# Patient Record
Sex: FEMALE | Race: WHITE | NOT HISPANIC OR LATINO | Employment: OTHER | URBAN - METROPOLITAN AREA
[De-identification: names, ages, dates, MRNs, and addresses within clinical notes are randomized per-mention and may not be internally consistent; named-entity substitution may affect disease eponyms.]

---

## 2019-05-07 ENCOUNTER — HOSPITAL ENCOUNTER (OUTPATIENT)
Dept: OTHER | Facility: HOSPITAL | Age: 79
Discharge: HOME | End: 2019-05-07
Attending: PSYCHIATRY & NEUROLOGY

## 2019-05-07 LAB
AMPHETAMINES UR QL SCN: NEGATIVE
BARBITURATES UR QL SCN: NEGATIVE
BENZODIAZ UR QL SCN: NEGATIVE
CONV COCAINE, UR: NEGATIVE
METHADONE UR QL SCN: NEGATIVE
OPIATES TESTED UR SCN: NEGATIVE
OXYCODONE UR QL SCN: NEGATIVE
PCP UR QL: NEGATIVE
THC SERPLBLD CFM-MCNC: NEGATIVE NG/ML

## 2019-05-09 LAB — ETHANOL UR-MCNC: NEGATIVE MG/DL

## 2019-06-04 ENCOUNTER — HOSPITAL ENCOUNTER (OUTPATIENT)
Dept: OTHER | Facility: HOSPITAL | Age: 79
Discharge: HOME | End: 2019-06-04
Attending: PSYCHIATRY & NEUROLOGY

## 2019-06-04 LAB
CHOLEST SERPL-MCNC: 113 MG/DL (ref 107–200)
EST. AVERAGE GLUCOSE BLD GHB EST-MCNC: 197 MG/DL
HBA1C MFR BLD: 8.5 % (ref 3.5–5.7)
VALPROATE SERPL-MCNC: 57.6 UG/ML (ref 50–100)

## 2019-10-29 ENCOUNTER — HOSPITAL ENCOUNTER (OUTPATIENT)
Dept: OTHER | Facility: HOSPITAL | Age: 79
Discharge: HOME | End: 2019-10-29
Attending: PSYCHIATRY & NEUROLOGY

## 2019-10-29 LAB — VALPROATE SERPL-MCNC: 40.1 UG/ML (ref 50–100)

## 2020-05-26 ENCOUNTER — HOSPITAL ENCOUNTER (OUTPATIENT)
Dept: LAB | Facility: HOSPITAL | Age: 80
Discharge: HOME | End: 2020-05-26
Attending: PSYCHIATRY & NEUROLOGY

## 2020-05-26 LAB
AMPHETAMINES UR QL SCN: NEGATIVE
BARBITURATES UR QL SCN: NEGATIVE
BENZODIAZ UR QL SCN: NEGATIVE
CONV COCAINE, UR: NEGATIVE
ETHANOL BLD-MCNC: <10 MG/DL
METHADONE UR QL SCN: NEGATIVE
OPIATES TESTED UR SCN: NEGATIVE
OXYCODONE UR QL SCN: NEGATIVE
PCP UR QL: NEGATIVE
THC SERPLBLD CFM-MCNC: NEGATIVE NG/ML

## 2020-10-26 ENCOUNTER — APPOINTMENT (OUTPATIENT)
Dept: GENERAL RADIOLOGY | Facility: HOSPITAL | Age: 80
End: 2020-10-26

## 2020-10-26 ENCOUNTER — HOSPITAL ENCOUNTER (INPATIENT)
Facility: HOSPITAL | Age: 80
LOS: 6 days | Discharge: HOME HEALTH | End: 2020-11-03
Attending: INTERNAL MEDICINE | Admitting: INTERNAL MEDICINE

## 2020-10-26 ENCOUNTER — APPOINTMENT (OUTPATIENT)
Dept: CT IMAGING | Facility: HOSPITAL | Age: 80
End: 2020-10-26

## 2020-10-26 DIAGNOSIS — N30.01 ACUTE CYSTITIS WITH HEMATURIA: ICD-10-CM

## 2020-10-26 DIAGNOSIS — U07.1 PNEUMONIA DUE TO COVID-19 VIRUS: Primary | ICD-10-CM

## 2020-10-26 DIAGNOSIS — R06.00 DYSPNEA, UNSPECIFIED TYPE: ICD-10-CM

## 2020-10-26 DIAGNOSIS — J12.82 PNEUMONIA DUE TO COVID-19 VIRUS: Primary | ICD-10-CM

## 2020-10-26 LAB
ALBUMIN SERPL-MCNC: 3 G/DL (ref 3.5–5.2)
ALBUMIN/GLOB SERPL: 0.8 G/DL
ALP SERPL-CCNC: 82 U/L (ref 39–117)
ALT SERPL W P-5'-P-CCNC: 14 U/L (ref 1–33)
ANION GAP SERPL CALCULATED.3IONS-SCNC: 13 MMOL/L (ref 5–15)
ANISOCYTOSIS BLD QL: ABNORMAL
AST SERPL-CCNC: 27 U/L (ref 1–32)
B PARAPERT DNA SPEC QL NAA+PROBE: NOT DETECTED
B PERT DNA SPEC QL NAA+PROBE: NOT DETECTED
BACTERIA UR QL AUTO: ABNORMAL /HPF
BILIRUB SERPL-MCNC: 0.2 MG/DL (ref 0–1.2)
BILIRUB UR QL STRIP: NEGATIVE
BUN SERPL-MCNC: 16 MG/DL (ref 8–23)
BUN/CREAT SERPL: 11 (ref 7–25)
C PNEUM DNA NPH QL NAA+NON-PROBE: NOT DETECTED
CALCIUM SPEC-SCNC: 8.9 MG/DL (ref 8.6–10.5)
CHLORIDE SERPL-SCNC: 103 MMOL/L (ref 98–107)
CLARITY UR: ABNORMAL
CO2 SERPL-SCNC: 23 MMOL/L (ref 22–29)
COLOR UR: YELLOW
CREAT SERPL-MCNC: 1.45 MG/DL (ref 0.57–1)
CRP SERPL-MCNC: 13.34 MG/DL (ref 0–0.5)
D DIMER PPP FEU-MCNC: 2.45 MG/L (FEU) (ref 0–0.59)
DEPRECATED RDW RBC AUTO: 43.8 FL (ref 37–54)
ERYTHROCYTE [DISTWIDTH] IN BLOOD BY AUTOMATED COUNT: 18.2 % (ref 12.3–15.4)
FERRITIN SERPL-MCNC: 96.58 NG/ML (ref 13–150)
FLUAV H1 2009 PAND RNA NPH QL NAA+PROBE: NOT DETECTED
FLUAV H1 HA GENE NPH QL NAA+PROBE: NOT DETECTED
FLUAV H3 RNA NPH QL NAA+PROBE: NOT DETECTED
FLUAV SUBTYP SPEC NAA+PROBE: NOT DETECTED
FLUBV RNA ISLT QL NAA+PROBE: NOT DETECTED
GFR SERPL CREATININE-BSD FRML MDRD: 35 ML/MIN/1.73
GLOBULIN UR ELPH-MCNC: 3.6 GM/DL
GLUCOSE SERPL-MCNC: 203 MG/DL (ref 65–99)
GLUCOSE UR STRIP-MCNC: ABNORMAL MG/DL
HADV DNA SPEC NAA+PROBE: NOT DETECTED
HCOV 229E RNA SPEC QL NAA+PROBE: NOT DETECTED
HCOV HKU1 RNA SPEC QL NAA+PROBE: NOT DETECTED
HCOV NL63 RNA SPEC QL NAA+PROBE: NOT DETECTED
HCOV OC43 RNA SPEC QL NAA+PROBE: NOT DETECTED
HCT VFR BLD AUTO: 26.2 % (ref 34–46.6)
HGB BLD-MCNC: 8.1 G/DL (ref 12–15.9)
HGB UR QL STRIP.AUTO: ABNORMAL
HMPV RNA NPH QL NAA+NON-PROBE: NOT DETECTED
HOLD SPECIMEN: NORMAL
HOLD SPECIMEN: NORMAL
HPIV1 RNA SPEC QL NAA+PROBE: NOT DETECTED
HPIV2 RNA SPEC QL NAA+PROBE: NOT DETECTED
HPIV3 RNA NPH QL NAA+PROBE: NOT DETECTED
HPIV4 P GENE NPH QL NAA+PROBE: NOT DETECTED
HYALINE CASTS UR QL AUTO: ABNORMAL /LPF
KETONES UR QL STRIP: NEGATIVE
LDH SERPL-CCNC: 334 U/L (ref 135–214)
LEUKOCYTE ESTERASE UR QL STRIP.AUTO: ABNORMAL
LYMPHOCYTES # BLD MANUAL: 0.67 10*3/MM3 (ref 0.7–3.1)
LYMPHOCYTES NFR BLD MANUAL: 9 % (ref 19.6–45.3)
LYMPHOCYTES NFR BLD MANUAL: 9 % (ref 5–12)
M PNEUMO IGG SER IA-ACNC: NOT DETECTED
MCH RBC QN AUTO: 20.7 PG (ref 26.6–33)
MCHC RBC AUTO-ENTMCNC: 30.7 G/DL (ref 31.5–35.7)
MCV RBC AUTO: 67.3 FL (ref 79–97)
MICROCYTES BLD QL: ABNORMAL
MONOCYTES # BLD AUTO: 0.67 10*3/MM3 (ref 0.1–0.9)
NEUTROPHILS # BLD AUTO: 6.07 10*3/MM3 (ref 1.7–7)
NEUTROPHILS NFR BLD MANUAL: 82 % (ref 42.7–76)
NITRITE UR QL STRIP: POSITIVE
PH UR STRIP.AUTO: 6.5 [PH] (ref 5–8)
PLAT MORPH BLD: NORMAL
PLATELET # BLD AUTO: 310 10*3/MM3 (ref 140–450)
PMV BLD AUTO: 7.6 FL (ref 6–12)
POTASSIUM SERPL-SCNC: 3.1 MMOL/L (ref 3.5–5.2)
PROCALCITONIN SERPL-MCNC: 0.2 NG/ML (ref 0–0.25)
PROT SERPL-MCNC: 6.6 G/DL (ref 6–8.5)
PROT UR QL STRIP: ABNORMAL
RBC # BLD AUTO: 3.9 10*6/MM3 (ref 3.77–5.28)
RBC # UR: ABNORMAL /HPF
REF LAB TEST METHOD: ABNORMAL
RHINOVIRUS RNA SPEC NAA+PROBE: NOT DETECTED
RSV RNA NPH QL NAA+NON-PROBE: NOT DETECTED
SARS-COV-2 RNA NPH QL NAA+NON-PROBE: DETECTED
SCAN SLIDE: NORMAL
SODIUM SERPL-SCNC: 139 MMOL/L (ref 136–145)
SP GR UR STRIP: 1.02 (ref 1–1.03)
SQUAMOUS #/AREA URNS HPF: ABNORMAL /HPF
UROBILINOGEN UR QL STRIP: ABNORMAL
WBC # BLD AUTO: 7.4 10*3/MM3 (ref 3.4–10.8)
WBC MORPH BLD: NORMAL
WBC UR QL AUTO: ABNORMAL /HPF
WHOLE BLOOD HOLD SPECIMEN: NORMAL
WHOLE BLOOD HOLD SPECIMEN: NORMAL

## 2020-10-26 PROCEDURE — 85379 FIBRIN DEGRADATION QUANT: CPT | Performed by: NURSE PRACTITIONER

## 2020-10-26 PROCEDURE — 71045 X-RAY EXAM CHEST 1 VIEW: CPT

## 2020-10-26 PROCEDURE — 71275 CT ANGIOGRAPHY CHEST: CPT

## 2020-10-26 PROCEDURE — 83615 LACTATE (LD) (LDH) ENZYME: CPT | Performed by: NURSE PRACTITIONER

## 2020-10-26 PROCEDURE — 87186 SC STD MICRODIL/AGAR DIL: CPT | Performed by: NURSE PRACTITIONER

## 2020-10-26 PROCEDURE — 84145 PROCALCITONIN (PCT): CPT | Performed by: NURSE PRACTITIONER

## 2020-10-26 PROCEDURE — 80053 COMPREHEN METABOLIC PANEL: CPT | Performed by: NURSE PRACTITIONER

## 2020-10-26 PROCEDURE — 93005 ELECTROCARDIOGRAM TRACING: CPT | Performed by: NURSE PRACTITIONER

## 2020-10-26 PROCEDURE — 87899 AGENT NOS ASSAY W/OPTIC: CPT | Performed by: NURSE PRACTITIONER

## 2020-10-26 PROCEDURE — 85007 BL SMEAR W/DIFF WBC COUNT: CPT | Performed by: NURSE PRACTITIONER

## 2020-10-26 PROCEDURE — 99284 EMERGENCY DEPT VISIT MOD MDM: CPT

## 2020-10-26 PROCEDURE — 87077 CULTURE AEROBIC IDENTIFY: CPT | Performed by: NURSE PRACTITIONER

## 2020-10-26 PROCEDURE — 87147 CULTURE TYPE IMMUNOLOGIC: CPT | Performed by: NURSE PRACTITIONER

## 2020-10-26 PROCEDURE — 87086 URINE CULTURE/COLONY COUNT: CPT | Performed by: NURSE PRACTITIONER

## 2020-10-26 PROCEDURE — G0378 HOSPITAL OBSERVATION PER HR: HCPCS

## 2020-10-26 PROCEDURE — 36415 COLL VENOUS BLD VENIPUNCTURE: CPT

## 2020-10-26 PROCEDURE — 87150 DNA/RNA AMPLIFIED PROBE: CPT | Performed by: NURSE PRACTITIONER

## 2020-10-26 PROCEDURE — 82728 ASSAY OF FERRITIN: CPT | Performed by: NURSE PRACTITIONER

## 2020-10-26 PROCEDURE — 87040 BLOOD CULTURE FOR BACTERIA: CPT | Performed by: NURSE PRACTITIONER

## 2020-10-26 PROCEDURE — 99223 1ST HOSP IP/OBS HIGH 75: CPT | Performed by: NURSE PRACTITIONER

## 2020-10-26 PROCEDURE — 85025 COMPLETE CBC W/AUTO DIFF WBC: CPT | Performed by: NURSE PRACTITIONER

## 2020-10-26 PROCEDURE — 25010000002 CEFTRIAXONE PER 250 MG: Performed by: NURSE PRACTITIONER

## 2020-10-26 PROCEDURE — 0202U NFCT DS 22 TRGT SARS-COV-2: CPT | Performed by: NURSE PRACTITIONER

## 2020-10-26 PROCEDURE — 86140 C-REACTIVE PROTEIN: CPT | Performed by: NURSE PRACTITIONER

## 2020-10-26 PROCEDURE — 0 IOPAMIDOL PER 1 ML: Performed by: NURSE PRACTITIONER

## 2020-10-26 PROCEDURE — 81001 URINALYSIS AUTO W/SCOPE: CPT | Performed by: NURSE PRACTITIONER

## 2020-10-26 RX ORDER — ONDANSETRON 4 MG/1
4 TABLET, FILM COATED ORAL EVERY 6 HOURS PRN
Status: DISCONTINUED | OUTPATIENT
Start: 2020-10-26 | End: 2020-11-03 | Stop reason: HOSPADM

## 2020-10-26 RX ORDER — ACETAMINOPHEN 325 MG/1
650 TABLET ORAL EVERY 4 HOURS PRN
Status: DISCONTINUED | OUTPATIENT
Start: 2020-10-26 | End: 2020-11-03 | Stop reason: HOSPADM

## 2020-10-26 RX ORDER — POTASSIUM CHLORIDE 20 MEQ/1
40 TABLET, EXTENDED RELEASE ORAL DAILY
Status: DISCONTINUED | OUTPATIENT
Start: 2020-10-26 | End: 2020-11-03 | Stop reason: HOSPADM

## 2020-10-26 RX ORDER — ONDANSETRON 2 MG/ML
4 INJECTION INTRAMUSCULAR; INTRAVENOUS EVERY 6 HOURS PRN
Status: DISCONTINUED | OUTPATIENT
Start: 2020-10-26 | End: 2020-11-03 | Stop reason: HOSPADM

## 2020-10-26 RX ORDER — SODIUM CHLORIDE 0.9 % (FLUSH) 0.9 %
10 SYRINGE (ML) INJECTION EVERY 12 HOURS SCHEDULED
Status: DISCONTINUED | OUTPATIENT
Start: 2020-10-26 | End: 2020-11-03 | Stop reason: HOSPADM

## 2020-10-26 RX ORDER — ACETAMINOPHEN 650 MG/1
650 SUPPOSITORY RECTAL EVERY 4 HOURS PRN
Status: DISCONTINUED | OUTPATIENT
Start: 2020-10-26 | End: 2020-11-03 | Stop reason: HOSPADM

## 2020-10-26 RX ORDER — SODIUM CHLORIDE 0.9 % (FLUSH) 0.9 %
10 SYRINGE (ML) INJECTION AS NEEDED
Status: DISCONTINUED | OUTPATIENT
Start: 2020-10-26 | End: 2020-11-03 | Stop reason: HOSPADM

## 2020-10-26 RX ORDER — HEPARIN SODIUM 5000 [USP'U]/ML
5000 INJECTION, SOLUTION INTRAVENOUS; SUBCUTANEOUS EVERY 12 HOURS SCHEDULED
Status: DISCONTINUED | OUTPATIENT
Start: 2020-10-27 | End: 2020-10-27

## 2020-10-26 RX ORDER — ACETAMINOPHEN 160 MG/5ML
650 SOLUTION ORAL EVERY 4 HOURS PRN
Status: DISCONTINUED | OUTPATIENT
Start: 2020-10-26 | End: 2020-11-03 | Stop reason: HOSPADM

## 2020-10-26 RX ADMIN — IOPAMIDOL 100 ML: 755 INJECTION, SOLUTION INTRAVENOUS at 18:55

## 2020-10-26 RX ADMIN — POTASSIUM CHLORIDE 40 MEQ: 1500 TABLET, EXTENDED RELEASE ORAL at 18:28

## 2020-10-26 RX ADMIN — CEFTRIAXONE SODIUM 1 G: 10 INJECTION, POWDER, FOR SOLUTION INTRAVENOUS at 18:28

## 2020-10-27 PROBLEM — N17.9 ACUTE RENAL INJURY (HCC): Status: ACTIVE | Noted: 2020-10-27

## 2020-10-27 PROBLEM — R53.1 GENERALIZED WEAKNESS: Status: ACTIVE | Noted: 2020-10-27

## 2020-10-27 PROBLEM — G93.41 METABOLIC ENCEPHALOPATHY: Status: ACTIVE | Noted: 2020-10-27

## 2020-10-27 LAB
BACTERIA BLD CULT: ABNORMAL
BOTTLE TYPE: ABNORMAL
D DIMER PPP FEU-MCNC: 2.24 MG/L (FEU) (ref 0–0.59)
D-LACTATE SERPL-SCNC: 1.2 MMOL/L (ref 0.5–2)
GLUCOSE BLDC GLUCOMTR-MCNC: 162 MG/DL (ref 70–105)
GLUCOSE BLDC GLUCOMTR-MCNC: 242 MG/DL (ref 70–105)
GLUCOSE BLDC GLUCOMTR-MCNC: 325 MG/DL (ref 70–105)
HBA1C MFR BLD: 10.3 % (ref 3.5–5.6)
HCT VFR BLD AUTO: 23.4 % (ref 34–46.6)
HGB BLD-MCNC: 7.1 G/DL (ref 12–15.9)
S PNEUM AG SPEC QL LA: NEGATIVE
VALPROATE SERPL-MCNC: 30.1 MCG/ML (ref 50–125)

## 2020-10-27 PROCEDURE — 25010000002 ENOXAPARIN PER 10 MG: Performed by: INTERNAL MEDICINE

## 2020-10-27 PROCEDURE — 25010000002 HEPARIN (PORCINE) PER 1000 UNITS: Performed by: NURSE PRACTITIONER

## 2020-10-27 PROCEDURE — 63710000001 INSULIN LISPRO (HUMAN) PER 5 UNITS: Performed by: NURSE PRACTITIONER

## 2020-10-27 PROCEDURE — 83036 HEMOGLOBIN GLYCOSYLATED A1C: CPT | Performed by: NURSE PRACTITIONER

## 2020-10-27 PROCEDURE — 25010000002 AZITHROMYCIN PER 500 MG: Performed by: NURSE PRACTITIONER

## 2020-10-27 PROCEDURE — 82962 GLUCOSE BLOOD TEST: CPT

## 2020-10-27 PROCEDURE — 63710000001 INSULIN GLARGINE PER 5 UNITS: Performed by: INTERNAL MEDICINE

## 2020-10-27 PROCEDURE — 85018 HEMOGLOBIN: CPT | Performed by: NURSE PRACTITIONER

## 2020-10-27 PROCEDURE — 25010000002 CEFTRIAXONE PER 250 MG: Performed by: NURSE PRACTITIONER

## 2020-10-27 PROCEDURE — 83605 ASSAY OF LACTIC ACID: CPT | Performed by: NURSE PRACTITIONER

## 2020-10-27 PROCEDURE — G0378 HOSPITAL OBSERVATION PER HR: HCPCS

## 2020-10-27 PROCEDURE — 99233 SBSQ HOSP IP/OBS HIGH 50: CPT | Performed by: INTERNAL MEDICINE

## 2020-10-27 PROCEDURE — 87040 BLOOD CULTURE FOR BACTERIA: CPT | Performed by: INTERNAL MEDICINE

## 2020-10-27 PROCEDURE — 85014 HEMATOCRIT: CPT | Performed by: NURSE PRACTITIONER

## 2020-10-27 PROCEDURE — 85379 FIBRIN DEGRADATION QUANT: CPT

## 2020-10-27 PROCEDURE — 80164 ASSAY DIPROPYLACETIC ACD TOT: CPT | Performed by: NURSE PRACTITIONER

## 2020-10-27 RX ORDER — DEXTROSE MONOHYDRATE 25 G/50ML
25 INJECTION, SOLUTION INTRAVENOUS
Status: DISCONTINUED | OUTPATIENT
Start: 2020-10-27 | End: 2020-11-03 | Stop reason: HOSPADM

## 2020-10-27 RX ORDER — PREDNISOLONE ACETATE 10 MG/ML
1 SUSPENSION/ DROPS OPHTHALMIC 4 TIMES DAILY
Status: DISCONTINUED | OUTPATIENT
Start: 2020-10-27 | End: 2020-11-03 | Stop reason: HOSPADM

## 2020-10-27 RX ORDER — SODIUM CHLORIDE 9 MG/ML
75 INJECTION, SOLUTION INTRAVENOUS CONTINUOUS
Status: DISCONTINUED | OUTPATIENT
Start: 2020-10-27 | End: 2020-11-01

## 2020-10-27 RX ORDER — ROSUVASTATIN CALCIUM 20 MG/1
20 TABLET, COATED ORAL
COMMUNITY

## 2020-10-27 RX ORDER — POTASSIUM CHLORIDE 20 MEQ/1
40 TABLET, EXTENDED RELEASE ORAL AS NEEDED
Status: DISCONTINUED | OUTPATIENT
Start: 2020-10-27 | End: 2020-11-03 | Stop reason: HOSPADM

## 2020-10-27 RX ORDER — LEVOTHYROXINE SODIUM 0.07 MG/1
37.5 TABLET ORAL DAILY
Status: DISCONTINUED | OUTPATIENT
Start: 2020-10-27 | End: 2020-11-03 | Stop reason: HOSPADM

## 2020-10-27 RX ORDER — ROSUVASTATIN CALCIUM 10 MG/1
20 TABLET, COATED ORAL NIGHTLY
Status: DISCONTINUED | OUTPATIENT
Start: 2020-10-27 | End: 2020-11-03 | Stop reason: HOSPADM

## 2020-10-27 RX ORDER — RISPERIDONE 0.25 MG/1
0.5 TABLET ORAL EVERY MORNING
Status: DISCONTINUED | OUTPATIENT
Start: 2020-10-28 | End: 2020-11-03 | Stop reason: HOSPADM

## 2020-10-27 RX ORDER — OXYBUTYNIN CHLORIDE 5 MG/1
5 TABLET ORAL EVERY MORNING
COMMUNITY

## 2020-10-27 RX ORDER — OFLOXACIN 3 MG/ML
1 SOLUTION/ DROPS OPHTHALMIC 4 TIMES DAILY
COMMUNITY
End: 2022-09-26

## 2020-10-27 RX ORDER — NICOTINE POLACRILEX 4 MG
15 LOZENGE BUCCAL
Status: DISCONTINUED | OUTPATIENT
Start: 2020-10-27 | End: 2020-11-03 | Stop reason: HOSPADM

## 2020-10-27 RX ORDER — LORAZEPAM 0.5 MG/1
0.5 TABLET ORAL 3 TIMES DAILY
COMMUNITY

## 2020-10-27 RX ORDER — OFLOXACIN 3 MG/ML
1 SOLUTION/ DROPS OPHTHALMIC 4 TIMES DAILY
Status: DISCONTINUED | OUTPATIENT
Start: 2020-10-27 | End: 2020-11-03 | Stop reason: HOSPADM

## 2020-10-27 RX ORDER — LEVOTHYROXINE SODIUM 0.03 MG/1
37.5 TABLET ORAL EVERY MORNING
COMMUNITY
End: 2022-10-11

## 2020-10-27 RX ORDER — RISPERIDONE 1 MG/1
1 TABLET ORAL DAILY
Status: DISCONTINUED | OUTPATIENT
Start: 2020-10-27 | End: 2020-11-03 | Stop reason: HOSPADM

## 2020-10-27 RX ORDER — PREDNISOLONE ACETATE 10 MG/ML
1 SUSPENSION/ DROPS OPHTHALMIC 4 TIMES DAILY
COMMUNITY
End: 2022-09-26

## 2020-10-27 RX ORDER — GLIPIZIDE 10 MG/1
20 TABLET ORAL
COMMUNITY
End: 2022-11-15 | Stop reason: HOSPADM

## 2020-10-27 RX ORDER — RISPERIDONE 0.25 MG/1
0.5 TABLET ORAL NIGHTLY
Status: DISCONTINUED | OUTPATIENT
Start: 2020-10-27 | End: 2020-11-03 | Stop reason: HOSPADM

## 2020-10-27 RX ORDER — LORAZEPAM 0.5 MG/1
0.5 TABLET ORAL 3 TIMES DAILY
Status: DISCONTINUED | OUTPATIENT
Start: 2020-10-27 | End: 2020-11-03 | Stop reason: HOSPADM

## 2020-10-27 RX ORDER — INSULIN GLARGINE 100 [IU]/ML
10 INJECTION, SOLUTION SUBCUTANEOUS NIGHTLY
Status: DISCONTINUED | OUTPATIENT
Start: 2020-10-27 | End: 2020-10-31

## 2020-10-27 RX ORDER — RISPERIDONE 0.5 MG/1
0.5 TABLET ORAL DAILY
Status: ON HOLD | COMMUNITY
End: 2022-11-08

## 2020-10-27 RX ORDER — INSULIN LISPRO 100 [IU]/ML
0-7 INJECTION, SOLUTION INTRAVENOUS; SUBCUTANEOUS AS NEEDED
Status: DISCONTINUED | OUTPATIENT
Start: 2020-10-27 | End: 2020-10-31

## 2020-10-27 RX ORDER — CLOPIDOGREL BISULFATE 75 MG/1
75 TABLET ORAL DAILY
Status: DISCONTINUED | OUTPATIENT
Start: 2020-10-27 | End: 2020-11-03 | Stop reason: HOSPADM

## 2020-10-27 RX ORDER — METFORMIN HYDROCHLORIDE 500 MG/1
500 TABLET, EXTENDED RELEASE ORAL 2 TIMES DAILY
COMMUNITY
End: 2022-11-15 | Stop reason: HOSPADM

## 2020-10-27 RX ORDER — POTASSIUM CHLORIDE 1.5 G/1.77G
40 POWDER, FOR SOLUTION ORAL AS NEEDED
Status: DISCONTINUED | OUTPATIENT
Start: 2020-10-27 | End: 2020-11-03 | Stop reason: HOSPADM

## 2020-10-27 RX ORDER — CLOPIDOGREL BISULFATE 75 MG/1
75 TABLET ORAL DAILY
COMMUNITY
End: 2022-09-26 | Stop reason: ALTCHOICE

## 2020-10-27 RX ORDER — PANTOPRAZOLE SODIUM 40 MG/1
40 TABLET, DELAYED RELEASE ORAL EVERY MORNING
COMMUNITY

## 2020-10-27 RX ORDER — RISPERIDONE 0.25 MG/1
0.5 TABLET ORAL 4 TIMES DAILY
Status: DISCONTINUED | OUTPATIENT
Start: 2020-10-27 | End: 2020-10-27

## 2020-10-27 RX ORDER — INSULIN LISPRO 100 [IU]/ML
0-7 INJECTION, SOLUTION INTRAVENOUS; SUBCUTANEOUS
Status: DISCONTINUED | OUTPATIENT
Start: 2020-10-27 | End: 2020-10-31

## 2020-10-27 RX ORDER — RISPERIDONE 0.25 MG/1
0.5 TABLET ORAL ONCE
Status: COMPLETED | OUTPATIENT
Start: 2020-10-27 | End: 2020-10-27

## 2020-10-27 RX ORDER — DIVALPROEX SODIUM 250 MG/1
250 TABLET, DELAYED RELEASE ORAL 3 TIMES DAILY
COMMUNITY
End: 2022-11-15 | Stop reason: HOSPADM

## 2020-10-27 RX ORDER — DIVALPROEX SODIUM 250 MG/1
250 TABLET, DELAYED RELEASE ORAL 3 TIMES DAILY
Status: DISCONTINUED | OUTPATIENT
Start: 2020-10-27 | End: 2020-11-03 | Stop reason: HOSPADM

## 2020-10-27 RX ORDER — PANTOPRAZOLE SODIUM 40 MG/1
40 TABLET, DELAYED RELEASE ORAL
Status: DISCONTINUED | OUTPATIENT
Start: 2020-10-27 | End: 2020-11-03 | Stop reason: HOSPADM

## 2020-10-27 RX ADMIN — DIVALPROEX SODIUM 250 MG: 250 TABLET, DELAYED RELEASE ORAL at 21:37

## 2020-10-27 RX ADMIN — PREDNISOLONE ACETATE 1 DROP: 10 SUSPENSION/ DROPS OPHTHALMIC at 21:38

## 2020-10-27 RX ADMIN — POTASSIUM CHLORIDE 40 MEQ: 1500 TABLET, EXTENDED RELEASE ORAL at 09:30

## 2020-10-27 RX ADMIN — OFLOXACIN 1 DROP: 3 SOLUTION/ DROPS OPHTHALMIC at 11:27

## 2020-10-27 RX ADMIN — SODIUM CHLORIDE, PRESERVATIVE FREE 10 ML: 5 INJECTION INTRAVENOUS at 21:37

## 2020-10-27 RX ADMIN — DIVALPROEX SODIUM 250 MG: 250 TABLET, DELAYED RELEASE ORAL at 09:33

## 2020-10-27 RX ADMIN — HEPARIN SODIUM 5000 UNITS: 5000 INJECTION INTRAVENOUS; SUBCUTANEOUS at 01:30

## 2020-10-27 RX ADMIN — LORAZEPAM 0.5 MG: 0.5 TABLET ORAL at 21:37

## 2020-10-27 RX ADMIN — ROSUVASTATIN CALCIUM 20 MG: 10 TABLET, FILM COATED ORAL at 21:37

## 2020-10-27 RX ADMIN — ENOXAPARIN SODIUM 40 MG: 40 INJECTION SUBCUTANEOUS at 21:36

## 2020-10-27 RX ADMIN — LORAZEPAM 0.5 MG: 0.5 TABLET ORAL at 09:32

## 2020-10-27 RX ADMIN — INSULIN LISPRO 2 UNITS: 100 INJECTION, SOLUTION INTRAVENOUS; SUBCUTANEOUS at 09:29

## 2020-10-27 RX ADMIN — PREDNISOLONE ACETATE 1 DROP: 10 SUSPENSION/ DROPS OPHTHALMIC at 11:27

## 2020-10-27 RX ADMIN — RISPERIDONE 1 MG: 1 TABLET ORAL at 15:02

## 2020-10-27 RX ADMIN — PREDNISOLONE ACETATE 1 DROP: 10 SUSPENSION/ DROPS OPHTHALMIC at 17:02

## 2020-10-27 RX ADMIN — CLOPIDOGREL BISULFATE 75 MG: 75 TABLET ORAL at 09:37

## 2020-10-27 RX ADMIN — OFLOXACIN 1 DROP: 3 SOLUTION/ DROPS OPHTHALMIC at 17:02

## 2020-10-27 RX ADMIN — INSULIN GLARGINE 10 UNITS: 100 INJECTION, SOLUTION SUBCUTANEOUS at 21:38

## 2020-10-27 RX ADMIN — SODIUM CHLORIDE 500 MG: 900 INJECTION, SOLUTION INTRAVENOUS at 01:29

## 2020-10-27 RX ADMIN — DIVALPROEX SODIUM 250 MG: 250 TABLET, DELAYED RELEASE ORAL at 15:02

## 2020-10-27 RX ADMIN — Medication 10 ML: at 01:30

## 2020-10-27 RX ADMIN — OFLOXACIN 1 DROP: 3 SOLUTION/ DROPS OPHTHALMIC at 21:38

## 2020-10-27 RX ADMIN — ENOXAPARIN SODIUM 40 MG: 40 INJECTION SUBCUTANEOUS at 11:26

## 2020-10-27 RX ADMIN — LEVOTHYROXINE SODIUM 37.5 MCG: 75 TABLET ORAL at 09:30

## 2020-10-27 RX ADMIN — LORAZEPAM 0.5 MG: 0.5 TABLET ORAL at 15:02

## 2020-10-27 RX ADMIN — SODIUM CHLORIDE 75 ML/HR: 900 INJECTION, SOLUTION INTRAVENOUS at 15:08

## 2020-10-27 RX ADMIN — PANTOPRAZOLE SODIUM 40 MG: 40 TABLET, DELAYED RELEASE ORAL at 09:30

## 2020-10-27 RX ADMIN — SODIUM CHLORIDE, PRESERVATIVE FREE 10 ML: 5 INJECTION INTRAVENOUS at 09:35

## 2020-10-27 RX ADMIN — RISPERIDONE 0.5 MG: 0.25 TABLET ORAL at 12:17

## 2020-10-27 RX ADMIN — INSULIN LISPRO 2 UNITS: 100 INJECTION, SOLUTION INTRAVENOUS; SUBCUTANEOUS at 12:17

## 2020-10-27 RX ADMIN — CEFTRIAXONE SODIUM 1 G: 10 INJECTION, POWDER, FOR SOLUTION INTRAVENOUS at 17:02

## 2020-10-27 RX ADMIN — INSULIN LISPRO 5 UNITS: 100 INJECTION, SOLUTION INTRAVENOUS; SUBCUTANEOUS at 17:02

## 2020-10-27 RX ADMIN — RISPERIDONE 0.5 MG: 0.25 TABLET ORAL at 21:37

## 2020-10-28 LAB
ABO GROUP BLD: NORMAL
ALBUMIN SERPL-MCNC: 2.4 G/DL (ref 3.5–5.2)
ALBUMIN/GLOB SERPL: 0.8 G/DL
ALP SERPL-CCNC: 99 U/L (ref 39–117)
ALT SERPL W P-5'-P-CCNC: 23 U/L (ref 1–33)
ANION GAP SERPL CALCULATED.3IONS-SCNC: 8 MMOL/L (ref 5–15)
ANISOCYTOSIS BLD QL: ABNORMAL
AST SERPL-CCNC: 47 U/L (ref 1–32)
BACTERIA SPEC AEROBE CULT: ABNORMAL
BACTERIA SPEC AEROBE CULT: ABNORMAL
BILIRUB SERPL-MCNC: <0.2 MG/DL (ref 0–1.2)
BLD GP AB SCN SERPL QL: NEGATIVE
BUN SERPL-MCNC: 15 MG/DL (ref 8–23)
BUN/CREAT SERPL: 10.9 (ref 7–25)
CALCIUM SPEC-SCNC: 8.2 MG/DL (ref 8.6–10.5)
CHLORIDE SERPL-SCNC: 109 MMOL/L (ref 98–107)
CO2 SERPL-SCNC: 23 MMOL/L (ref 22–29)
CREAT SERPL-MCNC: 1.37 MG/DL (ref 0.57–1)
DEPRECATED RDW RBC AUTO: 44.2 FL (ref 37–54)
EOSINOPHIL # BLD MANUAL: 0.12 10*3/MM3 (ref 0–0.4)
EOSINOPHIL NFR BLD MANUAL: 2 % (ref 0.3–6.2)
ERYTHROCYTE [DISTWIDTH] IN BLOOD BY AUTOMATED COUNT: 18.4 % (ref 12.3–15.4)
GFR SERPL CREATININE-BSD FRML MDRD: 37 ML/MIN/1.73
GLOBULIN UR ELPH-MCNC: 3 GM/DL
GLUCOSE BLDC GLUCOMTR-MCNC: 145 MG/DL (ref 70–105)
GLUCOSE BLDC GLUCOMTR-MCNC: 193 MG/DL (ref 70–105)
GLUCOSE BLDC GLUCOMTR-MCNC: 228 MG/DL (ref 70–105)
GLUCOSE BLDC GLUCOMTR-MCNC: 260 MG/DL (ref 70–105)
GLUCOSE BLDC GLUCOMTR-MCNC: 313 MG/DL (ref 70–105)
GLUCOSE SERPL-MCNC: 179 MG/DL (ref 65–99)
HCT VFR BLD AUTO: 19.7 % (ref 34–46.6)
HCT VFR BLD AUTO: 25.8 % (ref 34–46.6)
HGB BLD-MCNC: 6.2 G/DL (ref 12–15.9)
HGB BLD-MCNC: 8 G/DL (ref 12–15.9)
LYMPHOCYTES # BLD MANUAL: 0.62 10*3/MM3 (ref 0.7–3.1)
LYMPHOCYTES NFR BLD MANUAL: 10 % (ref 19.6–45.3)
LYMPHOCYTES NFR BLD MANUAL: 6 % (ref 5–12)
MCH RBC QN AUTO: 21.4 PG (ref 26.6–33)
MCHC RBC AUTO-ENTMCNC: 31.7 G/DL (ref 31.5–35.7)
MCV RBC AUTO: 67.7 FL (ref 79–97)
MICROCYTES BLD QL: ABNORMAL
MONOCYTES # BLD AUTO: 0.37 10*3/MM3 (ref 0.1–0.9)
NEUTROPHILS # BLD AUTO: 5.08 10*3/MM3 (ref 1.7–7)
NEUTROPHILS NFR BLD MANUAL: 77 % (ref 42.7–76)
NEUTS BAND NFR BLD MANUAL: 5 % (ref 0–5)
PLATELET # BLD AUTO: 253 10*3/MM3 (ref 140–450)
PMV BLD AUTO: 7.5 FL (ref 6–12)
POTASSIUM SERPL-SCNC: 4.2 MMOL/L (ref 3.5–5.2)
PROCALCITONIN SERPL-MCNC: 0.18 NG/ML (ref 0–0.25)
PROT SERPL-MCNC: 5.4 G/DL (ref 6–8.5)
QT INTERVAL: 326 MS
RBC # BLD AUTO: 2.91 10*6/MM3 (ref 3.77–5.28)
RH BLD: POSITIVE
SCAN SLIDE: NORMAL
SMALL PLATELETS BLD QL SMEAR: ADEQUATE
SODIUM SERPL-SCNC: 140 MMOL/L (ref 136–145)
T&S EXPIRATION DATE: NORMAL
WBC # BLD AUTO: 6.2 10*3/MM3 (ref 3.4–10.8)
WBC MORPH BLD: NORMAL

## 2020-10-28 PROCEDURE — 86900 BLOOD TYPING SEROLOGIC ABO: CPT

## 2020-10-28 PROCEDURE — 82962 GLUCOSE BLOOD TEST: CPT

## 2020-10-28 PROCEDURE — 25010000002 CEFTRIAXONE PER 250 MG: Performed by: INTERNAL MEDICINE

## 2020-10-28 PROCEDURE — 85018 HEMOGLOBIN: CPT | Performed by: INTERNAL MEDICINE

## 2020-10-28 PROCEDURE — 63710000001 INSULIN GLARGINE PER 5 UNITS: Performed by: INTERNAL MEDICINE

## 2020-10-28 PROCEDURE — 86923 COMPATIBILITY TEST ELECTRIC: CPT

## 2020-10-28 PROCEDURE — P9016 RBC LEUKOCYTES REDUCED: HCPCS

## 2020-10-28 PROCEDURE — 85025 COMPLETE CBC W/AUTO DIFF WBC: CPT | Performed by: INTERNAL MEDICINE

## 2020-10-28 PROCEDURE — 84145 PROCALCITONIN (PCT): CPT | Performed by: NURSE PRACTITIONER

## 2020-10-28 PROCEDURE — 86850 RBC ANTIBODY SCREEN: CPT | Performed by: NURSE PRACTITIONER

## 2020-10-28 PROCEDURE — 63710000001 INSULIN LISPRO (HUMAN) PER 5 UNITS: Performed by: NURSE PRACTITIONER

## 2020-10-28 PROCEDURE — 25010000002 AZITHROMYCIN PER 500 MG: Performed by: NURSE PRACTITIONER

## 2020-10-28 PROCEDURE — 86900 BLOOD TYPING SEROLOGIC ABO: CPT | Performed by: NURSE PRACTITIONER

## 2020-10-28 PROCEDURE — 86901 BLOOD TYPING SEROLOGIC RH(D): CPT | Performed by: NURSE PRACTITIONER

## 2020-10-28 PROCEDURE — 63710000001 DIPHENHYDRAMINE PER 50 MG: Performed by: NURSE PRACTITIONER

## 2020-10-28 PROCEDURE — 86901 BLOOD TYPING SEROLOGIC RH(D): CPT

## 2020-10-28 PROCEDURE — 99233 SBSQ HOSP IP/OBS HIGH 50: CPT | Performed by: INTERNAL MEDICINE

## 2020-10-28 PROCEDURE — 85007 BL SMEAR W/DIFF WBC COUNT: CPT | Performed by: INTERNAL MEDICINE

## 2020-10-28 PROCEDURE — 85014 HEMATOCRIT: CPT | Performed by: INTERNAL MEDICINE

## 2020-10-28 PROCEDURE — 36430 TRANSFUSION BLD/BLD COMPNT: CPT

## 2020-10-28 PROCEDURE — 25010000002 ENOXAPARIN PER 10 MG: Performed by: INTERNAL MEDICINE

## 2020-10-28 PROCEDURE — 80053 COMPREHEN METABOLIC PANEL: CPT | Performed by: INTERNAL MEDICINE

## 2020-10-28 RX ORDER — ACETAMINOPHEN 160 MG/5ML
650 SOLUTION ORAL ONCE
Status: COMPLETED | OUTPATIENT
Start: 2020-10-28 | End: 2020-10-28

## 2020-10-28 RX ORDER — AZITHROMYCIN 250 MG/1
500 TABLET, FILM COATED ORAL ONCE
Status: COMPLETED | OUTPATIENT
Start: 2020-10-29 | End: 2020-10-29

## 2020-10-28 RX ORDER — ACETAMINOPHEN 650 MG/1
650 SUPPOSITORY RECTAL ONCE
Status: COMPLETED | OUTPATIENT
Start: 2020-10-28 | End: 2020-10-28

## 2020-10-28 RX ORDER — ACETAMINOPHEN 325 MG/1
650 TABLET ORAL ONCE
Status: COMPLETED | OUTPATIENT
Start: 2020-10-28 | End: 2020-10-28

## 2020-10-28 RX ORDER — DIPHENHYDRAMINE HYDROCHLORIDE 50 MG/ML
25 INJECTION INTRAMUSCULAR; INTRAVENOUS ONCE
Status: COMPLETED | OUTPATIENT
Start: 2020-10-28 | End: 2020-10-28

## 2020-10-28 RX ORDER — DEXAMETHASONE 6 MG/1
6 TABLET ORAL
Status: DISCONTINUED | OUTPATIENT
Start: 2020-10-29 | End: 2020-11-03 | Stop reason: HOSPADM

## 2020-10-28 RX ORDER — DIPHENHYDRAMINE HCL 25 MG
25 CAPSULE ORAL ONCE
Status: COMPLETED | OUTPATIENT
Start: 2020-10-28 | End: 2020-10-28

## 2020-10-28 RX ADMIN — RISPERIDONE 0.5 MG: 0.25 TABLET ORAL at 21:00

## 2020-10-28 RX ADMIN — INSULIN GLARGINE 10 UNITS: 100 INJECTION, SOLUTION SUBCUTANEOUS at 21:01

## 2020-10-28 RX ADMIN — INSULIN LISPRO 2 UNITS: 100 INJECTION, SOLUTION INTRAVENOUS; SUBCUTANEOUS at 12:14

## 2020-10-28 RX ADMIN — SODIUM CHLORIDE, PRESERVATIVE FREE 10 ML: 5 INJECTION INTRAVENOUS at 10:03

## 2020-10-28 RX ADMIN — ROSUVASTATIN CALCIUM 20 MG: 10 TABLET, FILM COATED ORAL at 21:00

## 2020-10-28 RX ADMIN — SODIUM CHLORIDE 500 MG: 900 INJECTION, SOLUTION INTRAVENOUS at 00:23

## 2020-10-28 RX ADMIN — DIVALPROEX SODIUM 250 MG: 250 TABLET, DELAYED RELEASE ORAL at 18:04

## 2020-10-28 RX ADMIN — POTASSIUM CHLORIDE 40 MEQ: 1500 TABLET, EXTENDED RELEASE ORAL at 10:02

## 2020-10-28 RX ADMIN — SODIUM CHLORIDE 75 ML/HR: 900 INJECTION, SOLUTION INTRAVENOUS at 21:00

## 2020-10-28 RX ADMIN — DIPHENHYDRAMINE HYDROCHLORIDE 25 MG: 25 CAPSULE ORAL at 05:27

## 2020-10-28 RX ADMIN — PANTOPRAZOLE SODIUM 40 MG: 40 TABLET, DELAYED RELEASE ORAL at 10:02

## 2020-10-28 RX ADMIN — ACETAMINOPHEN 650 MG: 325 TABLET, FILM COATED ORAL at 05:27

## 2020-10-28 RX ADMIN — OFLOXACIN 1 DROP: 3 SOLUTION/ DROPS OPHTHALMIC at 10:03

## 2020-10-28 RX ADMIN — OFLOXACIN 1 DROP: 3 SOLUTION/ DROPS OPHTHALMIC at 18:05

## 2020-10-28 RX ADMIN — PREDNISOLONE ACETATE 1 DROP: 10 SUSPENSION/ DROPS OPHTHALMIC at 10:03

## 2020-10-28 RX ADMIN — OFLOXACIN 1 DROP: 3 SOLUTION/ DROPS OPHTHALMIC at 12:13

## 2020-10-28 RX ADMIN — LORAZEPAM 0.5 MG: 0.5 TABLET ORAL at 18:04

## 2020-10-28 RX ADMIN — CLOPIDOGREL BISULFATE 75 MG: 75 TABLET ORAL at 10:02

## 2020-10-28 RX ADMIN — SODIUM CHLORIDE, PRESERVATIVE FREE 10 ML: 5 INJECTION INTRAVENOUS at 21:01

## 2020-10-28 RX ADMIN — DIVALPROEX SODIUM 250 MG: 250 TABLET, DELAYED RELEASE ORAL at 10:02

## 2020-10-28 RX ADMIN — DIVALPROEX SODIUM 250 MG: 250 TABLET, DELAYED RELEASE ORAL at 21:00

## 2020-10-28 RX ADMIN — PREDNISOLONE ACETATE 1 DROP: 10 SUSPENSION/ DROPS OPHTHALMIC at 21:00

## 2020-10-28 RX ADMIN — CEFTRIAXONE SODIUM 1 G: 10 INJECTION, POWDER, FOR SOLUTION INTRAVENOUS at 18:04

## 2020-10-28 RX ADMIN — LEVOTHYROXINE SODIUM 37.5 MCG: 75 TABLET ORAL at 10:02

## 2020-10-28 RX ADMIN — LORAZEPAM 0.5 MG: 0.5 TABLET ORAL at 10:02

## 2020-10-28 RX ADMIN — RISPERIDONE 1 MG: 1 TABLET ORAL at 18:04

## 2020-10-28 RX ADMIN — PREDNISOLONE ACETATE 1 DROP: 10 SUSPENSION/ DROPS OPHTHALMIC at 18:05

## 2020-10-28 RX ADMIN — RISPERIDONE 0.5 MG: 0.25 TABLET ORAL at 05:26

## 2020-10-28 RX ADMIN — SODIUM CHLORIDE 75 ML/HR: 900 INJECTION, SOLUTION INTRAVENOUS at 12:14

## 2020-10-28 RX ADMIN — PREDNISOLONE ACETATE 1 DROP: 10 SUSPENSION/ DROPS OPHTHALMIC at 12:13

## 2020-10-28 RX ADMIN — ENOXAPARIN SODIUM 40 MG: 40 INJECTION SUBCUTANEOUS at 10:02

## 2020-10-28 RX ADMIN — ENOXAPARIN SODIUM 40 MG: 40 INJECTION SUBCUTANEOUS at 21:01

## 2020-10-28 RX ADMIN — LORAZEPAM 0.5 MG: 0.5 TABLET ORAL at 21:00

## 2020-10-28 RX ADMIN — INSULIN LISPRO 3 UNITS: 100 INJECTION, SOLUTION INTRAVENOUS; SUBCUTANEOUS at 18:04

## 2020-10-28 RX ADMIN — OFLOXACIN 1 DROP: 3 SOLUTION/ DROPS OPHTHALMIC at 21:00

## 2020-10-29 LAB
ANION GAP SERPL CALCULATED.3IONS-SCNC: 10 MMOL/L (ref 5–15)
ANISOCYTOSIS BLD QL: ABNORMAL
BACTERIA SPEC AEROBE CULT: ABNORMAL
BH BB BLOOD EXPIRATION DATE: NORMAL
BH BB BLOOD TYPE BARCODE: 5100
BH BB DISPENSE STATUS: NORMAL
BH BB PRODUCT CODE: NORMAL
BH BB UNIT NUMBER: NORMAL
BUN SERPL-MCNC: 11 MG/DL (ref 8–23)
BUN/CREAT SERPL: 10 (ref 7–25)
BURR CELLS BLD QL SMEAR: ABNORMAL
CALCIUM SPEC-SCNC: 8.4 MG/DL (ref 8.6–10.5)
CHLORIDE SERPL-SCNC: 106 MMOL/L (ref 98–107)
CO2 SERPL-SCNC: 20 MMOL/L (ref 22–29)
CREAT SERPL-MCNC: 1.1 MG/DL (ref 0.57–1)
CROSSMATCH INTERPRETATION: NORMAL
DACRYOCYTES BLD QL SMEAR: ABNORMAL
DEPRECATED RDW RBC AUTO: 52.9 FL (ref 37–54)
ELLIPTOCYTES BLD QL SMEAR: ABNORMAL
ERYTHROCYTE [DISTWIDTH] IN BLOOD BY AUTOMATED COUNT: 21.1 % (ref 12.3–15.4)
GFR SERPL CREATININE-BSD FRML MDRD: 48 ML/MIN/1.73
GLUCOSE BLDC GLUCOMTR-MCNC: 148 MG/DL (ref 70–105)
GLUCOSE BLDC GLUCOMTR-MCNC: 312 MG/DL (ref 70–105)
GLUCOSE BLDC GLUCOMTR-MCNC: 353 MG/DL (ref 70–105)
GLUCOSE BLDC GLUCOMTR-MCNC: 447 MG/DL (ref 70–105)
GLUCOSE BLDC GLUCOMTR-MCNC: 483 MG/DL (ref 70–105)
GLUCOSE SERPL-MCNC: 154 MG/DL (ref 65–99)
GRAM STN SPEC: ABNORMAL
HCT VFR BLD AUTO: 26.2 % (ref 34–46.6)
HGB BLD-MCNC: 8.4 G/DL (ref 12–15.9)
LYMPHOCYTES # BLD MANUAL: 0.54 10*3/MM3 (ref 0.7–3.1)
LYMPHOCYTES NFR BLD MANUAL: 17 % (ref 5–12)
LYMPHOCYTES NFR BLD MANUAL: 7 % (ref 19.6–45.3)
MCH RBC QN AUTO: 22.7 PG (ref 26.6–33)
MCHC RBC AUTO-ENTMCNC: 32.1 G/DL (ref 31.5–35.7)
MCV RBC AUTO: 70.6 FL (ref 79–97)
METAMYELOCYTES NFR BLD MANUAL: 1 % (ref 0–0)
MICROCYTES BLD QL: ABNORMAL
MONOCYTES # BLD AUTO: 1.31 10*3/MM3 (ref 0.1–0.9)
MYELOCYTES NFR BLD MANUAL: 1 % (ref 0–0)
NEUTROPHILS # BLD AUTO: 5.7 10*3/MM3 (ref 1.7–7)
NEUTROPHILS NFR BLD MANUAL: 70 % (ref 42.7–76)
NEUTS BAND NFR BLD MANUAL: 4 % (ref 0–5)
PLAT MORPH BLD: NORMAL
PLATELET # BLD AUTO: 350 10*3/MM3 (ref 140–450)
PMV BLD AUTO: 7.4 FL (ref 6–12)
POIKILOCYTOSIS BLD QL SMEAR: ABNORMAL
POLYCHROMASIA BLD QL SMEAR: ABNORMAL
POTASSIUM SERPL-SCNC: 4.3 MMOL/L (ref 3.5–5.2)
RBC # BLD AUTO: 3.72 10*6/MM3 (ref 3.77–5.28)
SCAN SLIDE: NORMAL
SODIUM SERPL-SCNC: 136 MMOL/L (ref 136–145)
UNIT  ABO: NORMAL
UNIT  RH: NORMAL
WBC # BLD AUTO: 7.7 10*3/MM3 (ref 3.4–10.8)
WBC MORPH BLD: NORMAL

## 2020-10-29 PROCEDURE — 25010000002 ENOXAPARIN PER 10 MG: Performed by: INTERNAL MEDICINE

## 2020-10-29 PROCEDURE — 25010000002 CEFTRIAXONE PER 250 MG: Performed by: INTERNAL MEDICINE

## 2020-10-29 PROCEDURE — 63710000001 INSULIN GLARGINE PER 5 UNITS: Performed by: INTERNAL MEDICINE

## 2020-10-29 PROCEDURE — 85007 BL SMEAR W/DIFF WBC COUNT: CPT | Performed by: INTERNAL MEDICINE

## 2020-10-29 PROCEDURE — 97162 PT EVAL MOD COMPLEX 30 MIN: CPT

## 2020-10-29 PROCEDURE — 82962 GLUCOSE BLOOD TEST: CPT

## 2020-10-29 PROCEDURE — 99232 SBSQ HOSP IP/OBS MODERATE 35: CPT | Performed by: INTERNAL MEDICINE

## 2020-10-29 PROCEDURE — 80048 BASIC METABOLIC PNL TOTAL CA: CPT | Performed by: INTERNAL MEDICINE

## 2020-10-29 PROCEDURE — 63710000001 INSULIN LISPRO (HUMAN) PER 5 UNITS: Performed by: NURSE PRACTITIONER

## 2020-10-29 PROCEDURE — 85025 COMPLETE CBC W/AUTO DIFF WBC: CPT | Performed by: INTERNAL MEDICINE

## 2020-10-29 RX ADMIN — DIVALPROEX SODIUM 250 MG: 250 TABLET, DELAYED RELEASE ORAL at 17:26

## 2020-10-29 RX ADMIN — OFLOXACIN 1 DROP: 3 SOLUTION/ DROPS OPHTHALMIC at 17:26

## 2020-10-29 RX ADMIN — SODIUM CHLORIDE 75 ML/HR: 900 INJECTION, SOLUTION INTRAVENOUS at 09:36

## 2020-10-29 RX ADMIN — RISPERIDONE 0.5 MG: 0.25 TABLET ORAL at 20:50

## 2020-10-29 RX ADMIN — SODIUM CHLORIDE, PRESERVATIVE FREE 10 ML: 5 INJECTION INTRAVENOUS at 17:26

## 2020-10-29 RX ADMIN — ENOXAPARIN SODIUM 40 MG: 40 INJECTION SUBCUTANEOUS at 09:34

## 2020-10-29 RX ADMIN — DIVALPROEX SODIUM 250 MG: 250 TABLET, DELAYED RELEASE ORAL at 20:50

## 2020-10-29 RX ADMIN — PREDNISOLONE ACETATE 1 DROP: 10 SUSPENSION/ DROPS OPHTHALMIC at 20:51

## 2020-10-29 RX ADMIN — PREDNISOLONE ACETATE 1 DROP: 10 SUSPENSION/ DROPS OPHTHALMIC at 17:26

## 2020-10-29 RX ADMIN — DIVALPROEX SODIUM 250 MG: 250 TABLET, DELAYED RELEASE ORAL at 09:36

## 2020-10-29 RX ADMIN — OFLOXACIN 1 DROP: 3 SOLUTION/ DROPS OPHTHALMIC at 09:33

## 2020-10-29 RX ADMIN — SODIUM CHLORIDE, PRESERVATIVE FREE 10 ML: 5 INJECTION INTRAVENOUS at 09:35

## 2020-10-29 RX ADMIN — LEVOTHYROXINE SODIUM 37.5 MCG: 75 TABLET ORAL at 05:19

## 2020-10-29 RX ADMIN — CLOPIDOGREL BISULFATE 75 MG: 75 TABLET ORAL at 09:36

## 2020-10-29 RX ADMIN — LORAZEPAM 0.5 MG: 0.5 TABLET ORAL at 09:36

## 2020-10-29 RX ADMIN — ENOXAPARIN SODIUM 40 MG: 40 INJECTION SUBCUTANEOUS at 20:53

## 2020-10-29 RX ADMIN — CEFTRIAXONE SODIUM 1 G: 10 INJECTION, POWDER, FOR SOLUTION INTRAVENOUS at 17:26

## 2020-10-29 RX ADMIN — LORAZEPAM 0.5 MG: 0.5 TABLET ORAL at 17:26

## 2020-10-29 RX ADMIN — INSULIN GLARGINE 10 UNITS: 100 INJECTION, SOLUTION SUBCUTANEOUS at 20:51

## 2020-10-29 RX ADMIN — OFLOXACIN 1 DROP: 3 SOLUTION/ DROPS OPHTHALMIC at 12:37

## 2020-10-29 RX ADMIN — INSULIN LISPRO 6 UNITS: 100 INJECTION, SOLUTION INTRAVENOUS; SUBCUTANEOUS at 17:26

## 2020-10-29 RX ADMIN — LORAZEPAM 0.5 MG: 0.5 TABLET ORAL at 20:51

## 2020-10-29 RX ADMIN — PANTOPRAZOLE SODIUM 40 MG: 40 TABLET, DELAYED RELEASE ORAL at 05:18

## 2020-10-29 RX ADMIN — AZITHROMYCIN MONOHYDRATE 500 MG: 250 TABLET ORAL at 00:09

## 2020-10-29 RX ADMIN — POTASSIUM CHLORIDE 40 MEQ: 1500 TABLET, EXTENDED RELEASE ORAL at 09:36

## 2020-10-29 RX ADMIN — RISPERIDONE 0.5 MG: 0.25 TABLET ORAL at 05:19

## 2020-10-29 RX ADMIN — INSULIN LISPRO 5 UNITS: 100 INJECTION, SOLUTION INTRAVENOUS; SUBCUTANEOUS at 12:36

## 2020-10-29 RX ADMIN — OFLOXACIN 1 DROP: 3 SOLUTION/ DROPS OPHTHALMIC at 20:51

## 2020-10-29 RX ADMIN — SODIUM CHLORIDE, PRESERVATIVE FREE 10 ML: 5 INJECTION INTRAVENOUS at 20:51

## 2020-10-29 RX ADMIN — PREDNISOLONE ACETATE 1 DROP: 10 SUSPENSION/ DROPS OPHTHALMIC at 09:33

## 2020-10-29 RX ADMIN — ROSUVASTATIN CALCIUM 20 MG: 10 TABLET, FILM COATED ORAL at 20:50

## 2020-10-29 RX ADMIN — INSULIN LISPRO 7 UNITS: 100 INJECTION, SOLUTION INTRAVENOUS; SUBCUTANEOUS at 20:52

## 2020-10-29 RX ADMIN — RISPERIDONE 1 MG: 1 TABLET ORAL at 17:26

## 2020-10-29 RX ADMIN — PREDNISOLONE ACETATE 1 DROP: 10 SUSPENSION/ DROPS OPHTHALMIC at 12:37

## 2020-10-29 RX ADMIN — DEXAMETHASONE 6 MG: 6 TABLET ORAL at 09:35

## 2020-10-30 LAB
ANION GAP SERPL CALCULATED.3IONS-SCNC: 11 MMOL/L (ref 5–15)
BUN SERPL-MCNC: 20 MG/DL (ref 8–23)
BUN/CREAT SERPL: 19.4 (ref 7–25)
BURR CELLS BLD QL SMEAR: ABNORMAL
C3 FRG RBC-MCNC: ABNORMAL
CALCIUM SPEC-SCNC: 8.9 MG/DL (ref 8.6–10.5)
CHLORIDE SERPL-SCNC: 108 MMOL/L (ref 98–107)
CO2 SERPL-SCNC: 20 MMOL/L (ref 22–29)
CREAT SERPL-MCNC: 1.03 MG/DL (ref 0.57–1)
DEPRECATED RDW RBC AUTO: 53.4 FL (ref 37–54)
ERYTHROCYTE [DISTWIDTH] IN BLOOD BY AUTOMATED COUNT: 21.5 % (ref 12.3–15.4)
GFR SERPL CREATININE-BSD FRML MDRD: 51 ML/MIN/1.73
GLUCOSE BLDC GLUCOMTR-MCNC: 185 MG/DL (ref 70–105)
GLUCOSE BLDC GLUCOMTR-MCNC: 281 MG/DL (ref 70–105)
GLUCOSE BLDC GLUCOMTR-MCNC: 310 MG/DL (ref 70–105)
GLUCOSE BLDC GLUCOMTR-MCNC: 351 MG/DL (ref 70–105)
GLUCOSE BLDC GLUCOMTR-MCNC: 387 MG/DL (ref 70–105)
GLUCOSE SERPL-MCNC: 253 MG/DL (ref 65–99)
HCT VFR BLD AUTO: 28.7 % (ref 34–46.6)
HGB BLD-MCNC: 9.1 G/DL (ref 12–15.9)
LYMPHOCYTES # BLD MANUAL: 1.16 10*3/MM3 (ref 0.7–3.1)
LYMPHOCYTES NFR BLD MANUAL: 10 % (ref 5–12)
LYMPHOCYTES NFR BLD MANUAL: 15 % (ref 19.6–45.3)
MCH RBC QN AUTO: 22.2 PG (ref 26.6–33)
MCHC RBC AUTO-ENTMCNC: 31.7 G/DL (ref 31.5–35.7)
MCV RBC AUTO: 69.9 FL (ref 79–97)
METAMYELOCYTES NFR BLD MANUAL: 1 % (ref 0–0)
MICROCYTES BLD QL: ABNORMAL
MONOCYTES # BLD AUTO: 0.77 10*3/MM3 (ref 0.1–0.9)
NEUTROPHILS # BLD AUTO: 5.7 10*3/MM3 (ref 1.7–7)
NEUTROPHILS NFR BLD MANUAL: 63 % (ref 42.7–76)
NEUTS BAND NFR BLD MANUAL: 11 % (ref 0–5)
PLAT MORPH BLD: NORMAL
PLATELET # BLD AUTO: 434 10*3/MM3 (ref 140–450)
PMV BLD AUTO: 7.3 FL (ref 6–12)
POIKILOCYTOSIS BLD QL SMEAR: ABNORMAL
POTASSIUM SERPL-SCNC: 4.1 MMOL/L (ref 3.5–5.2)
RBC # BLD AUTO: 4.11 10*6/MM3 (ref 3.77–5.28)
SCAN SLIDE: NORMAL
SODIUM SERPL-SCNC: 139 MMOL/L (ref 136–145)
WBC # BLD AUTO: 7.7 10*3/MM3 (ref 3.4–10.8)
WBC MORPH BLD: NORMAL

## 2020-10-30 PROCEDURE — 82962 GLUCOSE BLOOD TEST: CPT

## 2020-10-30 PROCEDURE — 85025 COMPLETE CBC W/AUTO DIFF WBC: CPT | Performed by: INTERNAL MEDICINE

## 2020-10-30 PROCEDURE — 80048 BASIC METABOLIC PNL TOTAL CA: CPT | Performed by: INTERNAL MEDICINE

## 2020-10-30 PROCEDURE — 25010000002 ENOXAPARIN PER 10 MG: Performed by: INTERNAL MEDICINE

## 2020-10-30 PROCEDURE — 63710000001 INSULIN GLARGINE PER 5 UNITS: Performed by: INTERNAL MEDICINE

## 2020-10-30 PROCEDURE — 99232 SBSQ HOSP IP/OBS MODERATE 35: CPT | Performed by: INTERNAL MEDICINE

## 2020-10-30 PROCEDURE — 25010000002 CEFTRIAXONE PER 250 MG: Performed by: INTERNAL MEDICINE

## 2020-10-30 PROCEDURE — 85007 BL SMEAR W/DIFF WBC COUNT: CPT | Performed by: INTERNAL MEDICINE

## 2020-10-30 PROCEDURE — 63710000001 INSULIN LISPRO (HUMAN) PER 5 UNITS: Performed by: NURSE PRACTITIONER

## 2020-10-30 RX ADMIN — OFLOXACIN 1 DROP: 3 SOLUTION/ DROPS OPHTHALMIC at 21:35

## 2020-10-30 RX ADMIN — RISPERIDONE 0.5 MG: 0.25 TABLET ORAL at 06:11

## 2020-10-30 RX ADMIN — ENOXAPARIN SODIUM 40 MG: 40 INJECTION SUBCUTANEOUS at 10:15

## 2020-10-30 RX ADMIN — LORAZEPAM 0.5 MG: 0.5 TABLET ORAL at 17:04

## 2020-10-30 RX ADMIN — SODIUM CHLORIDE 75 ML/HR: 900 INJECTION, SOLUTION INTRAVENOUS at 21:35

## 2020-10-30 RX ADMIN — RISPERIDONE 1 MG: 1 TABLET ORAL at 17:03

## 2020-10-30 RX ADMIN — OFLOXACIN 1 DROP: 3 SOLUTION/ DROPS OPHTHALMIC at 17:05

## 2020-10-30 RX ADMIN — INSULIN LISPRO 4 UNITS: 100 INJECTION, SOLUTION INTRAVENOUS; SUBCUTANEOUS at 12:48

## 2020-10-30 RX ADMIN — CEFTRIAXONE SODIUM 1 G: 10 INJECTION, POWDER, FOR SOLUTION INTRAVENOUS at 17:04

## 2020-10-30 RX ADMIN — DIVALPROEX SODIUM 250 MG: 250 TABLET, DELAYED RELEASE ORAL at 07:38

## 2020-10-30 RX ADMIN — PREDNISOLONE ACETATE 1 DROP: 10 SUSPENSION/ DROPS OPHTHALMIC at 17:05

## 2020-10-30 RX ADMIN — LORAZEPAM 0.5 MG: 0.5 TABLET ORAL at 07:39

## 2020-10-30 RX ADMIN — PANTOPRAZOLE SODIUM 40 MG: 40 TABLET, DELAYED RELEASE ORAL at 06:12

## 2020-10-30 RX ADMIN — CLOPIDOGREL BISULFATE 75 MG: 75 TABLET ORAL at 07:39

## 2020-10-30 RX ADMIN — DIVALPROEX SODIUM 250 MG: 250 TABLET, DELAYED RELEASE ORAL at 21:35

## 2020-10-30 RX ADMIN — DEXAMETHASONE 6 MG: 6 TABLET ORAL at 07:38

## 2020-10-30 RX ADMIN — ENOXAPARIN SODIUM 40 MG: 40 INJECTION SUBCUTANEOUS at 21:34

## 2020-10-30 RX ADMIN — PREDNISOLONE ACETATE 1 DROP: 10 SUSPENSION/ DROPS OPHTHALMIC at 12:48

## 2020-10-30 RX ADMIN — INSULIN LISPRO 4 UNITS: 100 INJECTION, SOLUTION INTRAVENOUS; SUBCUTANEOUS at 07:40

## 2020-10-30 RX ADMIN — SODIUM CHLORIDE, PRESERVATIVE FREE 10 ML: 5 INJECTION INTRAVENOUS at 07:41

## 2020-10-30 RX ADMIN — LORAZEPAM 0.5 MG: 0.5 TABLET ORAL at 21:34

## 2020-10-30 RX ADMIN — PREDNISOLONE ACETATE 1 DROP: 10 SUSPENSION/ DROPS OPHTHALMIC at 21:35

## 2020-10-30 RX ADMIN — OFLOXACIN 1 DROP: 3 SOLUTION/ DROPS OPHTHALMIC at 12:48

## 2020-10-30 RX ADMIN — POTASSIUM CHLORIDE 40 MEQ: 1500 TABLET, EXTENDED RELEASE ORAL at 07:38

## 2020-10-30 RX ADMIN — INSULIN LISPRO 6 UNITS: 100 INJECTION, SOLUTION INTRAVENOUS; SUBCUTANEOUS at 17:03

## 2020-10-30 RX ADMIN — LEVOTHYROXINE SODIUM 37.5 MCG: 75 TABLET ORAL at 06:12

## 2020-10-30 RX ADMIN — DIVALPROEX SODIUM 250 MG: 250 TABLET, DELAYED RELEASE ORAL at 17:04

## 2020-10-30 RX ADMIN — SODIUM CHLORIDE, PRESERVATIVE FREE 10 ML: 5 INJECTION INTRAVENOUS at 21:34

## 2020-10-30 RX ADMIN — ROSUVASTATIN CALCIUM 20 MG: 10 TABLET, FILM COATED ORAL at 21:34

## 2020-10-30 RX ADMIN — PREDNISOLONE ACETATE 1 DROP: 10 SUSPENSION/ DROPS OPHTHALMIC at 07:43

## 2020-10-30 RX ADMIN — RISPERIDONE 0.5 MG: 0.25 TABLET ORAL at 21:34

## 2020-10-30 RX ADMIN — OFLOXACIN 1 DROP: 3 SOLUTION/ DROPS OPHTHALMIC at 07:43

## 2020-10-30 RX ADMIN — INSULIN GLARGINE 10 UNITS: 100 INJECTION, SOLUTION SUBCUTANEOUS at 21:35

## 2020-10-31 LAB
ALBUMIN SERPL-MCNC: 2.4 G/DL (ref 3.5–5.2)
ALP SERPL-CCNC: 109 U/L (ref 39–117)
ALT SERPL W P-5'-P-CCNC: 34 U/L (ref 1–33)
ANION GAP SERPL CALCULATED.3IONS-SCNC: 11 MMOL/L (ref 5–15)
AST SERPL-CCNC: 44 U/L (ref 1–32)
BACTERIA SPEC AEROBE CULT: NORMAL
BILIRUB CONJ SERPL-MCNC: <0.2 MG/DL (ref 0–0.3)
BILIRUB INDIRECT SERPL-MCNC: ABNORMAL MG/DL
BILIRUB SERPL-MCNC: 0.2 MG/DL (ref 0–1.2)
BUN SERPL-MCNC: 20 MG/DL (ref 8–23)
BUN/CREAT SERPL: 18.7 (ref 7–25)
C3 FRG RBC-MCNC: ABNORMAL
CALCIUM SPEC-SCNC: 8.9 MG/DL (ref 8.6–10.5)
CHLORIDE SERPL-SCNC: 105 MMOL/L (ref 98–107)
CO2 SERPL-SCNC: 20 MMOL/L (ref 22–29)
CREAT SERPL-MCNC: 1.07 MG/DL (ref 0.57–1)
D DIMER PPP FEU-MCNC: 1.75 MG/L (FEU) (ref 0–0.59)
DEPRECATED RDW RBC AUTO: 53.4 FL (ref 37–54)
ERYTHROCYTE [DISTWIDTH] IN BLOOD BY AUTOMATED COUNT: 21.7 % (ref 12.3–15.4)
GFR SERPL CREATININE-BSD FRML MDRD: 49 ML/MIN/1.73
GLUCOSE BLDC GLUCOMTR-MCNC: 291 MG/DL (ref 70–105)
GLUCOSE BLDC GLUCOMTR-MCNC: 335 MG/DL (ref 70–105)
GLUCOSE BLDC GLUCOMTR-MCNC: 339 MG/DL (ref 70–105)
GLUCOSE BLDC GLUCOMTR-MCNC: 346 MG/DL (ref 70–105)
GLUCOSE SERPL-MCNC: 365 MG/DL (ref 65–99)
HCT VFR BLD AUTO: 28.1 % (ref 34–46.6)
HGB BLD-MCNC: 8.8 G/DL (ref 12–15.9)
LYMPHOCYTES # BLD MANUAL: 1.01 10*3/MM3 (ref 0.7–3.1)
LYMPHOCYTES NFR BLD MANUAL: 13 % (ref 19.6–45.3)
LYMPHOCYTES NFR BLD MANUAL: 5 % (ref 5–12)
MCH RBC QN AUTO: 22.2 PG (ref 26.6–33)
MCHC RBC AUTO-ENTMCNC: 31.4 G/DL (ref 31.5–35.7)
MCV RBC AUTO: 70.5 FL (ref 79–97)
METAMYELOCYTES NFR BLD MANUAL: 1 % (ref 0–0)
MICROCYTES BLD QL: ABNORMAL
MONOCYTES # BLD AUTO: 0.39 10*3/MM3 (ref 0.1–0.9)
NEUTROPHILS # BLD AUTO: 6.32 10*3/MM3 (ref 1.7–7)
NEUTROPHILS NFR BLD MANUAL: 70 % (ref 42.7–76)
NEUTS BAND NFR BLD MANUAL: 11 % (ref 0–5)
NRBC SPEC MANUAL: 1 /100 WBC (ref 0–0.2)
PLAT MORPH BLD: NORMAL
PLATELET # BLD AUTO: 495 10*3/MM3 (ref 140–450)
PMV BLD AUTO: 7.5 FL (ref 6–12)
POIKILOCYTOSIS BLD QL SMEAR: ABNORMAL
POTASSIUM SERPL-SCNC: 4.6 MMOL/L (ref 3.5–5.2)
PROT SERPL-MCNC: 6.1 G/DL (ref 6–8.5)
RBC # BLD AUTO: 3.99 10*6/MM3 (ref 3.77–5.28)
SCAN SLIDE: NORMAL
SODIUM SERPL-SCNC: 136 MMOL/L (ref 136–145)
WBC # BLD AUTO: 7.8 10*3/MM3 (ref 3.4–10.8)
WBC MORPH BLD: NORMAL

## 2020-10-31 PROCEDURE — 85379 FIBRIN DEGRADATION QUANT: CPT | Performed by: INTERNAL MEDICINE

## 2020-10-31 PROCEDURE — 25010000002 CEFTRIAXONE PER 250 MG: Performed by: INTERNAL MEDICINE

## 2020-10-31 PROCEDURE — 25010000002 ENOXAPARIN PER 10 MG: Performed by: INTERNAL MEDICINE

## 2020-10-31 PROCEDURE — 63710000001 INSULIN LISPRO (HUMAN) PER 5 UNITS: Performed by: NURSE PRACTITIONER

## 2020-10-31 PROCEDURE — 80076 HEPATIC FUNCTION PANEL: CPT | Performed by: INTERNAL MEDICINE

## 2020-10-31 PROCEDURE — 99233 SBSQ HOSP IP/OBS HIGH 50: CPT | Performed by: INTERNAL MEDICINE

## 2020-10-31 PROCEDURE — 63710000001 INSULIN LISPRO (HUMAN) PER 5 UNITS: Performed by: INTERNAL MEDICINE

## 2020-10-31 PROCEDURE — 85025 COMPLETE CBC W/AUTO DIFF WBC: CPT | Performed by: INTERNAL MEDICINE

## 2020-10-31 PROCEDURE — 63710000001 INSULIN GLARGINE PER 5 UNITS: Performed by: INTERNAL MEDICINE

## 2020-10-31 PROCEDURE — 85007 BL SMEAR W/DIFF WBC COUNT: CPT | Performed by: INTERNAL MEDICINE

## 2020-10-31 PROCEDURE — 82962 GLUCOSE BLOOD TEST: CPT

## 2020-10-31 PROCEDURE — XW033E5 INTRODUCTION OF REMDESIVIR ANTI-INFECTIVE INTO PERIPHERAL VEIN, PERCUTANEOUS APPROACH, NEW TECHNOLOGY GROUP 5: ICD-10-PCS | Performed by: INTERNAL MEDICINE

## 2020-10-31 PROCEDURE — 80048 BASIC METABOLIC PNL TOTAL CA: CPT | Performed by: INTERNAL MEDICINE

## 2020-10-31 RX ORDER — INSULIN GLARGINE 100 [IU]/ML
15 INJECTION, SOLUTION SUBCUTANEOUS NIGHTLY
Status: DISCONTINUED | OUTPATIENT
Start: 2020-10-31 | End: 2020-11-03 | Stop reason: HOSPADM

## 2020-10-31 RX ORDER — INSULIN LISPRO 100 [IU]/ML
0-24 INJECTION, SOLUTION INTRAVENOUS; SUBCUTANEOUS AS NEEDED
Status: DISCONTINUED | OUTPATIENT
Start: 2020-10-31 | End: 2020-11-03 | Stop reason: HOSPADM

## 2020-10-31 RX ORDER — INSULIN LISPRO 100 [IU]/ML
0-24 INJECTION, SOLUTION INTRAVENOUS; SUBCUTANEOUS
Status: DISCONTINUED | OUTPATIENT
Start: 2020-10-31 | End: 2020-11-03 | Stop reason: HOSPADM

## 2020-10-31 RX ADMIN — OFLOXACIN 1 DROP: 3 SOLUTION/ DROPS OPHTHALMIC at 17:39

## 2020-10-31 RX ADMIN — PANTOPRAZOLE SODIUM 40 MG: 40 TABLET, DELAYED RELEASE ORAL at 09:17

## 2020-10-31 RX ADMIN — LORAZEPAM 0.5 MG: 0.5 TABLET ORAL at 16:15

## 2020-10-31 RX ADMIN — ENOXAPARIN SODIUM 40 MG: 40 INJECTION SUBCUTANEOUS at 20:26

## 2020-10-31 RX ADMIN — INSULIN LISPRO 5 UNITS: 100 INJECTION, SOLUTION INTRAVENOUS; SUBCUTANEOUS at 12:54

## 2020-10-31 RX ADMIN — CLOPIDOGREL BISULFATE 75 MG: 75 TABLET ORAL at 09:17

## 2020-10-31 RX ADMIN — OFLOXACIN 1 DROP: 3 SOLUTION/ DROPS OPHTHALMIC at 11:41

## 2020-10-31 RX ADMIN — CEFTRIAXONE SODIUM 1 G: 10 INJECTION, POWDER, FOR SOLUTION INTRAVENOUS at 17:39

## 2020-10-31 RX ADMIN — LORAZEPAM 0.5 MG: 0.5 TABLET ORAL at 09:18

## 2020-10-31 RX ADMIN — SODIUM CHLORIDE, PRESERVATIVE FREE 10 ML: 5 INJECTION INTRAVENOUS at 09:18

## 2020-10-31 RX ADMIN — PREDNISOLONE ACETATE 1 DROP: 10 SUSPENSION/ DROPS OPHTHALMIC at 11:41

## 2020-10-31 RX ADMIN — RISPERIDONE 1 MG: 1 TABLET ORAL at 16:15

## 2020-10-31 RX ADMIN — LORAZEPAM 0.5 MG: 0.5 TABLET ORAL at 20:25

## 2020-10-31 RX ADMIN — INSULIN LISPRO 4 UNITS: 100 INJECTION, SOLUTION INTRAVENOUS; SUBCUTANEOUS at 09:18

## 2020-10-31 RX ADMIN — DIVALPROEX SODIUM 250 MG: 250 TABLET, DELAYED RELEASE ORAL at 09:18

## 2020-10-31 RX ADMIN — DIVALPROEX SODIUM 250 MG: 250 TABLET, DELAYED RELEASE ORAL at 16:15

## 2020-10-31 RX ADMIN — RISPERIDONE 0.5 MG: 0.25 TABLET ORAL at 09:17

## 2020-10-31 RX ADMIN — ENOXAPARIN SODIUM 40 MG: 40 INJECTION SUBCUTANEOUS at 09:19

## 2020-10-31 RX ADMIN — INSULIN LISPRO 16 UNITS: 100 INJECTION, SOLUTION INTRAVENOUS; SUBCUTANEOUS at 17:38

## 2020-10-31 RX ADMIN — OFLOXACIN 1 DROP: 3 SOLUTION/ DROPS OPHTHALMIC at 09:20

## 2020-10-31 RX ADMIN — OFLOXACIN 1 DROP: 3 SOLUTION/ DROPS OPHTHALMIC at 20:26

## 2020-10-31 RX ADMIN — SODIUM CHLORIDE 75 ML/HR: 900 INJECTION, SOLUTION INTRAVENOUS at 20:24

## 2020-10-31 RX ADMIN — DEXAMETHASONE 6 MG: 6 TABLET ORAL at 09:17

## 2020-10-31 RX ADMIN — SODIUM CHLORIDE 200 MG: 9 INJECTION, SOLUTION INTRAVENOUS at 11:41

## 2020-10-31 RX ADMIN — SODIUM CHLORIDE, PRESERVATIVE FREE 10 ML: 5 INJECTION INTRAVENOUS at 20:24

## 2020-10-31 RX ADMIN — PREDNISOLONE ACETATE 1 DROP: 10 SUSPENSION/ DROPS OPHTHALMIC at 20:26

## 2020-10-31 RX ADMIN — POTASSIUM CHLORIDE 40 MEQ: 1500 TABLET, EXTENDED RELEASE ORAL at 09:17

## 2020-10-31 RX ADMIN — ROSUVASTATIN CALCIUM 20 MG: 10 TABLET, FILM COATED ORAL at 20:25

## 2020-10-31 RX ADMIN — INSULIN GLARGINE 15 UNITS: 100 INJECTION, SOLUTION SUBCUTANEOUS at 20:27

## 2020-10-31 RX ADMIN — RISPERIDONE 0.5 MG: 0.25 TABLET ORAL at 20:25

## 2020-10-31 RX ADMIN — PREDNISOLONE ACETATE 1 DROP: 10 SUSPENSION/ DROPS OPHTHALMIC at 17:39

## 2020-10-31 RX ADMIN — DIVALPROEX SODIUM 250 MG: 250 TABLET, DELAYED RELEASE ORAL at 20:25

## 2020-10-31 RX ADMIN — LEVOTHYROXINE SODIUM 37.5 MCG: 75 TABLET ORAL at 09:18

## 2020-10-31 RX ADMIN — PREDNISOLONE ACETATE 1 DROP: 10 SUSPENSION/ DROPS OPHTHALMIC at 09:20

## 2020-11-01 LAB
ALBUMIN SERPL-MCNC: 2.6 G/DL (ref 3.5–5.2)
ALBUMIN/GLOB SERPL: 0.8 G/DL
ALP SERPL-CCNC: 111 U/L (ref 39–117)
ALT SERPL W P-5'-P-CCNC: 35 U/L (ref 1–33)
ANION GAP SERPL CALCULATED.3IONS-SCNC: 12 MMOL/L (ref 5–15)
ANISOCYTOSIS BLD QL: ABNORMAL
AST SERPL-CCNC: 39 U/L (ref 1–32)
BACTERIA SPEC AEROBE CULT: NORMAL
BACTERIA SPEC AEROBE CULT: NORMAL
BILIRUB CONJ SERPL-MCNC: <0.2 MG/DL (ref 0–0.3)
BILIRUB SERPL-MCNC: 0.2 MG/DL (ref 0–1.2)
BUN SERPL-MCNC: 21 MG/DL (ref 8–23)
BUN/CREAT SERPL: 22.6 (ref 7–25)
C3 FRG RBC-MCNC: ABNORMAL
CALCIUM SPEC-SCNC: 8.7 MG/DL (ref 8.6–10.5)
CHLORIDE SERPL-SCNC: 105 MMOL/L (ref 98–107)
CO2 SERPL-SCNC: 22 MMOL/L (ref 22–29)
CREAT SERPL-MCNC: 0.93 MG/DL (ref 0.57–1)
DEPRECATED RDW RBC AUTO: 52.5 FL (ref 37–54)
EOSINOPHIL # BLD MANUAL: 0.09 10*3/MM3 (ref 0–0.4)
EOSINOPHIL NFR BLD MANUAL: 1 % (ref 0.3–6.2)
ERYTHROCYTE [DISTWIDTH] IN BLOOD BY AUTOMATED COUNT: 21.2 % (ref 12.3–15.4)
GFR SERPL CREATININE-BSD FRML MDRD: 58 ML/MIN/1.73
GLOBULIN UR ELPH-MCNC: 3.4 GM/DL
GLUCOSE BLDC GLUCOMTR-MCNC: 170 MG/DL (ref 70–105)
GLUCOSE BLDC GLUCOMTR-MCNC: 208 MG/DL (ref 70–105)
GLUCOSE BLDC GLUCOMTR-MCNC: 217 MG/DL (ref 70–105)
GLUCOSE BLDC GLUCOMTR-MCNC: 270 MG/DL (ref 70–105)
GLUCOSE SERPL-MCNC: 183 MG/DL (ref 65–99)
HCT VFR BLD AUTO: 28.4 % (ref 34–46.6)
HGB BLD-MCNC: 9 G/DL (ref 12–15.9)
LYMPHOCYTES # BLD MANUAL: 1.38 10*3/MM3 (ref 0.7–3.1)
LYMPHOCYTES NFR BLD MANUAL: 11 % (ref 5–12)
LYMPHOCYTES NFR BLD MANUAL: 15 % (ref 19.6–45.3)
MCH RBC QN AUTO: 22.3 PG (ref 26.6–33)
MCHC RBC AUTO-ENTMCNC: 31.9 G/DL (ref 31.5–35.7)
MCV RBC AUTO: 69.9 FL (ref 79–97)
MICROCYTES BLD QL: ABNORMAL
MONOCYTES # BLD AUTO: 1.01 10*3/MM3 (ref 0.1–0.9)
NEUTROPHILS # BLD AUTO: 6.72 10*3/MM3 (ref 1.7–7)
NEUTROPHILS NFR BLD MANUAL: 71 % (ref 42.7–76)
NEUTS BAND NFR BLD MANUAL: 2 % (ref 0–5)
PLATELET # BLD AUTO: 534 10*3/MM3 (ref 140–450)
PMV BLD AUTO: 7.2 FL (ref 6–12)
POIKILOCYTOSIS BLD QL SMEAR: ABNORMAL
POTASSIUM SERPL-SCNC: 4 MMOL/L (ref 3.5–5.2)
PROT SERPL-MCNC: 6 G/DL (ref 6–8.5)
RBC # BLD AUTO: 4.06 10*6/MM3 (ref 3.77–5.28)
SCAN SLIDE: NORMAL
SMALL PLATELETS BLD QL SMEAR: ABNORMAL
SODIUM SERPL-SCNC: 139 MMOL/L (ref 136–145)
WBC # BLD AUTO: 9.2 10*3/MM3 (ref 3.4–10.8)
WBC MORPH BLD: NORMAL

## 2020-11-01 PROCEDURE — 85025 COMPLETE CBC W/AUTO DIFF WBC: CPT | Performed by: INTERNAL MEDICINE

## 2020-11-01 PROCEDURE — 82248 BILIRUBIN DIRECT: CPT | Performed by: INTERNAL MEDICINE

## 2020-11-01 PROCEDURE — 99232 SBSQ HOSP IP/OBS MODERATE 35: CPT | Performed by: INTERNAL MEDICINE

## 2020-11-01 PROCEDURE — 80053 COMPREHEN METABOLIC PANEL: CPT | Performed by: INTERNAL MEDICINE

## 2020-11-01 PROCEDURE — 82962 GLUCOSE BLOOD TEST: CPT

## 2020-11-01 PROCEDURE — 25010000002 ENOXAPARIN PER 10 MG: Performed by: INTERNAL MEDICINE

## 2020-11-01 PROCEDURE — 63710000001 INSULIN GLARGINE PER 5 UNITS: Performed by: INTERNAL MEDICINE

## 2020-11-01 PROCEDURE — 63710000001 INSULIN LISPRO (HUMAN) PER 5 UNITS: Performed by: INTERNAL MEDICINE

## 2020-11-01 PROCEDURE — 25010000002 CEFTRIAXONE PER 250 MG: Performed by: INTERNAL MEDICINE

## 2020-11-01 PROCEDURE — 63710000001 INSULIN REGULAR HUMAN PER 5 UNITS: Performed by: INTERNAL MEDICINE

## 2020-11-01 PROCEDURE — 85007 BL SMEAR W/DIFF WBC COUNT: CPT | Performed by: INTERNAL MEDICINE

## 2020-11-01 RX ADMIN — LORAZEPAM 0.5 MG: 0.5 TABLET ORAL at 22:23

## 2020-11-01 RX ADMIN — RISPERIDONE 0.5 MG: 0.25 TABLET ORAL at 05:45

## 2020-11-01 RX ADMIN — OFLOXACIN 1 DROP: 3 SOLUTION/ DROPS OPHTHALMIC at 22:24

## 2020-11-01 RX ADMIN — LORAZEPAM 0.5 MG: 0.5 TABLET ORAL at 09:08

## 2020-11-01 RX ADMIN — DIVALPROEX SODIUM 250 MG: 250 TABLET, DELAYED RELEASE ORAL at 22:28

## 2020-11-01 RX ADMIN — PREDNISOLONE ACETATE 1 DROP: 10 SUSPENSION/ DROPS OPHTHALMIC at 09:08

## 2020-11-01 RX ADMIN — INSULIN LISPRO 8 UNITS: 100 INJECTION, SOLUTION INTRAVENOUS; SUBCUTANEOUS at 12:37

## 2020-11-01 RX ADMIN — LORAZEPAM 0.5 MG: 0.5 TABLET ORAL at 17:15

## 2020-11-01 RX ADMIN — PREDNISOLONE ACETATE 1 DROP: 10 SUSPENSION/ DROPS OPHTHALMIC at 17:42

## 2020-11-01 RX ADMIN — OFLOXACIN 1 DROP: 3 SOLUTION/ DROPS OPHTHALMIC at 09:08

## 2020-11-01 RX ADMIN — DEXAMETHASONE 6 MG: 6 TABLET ORAL at 09:08

## 2020-11-01 RX ADMIN — INSULIN LISPRO 8 UNITS: 100 INJECTION, SOLUTION INTRAVENOUS; SUBCUTANEOUS at 17:41

## 2020-11-01 RX ADMIN — PREDNISOLONE ACETATE 1 DROP: 10 SUSPENSION/ DROPS OPHTHALMIC at 12:37

## 2020-11-01 RX ADMIN — CEFTRIAXONE SODIUM 1 G: 10 INJECTION, POWDER, FOR SOLUTION INTRAVENOUS at 17:16

## 2020-11-01 RX ADMIN — INSULIN HUMAN 6 UNITS: 100 INJECTION, SOLUTION PARENTERAL at 12:37

## 2020-11-01 RX ADMIN — DIVALPROEX SODIUM 250 MG: 250 TABLET, DELAYED RELEASE ORAL at 09:08

## 2020-11-01 RX ADMIN — RISPERIDONE 0.5 MG: 0.25 TABLET ORAL at 22:23

## 2020-11-01 RX ADMIN — ENOXAPARIN SODIUM 40 MG: 40 INJECTION SUBCUTANEOUS at 22:24

## 2020-11-01 RX ADMIN — INSULIN LISPRO 4 UNITS: 100 INJECTION, SOLUTION INTRAVENOUS; SUBCUTANEOUS at 09:07

## 2020-11-01 RX ADMIN — LEVOTHYROXINE SODIUM 37.5 MCG: 75 TABLET ORAL at 05:46

## 2020-11-01 RX ADMIN — PANTOPRAZOLE SODIUM 40 MG: 40 TABLET, DELAYED RELEASE ORAL at 05:45

## 2020-11-01 RX ADMIN — OFLOXACIN 1 DROP: 3 SOLUTION/ DROPS OPHTHALMIC at 12:37

## 2020-11-01 RX ADMIN — RISPERIDONE 1 MG: 1 TABLET ORAL at 17:15

## 2020-11-01 RX ADMIN — PREDNISOLONE ACETATE 1 DROP: 10 SUSPENSION/ DROPS OPHTHALMIC at 22:24

## 2020-11-01 RX ADMIN — SODIUM CHLORIDE, PRESERVATIVE FREE 10 ML: 5 INJECTION INTRAVENOUS at 22:24

## 2020-11-01 RX ADMIN — OFLOXACIN 1 DROP: 3 SOLUTION/ DROPS OPHTHALMIC at 17:42

## 2020-11-01 RX ADMIN — CLOPIDOGREL BISULFATE 75 MG: 75 TABLET ORAL at 09:08

## 2020-11-01 RX ADMIN — SODIUM CHLORIDE 75 ML/HR: 900 INJECTION, SOLUTION INTRAVENOUS at 05:44

## 2020-11-01 RX ADMIN — POTASSIUM CHLORIDE 40 MEQ: 1500 TABLET, EXTENDED RELEASE ORAL at 09:07

## 2020-11-01 RX ADMIN — ENOXAPARIN SODIUM 40 MG: 40 INJECTION SUBCUTANEOUS at 09:08

## 2020-11-01 RX ADMIN — INSULIN GLARGINE 15 UNITS: 100 INJECTION, SOLUTION SUBCUTANEOUS at 22:28

## 2020-11-01 RX ADMIN — DIVALPROEX SODIUM 250 MG: 250 TABLET, DELAYED RELEASE ORAL at 17:15

## 2020-11-01 RX ADMIN — SODIUM CHLORIDE, PRESERVATIVE FREE 10 ML: 5 INJECTION INTRAVENOUS at 09:08

## 2020-11-01 RX ADMIN — SODIUM CHLORIDE 100 MG: 9 INJECTION, SOLUTION INTRAVENOUS at 09:09

## 2020-11-01 RX ADMIN — ROSUVASTATIN CALCIUM 20 MG: 10 TABLET, FILM COATED ORAL at 22:23

## 2020-11-01 NOTE — PLAN OF CARE
Goal Outcome Evaluation:  Plan of Care Reviewed With: patient  Progress: no change  Outcome Summary: patient is resting with no new complaints, patient is currently on 6L O2, patient was started on remdesivir yesterday, will continue to monitor patient

## 2020-11-01 NOTE — PLAN OF CARE
Goal Outcome Evaluation:  Plan of Care Reviewed With: patient  Progress: no change   Patient seems to be better feeling today since getting Remdesivir, I can see a change in patients actions and responses.  No other issues noted at this time.  Continue to monitor.

## 2020-11-01 NOTE — PROGRESS NOTES
[facility] Medicine Services Daily Progress Note      Hospitalist Team  LOS 4 days      Patient Care Team:  Beka Weir MD as PCP - General (Family Medicine)    Patient Location: 302/1      Subjective   Subjective     Chief Complaint / Subjective  Chief Complaint   Patient presents with   • Weakness - Generalized     Related to COVID-19         Brief Synopsis of Hospital Course/HPI    80-year-old female with multiple comorbidities including diabetes mellitus type 2, hypothyroidism and bipolar disorder.  Presented to the emergency room on 10/26/2020.  Patient and her  were feeling generally weak and had Covid test 2 days prior to this presentation and was positive.  Patient continued having worsening generalized body weakness and confusion thus presented to the ED.  Denies any fever or chills and had no shortness of breath.    Patient and her  live at [facility] and are normally independent in getting their groceries and activities of daily living.  They have a  who comes in once a week to help with housework.        Date::      10/27/2020  Patient seen and examined  Remains confused  Voiced no new complaints    10/28/2020  Patient remains on 2 L of oxygen via nasal cannula and saturating between 92 to 95%  Hemoglobin noted to be 6.2 today-no gross bleeding noted.  Patient continues with intermittent confusion.    10/29/2020  Has no new complaint  Intermittently confused  Evaluated by physical therapist and inpatient rehab recommended    10/30/2020  Formerly declined inpatient rehab  Patient is pleasantly confused  No new complaints  Initially on room air and subsequently started desaturating  Continue on supplementary oxygen  Will need walking oximetry prior to discharge    10/31/2020  Patient's oxygen requirements increasing  Currently on 6 L of oxygen via nasal cannula  Started on remdesivir  Renal function improving-discontinue IV  "fluid    11/1/2020  Patient seen and examined  Continues on 6 L of oxygen  Patient's mental status is back to baseline-she is able to answer questions correctly.  Denies any new complaints      Review of Systems   Constitution: Negative for chills and fever.   HENT: Negative for congestion.    Eyes: Negative for blurred vision.   Cardiovascular: Negative for chest pain.   Respiratory: Positive for shortness of breath.    Endocrine: Negative for cold intolerance and heat intolerance.   Gastrointestinal: Negative for abdominal pain, nausea and vomiting.   Genitourinary: Negative for dysuria.   Neurological: Positive for weakness.   Psychiatric/Behavioral: Negative for altered mental status.         Objective   Objective      Vital Signs  Temp:  [97.3 °F (36.3 °C)-99 °F (37.2 °C)] 99 °F (37.2 °C)  Heart Rate:  [] 97  Resp:  [15-20] 15  BP: (113-182)/(58-74) 140/65  Oxygen Therapy  SpO2: 96 %  Pulse Oximetry Type: Continuous  Device (Oxygen Therapy): nasal cannula  Flow (L/min): 6  Flowsheet Rows      First Filed Value   Admission Height  162.6 cm (64\") Documented at 10/26/2020 1524   Admission Weight  68 kg (150 lb) Documented at 10/26/2020 1524        Intake & Output (last 3 days)       10/29 0701 - 10/30 0700 10/30 0701 - 10/31 0700 10/31 0701 - 11/01 0700 11/01 0701 - 11/02 0700    P.O. 540 240 950     Blood        Total Intake(mL/kg) 540 (7.8) 240 (3.5) 950 (13.7)     Urine (mL/kg/hr) 1800 (1.1)       Stool 0       Total Output 1800       Net -1260 +240 +950             Urine Unmeasured Occurrence 3 x 3 x 6 x     Stool Unmeasured Occurrence 1 x  3 x         Lines, Drains & Airways    Active LDAs     Name:   Placement date:   Placement time:   Site:   Days:    Peripheral IV 10/26/20 1534 Right Antecubital   10/26/20    1534    Antecubital   less than 1                  Physical Exam:    Physical Exam  Vitals signs reviewed.   Constitutional:       General: She is not in acute distress.  HENT:      Head: " Normocephalic and atraumatic.      Nose: Nose normal.      Mouth/Throat:      Mouth: Mucous membranes are moist.   Eyes:      Pupils: Pupils are equal, round, and reactive to light.   Neck:      Musculoskeletal: Neck supple.   Cardiovascular:      Rate and Rhythm: Normal rate and regular rhythm.   Pulmonary:      Effort: No respiratory distress.   Abdominal:      General: Bowel sounds are normal.      Palpations: Abdomen is soft.      Tenderness: There is no abdominal tenderness.   Musculoskeletal:      Comments: Degenerative changes   Skin:     General: Skin is warm and dry.   Neurological:      Mental Status: She is alert and oriented to person, place, and time.   Psychiatric:         Mood and Affect: Mood normal.              Wounds (last 24 hours)      LDA Wound     Row Name 11/01/20 0300 10/31/20 2322 10/31/20 1910       Wound 10/26/20 2350 Bilateral medial perineum MASD (Moisture associated skin damage)    Wound - Properties Group Placement Date: 10/26/20  - Placement Time: 2350 -SH Present on Hospital Admission: Y  -SH Side: Bilateral  -SH Orientation: medial  -SH Location: perineum  -SH Primary Wound Type: MASD  -SH    Closure  Open to air  -SS  Open to air  -SS  Open to air  -SS    Base  blanchable  -SS  blanchable  -SS  blanchable  -SS    Periwound  blanchable;excoriated  -SS  blanchable;excoriated  -SS  blanchable;excoriated  -SS    Drainage Amount  none  -SS  none  -SS  none  -SS    Care, Wound  --  --  barrier applied  -SS    Dressing Care  --  --  open to air  -SS    Periwound Care  --  --  barrier ointment applied  -    Retired Wound - Properties Group Date first assessed: 10/26/20  - Time first assessed: 2350 -SH Present on Hospital Admission: Y  -SH Side: Bilateral  -SH Location: perineum  -SH Primary Wound Type: MASD  -SH       Wound 10/26/20 2350 urethral meatus MASD (Moisture associated skin damage)    Wound - Properties Group Placement Date: 10/26/20  - Placement Time: 2350 -SH  Present on Hospital Admission: Y  -SH Location: urethral meatus  -SH Primary Wound Type: MASD  -SH    Closure  Open to air  -SS  Open to air  -SS  Open to air  -SS    Base  blanchable;moist  -SS  blanchable;moist  -SS  blanchable;moist  -SS    Periwound  blanchable;redness  -SS  blanchable;redness  -SS  blanchable;redness  -SS    Drainage Amount  none  -SS  none  -SS  none  -SS    Care, Wound  --  --  barrier applied  -SS    Dressing Care  --  --  open to air  -SS    Periwound Care  --  --  barrier ointment applied  -SS    Retired Wound - Properties Group Date first assessed: 10/26/20  - Time first assessed: 2350 -SH Present on Hospital Admission: Y  -SH Location: urethral meatus  -SH Primary Wound Type: MASD  -SH      User Key  (r) = Recorded By, (t) = Taken By, (c) = Cosigned By    Initials Name Provider Type    Renuka Zayas RN Registered Nurse    Sydnie Ruby LPN Licensed Nurse          Procedures:              Results Review:     I reviewed the patient's new clinical results.      Lab Results (last 24 hours)     Procedure Component Value Units Date/Time    POC Glucose Once [[id number]]  (Abnormal) Collected: 11/01/20 0728    Specimen: Blood Updated: 11/01/20 0730     Glucose 170 mg/dL      Comment: Serial Number: [id number]:  [id number]       Scan Slide [[id number]] Collected: 11/01/20 0414    Specimen: Blood Updated: 11/01/20 0523     Scan Slide --     Comment: See Manual Differential Results       Manual Differential [[id number]]  (Abnormal) Collected: 11/01/20 0414    Specimen: Blood Updated: 11/01/20 0523     Neutrophil % 71.0 %      Lymphocyte % 15.0 %      Monocyte % 11.0 %      Eosinophil % 1.0 %      Bands %  2.0 %      Neutrophils Absolute 6.72 10*3/mm3      Lymphocytes Absolute 1.38 10*3/mm3      Monocytes Absolute 1.01 10*3/mm3      Eosinophils Absolute 0.09 10*3/mm3      Anisocytosis Slight/1+     Microcytes Slight/1+     Poikilocytes Slight/1+     RBC Fragments Slight/1+     WBC  Morphology Normal     Platelet Estimate Increased    CBC & Differential [[id number]]  (Abnormal) Collected: 11/01/20 0414    Specimen: Blood Updated: 11/01/20 0523    Narrative:      The following orders were created for panel order CBC & Differential.  Procedure                               Abnormality         Status                     ---------                               -----------         ------                     CBC Auto Differential[[id number]]        Abnormal            Final result                 Please view results for these tests on the individual orders.    CBC Auto Differential [[id number]]  (Abnormal) Collected: 11/01/20 0414    Specimen: Blood Updated: 11/01/20 0523     WBC 9.20 10*3/mm3      RBC 4.06 10*6/mm3      Hemoglobin 9.0 g/dL      Hematocrit 28.4 %      MCV 69.9 fL      MCH 22.3 pg      MCHC 31.9 g/dL      RDW 21.2 %      RDW-SD 52.5 fl      MPV 7.2 fL      Platelets 534 10*3/mm3     Comprehensive Metabolic Panel [[id number]]  (Abnormal) Collected: 11/01/20 0414    Specimen: Blood Updated: 11/01/20 0513     Glucose 183 mg/dL      BUN 21 mg/dL      Creatinine 0.93 mg/dL      Sodium 139 mmol/L      Potassium 4.0 mmol/L      Chloride 105 mmol/L      CO2 22.0 mmol/L      Calcium 8.7 mg/dL      Total Protein 6.0 g/dL      Albumin 2.60 g/dL      ALT (SGPT) 35 U/L      AST (SGOT) 39 U/L      Alkaline Phosphatase 111 U/L      Total Bilirubin 0.2 mg/dL      eGFR Non African Amer 58 mL/min/1.73      Globulin 3.4 gm/dL      A/G Ratio 0.8 g/dL      BUN/Creatinine Ratio 22.6     Anion Gap 12.0 mmol/L     Narrative:      GFR Normal >60  Chronic Kidney Disease <60  Kidney Failure <15      Bilirubin, Direct [[id number]]  (Normal) Collected: 11/01/20 0414    Specimen: Blood Updated: 11/01/20 0513     Bilirubin, Direct <0.2 mg/dL     POC Glucose Once [[id number]]  (Abnormal) Collected: 10/31/20 1904    Specimen: Blood Updated: 10/31/20 1905     Glucose 339 mg/dL      Comment: Serial Number:  [id number]:  [id number]       Blood Culture - Blood, Arm, Left [[id number]] Collected: 10/26/20 1712    Specimen: Blood from Arm, Left Updated: 10/31/20 1730     Blood Culture No growth at 5 days    POC Glucose Once [[id number]]  (Abnormal) Collected: 10/31/20 1643    Specimen: Blood Updated: 10/31/20 1646     Glucose 335 mg/dL      Comment: Serial Number: [id number]:  [id number]       Blood Culture - Blood, Arm, Left [[id number]] Collected: 10/27/20 1541    Specimen: Blood from Arm, Left Updated: 10/31/20 1615     Blood Culture No growth at 4 days    Blood Culture - Blood, Hand, Right [[id number]] Collected: 10/27/20 1541    Specimen: Blood from Hand, Right Updated: 10/31/20 1615     Blood Culture No growth at 4 days    POC Glucose Once [[id number]]  (Abnormal) Collected: 10/31/20 1144    Specimen: Blood Updated: 10/31/20 1151     Glucose 346 mg/dL      Comment: Serial Number: [id number]:  [id number]       D-dimer, Quantitative [[id number]]  (Abnormal) Collected: 10/31/20 1046    Specimen: Blood Updated: 10/31/20 1145     D-Dimer, Quantitative 1.75 mg/L (FEU)     Narrative:      Reference Range  --------------------------------------------------------------------     < 0.50   Negative Predictive Value  0.50-0.59   Indeterminate    >= 0.60   Probable VTE             A very low percentage of patients with DVT may yield D-Dimer results   below the cut-off of 0.50 mg/L FEU.  This is known to be more   prevalent in patients with distal DVT.             Results of this test should always be interpreted in conjunction with   the patient's medical history, clinical presentation and other   findings.  Clinical diagnosis should not be based on the result of   INNOVANCE D-Dimer alone.    Hepatic Function Panel [[id number]]  (Abnormal) Collected: 10/31/20 0345    Specimen: Blood Updated: 10/31/20 1134     Total Protein 6.1 g/dL      Albumin 2.40 g/dL      ALT (SGPT) 34 U/L      AST (SGOT) 44 U/L      Comment:  Specimen hemolyzed.  Results may be affected.        Alkaline Phosphatase 109 U/L      Total Bilirubin 0.2 mg/dL      Bilirubin, Direct <0.2 mg/dL      Comment: Specimen hemolyzed. Results may be affected.        Bilirubin, Indirect --     Comment: Unable to calculate           No results found for: HGBA1C            No results found for: LIPASE  No results found for: CHOL, CHLPL, TRIG, HDL, LDL, LDLDIRECT    No results found for: INTRAOP, PREDX, FINALDX, COMDX    Microbiology Results (last 10 days)     Procedure Component Value - Date/Time    Blood Culture - Blood, Arm, Left [[id number]] Collected: 10/27/20 1541    Lab Status: Preliminary result Specimen: Blood from Arm, Left Updated: 10/31/20 1615     Blood Culture No growth at 4 days    Blood Culture - Blood, Hand, Right [[id number]] Collected: 10/27/20 1541    Lab Status: Preliminary result Specimen: Blood from Hand, Right Updated: 10/31/20 1615     Blood Culture No growth at 4 days    Blood Culture - Blood, Arm, Left [[id number]] Collected: 10/26/20 1712    Lab Status: Final result Specimen: Blood from Arm, Left Updated: 10/31/20 1730     Blood Culture No growth at 5 days    Respiratory Panel PCR w/COVID-19(SARS-CoV-2) GEORGE/EFRAIN/SHAINA/PAD/COR/MAD/VANDANA In-House, NP Swab in UTM/VTM, 3-4 HR TAT - Swab, Nasopharynx [[id number]]  (Abnormal) Collected: 10/26/20 1711    Lab Status: Final result Specimen: Swab from Nasopharynx Updated: 10/26/20 1822     ADENOVIRUS, PCR Not Detected     Coronavirus 229E Not Detected     Coronavirus HKU1 Not Detected     Coronavirus NL63 Not Detected     Coronavirus OC43 Not Detected     COVID19 Detected     Human Metapneumovirus Not Detected     Human Rhinovirus/Enterovirus Not Detected     Influenza A PCR Not Detected     Influenza A H1 Not Detected     Influenza A H1 2009 PCR Not Detected     Influenza A H3 Not Detected     Influenza B PCR Not Detected     Parainfluenza Virus 1 Not Detected     Parainfluenza Virus 2 Not Detected      Parainfluenza Virus 3 Not Detected     Parainfluenza Virus 4 Not Detected     RSV, PCR Not Detected     Bordetella pertussis pcr Not Detected     Bordetella parapertussis PCR Not Detected     Chlamydophila pneumoniae PCR Not Detected     Mycoplasma pneumo by PCR Not Detected    Narrative:      Fact sheet for providers: https://[employer]/wp-content/uploads/VJL8094-6482-RX9.1-EUA-Provider-Fact-Sheet-3.pdf    Fact sheet for patients: https://docs.[employer]ist/wp-content/uploads/TSN7308-9666-MR4.1-EUA-Patient-Fact-Sheet-1.pdf    Urine Culture - Urine, Urine, Clean Catch [[id number]]  (Abnormal)  (Susceptibility) Collected: 10/26/20 1711    Lab Status: Final result Specimen: Urine, Clean Catch Updated: 10/28/20 0437     Urine Culture >100,000 CFU/mL Escherichia coli      50,000 CFU/mL Streptococcus agalactiae (Group B)     Comment: This organism is considered to be universally susceptible to penicillin.  No further antibiotic testing will be performed.       Susceptibility      Escherichia coli     MCKENNA     Ampicillin Resistant     Ampicillin + Sulbactam Resistant     Cefazolin Susceptible     Cefepime Susceptible     Ceftazidime Susceptible     Ceftriaxone Susceptible     Gentamicin Resistant     Levofloxacin Susceptible     Nitrofurantoin Susceptible     Piperacillin + Tazobactam Intermediate     Tetracycline Resistant     Tobramycin Susceptible     Trimethoprim + Sulfamethoxazole Resistant                    S. Pneumo Ag Urine or CSF - Urine, Urine, Clean Catch [[id number]]  (Normal) Collected: 10/26/20 1711    Lab Status: Final result Specimen: Urine, Clean Catch Updated: 10/27/20 0751     Strep Pneumo Ag Negative    Blood Culture - Blood, Arm, Right [[id number]]  (Abnormal) Collected: 10/26/20 1704    Lab Status: Final result Specimen: Blood from Arm, Right Updated: 10/29/20 0609     Blood Culture Staphylococcus hominis ssp hominis     Comment: Probable contaminant requires clinical correlation, susceptibility  not performed unless requested by physician.          Gram Stain Anaerobic Bottle Gram positive cocci in clusters    Blood Culture ID, PCR - Blood, Arm, Right [[id number]]  (Abnormal) Collected: 10/26/20 1704    Lab Status: Final result Specimen: Blood from Arm, Right Updated: 10/27/20 1357     BCID, PCR Staphylococcus spp, not aureus. Identification by BCID PCR.     BOTTLE TYPE Anaerobic Bottle          ECG/EMG Results (most recent)     Procedure Component Value Units Date/Time    ECG 12 Lead [[id number]] Collected: 10/26/20 1648     Updated: 10/28/20 1700     QT Interval 326 ms     Narrative:      HEART RATE= 100  bpm  RR Interval= 612  ms  AK Interval= 174  ms  P Horizontal Axis= 22  deg  P Front Axis= 34  deg  QRSD Interval= 86  ms  QT Interval= 326  ms  QRS Axis= 43  deg  T Wave Axis= -47  deg  - ABNORMAL ECG -  Sinus tachycardia  Atrial premature complexes  No previous ECG available for comparison  Electronically Signed By: Tyrone Coon) 28-Oct-2020 16:59:23  Date and Time of Study: 2020-10-26 16:48:03                    Xr Chest 1 View    Result Date: 10/26/2020   1. Patchy peripheral opacities concerning for multifocal pneumonia. Patient is Covid positive. 2. Mild enlargement of the cardiac silhouette.   Electronically Signed By-Shahram Kenny DO. On:10/26/2020 4:56 PM This report was finalized on [id number] by  Shahram Kenny DO..    Ct Chest Pulmonary Embolism    Result Date: 10/26/2020  1. Negative for pulmonary embolus. 2. There are extensive bilateral groundglass opacities in the lungs. The appearance is compatible with Covid pneumonia. Other diagnoses such as influenza or organizing pneumonia, drug toxicity, or connective tissue disease is also possible. 3. Atherosclerosis.  Electronically Signed By-Gladys Ambrose On:10/26/2020 7:25 PM This report was finalized on [id number] by  Gladys Ambrose .          Xrays, labs reviewed personally by physician.    Medication Review:   I have reviewed the  patient's current medication list      Scheduled Meds  cefTRIAXone, 1 g, Intravenous, Q24H  clopidogrel, 75 mg, Oral, Daily  dexamethasone, 6 mg, Oral, Daily With Breakfast  divalproex, 250 mg, Oral, TID  enoxaparin, 40 mg, Subcutaneous, Q12H  insulin glargine, 15 Units, Subcutaneous, Nightly  insulin lispro, 0-24 Units, Subcutaneous, TID AC  levothyroxine, 37.5 mcg, Oral, Daily  LORazepam, 0.5 mg, Oral, TID  ofloxacin, 1 drop, Right Eye, 4x Daily  pantoprazole, 40 mg, Oral, QAM AC  potassium chloride, 40 mEq, Oral, Daily  prednisoLONE acetate, 1 drop, Right Eye, 4x Daily  remdesivir, 100 mg, Intravenous, Q24H  risperiDONE, 0.5 mg, Oral, QAM  risperiDONE, 0.5 mg, Oral, Nightly  risperiDONE, 1 mg, Oral, Daily  rosuvastatin, 20 mg, Oral, Nightly  sodium chloride, 10 mL, Intravenous, Q12H        Meds Infusions  Pharmacy Consult - Remdesivir,   sodium chloride, 75 mL/hr, Last Rate: 75 mL/hr (11/01/20 0544)        Meds PRN  •  acetaminophen **OR** acetaminophen **OR** acetaminophen  •  dextrose  •  dextrose  •  glucagon (human recombinant)  •  insulin lispro **AND** insulin lispro  •  ondansetron **OR** ondansetron  •  Pharmacy Consult - Remdesivir  •  potassium chloride  •  potassium chloride  •  [COMPLETED] Insert peripheral IV **AND** sodium chloride  •  sodium chloride        Assessment/Plan   Assessment/Plan     Active Hospital Problems    Diagnosis  POA   • **Pneumonia due to COVID-19 virus [U07.1, J12.89]  Yes   • Generalized weakness [R53.1]  Unknown   • Metabolic encephalopathy [G93.41]  Yes   • Acute renal injury (CMS/HCC) [N17.9]  Unknown      Resolved Hospital Problems   No resolved problems to display.       MEDICAL DECISION MAKING COMPLEXITY BY PROBLEM:     Generalized body weakness  Most likely due to Covid infection  Fall precaution  PT recommending inpatient rehab but family declined.  Will be discharged home with home health for physical therapy    COVID-19 with  pneumonia:   Chest x-ray and CT chest  reviewed-findings suggestive of atypical pneumonia.   On 6 L via nasal cannula  on Decadron and remdesivir  Anti-inflammatory agents  Monitor    Acute on chronic anemia  ?  Etiology  No gross bleeding noted  Hemoglobin  6.2-10/20/2020-status post transfusion 1 unit packed red blood cell   Patient will need further work-up probably as outpatient when infection resolves    CAD  No active angina  Continue medical management    Hypothyroidism-on Synthroid    Hypokalemia-on replacement protocol    Altered mental status  ?  Underlying dementia  Probably due to UTI  Mental status back to baseline    Acute kidney injury  Probably due to dehydration  Resolved    E. coli UTI  on Rocephin         Bipolar disorder with anxiety:   On Depakote, Ativan and risperidone     Diabetes type 2, chronic:  A1c 10.3  Holding home medication glipizide and Metformin  On premeal insulin, sliding scale insulin and Lantus   Patient will probably need insulin at discharge    Hyperlipidemia-on statin      HLD-on PPI     Overactive bladder: Hold Ditropan       Bacteremia-blood culture growing gram-positive cocci in clusters not aureus  ?  Contaminant  Repeat blood culture no growth      VTE Prophylaxis -Lovenox    Mechanical Order History:     None      Pharmalogical Order History:      Ordered     Dose Route Frequency Stop    10/26/20 2337  heparin (porcine) 5000 UNIT/ML injection 5,000 Units      5,000 Units SC Every 12 Hours Scheduled --    10/26/20 2028  enoxaparin (LOVENOX) syringe 30 mg  Status:  Discontinued      30 mg SC Every 24 Hours 10/26/20 2338                  Code Status -   Code Status and Medical Interventions:   Ordered at: 10/26/20 2028     Code Status:    CPR     Medical Interventions (Level of Support Prior to Arrest):    Full       This patient has been examined with appropriate PPE11/01/20        Discharge Planning            Electronically signed by Brannon Sorenson MD, 11/01/20, 09:29 [facility] Hospitalist  Team

## 2020-11-02 LAB
ALBUMIN SERPL-MCNC: 2.3 G/DL (ref 3.5–5.2)
ALP SERPL-CCNC: 105 U/L (ref 39–117)
ALT SERPL W P-5'-P-CCNC: 36 U/L (ref 1–33)
ANION GAP SERPL CALCULATED.3IONS-SCNC: 10 MMOL/L (ref 5–15)
ANISOCYTOSIS BLD QL: ABNORMAL
AST SERPL-CCNC: 35 U/L (ref 1–32)
BILIRUB CONJ SERPL-MCNC: <0.2 MG/DL (ref 0–0.3)
BILIRUB INDIRECT SERPL-MCNC: ABNORMAL MG/DL
BILIRUB SERPL-MCNC: 0.2 MG/DL (ref 0–1.2)
BUN SERPL-MCNC: 29 MG/DL (ref 8–23)
BUN/CREAT SERPL: 23 (ref 7–25)
CALCIUM SPEC-SCNC: 8.4 MG/DL (ref 8.6–10.5)
CHLORIDE SERPL-SCNC: 103 MMOL/L (ref 98–107)
CO2 SERPL-SCNC: 21 MMOL/L (ref 22–29)
CREAT SERPL-MCNC: 1.26 MG/DL (ref 0.57–1)
DEPRECATED RDW RBC AUTO: 55.1 FL (ref 37–54)
ERYTHROCYTE [DISTWIDTH] IN BLOOD BY AUTOMATED COUNT: 22.2 % (ref 12.3–15.4)
GFR SERPL CREATININE-BSD FRML MDRD: 41 ML/MIN/1.73
GLUCOSE BLDC GLUCOMTR-MCNC: 159 MG/DL (ref 70–105)
GLUCOSE BLDC GLUCOMTR-MCNC: 265 MG/DL (ref 70–105)
GLUCOSE BLDC GLUCOMTR-MCNC: 315 MG/DL (ref 70–105)
GLUCOSE BLDC GLUCOMTR-MCNC: 327 MG/DL (ref 70–105)
GLUCOSE SERPL-MCNC: 342 MG/DL (ref 65–99)
HCT VFR BLD AUTO: 27.6 % (ref 34–46.6)
HGB BLD-MCNC: 8.6 G/DL (ref 12–15.9)
LYMPHOCYTES # BLD MANUAL: 1.48 10*3/MM3 (ref 0.7–3.1)
LYMPHOCYTES NFR BLD MANUAL: 17 % (ref 19.6–45.3)
LYMPHOCYTES NFR BLD MANUAL: 6 % (ref 5–12)
MCH RBC QN AUTO: 22.2 PG (ref 26.6–33)
MCHC RBC AUTO-ENTMCNC: 31.2 G/DL (ref 31.5–35.7)
MCV RBC AUTO: 71.2 FL (ref 79–97)
MICROCYTES BLD QL: ABNORMAL
MONOCYTES # BLD AUTO: 0.52 10*3/MM3 (ref 0.1–0.9)
NEUTROPHILS # BLD AUTO: 6.7 10*3/MM3 (ref 1.7–7)
NEUTROPHILS NFR BLD MANUAL: 74 % (ref 42.7–76)
NEUTS BAND NFR BLD MANUAL: 3 % (ref 0–5)
PLATELET # BLD AUTO: 506 10*3/MM3 (ref 140–450)
PMV BLD AUTO: 7.3 FL (ref 6–12)
POTASSIUM SERPL-SCNC: 4.9 MMOL/L (ref 3.5–5.2)
PROT SERPL-MCNC: 5.6 G/DL (ref 6–8.5)
RBC # BLD AUTO: 3.87 10*6/MM3 (ref 3.77–5.28)
SCAN SLIDE: NORMAL
SMALL PLATELETS BLD QL SMEAR: ABNORMAL
SODIUM SERPL-SCNC: 134 MMOL/L (ref 136–145)
WBC # BLD AUTO: 8.7 10*3/MM3 (ref 3.4–10.8)
WBC MORPH BLD: NORMAL

## 2020-11-02 PROCEDURE — 63710000001 INSULIN LISPRO (HUMAN) PER 5 UNITS: Performed by: INTERNAL MEDICINE

## 2020-11-02 PROCEDURE — 25010000002 ENOXAPARIN PER 10 MG: Performed by: INTERNAL MEDICINE

## 2020-11-02 PROCEDURE — 85007 BL SMEAR W/DIFF WBC COUNT: CPT | Performed by: INTERNAL MEDICINE

## 2020-11-02 PROCEDURE — 80048 BASIC METABOLIC PNL TOTAL CA: CPT | Performed by: INTERNAL MEDICINE

## 2020-11-02 PROCEDURE — 25010000002 CEFTRIAXONE PER 250 MG: Performed by: INTERNAL MEDICINE

## 2020-11-02 PROCEDURE — 63710000001 INSULIN GLARGINE PER 5 UNITS: Performed by: INTERNAL MEDICINE

## 2020-11-02 PROCEDURE — 82962 GLUCOSE BLOOD TEST: CPT

## 2020-11-02 PROCEDURE — 63710000001 INSULIN REGULAR HUMAN PER 5 UNITS: Performed by: INTERNAL MEDICINE

## 2020-11-02 PROCEDURE — 80076 HEPATIC FUNCTION PANEL: CPT | Performed by: INTERNAL MEDICINE

## 2020-11-02 PROCEDURE — 99232 SBSQ HOSP IP/OBS MODERATE 35: CPT | Performed by: INTERNAL MEDICINE

## 2020-11-02 PROCEDURE — 85025 COMPLETE CBC W/AUTO DIFF WBC: CPT | Performed by: INTERNAL MEDICINE

## 2020-11-02 RX ADMIN — INSULIN LISPRO 16 UNITS: 100 INJECTION, SOLUTION INTRAVENOUS; SUBCUTANEOUS at 08:21

## 2020-11-02 RX ADMIN — RISPERIDONE 1 MG: 1 TABLET ORAL at 15:56

## 2020-11-02 RX ADMIN — ENOXAPARIN SODIUM 40 MG: 40 INJECTION SUBCUTANEOUS at 11:00

## 2020-11-02 RX ADMIN — SODIUM CHLORIDE 100 MG: 9 INJECTION, SOLUTION INTRAVENOUS at 11:00

## 2020-11-02 RX ADMIN — CEFTRIAXONE SODIUM 1 G: 10 INJECTION, POWDER, FOR SOLUTION INTRAVENOUS at 17:55

## 2020-11-02 RX ADMIN — INSULIN HUMAN 6 UNITS: 100 INJECTION, SOLUTION PARENTERAL at 08:20

## 2020-11-02 RX ADMIN — CLOPIDOGREL BISULFATE 75 MG: 75 TABLET ORAL at 08:21

## 2020-11-02 RX ADMIN — PANTOPRAZOLE SODIUM 40 MG: 40 TABLET, DELAYED RELEASE ORAL at 05:31

## 2020-11-02 RX ADMIN — POTASSIUM CHLORIDE 40 MEQ: 1500 TABLET, EXTENDED RELEASE ORAL at 08:21

## 2020-11-02 RX ADMIN — DIVALPROEX SODIUM 250 MG: 250 TABLET, DELAYED RELEASE ORAL at 08:21

## 2020-11-02 RX ADMIN — DIVALPROEX SODIUM 250 MG: 250 TABLET, DELAYED RELEASE ORAL at 20:23

## 2020-11-02 RX ADMIN — RISPERIDONE 0.5 MG: 0.25 TABLET ORAL at 20:23

## 2020-11-02 RX ADMIN — OFLOXACIN 1 DROP: 3 SOLUTION/ DROPS OPHTHALMIC at 11:07

## 2020-11-02 RX ADMIN — LORAZEPAM 0.5 MG: 0.5 TABLET ORAL at 08:21

## 2020-11-02 RX ADMIN — OFLOXACIN 1 DROP: 3 SOLUTION/ DROPS OPHTHALMIC at 17:55

## 2020-11-02 RX ADMIN — ROSUVASTATIN CALCIUM 20 MG: 10 TABLET, FILM COATED ORAL at 20:23

## 2020-11-02 RX ADMIN — SODIUM CHLORIDE, PRESERVATIVE FREE 10 ML: 5 INJECTION INTRAVENOUS at 20:27

## 2020-11-02 RX ADMIN — PREDNISOLONE ACETATE 1 DROP: 10 SUSPENSION/ DROPS OPHTHALMIC at 21:00

## 2020-11-02 RX ADMIN — INSULIN LISPRO 16 UNITS: 100 INJECTION, SOLUTION INTRAVENOUS; SUBCUTANEOUS at 11:49

## 2020-11-02 RX ADMIN — LEVOTHYROXINE SODIUM 37.5 MCG: 75 TABLET ORAL at 05:31

## 2020-11-02 RX ADMIN — LORAZEPAM 0.5 MG: 0.5 TABLET ORAL at 15:56

## 2020-11-02 RX ADMIN — ACETAMINOPHEN 650 MG: 325 TABLET, FILM COATED ORAL at 20:23

## 2020-11-02 RX ADMIN — PREDNISOLONE ACETATE 1 DROP: 10 SUSPENSION/ DROPS OPHTHALMIC at 08:22

## 2020-11-02 RX ADMIN — PREDNISOLONE ACETATE 1 DROP: 10 SUSPENSION/ DROPS OPHTHALMIC at 17:55

## 2020-11-02 RX ADMIN — OFLOXACIN 1 DROP: 3 SOLUTION/ DROPS OPHTHALMIC at 21:00

## 2020-11-02 RX ADMIN — INSULIN GLARGINE 15 UNITS: 100 INJECTION, SOLUTION SUBCUTANEOUS at 20:36

## 2020-11-02 RX ADMIN — LORAZEPAM 0.5 MG: 0.5 TABLET ORAL at 20:23

## 2020-11-02 RX ADMIN — SODIUM CHLORIDE, PRESERVATIVE FREE 10 ML: 5 INJECTION INTRAVENOUS at 08:21

## 2020-11-02 RX ADMIN — DEXAMETHASONE 6 MG: 6 TABLET ORAL at 08:21

## 2020-11-02 RX ADMIN — RISPERIDONE 0.5 MG: 0.25 TABLET ORAL at 05:31

## 2020-11-02 RX ADMIN — OFLOXACIN 1 DROP: 3 SOLUTION/ DROPS OPHTHALMIC at 08:22

## 2020-11-02 RX ADMIN — INSULIN LISPRO 4 UNITS: 100 INJECTION, SOLUTION INTRAVENOUS; SUBCUTANEOUS at 17:55

## 2020-11-02 RX ADMIN — PREDNISOLONE ACETATE 1 DROP: 10 SUSPENSION/ DROPS OPHTHALMIC at 11:07

## 2020-11-02 RX ADMIN — DIVALPROEX SODIUM 250 MG: 250 TABLET, DELAYED RELEASE ORAL at 15:56

## 2020-11-02 RX ADMIN — ENOXAPARIN SODIUM 40 MG: 40 INJECTION SUBCUTANEOUS at 21:00

## 2020-11-02 NOTE — PLAN OF CARE
Goal Outcome Evaluation:  Plan of Care Reviewed With: patient  Progress: no change  Outcome Summary: pt voiced no concerns this shift; O2 weaned to 4L per n/c; Remdesivir continued; plan for discharge to home tomororw with H/H

## 2020-11-02 NOTE — CONSULTS
"Diabetes Education  Assessment/Teaching    Patient Name:  Laura Perkins  YOB: 1940  MRN: [id number]  Admit Date:  10/26/2020      Assessment Date:  11/2/2020    Most Recent Value   General Information    Referral From:  A1c, Blood glucose [Pt seen due to adm bs of 203 and A1c this adm of 10.3%]   Height  164.6 cm (64.8\")   Height Method  Stated   Weight  67.6 kg (149 lb 0.5 oz)   Weight Method  Bed scale   Pregnancy Assessment   Diabetes History   What type of diabetes do you have?  Type 2   Length of Diabetes Diagnosis  -- [since 1996]   Do you test your blood sugar at home?  yes   Frequency of checks  am and presupper   Meter type  Prodigy   Who performs the test?  self   Have you had low blood sugar? (<70mg/dl)  no   Have you had high blood sugar? (>140mg/dl)  yes   How often do you have high blood sugar?  frequently   When was your last high blood sugar?  Adm bs 203   Education Preferences   What areas of diabetes would you like to learn about?  avoiding high blood sugar, avoiding low blood sugar   Nutrition Information   Assessment Topics   Problem Solving - Assessment  Needs education   Reducing Risk - Assessment  Needs education   DM Goals   Problem Solving - Goal  Today   Reducing Risk - Goal  Today            Most Recent Value   DM Education Needs   Meter  Has own   Frequency of Testing  2 times a day   Blood Glucose Target Range  Discussed A1c result of 10.3%. Reviewed healthy bs range and healthy A1c target. Discussed importance of bs control   Medication  Oral [Pt takes glipizide 10 mg bid and Metformin  mg bid]   Problem Solving  Hypoglycemia, Hyperglycemia, Signs, Symptoms, Treatment   Reducing Risks  A1C testing   Motivation  Engaged   Teaching Method  Explanation, Discussion   Patient Response  Verbalized understanding            Other Comments: Contacted pt by phone due to Covid confirmed. Pt lives with . She states she sees PCP routinely. Pt recording bs and notifies MD " of readings. Pt states  fills pill container weekly. Discussed pt receiving Lantus 15 units in hospital and oral DM meds are being held. Discussed if MD to d/c pt on insulin, educator would follow up.     Educator to follow up if MD consults for insulin teaching. Pt states she has the necessary DM meds and bs checking supplies at home. Pt states no additional info needed at this time.        Electronically signed by:  Lainey Altamirano RN  11/02/20 17:22 EST

## 2020-11-02 NOTE — PROGRESS NOTES
Continued Stay Note  THANH Wharton     Patient Name: Laura Perkins  MRN: [id number]  Today's Date: 11/2/2020    Admit Date: 10/26/2020    Discharge Plan     Row Name 11/02/20 1357       Plan    Plan  Declined IP rehab. Anticipate routine home with spouse and [facility], order placed and accepted. Will require walking oximetry prior to d/c.    Plan Comments  Called and spoke to patients spouse Marino, he is still declining IP rehab placement for patient on d/c. DC barriers: 6 L NC, IV abx, IV Remdesivir- last dose scheduled for 11/4            Elisabeth Aguilar RN

## 2020-11-02 NOTE — PLAN OF CARE
Goal Outcome Evaluation:  Plan of Care Reviewed With: patient  Progress: no change  Outcome Summary: patient currenlty resting, patient moved to room 303 due to 302's call light not working, patient still on 6L O2,  will continue to monitor

## 2020-11-02 NOTE — PROGRESS NOTES
[facility] Medicine Services Daily Progress Note      Hospitalist Team  LOS 5 days      Patient Care Team:  Beka Weir MD as PCP - General (Family Medicine)    Patient Location: 303/1      Subjective   Subjective     Chief Complaint / Subjective  Chief Complaint   Patient presents with    Weakness - Generalized     Related to COVID-19         Brief Synopsis of Hospital Course/HPI    80-year-old female with multiple comorbidities including diabetes mellitus type 2, hypothyroidism and bipolar disorder.  Presented to the emergency room on 10/26/2020.  Patient and her  were feeling generally weak and had Covid test 2 days prior to this presentation and was positive.  Patient continued having worsening generalized body weakness and confusion thus presented to the ED.  Denies any fever or chills and had no shortness of breath.    Patient and her  live at [facility] and are normally independent in getting their groceries and activities of daily living.  They have a  who comes in once a week to help with housework.        Date::      10/27/2020  Patient seen and examined  Remains confused  Voiced no new complaints    10/28/2020  Patient remains on 2 L of oxygen via nasal cannula and saturating between 92 to 95%  Hemoglobin noted to be 6.2 today-no gross bleeding noted.  Patient continues with intermittent confusion.    10/29/2020  Has no new complaint  Intermittently confused  Evaluated by physical therapist and inpatient rehab recommended    10/30/2020  Formerly declined inpatient rehab  Patient is pleasantly confused  No new complaints  Initially on room air and subsequently started desaturating  Continue on supplementary oxygen  Will need walking oximetry prior to discharge    10/31/2020  Patient's oxygen requirements increasing  Currently on 6 L of oxygen via nasal cannula  Started on remdesivir  Renal function improving-discontinue IV  "fluid    11/1/2020  Patient seen and examined  Continues on 6 L of oxygen  Patient's mental status is back to baseline-she is able to answer questions correctly.  Denies any new complaints    11/2/20  Reports feeling improved and less short of breath, at time of exam titrated O2 down to 4L    Review of Systems   Constitution: Negative for chills and fever.   HENT: Negative for congestion.    Eyes: Negative for blurred vision.   Cardiovascular: Negative for chest pain.   Respiratory: Positive for shortness of breath.    Endocrine: Negative for cold intolerance and heat intolerance.   Gastrointestinal: Negative for abdominal pain, nausea and vomiting.   Genitourinary: Negative for dysuria.   Neurological: Positive for weakness.   Psychiatric/Behavioral: Negative for altered mental status.         Objective   Objective      Vital Signs  Temp:  [97.3 °F (36.3 °C)-98.7 °F (37.1 °C)] 98.1 °F (36.7 °C)  Heart Rate:  [] 95  Resp:  [14-16] 16  BP: (137-167)/(47-77) 157/71  Oxygen Therapy  SpO2: 95 %  Pulse Oximetry Type: Continuous  Device (Oxygen Therapy): nasal cannula  Flow (L/min): 3  Flowsheet Rows        First Filed Value   Admission Height  162.6 cm (64\") Documented at 10/26/2020 1524   Admission Weight  68 kg (150 lb) Documented at 10/26/2020 1524          Intake & Output (last 3 days)         10/30 0701 - 10/31 0700 10/31 0701 - 11/01 0700 11/01 0701 - 11/02 0700 11/02 0701 - 11/03 0700    P.O. 240 950      Total Intake(mL/kg) 240 (3.5) 950 (13.7)      Urine (mL/kg/hr)   500 (0.3)     Stool        Total Output   500     Net +240 +950 -500             Urine Unmeasured Occurrence 3 x 6 x 4 x 2 x    Stool Unmeasured Occurrence  3 x 1 x 2 x          Lines, Drains & Airways      Active LDAs       Name:   Placement date:   Placement time:   Site:   Days:    Peripheral IV 10/26/20 1534 Right Antecubital   10/26/20    1534    Antecubital   less than 1                      Physical Exam:    Physical Exam  Vitals signs " reviewed.   Constitutional:       General: She is not in acute distress.  Eyes:      Pupils: Pupils are equal, round, and reactive to light.   Cardiovascular:      Rate and Rhythm: Normal rate and regular rhythm.   Pulmonary:      Effort: No respiratory distress.   Abdominal:      Palpations: Abdomen is soft.      Tenderness: There is no abdominal tenderness.   Musculoskeletal:      Comments: Degenerative changes   Skin:     General: Skin is warm.   Neurological:      Mental Status: She is alert.   Psychiatric:         Mood and Affect: Mood normal.              Wounds (last 24 hours)        LDA Wound       Row Name 11/02/20 0745 11/02/20 0300 11/01/20 2300       Wound 10/26/20 2350 Bilateral medial perineum MASD (Moisture associated skin damage)    Wound - Properties Group Placement Date: 10/26/20  -SH Placement Time: 2350 -SH Present on Hospital Admission: Y  -SH Side: Bilateral  -SH Orientation: medial  -SH Location: perineum  -SH Primary Wound Type: MASD  -SH    Dressing Appearance  open to air  -VS  --  --    Closure  Open to air  -VS  Open to air  -SS  Open to air  -SS    Base  blanchable;moist  -VS  blanchable  -SS  blanchable  -SS    Periwound  blanchable;excoriated  -VS  blanchable;excoriated  -SS  blanchable;excoriated  -SS    Periwound Temperature  warm  -VS  --  --    Periwound Skin Turgor  soft  -VS  --  --    Drainage Amount  none  -VS  none  -SS  none  -SS    Care, Wound  barrier applied  -VS  --  --    Dressing Care  open to air  -VS  --  --    Periwound Care  barrier ointment applied  -VS  --  --    Retired Wound - Properties Group Date first assessed: 10/26/20  -SH Time first assessed: 2350 -SH Present on Hospital Admission: Y  -SH Side: Bilateral  -SH Location: perineum  -SH Primary Wound Type: MASD  -SH       Wound 10/26/20 2350 urethral meatus MASD (Moisture associated skin damage)    Wound - Properties Group Placement Date: 10/26/20  -SH Placement Time: 2350 -SH Present on Hospital Admission:  Y  -SH Location: urethral meatus  -SH Primary Wound Type: MASD  -SH    Dressing Appearance  open to air  -VS  --  --    Closure  Open to air  -VS  Open to air  -SS  Open to air  -SS    Base  blanchable;moist  -VS  blanchable;moist  -SS  blanchable;moist  -SS    Periwound  blanchable;excoriated;redness  -VS  blanchable;redness  -SS  blanchable;redness  -SS    Periwound Temperature  warm  -VS  --  --    Periwound Skin Turgor  soft  -VS  --  --    Drainage Amount  none  -VS  none  -SS  none  -SS    Care, Wound  barrier applied  -VS  --  --    Dressing Care  open to air  -VS  --  --    Periwound Care  barrier ointment applied  -VS  --  --    Retired Wound - Properties Group Date first assessed: 10/26/20  -SH Time first assessed: 2350 -SH Present on Hospital Admission: Y  -SH Location: urethral meatus  -SH Primary Wound Type: MASD  -SH      Row Name 11/01/20 1901             Wound 10/26/20 2350 Bilateral medial perineum MASD (Moisture associated skin damage)    Wound - Properties Group Placement Date: 10/26/20  -SH Placement Time: 2350 -SH Present on Hospital Admission: Y  -SH Side: Bilateral  -SH Orientation: medial  -SH Location: perineum  -SH Primary Wound Type: MASD  -SH    Closure  Open to air  -SS      Base  blanchable  -SS      Periwound  blanchable;excoriated  -SS      Drainage Amount  none  -SS      Care, Wound  barrier applied  -SS      Dressing Care  open to air  -SS      Periwound Care  barrier ointment applied  -SS      Retired Wound - Properties Group Date first assessed: 10/26/20  -SH Time first assessed: 2350  -SH Present on Hospital Admission: Y  -SH Side: Bilateral  -SH Location: perineum  -SH Primary Wound Type: MASD  -SH       Wound 10/26/20 2350 urethral meatus MASD (Moisture associated skin damage)    Wound - Properties Group Placement Date: 10/26/20  -SH Placement Time: 2350  -SH Present on Hospital Admission: Y  -SH Location: urethral meatus  -SH Primary Wound Type: MASD  -SH    Closure  Open to  air  -      Base  blanchable;moist  -      Periwound  blanchable;redness  -SS      Drainage Amount  none  -      Care, Wound  barrier applied  -      Dressing Care  open to air  -      Periwound Care  barrier ointment applied  -      Retired Wound - Properties Group Date first assessed: 10/26/20  - Time first assessed: 2350  -SH Present on Hospital Admission: Y  - Location: urethral meatus  - Primary Wound Type: MASD  -          User Key  (r) = Recorded By, (t) = Taken By, (c) = Cosigned By      Initials Name Provider Type     Renuka Sullivan RN Registered Nurse    VS Sturgeon, Valerie, LPN Licensed Nurse     Sydnie Witt LPN Licensed Nurse            Procedures:              Results Review:     I reviewed the patient's new clinical results.      Lab Results (last 24 hours)       Procedure Component Value Units Date/Time    POC Glucose Once [[id number]]  (Abnormal) Collected: 11/02/20 1632    Specimen: Blood Updated: 11/02/20 1634     Glucose 159 mg/dL      Comment: Serial Number: [id number]:  [id number]       POC Glucose Once [[id number]]  (Abnormal) Collected: 11/02/20 1130    Specimen: Blood Updated: 11/02/20 1136     Glucose 315 mg/dL      Comment: Serial Number: [id number]:  [id number]       Hepatic Function Panel [[id number]]  (Abnormal) Collected: 11/02/20 0624    Specimen: Blood Updated: 11/02/20 1135     Total Protein 5.6 g/dL      Albumin 2.30 g/dL      ALT (SGPT) 36 U/L      AST (SGOT) 35 U/L      Alkaline Phosphatase 105 U/L      Total Bilirubin 0.2 mg/dL      Bilirubin, Direct <0.2 mg/dL      Bilirubin, Indirect --     Comment: Unable to calculate       Scan Slide [[id number]] Collected: 11/02/20 0624    Specimen: Blood Updated: 11/02/20 0930     Scan Slide --     Comment: See Manual Differential Results       Manual Differential [[id number]]  (Abnormal) Collected: 11/02/20 0624    Specimen: Blood Updated: 11/02/20 0930     Neutrophil % 74.0 %      Lymphocyte % 17.0  %      Monocyte % 6.0 %      Bands %  3.0 %      Neutrophils Absolute 6.70 10*3/mm3      Lymphocytes Absolute 1.48 10*3/mm3      Monocytes Absolute 0.52 10*3/mm3      Anisocytosis Slight/1+     Microcytes Slight/1+     WBC Morphology Normal     Platelet Estimate Increased    CBC & Differential [[id number]]  (Abnormal) Collected: 11/02/20 0624    Specimen: Blood Updated: 11/02/20 0930    Narrative:      The following orders were created for panel order CBC & Differential.  Procedure                               Abnormality         Status                     ---------                               -----------         ------                     CBC Auto Differential[[id number]]        Abnormal            Final result                 Please view results for these tests on the individual orders.    CBC Auto Differential [[id number]]  (Abnormal) Collected: 11/02/20 0624    Specimen: Blood Updated: 11/02/20 0930     WBC 8.70 10*3/mm3      RBC 3.87 10*6/mm3      Hemoglobin 8.6 g/dL      Hematocrit 27.6 %      MCV 71.2 fL      MCH 22.2 pg      MCHC 31.2 g/dL      RDW 22.2 %      RDW-SD 55.1 fl      MPV 7.3 fL      Platelets 506 10*3/mm3     POC Glucose Once [[id number]]  (Abnormal) Collected: 11/02/20 0745    Specimen: Blood Updated: 11/02/20 0746     Glucose 327 mg/dL      Comment: Serial Number: [id number]:  [id number]       Basic Metabolic Panel [[id number]]  (Abnormal) Collected: 11/02/20 0624    Specimen: Blood Updated: 11/02/20 0712     Glucose 342 mg/dL      BUN 29 mg/dL      Creatinine 1.26 mg/dL      Sodium 134 mmol/L      Potassium 4.9 mmol/L      Chloride 103 mmol/L      CO2 21.0 mmol/L      Calcium 8.4 mg/dL      eGFR Non African Amer 41 mL/min/1.73      BUN/Creatinine Ratio 23.0     Anion Gap 10.0 mmol/L     Narrative:      GFR Normal >60  Chronic Kidney Disease <60  Kidney Failure <15      POC Glucose Once [[id number]]  (Abnormal) Collected: 11/01/20 1941    Specimen: Blood Updated: 11/01/20 1942     Glucose  270 mg/dL      Comment: Serial Number: [id number]:  [id number]             No results found for: HGBA1C            No results found for: LIPASE  No results found for: CHOL, CHLPL, TRIG, HDL, LDL, LDLDIRECT    No results found for: INTRAOP, PREDX, FINALDX, COMDX    Microbiology Results (last 10 days)       Procedure Component Value - Date/Time    Blood Culture - Blood, Arm, Left [[id number]] Collected: 10/27/20 1541    Lab Status: Final result Specimen: Blood from Arm, Left Updated: 11/01/20 1515     Blood Culture No growth at 5 days    Blood Culture - Blood, Hand, Right [[id number]] Collected: 10/27/20 1541    Lab Status: Final result Specimen: Blood from Hand, Right Updated: 11/01/20 1515     Blood Culture No growth at 5 days    Blood Culture - Blood, Arm, Left [[id number]] Collected: 10/26/20 1712    Lab Status: Final result Specimen: Blood from Arm, Left Updated: 10/31/20 1730     Blood Culture No growth at 5 days    Respiratory Panel PCR w/COVID-19(SARS-CoV-2) GEORGE/EFRAIN/SHAINA/PAD/COR/MAD/VANDANA In-House, NP Swab in UTM/VTM, 3-4 HR TAT - Swab, Nasopharynx [[id number]]  (Abnormal) Collected: 10/26/20 1711    Lab Status: Final result Specimen: Swab from Nasopharynx Updated: 10/26/20 1822     ADENOVIRUS, PCR Not Detected     Coronavirus 229E Not Detected     Coronavirus HKU1 Not Detected     Coronavirus NL63 Not Detected     Coronavirus OC43 Not Detected     COVID19 Detected     Human Metapneumovirus Not Detected     Human Rhinovirus/Enterovirus Not Detected     Influenza A PCR Not Detected     Influenza A H1 Not Detected     Influenza A H1 2009 PCR Not Detected     Influenza A H3 Not Detected     Influenza B PCR Not Detected     Parainfluenza Virus 1 Not Detected     Parainfluenza Virus 2 Not Detected     Parainfluenza Virus 3 Not Detected     Parainfluenza Virus 4 Not Detected     RSV, PCR Not Detected     Bordetella pertussis pcr Not Detected     Bordetella parapertussis PCR Not Detected     Chlamydophila pneumoniae  PCR Not Detected     Mycoplasma pneumo by PCR Not Detected    Narrative:      Fact sheet for providers: https://docs.[employer]/wp-content/uploads/DIO1928-6841-RF1.1-EUA-Provider-Fact-Sheet-3.pdf    Fact sheet for patients: https://docs.[employer]/wp-content/uploads/IYH1323-6532-SL1.1-EUA-Patient-Fact-Sheet-1.pdf    Urine Culture - Urine, Urine, Clean Catch [[id number]]  (Abnormal)  (Susceptibility) Collected: 10/26/20 1711    Lab Status: Final result Specimen: Urine, Clean Catch Updated: 10/28/20 0437     Urine Culture >100,000 CFU/mL Escherichia coli      50,000 CFU/mL Streptococcus agalactiae (Group B)     Comment: This organism is considered to be universally susceptible to penicillin.  No further antibiotic testing will be performed.       Susceptibility        Escherichia coli     MCKENNA     Ampicillin Resistant     Ampicillin + Sulbactam Resistant     Cefazolin Susceptible     Cefepime Susceptible     Ceftazidime Susceptible     Ceftriaxone Susceptible     Gentamicin Resistant     Levofloxacin Susceptible     Nitrofurantoin Susceptible     Piperacillin + Tazobactam Intermediate     Tetracycline Resistant     Tobramycin Susceptible     Trimethoprim + Sulfamethoxazole Resistant                      S. Pneumo Ag Urine or CSF - Urine, Urine, Clean Catch [[id number]]  (Normal) Collected: 10/26/20 1711    Lab Status: Final result Specimen: Urine, Clean Catch Updated: 10/27/20 0751     Strep Pneumo Ag Negative    Blood Culture - Blood, Arm, Right [[id number]]  (Abnormal) Collected: 10/26/20 1704    Lab Status: Final result Specimen: Blood from Arm, Right Updated: 10/29/20 0609     Blood Culture Staphylococcus hominis ssp hominis     Comment: Probable contaminant requires clinical correlation, susceptibility not performed unless requested by physician.          Gram Stain Anaerobic Bottle Gram positive cocci in clusters    Blood Culture ID, PCR - Blood, Arm, Right [[id number]]  (Abnormal) Collected: 10/26/20 1704     Lab Status: Final result Specimen: Blood from Arm, Right Updated: 10/27/20 1357     BCID, PCR Staphylococcus spp, not aureus. Identification by BCID PCR.     BOTTLE TYPE Anaerobic Bottle            ECG/EMG Results (most recent)       Procedure Component Value Units Date/Time    ECG 12 Lead [[id number]] Collected: 10/26/20 1648     Updated: 10/28/20 1700     QT Interval 326 ms     Narrative:      HEART RATE= 100  bpm  RR Interval= 612  ms  MA Interval= 174  ms  P Horizontal Axis= 22  deg  P Front Axis= 34  deg  QRSD Interval= 86  ms  QT Interval= 326  ms  QRS Axis= 43  deg  T Wave Axis= -47  deg  - ABNORMAL ECG -  Sinus tachycardia  Atrial premature complexes  No previous ECG available for comparison  Electronically Signed By: Tyrone Coon (SHAINA) 28-Oct-2020 16:59:23  Date and Time of Study: 2020-10-26 16:48:03                      Xr Chest 1 View    Result Date: 10/26/2020   1. Patchy peripheral opacities concerning for multifocal pneumonia. Patient is Covid positive. 2. Mild enlargement of the cardiac silhouette.   Electronically Signed By-Shahram Kenny DO. On:10/26/2020 4:56 PM This report was finalized on [id number] by  Shahram Kenny DO..    Ct Chest Pulmonary Embolism    Result Date: 10/26/2020  1. Negative for pulmonary embolus. 2. There are extensive bilateral groundglass opacities in the lungs. The appearance is compatible with Covid pneumonia. Other diagnoses such as influenza or organizing pneumonia, drug toxicity, or connective tissue disease is also possible. 3. Atherosclerosis.  Electronically Signed By-Gladys Ambrose On:10/26/2020 7:25 PM This report was finalized on [id number] by  Gladys Ambrose .          Xrays, labs reviewed personally by physician.    Medication Review:   I have reviewed the patient's current medication list      Scheduled Meds  cefTRIAXone, 1 g, Intravenous, Q24H  clopidogrel, 75 mg, Oral, Daily  dexamethasone, 6 mg, Oral, Daily With Breakfast  divalproex, 250 mg, Oral,  TID  enoxaparin, 40 mg, Subcutaneous, Q12H  insulin glargine, 15 Units, Subcutaneous, Nightly  insulin lispro, 0-24 Units, Subcutaneous, TID AC  [START ON 11/3/2020] insulin regular, 12 Units, Subcutaneous, Daily With Breakfast  levothyroxine, 37.5 mcg, Oral, Daily  LORazepam, 0.5 mg, Oral, TID  ofloxacin, 1 drop, Right Eye, 4x Daily  pantoprazole, 40 mg, Oral, QAM AC  potassium chloride, 40 mEq, Oral, Daily  prednisoLONE acetate, 1 drop, Right Eye, 4x Daily  remdesivir, 100 mg, Intravenous, Q24H  risperiDONE, 0.5 mg, Oral, QAM  risperiDONE, 0.5 mg, Oral, Nightly  risperiDONE, 1 mg, Oral, Daily  rosuvastatin, 20 mg, Oral, Nightly  sodium chloride, 10 mL, Intravenous, Q12H        Meds Infusions  Pharmacy Consult - Remdesivir,         Meds PRN    acetaminophen **OR** acetaminophen **OR** acetaminophen    dextrose    dextrose    glucagon (human recombinant)    insulin lispro **AND** insulin lispro    ondansetron **OR** ondansetron    Pharmacy Consult - Remdesivir    potassium chloride    potassium chloride    [COMPLETED] Insert peripheral IV **AND** sodium chloride    sodium chloride        Assessment/Plan   Assessment/Plan     Active Hospital Problems    Diagnosis  POA    **Pneumonia due to COVID-19 virus [U07.1, J12.89]  Yes    Generalized weakness [R53.1]  Unknown    Metabolic encephalopathy [G93.41]  Yes    Acute renal injury (CMS/HCC) [N17.9]  Unknown      Resolved Hospital Problems   No resolved problems to display.       MEDICAL DECISION MAKING COMPLEXITY BY PROBLEM:     Generalized body weakness  Most likely due to Covid infection  Fall precaution  PT recommending inpatient rehab but family declined.  Will be discharged home with home health for physical therapy    Acute respiratory failure with hypoxia due to COVID-19 pneumonia:   Chest x-ray and CT chest reviewed-findings suggestive of atypical pneumonia.   Wean O2 down, on 4L  on Decadron and remdesivir  Anti-inflammatory agents    Acute on chronic anemia  ?   Etiology  No gross bleeding noted  Hemoglobin  6.2-10/20/2020-status post transfusion 1 unit packed red blood cell   Patient will need further work-up  as outpatient when infection resolves    CAD  No active angina  Continue medical management    Hypothyroidism-on Synthroid    Hypokalemia-on replacement protocol    Acute Encephalopathy  ?  Underlying dementia  Probably due to UTI  Mental status back to baseline    Acute kidney injury  Probably due to dehydration  Resolved    E. coli UTI  Finishes Rocephin tomorrow       Bipolar disorder with anxiety:   On Depakote, Ativan and risperidone     Diabetes type 2, chronic:  A1c 10.3  Holding home medication glipizide and Metformin  On premeal insulin, sliding scale insulin and Lantus   Follow and adjust prn    Hyperlipidemia-on statin      HLD-on PPI     Overactive bladder: Hold Ditropan       Bacteremia-blood culture growing gram-positive cocci in clusters not aureus  ?  Contaminant  Repeat blood culture no growth      VTE Prophylaxis -Lovenox    Mechanical Order History:       None          Pharmalogical Order History:        Ordered     Dose Route Frequency Stop    10/26/20 2337  heparin (porcine) 5000 UNIT/ML injection 5,000 Units      5,000 Units SC Every 12 Hours Scheduled --    10/26/20 2028  enoxaparin (LOVENOX) syringe 30 mg  Status:  Discontinued      30 mg SC Every 24 Hours 10/26/20 2338                      Code Status -   Code Status and Medical Interventions:   Ordered at: 10/26/20 2028     Code Status:    CPR     Medical Interventions (Level of Support Prior to Arrest):    Full       This patient has been examined with appropriate PPE11/02/20        Discharge Planning  Tomorrow if O2 around 3-4L, will need oximetry prior to d/c          Electronically signed by Nicolás Cook DO, 11/02/20, 17:43 EST.  [facility] Hospitalist Team

## 2020-11-03 ENCOUNTER — READMISSION MANAGEMENT (OUTPATIENT)
Dept: CALL CENTER | Facility: HOSPITAL | Age: 80
End: 2020-11-03

## 2020-11-03 VITALS
SYSTOLIC BLOOD PRESSURE: 121 MMHG | OXYGEN SATURATION: 92 % | DIASTOLIC BLOOD PRESSURE: 50 MMHG | WEIGHT: 149.03 LBS | TEMPERATURE: 97.2 F | HEIGHT: 65 IN | RESPIRATION RATE: 17 BRPM | BODY MASS INDEX: 24.83 KG/M2 | HEART RATE: 92 BPM

## 2020-11-03 PROBLEM — R53.1 GENERALIZED WEAKNESS: Status: RESOLVED | Noted: 2020-10-27 | Resolved: 2020-11-03

## 2020-11-03 PROBLEM — G93.41 METABOLIC ENCEPHALOPATHY: Status: RESOLVED | Noted: 2020-10-27 | Resolved: 2020-11-03

## 2020-11-03 LAB
ALBUMIN SERPL-MCNC: 2.4 G/DL (ref 3.5–5.2)
ALP SERPL-CCNC: 125 U/L (ref 39–117)
ALT SERPL W P-5'-P-CCNC: 35 U/L (ref 1–33)
AST SERPL-CCNC: 32 U/L (ref 1–32)
BILIRUB CONJ SERPL-MCNC: <0.2 MG/DL (ref 0–0.3)
BILIRUB INDIRECT SERPL-MCNC: ABNORMAL MG/DL
BILIRUB SERPL-MCNC: 0.2 MG/DL (ref 0–1.2)
CREAT SERPL-MCNC: 1.38 MG/DL (ref 0.57–1)
D DIMER PPP FEU-MCNC: 1.02 MG/L (FEU) (ref 0–0.59)
GFR SERPL CREATININE-BSD FRML MDRD: 37 ML/MIN/1.73
GLUCOSE BLDC GLUCOMTR-MCNC: 251 MG/DL (ref 70–105)
GLUCOSE BLDC GLUCOMTR-MCNC: 323 MG/DL (ref 70–105)
PROT SERPL-MCNC: 6.5 G/DL (ref 6–8.5)

## 2020-11-03 PROCEDURE — 85379 FIBRIN DEGRADATION QUANT: CPT | Performed by: INTERNAL MEDICINE

## 2020-11-03 PROCEDURE — 63710000001 INSULIN REGULAR HUMAN PER 5 UNITS: Performed by: INTERNAL MEDICINE

## 2020-11-03 PROCEDURE — 82565 ASSAY OF CREATININE: CPT | Performed by: INTERNAL MEDICINE

## 2020-11-03 PROCEDURE — 80076 HEPATIC FUNCTION PANEL: CPT | Performed by: INTERNAL MEDICINE

## 2020-11-03 PROCEDURE — 82962 GLUCOSE BLOOD TEST: CPT

## 2020-11-03 PROCEDURE — 99239 HOSP IP/OBS DSCHRG MGMT >30: CPT | Performed by: INTERNAL MEDICINE

## 2020-11-03 PROCEDURE — 25010000002 ENOXAPARIN PER 10 MG: Performed by: INTERNAL MEDICINE

## 2020-11-03 PROCEDURE — 63710000001 INSULIN LISPRO (HUMAN) PER 5 UNITS: Performed by: INTERNAL MEDICINE

## 2020-11-03 PROCEDURE — 94618 PULMONARY STRESS TESTING: CPT

## 2020-11-03 RX ORDER — NYSTATIN 100000 [USP'U]/G
POWDER TOPICAL EVERY 12 HOURS SCHEDULED
Status: DISCONTINUED | OUTPATIENT
Start: 2020-11-03 | End: 2020-11-03 | Stop reason: HOSPADM

## 2020-11-03 RX ORDER — ALBUTEROL SULFATE 90 UG/1
2 AEROSOL, METERED RESPIRATORY (INHALATION) EVERY 4 HOURS PRN
Qty: 18 G | Refills: 0 | Status: SHIPPED | OUTPATIENT
Start: 2020-11-03 | End: 2022-09-26

## 2020-11-03 RX ORDER — DEXAMETHASONE 6 MG/1
6 TABLET ORAL
Qty: 4 TABLET | Refills: 0 | Status: SHIPPED | OUTPATIENT
Start: 2020-11-04 | End: 2022-09-26

## 2020-11-03 RX ORDER — ACYCLOVIR 200 MG/1
800 CAPSULE ORAL
Status: DISCONTINUED | OUTPATIENT
Start: 2020-11-03 | End: 2020-11-03

## 2020-11-03 RX ORDER — NYSTATIN 100000 [USP'U]/G
POWDER TOPICAL EVERY 12 HOURS SCHEDULED
Qty: 15 G | Refills: 0 | Status: SHIPPED | OUTPATIENT
Start: 2020-11-03 | End: 2022-09-26

## 2020-11-03 RX ADMIN — SODIUM CHLORIDE, PRESERVATIVE FREE 10 ML: 5 INJECTION INTRAVENOUS at 08:50

## 2020-11-03 RX ADMIN — OFLOXACIN 1 DROP: 3 SOLUTION/ DROPS OPHTHALMIC at 08:52

## 2020-11-03 RX ADMIN — DEXAMETHASONE 6 MG: 6 TABLET ORAL at 08:51

## 2020-11-03 RX ADMIN — POTASSIUM CHLORIDE 40 MEQ: 1500 TABLET, EXTENDED RELEASE ORAL at 08:51

## 2020-11-03 RX ADMIN — LEVOTHYROXINE SODIUM 37.5 MCG: 75 TABLET ORAL at 08:51

## 2020-11-03 RX ADMIN — CLOPIDOGREL BISULFATE 75 MG: 75 TABLET ORAL at 08:51

## 2020-11-03 RX ADMIN — INSULIN HUMAN 12 UNITS: 100 INJECTION, SOLUTION PARENTERAL at 08:50

## 2020-11-03 RX ADMIN — SODIUM CHLORIDE 100 MG: 9 INJECTION, SOLUTION INTRAVENOUS at 09:55

## 2020-11-03 RX ADMIN — PANTOPRAZOLE SODIUM 40 MG: 40 TABLET, DELAYED RELEASE ORAL at 08:51

## 2020-11-03 RX ADMIN — LORAZEPAM 0.5 MG: 0.5 TABLET ORAL at 08:51

## 2020-11-03 RX ADMIN — PREDNISOLONE ACETATE 1 DROP: 10 SUSPENSION/ DROPS OPHTHALMIC at 12:23

## 2020-11-03 RX ADMIN — DIVALPROEX SODIUM 250 MG: 250 TABLET, DELAYED RELEASE ORAL at 08:51

## 2020-11-03 RX ADMIN — INSULIN LISPRO 12 UNITS: 100 INJECTION, SOLUTION INTRAVENOUS; SUBCUTANEOUS at 12:23

## 2020-11-03 RX ADMIN — RISPERIDONE 0.5 MG: 0.25 TABLET ORAL at 06:17

## 2020-11-03 RX ADMIN — INSULIN LISPRO 16 UNITS: 100 INJECTION, SOLUTION INTRAVENOUS; SUBCUTANEOUS at 08:50

## 2020-11-03 RX ADMIN — NYSTATIN: 100000 POWDER TOPICAL at 05:34

## 2020-11-03 RX ADMIN — OFLOXACIN 1 DROP: 3 SOLUTION/ DROPS OPHTHALMIC at 12:23

## 2020-11-03 RX ADMIN — ENOXAPARIN SODIUM 40 MG: 40 INJECTION SUBCUTANEOUS at 09:56

## 2020-11-03 RX ADMIN — ACYCLOVIR 800 MG: 200 CAPSULE ORAL at 06:18

## 2020-11-03 RX ADMIN — PREDNISOLONE ACETATE 1 DROP: 10 SUSPENSION/ DROPS OPHTHALMIC at 08:52

## 2020-11-03 NOTE — PLAN OF CARE
Goal Outcome Evaluation:  Plan of Care Reviewed With: patient  Progress: improving  Outcome Summary: pt plan for discharge thgius evening; home with home health [place]

## 2020-11-03 NOTE — PLAN OF CARE
Goal Outcome Evaluation:  Plan of Care Reviewed With: patient  Progress: improving  Pt discharge to home

## 2020-11-03 NOTE — PROGRESS NOTES
Exercise Oximetry    Patient Name:Laura Perkins   MRN: [id number]   Date: 11/03/20             ROOM AIR BASELINE   SpO2% 91   Heart Rate    Blood Pressure      EXERCISE ON ROOM AIR SpO2% EXERCISE ON O2 @  LPM SpO2%   1 MINUTE 90 1 MINUTE    2 MINUTES 91 2 MINUTES    3 MINUTES 91 3 MINUTES    4 MINUTES 89 4 MINUTES    5 MINUTES 91 5 MINUTES    6 MINUTES 90 6 MINUTES               Distance Walked   Distance Walked   Dyspnea (Dhiraj Scale)   Dyspnea (Dhiraj Scale)   Fatigue (Dhiraj Scale)   Fatigue (Dhiraj Scale)   SpO2% Post Exercise   SpO2% Post Exercise   HR Post Exercise   HR Post Exercise   Time to Recovery   Time to Recovery     Comments: Room air sats 89-91% for duration of walk

## 2020-11-03 NOTE — DISCHARGE SUMMARY
Date of Admission: 10/26/2020    Date of Discharge:  11/3/2020    Length of stay:  LOS: 6 days     Presenting Problem:   Acute cystitis with hematuria [N30.01]  Dyspnea, unspecified type [R06.00]  Pneumonia due to COVID-19 virus [U07.1, J12.89]  Pneumonia due to COVID-19 virus [U07.1, J12.89]      Principal and Active Diagnosis During Hospital Stay:     Active Hospital Problems    Diagnosis  POA   • **Pneumonia due to COVID-19 virus [U07.1, J12.89]  Yes   • Acute renal injury (CMS/HCC) [N17.9]  Unknown      Resolved Hospital Problems    Diagnosis Date Resolved POA   • Generalized weakness [R53.1] 11/03/2020 Unknown   • Metabolic encephalopathy [G93.41] 11/03/2020 Yes       Generalized body weakness  Most likely due to Covid infection  Fall precaution  PT recommending inpatient rehab but family declined.  Will be discharged home with home health for physical therapy     Acute respiratory failure with hypoxia due to COVID-19 pneumonia:   Chest x-ray and CT chest reviewed-findings suggestive of atypical pneumonia.   Wean O2 down, on 3L and will get exercise O2 before d/c as will need at home  on Decadron for 4 more days and finished remdesivir  Anti-inflammatory agents     Acute on chronic anemia  ?  Etiology  No gross bleeding noted  Hemoglobin  6.2-10/20/2020-status post transfusion 1 unit packed red blood cell and Hgb stable   Patient will need further work-up  as outpatient when infection resolves     CAD  No active angina  Continue medical management     Hypothyroidism-on Synthroid     Hypokalemia-on replacement protocol     Acute Encephalopathy  ?  Underlying dementia  Probably due to UTI and COVID-19 pneumonia   Mental status back to baseline     Acute kidney injury  Probably due to dehydration  Resolved     E. coli UTI  Finished Rocephin tomorrow        Bipolar disorder with anxiety:   On Depakote, Ativan and risperidone     Diabetes type 2, chronic:  Uncontrolled  Continue home meds at  d/c     Hyperlipidemia-on statin        HLD-on PPI     Overactive bladder: Hold Ditropan     Rash to inner thigh  Likely fungal   Continue Nystain powder at home      Bacteremia-blood culture growing gram-positive cocci in clusters not aureus but only one blood Cx done at the time  likely Contaminant  Repeat blood culture no growth    Hospital Course  Patient is a 80 y.o. female presented with above. She progressed and improved. PT rec rehab but family decline. Oxygenation had improved, and she was felt stable to d/c home with home health         Procedures Performed:as noted          Consults:   Consults     Date and Time Order Name Status Description    10/26/2020 1953 Hospitalist (on-call MD unless specified) Completed           Pertinent Test Results:     Lab Results (last 72 hours)     Procedure Component Value Units Date/Time    POC Glucose Once [[id number]]  (Abnormal) Collected: 11/03/20 0732    Specimen: Blood Updated: 11/03/20 0733     Glucose 323 mg/dL      Comment: Serial Number: [id number]:  [id number]       D-dimer, Quantitative [[id number]]  (Abnormal) Collected: 11/03/20 0437    Specimen: Blood Updated: 11/03/20 0544     D-Dimer, Quantitative 1.02 mg/L (FEU)     Narrative:      Reference Range  --------------------------------------------------------------------     < 0.50   Negative Predictive Value  0.50-0.59   Indeterminate    >= 0.60   Probable VTE             A very low percentage of patients with DVT may yield D-Dimer results   below the cut-off of 0.50 mg/L FEU.  This is known to be more   prevalent in patients with distal DVT.             Results of this test should always be interpreted in conjunction with   the patient's medical history, clinical presentation and other   findings.  Clinical diagnosis should not be based on the result of   INNOVANCE D-Dimer alone.    Hepatic Function Panel [[id number]]  (Abnormal) Collected: 11/03/20 0437    Specimen: Blood Updated: 11/03/20 0542      Total Protein 6.5 g/dL      Albumin 2.40 g/dL      ALT (SGPT) 35 U/L      AST (SGOT) 32 U/L      Comment: Specimen hemolyzed.  Results may be affected.        Alkaline Phosphatase 125 U/L      Total Bilirubin 0.2 mg/dL      Bilirubin, Direct <0.2 mg/dL      Comment: Specimen hemolyzed. Results may be affected.        Bilirubin, Indirect --     Comment: Unable to calculate       Creatinine, Serum [[id number]]  (Abnormal) Collected: 11/03/20 0437    Specimen: Blood Updated: 11/03/20 0541     Creatinine 1.38 mg/dL      eGFR Non African Amer 37 mL/min/1.73     Narrative:      GFR Normal >60  Chronic Kidney Disease <60  Kidney Failure <15      POC Glucose Once [[id number]]  (Abnormal) Collected: 11/02/20 1914    Specimen: Blood Updated: 11/02/20 1919     Glucose 265 mg/dL      Comment: Serial Number: [id number]:  [id number]       POC Glucose Once [[id number]]  (Abnormal) Collected: 11/02/20 1632    Specimen: Blood Updated: 11/02/20 1634     Glucose 159 mg/dL      Comment: Serial Number: [id number]:  [id number]       POC Glucose Once [[id number]]  (Abnormal) Collected: 11/02/20 1130    Specimen: Blood Updated: 11/02/20 1136     Glucose 315 mg/dL      Comment: Serial Number: [id number]:  [id number]       Hepatic Function Panel [[id number]]  (Abnormal) Collected: 11/02/20 0624    Specimen: Blood Updated: 11/02/20 1135     Total Protein 5.6 g/dL      Albumin 2.30 g/dL      ALT (SGPT) 36 U/L      AST (SGOT) 35 U/L      Alkaline Phosphatase 105 U/L      Total Bilirubin 0.2 mg/dL      Bilirubin, Direct <0.2 mg/dL      Bilirubin, Indirect --     Comment: Unable to calculate       Scan Slide [[id number]] Collected: 11/02/20 0624    Specimen: Blood Updated: 11/02/20 0930     Scan Slide --     Comment: See Manual Differential Results       Manual Differential [[id number]]  (Abnormal) Collected: 11/02/20 0624    Specimen: Blood Updated: 11/02/20 0930     Neutrophil % 74.0 %      Lymphocyte % 17.0 %      Monocyte % 6.0 %       Bands %  3.0 %      Neutrophils Absolute 6.70 10*3/mm3      Lymphocytes Absolute 1.48 10*3/mm3      Monocytes Absolute 0.52 10*3/mm3      Anisocytosis Slight/1+     Microcytes Slight/1+     WBC Morphology Normal     Platelet Estimate Increased    CBC & Differential [[id number]]  (Abnormal) Collected: 11/02/20 0624    Specimen: Blood Updated: 11/02/20 0930    Narrative:      The following orders were created for panel order CBC & Differential.  Procedure                               Abnormality         Status                     ---------                               -----------         ------                     CBC Auto Differential[[id number]]        Abnormal            Final result                 Please view results for these tests on the individual orders.    CBC Auto Differential [[id number]]  (Abnormal) Collected: 11/02/20 0624    Specimen: Blood Updated: 11/02/20 0930     WBC 8.70 10*3/mm3      RBC 3.87 10*6/mm3      Hemoglobin 8.6 g/dL      Hematocrit 27.6 %      MCV 71.2 fL      MCH 22.2 pg      MCHC 31.2 g/dL      RDW 22.2 %      RDW-SD 55.1 fl      MPV 7.3 fL      Platelets 506 10*3/mm3     POC Glucose Once [[id number]]  (Abnormal) Collected: 11/02/20 0745    Specimen: Blood Updated: 11/02/20 0746     Glucose 327 mg/dL      Comment: Serial Number: [id number]:  [id number]       Basic Metabolic Panel [[id number]]  (Abnormal) Collected: 11/02/20 0624    Specimen: Blood Updated: 11/02/20 0712     Glucose 342 mg/dL      BUN 29 mg/dL      Creatinine 1.26 mg/dL      Sodium 134 mmol/L      Potassium 4.9 mmol/L      Chloride 103 mmol/L      CO2 21.0 mmol/L      Calcium 8.4 mg/dL      eGFR Non African Amer 41 mL/min/1.73      BUN/Creatinine Ratio 23.0     Anion Gap 10.0 mmol/L     Narrative:      GFR Normal >60  Chronic Kidney Disease <60  Kidney Failure <15      POC Glucose Once [[id number]]  (Abnormal) Collected: 11/01/20 1941    Specimen: Blood Updated: 11/01/20 1942     Glucose 270 mg/dL      Comment:  Serial Number: [id number]:  [id number]       POC Glucose Once [[id number]]  (Abnormal) Collected: 11/01/20 1724    Specimen: Blood Updated: 11/01/20 1726     Glucose 208 mg/dL      Comment: Serial Number: [id number]:  [id number]       Blood Culture - Blood, Arm, Left [[id number]] Collected: 10/27/20 1541    Specimen: Blood from Arm, Left Updated: 11/01/20 1515     Blood Culture No growth at 5 days    Blood Culture - Blood, Hand, Right [[id number]] Collected: 10/27/20 1541    Specimen: Blood from Hand, Right Updated: 11/01/20 1515     Blood Culture No growth at 5 days    POC Glucose Once [[id number]]  (Abnormal) Collected: 11/01/20 1111    Specimen: Blood Updated: 11/01/20 1112     Glucose 217 mg/dL      Comment: Serial Number: [id number]:  [id number]       POC Glucose Once [[id number]]  (Abnormal) Collected: 11/01/20 0728    Specimen: Blood Updated: 11/01/20 0730     Glucose 170 mg/dL      Comment: Serial Number: [id number]:  [id number]       Scan Slide [[id number]] Collected: 11/01/20 0414    Specimen: Blood Updated: 11/01/20 0523     Scan Slide --     Comment: See Manual Differential Results       Manual Differential [[id number]]  (Abnormal) Collected: 11/01/20 0414    Specimen: Blood Updated: 11/01/20 0523     Neutrophil % 71.0 %      Lymphocyte % 15.0 %      Monocyte % 11.0 %      Eosinophil % 1.0 %      Bands %  2.0 %      Neutrophils Absolute 6.72 10*3/mm3      Lymphocytes Absolute 1.38 10*3/mm3      Monocytes Absolute 1.01 10*3/mm3      Eosinophils Absolute 0.09 10*3/mm3      Anisocytosis Slight/1+     Microcytes Slight/1+     Poikilocytes Slight/1+     RBC Fragments Slight/1+     WBC Morphology Normal     Platelet Estimate Increased    CBC & Differential [[id number]]  (Abnormal) Collected: 11/01/20 0414    Specimen: Blood Updated: 11/01/20 0523    Narrative:      The following orders were created for panel order CBC & Differential.  Procedure                               Abnormality          Status                     ---------                               -----------         ------                     CBC Auto Differential[[id number]]        Abnormal            Final result                 Please view results for these tests on the individual orders.    CBC Auto Differential [[id number]]  (Abnormal) Collected: 11/01/20 0414    Specimen: Blood Updated: 11/01/20 0523     WBC 9.20 10*3/mm3      RBC 4.06 10*6/mm3      Hemoglobin 9.0 g/dL      Hematocrit 28.4 %      MCV 69.9 fL      MCH 22.3 pg      MCHC 31.9 g/dL      RDW 21.2 %      RDW-SD 52.5 fl      MPV 7.2 fL      Platelets 534 10*3/mm3     Comprehensive Metabolic Panel [[id number]]  (Abnormal) Collected: 11/01/20 0414    Specimen: Blood Updated: 11/01/20 0513     Glucose 183 mg/dL      BUN 21 mg/dL      Creatinine 0.93 mg/dL      Sodium 139 mmol/L      Potassium 4.0 mmol/L      Chloride 105 mmol/L      CO2 22.0 mmol/L      Calcium 8.7 mg/dL      Total Protein 6.0 g/dL      Albumin 2.60 g/dL      ALT (SGPT) 35 U/L      AST (SGOT) 39 U/L      Alkaline Phosphatase 111 U/L      Total Bilirubin 0.2 mg/dL      eGFR Non African Amer 58 mL/min/1.73      Globulin 3.4 gm/dL      A/G Ratio 0.8 g/dL      BUN/Creatinine Ratio 22.6     Anion Gap 12.0 mmol/L     Narrative:      GFR Normal >60  Chronic Kidney Disease <60  Kidney Failure <15      Bilirubin, Direct [[id number]]  (Normal) Collected: 11/01/20 0414    Specimen: Blood Updated: 11/01/20 0513     Bilirubin, Direct <0.2 mg/dL     POC Glucose Once [[id number]]  (Abnormal) Collected: 10/31/20 1904    Specimen: Blood Updated: 10/31/20 1905     Glucose 339 mg/dL      Comment: Serial Number: [id number]:  [id number]       Blood Culture - Blood, Arm, Left [[id number]] Collected: 10/26/20 1712    Specimen: Blood from Arm, Left Updated: 10/31/20 1730     Blood Culture No growth at 5 days    POC Glucose Once [[id number]]  (Abnormal) Collected: 10/31/20 1643    Specimen: Blood Updated: 10/31/20 1646     Glucose 335  mg/dL      Comment: Serial Number: [id number]:  [id number]       POC Glucose Once [[id number]]  (Abnormal) Collected: 10/31/20 1144    Specimen: Blood Updated: 10/31/20 1151     Glucose 346 mg/dL      Comment: Serial Number: [id number]:  [id number]       D-dimer, Quantitative [[id number]]  (Abnormal) Collected: 10/31/20 1046    Specimen: Blood Updated: 10/31/20 1145     D-Dimer, Quantitative 1.75 mg/L (FEU)     Narrative:      Reference Range  --------------------------------------------------------------------     < 0.50   Negative Predictive Value  0.50-0.59   Indeterminate    >= 0.60   Probable VTE             A very low percentage of patients with DVT may yield D-Dimer results   below the cut-off of 0.50 mg/L FEU.  This is known to be more   prevalent in patients with distal DVT.             Results of this test should always be interpreted in conjunction with   the patient's medical history, clinical presentation and other   findings.  Clinical diagnosis should not be based on the result of   INNOVANCE D-Dimer alone.               Microbiology Results (last 10 days)     Procedure Component Value - Date/Time    Blood Culture - Blood, Arm, Left [[id number]] Collected: 10/27/20 1541    Lab Status: Final result Specimen: Blood from Arm, Left Updated: 11/01/20 1515     Blood Culture No growth at 5 days    Blood Culture - Blood, Hand, Right [[id number]] Collected: 10/27/20 1541    Lab Status: Final result Specimen: Blood from Hand, Right Updated: 11/01/20 1515     Blood Culture No growth at 5 days    Blood Culture - Blood, Arm, Left [[id number]] Collected: 10/26/20 1712    Lab Status: Final result Specimen: Blood from Arm, Left Updated: 10/31/20 1730     Blood Culture No growth at 5 days    Respiratory Panel PCR w/COVID-19(SARS-CoV-2) GEORGE/EFRAIN/SHAINA/PAD/COR/MAD/VANDANA In-House, NP Swab in [facility], 3-4 HR TAT - Swab, Nasopharynx [[id number]]  (Abnormal) Collected: 10/26/20 4257    Lab Status: Final result Specimen:  Swab from Nasopharynx Updated: 10/26/20 1822     ADENOVIRUS, PCR Not Detected     Coronavirus 229E Not Detected     Coronavirus HKU1 Not Detected     Coronavirus NL63 Not Detected     Coronavirus OC43 Not Detected     COVID19 Detected     Human Metapneumovirus Not Detected     Human Rhinovirus/Enterovirus Not Detected     Influenza A PCR Not Detected     Influenza A H1 Not Detected     Influenza A H1 2009 PCR Not Detected     Influenza A H3 Not Detected     Influenza B PCR Not Detected     Parainfluenza Virus 1 Not Detected     Parainfluenza Virus 2 Not Detected     Parainfluenza Virus 3 Not Detected     Parainfluenza Virus 4 Not Detected     RSV, PCR Not Detected     Bordetella pertussis pcr Not Detected     Bordetella parapertussis PCR Not Detected     Chlamydophila pneumoniae PCR Not Detected     Mycoplasma pneumo by PCR Not Detected    Narrative:      Fact sheet for providers: https://docs.[employer]/wp-content/uploads/NBT8447-0740-AX2.1-EUA-Provider-Fact-Sheet-3.pdf    Fact sheet for patients: https://docs.[employer]/wp-content/uploads/FMD7550-3260-QC3.1-EUA-Patient-Fact-Sheet-1.pdf    Urine Culture - Urine, Urine, Clean Catch [[id number]]  (Abnormal)  (Susceptibility) Collected: 10/26/20 1711    Lab Status: Final result Specimen: Urine, Clean Catch Updated: 10/28/20 0437     Urine Culture >100,000 CFU/mL Escherichia coli      50,000 CFU/mL Streptococcus agalactiae (Group B)     Comment: This organism is considered to be universally susceptible to penicillin.  No further antibiotic testing will be performed.       Susceptibility      Escherichia coli     MCKENNA     Ampicillin Resistant     Ampicillin + Sulbactam Resistant     Cefazolin Susceptible     Cefepime Susceptible     Ceftazidime Susceptible     Ceftriaxone Susceptible     Gentamicin Resistant     Levofloxacin Susceptible     Nitrofurantoin Susceptible     Piperacillin + Tazobactam Intermediate     Tetracycline Resistant     Tobramycin Susceptible      Trimethoprim + Sulfamethoxazole Resistant                    S. Pneumo Ag Urine or CSF - Urine, Urine, Clean Catch [[id number]]  (Normal) Collected: 10/26/20 1711    Lab Status: Final result Specimen: Urine, Clean Catch Updated: 10/27/20 0751     Strep Pneumo Ag Negative    Blood Culture - Blood, Arm, Right [[id number]]  (Abnormal) Collected: 10/26/20 1704    Lab Status: Final result Specimen: Blood from Arm, Right Updated: 10/29/20 0609     Blood Culture Staphylococcus hominis ssp hominis     Comment: Probable contaminant requires clinical correlation, susceptibility not performed unless requested by physician.          Gram Stain Anaerobic Bottle Gram positive cocci in clusters    Blood Culture ID, PCR - Blood, Arm, Right [[id number]]  (Abnormal) Collected: 10/26/20 1704    Lab Status: Final result Specimen: Blood from Arm, Right Updated: 10/27/20 1357     BCID, PCR Staphylococcus spp, not aureus. Identification by BCID PCR.     BOTTLE TYPE Anaerobic Bottle                 Imaging Results (All)     Procedure Component Value Units Date/Time    CT Chest Pulmonary Embolism [[id number]] Collected: 10/26/20 1915     Updated: 10/26/20 1928    Narrative:         DATE OF EXAM:  10/26/2020 6:50 PM     PROCEDURE:  CT CHEST PULMONARY EMBOLISM-     INDICATIONS:   PE suspected, low/intermediate prob, positive D-dimer     COMPARISON:   Portable chest 10/26/1990     TECHNIQUE:  Routine transaxial slices were obtained through chest after  administration of intravenous 100 ml of Isovue 370. Reconstructed  coronal and sagittal images were also obtained. Automated exposure  control and iterative reconstruction methods were used.      FINDINGS:  Pulmonary artery opacification is good. No pulmonary embolus is  identified. There is no evidence of right heart strain.     There is heavy atherosclerotic vascular calcification, including in the  coronary arteries. The heart size is upper limits of normal. There is  dense material in the  esophagus consistent with contrast. Mediastinal  nodes are not significantly enlarged. There is borderline hilar  adenopathy.     There are multiple bilateral areas of groundglass opacity in the upper  and lower lobes, many of which are peripheral in location. Overall these  are moderately extensive. There is no endobronchial lesion identified.  There is no pleural effusion.     Limited abdominal imaging is negative. 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  There is compression of T12 which looks chronic.       Impression:      1. Negative for pulmonary embolus.  2. There are extensive bilateral groundglass opacities in the lungs. The  appearance is compatible with Covid pneumonia. Other diagnoses such as  influenza or organizing pneumonia, drug toxicity, or connective tissue  disease is also possible.  3. Atherosclerosis.     Electronically Signed By-Gladys Ambrose On:10/26/2020 7:25 PM  This report was finalized on [id number] by  Gladys Ambrose, .    XR Chest 1 View [[id number]] Collected: 10/26/20 1655     Updated: 10/26/20 1659    Narrative:      XR CHEST 1 VW-     Date of Exam: 10/26/2020 4:48 PM     Indication: shortness of breath  80-year-old female, Covid positive,  chest pain and shortness of breath     Comparison: None available.     Technique: 1 view(s) of the chest were obtained.     FINDINGS: Patchy peripheral opacities are present throughout each  lung concerning for multifocal pneumonia. Pulmonary vascularity is  unremarkable. There is calcification in the thoracic aorta and mild  enlargement of the cardiac silhouette. No pneumothorax or pleural  effusion. Trachea is midline. Visualized bony structures are intact.       Impression:         1. Patchy peripheral opacities concerning for multifocal pneumonia.  Patient is Covid positive.  2. Mild enlargement of the cardiac silhouette.        Electronically Signed By-Shahram Kenny DO. On:10/26/2020  4:56 PM  This report was finalized on [id number] by  Shahram Kenny DO..            Condition on Discharge:  Chronically ill but stable     Vital Signs  Temp:  [97.2 °F (36.2 °C)-98.5 °F (36.9 °C)] 97.5 °F (36.4 °C)  Heart Rate:  [88-98] 88  Resp:  [16-18] 17  BP: (110-164)/(47-71) 118/67    Physical Exam:  Physical Exam  HENT:      Mouth/Throat:      Comments: Poor dentition  Eyes:      Pupils: Pupils are equal, round, and reactive to light.   Pulmonary:      Effort: Pulmonary effort is normal.      Breath sounds: Normal breath sounds.   Abdominal:      Palpations: Abdomen is soft.   Skin:     Comments: Red rash noted to inner thigh   Neurological:      Mental Status: She is alert.      Comments: Oriented x 2         Discharge Disposition  Home-Health Care Svc    Discharge Medications     Discharge Medications      New Medications      Instructions Start Date   albuterol sulfate  (90 Base) MCG/ACT inhaler  Commonly known as: PROVENTIL HFA;VENTOLIN HFA;PROAIR HFA   2 puffs, Inhalation, Every 4 Hours PRN      dexamethasone 6 MG tablet  Commonly known as: DECADRON   6 mg, Oral, Daily With Breakfast   Start Date: November 4, 2020     nystatin [id number] UNIT/GM powder  Commonly known as: MYCOSTATIN   Topical, Every 12 Hours Scheduled         Continue These Medications      Instructions Start Date   clopidogrel 75 MG tablet  Commonly known as: PLAVIX   75 mg, Oral, Daily      divalproex 125 MG DR tablet  Commonly known as: DEPAKOTE   250 mg, Oral, 3 Times Daily      glipizide 10 MG tablet  Commonly known as: GLUCOTROL   10 mg, Oral, 2 Times Daily Before Meals      levothyroxine 25 MCG tablet  Commonly known as: SYNTHROID, LEVOTHROID   37.5 mcg, Oral, Every Morning      LORazepam 0.5 MG tablet  Commonly known as: ATIVAN   0.5 mg, Oral, 3 Times Daily      metFORMIN  MG 24 hr tablet  Commonly known as: GLUCOPHAGE-XR   500 mg, Oral, 2 times daily      ofloxacin 0.3 % ophthalmic solution  Commonly known as:  OCUFLOX   1 drop, Right Eye, 4 Times Daily      oxybutynin 5 MG tablet  Commonly known as: DITROPAN   5 mg, Oral, Every Morning      pantoprazole 40 MG EC tablet  Commonly known as: PROTONIX   40 mg, Oral, Every Morning      prednisoLONE acetate 1 % ophthalmic suspension  Commonly known as: PRED FORTE   1 drop, Right Eye, 4 Times Daily      risperiDONE 0.5 MG tablet  Commonly known as: risperDAL   0.5 mg, Oral, 4 Times Daily, Takes 1 tablet QAM, 2 tablets QPM, and 1 tablet QHS.      rosuvastatin 20 MG tablet  Commonly known as: CRESTOR   20 mg, Oral, Every Night at Bedtime             Discharge Diet:   Diet Instructions     Diet: Cardiac, Consistent Carbohydrate      Discharge Diet:  Cardiac  Consistent Carbohydrate             Activity at Discharge:     Follow-up Appointments  No future appointments.  Additional Instructions for the Follow-ups that You Need to Schedule     Ambulatory Referral to Home Health   As directed      Face to Face Visit Date: 10/27/2020    Follow-up provider for Plan of Care?: I treated the patient in an acute care facility and will not continue treatment after discharge.    Follow-up provider: VIRGINIA BOSTON [[id number]]    Reason/Clinical Findings: post hospital evaluation    Describe mobility limitations that make leaving home difficult: weakness    Nursing/Therapeutic Services Requested: Other ([facility] to evalute )    Frequency: 1 Week 1         Discharge Follow-up with PCP   As directed       Currently Documented PCP:    Virginia Boston MD    PCP Phone Number:    [phone]     Follow Up Details: one week               Test Results Pending at Discharge       Risk for Readmission (LACE) Score: 8 (11/3/2020  6:00 AM)      This patient has been examined wearing appropriate Personal Protective Equipment . 11/03/20      Time: Discharge 34 min with face-to-face history exam, writing of prescriptions, and documenting discharge data including care coordination with the nursing staff.      Nicolás LARA  DO Joyce  11/03/20  10:37 EST

## 2020-11-03 NOTE — PLAN OF CARE
Goal Outcome Evaluation:  Plan of Care Reviewed With: patient  Progress: no change  Outcome Summary: Patient states she is breathing well this shift; SpO2 remains at 96% on 3L O2 via NC.  Patient c/o itching and burning in crotch.  Vaginal/perianal/buttocks areas red with vesicular lesions.  Orders placed to treat for fungal infection and shingles.  Thomas catheter placed this shift to manage wounds as skin is noted to be oozing blood.  Patient otherwise pleasantly confused and cooperative in care.  Will continue to monitor.

## 2020-11-03 NOTE — NURSING NOTE
Patient incontinent with severe excoriation of the vaginal/perianal area.  Bleeding noted to labia majora.  Patient complaining of burning/itching.  16 [place] Thomas catheter placed and tolerated well.  Urine return noted.  While applying nystatin powder and antifungal cream patient noted to have a large cluster of vesicles lateral to the left labia majora and multiple disseminated vesicals on the perianal area and buttocks.  MASSIEL Devine notified and will place appropriate orders.

## 2020-11-04 ENCOUNTER — READMISSION MANAGEMENT (OUTPATIENT)
Dept: CALL CENTER | Facility: HOSPITAL | Age: 80
End: 2020-11-04

## 2020-11-04 NOTE — OUTREACH NOTE
Prep Survey      Responses   [facility]   Is LACE score < 7 ?  No   Eligibility  Readm Mgmt   Discharge diagnosis  Pneumonia due to COVID-19 virus   Does the patient have one of the following disease processes/diagnoses(primary or secondary)?  COVID-19   Does the patient have Home health ordered?  Yes   What is the Home health agency?   [facility]   Is there a DME ordered?  No   Prep survey completed?  Yes          Reina Boyd RN

## 2020-11-05 ENCOUNTER — READMISSION MANAGEMENT (OUTPATIENT)
Dept: CALL CENTER | Facility: HOSPITAL | Age: 80
End: 2020-11-05

## 2020-11-05 NOTE — OUTREACH NOTE
COVID-19 Week 1 Survey      Responses   [facility] patient discharged from?  [facility]   Does the patient have one of the following disease processes/diagnoses(primary or secondary)?  COVID-19   COVID-19 underlying condition?  None   Call Number  Call 2   Week 1 Call successful?  Yes   Call start time  1417   Call end time  1418   Discharge diagnosis  Pneumonia due to COVID-19 virus   Is patient permission given to speak with other caregiver?  Yes   List who call center can speak with  Marino-     Person spoke with today (if not patient) and relationship  Marino-    Meds reviewed with patient/caregiver?  Yes   Is the patient having any side effects they believe may be caused by any medication additions or changes?  No   Does the patient have all medications ordered at discharge?  Yes   Is the patient taking all medications as directed (includes completed medication regime)?  Yes   Does the patient have a primary care provider?   Yes   Does the patient have an appointment with their PCP or specialist within 7 days of discharge?  Yes   Has the patient kept scheduled appointments due by today?  Yes   Psychosocial issues?  No   Did the patient receive a copy of their discharge instructions?  Yes   Did the patient receive a copy of COVID-19 specific instructions?  Yes   Nursing interventions  Reviewed instructions with patient   What is the patient's perception of their health status since discharge?  Improving   Does the patient have any of the following symptoms?  None   Nursing Interventions  Nurse provided patient education   Pulse Ox monitoring  None   Is the patient/caregiver able to teach back steps to recovery at home?  Set small, achievable goals for return to baseline health, Rest and rebuild strength, gradually increase activity   If the patient is a current smoker, are they able to teach back resources for cessation?  Not a smoker   Is the patient/caregiver able to teach back the hierarchy of who  to call/visit for symptoms/problems? PCP, Specialist, Home health nurse, Urgent Care, ED, 911  Yes   COVID-19 call completed?  Yes          Mitzi Levy RN

## 2020-11-06 ENCOUNTER — READMISSION MANAGEMENT (OUTPATIENT)
Dept: CALL CENTER | Facility: HOSPITAL | Age: 80
End: 2020-11-06

## 2020-11-06 NOTE — OUTREACH NOTE
COVID-19 Week 1 Survey      Responses   [facility] patient discharged from?  [facility]   Does the patient have one of the following disease processes/diagnoses(primary or secondary)?  COVID-19   COVID-19 underlying condition?  None   Call Number  Call 3   Week 1 Call successful?  Yes   Call start time  1448   Call end time  1452   Is patient permission given to speak with other caregiver?  Yes   Person spoke with today (if not patient) and relationship  Marino-   Meds reviewed with patient/caregiver?  Yes   Is the patient having any side effects they believe may be caused by any medication additions or changes?  No   Does the patient have all medications ordered at discharge?  Yes   Is the patient taking all medications as directed (includes completed medication regime)?  Yes   Does the patient have a primary care provider?   Yes   Does the patient have an appointment with their PCP or specialist within 7 days of discharge?  Yes   Has the patient kept scheduled appointments due by today?  Yes   Has home health visited the patient within 72 hours of discharge?  Yes   Psychosocial issues?  No   Did the patient receive a copy of their discharge instructions?  Yes   Did the patient receive a copy of COVID-19 specific instructions?  Yes   Nursing interventions  Reviewed instructions with patient   What is the patient's perception of their health status since discharge?  Improving   Does the patient have any of the following symptoms?  None   Nursing Interventions  Nurse provided patient education   Pulse Ox monitoring  None   Is the patient/caregiver able to teach back steps to recovery at home?  Eat a well-balance diet   If the patient is a current smoker, are they able to teach back resources for cessation?  Not a smoker   Is the patient/caregiver able to teach back the hierarchy of who to call/visit for symptoms/problems? PCP, Specialist, Home health nurse, Urgent Care, ED, 911  Yes   COVID-19 call completed?   Yes   Wrap up additional comments   states is slightly better but still weak. States HH PT is working with her. Denies any fever,SOA or chest pain-states very little cough. States appetite good. Denies any needs today.          Alexsandra Mott RN

## 2020-11-09 ENCOUNTER — READMISSION MANAGEMENT (OUTPATIENT)
Dept: CALL CENTER | Facility: HOSPITAL | Age: 80
End: 2020-11-09

## 2020-11-09 NOTE — OUTREACH NOTE
COVID-19 Week 1 Survey      Responses   [facility] patient discharged from?  [facility]   Does the patient have one of the following disease processes/diagnoses(primary or secondary)?  COVID-19   COVID-19 underlying condition?  None   Call Number  Call 4   Week 1 Call successful?  No   Discharge diagnosis  Pneumonia due to COVID-19 virus          Lupe Finnegan RN

## 2020-11-12 ENCOUNTER — READMISSION MANAGEMENT (OUTPATIENT)
Dept: CALL CENTER | Facility: HOSPITAL | Age: 80
End: 2020-11-12

## 2020-11-12 NOTE — OUTREACH NOTE
COVID-19 Week 2 Survey      Responses   [facility] patient discharged from?  [facility]   Does the patient have one of the following disease processes/diagnoses(primary or secondary)?  COVID-19   COVID-19 underlying condition?  None   Call Number  Call 1   COVID-19 Week 2: Call 1 attempt successful?  Yes   Call start time  1127   Call end time  1129   Discharge diagnosis  Pneumonia due to COVID-19 virus   Is patient permission given to speak with other caregiver?  Yes   List who call center can speak with  Marino-     Person spoke with today (if not patient) and relationship  Marino-   Meds reviewed with patient/caregiver?  Yes   Is the patient having any side effects they believe may be caused by any medication additions or changes?  No   Does the patient have all medications ordered at discharge?  Yes   Is the patient taking all medications as directed (includes completed medication regime)?  Yes   Does the patient have a primary care provider?   Yes   Does the patient have an appointment with their PCP or specialist within 7 days of discharge?  Yes   Has the patient kept scheduled appointments due by today?  Yes   Psychosocial issues?  No   Did the patient receive a copy of their discharge instructions?  Yes   Did the patient receive a copy of COVID-19 specific instructions?  Yes   Nursing interventions  Reviewed instructions with patient   What is the patient's perception of their health status since discharge?  Improving   Does the patient have any of the following symptoms?  None   Nursing Interventions  Nurse provided patient education   Pulse Ox monitoring  None   Is the patient/caregiver able to teach back steps to recovery at home?  Set small, achievable goals for return to baseline health, Rest and rebuild strength, gradually increase activity   If the patient is a current smoker, are they able to teach back resources for cessation?  Not a smoker   Is the patient/caregiver able to teach back  the hierarchy of who to call/visit for symptoms/problems? PCP, Specialist, Home health nurse, Urgent Care, ED, 911  Yes   COVID-19 call completed?  Yes   Wrap up additional comments  Still weak. She has a good appetite. She just doesn't do as much as before.          Mitzi Levy RN

## 2020-11-17 ENCOUNTER — READMISSION MANAGEMENT (OUTPATIENT)
Dept: CALL CENTER | Facility: HOSPITAL | Age: 80
End: 2020-11-17

## 2020-11-17 NOTE — OUTREACH NOTE
COVID-19 Week 3 Survey      Responses   [facility] patient discharged from?  [facility]   Does the patient have one of the following disease processes/diagnoses(primary or secondary)?  COVID-19   COVID-19 underlying condition?  None   Call Number  Call 1   COVID-19 Week 3: Call 1 attempt successful?  No   Discharge diagnosis  Pneumonia due to COVID-19 virus          Reena Norwood RN

## 2021-04-06 RX ORDER — LEVOTHYROXINE SODIUM 0.03 MG/1
TABLET ORAL
Qty: 90 TABLET | OUTPATIENT
Start: 2021-04-06

## 2021-04-06 RX ORDER — DIVALPROEX SODIUM 250 MG/1
TABLET, DELAYED RELEASE ORAL
Qty: 270 TABLET | OUTPATIENT
Start: 2021-04-06

## 2021-04-06 RX ORDER — OXYBUTYNIN CHLORIDE 5 MG/1
TABLET ORAL
Qty: 180 TABLET | OUTPATIENT
Start: 2021-04-06

## 2021-04-06 RX ORDER — METFORMIN HYDROCHLORIDE 500 MG/1
TABLET, EXTENDED RELEASE ORAL
Qty: 180 TABLET | OUTPATIENT
Start: 2021-04-06

## 2021-04-06 RX ORDER — SIMVASTATIN 40 MG
TABLET ORAL
Qty: 90 TABLET | OUTPATIENT
Start: 2021-04-06

## 2022-02-23 ENCOUNTER — OFFICE (OUTPATIENT)
Dept: RURAL CLINIC 3 | Facility: CLINIC | Age: 82
End: 2022-02-23

## 2022-02-23 VITALS
HEIGHT: 64 IN | WEIGHT: 149 LBS | HEART RATE: 82 BPM | DIASTOLIC BLOOD PRESSURE: 74 MMHG | SYSTOLIC BLOOD PRESSURE: 132 MMHG

## 2022-02-23 DIAGNOSIS — D50.9 IRON DEFICIENCY ANEMIA, UNSPECIFIED: ICD-10-CM

## 2022-02-23 DIAGNOSIS — R11.10 VOMITING, UNSPECIFIED: ICD-10-CM

## 2022-02-23 PROCEDURE — 99204 OFFICE O/P NEW MOD 45 MIN: CPT | Performed by: INTERNAL MEDICINE

## 2022-02-23 RX ORDER — METOCLOPRAMIDE 5 MG/1
TABLET ORAL
Qty: 120 | Refills: 3 | Status: COMPLETED
Start: 2022-02-23 | End: 2022-08-24

## 2022-04-20 ENCOUNTER — ON CAMPUS - OUTPATIENT (OUTPATIENT)
Dept: RURAL HOSPITAL 3 | Facility: HOSPITAL | Age: 82
End: 2022-04-20

## 2022-04-20 DIAGNOSIS — K29.50 UNSPECIFIED CHRONIC GASTRITIS WITHOUT BLEEDING: ICD-10-CM

## 2022-04-20 DIAGNOSIS — D13.1 BENIGN NEOPLASM OF STOMACH: ICD-10-CM

## 2022-04-20 DIAGNOSIS — D50.0 IRON DEFICIENCY ANEMIA SECONDARY TO BLOOD LOSS (CHRONIC): ICD-10-CM

## 2022-04-20 DIAGNOSIS — K92.1 MELENA: ICD-10-CM

## 2022-04-20 PROCEDURE — 43251 EGD REMOVE LESION SNARE: CPT | Performed by: INTERNAL MEDICINE

## 2022-04-20 PROCEDURE — 43239 EGD BIOPSY SINGLE/MULTIPLE: CPT | Mod: 59 | Performed by: INTERNAL MEDICINE

## 2022-08-24 ENCOUNTER — OFFICE (OUTPATIENT)
Dept: RURAL CLINIC 3 | Facility: CLINIC | Age: 82
End: 2022-08-24

## 2022-08-24 VITALS
WEIGHT: 127 LBS | HEART RATE: 83 BPM | HEIGHT: 64 IN | DIASTOLIC BLOOD PRESSURE: 61 MMHG | SYSTOLIC BLOOD PRESSURE: 116 MMHG

## 2022-08-24 DIAGNOSIS — R19.5 OTHER FECAL ABNORMALITIES: ICD-10-CM

## 2022-08-24 PROCEDURE — 99214 OFFICE O/P EST MOD 30 MIN: CPT | Performed by: NURSE PRACTITIONER

## 2022-08-24 RX ORDER — PANTOPRAZOLE SODIUM 40 MG/1
40 TABLET, DELAYED RELEASE ORAL
Qty: 90 | Refills: 3 | Status: ACTIVE
Start: 2022-08-24

## 2022-09-21 ENCOUNTER — ON CAMPUS - OUTPATIENT (OUTPATIENT)
Dept: RURAL HOSPITAL 3 | Facility: HOSPITAL | Age: 82
End: 2022-09-21

## 2022-09-21 DIAGNOSIS — D12.0 BENIGN NEOPLASM OF CECUM: ICD-10-CM

## 2022-09-21 DIAGNOSIS — D12.2 BENIGN NEOPLASM OF ASCENDING COLON: ICD-10-CM

## 2022-09-21 DIAGNOSIS — R13.10 DYSPHAGIA, UNSPECIFIED: ICD-10-CM

## 2022-09-21 DIAGNOSIS — D13.1 BENIGN NEOPLASM OF STOMACH: ICD-10-CM

## 2022-09-21 DIAGNOSIS — D12.5 BENIGN NEOPLASM OF SIGMOID COLON: ICD-10-CM

## 2022-09-21 DIAGNOSIS — K31.89 OTHER DISEASES OF STOMACH AND DUODENUM: ICD-10-CM

## 2022-09-21 DIAGNOSIS — D12.8 BENIGN NEOPLASM OF RECTUM: ICD-10-CM

## 2022-09-21 DIAGNOSIS — K29.80 DUODENITIS WITHOUT BLEEDING: ICD-10-CM

## 2022-09-21 DIAGNOSIS — C18.4 MALIGNANT NEOPLASM OF TRANSVERSE COLON: ICD-10-CM

## 2022-09-21 DIAGNOSIS — K22.4 DYSKINESIA OF ESOPHAGUS: ICD-10-CM

## 2022-09-21 DIAGNOSIS — D50.0 IRON DEFICIENCY ANEMIA SECONDARY TO BLOOD LOSS (CHRONIC): ICD-10-CM

## 2022-09-21 DIAGNOSIS — D12.3 BENIGN NEOPLASM OF TRANSVERSE COLON: ICD-10-CM

## 2022-09-21 PROCEDURE — 45381 COLONOSCOPY SUBMUCOUS NJX: CPT | Performed by: INTERNAL MEDICINE

## 2022-09-21 PROCEDURE — 45385 COLONOSCOPY W/LESION REMOVAL: CPT | Performed by: INTERNAL MEDICINE

## 2022-09-21 PROCEDURE — 45380 COLONOSCOPY AND BIOPSY: CPT | Mod: 59 | Performed by: INTERNAL MEDICINE

## 2022-09-21 PROCEDURE — 43239 EGD BIOPSY SINGLE/MULTIPLE: CPT | Performed by: INTERNAL MEDICINE

## 2022-09-21 PROCEDURE — 43450 DILATE ESOPHAGUS 1/MULT PASS: CPT | Performed by: INTERNAL MEDICINE

## 2022-09-26 ENCOUNTER — OFFICE VISIT (OUTPATIENT)
Dept: SURGERY | Facility: CLINIC | Age: 82
End: 2022-09-26

## 2022-09-26 VITALS — BODY MASS INDEX: 23.9 KG/M2 | HEIGHT: 64 IN | WEIGHT: 140 LBS

## 2022-09-26 DIAGNOSIS — C18.4 CANCER OF TRANSVERSE COLON: Primary | ICD-10-CM

## 2022-09-26 PROCEDURE — 99204 OFFICE O/P NEW MOD 45 MIN: CPT | Performed by: SURGERY

## 2022-09-26 RX ORDER — FLUOXETINE HYDROCHLORIDE 20 MG/1
1 CAPSULE ORAL DAILY
COMMUNITY
Start: 2022-07-23

## 2022-09-26 RX ORDER — FERROUS SULFATE 325(65) MG
1 TABLET ORAL 2 TIMES DAILY
COMMUNITY
Start: 2022-09-17

## 2022-09-26 RX ORDER — CEFAZOLIN SODIUM 2 G/100ML
2 INJECTION, SOLUTION INTRAVENOUS ONCE
Status: CANCELLED | OUTPATIENT
Start: 2022-10-18 | End: 2022-09-26

## 2022-09-26 RX ORDER — METRONIDAZOLE 500 MG/100ML
500 INJECTION, SOLUTION INTRAVENOUS ONCE
Status: CANCELLED | OUTPATIENT
Start: 2022-10-18 | End: 2022-09-26

## 2022-09-26 RX ORDER — NEOMYCIN SULFATE 500 MG/1
1000 TABLET ORAL 3 TIMES DAILY
Qty: 6 TABLET | Refills: 0 | Status: SHIPPED | OUTPATIENT
Start: 2022-09-26 | End: 2022-09-27

## 2022-09-26 RX ORDER — CANAGLIFLOZIN 300 MG/1
150 TABLET, FILM COATED ORAL DAILY
COMMUNITY
Start: 2022-07-13 | End: 2022-11-15 | Stop reason: HOSPADM

## 2022-09-26 RX ORDER — METRONIDAZOLE 500 MG/1
500 TABLET ORAL 3 TIMES DAILY
Qty: 3 TABLET | Refills: 0 | Status: SHIPPED | OUTPATIENT
Start: 2022-09-26 | End: 2022-09-27

## 2022-09-30 NOTE — PROGRESS NOTES
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  I discussed with the patient and her family the nature of the diagnosis and the recommendation for laparoscopic transverse colectomy.  They understand the nature of the procedure and the risks including but not limited to bleeding, infection, conversion to open procedure, and anastomotic leak requiring reoperation and temporary colostomy.  Increased risk of surgery and anesthesia complications secondary to age and multiple medical comorbidities outlined below.  Also discussed likelihood of needing rehabilitation stay following hospitalization due to age and debility.    CC:     Colon cancer    HPI:    82-year-old lady who underwent EGD and colonoscopy secondary to iron deficiency anemia and was found to have a 5 cm transverse colon mass.    ENDOSCOPY:   • EGD 9/21/2022: Gastric polyps  • Colonoscopy 9/21/2022: 5 cm transverse colon mass, 19 colon polyps.  All polyps were excised.  Mass was biopsied and tattoo applied distal to the mass.    RADIOLOGY:   • CT abdomen pelvis 9/21/2022: Abdominal wall thickening measuring 9 to 10 cm in length in the mid transverse colon.  No evidence of distant metastatic disease.  Mild bladder wall thickening.    LABS:    • WBC 8.2  • Hemoglobin 10.1  • Platelets 316  • Albumin 2.8  • ALT 8  • AST 14  • CEA 8.84    SOCIAL HISTORY:   • No tobacco use  • No alcohol use    FAMILY HISTORY:    • Colorectal cancer: Negative    PREVIOUS ABDOMINAL SURGERY    • Hysterectomy    PAST MEDICAL HISTORY:    • Transverse colon cancer-current diagnosis  • Gastroesophageal reflux disease  • Bipolar 1 disorder  • Diabetes mellitus  • Hypothyroidism  • Hypertension  • Hyperlipidemia  • History of cerebrovascular accident 2012    MEDICATIONS:   • Invokana  • Depakote  • Iron  • Prozac  • Glipizide  • Synthroid  • Ativan  • Glucophage  • Ditropan  • Protonix  • Risperidone  • Crestor  • Januvia    ALLERGIES:   • None    PHYSICAL EXAM:    • Constitutional: Elderly appearing lady, no acute distress  • Vital signs:   o Weight: Weight 140 pounds  o Height: 64 inches  o BMI: 24  • Neck: Supple, no palpable mass, trachea midline  • Respiratory: Normal nonlabored inspiratory effort  • Cardiovascular: Regular rate, no jugular venous distention  • Gastrointestinal: Soft, nontender, no palpable mass  • Psychiatric: Responds appropriately to questions    JENNIFER LAUREN M.D.

## 2022-10-11 ENCOUNTER — PRE-ADMISSION TESTING (OUTPATIENT)
Dept: PREADMISSION TESTING | Facility: HOSPITAL | Age: 82
End: 2022-10-11

## 2022-10-11 ENCOUNTER — TELEPHONE (OUTPATIENT)
Dept: SURGERY | Facility: CLINIC | Age: 82
End: 2022-10-11

## 2022-10-11 VITALS
DIASTOLIC BLOOD PRESSURE: 61 MMHG | TEMPERATURE: 97.5 F | WEIGHT: 142 LBS | BODY MASS INDEX: 24.24 KG/M2 | RESPIRATION RATE: 16 BRPM | SYSTOLIC BLOOD PRESSURE: 128 MMHG | HEIGHT: 64 IN | OXYGEN SATURATION: 99 % | HEART RATE: 85 BPM

## 2022-10-11 LAB
ANION GAP SERPL CALCULATED.3IONS-SCNC: 12.7 MMOL/L (ref 5–15)
BUN SERPL-MCNC: 12 MG/DL (ref 8–23)
BUN/CREAT SERPL: 14.8 (ref 7–25)
CALCIUM SPEC-SCNC: 8.4 MG/DL (ref 8.6–10.5)
CHLORIDE SERPL-SCNC: 104 MMOL/L (ref 98–107)
CO2 SERPL-SCNC: 26.3 MMOL/L (ref 22–29)
CREAT SERPL-MCNC: 0.81 MG/DL (ref 0.57–1)
DEPRECATED RDW RBC AUTO: 46.6 FL (ref 37–54)
EGFRCR SERPLBLD CKD-EPI 2021: 72.1 ML/MIN/1.73
ERYTHROCYTE [DISTWIDTH] IN BLOOD BY AUTOMATED COUNT: 15.7 % (ref 12.3–15.4)
GLUCOSE SERPL-MCNC: 221 MG/DL (ref 65–99)
HCT VFR BLD AUTO: 30 % (ref 34–46.6)
HGB BLD-MCNC: 8.8 G/DL (ref 12–15.9)
MCH RBC QN AUTO: 23.8 PG (ref 26.6–33)
MCHC RBC AUTO-ENTMCNC: 29.3 G/DL (ref 31.5–35.7)
MCV RBC AUTO: 81.1 FL (ref 79–97)
PLATELET # BLD AUTO: 245 10*3/MM3 (ref 140–450)
PMV BLD AUTO: 9.2 FL (ref 6–12)
POTASSIUM SERPL-SCNC: 2.7 MMOL/L (ref 3.5–5.2)
QT INTERVAL: 449 MS
RBC # BLD AUTO: 3.7 10*6/MM3 (ref 3.77–5.28)
SODIUM SERPL-SCNC: 143 MMOL/L (ref 136–145)
WBC NRBC COR # BLD: 8.27 10*3/MM3 (ref 3.4–10.8)

## 2022-10-11 PROCEDURE — 93010 ELECTROCARDIOGRAM REPORT: CPT | Performed by: INTERNAL MEDICINE

## 2022-10-11 PROCEDURE — 93005 ELECTROCARDIOGRAM TRACING: CPT

## 2022-10-11 PROCEDURE — 85027 COMPLETE CBC AUTOMATED: CPT

## 2022-10-11 PROCEDURE — 80048 BASIC METABOLIC PNL TOTAL CA: CPT

## 2022-10-11 PROCEDURE — 36415 COLL VENOUS BLD VENIPUNCTURE: CPT

## 2022-10-11 RX ORDER — RISPERIDONE 0.5 MG/1
1 TABLET ORAL 2 TIMES DAILY
COMMUNITY
End: 2022-11-15 | Stop reason: HOSPADM

## 2022-10-11 RX ORDER — LEVOTHYROXINE SODIUM 0.05 MG/1
50 TABLET ORAL DAILY
COMMUNITY

## 2022-10-11 RX ORDER — POTASSIUM CHLORIDE 20 MEQ/1
20 TABLET, EXTENDED RELEASE ORAL 2 TIMES DAILY
Qty: 60 TABLET | Refills: 0 | Status: SHIPPED | OUTPATIENT
Start: 2022-10-11 | End: 2022-10-24

## 2022-10-11 NOTE — DISCHARGE INSTRUCTIONS
Take the following medications the morning of surgery:    DEPAKOTE,  FLUOXETINE, LEVOTHYROXINE,  PANTOPRAZOLE AND RISPERIDONE    ARRIVE AT 11:00    If you are on prescription narcotic pain medication to control your pain you may also take that medication the morning of surgery.    General Instructions:  Do not eat solid food after midnight the night before surgery.  You may drink clear liquids day of surgery but must stop at least one hour before your hospital arrival time.  It is beneficial for you to have a clear drink that contains carbohydrates the day of surgery.  We suggest a 12 to 20 ounce bottle of Gatorade or Powerade for non-diabetic patients or a 12 to 20 ounce bottle of G2 or Powerade Zero for diabetic patients. (Pediatric patients, are not advised to drink a 12 to 20 ounce carbohydrate drink)    Clear liquids are liquids you can see through.  Nothing red in color.     Plain water                               Sports drinks  Sodas                                   Gelatin (Jell-O)  Fruit juices without pulp such as white grape juice and apple juice  Popsicles that contain no fruit or yogurt  Tea or coffee (no cream or milk added)  Gatorade / Powerade  G2 / Powerade Zero       Patients who avoid smoking, chewing tobacco and alcohol for 4 weeks prior to surgery have a reduced risk of post-operative complications.  Quit smoking as many days before surgery as you can.  Do not smoke, use chewing tobacco or drink alcohol the day of surgery.   If applicable bring your C-PAP/ BI-PAP machine.  Bring any papers given to you in the doctor’s office.  Wear clean comfortable clothes.  Do not wear contact lenses, false eyelashes or make-up.  Bring a case for your glasses.   Bring crutches or walker if applicable.  Remove all piercings.  Leave jewelry and any other valuables at home.  Hair extensions with metal clips must be removed prior to surgery.  The Pre-Admission Testing nurse will instruct you to bring  medications if unable to obtain an accurate list in Pre-Admission Testing.            Preventing a Surgical Site Infection:  For 2 to 3 days before surgery, avoid shaving with a razor because the razor can irritate skin and make it easier to develop an infection.    Any areas of open skin can increase the risk of a post-operative wound infection by allowing bacteria to enter and travel throughout the body.  Notify your surgeon if you have any skin wounds / rashes even if it is not near the expected surgical site.  The area will need assessed to determine if surgery should be delayed until it is healed.  The night prior to surgery shower using a fresh bar of anti-bacterial soap (such as Dial) and clean washcloth.  Sleep in a clean bed with clean clothing.  Do not allow pets to sleep with you.  Shower on the morning of surgery using a fresh bar of anti-bacterial soap (such as Dial) and clean washcloth.  Dry with a clean towel and dress in clean clothing.  Ask your surgeon if you will be receiving antibiotics prior to surgery.  Make sure you, your family, and all healthcare providers clean their hands with soap and water or an alcohol based hand  before caring for you or your wound.    Day of surgery:  Your arrival time is approximately two hours before your scheduled surgery time.  Upon arrival, a Pre-op nurse and Anesthesiologist will review your health history, obtain vital signs, and answer questions you may have.  The only belongings needed at this time will be a list of your home medications and if applicable your C-PAP/BI-PAP machine.  A Pre-op nurse will start an IV and you may receive medication in preparation for surgery, including something to help you relax.     Please be aware that surgery does come with discomfort.  We want to make every effort to control your discomfort so please discuss any uncontrolled symptoms with your nurse.   Your doctor will most likely have prescribed pain medications.       If you are going home after surgery you will receive individualized written care instructions before being discharged.  A responsible adult must drive you to and from the hospital on the day of your surgery and stay with you for 24 hours.  Discharge prescriptions can be filled by the hospital pharmacy during regular pharmacy hours.  If you are having surgery late in the day/evening your prescription may be e-prescribed to your pharmacy.  Please verify your pharmacy hours or chose a 24 hour pharmacy to avoid not having access to your prescription because your pharmacy has closed for the day.    If you are staying overnight following surgery, you will be transported to your hospital room following the recovery period.  [facility] has all private rooms.    If you have any questions please call Pre-Admission Testing at [phone].  Deductibles and co-payments are collected on the day of service. Please be prepared to pay the required co-pay, deductible or deposit on the day of service as defined by your plan.    Call your surgeon immediately if you experience any of the following symptoms:  Sore Throat  Shortness of Breath or difficulty breathing  Cough  Chills  Body soreness or muscle pain  Headache  Fever  New loss of taste or smell  Do not arrive for your surgery ill.  Your procedure will need to be rescheduled to another time.  You will need to call your physician before the day of surgery to avoid any unnecessary exposure to hospital staff as well as other patients.     CHLORHEXIDINE CLOTH INSTRUCTIONS  The morning of surgery follow these instructions using the Chlorhexidine cloths you've been given.  These steps reduce bacteria on the body.  Do not use the cloths near your eyes, ears mouth, genitalia or on open wounds.  Throw the cloths away after use but do not try to flush them down a toilet.      Open and remove one cloth at a time from the package.    Leave the cloth unfolded and begin  the bathing.  Massage the skin with the cloths using gentle pressure to remove bacteria.  Do not scrub harshly.   Follow the steps below with one 2% CHG cloth per area (6 total cloths).  One cloth for neck, shoulders and chest.  One cloth for both arms, hands, fingers and underarms (do underarms last).  One cloth for the abdomen followed by groin.  One cloth for right leg and foot including between the toes.  One cloth for left leg and foot including between the toes.  The last cloth is to be used for the back of the neck, back and buttocks.    Allow the CHG to air dry 3 minutes on the skin which will give it time to work and decrease the chance of irritation.  The skin may feel sticky until it is dry.  Do not rinse with water or any other liquid or you will lose the beneficial effects of the CHG.  If mild skin irritation occurs, do rinse the skin to remove the CHG.  Report this to the nurse at time of admission.  Do not apply lotions, creams, ointments, deodorants or perfumes after using the clothes. Dress in clean clothes before coming to the hospital.

## 2022-10-11 NOTE — TELEPHONE ENCOUNTER
Pt neomycin (MYCIFRADIN) 500 MG tablet   Pharmacy did not receive wondering if it can be called in again to [facility]

## 2022-10-12 ENCOUNTER — PREP FOR SURGERY (OUTPATIENT)
Dept: OTHER | Facility: HOSPITAL | Age: 82
End: 2022-10-12

## 2022-10-12 ENCOUNTER — TELEPHONE (OUTPATIENT)
Dept: SURGERY | Facility: CLINIC | Age: 82
End: 2022-10-12

## 2022-10-12 DIAGNOSIS — C18.4 CANCER OF TRANSVERSE COLON: Primary | ICD-10-CM

## 2022-10-12 NOTE — TELEPHONE ENCOUNTER
Spoke with pharamcist at [facility], they have received the prescription, but it is currently out of stock.  It is expected to be back in stock tomorrow.  They will fill the prescription and notify the patient.

## 2022-10-17 ENCOUNTER — ANESTHESIA EVENT (OUTPATIENT)
Dept: PERIOP | Facility: HOSPITAL | Age: 82
End: 2022-10-17

## 2022-10-18 ENCOUNTER — ANESTHESIA (OUTPATIENT)
Dept: PERIOP | Facility: HOSPITAL | Age: 82
End: 2022-10-18

## 2022-10-18 ENCOUNTER — HOSPITAL ENCOUNTER (INPATIENT)
Facility: HOSPITAL | Age: 82
LOS: 3 days | Discharge: NURSING, REHAB OR LONG-TERM CARE | End: 2022-10-21
Attending: SURGERY | Admitting: SURGERY

## 2022-10-18 DIAGNOSIS — C18.4 CANCER OF TRANSVERSE COLON: ICD-10-CM

## 2022-10-18 LAB
ABO GROUP BLD: NORMAL
BLD GP AB SCN SERPL QL: NEGATIVE
GLUCOSE BLDC GLUCOMTR-MCNC: 225 MG/DL (ref 70–130)
GLUCOSE BLDC GLUCOMTR-MCNC: 61 MG/DL (ref 70–130)
GLUCOSE BLDC GLUCOMTR-MCNC: 95 MG/DL (ref 70–130)
RH BLD: POSITIVE
T&S EXPIRATION DATE: NORMAL

## 2022-10-18 PROCEDURE — 86920 COMPATIBILITY TEST SPIN: CPT

## 2022-10-18 PROCEDURE — 25010000002 DEXAMETHASONE SODIUM PHOSPHATE 20 MG/5ML SOLUTION: Performed by: NURSE ANESTHETIST, CERTIFIED REGISTERED

## 2022-10-18 PROCEDURE — 86901 BLOOD TYPING SEROLOGIC RH(D): CPT | Performed by: SURGERY

## 2022-10-18 PROCEDURE — C9290 INJ, BUPIVACAINE LIPOSOME: HCPCS | Performed by: SURGERY

## 2022-10-18 PROCEDURE — 82962 GLUCOSE BLOOD TEST: CPT

## 2022-10-18 PROCEDURE — 25010000002 PROPOFOL 10 MG/ML EMULSION: Performed by: NURSE ANESTHETIST, CERTIFIED REGISTERED

## 2022-10-18 PROCEDURE — 44204 LAPARO PARTIAL COLECTOMY: CPT | Performed by: SURGERY

## 2022-10-18 PROCEDURE — 25010000002 FENTANYL CITRATE (PF) 50 MCG/ML SOLUTION: Performed by: NURSE ANESTHETIST, CERTIFIED REGISTERED

## 2022-10-18 PROCEDURE — 0DBL4ZZ EXCISION OF TRANSVERSE COLON, PERCUTANEOUS ENDOSCOPIC APPROACH: ICD-10-PCS | Performed by: SURGERY

## 2022-10-18 PROCEDURE — 25010000002 CEFAZOLIN IN DEXTROSE 2-4 GM/100ML-% SOLUTION: Performed by: SURGERY

## 2022-10-18 PROCEDURE — 88342 IMHCHEM/IMCYTCHM 1ST ANTB: CPT

## 2022-10-18 PROCEDURE — 25010000002 FENTANYL CITRATE (PF) 100 MCG/2ML SOLUTION: Performed by: NURSE ANESTHETIST, CERTIFIED REGISTERED

## 2022-10-18 PROCEDURE — 86900 BLOOD TYPING SEROLOGIC ABO: CPT | Performed by: SURGERY

## 2022-10-18 PROCEDURE — 88307 TISSUE EXAM BY PATHOLOGIST: CPT | Performed by: SURGERY

## 2022-10-18 PROCEDURE — 88309 TISSUE EXAM BY PATHOLOGIST: CPT | Performed by: SURGERY

## 2022-10-18 PROCEDURE — 25010000002 ONDANSETRON PER 1 MG: Performed by: NURSE ANESTHETIST, CERTIFIED REGISTERED

## 2022-10-18 PROCEDURE — 86850 RBC ANTIBODY SCREEN: CPT | Performed by: SURGERY

## 2022-10-18 PROCEDURE — 0 POTASSIUM CHLORIDE 10 MEQ/100ML SOLUTION: Performed by: NURSE ANESTHETIST, CERTIFIED REGISTERED

## 2022-10-18 PROCEDURE — 88341 IMHCHEM/IMCYTCHM EA ADD ANTB: CPT

## 2022-10-18 PROCEDURE — 0 BUPIVACAINE LIPOSOME 1.3 % SUSPENSION 20 ML VIAL: Performed by: SURGERY

## 2022-10-18 PROCEDURE — 25010000002 HYDRALAZINE PER 20 MG: Performed by: NURSE ANESTHETIST, CERTIFIED REGISTERED

## 2022-10-18 DEVICE — PROXIMATE LINEAR CUTTER RELOAD, BLUE, 75MM
Type: IMPLANTABLE DEVICE | Site: ABDOMEN | Status: FUNCTIONAL
Brand: PROXIMATE

## 2022-10-18 DEVICE — PROXIMATE RELOADABLE LINEAR CUTTER WITH SAFETY LOCK-OUT, 75MM
Type: IMPLANTABLE DEVICE | Site: ABDOMEN | Status: FUNCTIONAL
Brand: PROXIMATE

## 2022-10-18 DEVICE — HORIZON TI LG 6 CLIPS/CART
Type: IMPLANTABLE DEVICE | Site: ABDOMEN | Status: FUNCTIONAL
Brand: WECK

## 2022-10-18 RX ORDER — FENTANYL CITRATE 50 UG/ML
50 INJECTION, SOLUTION INTRAMUSCULAR; INTRAVENOUS
Status: DISCONTINUED | OUTPATIENT
Start: 2022-10-18 | End: 2022-10-18 | Stop reason: HOSPADM

## 2022-10-18 RX ORDER — POTASSIUM CHLORIDE 750 MG/1
20 TABLET, FILM COATED, EXTENDED RELEASE ORAL 2 TIMES DAILY WITH MEALS
Status: DISCONTINUED | OUTPATIENT
Start: 2022-10-18 | End: 2022-10-21 | Stop reason: HOSPADM

## 2022-10-18 RX ORDER — GABAPENTIN 300 MG/1
300 CAPSULE ORAL ONCE
Status: COMPLETED | OUTPATIENT
Start: 2022-10-18 | End: 2022-10-18

## 2022-10-18 RX ORDER — PROPOFOL 10 MG/ML
VIAL (ML) INTRAVENOUS CONTINUOUS PRN
Status: DISCONTINUED | OUTPATIENT
Start: 2022-10-18 | End: 2022-10-18 | Stop reason: SURG

## 2022-10-18 RX ORDER — ONDANSETRON 2 MG/ML
INJECTION INTRAMUSCULAR; INTRAVENOUS AS NEEDED
Status: DISCONTINUED | OUTPATIENT
Start: 2022-10-18 | End: 2022-10-18 | Stop reason: SURG

## 2022-10-18 RX ORDER — HYDROMORPHONE HYDROCHLORIDE 1 MG/ML
0.5 INJECTION, SOLUTION INTRAMUSCULAR; INTRAVENOUS; SUBCUTANEOUS
Status: DISCONTINUED | OUTPATIENT
Start: 2022-10-18 | End: 2022-10-20

## 2022-10-18 RX ORDER — PROPOFOL 10 MG/ML
VIAL (ML) INTRAVENOUS AS NEEDED
Status: DISCONTINUED | OUTPATIENT
Start: 2022-10-18 | End: 2022-10-18 | Stop reason: SURG

## 2022-10-18 RX ORDER — FAMOTIDINE 20 MG/1
20 TABLET, FILM COATED ORAL
Status: COMPLETED | OUTPATIENT
Start: 2022-10-18 | End: 2022-10-18

## 2022-10-18 RX ORDER — EPHEDRINE SULFATE 50 MG/ML
5 INJECTION, SOLUTION INTRAVENOUS ONCE AS NEEDED
Status: DISCONTINUED | OUTPATIENT
Start: 2022-10-18 | End: 2022-10-18 | Stop reason: HOSPADM

## 2022-10-18 RX ORDER — FAMOTIDINE 10 MG/ML
20 INJECTION, SOLUTION INTRAVENOUS
Status: COMPLETED | OUTPATIENT
Start: 2022-10-18 | End: 2022-10-18

## 2022-10-18 RX ORDER — RISPERIDONE 1 MG/1
1 TABLET ORAL 2 TIMES DAILY
Status: DISCONTINUED | OUTPATIENT
Start: 2022-10-18 | End: 2022-10-21 | Stop reason: HOSPADM

## 2022-10-18 RX ORDER — SODIUM CHLORIDE AND POTASSIUM CHLORIDE 150; 900 MG/100ML; MG/100ML
75 INJECTION, SOLUTION INTRAVENOUS CONTINUOUS
Status: DISCONTINUED | OUTPATIENT
Start: 2022-10-18 | End: 2022-10-19

## 2022-10-18 RX ORDER — CEFAZOLIN SODIUM 2 G/100ML
2 INJECTION, SOLUTION INTRAVENOUS EVERY 8 HOURS
Status: COMPLETED | OUTPATIENT
Start: 2022-10-18 | End: 2022-10-19

## 2022-10-18 RX ORDER — OXYCODONE AND ACETAMINOPHEN 7.5; 325 MG/1; MG/1
1 TABLET ORAL EVERY 4 HOURS PRN
Status: DISCONTINUED | OUTPATIENT
Start: 2022-10-18 | End: 2022-10-18 | Stop reason: HOSPADM

## 2022-10-18 RX ORDER — DEXTROSE, SODIUM CHLORIDE, SODIUM LACTATE, POTASSIUM CHLORIDE, AND CALCIUM CHLORIDE 5; .6; .31; .03; .02 G/100ML; G/100ML; G/100ML; G/100ML; G/100ML
75 INJECTION, SOLUTION INTRAVENOUS CONTINUOUS
Status: DISCONTINUED | OUTPATIENT
Start: 2022-10-18 | End: 2022-10-18

## 2022-10-18 RX ORDER — LABETALOL HYDROCHLORIDE 5 MG/ML
5 INJECTION, SOLUTION INTRAVENOUS
Status: DISCONTINUED | OUTPATIENT
Start: 2022-10-18 | End: 2022-10-18 | Stop reason: HOSPADM

## 2022-10-18 RX ORDER — SODIUM CHLORIDE, SODIUM LACTATE, POTASSIUM CHLORIDE, CALCIUM CHLORIDE 600; 310; 30; 20 MG/100ML; MG/100ML; MG/100ML; MG/100ML
9 INJECTION, SOLUTION INTRAVENOUS CONTINUOUS
Status: DISCONTINUED | OUTPATIENT
Start: 2022-10-18 | End: 2022-10-18

## 2022-10-18 RX ORDER — METRONIDAZOLE 500 MG/100ML
500 INJECTION, SOLUTION INTRAVENOUS ONCE
Status: COMPLETED | OUTPATIENT
Start: 2022-10-18 | End: 2022-10-18

## 2022-10-18 RX ORDER — LEVOTHYROXINE SODIUM 0.05 MG/1
50 TABLET ORAL DAILY
Status: DISCONTINUED | OUTPATIENT
Start: 2022-10-19 | End: 2022-10-21 | Stop reason: HOSPADM

## 2022-10-18 RX ORDER — SODIUM CHLORIDE, SODIUM LACTATE, POTASSIUM CHLORIDE, CALCIUM CHLORIDE 600; 310; 30; 20 MG/100ML; MG/100ML; MG/100ML; MG/100ML
INJECTION, SOLUTION INTRAVENOUS CONTINUOUS PRN
Status: DISCONTINUED | OUTPATIENT
Start: 2022-10-18 | End: 2022-10-18 | Stop reason: SURG

## 2022-10-18 RX ORDER — IBUPROFEN 600 MG/1
600 TABLET ORAL ONCE AS NEEDED
Status: DISCONTINUED | OUTPATIENT
Start: 2022-10-18 | End: 2022-10-18 | Stop reason: HOSPADM

## 2022-10-18 RX ORDER — HYDROCODONE BITARTRATE AND ACETAMINOPHEN 5; 325 MG/1; MG/1
2 TABLET ORAL EVERY 4 HOURS PRN
Status: DISCONTINUED | OUTPATIENT
Start: 2022-10-18 | End: 2022-10-20

## 2022-10-18 RX ORDER — METRONIDAZOLE 500 MG/100ML
500 INJECTION, SOLUTION INTRAVENOUS EVERY 8 HOURS SCHEDULED
Status: COMPLETED | OUTPATIENT
Start: 2022-10-18 | End: 2022-10-19

## 2022-10-18 RX ORDER — BUPIVACAINE HYDROCHLORIDE AND EPINEPHRINE 5; 5 MG/ML; UG/ML
INJECTION, SOLUTION EPIDURAL; INTRACAUDAL; PERINEURAL AS NEEDED
Status: DISCONTINUED | OUTPATIENT
Start: 2022-10-18 | End: 2022-10-18 | Stop reason: HOSPADM

## 2022-10-18 RX ORDER — CEFAZOLIN SODIUM 2 G/100ML
2 INJECTION, SOLUTION INTRAVENOUS ONCE
Status: COMPLETED | OUTPATIENT
Start: 2022-10-18 | End: 2022-10-18

## 2022-10-18 RX ORDER — ROSUVASTATIN CALCIUM 20 MG/1
20 TABLET, COATED ORAL NIGHTLY
Status: DISCONTINUED | OUTPATIENT
Start: 2022-10-18 | End: 2022-10-21 | Stop reason: HOSPADM

## 2022-10-18 RX ORDER — POTASSIUM CHLORIDE 7.45 MG/ML
INJECTION INTRAVENOUS CONTINUOUS PRN
Status: DISCONTINUED | OUTPATIENT
Start: 2022-10-18 | End: 2022-10-18 | Stop reason: SURG

## 2022-10-18 RX ORDER — FENTANYL CITRATE 50 UG/ML
INJECTION, SOLUTION INTRAMUSCULAR; INTRAVENOUS AS NEEDED
Status: DISCONTINUED | OUTPATIENT
Start: 2022-10-18 | End: 2022-10-18 | Stop reason: SURG

## 2022-10-18 RX ORDER — NALOXONE HCL 0.4 MG/ML
0.2 VIAL (ML) INJECTION AS NEEDED
Status: DISCONTINUED | OUTPATIENT
Start: 2022-10-18 | End: 2022-10-18 | Stop reason: HOSPADM

## 2022-10-18 RX ORDER — MAGNESIUM HYDROXIDE 1200 MG/15ML
LIQUID ORAL AS NEEDED
Status: DISCONTINUED | OUTPATIENT
Start: 2022-10-18 | End: 2022-10-18 | Stop reason: HOSPADM

## 2022-10-18 RX ORDER — PROMETHAZINE HYDROCHLORIDE 25 MG/1
25 SUPPOSITORY RECTAL ONCE AS NEEDED
Status: DISCONTINUED | OUTPATIENT
Start: 2022-10-18 | End: 2022-10-18 | Stop reason: HOSPADM

## 2022-10-18 RX ORDER — LIDOCAINE HYDROCHLORIDE 20 MG/ML
INJECTION, SOLUTION INTRAVENOUS AS NEEDED
Status: DISCONTINUED | OUTPATIENT
Start: 2022-10-18 | End: 2022-10-18 | Stop reason: SURG

## 2022-10-18 RX ORDER — NALOXONE HCL 0.4 MG/ML
0.1 VIAL (ML) INJECTION
Status: DISCONTINUED | OUTPATIENT
Start: 2022-10-18 | End: 2022-10-20

## 2022-10-18 RX ORDER — CELECOXIB 200 MG/1
200 CAPSULE ORAL ONCE
Status: COMPLETED | OUTPATIENT
Start: 2022-10-18 | End: 2022-10-18

## 2022-10-18 RX ORDER — SODIUM CHLORIDE 0.9 % (FLUSH) 0.9 %
3-10 SYRINGE (ML) INJECTION AS NEEDED
Status: DISCONTINUED | OUTPATIENT
Start: 2022-10-18 | End: 2022-10-18 | Stop reason: HOSPADM

## 2022-10-18 RX ORDER — METFORMIN HYDROCHLORIDE 500 MG/1
500 TABLET, EXTENDED RELEASE ORAL 2 TIMES DAILY WITH MEALS
Status: DISCONTINUED | OUTPATIENT
Start: 2022-10-18 | End: 2022-10-21 | Stop reason: HOSPADM

## 2022-10-18 RX ORDER — PANTOPRAZOLE SODIUM 40 MG/1
40 TABLET, DELAYED RELEASE ORAL EVERY MORNING
Status: DISCONTINUED | OUTPATIENT
Start: 2022-10-19 | End: 2022-10-21 | Stop reason: HOSPADM

## 2022-10-18 RX ORDER — NICOTINE POLACRILEX 4 MG
15 LOZENGE BUCCAL
Status: DISCONTINUED | OUTPATIENT
Start: 2022-10-18 | End: 2022-10-21 | Stop reason: HOSPADM

## 2022-10-18 RX ORDER — MIDAZOLAM HYDROCHLORIDE 1 MG/ML
0.5 INJECTION INTRAMUSCULAR; INTRAVENOUS
Status: DISCONTINUED | OUTPATIENT
Start: 2022-10-18 | End: 2022-10-18 | Stop reason: HOSPADM

## 2022-10-18 RX ORDER — ONDANSETRON 2 MG/ML
4 INJECTION INTRAMUSCULAR; INTRAVENOUS EVERY 6 HOURS PRN
Status: DISCONTINUED | OUTPATIENT
Start: 2022-10-18 | End: 2022-10-21 | Stop reason: HOSPADM

## 2022-10-18 RX ORDER — HYDRALAZINE HYDROCHLORIDE 20 MG/ML
5 INJECTION INTRAMUSCULAR; INTRAVENOUS
Status: DISCONTINUED | OUTPATIENT
Start: 2022-10-18 | End: 2022-10-18 | Stop reason: HOSPADM

## 2022-10-18 RX ORDER — INSULIN LISPRO 100 [IU]/ML
0-9 INJECTION, SOLUTION INTRAVENOUS; SUBCUTANEOUS
Status: DISCONTINUED | OUTPATIENT
Start: 2022-10-19 | End: 2022-10-21 | Stop reason: HOSPADM

## 2022-10-18 RX ORDER — DEXTROSE MONOHYDRATE 25 G/50ML
25 INJECTION, SOLUTION INTRAVENOUS
Status: DISCONTINUED | OUTPATIENT
Start: 2022-10-18 | End: 2022-10-21 | Stop reason: HOSPADM

## 2022-10-18 RX ORDER — DEXAMETHASONE SODIUM PHOSPHATE 4 MG/ML
INJECTION, SOLUTION INTRA-ARTICULAR; INTRALESIONAL; INTRAMUSCULAR; INTRAVENOUS; SOFT TISSUE AS NEEDED
Status: DISCONTINUED | OUTPATIENT
Start: 2022-10-18 | End: 2022-10-18 | Stop reason: SURG

## 2022-10-18 RX ORDER — ACETAMINOPHEN 500 MG
1000 TABLET ORAL ONCE
Status: COMPLETED | OUTPATIENT
Start: 2022-10-18 | End: 2022-10-18

## 2022-10-18 RX ORDER — LABETALOL HYDROCHLORIDE 5 MG/ML
INJECTION, SOLUTION INTRAVENOUS AS NEEDED
Status: DISCONTINUED | OUTPATIENT
Start: 2022-10-18 | End: 2022-10-18 | Stop reason: SURG

## 2022-10-18 RX ORDER — PROMETHAZINE HYDROCHLORIDE 25 MG/1
25 TABLET ORAL ONCE AS NEEDED
Status: DISCONTINUED | OUTPATIENT
Start: 2022-10-18 | End: 2022-10-18 | Stop reason: HOSPADM

## 2022-10-18 RX ORDER — HYDROCODONE BITARTRATE AND ACETAMINOPHEN 5; 325 MG/1; MG/1
1 TABLET ORAL EVERY 4 HOURS PRN
Status: DISCONTINUED | OUTPATIENT
Start: 2022-10-18 | End: 2022-10-21 | Stop reason: HOSPADM

## 2022-10-18 RX ORDER — OXYBUTYNIN CHLORIDE 5 MG/1
5 TABLET ORAL EVERY MORNING
Status: DISCONTINUED | OUTPATIENT
Start: 2022-10-19 | End: 2022-10-21 | Stop reason: HOSPADM

## 2022-10-18 RX ORDER — HYDROCODONE BITARTRATE AND ACETAMINOPHEN 7.5; 325 MG/1; MG/1
1 TABLET ORAL ONCE AS NEEDED
Status: DISCONTINUED | OUTPATIENT
Start: 2022-10-18 | End: 2022-10-18 | Stop reason: HOSPADM

## 2022-10-18 RX ORDER — HYDROMORPHONE HYDROCHLORIDE 1 MG/ML
0.5 INJECTION, SOLUTION INTRAMUSCULAR; INTRAVENOUS; SUBCUTANEOUS
Status: DISCONTINUED | OUTPATIENT
Start: 2022-10-18 | End: 2022-10-18 | Stop reason: HOSPADM

## 2022-10-18 RX ORDER — DIPHENHYDRAMINE HCL 25 MG
25 CAPSULE ORAL
Status: DISCONTINUED | OUTPATIENT
Start: 2022-10-18 | End: 2022-10-18 | Stop reason: HOSPADM

## 2022-10-18 RX ORDER — FLUOXETINE HYDROCHLORIDE 20 MG/1
20 CAPSULE ORAL DAILY
Status: DISCONTINUED | OUTPATIENT
Start: 2022-10-19 | End: 2022-10-21 | Stop reason: HOSPADM

## 2022-10-18 RX ORDER — GLIPIZIDE 10 MG/1
20 TABLET ORAL
Status: DISCONTINUED | OUTPATIENT
Start: 2022-10-19 | End: 2022-10-21 | Stop reason: HOSPADM

## 2022-10-18 RX ORDER — LORAZEPAM 0.5 MG/1
0.5 TABLET ORAL 3 TIMES DAILY
Status: DISCONTINUED | OUTPATIENT
Start: 2022-10-18 | End: 2022-10-21 | Stop reason: HOSPADM

## 2022-10-18 RX ORDER — SODIUM CHLORIDE 0.9 % (FLUSH) 0.9 %
3 SYRINGE (ML) INJECTION EVERY 12 HOURS SCHEDULED
Status: DISCONTINUED | OUTPATIENT
Start: 2022-10-18 | End: 2022-10-18 | Stop reason: HOSPADM

## 2022-10-18 RX ORDER — FLUMAZENIL 0.1 MG/ML
0.2 INJECTION INTRAVENOUS AS NEEDED
Status: DISCONTINUED | OUTPATIENT
Start: 2022-10-18 | End: 2022-10-18 | Stop reason: HOSPADM

## 2022-10-18 RX ORDER — ROCURONIUM BROMIDE 10 MG/ML
INJECTION, SOLUTION INTRAVENOUS AS NEEDED
Status: DISCONTINUED | OUTPATIENT
Start: 2022-10-18 | End: 2022-10-18 | Stop reason: SURG

## 2022-10-18 RX ORDER — SODIUM CHLORIDE 9 MG/ML
INJECTION, SOLUTION INTRAVENOUS AS NEEDED
Status: DISCONTINUED | OUTPATIENT
Start: 2022-10-18 | End: 2022-10-18 | Stop reason: HOSPADM

## 2022-10-18 RX ORDER — LIDOCAINE HYDROCHLORIDE 10 MG/ML
0.5 INJECTION, SOLUTION EPIDURAL; INFILTRATION; INTRACAUDAL; PERINEURAL ONCE AS NEEDED
Status: DISCONTINUED | OUTPATIENT
Start: 2022-10-18 | End: 2022-10-18 | Stop reason: HOSPADM

## 2022-10-18 RX ORDER — DIPHENHYDRAMINE HYDROCHLORIDE 50 MG/ML
12.5 INJECTION INTRAMUSCULAR; INTRAVENOUS
Status: DISCONTINUED | OUTPATIENT
Start: 2022-10-18 | End: 2022-10-18 | Stop reason: HOSPADM

## 2022-10-18 RX ORDER — DIVALPROEX SODIUM 250 MG/1
250 TABLET, DELAYED RELEASE ORAL 3 TIMES DAILY
Status: DISCONTINUED | OUTPATIENT
Start: 2022-10-18 | End: 2022-10-21 | Stop reason: HOSPADM

## 2022-10-18 RX ORDER — ONDANSETRON 2 MG/ML
4 INJECTION INTRAMUSCULAR; INTRAVENOUS ONCE AS NEEDED
Status: DISCONTINUED | OUTPATIENT
Start: 2022-10-18 | End: 2022-10-18 | Stop reason: HOSPADM

## 2022-10-18 RX ORDER — ENOXAPARIN SODIUM 100 MG/ML
30 INJECTION SUBCUTANEOUS DAILY
Status: DISCONTINUED | OUTPATIENT
Start: 2022-10-19 | End: 2022-10-21 | Stop reason: HOSPADM

## 2022-10-18 RX ADMIN — HYDRALAZINE HYDROCHLORIDE 5 MG: 20 INJECTION INTRAMUSCULAR; INTRAVENOUS at 18:10

## 2022-10-18 RX ADMIN — FENTANYL CITRATE 50 MCG: 50 INJECTION INTRAMUSCULAR; INTRAVENOUS at 19:18

## 2022-10-18 RX ADMIN — SODIUM CHLORIDE, SODIUM LACTATE, POTASSIUM CHLORIDE, CALCIUM CHLORIDE AND DEXTROSE MONOHYDRATE 75 ML/HR: 5; 600; 310; 30; 20 INJECTION, SOLUTION INTRAVENOUS at 13:19

## 2022-10-18 RX ADMIN — FENTANYL CITRATE 50 MCG: 50 INJECTION, SOLUTION INTRAMUSCULAR; INTRAVENOUS at 16:10

## 2022-10-18 RX ADMIN — Medication 120 MCG/KG/MIN: at 16:10

## 2022-10-18 RX ADMIN — PROPOFOL 50 MG: 10 INJECTION, EMULSION INTRAVENOUS at 16:31

## 2022-10-18 RX ADMIN — POTASSIUM CHLORIDE 20 MEQ: 750 TABLET, EXTENDED RELEASE ORAL at 22:52

## 2022-10-18 RX ADMIN — CELECOXIB 200 MG: 200 CAPSULE ORAL at 12:40

## 2022-10-18 RX ADMIN — SODIUM CHLORIDE, POTASSIUM CHLORIDE, SODIUM LACTATE AND CALCIUM CHLORIDE 9 ML/HR: 600; 310; 30; 20 INJECTION, SOLUTION INTRAVENOUS at 12:40

## 2022-10-18 RX ADMIN — LORAZEPAM 0.5 MG: 0.5 TABLET ORAL at 22:52

## 2022-10-18 RX ADMIN — SODIUM CHLORIDE, POTASSIUM CHLORIDE, SODIUM LACTATE AND CALCIUM CHLORIDE: 600; 310; 30; 20 INJECTION, SOLUTION INTRAVENOUS at 14:51

## 2022-10-18 RX ADMIN — METRONIDAZOLE 500 MG: 500 INJECTION, SOLUTION INTRAVENOUS at 15:51

## 2022-10-18 RX ADMIN — ROCURONIUM BROMIDE 50 MG: 50 INJECTION, SOLUTION INTRAVENOUS at 16:10

## 2022-10-18 RX ADMIN — LABETALOL HYDROCHLORIDE 5 MG: 5 INJECTION, SOLUTION INTRAVENOUS at 17:16

## 2022-10-18 RX ADMIN — FENTANYL CITRATE 50 MCG: 50 INJECTION, SOLUTION INTRAMUSCULAR; INTRAVENOUS at 16:30

## 2022-10-18 RX ADMIN — PROPOFOL 120 MG: 10 INJECTION, EMULSION INTRAVENOUS at 16:10

## 2022-10-18 RX ADMIN — DEXAMETHASONE SODIUM PHOSPHATE 6 MG: 4 INJECTION, SOLUTION INTRAMUSCULAR; INTRAVENOUS at 16:24

## 2022-10-18 RX ADMIN — RISPERIDONE 1 MG: 1 TABLET ORAL at 22:52

## 2022-10-18 RX ADMIN — METFORMIN HYDROCHLORIDE 500 MG: 500 TABLET, EXTENDED RELEASE ORAL at 22:52

## 2022-10-18 RX ADMIN — CEFAZOLIN SODIUM 2 G: 2 INJECTION, SOLUTION INTRAVENOUS at 15:52

## 2022-10-18 RX ADMIN — ACETAMINOPHEN 1000 MG: 500 TABLET ORAL at 12:40

## 2022-10-18 RX ADMIN — ROSUVASTATIN CALCIUM 20 MG: 20 TABLET, FILM COATED ORAL at 22:52

## 2022-10-18 RX ADMIN — GABAPENTIN 300 MG: 300 CAPSULE ORAL at 12:40

## 2022-10-18 RX ADMIN — ONDANSETRON 4 MG: 2 INJECTION INTRAMUSCULAR; INTRAVENOUS at 17:20

## 2022-10-18 RX ADMIN — HYDROCODONE BITARTRATE AND ACETAMINOPHEN 1 TABLET: 5; 325 TABLET ORAL at 22:56

## 2022-10-18 RX ADMIN — SUGAMMADEX 200 MG: 100 INJECTION, SOLUTION INTRAVENOUS at 17:20

## 2022-10-18 RX ADMIN — LABETALOL HYDROCHLORIDE 5 MG: 5 INJECTION, SOLUTION INTRAVENOUS at 16:37

## 2022-10-18 RX ADMIN — METRONIDAZOLE 500 MG: 500 INJECTION, SOLUTION INTRAVENOUS at 22:52

## 2022-10-18 RX ADMIN — DIVALPROEX SODIUM 250 MG: 250 TABLET, DELAYED RELEASE ORAL at 22:52

## 2022-10-18 RX ADMIN — LIDOCAINE HYDROCHLORIDE 60 MG: 20 INJECTION, SOLUTION INTRAVENOUS at 16:10

## 2022-10-18 RX ADMIN — POTASSIUM CHLORIDE: 7.46 INJECTION, SOLUTION INTRAVENOUS at 16:55

## 2022-10-18 RX ADMIN — FAMOTIDINE 20 MG: 10 INJECTION INTRAVENOUS at 12:40

## 2022-10-18 NOTE — ANESTHESIA POSTPROCEDURE EVALUATION
"Patient: Laura Perkins    Procedure Summary     Date: 10/18/22 Room / Location: [facility] OR [address] [place] [address] MAIN OR    Anesthesia Start: 1600 Anesthesia Stop: 1749    Procedure: COLON RESECTIOn TRANSVERSE LAPAROSCOPIC (Abdomen) Diagnosis:       Cancer of transverse colon (HCC)      (Cancer of transverse colon (HCC) [C18.4])    Surgeons: Toño Michelle MD Provider: Elizabeth Cohen MD    Anesthesia Type: general ASA Status: 2          Anesthesia Type: general    Vitals  Vitals Value Taken Time   /72 10/18/22 1841   Temp 36.6 °C (97.8 °F) 10/18/22 1744   Pulse 57 10/18/22 1844   Resp 14 10/18/22 1835   SpO2 100 % 10/18/22 1844   Vitals shown include unvalidated device data.        Post Anesthesia Care and Evaluation    Pain management: adequate    Airway patency: patent  Anesthetic complications: No anesthetic complications    Cardiovascular status: acceptable  Respiratory status: acceptable  Hydration status: acceptable    Comments: /68   Pulse 64   Temp 36.6 °C (97.8 °F) (Oral)   Resp 14   Ht 162.6 cm (64\")   Wt 64.4 kg (141 lb 15.6 oz)   SpO2 100%   BMI 24.37 kg/m²         "

## 2022-10-18 NOTE — OP NOTE
PREOPERATIVE DIAGNOSIS:  Transverse colon cancer    POSTOPERATIVE DIAGNOSIS (FINDINGS):  Mid transverse colon cancer    PROCEDURE:  Laparoscopic partial transverse colectomy    SURGEON:  Toño Michelle MD    ASSISTANT:  Tila Connors, was responsible for performing the following activities: suction, irrigation, suturing, closing, retraction, camera holding, and placing dressing, and their skilled assistance was necessary for the success of this case.    ANESTHESIA:  General    EBL:  Minimal    SPECIMEN(S):  Transverse colon    DESCRIPTION:  In supine position under general anesthetic prepped and draped usual sterile manner.  Half percent Marcaine with epinephrine mixed with Exparel infiltrated in all incision sites.  Small vertical incision made above the umbilicus and Veress needle inserted with upper extraction on the abdominal wall.  Abdomen insufflated 15 mmHg and a 5 mm Optiview trocar inserted followed by 2 left midabdomen 5 mm trochars.  There were adhesions to the anterior abdominal wall which were taken down with the harmonic scalpel and the tattoo mariela just distal to the colon tumor was identified in the mid transverse colon directly beneath the midline trocar incision.  The transverse colon was extremely redundant and I therefore elected to extend the supraumbilical trocar incision superiorly just long enough to allow exteriorization of the transverse colon and tumor through a medium wound protector.  The transverse colon was divided proximal and distal to the tumor with the BALA stapler.  The colon mesentery was divided with the harmonic scalpel to the level of the middle colic vessel which was clamped, divided, and suture ligated with 2-0 silk.  The specimen was passed off, good hemostasis was noted.  And an end to end handsewn anastomosis of outer interrupted 2-0 silk and running interlocking 3-0 chromic was fashioned.  Good patent lumen was palpable and good hemostasis was noted and bowel was returned  to the peritoneal cavity.  Fascia was closed with running 0 PDS.  Skin closed with 5-0 Vicryl subcuticular and Exofin.  Tolerated well, stable to recovery room.    Toño Michelle M.D.

## 2022-10-18 NOTE — ANESTHESIA PREPROCEDURE EVALUATION
Anesthesia Evaluation     Patient summary reviewed and Nursing notes reviewed   NPO Solid Status: > 8 hours  NPO Liquid Status: > 2 hours           Airway   Mallampati: II  TM distance: >3 FB  Neck ROM: full  Dental - normal exam   (+) poor dentition    Pulmonary - normal exam    breath sounds clear to auscultation  (+) pneumonia resolved ,   Cardiovascular - normal exam    ECG reviewed  Rhythm: regular  Rate: normal    (+) hyperlipidemia,   (-) angina, orthopnea, PND, LABOY    ROS comment: Sinus rhythm  Borderline repolarization abnormality  Prolonged QT interval  Rate has improved and PACs have resolved    Neuro/Psych  (+) CVA, psychiatric history Bipolar and Anxiety,    GI/Hepatic/Renal/Endo    (+)  GERD,  diabetes mellitus type 2, thyroid problem hypothyroidism    Musculoskeletal (-) negative ROS    Abdominal    Substance History - negative use     OB/GYN negative ob/gyn ROS         Other   blood dyscrasia (Hbg = 8.8) anemia,   history of cancer (Colon cancer) active                  Anesthesia Plan    ASA 2     general     (K+ 2.7 Glu 61)  intravenous induction     Anesthetic plan, risks, benefits, and alternatives have been provided, discussed and informed consent has been obtained with: patient.        CODE STATUS:

## 2022-10-18 NOTE — ANESTHESIA PROCEDURE NOTES
Airway  Urgency: elective    Airway not difficult    General Information and Staff    Patient location during procedure: OR  Anesthesiologist: Elizabeth Cohen MD  CRNA/CAA: Jonah Bay CRNA    Indications and Patient Condition  Indications for airway management: airway protection    Preoxygenated: yes  MILS maintained throughout  Mask difficulty assessment: 1 - vent by mask    Final Airway Details  Final airway type: endotracheal airway      Successful airway: ETT  Cuffed: yes   Successful intubation technique: direct laryngoscopy  Endotracheal tube insertion site: oral  Blade: Ruben  Blade size: 3  ETT size (mm): 7.0  Cormack-Lehane Classification: grade I - full view of glottis  Placement verified by: chest auscultation and capnometry   Measured from: lips  ETT/EBT  to lips (cm): 20  Number of attempts at approach: 1  Assessment: lips, teeth, and gum same as pre-op and atraumatic intubation

## 2022-10-18 NOTE — ADDENDUM NOTE
Addendum  created 10/18/22 1950 by Renny Shore MD    Child order released for a procedure order, Order Canceled from Note

## 2022-10-19 LAB
ANION GAP SERPL CALCULATED.3IONS-SCNC: 16.2 MMOL/L (ref 5–15)
BUN SERPL-MCNC: 7 MG/DL (ref 8–23)
BUN/CREAT SERPL: 10.4 (ref 7–25)
CALCIUM SPEC-SCNC: 8.9 MG/DL (ref 8.6–10.5)
CHLORIDE SERPL-SCNC: 100 MMOL/L (ref 98–107)
CO2 SERPL-SCNC: 21.8 MMOL/L (ref 22–29)
CREAT SERPL-MCNC: 0.67 MG/DL (ref 0.57–1)
DEPRECATED RDW RBC AUTO: 43.9 FL (ref 37–54)
EGFRCR SERPLBLD CKD-EPI 2021: 86.8 ML/MIN/1.73
ERYTHROCYTE [DISTWIDTH] IN BLOOD BY AUTOMATED COUNT: 15.6 % (ref 12.3–15.4)
GLUCOSE BLDC GLUCOMTR-MCNC: 171 MG/DL (ref 70–130)
GLUCOSE BLDC GLUCOMTR-MCNC: 179 MG/DL (ref 70–130)
GLUCOSE BLDC GLUCOMTR-MCNC: 203 MG/DL (ref 70–130)
GLUCOSE BLDC GLUCOMTR-MCNC: 246 MG/DL (ref 70–130)
GLUCOSE BLDC GLUCOMTR-MCNC: 292 MG/DL (ref 70–130)
GLUCOSE SERPL-MCNC: 143 MG/DL (ref 65–99)
HCT VFR BLD AUTO: 28.9 % (ref 34–46.6)
HGB BLD-MCNC: 8.8 G/DL (ref 12–15.9)
MCH RBC QN AUTO: 23.9 PG (ref 26.6–33)
MCHC RBC AUTO-ENTMCNC: 30.4 G/DL (ref 31.5–35.7)
MCV RBC AUTO: 78.5 FL (ref 79–97)
PLATELET # BLD AUTO: 238 10*3/MM3 (ref 140–450)
PMV BLD AUTO: 9.2 FL (ref 6–12)
POTASSIUM SERPL-SCNC: 4 MMOL/L (ref 3.5–5.2)
RBC # BLD AUTO: 3.68 10*6/MM3 (ref 3.77–5.28)
SODIUM SERPL-SCNC: 138 MMOL/L (ref 136–145)
WBC NRBC COR # BLD: 8.19 10*3/MM3 (ref 3.4–10.8)

## 2022-10-19 PROCEDURE — 80048 BASIC METABOLIC PNL TOTAL CA: CPT | Performed by: SURGERY

## 2022-10-19 PROCEDURE — 86901 BLOOD TYPING SEROLOGIC RH(D): CPT

## 2022-10-19 PROCEDURE — 25010000002 SODIUM CHLORIDE 0.9 % WITH KCL 20 MEQ 20-0.9 MEQ/L-% SOLUTION: Performed by: SURGERY

## 2022-10-19 PROCEDURE — 99024 POSTOP FOLLOW-UP VISIT: CPT | Performed by: SURGERY

## 2022-10-19 PROCEDURE — 82962 GLUCOSE BLOOD TEST: CPT

## 2022-10-19 PROCEDURE — 85027 COMPLETE CBC AUTOMATED: CPT | Performed by: SURGERY

## 2022-10-19 PROCEDURE — 97161 PT EVAL LOW COMPLEX 20 MIN: CPT

## 2022-10-19 PROCEDURE — 97530 THERAPEUTIC ACTIVITIES: CPT

## 2022-10-19 PROCEDURE — 86900 BLOOD TYPING SEROLOGIC ABO: CPT

## 2022-10-19 PROCEDURE — 25010000002 ENOXAPARIN PER 10 MG: Performed by: SURGERY

## 2022-10-19 PROCEDURE — 63710000001 INSULIN LISPRO (HUMAN) PER 5 UNITS: Performed by: SURGERY

## 2022-10-19 PROCEDURE — 25010000002 CEFAZOLIN IN DEXTROSE 2-4 GM/100ML-% SOLUTION: Performed by: SURGERY

## 2022-10-19 RX ORDER — DOCUSATE SODIUM 100 MG/1
100 CAPSULE, LIQUID FILLED ORAL 2 TIMES DAILY
Status: DISCONTINUED | OUTPATIENT
Start: 2022-10-19 | End: 2022-10-21 | Stop reason: HOSPADM

## 2022-10-19 RX ORDER — IRON POLYSACCHARIDE COMPLEX 150 MG
150 CAPSULE ORAL DAILY
Status: DISCONTINUED | OUTPATIENT
Start: 2022-10-19 | End: 2022-10-21 | Stop reason: HOSPADM

## 2022-10-19 RX ADMIN — DOCUSATE SODIUM 100 MG: 100 CAPSULE, LIQUID FILLED ORAL at 10:27

## 2022-10-19 RX ADMIN — LINAGLIPTIN 5 MG: 5 TABLET, FILM COATED ORAL at 08:13

## 2022-10-19 RX ADMIN — CEFAZOLIN SODIUM 2 G: 2 INJECTION, SOLUTION INTRAVENOUS at 01:06

## 2022-10-19 RX ADMIN — DIVALPROEX SODIUM 250 MG: 250 TABLET, DELAYED RELEASE ORAL at 16:25

## 2022-10-19 RX ADMIN — POTASSIUM CHLORIDE 20 MEQ: 750 TABLET, EXTENDED RELEASE ORAL at 08:12

## 2022-10-19 RX ADMIN — POTASSIUM CHLORIDE 20 MEQ: 750 TABLET, EXTENDED RELEASE ORAL at 17:23

## 2022-10-19 RX ADMIN — HYDROCODONE BITARTRATE AND ACETAMINOPHEN 2 TABLET: 5; 325 TABLET ORAL at 16:25

## 2022-10-19 RX ADMIN — GLIPIZIDE 20 MG: 10 TABLET ORAL at 06:46

## 2022-10-19 RX ADMIN — RISPERIDONE 1 MG: 1 TABLET ORAL at 08:13

## 2022-10-19 RX ADMIN — METFORMIN HYDROCHLORIDE 500 MG: 500 TABLET, EXTENDED RELEASE ORAL at 08:12

## 2022-10-19 RX ADMIN — ROSUVASTATIN CALCIUM 20 MG: 20 TABLET, FILM COATED ORAL at 20:58

## 2022-10-19 RX ADMIN — INSULIN LISPRO 2 UNITS: 100 INJECTION, SOLUTION INTRAVENOUS; SUBCUTANEOUS at 11:56

## 2022-10-19 RX ADMIN — INSULIN LISPRO 6 UNITS: 100 INJECTION, SOLUTION INTRAVENOUS; SUBCUTANEOUS at 17:22

## 2022-10-19 RX ADMIN — LORAZEPAM 0.5 MG: 0.5 TABLET ORAL at 20:59

## 2022-10-19 RX ADMIN — POTASSIUM CHLORIDE AND SODIUM CHLORIDE 75 ML/HR: 900; 150 INJECTION, SOLUTION INTRAVENOUS at 01:09

## 2022-10-19 RX ADMIN — DOCUSATE SODIUM 100 MG: 100 CAPSULE, LIQUID FILLED ORAL at 20:59

## 2022-10-19 RX ADMIN — DIVALPROEX SODIUM 250 MG: 250 TABLET, DELAYED RELEASE ORAL at 08:13

## 2022-10-19 RX ADMIN — LEVOTHYROXINE SODIUM 50 MCG: 0.05 TABLET ORAL at 08:13

## 2022-10-19 RX ADMIN — ENOXAPARIN SODIUM 30 MG: 30 INJECTION SUBCUTANEOUS at 08:12

## 2022-10-19 RX ADMIN — PANTOPRAZOLE SODIUM 40 MG: 40 TABLET, DELAYED RELEASE ORAL at 06:46

## 2022-10-19 RX ADMIN — DIVALPROEX SODIUM 250 MG: 250 TABLET, DELAYED RELEASE ORAL at 20:59

## 2022-10-19 RX ADMIN — GLIPIZIDE 20 MG: 10 TABLET ORAL at 17:29

## 2022-10-19 RX ADMIN — INSULIN LISPRO 4 UNITS: 100 INJECTION, SOLUTION INTRAVENOUS; SUBCUTANEOUS at 07:47

## 2022-10-19 RX ADMIN — CEFAZOLIN SODIUM 2 G: 2 INJECTION, SOLUTION INTRAVENOUS at 07:47

## 2022-10-19 RX ADMIN — METFORMIN HYDROCHLORIDE 500 MG: 500 TABLET, EXTENDED RELEASE ORAL at 17:30

## 2022-10-19 RX ADMIN — LORAZEPAM 0.5 MG: 0.5 TABLET ORAL at 16:25

## 2022-10-19 RX ADMIN — HYDROCODONE BITARTRATE AND ACETAMINOPHEN 1 TABLET: 5; 325 TABLET ORAL at 10:27

## 2022-10-19 RX ADMIN — OXYBUTYNIN CHLORIDE 5 MG: 5 TABLET ORAL at 06:46

## 2022-10-19 RX ADMIN — FLUOXETINE HYDROCHLORIDE 20 MG: 20 CAPSULE ORAL at 08:13

## 2022-10-19 RX ADMIN — RISPERIDONE 1 MG: 1 TABLET ORAL at 20:59

## 2022-10-19 RX ADMIN — HYDROCODONE BITARTRATE AND ACETAMINOPHEN 1 TABLET: 5; 325 TABLET ORAL at 04:59

## 2022-10-19 RX ADMIN — METRONIDAZOLE 500 MG: 500 INJECTION, SOLUTION INTRAVENOUS at 06:46

## 2022-10-19 RX ADMIN — LORAZEPAM 0.5 MG: 0.5 TABLET ORAL at 08:12

## 2022-10-19 NOTE — PLAN OF CARE
Goal Outcome Evaluation:           Progress: improving  Outcome Evaluation: VSS. Pt worked with PT today, sat up in the chair for most of the day and interacted with staff and spouse. Pt tolerating regular diet and PO narcotics with adequate pain management. Waiting for rehab placement. Pt incontinent in brief throughout shift.

## 2022-10-19 NOTE — DISCHARGE PLACEMENT REQUEST
"Shereen Bonds (82 y.o. Female)     Date of Birth   1940    Social Security Number       Address   [address] [address]    Home Phone   [phone]    MRN   [id number]       Baptism   Protestant    Marital Status                               Admission Date   10/18/22    Admission Type   Urgent    Admitting Provider   Toño Michelle MD    Attending Provider   Toño Michelle MD    Department, Room/Bed   [address], P681/1       Discharge Date       Discharge Disposition       Discharge Destination                               Attending Provider: Toño Michelle MD    Allergies: No Known Allergies    Isolation: None   Infection: None   Code Status: CPR    Ht: 162.6 cm (64\")   Wt: 64.4 kg (141 lb 15.6 oz)    Admission Cmt: None   Principal Problem: Cancer of transverse colon (HCC) [C18.4]                 Active Insurance as of 10/18/2022     Primary Coverage     Payor Plan Insurance Group Employer/Plan Group    MEDICARE MEDICARE A & B      Payor Plan Address Payor Plan Phone Number Payor Plan Fax Number Effective Dates    [address] [phone]  5/1/2004 - None Entered    [facility] [id number]       Subscriber Name Subscriber Birth Date Member ID       SHEREEN BONDS 1940 [id number]           Secondary Coverage     Payor Plan Insurance Group Employer/Plan Group    AARP MC SUP AAR HEALTH CARE OPTIONS      Payor Plan Address Payor Plan Phone Number Payor Plan Fax Number Effective Dates    [facility] [phone]  1/1/2020 - None Entered    [address]       [facility] [address]       Subscriber Name Subscriber Birth Date Member ID       SHEREEN BONDS 1940 [id number]                 Emergency Contacts      (Rel.) Home Phone Work Phone Mobile Phone    KIMBERLY BONDS (Spouse) [phone] -- --    KIMBERLY BONDS (Son) -- -- [phone]    Mazin Bonds (Son) -- -- [phone]    Gianni Bonds (Son) -- -- [phone] "

## 2022-10-19 NOTE — PROGRESS NOTES
Discharge Planning Assessment  [facility]     Patient Name: Laura Perkins  MRN: [id number]  Today's Date: 10/19/2022    Admit Date: 10/18/2022    Plan: SNF referral pending to [facility]   Discharge Needs Assessment     Row Name 10/19/22 1312       Living Environment    People in Home spouse    Name(s) of People in Home Marino    Current Living Arrangements home    Primary Care Provided by spouse/significant other;self    Provides Primary Care For no one, unable/limited ability to care for self    Family Caregiver if Needed spouse    Family Caregiver Names Marino    Quality of Family Relationships supportive;involved;helpful       Resource/Environmental Concerns    Transportation Concerns none       Transition Planning    Patient/Family Anticipates Transition to inpatient rehabilitation facility;home with help/services;home with family       Discharge Needs Assessment    Equipment Currently Used at Home wheelchair;cane, quad    Provided Post Acute Provider List? Refused    Refused Provider List Comment declined, pt is current with [facility]  and pt's spouse requested referral to [facility]    Provided Post Acute Provider Quality & Resource List? Refused    Refused Quality and Resource List Comment declined, pt is current with [facility]  and pt's spouse requested referral to [facility]               Discharge Plan     Row Name 10/19/22 1314       Plan    Plan SNF referral pending to [facility]    Roadmap to Recovery Yes  left at pt's bedside for pt's spouse    Plan Comments Spoke with pt deyanira at bedside who was agreeable for CCP to call her sposue to discuss DCP/needs.  Spoke with pt's sposue Marino who did confirm that pt does reside with him in [place] [facility].  Pt's sposue stated that pt is current with [facility] .  referral placed in [facility] for [facility]   to follow.  Pt's spouse stated that pt has recently last couple months decreased with her  khadar and has been using her wheelchair more.  pt stated that PTA pt was able to stand up independently and take several steps to get herself into the wheelchair.  on and off the bed and on and off the commode.  Pt's sposue stated that he feel spt will need to go to rehab at [place] to get stronger.  Pt's sposue stated that pt has been to [facility] in the past and they would like pt to go there again at [place].  Did inform, pt's spouse that pt would need to meet criteria for sub acute rehab and pt's sposue voiced understanding.  Referral placed in [facility] for [facility] and spoke to Ally  with [facility] who will assess pt and see if [facility] could accept pt for skilled rehab at [place].   [facility].              Continued Care and Services - Admitted Since 10/18/2022     Destination     Service Provider Request Status Selected Services Address Phone Fax Patient Preferred    [facility] Pending - Request Sent N/A [address] [facility] [id number] [phone] --          Home Medical Care     Service Provider Request Status Selected Services Address Phone Fax Patient Preferred    [facility] Accepted N/A [address] [facility] [address] [phone] --    [facility] Accepted N/A [address] [place] [address] [phone] --                 Demographic Summary     Row Name 10/19/22 1311       General Information    Admission Type inpatient    Arrived From home    Referral Source admission list    Reason for Consult discharge planning    Preferred Language English               Functional Status     Row Name 10/19/22 1312       Functional Status    Usual Activity Tolerance fair    Current Activity Tolerance poor       Functional Status, IADL    Medications assistive person    Meal Preparation  assistive person    Housekeeping assistive person    Laundry assistive person    Shopping assistive person               Psychosocial    No documentation.                Abuse/Neglect    No documentation.                Legal    No documentation.                Substance Abuse    No documentation.                Patient Forms    No documentation.                   LIDIA Kelley

## 2022-10-19 NOTE — PROGRESS NOTES
Postoperative day 1 transverse colectomy    Awake and alert.    Afebrile, vital signs stable and basically normal.  Abdomen is soft and minimally tender.  Incisions look good.    WBC 8.2  Hemoglobin 8.8    · Start stool softener  · Ambulate with physical therapy  · Start iron supplement

## 2022-10-19 NOTE — PLAN OF CARE
Goal Outcome Evaluation:  Plan of Care Reviewed With: patient           Outcome Evaluation: VSS, midline incision and 2 lap sites all CDI, dermabond and open to air, some slight bruising noted at incisions, in brief, turns and repositioning done as needed, IVF infusing, IV abx given, norco given for pain control and c/o shoulder pain, voided, tolerating diet, a little slow with answering questions, SCDs on, bed alarm for falls precautions,

## 2022-10-19 NOTE — PLAN OF CARE
Goal Outcome Evaluation:  Plan of Care Reviewed With: patient           Outcome Evaluation: Patient is an 82 y.o female who present to [facility] POD1 colon resection. Patient AO to self and time. Patient demonstrate intermittent ability to answer personal history question but at times would stare blankly at therapist. Patient reports she lives at home with her husand who assists her with all ADLs. Patient reports she does not walk but stand pivots to w/c with a rwx. Unclear level of assist needed to w/c at baseline. Patient sat up to EOB with modA this date. Patient required ModA to scoot to EOB to put feet on the floor. Patient performed STS from EOB x3 this date. Patient attempted first 2 STS with rwx and modA. Patient demonstrated a posterior lean with difficulty shifting weight anteriorly. Patient unable to initiate movement of feet or rwx. Patient performed 3rd STS supported on therapist with modA. Once standing patient performed pivot to the chair with modA-MaxA. Patient UIC at end of session with chair alarm on. Patient would continue to benefit from skilled PT intervention to address deficits in strength, balance, and activity endurance limiting functional mobility below baseline. PT recommends SNF at baseline. Will continue to monitor.

## 2022-10-19 NOTE — THERAPY EVALUATION
Patient Name: Laura Perkins  : 1940    MRN: [id number]                              Today's Date: 10/19/2022       Admit Date: 10/18/2022    Visit Dx:     ICD-10-CM ICD-9-CM   1. Cancer of transverse colon (HCC)  C18.4 153.1     Patient Active Problem List   Diagnosis   • Pneumonia due to COVID-19 virus   • Acute renal injury (HCC)   • Cancer of transverse colon (HCC)     Past Medical History:   Diagnosis Date   • Acid reflux    • Anemia    • Anemia    • Bipolar 1 disorder (HCC)    • Colon cancer (HCC)    • Diabetes mellitus (HCC)     Type 2   • Disease of thyroid gland    • Hyperlipidemia    • Hypertension     TAKEN OFF HTN MEDS OVER 5 YEARS AGO   • Incontinence of urine    • Stroke (HCC)          Past Surgical History:   Procedure Laterality Date   • BLADDER SURGERY     • COLON RESECTION N/A 10/18/2022    Procedure: COLON RESECTIOn TRANSVERSE LAPAROSCOPIC;  Surgeon: Toño Michelle MD;  Location: [facility];  Service: General;  Laterality: N/A;   • COLONOSCOPY  2022    [facility]   • ENDOSCOPY  2022    [facility]   • EYE SURGERY     • HEMORRHOIDECTOMY     • HYSTERECTOMY        General Information     Row Name 10/19/22 1206          Physical Therapy Time and Intention    Document Type evaluation  -     Mode of Treatment individual therapy;physical therapy  -     Row Name 10/19/22 1206          General Information    Patient Profile Reviewed yes  -SM     Prior Level of Function mod assist:  Patient reports  assists with ADLs  -     Existing Precautions/Restrictions fall  -     Barriers to Rehab previous functional deficit;cognitive status  -     Row Name 10/19/22 1206          Living Environment    People in Home spouse  -     Row Name 10/19/22 1206          Cognition    Orientation Status (Cognition) oriented to;person;time;situation  -     Row Name 10/19/22 1206          Safety Issues, Functional Mobility    Safety Issues Affecting  Function (Mobility) ability to follow commands;positioning of assistive device;problem-solving;sequencing abilities  -     Impairments Affecting Function (Mobility) balance;strength;pain;endurance/activity tolerance  -           User Key  (r) = Recorded By, (t) = Taken By, (c) = Cosigned By    Initials Name Provider Type     Libia Love PT Physical Therapist               Mobility     Row Name 10/19/22 1206          Bed Mobility    Bed Mobility supine-sit  -     Supine-Sit [place] (Bed Mobility) moderate assist (50% patient effort);verbal cues;nonverbal cues (demo/gesture)  -     Assistive Device (Bed Mobility) draw sheet;bed rails;head of bed elevated  -     Comment, (Bed Mobility) Patient UIC at end of session  -     Row Name 10/19/22 1206          Bed-Chair Transfer    Bed-Chair [place] (Transfers) maximum assist (25% patient effort);verbal cues;nonverbal cues (demo/gesture)  -     Row Name 10/19/22 1206          Sit-Stand Transfer    Sit-Stand [place] (Transfers) moderate assist (50% patient effort);maximum assist (25% patient effort)  -     Assistive Device (Sit-Stand Transfers) walker, front-wheeled  -     Comment, (Sit-Stand Transfer) x3 from EOB  -     Row Name 10/19/22 1206          Gait/Stairs (Locomotion)    [place] Level (Gait) not tested  -           User Key  (r) = Recorded By, (t) = Taken By, (c) = Cosigned By    Initials Name Provider Type     Libia Love PT Physical Therapist               Obj/Interventions     Row Name 10/19/22 1207          Range of Motion Comprehensive    General Range of Motion bilateral lower extremity ROM WFL  -     Row Name 10/19/22 1207          Strength Comprehensive (MMT)    General Manual Muscle Testing (MMT) Assessment lower extremity strength deficits identified  -     Comment, General Manual Muscle Testing (MMT) Assessment Generalized LE weakness  -     Row Name 10/19/22 1207          Motor Skills     Motor Skills functional endurance  -     Functional Endurance poor  -     Row Name 10/19/22 1207          Balance    Balance Assessment sitting static balance;sitting dynamic balance;sit to stand dynamic balance;standing static balance  -SM     Static Sitting Balance standby assist  -SM     Dynamic Sitting Balance contact guard  -SM     Position, Sitting Balance sitting edge of bed  -SM     Sit to Stand Dynamic Balance maximum assist;moderate assist  -SM     Static Standing Balance maximum assist  -SM     Position/Device Used, Standing Balance walker, front-wheeled  -     Balance Interventions sitting;standing;sit to stand;supported;static  -SM           User Key  (r) = Recorded By, (t) = Taken By, (c) = Cosigned By    Initials Name Provider Type     Libia Love PT Physical Therapist               Goals/Plan     Row Name 10/19/22 1214          Bed Mobility Goal 1 (PT)    Activity/Assistive Device (Bed Mobility Goal 1, PT) bed mobility activities, all  -SM     [place] Level/Cues Needed (Bed Mobility Goal 1, PT) contact guard required  -SM     Time Frame (Bed Mobility Goal 1, PT) 1 week  -     Row Name 10/19/22 1214          Transfer Goal 1 (PT)    Activity/Assistive Device (Transfer Goal 1, PT) sit-to-stand/stand-to-sit;bed-to-chair/chair-to-bed  -SM     [place] Level/Cues Needed (Transfer Goal 1, PT) minimum assist (75% or more patient effort)  -SM     Time Frame (Transfer Goal 1, PT) 1 week  -           User Key  (r) = Recorded By, (t) = Taken By, (c) = Cosigned By    Initials Name Provider Type     Libia Love PT Physical Therapist               Clinical Impression     Row Name 10/19/22 1208          Pain    Pretreatment Pain Rating 0/10 - no pain  -SM     Posttreatment Pain Rating 0/10 - no pain  -SM     Row Name 10/19/22 1208          Plan of Care Review    Plan of Care Reviewed With patient  -     Outcome Evaluation Patient is an 82 y.o female who present to [facility] POD1  colon resection. Patient AO to self and time. Patient demonstrate intermittent ability to answer personal history question but at times would stare blankly at therapist. Patient reports she lives at home with her husand who assists her with all ADLs. Patient reports she does not walk but stand pivots to w/c with a rwx. Unclear level of assist needed to w/c at baseline. Patient sat up to EOB with modA this date. Patient required ModA to scoot to EOB to put feet on the floor. Patient performed STS from EOB x3 this date. Patient attempted first 2 STS with rwx and modA. Patient demonstrated a posterior lean with difficulty shifting weight anteriorly. Patient unable to initiate movement of feet or rwx. Patient performed 3rd STS supported on therapist with modA. Once standing patient performed pivot to the chair with modA-MaxA. Patient UIC at end of session with chair alarm on. Patient would continue to benefit from skilled PT intervention to address deficits in strength, balance, and activity endurance limiting functional mobility below baseline. PT recommends SNF at baseline. Will continue to monitor.  -     Row Name 10/19/22 1200          Therapy Assessment/Plan (PT)    Rehab Potential (PT) good, to achieve stated therapy goals  -     Criteria for Skilled Interventions Met (PT) yes  -     Therapy Frequency (PT) 5 times/wk  -     Row Name 10/19/22 1200          Vital Signs    O2 Delivery Pre Treatment room air  -SM     O2 Delivery Intra Treatment room air  -SM     O2 Delivery Post Treatment room air  -SM     Pre Patient Position Supine  -SM     Intra Patient Position Standing  -SM     Post Patient Position Sitting  -SM     Row Name 10/19/22 1207          Positioning and Restraints    Pre-Treatment Position in bed  -SM     Post Treatment Position chair  -SM     In Chair reclined;encouraged to call for assist;call light within reach;exit alarm on  -SM           User Key  (r) = Recorded By, (t) = Taken By, (c) =  Cosigned By    Initials Name Provider Type    Libia Chávez, YULIA Physical Therapist               Outcome Measures     Row Name 10/19/22 1215 10/19/22 0814       How much help from another person do you currently need...    Turning from your back to your side while in flat bed without using bedrails? 3  -SM 3  -MB    Moving from lying on back to sitting on the side of a flat bed without bedrails? 2  -SM 3  -MB    Moving to and from a bed to a chair (including a wheelchair)? 2  -SM 2  -MB    Standing up from a chair using your arms (e.g., wheelchair, bedside chair)? 2  -SM 1  -MB    Climbing 3-5 steps with a railing? 1  -SM 1  -MB    To walk in hospital room? 1  -SM 1  -MB    AM-PAC 6 Clicks Score (PT) 11  -SM 11  -MB    Highest level of mobility 4 --> Transferred to chair/commode  -SM 4 --> Transferred to chair/commode  -MB    Row Name 10/19/22 1215          Functional Assessment    Outcome Measure Options AM-PAC 6 Clicks Basic Mobility (PT)  -           User Key  (r) = Recorded By, (t) = Taken By, (c) = Cosigned By    Initials Name Provider Type    Emilie Gibson, RN Registered Nurse    Libia Chávez PT Physical Therapist                             Physical Therapy Education     Title: PT OT SLP Therapies (In Progress)     Topic: Physical Therapy (In Progress)     Point: Mobility training (In Progress)     Learning Progress Summary           Patient Acceptance, E, NR by  at 10/19/2022 1215                   Point: Home exercise program (In Progress)     Learning Progress Summary           Patient Acceptance, E, NR by  at 10/19/2022 1215                   Point: Body mechanics (In Progress)     Learning Progress Summary           Patient Acceptance, E, NR by  at 10/19/2022 1215                   Point: Precautions (In Progress)     Learning Progress Summary           Patient Acceptance, E, NR by  at 10/19/2022 1215                               User Key     Initials Effective Dates Name  Provider Type Discipline     05/02/22 -  Libia Love, YULIA Physical Therapist PT              PT Recommendation and Plan     Plan of Care Reviewed With: patient  Outcome Evaluation: Patient is an 82 y.o female who present to [facility] POD1 colon resection. Patient AO to self and time. Patient demonstrate intermittent ability to answer personal history question but at times would stare blankly at therapist. Patient reports she lives at home with her husand who assists her with all ADLs. Patient reports she does not walk but stand pivots to w/c with a rwx. Unclear level of assist needed to w/c at baseline. Patient sat up to EOB with modA this date. Patient required ModA to scoot to EOB to put feet on the floor. Patient performed STS from EOB x3 this date. Patient attempted first 2 STS with rwx and modA. Patient demonstrated a posterior lean with difficulty shifting weight anteriorly. Patient unable to initiate movement of feet or rwx. Patient performed 3rd STS supported on therapist with modA. Once standing patient performed pivot to the chair with modA-MaxA. Patient UIC at end of session with chair alarm on. Patient would continue to benefit from skilled PT intervention to address deficits in strength, balance, and activity endurance limiting functional mobility below baseline. PT recommends SNF at baseline. Will continue to monitor.     Time Calculation:    PT Charges     Row Name 10/19/22 1216             Time Calculation    Start Time 1053  -SM      Stop Time 1113  -SM      Time Calculation (min) 20 min  -SM      PT Received On 10/19/22  -      PT - Next Appointment 10/20/22  -      PT Goal Re-Cert Due Date 10/26/22  -         Time Calculation- PT    Total Timed Code Minutes- PT 10 minute(s)  -SM         Timed Charges    46532 - PT Therapeutic Activity Minutes 10  -SM         Total Minutes    Timed Charges Total Minutes 10  -SM       Total Minutes 10  -SM            User Key  (r) = Recorded By, (t) = Taken  By, (c) = Cosigned By    Initials Name Provider Type     Libia Love PT Physical Therapist              Therapy Charges for Today     Code Description Service Date Service Provider Modifiers Qty    [id number]  PT THERAPEUTIC ACT EA 15 MIN 10/19/2022 Libia Love, PT GP 1    [id number]  PT EVAL LOW COMPLEXITY 3 10/19/2022 Libia Love PT GP 1          PT G-Codes  Outcome Measure Options: AM-PAC 6 Clicks Basic Mobility (PT)  AM-PAC 6 Clicks Score (PT): 11  Patient was not wearing a face mask during this therapy encounter. Therapist used appropriate personal protective equipment including mask and gloves.  Mask used was standard procedure mask. Appropriate PPE was worn during the entire therapy session. Hand hygiene was completed before and after therapy session. Patient is not in enhanced droplet precautions.     Libia Love PT  10/19/2022

## 2022-10-20 LAB
GLUCOSE BLDC GLUCOMTR-MCNC: 134 MG/DL (ref 70–130)
GLUCOSE BLDC GLUCOMTR-MCNC: 169 MG/DL (ref 70–130)
GLUCOSE BLDC GLUCOMTR-MCNC: 178 MG/DL (ref 70–130)
GLUCOSE BLDC GLUCOMTR-MCNC: 185 MG/DL (ref 70–130)

## 2022-10-20 PROCEDURE — 63710000001 INSULIN LISPRO (HUMAN) PER 5 UNITS: Performed by: SURGERY

## 2022-10-20 PROCEDURE — 82962 GLUCOSE BLOOD TEST: CPT

## 2022-10-20 PROCEDURE — 99024 POSTOP FOLLOW-UP VISIT: CPT | Performed by: SURGERY

## 2022-10-20 PROCEDURE — 25010000002 ENOXAPARIN PER 10 MG: Performed by: SURGERY

## 2022-10-20 RX ADMIN — INSULIN LISPRO 2 UNITS: 100 INJECTION, SOLUTION INTRAVENOUS; SUBCUTANEOUS at 17:18

## 2022-10-20 RX ADMIN — DIVALPROEX SODIUM 250 MG: 250 TABLET, DELAYED RELEASE ORAL at 08:36

## 2022-10-20 RX ADMIN — LINAGLIPTIN 5 MG: 5 TABLET, FILM COATED ORAL at 08:36

## 2022-10-20 RX ADMIN — LORAZEPAM 0.5 MG: 0.5 TABLET ORAL at 08:36

## 2022-10-20 RX ADMIN — LEVOTHYROXINE SODIUM 50 MCG: 0.05 TABLET ORAL at 08:36

## 2022-10-20 RX ADMIN — POTASSIUM CHLORIDE 20 MEQ: 750 TABLET, EXTENDED RELEASE ORAL at 08:36

## 2022-10-20 RX ADMIN — Medication 150 MG: at 00:14

## 2022-10-20 RX ADMIN — HYDROCODONE BITARTRATE AND ACETAMINOPHEN 1 TABLET: 5; 325 TABLET ORAL at 22:33

## 2022-10-20 RX ADMIN — INSULIN LISPRO 2 UNITS: 100 INJECTION, SOLUTION INTRAVENOUS; SUBCUTANEOUS at 12:14

## 2022-10-20 RX ADMIN — OXYBUTYNIN CHLORIDE 5 MG: 5 TABLET ORAL at 05:51

## 2022-10-20 RX ADMIN — DOCUSATE SODIUM 100 MG: 100 CAPSULE, LIQUID FILLED ORAL at 08:36

## 2022-10-20 RX ADMIN — RISPERIDONE 1 MG: 1 TABLET ORAL at 08:36

## 2022-10-20 RX ADMIN — ENOXAPARIN SODIUM 30 MG: 30 INJECTION SUBCUTANEOUS at 08:36

## 2022-10-20 RX ADMIN — GLIPIZIDE 20 MG: 10 TABLET ORAL at 08:36

## 2022-10-20 RX ADMIN — LORAZEPAM 0.5 MG: 0.5 TABLET ORAL at 22:33

## 2022-10-20 RX ADMIN — METFORMIN HYDROCHLORIDE 500 MG: 500 TABLET, EXTENDED RELEASE ORAL at 17:18

## 2022-10-20 RX ADMIN — Medication 150 MG: at 08:36

## 2022-10-20 RX ADMIN — DIVALPROEX SODIUM 250 MG: 250 TABLET, DELAYED RELEASE ORAL at 17:18

## 2022-10-20 RX ADMIN — LORAZEPAM 0.5 MG: 0.5 TABLET ORAL at 17:18

## 2022-10-20 RX ADMIN — DIVALPROEX SODIUM 250 MG: 250 TABLET, DELAYED RELEASE ORAL at 22:33

## 2022-10-20 RX ADMIN — PANTOPRAZOLE SODIUM 40 MG: 40 TABLET, DELAYED RELEASE ORAL at 05:51

## 2022-10-20 RX ADMIN — FLUOXETINE HYDROCHLORIDE 20 MG: 20 CAPSULE ORAL at 08:36

## 2022-10-20 RX ADMIN — ROSUVASTATIN CALCIUM 20 MG: 20 TABLET, FILM COATED ORAL at 22:34

## 2022-10-20 RX ADMIN — DOCUSATE SODIUM 100 MG: 100 CAPSULE, LIQUID FILLED ORAL at 22:33

## 2022-10-20 RX ADMIN — METFORMIN HYDROCHLORIDE 500 MG: 500 TABLET, EXTENDED RELEASE ORAL at 08:36

## 2022-10-20 RX ADMIN — GLIPIZIDE 20 MG: 10 TABLET ORAL at 17:18

## 2022-10-20 RX ADMIN — POTASSIUM CHLORIDE 20 MEQ: 750 TABLET, EXTENDED RELEASE ORAL at 17:18

## 2022-10-20 RX ADMIN — RISPERIDONE 1 MG: 1 TABLET ORAL at 22:34

## 2022-10-20 NOTE — PLAN OF CARE
Goal Outcome Evaluation:  Plan of Care Reviewed With: patient        Progress: improving  Outcome Evaluation: VSS, midline and lap sites X 2 are ETELVINA and CDI, blood glucose monitoring, falls precautions maintained, spouse at bedside, awaiting rehab placement

## 2022-10-20 NOTE — PROGRESS NOTES
Continued Stay Note  [facility]     Patient Name: Laura Perkins  MRN: [id number]  Today's Date: 10/20/2022    Admit Date: 10/18/2022    Plan: [facility]   Discharge Plan     Row Name 10/20/22 1545       Plan    Plan [facility]    Plan Comments Per Ally with [facility], patient accepted to [facility] and bed will be available tomorrow afternoon. Stated a COVID testis not needed. Informed patient and spouse at bedside who stated he will provide transportation at [place]. Updated RN               Discharge Codes    No documentation.               Expected Discharge Date and Time     Expected Discharge Date Expected Discharge Time    Oct 21, 2022             Meka Martin RN

## 2022-10-20 NOTE — PROGRESS NOTES
Postoperative day 2 transverse colectomy    Awake and alert, eating well.    Afebrile vital signs stable.  Good urine output.  Has had bowel movements.  Abdomen soft, incision clean with no evidence of infection.    · Recheck labs in a.m. to follow-up on anemia  · Await rehabilitation placement

## 2022-10-21 VITALS
DIASTOLIC BLOOD PRESSURE: 67 MMHG | HEART RATE: 99 BPM | WEIGHT: 141.98 LBS | TEMPERATURE: 97.2 F | SYSTOLIC BLOOD PRESSURE: 134 MMHG | RESPIRATION RATE: 18 BRPM | BODY MASS INDEX: 24.24 KG/M2 | OXYGEN SATURATION: 99 % | HEIGHT: 64 IN

## 2022-10-21 LAB
GLUCOSE BLDC GLUCOMTR-MCNC: 155 MG/DL (ref 70–130)
GLUCOSE BLDC GLUCOMTR-MCNC: 248 MG/DL (ref 70–130)

## 2022-10-21 PROCEDURE — 63710000001 INSULIN LISPRO (HUMAN) PER 5 UNITS: Performed by: SURGERY

## 2022-10-21 PROCEDURE — 82962 GLUCOSE BLOOD TEST: CPT

## 2022-10-21 PROCEDURE — 25010000002 ENOXAPARIN PER 10 MG: Performed by: SURGERY

## 2022-10-21 PROCEDURE — 99024 POSTOP FOLLOW-UP VISIT: CPT | Performed by: SURGERY

## 2022-10-21 RX ADMIN — GLIPIZIDE 20 MG: 10 TABLET ORAL at 06:51

## 2022-10-21 RX ADMIN — METFORMIN HYDROCHLORIDE 500 MG: 500 TABLET, EXTENDED RELEASE ORAL at 08:48

## 2022-10-21 RX ADMIN — FLUOXETINE HYDROCHLORIDE 20 MG: 20 CAPSULE ORAL at 08:48

## 2022-10-21 RX ADMIN — INSULIN LISPRO 4 UNITS: 100 INJECTION, SOLUTION INTRAVENOUS; SUBCUTANEOUS at 11:44

## 2022-10-21 RX ADMIN — ENOXAPARIN SODIUM 30 MG: 30 INJECTION SUBCUTANEOUS at 08:49

## 2022-10-21 RX ADMIN — RISPERIDONE 1 MG: 1 TABLET ORAL at 08:48

## 2022-10-21 RX ADMIN — POTASSIUM CHLORIDE 20 MEQ: 750 TABLET, EXTENDED RELEASE ORAL at 08:47

## 2022-10-21 RX ADMIN — DOCUSATE SODIUM 100 MG: 100 CAPSULE, LIQUID FILLED ORAL at 08:47

## 2022-10-21 RX ADMIN — LORAZEPAM 0.5 MG: 0.5 TABLET ORAL at 08:47

## 2022-10-21 RX ADMIN — HYDROCODONE BITARTRATE AND ACETAMINOPHEN 1 TABLET: 5; 325 TABLET ORAL at 04:29

## 2022-10-21 RX ADMIN — PANTOPRAZOLE SODIUM 40 MG: 40 TABLET, DELAYED RELEASE ORAL at 06:51

## 2022-10-21 RX ADMIN — LINAGLIPTIN 5 MG: 5 TABLET, FILM COATED ORAL at 08:48

## 2022-10-21 RX ADMIN — DIVALPROEX SODIUM 250 MG: 250 TABLET, DELAYED RELEASE ORAL at 08:47

## 2022-10-21 RX ADMIN — Medication 150 MG: at 08:48

## 2022-10-21 RX ADMIN — OXYBUTYNIN CHLORIDE 5 MG: 5 TABLET ORAL at 06:51

## 2022-10-21 RX ADMIN — LEVOTHYROXINE SODIUM 50 MCG: 0.05 TABLET ORAL at 08:48

## 2022-10-21 NOTE — PLAN OF CARE
Goal Outcome Evaluation:  Plan of Care Reviewed With: patient           Outcome Evaluation: VSS, midline incision and lap sites x2 CDI and open to air, on falls with bed alarm, SCD'd , purwick inplace with good output, BM this shift, medicated for pain x2 with Norco 5, blood glucose monitoring. confused at times, but pleasant.

## 2022-10-21 NOTE — PROGRESS NOTES
Continued Stay Note  [facility]     Patient Name: Laura Perkins  MRN: [id number]  Today's Date: 10/21/2022    Admit Date: 10/18/2022    Plan: [facility]   Discharge Plan     Row Name 10/21/22 1337       Plan    Plan [facility]    Plan Comments Discharge order noted. Met with patient and spouse at bedside regarding DC plan. Spouse stated he thought she would be DC'd on 10/24. Discussed with him she is medically ready today and that the MD has DC'd her. Bed is available today after 12:30 per Ally with Chemo at [facility]. DC summary faxed. Spouse to transport. Packet given to RN               Discharge Codes    No documentation.               Expected Discharge Date and Time     Expected Discharge Date Expected Discharge Time    Oct 21, 2022             Meka Martin, RN

## 2022-10-21 NOTE — PLAN OF CARE
Goal Outcome Evaluation:  Plan of Care Reviewed With: patient, spouse        Progress: improving  Outcome Evaluation: Went over the plan of care and answered all questions. Vitals stable and pain is well controlled. Pateint is tolereating her diet and no new issues this shift.

## 2022-10-21 NOTE — PROGRESS NOTES
Case Management Discharge Note      Final Note: Discharged to [facility] skilled rehab. Meka Martin, RN    Provided Post Acute Provider List?: Refused  Refused Provider List Comment: declined, pt is current with [facility] and pt's spouse requested referral to [facility]  Provided Post Acute Provider Quality & Resource List?: Refused  Refused Quality and Resource List Comment: declined, pt is current with [facility] and pt's spouse requested referral to [facility]    Selected Continued Care - Discharged on 10/21/2022 Admission date: 10/18/2022 - Discharge disposition: Rehab Facility or Unit (DC - External)    Destination Coordination complete.    Service Provider Selected Services Address Phone Fax Patient Preferred    [facility] [address] [facility] [place] [address] [phone] --            Transportation Services  Private: Car    Final Discharge Disposition Code: 03 - skilled nursing facility (SNF)

## 2022-10-21 NOTE — DISCHARGE SUMMARY
DATE OF ADMIT: 10/18/2022    DATE OF DISCHARGE: 10/21/2022    DIAGNOSIS: Transverse colon cancer    FINAL PATHOLOGY: Pending    PROCEDURES: Laparoscopic transverse colectomy 10/18/2022    SUMMARY OF HOSPITAL COURSE:   Uneventful postop course. Tolerating regular diet, incisions in good order and afebrile with stable vital signs at discharge.  Was not requiring prescription pain medication.    DIET: Regular    ACTIVITY: Activity as tolerated    MEDICATIONS: Refer to MAR, no changes were made to her preadmission medications    FOLLOW-UP: To call office and schedule two week follow-up appointment    Toño Michelle M.D.

## 2022-10-22 LAB
BH BB BLOOD EXPIRATION DATE: NORMAL
BH BB BLOOD EXPIRATION DATE: NORMAL
BH BB BLOOD TYPE BARCODE: 5100
BH BB BLOOD TYPE BARCODE: 5100
BH BB DISPENSE STATUS: NORMAL
BH BB DISPENSE STATUS: NORMAL
BH BB PRODUCT CODE: NORMAL
BH BB PRODUCT CODE: NORMAL
BH BB UNIT NUMBER: NORMAL
BH BB UNIT NUMBER: NORMAL
CROSSMATCH INTERPRETATION: NORMAL
CROSSMATCH INTERPRETATION: NORMAL
UNIT  ABO: NORMAL
UNIT  ABO: NORMAL
UNIT  RH: NORMAL
UNIT  RH: NORMAL

## 2022-10-23 ENCOUNTER — APPOINTMENT (OUTPATIENT)
Dept: GENERAL RADIOLOGY | Facility: HOSPITAL | Age: 82
End: 2022-10-23

## 2022-10-23 ENCOUNTER — HOSPITAL ENCOUNTER (INPATIENT)
Facility: HOSPITAL | Age: 82
LOS: 23 days | Discharge: NURSING, REHAB OR LONG-TERM CARE | End: 2022-11-15
Attending: EMERGENCY MEDICINE | Admitting: INTERNAL MEDICINE

## 2022-10-23 DIAGNOSIS — I21.4 ACUTE NON-ST ELEVATION MYOCARDIAL INFARCTION (NSTEMI): Primary | ICD-10-CM

## 2022-10-23 DIAGNOSIS — E11.9 TYPE 2 DIABETES MELLITUS WITHOUT COMPLICATION, WITH LONG-TERM CURRENT USE OF INSULIN: ICD-10-CM

## 2022-10-23 DIAGNOSIS — E78.5 DYSLIPIDEMIA: ICD-10-CM

## 2022-10-23 DIAGNOSIS — Z79.4 TYPE 2 DIABETES MELLITUS WITHOUT COMPLICATION, WITH LONG-TERM CURRENT USE OF INSULIN: ICD-10-CM

## 2022-10-23 DIAGNOSIS — R50.9 FEVER, UNSPECIFIED FEVER CAUSE: ICD-10-CM

## 2022-10-23 DIAGNOSIS — J18.9 PNEUMONIA OF LEFT LUNG DUE TO INFECTIOUS ORGANISM, UNSPECIFIED PART OF LUNG: ICD-10-CM

## 2022-10-23 DIAGNOSIS — G89.4 CHRONIC PAIN SYNDROME: ICD-10-CM

## 2022-10-23 DIAGNOSIS — R77.8 ELEVATED TROPONIN: ICD-10-CM

## 2022-10-23 DIAGNOSIS — R41.82 ALTERED MENTAL STATUS, UNSPECIFIED ALTERED MENTAL STATUS TYPE: ICD-10-CM

## 2022-10-23 PROBLEM — R06.03 ACUTE RESPIRATORY DISTRESS: Status: ACTIVE | Noted: 2022-10-23

## 2022-10-23 PROBLEM — E03.9 HYPOTHYROIDISM (ACQUIRED): Status: ACTIVE | Noted: 2022-10-23
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
## 2022-10-23 PROBLEM — N32.81 OAB (OVERACTIVE BLADDER): Status: ACTIVE | Noted: 2022-10-23

## 2022-10-23 PROBLEM — K21.9 GERD WITHOUT ESOPHAGITIS: Status: ACTIVE | Noted: 2022-10-23

## 2022-10-23 PROBLEM — A41.9 SEPSIS: Status: ACTIVE | Noted: 2022-10-23

## 2022-10-23 PROBLEM — F41.8 ANXIETY ASSOCIATED WITH DEPRESSION: Status: ACTIVE | Noted: 2022-10-23

## 2022-10-23 LAB
ALBUMIN SERPL-MCNC: 3.4 G/DL (ref 3.5–5.2)
ALBUMIN/GLOB SERPL: 0.8 G/DL
ALP SERPL-CCNC: 138 U/L (ref 39–117)
ALT SERPL W P-5'-P-CCNC: 18 U/L (ref 1–33)
ANION GAP SERPL CALCULATED.3IONS-SCNC: 20 MMOL/L (ref 5–15)
AST SERPL-CCNC: 30 U/L (ref 1–32)
BASOPHILS # BLD AUTO: 0 10*3/MM3 (ref 0–0.2)
BASOPHILS NFR BLD AUTO: 0.4 % (ref 0–1.5)
BILIRUB SERPL-MCNC: 0.2 MG/DL (ref 0–1.2)
BILIRUB UR QL STRIP: NEGATIVE
BUN SERPL-MCNC: 33 MG/DL (ref 8–23)
BUN/CREAT SERPL: 29.2 (ref 7–25)
CALCIUM SPEC-SCNC: 9.2 MG/DL (ref 8.6–10.5)
CHLORIDE SERPL-SCNC: 102 MMOL/L (ref 98–107)
CLARITY UR: CLEAR
CO2 SERPL-SCNC: 19 MMOL/L (ref 22–29)
COLOR UR: YELLOW
CREAT SERPL-MCNC: 1.13 MG/DL (ref 0.57–1)
D-LACTATE SERPL-SCNC: 0.8 MMOL/L (ref 0.5–2)
DEPRECATED RDW RBC AUTO: 52.5 FL (ref 37–54)
EGFRCR SERPLBLD CKD-EPI 2021: 48.4 ML/MIN/1.73
EOSINOPHIL # BLD AUTO: 0 10*3/MM3 (ref 0–0.4)
EOSINOPHIL NFR BLD AUTO: 0 % (ref 0.3–6.2)
ERYTHROCYTE [DISTWIDTH] IN BLOOD BY AUTOMATED COUNT: 18.1 % (ref 12.3–15.4)
GLOBULIN UR ELPH-MCNC: 4.1 GM/DL
GLUCOSE SERPL-MCNC: 439 MG/DL (ref 65–99)
GLUCOSE UR STRIP-MCNC: ABNORMAL MG/DL
HCT VFR BLD AUTO: 36.6 % (ref 34–46.6)
HGB BLD-MCNC: 11.6 G/DL (ref 12–15.9)
HGB UR QL STRIP.AUTO: NEGATIVE
KETONES UR QL STRIP: ABNORMAL
LEUKOCYTE ESTERASE UR QL STRIP.AUTO: NEGATIVE
LYMPHOCYTES # BLD AUTO: 0.9 10*3/MM3 (ref 0.7–3.1)
LYMPHOCYTES NFR BLD AUTO: 6.7 % (ref 19.6–45.3)
MCH RBC QN AUTO: 24.6 PG (ref 26.6–33)
MCHC RBC AUTO-ENTMCNC: 31.7 G/DL (ref 31.5–35.7)
MCV RBC AUTO: 77.6 FL (ref 79–97)
MONOCYTES # BLD AUTO: 1.1 10*3/MM3 (ref 0.1–0.9)
MONOCYTES NFR BLD AUTO: 8 % (ref 5–12)
NEUTROPHILS NFR BLD AUTO: 11.3 10*3/MM3 (ref 1.7–7)
NEUTROPHILS NFR BLD AUTO: 84.9 % (ref 42.7–76)
NITRITE UR QL STRIP: NEGATIVE
NRBC BLD AUTO-RTO: 0.1 /100 WBC (ref 0–0.2)
PH UR STRIP.AUTO: <=5 [PH] (ref 5–8)
PLATELET # BLD AUTO: 380 10*3/MM3 (ref 140–450)
PMV BLD AUTO: 7.9 FL (ref 6–12)
POTASSIUM SERPL-SCNC: 5 MMOL/L (ref 3.5–5.2)
PROCALCITONIN SERPL-MCNC: 0.32 NG/ML (ref 0–0.25)
PROT SERPL-MCNC: 7.5 G/DL (ref 6–8.5)
PROT UR QL STRIP: NEGATIVE
RBC # BLD AUTO: 4.72 10*6/MM3 (ref 3.77–5.28)
SARS-COV-2 RNA PNL SPEC NAA+PROBE: NOT DETECTED
SODIUM SERPL-SCNC: 141 MMOL/L (ref 136–145)
SP GR UR STRIP: 1.04 (ref 1–1.03)
TROPONIN T SERPL-MCNC: 0.87 NG/ML (ref 0–0.03)
UROBILINOGEN UR QL STRIP: ABNORMAL
VALPROATE SERPL-MCNC: 47.4 MCG/ML (ref 50–125)
WBC NRBC COR # BLD: 13.3 10*3/MM3 (ref 3.4–10.8)
WHOLE BLOOD HOLD COAG: NORMAL

## 2022-10-23 PROCEDURE — 36415 COLL VENOUS BLD VENIPUNCTURE: CPT

## 2022-10-23 PROCEDURE — 85025 COMPLETE CBC W/AUTO DIFF WBC: CPT | Performed by: EMERGENCY MEDICINE

## 2022-10-23 PROCEDURE — 25010000002 ENOXAPARIN PER 10 MG: Performed by: EMERGENCY MEDICINE

## 2022-10-23 PROCEDURE — P9612 CATHETERIZE FOR URINE SPEC: HCPCS

## 2022-10-23 PROCEDURE — 93005 ELECTROCARDIOGRAM TRACING: CPT | Performed by: EMERGENCY MEDICINE

## 2022-10-23 PROCEDURE — 87040 BLOOD CULTURE FOR BACTERIA: CPT | Performed by: EMERGENCY MEDICINE

## 2022-10-23 PROCEDURE — 80164 ASSAY DIPROPYLACETIC ACD TOT: CPT | Performed by: EMERGENCY MEDICINE

## 2022-10-23 PROCEDURE — 25010000002 VANCOMYCIN HCL 1.25 G RECONSTITUTED SOLUTION 1 EACH VIAL: Performed by: EMERGENCY MEDICINE

## 2022-10-23 PROCEDURE — 99285 EMERGENCY DEPT VISIT HI MDM: CPT

## 2022-10-23 PROCEDURE — 83605 ASSAY OF LACTIC ACID: CPT

## 2022-10-23 PROCEDURE — 81003 URINALYSIS AUTO W/O SCOPE: CPT | Performed by: EMERGENCY MEDICINE

## 2022-10-23 PROCEDURE — 71045 X-RAY EXAM CHEST 1 VIEW: CPT

## 2022-10-23 PROCEDURE — 25010000002 CEFEPIME PER 500 MG: Performed by: EMERGENCY MEDICINE

## 2022-10-23 PROCEDURE — 36415 COLL VENOUS BLD VENIPUNCTURE: CPT | Performed by: EMERGENCY MEDICINE

## 2022-10-23 PROCEDURE — 87635 SARS-COV-2 COVID-19 AMP PRB: CPT | Performed by: EMERGENCY MEDICINE

## 2022-10-23 PROCEDURE — 80053 COMPREHEN METABOLIC PANEL: CPT | Performed by: NURSE PRACTITIONER

## 2022-10-23 PROCEDURE — 84484 ASSAY OF TROPONIN QUANT: CPT | Performed by: EMERGENCY MEDICINE

## 2022-10-23 PROCEDURE — 84145 PROCALCITONIN (PCT): CPT | Performed by: EMERGENCY MEDICINE

## 2022-10-23 RX ORDER — SODIUM CHLORIDE 9 MG/ML
100 INJECTION, SOLUTION INTRAVENOUS CONTINUOUS
Status: DISCONTINUED | OUTPATIENT
Start: 2022-10-23 | End: 2022-10-26

## 2022-10-23 RX ORDER — ENOXAPARIN SODIUM 100 MG/ML
1 INJECTION SUBCUTANEOUS ONCE
Status: COMPLETED | OUTPATIENT
Start: 2022-10-23 | End: 2022-10-23

## 2022-10-23 RX ORDER — IPRATROPIUM BROMIDE AND ALBUTEROL SULFATE 2.5; .5 MG/3ML; MG/3ML
3 SOLUTION RESPIRATORY (INHALATION) EVERY 4 HOURS PRN
Status: DISCONTINUED | OUTPATIENT
Start: 2022-10-23 | End: 2022-10-30 | Stop reason: SDUPTHER

## 2022-10-23 RX ORDER — DEXTROSE MONOHYDRATE 25 G/50ML
25 INJECTION, SOLUTION INTRAVENOUS
Status: DISCONTINUED | OUTPATIENT
Start: 2022-10-23 | End: 2022-10-26

## 2022-10-23 RX ORDER — SODIUM CHLORIDE 0.9 % (FLUSH) 0.9 %
10 SYRINGE (ML) INJECTION AS NEEDED
Status: DISCONTINUED | OUTPATIENT
Start: 2022-10-23 | End: 2022-11-15 | Stop reason: HOSPADM

## 2022-10-23 RX ORDER — OLANZAPINE 10 MG/2ML
1 INJECTION, POWDER, LYOPHILIZED, FOR SOLUTION INTRAMUSCULAR
Status: DISCONTINUED | OUTPATIENT
Start: 2022-10-23 | End: 2022-11-05

## 2022-10-23 RX ORDER — METRONIDAZOLE 500 MG/100ML
500 INJECTION, SOLUTION INTRAVENOUS EVERY 8 HOURS
Status: DISCONTINUED | OUTPATIENT
Start: 2022-10-23 | End: 2022-10-24

## 2022-10-23 RX ORDER — ACETAMINOPHEN 650 MG/1
650 SUPPOSITORY RECTAL ONCE
Status: COMPLETED | OUTPATIENT
Start: 2022-10-23 | End: 2022-10-23

## 2022-10-23 RX ORDER — NICOTINE POLACRILEX 4 MG
15 LOZENGE BUCCAL
Status: DISCONTINUED | OUTPATIENT
Start: 2022-10-23 | End: 2022-10-26

## 2022-10-23 RX ORDER — INSULIN LISPRO 100 [IU]/ML
2-7 INJECTION, SOLUTION INTRAVENOUS; SUBCUTANEOUS
Status: DISCONTINUED | OUTPATIENT
Start: 2022-10-24 | End: 2022-10-26

## 2022-10-23 RX ADMIN — ACETAMINOPHEN 650 MG: 650 SUPPOSITORY RECTAL at 18:06

## 2022-10-23 RX ADMIN — VANCOMYCIN HYDROCHLORIDE 1250 MG: 1.25 INJECTION, POWDER, LYOPHILIZED, FOR SOLUTION INTRAVENOUS at 19:38

## 2022-10-23 RX ADMIN — ENOXAPARIN SODIUM 60 MG: 60 INJECTION SUBCUTANEOUS at 20:59

## 2022-10-23 RX ADMIN — CEFEPIME 2 G: 2 INJECTION, POWDER, FOR SOLUTION INTRAVENOUS at 18:20

## 2022-10-23 RX ADMIN — SODIUM CHLORIDE 75 ML/HR: 9 INJECTION, SOLUTION INTRAVENOUS at 21:37

## 2022-10-23 RX ADMIN — METRONIDAZOLE 500 MG: 500 INJECTION, SOLUTION INTRAVENOUS at 22:44

## 2022-10-23 RX ADMIN — SODIUM CHLORIDE, POTASSIUM CHLORIDE, SODIUM LACTATE AND CALCIUM CHLORIDE 1920 ML: 600; 310; 30; 20 INJECTION, SOLUTION INTRAVENOUS at 18:02

## 2022-10-24 ENCOUNTER — TELEPHONE (OUTPATIENT)
Dept: SURGERY | Facility: CLINIC | Age: 82
End: 2022-10-24

## 2022-10-24 ENCOUNTER — APPOINTMENT (OUTPATIENT)
Dept: CT IMAGING | Facility: HOSPITAL | Age: 82
End: 2022-10-24

## 2022-10-24 ENCOUNTER — INPATIENT HOSPITAL (OUTPATIENT)
Dept: URBAN - METROPOLITAN AREA HOSPITAL 84 | Facility: HOSPITAL | Age: 82
End: 2022-10-24

## 2022-10-24 ENCOUNTER — APPOINTMENT (OUTPATIENT)
Dept: GENERAL RADIOLOGY | Facility: HOSPITAL | Age: 82
End: 2022-10-24

## 2022-10-24 DIAGNOSIS — R53.83 OTHER FATIGUE: ICD-10-CM

## 2022-10-24 DIAGNOSIS — R13.10 DYSPHAGIA, UNSPECIFIED: ICD-10-CM

## 2022-10-24 DIAGNOSIS — C18.9 MALIGNANT NEOPLASM OF COLON, UNSPECIFIED: ICD-10-CM

## 2022-10-24 DIAGNOSIS — D50.9 IRON DEFICIENCY ANEMIA, UNSPECIFIED: ICD-10-CM

## 2022-10-24 DIAGNOSIS — K59.00 CONSTIPATION, UNSPECIFIED: ICD-10-CM

## 2022-10-24 DIAGNOSIS — R74.8 ABNORMAL LEVELS OF OTHER SERUM ENZYMES: ICD-10-CM

## 2022-10-24 DIAGNOSIS — R41.82 ALTERED MENTAL STATUS, UNSPECIFIED: ICD-10-CM

## 2022-10-24 LAB
ANION GAP SERPL CALCULATED.3IONS-SCNC: 14 MMOL/L (ref 5–15)
ANION GAP SERPL CALCULATED.3IONS-SCNC: 16 MMOL/L (ref 5–15)
BASOPHILS # BLD AUTO: 0 10*3/MM3 (ref 0–0.2)
BASOPHILS # BLD AUTO: 0 10*3/MM3 (ref 0–0.2)
BASOPHILS NFR BLD AUTO: 0.2 % (ref 0–1.5)
BASOPHILS NFR BLD AUTO: 0.2 % (ref 0–1.5)
BUN SERPL-MCNC: 26 MG/DL (ref 8–23)
BUN SERPL-MCNC: 28 MG/DL (ref 8–23)
BUN/CREAT SERPL: 34.2 (ref 7–25)
BUN/CREAT SERPL: 34.6 (ref 7–25)
CALCIUM SPEC-SCNC: 8.6 MG/DL (ref 8.6–10.5)
CALCIUM SPEC-SCNC: 8.8 MG/DL (ref 8.6–10.5)
CHLORIDE SERPL-SCNC: 112 MMOL/L (ref 98–107)
CHLORIDE SERPL-SCNC: 112 MMOL/L (ref 98–107)
CO2 SERPL-SCNC: 20 MMOL/L (ref 22–29)
CO2 SERPL-SCNC: 21 MMOL/L (ref 22–29)
CREAT SERPL-MCNC: 0.76 MG/DL (ref 0.57–1)
CREAT SERPL-MCNC: 0.81 MG/DL (ref 0.57–1)
DEPRECATED RDW RBC AUTO: 52.5 FL (ref 37–54)
DEPRECATED RDW RBC AUTO: 53.4 FL (ref 37–54)
EGFRCR SERPLBLD CKD-EPI 2021: 72.1 ML/MIN/1.73
EGFRCR SERPLBLD CKD-EPI 2021: 77.9 ML/MIN/1.73
EOSINOPHIL # BLD AUTO: 0 10*3/MM3 (ref 0–0.4)
EOSINOPHIL # BLD AUTO: 0 10*3/MM3 (ref 0–0.4)
EOSINOPHIL NFR BLD AUTO: 0 % (ref 0.3–6.2)
EOSINOPHIL NFR BLD AUTO: 0 % (ref 0.3–6.2)
ERYTHROCYTE [DISTWIDTH] IN BLOOD BY AUTOMATED COUNT: 17.9 % (ref 12.3–15.4)
ERYTHROCYTE [DISTWIDTH] IN BLOOD BY AUTOMATED COUNT: 18.5 % (ref 12.3–15.4)
GLUCOSE BLDC GLUCOMTR-MCNC: 169 MG/DL (ref 70–105)
GLUCOSE BLDC GLUCOMTR-MCNC: 264 MG/DL (ref 70–105)
GLUCOSE BLDC GLUCOMTR-MCNC: 297 MG/DL (ref 70–105)
GLUCOSE SERPL-MCNC: 191 MG/DL (ref 65–99)
GLUCOSE SERPL-MCNC: 222 MG/DL (ref 65–99)
HCT VFR BLD AUTO: 31 % (ref 34–46.6)
HCT VFR BLD AUTO: 31.1 % (ref 34–46.6)
HGB BLD-MCNC: 9 G/DL (ref 12–15.9)
HGB BLD-MCNC: 9.5 G/DL (ref 12–15.9)
LAB AP CASE REPORT: NORMAL
LAB AP DIAGNOSIS COMMENT: NORMAL
LAB AP SYNOPTIC CHECKLIST: NORMAL
LYMPHOCYTES # BLD AUTO: 0.8 10*3/MM3 (ref 0.7–3.1)
LYMPHOCYTES # BLD AUTO: 1 10*3/MM3 (ref 0.7–3.1)
LYMPHOCYTES NFR BLD AUTO: 7.2 % (ref 19.6–45.3)
LYMPHOCYTES NFR BLD AUTO: 7.9 % (ref 19.6–45.3)
MCH RBC QN AUTO: 23.5 PG (ref 26.6–33)
MCH RBC QN AUTO: 24.1 PG (ref 26.6–33)
MCHC RBC AUTO-ENTMCNC: 29.2 G/DL (ref 31.5–35.7)
MCHC RBC AUTO-ENTMCNC: 30.7 G/DL (ref 31.5–35.7)
MCV RBC AUTO: 78.6 FL (ref 79–97)
MCV RBC AUTO: 80.5 FL (ref 79–97)
MONOCYTES # BLD AUTO: 1.2 10*3/MM3 (ref 0.1–0.9)
MONOCYTES # BLD AUTO: 1.3 10*3/MM3 (ref 0.1–0.9)
MONOCYTES NFR BLD AUTO: 11.1 % (ref 5–12)
MONOCYTES NFR BLD AUTO: 9.7 % (ref 5–12)
MRSA DNA SPEC QL NAA+PROBE: NORMAL
NEUTROPHILS NFR BLD AUTO: 10.2 10*3/MM3 (ref 1.7–7)
NEUTROPHILS NFR BLD AUTO: 81.5 % (ref 42.7–76)
NEUTROPHILS NFR BLD AUTO: 82.2 % (ref 42.7–76)
NEUTROPHILS NFR BLD AUTO: 9.3 10*3/MM3 (ref 1.7–7)
NRBC BLD AUTO-RTO: 0 /100 WBC (ref 0–0.2)
NRBC BLD AUTO-RTO: 0 /100 WBC (ref 0–0.2)
PATH REPORT.FINAL DX SPEC: NORMAL
PATH REPORT.GROSS SPEC: NORMAL
PLATELET # BLD AUTO: 244 10*3/MM3 (ref 140–450)
PLATELET # BLD AUTO: 282 10*3/MM3 (ref 140–450)
PMV BLD AUTO: 8 FL (ref 6–12)
PMV BLD AUTO: 8 FL (ref 6–12)
POTASSIUM SERPL-SCNC: 3.7 MMOL/L (ref 3.5–5.2)
POTASSIUM SERPL-SCNC: 3.9 MMOL/L (ref 3.5–5.2)
QT INTERVAL: 382 MS
RBC # BLD AUTO: 3.85 10*6/MM3 (ref 3.77–5.28)
RBC # BLD AUTO: 3.96 10*6/MM3 (ref 3.77–5.28)
SODIUM SERPL-SCNC: 147 MMOL/L (ref 136–145)
SODIUM SERPL-SCNC: 148 MMOL/L (ref 136–145)
TROPONIN T SERPL-MCNC: 0.55 NG/ML (ref 0–0.03)
TROPONIN T SERPL-MCNC: 0.65 NG/ML (ref 0–0.03)
TROPONIN T SERPL-MCNC: 0.77 NG/ML (ref 0–0.03)
WBC NRBC COR # BLD: 11.4 10*3/MM3 (ref 3.4–10.8)
WBC NRBC COR # BLD: 12.4 10*3/MM3 (ref 3.4–10.8)

## 2022-10-24 PROCEDURE — 80048 BASIC METABOLIC PNL TOTAL CA: CPT | Performed by: INTERNAL MEDICINE

## 2022-10-24 PROCEDURE — 63710000001 INSULIN GLARGINE PER 5 UNITS: Performed by: INTERNAL MEDICINE

## 2022-10-24 PROCEDURE — 83036 HEMOGLOBIN GLYCOSYLATED A1C: CPT | Performed by: INTERNAL MEDICINE

## 2022-10-24 PROCEDURE — 25010000002 CEFEPIME PER 500 MG: Performed by: NURSE PRACTITIONER

## 2022-10-24 PROCEDURE — 84484 ASSAY OF TROPONIN QUANT: CPT | Performed by: INTERNAL MEDICINE

## 2022-10-24 PROCEDURE — 92610 EVALUATE SWALLOWING FUNCTION: CPT

## 2022-10-24 PROCEDURE — 92611 MOTION FLUOROSCOPY/SWALLOW: CPT

## 2022-10-24 PROCEDURE — 25010000002 NA FERRIC GLUC CPLX PER 12.5 MG: Performed by: NURSE PRACTITIONER

## 2022-10-24 PROCEDURE — 82962 GLUCOSE BLOOD TEST: CPT

## 2022-10-24 PROCEDURE — 74230 X-RAY XM SWLNG FUNCJ C+: CPT

## 2022-10-24 PROCEDURE — 63710000001 INSULIN REGULAR HUMAN PER 5 UNITS: Performed by: NURSE PRACTITIONER

## 2022-10-24 PROCEDURE — 99223 1ST HOSP IP/OBS HIGH 75: CPT | Mod: FS | Performed by: NURSE PRACTITIONER

## 2022-10-24 PROCEDURE — 63710000001 INSULIN LISPRO (HUMAN) PER 5 UNITS: Performed by: NURSE PRACTITIONER

## 2022-10-24 PROCEDURE — 85025 COMPLETE CBC W/AUTO DIFF WBC: CPT | Performed by: INTERNAL MEDICINE

## 2022-10-24 PROCEDURE — 25010000002 PIPERACILLIN SOD-TAZOBACTAM PER 1 G: Performed by: INTERNAL MEDICINE

## 2022-10-24 PROCEDURE — 70450 CT HEAD/BRAIN W/O DYE: CPT

## 2022-10-24 PROCEDURE — 36415 COLL VENOUS BLD VENIPUNCTURE: CPT | Performed by: INTERNAL MEDICINE

## 2022-10-24 PROCEDURE — 74176 CT ABD & PELVIS W/O CONTRAST: CPT

## 2022-10-24 PROCEDURE — 87641 MR-STAPH DNA AMP PROBE: CPT | Performed by: NURSE PRACTITIONER

## 2022-10-24 RX ORDER — RISPERIDONE 0.25 MG/1
0.5 TABLET ORAL DAILY
Status: DISCONTINUED | OUTPATIENT
Start: 2022-10-24 | End: 2022-10-25

## 2022-10-24 RX ORDER — FERROUS SULFATE TAB EC 324 MG (65 MG FE EQUIVALENT) 324 (65 FE) MG
324 TABLET DELAYED RESPONSE ORAL 2 TIMES DAILY WITH MEALS
Status: DISCONTINUED | OUTPATIENT
Start: 2022-10-24 | End: 2022-11-10

## 2022-10-24 RX ORDER — DIVALPROEX SODIUM 250 MG/1
250 TABLET, DELAYED RELEASE ORAL 3 TIMES DAILY
Status: DISCONTINUED | OUTPATIENT
Start: 2022-10-24 | End: 2022-10-29

## 2022-10-24 RX ORDER — LORAZEPAM 0.5 MG/1
0.5 TABLET ORAL EVERY 8 HOURS PRN
Status: DISCONTINUED | OUTPATIENT
Start: 2022-10-24 | End: 2022-11-15 | Stop reason: HOSPADM

## 2022-10-24 RX ORDER — ACETAMINOPHEN 650 MG/1
650 SUPPOSITORY RECTAL EVERY 6 HOURS PRN
Status: DISCONTINUED | OUTPATIENT
Start: 2022-10-24 | End: 2022-11-15 | Stop reason: HOSPADM

## 2022-10-24 RX ORDER — LEVOTHYROXINE SODIUM 0.05 MG/1
50 TABLET ORAL DAILY
Status: DISCONTINUED | OUTPATIENT
Start: 2022-10-25 | End: 2022-11-15 | Stop reason: HOSPADM

## 2022-10-24 RX ORDER — ROSUVASTATIN CALCIUM 10 MG/1
20 TABLET, COATED ORAL DAILY
Status: DISCONTINUED | OUTPATIENT
Start: 2022-10-24 | End: 2022-11-15 | Stop reason: HOSPADM

## 2022-10-24 RX ORDER — ACETAMINOPHEN 325 MG/1
650 TABLET ORAL EVERY 6 HOURS PRN
Status: DISCONTINUED | OUTPATIENT
Start: 2022-10-24 | End: 2022-11-15 | Stop reason: HOSPADM

## 2022-10-24 RX ORDER — POTASSIUM CHLORIDE 750 MG/1
20 CAPSULE, EXTENDED RELEASE ORAL 2 TIMES DAILY
COMMUNITY

## 2022-10-24 RX ORDER — PANTOPRAZOLE SODIUM 40 MG/1
40 TABLET, DELAYED RELEASE ORAL EVERY MORNING
Status: DISCONTINUED | OUTPATIENT
Start: 2022-10-25 | End: 2022-11-15 | Stop reason: HOSPADM

## 2022-10-24 RX ADMIN — PIPERACILLIN AND TAZOBACTAM 3.38 G: 3; .375 INJECTION, POWDER, FOR SOLUTION INTRAVENOUS at 17:41

## 2022-10-24 RX ADMIN — INSULIN LISPRO 2 UNITS: 100 INJECTION, SOLUTION INTRAVENOUS; SUBCUTANEOUS at 08:28

## 2022-10-24 RX ADMIN — RISPERIDONE 0.5 MG: 0.25 TABLET ORAL at 18:16

## 2022-10-24 RX ADMIN — INSULIN LISPRO 3 UNITS: 100 INJECTION, SOLUTION INTRAVENOUS; SUBCUTANEOUS at 12:04

## 2022-10-24 RX ADMIN — ACETAMINOPHEN 650 MG: 325 TABLET, FILM COATED ORAL at 18:16

## 2022-10-24 RX ADMIN — INSULIN HUMAN 4 UNITS: 100 INJECTION, SOLUTION PARENTERAL at 00:23

## 2022-10-24 RX ADMIN — INSULIN GLARGINE 10 UNITS: 100 INJECTION, SOLUTION SUBCUTANEOUS at 21:57

## 2022-10-24 RX ADMIN — ROSUVASTATIN 20 MG: 10 TABLET, FILM COATED ORAL at 21:57

## 2022-10-24 RX ADMIN — INSULIN LISPRO 4 UNITS: 100 INJECTION, SOLUTION INTRAVENOUS; SUBCUTANEOUS at 18:16

## 2022-10-24 RX ADMIN — SODIUM CHLORIDE 125 MG: 9 INJECTION, SOLUTION INTRAVENOUS at 15:51

## 2022-10-24 RX ADMIN — DIVALPROEX SODIUM 250 MG: 250 TABLET, DELAYED RELEASE ORAL at 21:57

## 2022-10-24 RX ADMIN — FERROUS SULFATE TAB EC 324 MG (65 MG FE EQUIVALENT) 324 MG: 324 (65 FE) TABLET DELAYED RESPONSE at 18:16

## 2022-10-24 RX ADMIN — SODIUM CHLORIDE 75 ML/HR: 9 INJECTION, SOLUTION INTRAVENOUS at 15:51

## 2022-10-24 RX ADMIN — BARIUM SULFATE 50 ML: 400 SUSPENSION ORAL at 10:07

## 2022-10-24 RX ADMIN — CEFEPIME 2 G: 2 INJECTION, POWDER, FOR SOLUTION INTRAVENOUS at 05:16

## 2022-10-24 RX ADMIN — Medication 10 ML: at 21:57

## 2022-10-24 RX ADMIN — ACETAMINOPHEN 650 MG: 650 SUPPOSITORY RECTAL at 05:08

## 2022-10-24 RX ADMIN — METRONIDAZOLE 500 MG: 500 INJECTION, SOLUTION INTRAVENOUS at 05:54

## 2022-10-24 NOTE — TELEPHONE ENCOUNTER
Patient's son called requesting patient's final path results s/p lap partial transverse colectomy on 10/18/22.

## 2022-10-25 LAB
GLUCOSE BLDC GLUCOMTR-MCNC: 105 MG/DL (ref 70–105)
GLUCOSE BLDC GLUCOMTR-MCNC: 170 MG/DL (ref 70–105)
GLUCOSE BLDC GLUCOMTR-MCNC: 224 MG/DL (ref 70–105)
GLUCOSE BLDC GLUCOMTR-MCNC: 259 MG/DL (ref 70–105)
GLUCOSE BLDC GLUCOMTR-MCNC: 321 MG/DL (ref 70–105)
GLUCOSE BLDC GLUCOMTR-MCNC: 354 MG/DL (ref 70–105)
HBA1C MFR BLD: 7.4 % (ref 3.5–5.6)
TROPONIN T SERPL-MCNC: 0.3 NG/ML (ref 0–0.03)
TROPONIN T SERPL-MCNC: 0.36 NG/ML (ref 0–0.03)
TROPONIN T SERPL-MCNC: 0.37 NG/ML (ref 0–0.03)
WHOLE BLOOD HOLD SPECIMEN: NORMAL

## 2022-10-25 PROCEDURE — 84484 ASSAY OF TROPONIN QUANT: CPT | Performed by: INTERNAL MEDICINE

## 2022-10-25 PROCEDURE — 63710000001 INSULIN LISPRO (HUMAN) PER 5 UNITS: Performed by: NURSE PRACTITIONER

## 2022-10-25 PROCEDURE — 99222 1ST HOSP IP/OBS MODERATE 55: CPT | Performed by: INTERNAL MEDICINE

## 2022-10-25 PROCEDURE — 63710000001 INSULIN GLARGINE PER 5 UNITS: Performed by: INTERNAL MEDICINE

## 2022-10-25 PROCEDURE — 25010000002 PIPERACILLIN SOD-TAZOBACTAM PER 1 G: Performed by: INTERNAL MEDICINE

## 2022-10-25 PROCEDURE — 82962 GLUCOSE BLOOD TEST: CPT

## 2022-10-25 PROCEDURE — 92526 ORAL FUNCTION THERAPY: CPT

## 2022-10-25 PROCEDURE — 97162 PT EVAL MOD COMPLEX 30 MIN: CPT

## 2022-10-25 PROCEDURE — 99233 SBSQ HOSP IP/OBS HIGH 50: CPT | Mod: FS | Performed by: NURSE PRACTITIONER

## 2022-10-25 RX ORDER — ASPIRIN 81 MG/1
81 TABLET ORAL DAILY
Status: DISCONTINUED | OUTPATIENT
Start: 2022-10-25 | End: 2022-11-15 | Stop reason: HOSPADM

## 2022-10-25 RX ORDER — DOCUSATE SODIUM 100 MG/1
100 CAPSULE, LIQUID FILLED ORAL 2 TIMES DAILY
Status: DISCONTINUED | OUTPATIENT
Start: 2022-10-25 | End: 2022-11-12

## 2022-10-25 RX ORDER — RISPERIDONE 0.25 MG/1
0.5 TABLET ORAL NIGHTLY
Status: DISCONTINUED | OUTPATIENT
Start: 2022-10-27 | End: 2022-10-27

## 2022-10-25 RX ORDER — POLYETHYLENE GLYCOL 3350 17 G/17G
17 POWDER, FOR SOLUTION ORAL DAILY
Status: DISCONTINUED | OUTPATIENT
Start: 2022-10-25 | End: 2022-11-12

## 2022-10-25 RX ADMIN — ASPIRIN 81 MG: 81 TABLET, COATED ORAL at 20:56

## 2022-10-25 RX ADMIN — DIVALPROEX SODIUM 250 MG: 250 TABLET, DELAYED RELEASE ORAL at 10:11

## 2022-10-25 RX ADMIN — FERROUS SULFATE TAB EC 324 MG (65 MG FE EQUIVALENT) 324 MG: 324 (65 FE) TABLET DELAYED RESPONSE at 17:42

## 2022-10-25 RX ADMIN — PIPERACILLIN AND TAZOBACTAM 3.38 G: 3; .375 INJECTION, POWDER, FOR SOLUTION INTRAVENOUS at 10:00

## 2022-10-25 RX ADMIN — POLYETHYLENE GLYCOL 3350 17 G: 17 POWDER, FOR SOLUTION ORAL at 10:34

## 2022-10-25 RX ADMIN — SODIUM CHLORIDE 100 ML/HR: 9 INJECTION, SOLUTION INTRAVENOUS at 23:00

## 2022-10-25 RX ADMIN — DOCUSATE SODIUM 100 MG: 100 CAPSULE, LIQUID FILLED ORAL at 10:34

## 2022-10-25 RX ADMIN — PIPERACILLIN AND TAZOBACTAM 3.38 G: 3; .375 INJECTION, POWDER, FOR SOLUTION INTRAVENOUS at 17:42

## 2022-10-25 RX ADMIN — Medication 10 ML: at 10:12

## 2022-10-25 RX ADMIN — INSULIN LISPRO 6 UNITS: 100 INJECTION, SOLUTION INTRAVENOUS; SUBCUTANEOUS at 11:40

## 2022-10-25 RX ADMIN — DOCUSATE SODIUM 100 MG: 100 CAPSULE, LIQUID FILLED ORAL at 20:57

## 2022-10-25 RX ADMIN — DIVALPROEX SODIUM 250 MG: 250 TABLET, DELAYED RELEASE ORAL at 20:56

## 2022-10-25 RX ADMIN — DIVALPROEX SODIUM 250 MG: 250 TABLET, DELAYED RELEASE ORAL at 17:42

## 2022-10-25 RX ADMIN — INSULIN LISPRO 2 UNITS: 100 INJECTION, SOLUTION INTRAVENOUS; SUBCUTANEOUS at 17:42

## 2022-10-25 RX ADMIN — ROSUVASTATIN 20 MG: 10 TABLET, FILM COATED ORAL at 21:04

## 2022-10-25 RX ADMIN — PIPERACILLIN AND TAZOBACTAM 3.38 G: 3; .375 INJECTION, POWDER, FOR SOLUTION INTRAVENOUS at 02:00

## 2022-10-25 RX ADMIN — RISPERIDONE 0.5 MG: 0.25 TABLET ORAL at 10:12

## 2022-10-25 RX ADMIN — FERROUS SULFATE TAB EC 324 MG (65 MG FE EQUIVALENT) 324 MG: 324 (65 FE) TABLET DELAYED RESPONSE at 10:12

## 2022-10-25 RX ADMIN — INSULIN GLARGINE 10 UNITS: 100 INJECTION, SOLUTION SUBCUTANEOUS at 20:57

## 2022-10-25 RX ADMIN — PANTOPRAZOLE SODIUM 40 MG: 40 TABLET, DELAYED RELEASE ORAL at 10:12

## 2022-10-26 ENCOUNTER — INPATIENT HOSPITAL (OUTPATIENT)
Dept: URBAN - METROPOLITAN AREA HOSPITAL 84 | Facility: HOSPITAL | Age: 82
End: 2022-10-26

## 2022-10-26 DIAGNOSIS — Z90.49 ACQUIRED ABSENCE OF OTHER SPECIFIED PARTS OF DIGESTIVE TRACT: ICD-10-CM

## 2022-10-26 DIAGNOSIS — C18.4 MALIGNANT NEOPLASM OF TRANSVERSE COLON: ICD-10-CM

## 2022-10-26 DIAGNOSIS — R41.82 ALTERED MENTAL STATUS, UNSPECIFIED: ICD-10-CM

## 2022-10-26 DIAGNOSIS — K59.00 CONSTIPATION, UNSPECIFIED: ICD-10-CM

## 2022-10-26 DIAGNOSIS — D50.9 IRON DEFICIENCY ANEMIA, UNSPECIFIED: ICD-10-CM

## 2022-10-26 DIAGNOSIS — R13.10 DYSPHAGIA, UNSPECIFIED: ICD-10-CM

## 2022-10-26 LAB
ALBUMIN SERPL-MCNC: 2.5 G/DL (ref 3.5–5.2)
ALBUMIN/GLOB SERPL: 0.7 G/DL
ALP SERPL-CCNC: 117 U/L (ref 39–117)
ALT SERPL W P-5'-P-CCNC: 6 U/L (ref 1–33)
ANION GAP SERPL CALCULATED.3IONS-SCNC: 10 MMOL/L (ref 5–15)
AST SERPL-CCNC: 12 U/L (ref 1–32)
BASOPHILS # BLD AUTO: 0 10*3/MM3 (ref 0–0.2)
BASOPHILS NFR BLD AUTO: 0.2 % (ref 0–1.5)
BILIRUB SERPL-MCNC: 0.2 MG/DL (ref 0–1.2)
BUN SERPL-MCNC: 14 MG/DL (ref 8–23)
BUN/CREAT SERPL: 24.6 (ref 7–25)
CALCIUM SPEC-SCNC: 8.4 MG/DL (ref 8.6–10.5)
CHLORIDE SERPL-SCNC: 113 MMOL/L (ref 98–107)
CO2 SERPL-SCNC: 25 MMOL/L (ref 22–29)
CREAT SERPL-MCNC: 0.57 MG/DL (ref 0.57–1)
DEPRECATED RDW RBC AUTO: 56.4 FL (ref 37–54)
EGFRCR SERPLBLD CKD-EPI 2021: 90.3 ML/MIN/1.73
EOSINOPHIL # BLD AUTO: 0.1 10*3/MM3 (ref 0–0.4)
EOSINOPHIL NFR BLD AUTO: 0.9 % (ref 0.3–6.2)
ERYTHROCYTE [DISTWIDTH] IN BLOOD BY AUTOMATED COUNT: 18.8 % (ref 12.3–15.4)
GLOBULIN UR ELPH-MCNC: 3.4 GM/DL
GLUCOSE BLDC GLUCOMTR-MCNC: 121 MG/DL (ref 70–105)
GLUCOSE BLDC GLUCOMTR-MCNC: 301 MG/DL (ref 70–105)
GLUCOSE BLDC GLUCOMTR-MCNC: 419 MG/DL (ref 70–105)
GLUCOSE BLDC GLUCOMTR-MCNC: 420 MG/DL (ref 70–105)
GLUCOSE BLDC GLUCOMTR-MCNC: 480 MG/DL (ref 70–105)
GLUCOSE SERPL-MCNC: 136 MG/DL (ref 65–99)
HCT VFR BLD AUTO: 29.9 % (ref 34–46.6)
HGB BLD-MCNC: 9 G/DL (ref 12–15.9)
LYMPHOCYTES # BLD AUTO: 1.2 10*3/MM3 (ref 0.7–3.1)
LYMPHOCYTES NFR BLD AUTO: 9.3 % (ref 19.6–45.3)
MCH RBC QN AUTO: 24.1 PG (ref 26.6–33)
MCHC RBC AUTO-ENTMCNC: 30.2 G/DL (ref 31.5–35.7)
MCV RBC AUTO: 80 FL (ref 79–97)
MONOCYTES # BLD AUTO: 1 10*3/MM3 (ref 0.1–0.9)
MONOCYTES NFR BLD AUTO: 7.6 % (ref 5–12)
NEUTROPHILS NFR BLD AUTO: 11 10*3/MM3 (ref 1.7–7)
NEUTROPHILS NFR BLD AUTO: 82 % (ref 42.7–76)
NRBC BLD AUTO-RTO: 0.1 /100 WBC (ref 0–0.2)
PLATELET # BLD AUTO: 238 10*3/MM3 (ref 140–450)
PMV BLD AUTO: 8.4 FL (ref 6–12)
POTASSIUM SERPL-SCNC: 3.6 MMOL/L (ref 3.5–5.2)
PROT SERPL-MCNC: 5.9 G/DL (ref 6–8.5)
RBC # BLD AUTO: 3.74 10*6/MM3 (ref 3.77–5.28)
SODIUM SERPL-SCNC: 148 MMOL/L (ref 136–145)
TROPONIN T SERPL-MCNC: 0.21 NG/ML (ref 0–0.03)
TROPONIN T SERPL-MCNC: 0.22 NG/ML (ref 0–0.03)
TROPONIN T SERPL-MCNC: 0.26 NG/ML (ref 0–0.03)
WBC NRBC COR # BLD: 13.4 10*3/MM3 (ref 3.4–10.8)

## 2022-10-26 PROCEDURE — 63710000001 INSULIN GLARGINE PER 5 UNITS: Performed by: INTERNAL MEDICINE

## 2022-10-26 PROCEDURE — 63710000001 INSULIN LISPRO (HUMAN) PER 5 UNITS: Performed by: NURSE PRACTITIONER

## 2022-10-26 PROCEDURE — 92526 ORAL FUNCTION THERAPY: CPT

## 2022-10-26 PROCEDURE — 82962 GLUCOSE BLOOD TEST: CPT

## 2022-10-26 PROCEDURE — 99232 SBSQ HOSP IP/OBS MODERATE 35: CPT | Performed by: INTERNAL MEDICINE

## 2022-10-26 PROCEDURE — 80053 COMPREHEN METABOLIC PANEL: CPT | Performed by: NURSE PRACTITIONER

## 2022-10-26 PROCEDURE — 99232 SBSQ HOSP IP/OBS MODERATE 35: CPT | Mod: FS | Performed by: NURSE PRACTITIONER

## 2022-10-26 PROCEDURE — 84484 ASSAY OF TROPONIN QUANT: CPT | Performed by: INTERNAL MEDICINE

## 2022-10-26 PROCEDURE — 25010000002 PIPERACILLIN SOD-TAZOBACTAM PER 1 G: Performed by: INTERNAL MEDICINE

## 2022-10-26 PROCEDURE — 63710000001 INSULIN LISPRO (HUMAN) PER 5 UNITS: Performed by: INTERNAL MEDICINE

## 2022-10-26 PROCEDURE — 36415 COLL VENOUS BLD VENIPUNCTURE: CPT | Performed by: NURSE PRACTITIONER

## 2022-10-26 PROCEDURE — 85025 COMPLETE CBC W/AUTO DIFF WBC: CPT | Performed by: NURSE PRACTITIONER

## 2022-10-26 RX ORDER — DEXTROSE MONOHYDRATE 25 G/50ML
25 INJECTION, SOLUTION INTRAVENOUS
Status: DISCONTINUED | OUTPATIENT
Start: 2022-10-26 | End: 2022-11-15 | Stop reason: HOSPADM

## 2022-10-26 RX ORDER — NICOTINE POLACRILEX 4 MG
15 LOZENGE BUCCAL
Status: DISCONTINUED | OUTPATIENT
Start: 2022-10-26 | End: 2022-11-15 | Stop reason: HOSPADM

## 2022-10-26 RX ORDER — INSULIN LISPRO 100 [IU]/ML
2-9 INJECTION, SOLUTION INTRAVENOUS; SUBCUTANEOUS
Status: DISCONTINUED | OUTPATIENT
Start: 2022-10-26 | End: 2022-10-31

## 2022-10-26 RX ORDER — METOPROLOL SUCCINATE 25 MG/1
12.5 TABLET, EXTENDED RELEASE ORAL
Status: DISCONTINUED | OUTPATIENT
Start: 2022-10-26 | End: 2022-10-30

## 2022-10-26 RX ORDER — AMOXICILLIN AND CLAVULANATE POTASSIUM 875; 125 MG/1; MG/1
1 TABLET, FILM COATED ORAL EVERY 12 HOURS SCHEDULED
Status: DISCONTINUED | OUTPATIENT
Start: 2022-10-26 | End: 2022-10-30

## 2022-10-26 RX ORDER — OLANZAPINE 10 MG/2ML
1 INJECTION, POWDER, LYOPHILIZED, FOR SOLUTION INTRAMUSCULAR
Status: DISCONTINUED | OUTPATIENT
Start: 2022-10-26 | End: 2022-11-15 | Stop reason: HOSPADM

## 2022-10-26 RX ADMIN — INSULIN GLARGINE 10 UNITS: 100 INJECTION, SOLUTION SUBCUTANEOUS at 20:37

## 2022-10-26 RX ADMIN — Medication 10 ML: at 20:37

## 2022-10-26 RX ADMIN — LEVOTHYROXINE SODIUM 50 MCG: 50 TABLET ORAL at 06:14

## 2022-10-26 RX ADMIN — MAGNESIUM OXIDE TAB 400 MG (241.3 MG ELEMENTAL MG) 400 MG: 400 (241.3 MG) TAB at 09:15

## 2022-10-26 RX ADMIN — POLYETHYLENE GLYCOL 3350 17 G: 17 POWDER, FOR SOLUTION ORAL at 09:08

## 2022-10-26 RX ADMIN — DIVALPROEX SODIUM 250 MG: 250 TABLET, DELAYED RELEASE ORAL at 09:08

## 2022-10-26 RX ADMIN — INSULIN LISPRO 7 UNITS: 100 INJECTION, SOLUTION INTRAVENOUS; SUBCUTANEOUS at 21:03

## 2022-10-26 RX ADMIN — INSULIN LISPRO 7 UNITS: 100 INJECTION, SOLUTION INTRAVENOUS; SUBCUTANEOUS at 17:58

## 2022-10-26 RX ADMIN — DIVALPROEX SODIUM 250 MG: 250 TABLET, DELAYED RELEASE ORAL at 20:36

## 2022-10-26 RX ADMIN — FERROUS SULFATE TAB EC 324 MG (65 MG FE EQUIVALENT) 324 MG: 324 (65 FE) TABLET DELAYED RESPONSE at 09:08

## 2022-10-26 RX ADMIN — METOPROLOL SUCCINATE 12.5 MG: 25 TABLET, FILM COATED, EXTENDED RELEASE ORAL at 13:18

## 2022-10-26 RX ADMIN — AMOXICILLIN AND CLAVULANATE POTASSIUM 1 TABLET: 875; 125 TABLET, FILM COATED ORAL at 17:58

## 2022-10-26 RX ADMIN — PANTOPRAZOLE SODIUM 40 MG: 40 TABLET, DELAYED RELEASE ORAL at 06:14

## 2022-10-26 RX ADMIN — INSULIN LISPRO 7 UNITS: 100 INJECTION, SOLUTION INTRAVENOUS; SUBCUTANEOUS at 13:27

## 2022-10-26 RX ADMIN — ROSUVASTATIN 20 MG: 10 TABLET, FILM COATED ORAL at 20:36

## 2022-10-26 RX ADMIN — DIVALPROEX SODIUM 250 MG: 250 TABLET, DELAYED RELEASE ORAL at 17:58

## 2022-10-26 RX ADMIN — PIPERACILLIN AND TAZOBACTAM 3.38 G: 3; .375 INJECTION, POWDER, FOR SOLUTION INTRAVENOUS at 01:49

## 2022-10-26 RX ADMIN — PIPERACILLIN AND TAZOBACTAM 3.38 G: 3; .375 INJECTION, POWDER, FOR SOLUTION INTRAVENOUS at 09:08

## 2022-10-26 RX ADMIN — ASPIRIN 81 MG: 81 TABLET, COATED ORAL at 09:07

## 2022-10-26 RX ADMIN — DOCUSATE SODIUM 100 MG: 100 CAPSULE, LIQUID FILLED ORAL at 09:08

## 2022-10-26 RX ADMIN — FERROUS SULFATE TAB EC 324 MG (65 MG FE EQUIVALENT) 324 MG: 324 (65 FE) TABLET DELAYED RESPONSE at 17:58

## 2022-10-27 ENCOUNTER — INPATIENT HOSPITAL (OUTPATIENT)
Dept: URBAN - METROPOLITAN AREA HOSPITAL 113 | Facility: HOSPITAL | Age: 82
End: 2022-10-27

## 2022-10-27 ENCOUNTER — APPOINTMENT (OUTPATIENT)
Dept: CARDIOLOGY | Facility: HOSPITAL | Age: 82
End: 2022-10-27

## 2022-10-27 DIAGNOSIS — D50.9 IRON DEFICIENCY ANEMIA, UNSPECIFIED: ICD-10-CM

## 2022-10-27 DIAGNOSIS — K59.00 CONSTIPATION, UNSPECIFIED: ICD-10-CM

## 2022-10-27 DIAGNOSIS — R13.10 DYSPHAGIA, UNSPECIFIED: ICD-10-CM

## 2022-10-27 PROBLEM — E78.5 DYSLIPIDEMIA: Status: ACTIVE | Noted: 2022-10-23

## 2022-10-27 PROBLEM — I21.4 ACUTE NON-ST ELEVATION MYOCARDIAL INFARCTION (NSTEMI) (HCC): Status: ACTIVE | Noted: 2022-10-23

## 2022-10-27 PROBLEM — R77.8 ELEVATED TROPONIN: Chronic | Status: ACTIVE | Noted: 2022-10-23

## 2022-10-27 PROBLEM — E11.9 TYPE 2 DIABETES MELLITUS (HCC): Chronic | Status: ACTIVE | Noted: 2022-10-23

## 2022-10-27 PROBLEM — E78.5 DYSLIPIDEMIA: Chronic | Status: ACTIVE | Noted: 2022-10-23

## 2022-10-27 PROBLEM — I21.4 ACUTE NON-ST ELEVATION MYOCARDIAL INFARCTION (NSTEMI): Chronic | Status: ACTIVE | Noted: 2022-10-23

## 2022-10-27 LAB
ACT BLD: 155 SECONDS (ref 89–137)
ALBUMIN SERPL-MCNC: 2 G/DL (ref 3.5–5.2)
ALBUMIN/GLOB SERPL: 0.5 G/DL
ALP SERPL-CCNC: 158 U/L (ref 39–117)
ALT SERPL W P-5'-P-CCNC: 8 U/L (ref 1–33)
ANION GAP SERPL CALCULATED.3IONS-SCNC: 12 MMOL/L (ref 5–15)
AST SERPL-CCNC: 16 U/L (ref 1–32)
BASOPHILS # BLD AUTO: 0 10*3/MM3 (ref 0–0.2)
BASOPHILS NFR BLD AUTO: 0.3 % (ref 0–1.5)
BILIRUB SERPL-MCNC: 0.2 MG/DL (ref 0–1.2)
BUN SERPL-MCNC: 13 MG/DL (ref 8–23)
BUN/CREAT SERPL: 22 (ref 7–25)
CALCIUM SPEC-SCNC: 8.3 MG/DL (ref 8.6–10.5)
CHLORIDE SERPL-SCNC: 107 MMOL/L (ref 98–107)
CO2 SERPL-SCNC: 24 MMOL/L (ref 22–29)
CREAT SERPL-MCNC: 0.59 MG/DL (ref 0.57–1)
DEPRECATED RDW RBC AUTO: 54.7 FL (ref 37–54)
EGFRCR SERPLBLD CKD-EPI 2021: 89.6 ML/MIN/1.73
EOSINOPHIL # BLD AUTO: 0.1 10*3/MM3 (ref 0–0.4)
EOSINOPHIL NFR BLD AUTO: 0.9 % (ref 0.3–6.2)
ERYTHROCYTE [DISTWIDTH] IN BLOOD BY AUTOMATED COUNT: 18.2 % (ref 12.3–15.4)
GLOBULIN UR ELPH-MCNC: 3.7 GM/DL
GLUCOSE BLDC GLUCOMTR-MCNC: 128 MG/DL (ref 70–105)
GLUCOSE BLDC GLUCOMTR-MCNC: 316 MG/DL (ref 70–105)
GLUCOSE SERPL-MCNC: 98 MG/DL (ref 65–99)
HCT VFR BLD AUTO: 29.3 % (ref 34–46.6)
HGB BLD-MCNC: 8.8 G/DL (ref 12–15.9)
LYMPHOCYTES # BLD AUTO: 1.2 10*3/MM3 (ref 0.7–3.1)
LYMPHOCYTES NFR BLD AUTO: 9.4 % (ref 19.6–45.3)
MCH RBC QN AUTO: 24.3 PG (ref 26.6–33)
MCHC RBC AUTO-ENTMCNC: 30.1 G/DL (ref 31.5–35.7)
MCV RBC AUTO: 80.5 FL (ref 79–97)
MONOCYTES # BLD AUTO: 1 10*3/MM3 (ref 0.1–0.9)
MONOCYTES NFR BLD AUTO: 7.4 % (ref 5–12)
NEUTROPHILS NFR BLD AUTO: 10.6 10*3/MM3 (ref 1.7–7)
NEUTROPHILS NFR BLD AUTO: 82 % (ref 42.7–76)
NRBC BLD AUTO-RTO: 0.1 /100 WBC (ref 0–0.2)
PLATELET # BLD AUTO: 236 10*3/MM3 (ref 140–450)
PMV BLD AUTO: 8.9 FL (ref 6–12)
POTASSIUM SERPL-SCNC: 3.9 MMOL/L (ref 3.5–5.2)
PROT SERPL-MCNC: 5.7 G/DL (ref 6–8.5)
RBC # BLD AUTO: 3.64 10*6/MM3 (ref 3.77–5.28)
SODIUM SERPL-SCNC: 143 MMOL/L (ref 136–145)
TROPONIN T SERPL-MCNC: 0.17 NG/ML (ref 0–0.03)
TROPONIN T SERPL-MCNC: 0.18 NG/ML (ref 0–0.03)
WBC NRBC COR # BLD: 12.9 10*3/MM3 (ref 3.4–10.8)

## 2022-10-27 PROCEDURE — 99232 SBSQ HOSP IP/OBS MODERATE 35: CPT | Performed by: INTERNAL MEDICINE

## 2022-10-27 PROCEDURE — C1874 STENT, COATED/COV W/DEL SYS: HCPCS | Performed by: INTERNAL MEDICINE

## 2022-10-27 PROCEDURE — 36415 COLL VENOUS BLD VENIPUNCTURE: CPT | Performed by: INTERNAL MEDICINE

## 2022-10-27 PROCEDURE — 25010000002 HEPARIN (PORCINE) PER 1000 UNITS: Performed by: INTERNAL MEDICINE

## 2022-10-27 PROCEDURE — 99153 MOD SED SAME PHYS/QHP EA: CPT | Performed by: INTERNAL MEDICINE

## 2022-10-27 PROCEDURE — 93458 L HRT ARTERY/VENTRICLE ANGIO: CPT | Performed by: INTERNAL MEDICINE

## 2022-10-27 PROCEDURE — B2151ZZ FLUOROSCOPY OF LEFT HEART USING LOW OSMOLAR CONTRAST: ICD-10-PCS | Performed by: INTERNAL MEDICINE

## 2022-10-27 PROCEDURE — 93005 ELECTROCARDIOGRAM TRACING: CPT | Performed by: INTERNAL MEDICINE

## 2022-10-27 PROCEDURE — 99232 SBSQ HOSP IP/OBS MODERATE 35: CPT | Mod: FS | Performed by: NURSE PRACTITIONER

## 2022-10-27 PROCEDURE — 63710000001 INSULIN LISPRO (HUMAN) PER 5 UNITS: Performed by: INTERNAL MEDICINE

## 2022-10-27 PROCEDURE — C1894 INTRO/SHEATH, NON-LASER: HCPCS | Performed by: INTERNAL MEDICINE

## 2022-10-27 PROCEDURE — 99152 MOD SED SAME PHYS/QHP 5/>YRS: CPT | Performed by: INTERNAL MEDICINE

## 2022-10-27 PROCEDURE — C1887 CATHETER, GUIDING: HCPCS | Performed by: INTERNAL MEDICINE

## 2022-10-27 PROCEDURE — 93010 ELECTROCARDIOGRAM REPORT: CPT | Performed by: INTERNAL MEDICINE

## 2022-10-27 PROCEDURE — 027034Z DILATION OF CORONARY ARTERY, ONE ARTERY WITH DRUG-ELUTING INTRALUMINAL DEVICE, PERCUTANEOUS APPROACH: ICD-10-PCS | Performed by: INTERNAL MEDICINE

## 2022-10-27 PROCEDURE — B2111ZZ FLUOROSCOPY OF MULTIPLE CORONARY ARTERIES USING LOW OSMOLAR CONTRAST: ICD-10-PCS | Performed by: INTERNAL MEDICINE

## 2022-10-27 PROCEDURE — 93306 TTE W/DOPPLER COMPLETE: CPT

## 2022-10-27 PROCEDURE — 25010000002 SULFUR HEXAFLUORIDE MICROSPH 60.7-25 MG RECONSTITUTED SUSPENSION: Performed by: INTERNAL MEDICINE

## 2022-10-27 PROCEDURE — 0 IOPAMIDOL PER 1 ML: Performed by: INTERNAL MEDICINE

## 2022-10-27 PROCEDURE — 25010000002 MIDAZOLAM PER 1 MG: Performed by: INTERNAL MEDICINE

## 2022-10-27 PROCEDURE — C1769 GUIDE WIRE: HCPCS | Performed by: INTERNAL MEDICINE

## 2022-10-27 PROCEDURE — 25010000002 FENTANYL CITRATE (PF) 100 MCG/2ML SOLUTION: Performed by: INTERNAL MEDICINE

## 2022-10-27 PROCEDURE — 93306 TTE W/DOPPLER COMPLETE: CPT | Performed by: INTERNAL MEDICINE

## 2022-10-27 PROCEDURE — 82962 GLUCOSE BLOOD TEST: CPT

## 2022-10-27 PROCEDURE — 80053 COMPREHEN METABOLIC PANEL: CPT | Performed by: NURSE PRACTITIONER

## 2022-10-27 PROCEDURE — 4A023N7 MEASUREMENT OF CARDIAC SAMPLING AND PRESSURE, LEFT HEART, PERCUTANEOUS APPROACH: ICD-10-PCS | Performed by: INTERNAL MEDICINE

## 2022-10-27 PROCEDURE — 85347 COAGULATION TIME ACTIVATED: CPT

## 2022-10-27 PROCEDURE — 84484 ASSAY OF TROPONIN QUANT: CPT | Performed by: INTERNAL MEDICINE

## 2022-10-27 PROCEDURE — C1725 CATH, TRANSLUMIN NON-LASER: HCPCS | Performed by: INTERNAL MEDICINE

## 2022-10-27 PROCEDURE — 85025 COMPLETE CBC W/AUTO DIFF WBC: CPT | Performed by: NURSE PRACTITIONER

## 2022-10-27 PROCEDURE — C9600 PERC DRUG-EL COR STENT SING: HCPCS | Performed by: INTERNAL MEDICINE

## 2022-10-27 PROCEDURE — 25010000002 HYDRALAZINE PER 20 MG: Performed by: INTERNAL MEDICINE

## 2022-10-27 PROCEDURE — 63710000001 INSULIN GLARGINE PER 5 UNITS: Performed by: INTERNAL MEDICINE

## 2022-10-27 PROCEDURE — 92928 PRQ TCAT PLMT NTRAC ST 1 LES: CPT | Performed by: INTERNAL MEDICINE

## 2022-10-27 DEVICE — XIENCE SKYPOINT™ EVEROLIMUS ELUTING CORONARY STENT SYSTEM 3.50 MM X 18 MM / RAPID-EXCHANGE
Type: IMPLANTABLE DEVICE | Site: CORONARY | Status: FUNCTIONAL
Brand: XIENCE SKYPOINT™

## 2022-10-27 RX ORDER — HEPARIN SODIUM 1000 [USP'U]/ML
INJECTION, SOLUTION INTRAVENOUS; SUBCUTANEOUS
Status: DISCONTINUED | OUTPATIENT
Start: 2022-10-27 | End: 2022-10-27 | Stop reason: HOSPADM

## 2022-10-27 RX ORDER — FENTANYL CITRATE 50 UG/ML
INJECTION, SOLUTION INTRAMUSCULAR; INTRAVENOUS
Status: DISCONTINUED | OUTPATIENT
Start: 2022-10-27 | End: 2022-10-27 | Stop reason: HOSPADM

## 2022-10-27 RX ORDER — NITROGLYCERIN 5 MG/ML
INJECTION, SOLUTION INTRAVENOUS
Status: DISCONTINUED | OUTPATIENT
Start: 2022-10-27 | End: 2022-10-27 | Stop reason: HOSPADM

## 2022-10-27 RX ORDER — CLOPIDOGREL BISULFATE 75 MG/1
300 TABLET ORAL ONCE
Status: DISCONTINUED | OUTPATIENT
Start: 2022-10-27 | End: 2022-11-05

## 2022-10-27 RX ORDER — MIDAZOLAM HYDROCHLORIDE 1 MG/ML
INJECTION INTRAMUSCULAR; INTRAVENOUS
Status: DISCONTINUED | OUTPATIENT
Start: 2022-10-27 | End: 2022-10-27 | Stop reason: HOSPADM

## 2022-10-27 RX ORDER — LIDOCAINE HYDROCHLORIDE 20 MG/ML
INJECTION, SOLUTION INFILTRATION; PERINEURAL
Status: DISCONTINUED | OUTPATIENT
Start: 2022-10-27 | End: 2022-10-27 | Stop reason: HOSPADM

## 2022-10-27 RX ORDER — SODIUM CHLORIDE 9 MG/ML
100 INJECTION, SOLUTION INTRAVENOUS CONTINUOUS
Status: DISCONTINUED | OUTPATIENT
Start: 2022-10-27 | End: 2022-10-30

## 2022-10-27 RX ORDER — ACETAMINOPHEN 325 MG/1
650 TABLET ORAL EVERY 4 HOURS PRN
Status: DISCONTINUED | OUTPATIENT
Start: 2022-10-27 | End: 2022-11-15 | Stop reason: HOSPADM

## 2022-10-27 RX ORDER — CLOPIDOGREL BISULFATE 75 MG/1
75 TABLET ORAL DAILY
Status: DISCONTINUED | OUTPATIENT
Start: 2022-10-28 | End: 2022-11-15 | Stop reason: HOSPADM

## 2022-10-27 RX ORDER — CLOPIDOGREL BISULFATE 75 MG/1
TABLET ORAL
Status: DISCONTINUED | OUTPATIENT
Start: 2022-10-27 | End: 2022-10-27 | Stop reason: HOSPADM

## 2022-10-27 RX ORDER — SODIUM CHLORIDE 9 MG/ML
INJECTION, SOLUTION INTRAVENOUS
Status: COMPLETED | OUTPATIENT
Start: 2022-10-27 | End: 2022-10-27

## 2022-10-27 RX ORDER — HYDRALAZINE HYDROCHLORIDE 20 MG/ML
20 INJECTION INTRAMUSCULAR; INTRAVENOUS ONCE
Status: COMPLETED | OUTPATIENT
Start: 2022-10-27 | End: 2022-10-27

## 2022-10-27 RX ADMIN — FERROUS SULFATE TAB EC 324 MG (65 MG FE EQUIVALENT) 324 MG: 324 (65 FE) TABLET DELAYED RESPONSE at 20:45

## 2022-10-27 RX ADMIN — MAGNESIUM OXIDE TAB 400 MG (241.3 MG ELEMENTAL MG) 400 MG: 400 (241.3 MG) TAB at 08:57

## 2022-10-27 RX ADMIN — AMOXICILLIN AND CLAVULANATE POTASSIUM 1 TABLET: 875; 125 TABLET, FILM COATED ORAL at 08:57

## 2022-10-27 RX ADMIN — DOCUSATE SODIUM 100 MG: 100 CAPSULE, LIQUID FILLED ORAL at 20:45

## 2022-10-27 RX ADMIN — INSULIN GLARGINE 10 UNITS: 100 INJECTION, SOLUTION SUBCUTANEOUS at 20:57

## 2022-10-27 RX ADMIN — AMOXICILLIN AND CLAVULANATE POTASSIUM 1 TABLET: 875; 125 TABLET, FILM COATED ORAL at 20:45

## 2022-10-27 RX ADMIN — DIVALPROEX SODIUM 250 MG: 250 TABLET, DELAYED RELEASE ORAL at 20:45

## 2022-10-27 RX ADMIN — LEVOTHYROXINE SODIUM 50 MCG: 50 TABLET ORAL at 05:41

## 2022-10-27 RX ADMIN — ASPIRIN 81 MG: 81 TABLET, COATED ORAL at 08:57

## 2022-10-27 RX ADMIN — Medication 10 ML: at 08:57

## 2022-10-27 RX ADMIN — FERROUS SULFATE TAB EC 324 MG (65 MG FE EQUIVALENT) 324 MG: 324 (65 FE) TABLET DELAYED RESPONSE at 08:57

## 2022-10-27 RX ADMIN — SODIUM CHLORIDE 100 ML/HR: 9 INJECTION, SOLUTION INTRAVENOUS at 14:48

## 2022-10-27 RX ADMIN — SODIUM CHLORIDE 100 ML/HR: 9 INJECTION, SOLUTION INTRAVENOUS at 20:09

## 2022-10-27 RX ADMIN — PANTOPRAZOLE SODIUM 40 MG: 40 TABLET, DELAYED RELEASE ORAL at 05:41

## 2022-10-27 RX ADMIN — DOCUSATE SODIUM 100 MG: 100 CAPSULE, LIQUID FILLED ORAL at 08:56

## 2022-10-27 RX ADMIN — INSULIN LISPRO 7 UNITS: 100 INJECTION, SOLUTION INTRAVENOUS; SUBCUTANEOUS at 12:04

## 2022-10-27 RX ADMIN — METOPROLOL SUCCINATE 12.5 MG: 25 TABLET, FILM COATED, EXTENDED RELEASE ORAL at 08:57

## 2022-10-27 RX ADMIN — HYDRALAZINE HYDROCHLORIDE 20 MG: 20 INJECTION INTRAMUSCULAR; INTRAVENOUS at 14:51

## 2022-10-27 RX ADMIN — ROSUVASTATIN 20 MG: 10 TABLET, FILM COATED ORAL at 20:45

## 2022-10-27 RX ADMIN — DIVALPROEX SODIUM 250 MG: 250 TABLET, DELAYED RELEASE ORAL at 08:56

## 2022-10-27 RX ADMIN — SULFUR HEXAFLUORIDE 2 ML: KIT at 07:31

## 2022-10-28 LAB
ALBUMIN SERPL-MCNC: 2.3 G/DL (ref 3.5–5.2)
ALP SERPL-CCNC: 93 U/L (ref 39–117)
ALT SERPL W P-5'-P-CCNC: 9 U/L (ref 1–33)
ANION GAP SERPL CALCULATED.3IONS-SCNC: 12 MMOL/L (ref 5–15)
AST SERPL-CCNC: 12 U/L (ref 1–32)
BACTERIA SPEC AEROBE CULT: NORMAL
BACTERIA SPEC AEROBE CULT: NORMAL
BILIRUB CONJ SERPL-MCNC: <0.2 MG/DL (ref 0–0.3)
BILIRUB INDIRECT SERPL-MCNC: ABNORMAL MG/DL
BILIRUB SERPL-MCNC: 0.2 MG/DL (ref 0–1.2)
BUN SERPL-MCNC: 15 MG/DL (ref 8–23)
BUN/CREAT SERPL: 26.3 (ref 7–25)
CALCIUM SPEC-SCNC: 8.4 MG/DL (ref 8.6–10.5)
CHLORIDE SERPL-SCNC: 109 MMOL/L (ref 98–107)
CHOLEST SERPL-MCNC: 79 MG/DL (ref 0–200)
CO2 SERPL-SCNC: 23 MMOL/L (ref 22–29)
CREAT SERPL-MCNC: 0.57 MG/DL (ref 0.57–1)
DEPRECATED RDW RBC AUTO: 54.3 FL (ref 37–54)
EGFRCR SERPLBLD CKD-EPI 2021: 90.3 ML/MIN/1.73
ERYTHROCYTE [DISTWIDTH] IN BLOOD BY AUTOMATED COUNT: 18.5 % (ref 12.3–15.4)
GLUCOSE BLDC GLUCOMTR-MCNC: 189 MG/DL (ref 70–105)
GLUCOSE BLDC GLUCOMTR-MCNC: 358 MG/DL (ref 70–105)
GLUCOSE BLDC GLUCOMTR-MCNC: 367 MG/DL (ref 70–105)
GLUCOSE SERPL-MCNC: 215 MG/DL (ref 65–99)
HCT VFR BLD AUTO: 28.3 % (ref 34–46.6)
HDLC SERPL-MCNC: 33 MG/DL (ref 40–60)
HGB BLD-MCNC: 8.6 G/DL (ref 12–15.9)
LDLC SERPL CALC-MCNC: 26 MG/DL (ref 0–100)
LDLC/HDLC SERPL: 0.76 {RATIO}
MCH RBC QN AUTO: 23.9 PG (ref 26.6–33)
MCHC RBC AUTO-ENTMCNC: 30.5 G/DL (ref 31.5–35.7)
MCV RBC AUTO: 78.4 FL (ref 79–97)
PA ADP PRP-ACNC: 213 PRU (ref 194–418)
PLATELET # BLD AUTO: 254 10*3/MM3 (ref 140–450)
PMV BLD AUTO: 8.5 FL (ref 6–12)
POTASSIUM SERPL-SCNC: 3.5 MMOL/L (ref 3.5–5.2)
PROT SERPL-MCNC: 5.5 G/DL (ref 6–8.5)
QT INTERVAL: 474 MS
RBC # BLD AUTO: 3.61 10*6/MM3 (ref 3.77–5.28)
SODIUM SERPL-SCNC: 144 MMOL/L (ref 136–145)
TRIGL SERPL-MCNC: 105 MG/DL (ref 0–150)
TROPONIN T SERPL-MCNC: 0.12 NG/ML (ref 0–0.03)
TROPONIN T SERPL-MCNC: 0.13 NG/ML (ref 0–0.03)
TROPONIN T SERPL-MCNC: 0.14 NG/ML (ref 0–0.03)
TROPONIN T SERPL-MCNC: 0.15 NG/ML (ref 0–0.03)
VLDLC SERPL-MCNC: 20 MG/DL (ref 5–40)
WBC NRBC COR # BLD: 12.2 10*3/MM3 (ref 3.4–10.8)

## 2022-10-28 PROCEDURE — 97530 THERAPEUTIC ACTIVITIES: CPT

## 2022-10-28 PROCEDURE — 80048 BASIC METABOLIC PNL TOTAL CA: CPT | Performed by: INTERNAL MEDICINE

## 2022-10-28 PROCEDURE — 82962 GLUCOSE BLOOD TEST: CPT

## 2022-10-28 PROCEDURE — 99222 1ST HOSP IP/OBS MODERATE 55: CPT | Performed by: PSYCHIATRY & NEUROLOGY

## 2022-10-28 PROCEDURE — 84484 ASSAY OF TROPONIN QUANT: CPT | Performed by: INTERNAL MEDICINE

## 2022-10-28 PROCEDURE — 97116 GAIT TRAINING THERAPY: CPT

## 2022-10-28 PROCEDURE — 80061 LIPID PANEL: CPT | Performed by: INTERNAL MEDICINE

## 2022-10-28 PROCEDURE — 93005 ELECTROCARDIOGRAM TRACING: CPT | Performed by: INTERNAL MEDICINE

## 2022-10-28 PROCEDURE — 99232 SBSQ HOSP IP/OBS MODERATE 35: CPT | Performed by: INTERNAL MEDICINE

## 2022-10-28 PROCEDURE — 97535 SELF CARE MNGMENT TRAINING: CPT

## 2022-10-28 PROCEDURE — 80076 HEPATIC FUNCTION PANEL: CPT | Performed by: INTERNAL MEDICINE

## 2022-10-28 PROCEDURE — 85576 BLOOD PLATELET AGGREGATION: CPT | Performed by: INTERNAL MEDICINE

## 2022-10-28 PROCEDURE — 85027 COMPLETE CBC AUTOMATED: CPT | Performed by: INTERNAL MEDICINE

## 2022-10-28 PROCEDURE — 97166 OT EVAL MOD COMPLEX 45 MIN: CPT

## 2022-10-28 PROCEDURE — 63710000001 INSULIN LISPRO (HUMAN) PER 5 UNITS: Performed by: INTERNAL MEDICINE

## 2022-10-28 PROCEDURE — 63710000001 INSULIN GLARGINE PER 5 UNITS: Performed by: INTERNAL MEDICINE

## 2022-10-28 PROCEDURE — 93010 ELECTROCARDIOGRAM REPORT: CPT | Performed by: INTERNAL MEDICINE

## 2022-10-28 PROCEDURE — 36415 COLL VENOUS BLD VENIPUNCTURE: CPT | Performed by: INTERNAL MEDICINE

## 2022-10-28 RX ORDER — ARIPIPRAZOLE 2 MG/1
2 TABLET ORAL NIGHTLY
Status: DISCONTINUED | OUTPATIENT
Start: 2022-10-28 | End: 2022-11-08

## 2022-10-28 RX ADMIN — DOCUSATE SODIUM 100 MG: 100 CAPSULE, LIQUID FILLED ORAL at 10:26

## 2022-10-28 RX ADMIN — DIVALPROEX SODIUM 250 MG: 250 TABLET, DELAYED RELEASE ORAL at 10:25

## 2022-10-28 RX ADMIN — DIVALPROEX SODIUM 250 MG: 250 TABLET, DELAYED RELEASE ORAL at 20:17

## 2022-10-28 RX ADMIN — INSULIN LISPRO 8 UNITS: 100 INJECTION, SOLUTION INTRAVENOUS; SUBCUTANEOUS at 15:00

## 2022-10-28 RX ADMIN — SODIUM CHLORIDE 100 ML/HR: 9 INJECTION, SOLUTION INTRAVENOUS at 06:10

## 2022-10-28 RX ADMIN — ARIPIPRAZOLE 2 MG: 2 TABLET ORAL at 20:17

## 2022-10-28 RX ADMIN — ROSUVASTATIN 20 MG: 10 TABLET, FILM COATED ORAL at 20:17

## 2022-10-28 RX ADMIN — AMOXICILLIN AND CLAVULANATE POTASSIUM 1 TABLET: 875; 125 TABLET, FILM COATED ORAL at 10:25

## 2022-10-28 RX ADMIN — CLOPIDOGREL BISULFATE 75 MG: 75 TABLET ORAL at 10:26

## 2022-10-28 RX ADMIN — INSULIN LISPRO 8 UNITS: 100 INJECTION, SOLUTION INTRAVENOUS; SUBCUTANEOUS at 17:50

## 2022-10-28 RX ADMIN — LEVOTHYROXINE SODIUM 50 MCG: 50 TABLET ORAL at 06:05

## 2022-10-28 RX ADMIN — FERROUS SULFATE TAB EC 324 MG (65 MG FE EQUIVALENT) 324 MG: 324 (65 FE) TABLET DELAYED RESPONSE at 10:26

## 2022-10-28 RX ADMIN — DIVALPROEX SODIUM 250 MG: 250 TABLET, DELAYED RELEASE ORAL at 17:18

## 2022-10-28 RX ADMIN — POLYETHYLENE GLYCOL 3350 17 G: 17 POWDER, FOR SOLUTION ORAL at 09:00

## 2022-10-28 RX ADMIN — INSULIN GLARGINE 10 UNITS: 100 INJECTION, SOLUTION SUBCUTANEOUS at 20:14

## 2022-10-28 RX ADMIN — PANTOPRAZOLE SODIUM 40 MG: 40 TABLET, DELAYED RELEASE ORAL at 06:05

## 2022-10-28 RX ADMIN — AMOXICILLIN AND CLAVULANATE POTASSIUM 1 TABLET: 875; 125 TABLET, FILM COATED ORAL at 20:17

## 2022-10-28 RX ADMIN — INSULIN LISPRO 2 UNITS: 100 INJECTION, SOLUTION INTRAVENOUS; SUBCUTANEOUS at 10:23

## 2022-10-28 RX ADMIN — FERROUS SULFATE TAB EC 324 MG (65 MG FE EQUIVALENT) 324 MG: 324 (65 FE) TABLET DELAYED RESPONSE at 17:18

## 2022-10-28 RX ADMIN — DOCUSATE SODIUM 100 MG: 100 CAPSULE, LIQUID FILLED ORAL at 20:17

## 2022-10-28 RX ADMIN — MAGNESIUM OXIDE TAB 400 MG (241.3 MG ELEMENTAL MG) 400 MG: 400 (241.3 MG) TAB at 10:26

## 2022-10-28 RX ADMIN — METOPROLOL SUCCINATE 12.5 MG: 25 TABLET, FILM COATED, EXTENDED RELEASE ORAL at 10:24

## 2022-10-28 RX ADMIN — SODIUM CHLORIDE 100 ML/HR: 9 INJECTION, SOLUTION INTRAVENOUS at 16:51

## 2022-10-28 RX ADMIN — ASPIRIN 81 MG: 81 TABLET, COATED ORAL at 10:24

## 2022-10-29 LAB
ANION GAP SERPL CALCULATED.3IONS-SCNC: 11 MMOL/L (ref 5–15)
ANISOCYTOSIS BLD QL: ABNORMAL
BH CV ECHO MEAS - ACS: 1.78 CM
BH CV ECHO MEAS - AO MAX PG: 7 MMHG
BH CV ECHO MEAS - AO MEAN PG: 4 MMHG
BH CV ECHO MEAS - AO ROOT DIAM: 3.1 CM
BH CV ECHO MEAS - AO V2 MAX: 132.5 CM/SEC
BH CV ECHO MEAS - AO V2 VTI: 29.2 CM
BH CV ECHO MEAS - AVA(I,D): 1.8 CM2
BH CV ECHO MEAS - EDV(CUBED): 112.9 ML
BH CV ECHO MEAS - EDV(MOD-SP4): 82.6 ML
BH CV ECHO MEAS - EF(MOD-SP4): 32.7 %
BH CV ECHO MEAS - ESV(CUBED): 27 ML
BH CV ECHO MEAS - ESV(MOD-SP4): 55.6 ML
BH CV ECHO MEAS - FS: 37.9 %
BH CV ECHO MEAS - IVS/LVPW: 1.1 CM
BH CV ECHO MEAS - IVSD: 1.03 CM
BH CV ECHO MEAS - LV DIASTOLIC VOL/BSA (35-75): 52.3 CM2
BH CV ECHO MEAS - LV MASS(C)D: 167.1 GRAMS
BH CV ECHO MEAS - LV MAX PG: 3.2 MMHG
BH CV ECHO MEAS - LV MEAN PG: 1.74 MMHG
BH CV ECHO MEAS - LV SYSTOLIC VOL/BSA (12-30): 35.2 CM2
BH CV ECHO MEAS - LV V1 MAX: 89.3 CM/SEC
BH CV ECHO MEAS - LV V1 VTI: 19.9 CM
BH CV ECHO MEAS - LVIDD: 4.8 CM
BH CV ECHO MEAS - LVIDS: 3 CM
BH CV ECHO MEAS - LVOT AREA: 2.6 CM2
BH CV ECHO MEAS - LVOT DIAM: 1.83 CM
BH CV ECHO MEAS - LVPWD: 0.93 CM
BH CV ECHO MEAS - MR MAX PG: 64.9 MMHG
BH CV ECHO MEAS - MR MAX VEL: 402.7 CM/SEC
BH CV ECHO MEAS - MV A MAX VEL: 147.2 CM/SEC
BH CV ECHO MEAS - MV DEC SLOPE: 605.9 CM/SEC2
BH CV ECHO MEAS - MV DEC TIME: 0.19 MSEC
BH CV ECHO MEAS - MV E MAX VEL: 115.3 CM/SEC
BH CV ECHO MEAS - MV E/A: 0.78
BH CV ECHO MEAS - MV MAX PG: 7.5 MMHG
BH CV ECHO MEAS - MV MEAN PG: 3.4 MMHG
BH CV ECHO MEAS - MV V2 VTI: 34.1 CM
BH CV ECHO MEAS - MVA(VTI): 1.54 CM2
BH CV ECHO MEAS - PA V2 MAX: 96.9 CM/SEC
BH CV ECHO MEAS - RAP SYSTOLE: 3 MMHG
BH CV ECHO MEAS - RV MAX PG: 2 MMHG
BH CV ECHO MEAS - RV V1 MAX: 70.6 CM/SEC
BH CV ECHO MEAS - RV V1 VTI: 14.8 CM
BH CV ECHO MEAS - RVDD: 2.43 CM
BH CV ECHO MEAS - RVSP: 29.5 MMHG
BH CV ECHO MEAS - SI(MOD-SP4): 17.1 ML/M2
BH CV ECHO MEAS - SV(LVOT): 52.4 ML
BH CV ECHO MEAS - SV(MOD-SP4): 27 ML
BH CV ECHO MEAS - TR MAX PG: 26.5 MMHG
BH CV ECHO MEAS - TR MAX VEL: 254.7 CM/SEC
BUN SERPL-MCNC: 17 MG/DL (ref 8–23)
BUN/CREAT SERPL: 32.1 (ref 7–25)
CALCIUM SPEC-SCNC: 8.1 MG/DL (ref 8.6–10.5)
CHLORIDE SERPL-SCNC: 106 MMOL/L (ref 98–107)
CO2 SERPL-SCNC: 24 MMOL/L (ref 22–29)
CREAT SERPL-MCNC: 0.53 MG/DL (ref 0.57–1)
DEPRECATED RDW RBC AUTO: 51.6 FL (ref 37–54)
EGFRCR SERPLBLD CKD-EPI 2021: 91.9 ML/MIN/1.73
ELLIPTOCYTES BLD QL SMEAR: ABNORMAL
EOSINOPHIL # BLD MANUAL: 0.14 10*3/MM3 (ref 0–0.4)
EOSINOPHIL NFR BLD MANUAL: 1 % (ref 0.3–6.2)
ERYTHROCYTE [DISTWIDTH] IN BLOOD BY AUTOMATED COUNT: 18.1 % (ref 12.3–15.4)
GLUCOSE BLDC GLUCOMTR-MCNC: 172 MG/DL (ref 70–105)
GLUCOSE BLDC GLUCOMTR-MCNC: 291 MG/DL (ref 70–105)
GLUCOSE BLDC GLUCOMTR-MCNC: 320 MG/DL (ref 70–105)
GLUCOSE BLDC GLUCOMTR-MCNC: 372 MG/DL (ref 70–105)
GLUCOSE SERPL-MCNC: 170 MG/DL (ref 65–99)
HCT VFR BLD AUTO: 29.1 % (ref 34–46.6)
HGB BLD-MCNC: 8.7 G/DL (ref 12–15.9)
HYPOCHROMIA BLD QL: ABNORMAL
LV EF 2D ECHO EST: 40 %
LYMPHOCYTES # BLD MANUAL: 1.56 10*3/MM3 (ref 0.7–3.1)
LYMPHOCYTES NFR BLD MANUAL: 3 % (ref 5–12)
MAGNESIUM SERPL-MCNC: 1.8 MG/DL (ref 1.6–2.4)
MAXIMAL PREDICTED HEART RATE: 137 BPM
MCH RBC QN AUTO: 24.3 PG (ref 26.6–33)
MCHC RBC AUTO-ENTMCNC: 30 G/DL (ref 31.5–35.7)
MCV RBC AUTO: 81 FL (ref 79–97)
MONOCYTES # BLD: 0.43 10*3/MM3 (ref 0.1–0.9)
NEUTROPHILS # BLD AUTO: 12.07 10*3/MM3 (ref 1.7–7)
NEUTROPHILS NFR BLD MANUAL: 79 % (ref 42.7–76)
NEUTS BAND NFR BLD MANUAL: 6 % (ref 0–5)
PLAT MORPH BLD: NORMAL
PLATELET # BLD AUTO: 297 10*3/MM3 (ref 140–450)
PMV BLD AUTO: 8.2 FL (ref 6–12)
POIKILOCYTOSIS BLD QL SMEAR: ABNORMAL
POTASSIUM SERPL-SCNC: 3.9 MMOL/L (ref 3.5–5.2)
RBC # BLD AUTO: 3.59 10*6/MM3 (ref 3.77–5.28)
SCAN SLIDE: NORMAL
SODIUM SERPL-SCNC: 141 MMOL/L (ref 136–145)
STRESS TARGET HR: 116 BPM
TROPONIN T SERPL-MCNC: 0.09 NG/ML (ref 0–0.03)
TROPONIN T SERPL-MCNC: 0.1 NG/ML (ref 0–0.03)
TROPONIN T SERPL-MCNC: 0.11 NG/ML (ref 0–0.03)
VALPROATE SERPL-MCNC: 30.6 MCG/ML (ref 50–125)
VARIANT LYMPHS NFR BLD MANUAL: 11 % (ref 19.6–45.3)
WBC MORPH BLD: NORMAL
WBC NRBC COR # BLD: 14.2 10*3/MM3 (ref 3.4–10.8)

## 2022-10-29 PROCEDURE — 84484 ASSAY OF TROPONIN QUANT: CPT | Performed by: INTERNAL MEDICINE

## 2022-10-29 PROCEDURE — 36415 COLL VENOUS BLD VENIPUNCTURE: CPT | Performed by: INTERNAL MEDICINE

## 2022-10-29 PROCEDURE — 63710000001 INSULIN LISPRO (HUMAN) PER 5 UNITS: Performed by: INTERNAL MEDICINE

## 2022-10-29 PROCEDURE — 82962 GLUCOSE BLOOD TEST: CPT

## 2022-10-29 PROCEDURE — 93010 ELECTROCARDIOGRAM REPORT: CPT | Performed by: INTERNAL MEDICINE

## 2022-10-29 PROCEDURE — 83735 ASSAY OF MAGNESIUM: CPT | Performed by: INTERNAL MEDICINE

## 2022-10-29 PROCEDURE — 63710000001 INSULIN GLARGINE PER 5 UNITS: Performed by: INTERNAL MEDICINE

## 2022-10-29 PROCEDURE — 99231 SBSQ HOSP IP/OBS SF/LOW 25: CPT | Performed by: PSYCHIATRY & NEUROLOGY

## 2022-10-29 PROCEDURE — 85025 COMPLETE CBC W/AUTO DIFF WBC: CPT | Performed by: INTERNAL MEDICINE

## 2022-10-29 PROCEDURE — 80164 ASSAY DIPROPYLACETIC ACD TOT: CPT | Performed by: PSYCHIATRY & NEUROLOGY

## 2022-10-29 PROCEDURE — 99233 SBSQ HOSP IP/OBS HIGH 50: CPT | Performed by: INTERNAL MEDICINE

## 2022-10-29 PROCEDURE — 85007 BL SMEAR W/DIFF WBC COUNT: CPT | Performed by: INTERNAL MEDICINE

## 2022-10-29 PROCEDURE — 93005 ELECTROCARDIOGRAM TRACING: CPT | Performed by: INTERNAL MEDICINE

## 2022-10-29 PROCEDURE — 80048 BASIC METABOLIC PNL TOTAL CA: CPT | Performed by: INTERNAL MEDICINE

## 2022-10-29 RX ORDER — DIVALPROEX SODIUM 500 MG/1
500 TABLET, DELAYED RELEASE ORAL 2 TIMES DAILY
Status: DISCONTINUED | OUTPATIENT
Start: 2022-10-29 | End: 2022-11-15 | Stop reason: HOSPADM

## 2022-10-29 RX ADMIN — DIVALPROEX SODIUM 500 MG: 500 TABLET, DELAYED RELEASE ORAL at 21:00

## 2022-10-29 RX ADMIN — AMOXICILLIN AND CLAVULANATE POTASSIUM 1 TABLET: 875; 125 TABLET, FILM COATED ORAL at 08:54

## 2022-10-29 RX ADMIN — AMOXICILLIN AND CLAVULANATE POTASSIUM 1 TABLET: 875; 125 TABLET, FILM COATED ORAL at 21:00

## 2022-10-29 RX ADMIN — LEVOTHYROXINE SODIUM 50 MCG: 50 TABLET ORAL at 05:54

## 2022-10-29 RX ADMIN — FERROUS SULFATE TAB EC 324 MG (65 MG FE EQUIVALENT) 324 MG: 324 (65 FE) TABLET DELAYED RESPONSE at 18:10

## 2022-10-29 RX ADMIN — INSULIN LISPRO 8 UNITS: 100 INJECTION, SOLUTION INTRAVENOUS; SUBCUTANEOUS at 14:04

## 2022-10-29 RX ADMIN — PANTOPRAZOLE SODIUM 40 MG: 40 TABLET, DELAYED RELEASE ORAL at 05:54

## 2022-10-29 RX ADMIN — INSULIN LISPRO 2 UNITS: 100 INJECTION, SOLUTION INTRAVENOUS; SUBCUTANEOUS at 08:54

## 2022-10-29 RX ADMIN — MAGNESIUM OXIDE TAB 400 MG (241.3 MG ELEMENTAL MG) 400 MG: 400 (241.3 MG) TAB at 08:54

## 2022-10-29 RX ADMIN — ASPIRIN 81 MG: 81 TABLET, COATED ORAL at 08:54

## 2022-10-29 RX ADMIN — DOCUSATE SODIUM 100 MG: 100 CAPSULE, LIQUID FILLED ORAL at 10:46

## 2022-10-29 RX ADMIN — LORAZEPAM 0.5 MG: 0.5 TABLET ORAL at 05:54

## 2022-10-29 RX ADMIN — DOCUSATE SODIUM 100 MG: 100 CAPSULE, LIQUID FILLED ORAL at 21:00

## 2022-10-29 RX ADMIN — ROSUVASTATIN 20 MG: 10 TABLET, FILM COATED ORAL at 21:00

## 2022-10-29 RX ADMIN — DIVALPROEX SODIUM 250 MG: 250 TABLET, DELAYED RELEASE ORAL at 08:54

## 2022-10-29 RX ADMIN — FERROUS SULFATE TAB EC 324 MG (65 MG FE EQUIVALENT) 324 MG: 324 (65 FE) TABLET DELAYED RESPONSE at 08:54

## 2022-10-29 RX ADMIN — SODIUM CHLORIDE 100 ML/HR: 9 INJECTION, SOLUTION INTRAVENOUS at 22:37

## 2022-10-29 RX ADMIN — INSULIN GLARGINE 10 UNITS: 100 INJECTION, SOLUTION SUBCUTANEOUS at 21:00

## 2022-10-29 RX ADMIN — CLOPIDOGREL BISULFATE 75 MG: 75 TABLET ORAL at 08:54

## 2022-10-29 RX ADMIN — ARIPIPRAZOLE 2 MG: 2 TABLET ORAL at 21:00

## 2022-10-29 RX ADMIN — INSULIN LISPRO 7 UNITS: 100 INJECTION, SOLUTION INTRAVENOUS; SUBCUTANEOUS at 18:10

## 2022-10-29 RX ADMIN — METOPROLOL SUCCINATE 12.5 MG: 25 TABLET, FILM COATED, EXTENDED RELEASE ORAL at 08:54

## 2022-10-29 RX ADMIN — Medication 10 ML: at 21:00

## 2022-10-29 RX ADMIN — POLYETHYLENE GLYCOL 3350 17 G: 17 POWDER, FOR SOLUTION ORAL at 10:46

## 2022-10-30 ENCOUNTER — APPOINTMENT (OUTPATIENT)
Dept: CT IMAGING | Facility: HOSPITAL | Age: 82
End: 2022-10-30

## 2022-10-30 ENCOUNTER — APPOINTMENT (OUTPATIENT)
Dept: GENERAL RADIOLOGY | Facility: HOSPITAL | Age: 82
End: 2022-10-30

## 2022-10-30 LAB
ANION GAP SERPL CALCULATED.3IONS-SCNC: 9 MMOL/L (ref 5–15)
ANISOCYTOSIS BLD QL: ABNORMAL
B PARAPERT DNA SPEC QL NAA+PROBE: NOT DETECTED
B PERT DNA SPEC QL NAA+PROBE: NOT DETECTED
BUN SERPL-MCNC: 14 MG/DL (ref 8–23)
BUN/CREAT SERPL: 31.8 (ref 7–25)
C PNEUM DNA NPH QL NAA+NON-PROBE: NOT DETECTED
CALCIUM SPEC-SCNC: 8 MG/DL (ref 8.6–10.5)
CHLORIDE SERPL-SCNC: 107 MMOL/L (ref 98–107)
CO2 SERPL-SCNC: 24 MMOL/L (ref 22–29)
CREAT SERPL-MCNC: 0.44 MG/DL (ref 0.57–1)
DEPRECATED RDW RBC AUTO: 52.9 FL (ref 37–54)
EGFRCR SERPLBLD CKD-EPI 2021: 96.1 ML/MIN/1.73
EOSINOPHIL # BLD MANUAL: 0.28 10*3/MM3 (ref 0–0.4)
EOSINOPHIL NFR BLD MANUAL: 2 % (ref 0.3–6.2)
ERYTHROCYTE [DISTWIDTH] IN BLOOD BY AUTOMATED COUNT: 18.3 % (ref 12.3–15.4)
FLUAV SUBTYP SPEC NAA+PROBE: NOT DETECTED
FLUBV RNA ISLT QL NAA+PROBE: NOT DETECTED
GLUCOSE BLDC GLUCOMTR-MCNC: 175 MG/DL (ref 70–105)
GLUCOSE BLDC GLUCOMTR-MCNC: 218 MG/DL (ref 70–105)
GLUCOSE BLDC GLUCOMTR-MCNC: 342 MG/DL (ref 70–105)
GLUCOSE BLDC GLUCOMTR-MCNC: 359 MG/DL (ref 70–105)
GLUCOSE SERPL-MCNC: 197 MG/DL (ref 65–99)
HADV DNA SPEC NAA+PROBE: NOT DETECTED
HCOV 229E RNA SPEC QL NAA+PROBE: NOT DETECTED
HCOV HKU1 RNA SPEC QL NAA+PROBE: NOT DETECTED
HCOV NL63 RNA SPEC QL NAA+PROBE: NOT DETECTED
HCOV OC43 RNA SPEC QL NAA+PROBE: NOT DETECTED
HCT VFR BLD AUTO: 29.5 % (ref 34–46.6)
HGB BLD-MCNC: 8.7 G/DL (ref 12–15.9)
HMPV RNA NPH QL NAA+NON-PROBE: NOT DETECTED
HPIV1 RNA ISLT QL NAA+PROBE: NOT DETECTED
HPIV2 RNA SPEC QL NAA+PROBE: NOT DETECTED
HPIV3 RNA NPH QL NAA+PROBE: NOT DETECTED
HPIV4 P GENE NPH QL NAA+PROBE: NOT DETECTED
LARGE PLATELETS: ABNORMAL
LYMPHOCYTES # BLD MANUAL: 1.25 10*3/MM3 (ref 0.7–3.1)
LYMPHOCYTES NFR BLD MANUAL: 5 % (ref 5–12)
M PNEUMO IGG SER IA-ACNC: NOT DETECTED
MCH RBC QN AUTO: 23.8 PG (ref 26.6–33)
MCHC RBC AUTO-ENTMCNC: 29.4 G/DL (ref 31.5–35.7)
MCV RBC AUTO: 81.1 FL (ref 79–97)
MONOCYTES # BLD: 0.7 10*3/MM3 (ref 0.1–0.9)
NEUTROPHILS # BLD AUTO: 11.68 10*3/MM3 (ref 1.7–7)
NEUTROPHILS NFR BLD MANUAL: 81 % (ref 42.7–76)
NEUTS BAND NFR BLD MANUAL: 3 % (ref 0–5)
NT-PROBNP SERPL-MCNC: ABNORMAL PG/ML (ref 0–1800)
PLATELET # BLD AUTO: 321 10*3/MM3 (ref 140–450)
PMV BLD AUTO: 8.3 FL (ref 6–12)
POIKILOCYTOSIS BLD QL SMEAR: ABNORMAL
POTASSIUM SERPL-SCNC: 4.2 MMOL/L (ref 3.5–5.2)
RBC # BLD AUTO: 3.64 10*6/MM3 (ref 3.77–5.28)
RHINOVIRUS RNA SPEC NAA+PROBE: NOT DETECTED
RSV RNA NPH QL NAA+NON-PROBE: NOT DETECTED
SARS-COV-2 RNA NPH QL NAA+NON-PROBE: NOT DETECTED
SCAN SLIDE: NORMAL
SMALL PLATELETS BLD QL SMEAR: ADEQUATE
SODIUM SERPL-SCNC: 140 MMOL/L (ref 136–145)
TROPONIN T SERPL-MCNC: 0.07 NG/ML (ref 0–0.03)
TROPONIN T SERPL-MCNC: 0.07 NG/ML (ref 0–0.03)
TROPONIN T SERPL-MCNC: 0.08 NG/ML (ref 0–0.03)
VARIANT LYMPHS NFR BLD MANUAL: 9 % (ref 19.6–45.3)
WBC MORPH BLD: NORMAL
WBC NRBC COR # BLD: 13.9 10*3/MM3 (ref 3.4–10.8)

## 2022-10-30 PROCEDURE — 63710000001 INSULIN LISPRO (HUMAN) PER 5 UNITS: Performed by: INTERNAL MEDICINE

## 2022-10-30 PROCEDURE — 94761 N-INVAS EAR/PLS OXIMETRY MLT: CPT

## 2022-10-30 PROCEDURE — 94799 UNLISTED PULMONARY SVC/PX: CPT

## 2022-10-30 PROCEDURE — 71045 X-RAY EXAM CHEST 1 VIEW: CPT

## 2022-10-30 PROCEDURE — 83880 ASSAY OF NATRIURETIC PEPTIDE: CPT | Performed by: INTERNAL MEDICINE

## 2022-10-30 PROCEDURE — 36415 COLL VENOUS BLD VENIPUNCTURE: CPT | Performed by: INTERNAL MEDICINE

## 2022-10-30 PROCEDURE — 25010000002 PIPERACILLIN SOD-TAZOBACTAM PER 1 G

## 2022-10-30 PROCEDURE — 94640 AIRWAY INHALATION TREATMENT: CPT

## 2022-10-30 PROCEDURE — 80048 BASIC METABOLIC PNL TOTAL CA: CPT | Performed by: INTERNAL MEDICINE

## 2022-10-30 PROCEDURE — 84484 ASSAY OF TROPONIN QUANT: CPT | Performed by: INTERNAL MEDICINE

## 2022-10-30 PROCEDURE — 85025 COMPLETE CBC W/AUTO DIFF WBC: CPT | Performed by: INTERNAL MEDICINE

## 2022-10-30 PROCEDURE — 63710000001 INSULIN GLARGINE PER 5 UNITS: Performed by: INTERNAL MEDICINE

## 2022-10-30 PROCEDURE — 25010000002 FUROSEMIDE PER 20 MG: Performed by: INTERNAL MEDICINE

## 2022-10-30 PROCEDURE — 82962 GLUCOSE BLOOD TEST: CPT

## 2022-10-30 PROCEDURE — 0202U NFCT DS 22 TRGT SARS-COV-2: CPT

## 2022-10-30 PROCEDURE — 85007 BL SMEAR W/DIFF WBC COUNT: CPT | Performed by: INTERNAL MEDICINE

## 2022-10-30 PROCEDURE — 71250 CT THORAX DX C-: CPT

## 2022-10-30 PROCEDURE — 94664 DEMO&/EVAL PT USE INHALER: CPT

## 2022-10-30 PROCEDURE — 99233 SBSQ HOSP IP/OBS HIGH 50: CPT | Performed by: INTERNAL MEDICINE

## 2022-10-30 RX ORDER — METOPROLOL SUCCINATE 25 MG/1
25 TABLET, EXTENDED RELEASE ORAL
Status: DISCONTINUED | OUTPATIENT
Start: 2022-10-31 | End: 2022-11-15 | Stop reason: HOSPADM

## 2022-10-30 RX ORDER — IPRATROPIUM BROMIDE AND ALBUTEROL SULFATE 2.5; .5 MG/3ML; MG/3ML
3 SOLUTION RESPIRATORY (INHALATION) EVERY 4 HOURS PRN
Status: DISCONTINUED | OUTPATIENT
Start: 2022-10-30 | End: 2022-11-09

## 2022-10-30 RX ORDER — FUROSEMIDE 10 MG/ML
20 INJECTION INTRAMUSCULAR; INTRAVENOUS DAILY
Status: DISCONTINUED | OUTPATIENT
Start: 2022-10-30 | End: 2022-10-31

## 2022-10-30 RX ORDER — ACETYLCYSTEINE 200 MG/ML
3 SOLUTION ORAL; RESPIRATORY (INHALATION)
Status: DISCONTINUED | OUTPATIENT
Start: 2022-10-30 | End: 2022-10-30

## 2022-10-30 RX ORDER — IPRATROPIUM BROMIDE AND ALBUTEROL SULFATE 2.5; .5 MG/3ML; MG/3ML
3 SOLUTION RESPIRATORY (INHALATION)
Status: DISCONTINUED | OUTPATIENT
Start: 2022-10-30 | End: 2022-11-01

## 2022-10-30 RX ORDER — LOSARTAN POTASSIUM 25 MG/1
25 TABLET ORAL
Status: DISCONTINUED | OUTPATIENT
Start: 2022-10-30 | End: 2022-11-15 | Stop reason: HOSPADM

## 2022-10-30 RX ORDER — ACETYLCYSTEINE 200 MG/ML
3 SOLUTION ORAL; RESPIRATORY (INHALATION)
Status: DISCONTINUED | OUTPATIENT
Start: 2022-10-30 | End: 2022-11-04

## 2022-10-30 RX ADMIN — METOPROLOL SUCCINATE 12.5 MG: 25 TABLET, FILM COATED, EXTENDED RELEASE ORAL at 10:03

## 2022-10-30 RX ADMIN — PANTOPRAZOLE SODIUM 40 MG: 40 TABLET, DELAYED RELEASE ORAL at 05:23

## 2022-10-30 RX ADMIN — Medication 10 ML: at 20:43

## 2022-10-30 RX ADMIN — FUROSEMIDE 20 MG: 10 INJECTION, SOLUTION INTRAMUSCULAR; INTRAVENOUS at 16:26

## 2022-10-30 RX ADMIN — LOSARTAN POTASSIUM 25 MG: 25 TABLET, FILM COATED ORAL at 16:26

## 2022-10-30 RX ADMIN — ROSUVASTATIN 20 MG: 10 TABLET, FILM COATED ORAL at 20:43

## 2022-10-30 RX ADMIN — INSULIN LISPRO 4 UNITS: 100 INJECTION, SOLUTION INTRAVENOUS; SUBCUTANEOUS at 17:56

## 2022-10-30 RX ADMIN — IPRATROPIUM BROMIDE AND ALBUTEROL SULFATE 3 ML: .5; 3 SOLUTION RESPIRATORY (INHALATION) at 18:50

## 2022-10-30 RX ADMIN — DOCUSATE SODIUM 100 MG: 100 CAPSULE, LIQUID FILLED ORAL at 10:02

## 2022-10-30 RX ADMIN — ACETYLCYSTEINE 3 ML: 200 SOLUTION ORAL; RESPIRATORY (INHALATION) at 18:55

## 2022-10-30 RX ADMIN — DIVALPROEX SODIUM 500 MG: 500 TABLET, DELAYED RELEASE ORAL at 10:03

## 2022-10-30 RX ADMIN — LEVOTHYROXINE SODIUM 50 MCG: 50 TABLET ORAL at 05:23

## 2022-10-30 RX ADMIN — INSULIN LISPRO 2 UNITS: 100 INJECTION, SOLUTION INTRAVENOUS; SUBCUTANEOUS at 10:03

## 2022-10-30 RX ADMIN — DIVALPROEX SODIUM 500 MG: 500 TABLET, DELAYED RELEASE ORAL at 20:43

## 2022-10-30 RX ADMIN — INSULIN LISPRO 7 UNITS: 100 INJECTION, SOLUTION INTRAVENOUS; SUBCUTANEOUS at 13:21

## 2022-10-30 RX ADMIN — IPRATROPIUM BROMIDE AND ALBUTEROL SULFATE 3 ML: .5; 3 SOLUTION RESPIRATORY (INHALATION) at 14:23

## 2022-10-30 RX ADMIN — ASPIRIN 81 MG: 81 TABLET, COATED ORAL at 10:02

## 2022-10-30 RX ADMIN — CLOPIDOGREL BISULFATE 75 MG: 75 TABLET ORAL at 10:02

## 2022-10-30 RX ADMIN — PIPERACILLIN AND TAZOBACTAM 3.38 G: 3; .375 INJECTION, POWDER, FOR SOLUTION INTRAVENOUS at 23:40

## 2022-10-30 RX ADMIN — FERROUS SULFATE TAB EC 324 MG (65 MG FE EQUIVALENT) 324 MG: 324 (65 FE) TABLET DELAYED RESPONSE at 10:03

## 2022-10-30 RX ADMIN — AMOXICILLIN AND CLAVULANATE POTASSIUM 1 TABLET: 875; 125 TABLET, FILM COATED ORAL at 10:03

## 2022-10-30 RX ADMIN — INSULIN GLARGINE 10 UNITS: 100 INJECTION, SOLUTION SUBCUTANEOUS at 20:41

## 2022-10-30 RX ADMIN — POLYETHYLENE GLYCOL 3350 17 G: 17 POWDER, FOR SOLUTION ORAL at 10:03

## 2022-10-30 RX ADMIN — MAGNESIUM OXIDE TAB 400 MG (241.3 MG ELEMENTAL MG) 400 MG: 400 (241.3 MG) TAB at 10:03

## 2022-10-30 RX ADMIN — PIPERACILLIN AND TAZOBACTAM 3.38 G: 3; .375 INJECTION, POWDER, FOR SOLUTION INTRAVENOUS at 16:25

## 2022-10-30 RX ADMIN — ARIPIPRAZOLE 2 MG: 2 TABLET ORAL at 20:43

## 2022-10-30 RX ADMIN — FERROUS SULFATE TAB EC 324 MG (65 MG FE EQUIVALENT) 324 MG: 324 (65 FE) TABLET DELAYED RESPONSE at 16:26

## 2022-10-31 ENCOUNTER — APPOINTMENT (OUTPATIENT)
Dept: CARDIOLOGY | Facility: HOSPITAL | Age: 82
End: 2022-10-31

## 2022-10-31 LAB
ACT BLD: 196 SECONDS (ref 89–137)
ANION GAP SERPL CALCULATED.3IONS-SCNC: 11 MMOL/L (ref 5–15)
BASOPHILS # BLD AUTO: 0.1 10*3/MM3 (ref 0–0.2)
BASOPHILS NFR BLD AUTO: 0.6 % (ref 0–1.5)
BH CV ECHO MEAS - EDV(MOD-SP2): 52.2 ML
BH CV ECHO MEAS - EDV(MOD-SP4): 60.1 ML
BH CV ECHO MEAS - EF(MOD-BP): 49 %
BH CV ECHO MEAS - EF(MOD-SP2): 49.2 %
BH CV ECHO MEAS - EF(MOD-SP4): 46.5 %
BH CV ECHO MEAS - ESV(MOD-SP2): 26.6 ML
BH CV ECHO MEAS - ESV(MOD-SP4): 32.1 ML
BH CV ECHO MEAS - LV DIASTOLIC VOL/BSA (35-75): 35.8 CM2
BH CV ECHO MEAS - LV SYSTOLIC VOL/BSA (12-30): 19.2 CM2
BH CV ECHO MEAS - SI(MOD-SP2): 15.3 ML/M2
BH CV ECHO MEAS - SI(MOD-SP4): 16.7 ML/M2
BH CV ECHO MEAS - SV(MOD-SP2): 25.7 ML
BH CV ECHO MEAS - SV(MOD-SP4): 28 ML
BUN SERPL-MCNC: 12 MG/DL (ref 8–23)
BUN/CREAT SERPL: 23.5 (ref 7–25)
CALCIUM SPEC-SCNC: 8.5 MG/DL (ref 8.6–10.5)
CHLORIDE SERPL-SCNC: 100 MMOL/L (ref 98–107)
CO2 SERPL-SCNC: 24 MMOL/L (ref 22–29)
CREAT SERPL-MCNC: 0.51 MG/DL (ref 0.57–1)
DEPRECATED RDW RBC AUTO: 52.9 FL (ref 37–54)
EGFRCR SERPLBLD CKD-EPI 2021: 92.8 ML/MIN/1.73
EOSINOPHIL # BLD AUTO: 0.3 10*3/MM3 (ref 0–0.4)
EOSINOPHIL NFR BLD AUTO: 2.2 % (ref 0.3–6.2)
ERYTHROCYTE [DISTWIDTH] IN BLOOD BY AUTOMATED COUNT: 18.4 % (ref 12.3–15.4)
GLUCOSE BLDC GLUCOMTR-MCNC: 202 MG/DL (ref 70–105)
GLUCOSE BLDC GLUCOMTR-MCNC: 381 MG/DL (ref 70–105)
GLUCOSE BLDC GLUCOMTR-MCNC: 383 MG/DL (ref 70–105)
GLUCOSE BLDC GLUCOMTR-MCNC: 510 MG/DL (ref 70–105)
GLUCOSE SERPL-MCNC: 190 MG/DL (ref 65–99)
HCT VFR BLD AUTO: 28.5 % (ref 34–46.6)
HGB BLD-MCNC: 8.4 G/DL (ref 12–15.9)
HOLD SPECIMEN: NORMAL
LYMPHOCYTES # BLD AUTO: 1.2 10*3/MM3 (ref 0.7–3.1)
LYMPHOCYTES NFR BLD AUTO: 9.7 % (ref 19.6–45.3)
MAXIMAL PREDICTED HEART RATE: 137 BPM
MCH RBC QN AUTO: 23.8 PG (ref 26.6–33)
MCHC RBC AUTO-ENTMCNC: 29.5 G/DL (ref 31.5–35.7)
MCV RBC AUTO: 80.7 FL (ref 79–97)
MONOCYTES # BLD AUTO: 1.1 10*3/MM3 (ref 0.1–0.9)
MONOCYTES NFR BLD AUTO: 9.3 % (ref 5–12)
NEUTROPHILS NFR BLD AUTO: 78.2 % (ref 42.7–76)
NEUTROPHILS NFR BLD AUTO: 9.5 10*3/MM3 (ref 1.7–7)
NRBC BLD AUTO-RTO: 0.1 /100 WBC (ref 0–0.2)
PLATELET # BLD AUTO: 366 10*3/MM3 (ref 140–450)
PMV BLD AUTO: 8.4 FL (ref 6–12)
POTASSIUM SERPL-SCNC: 3.8 MMOL/L (ref 3.5–5.2)
RBC # BLD AUTO: 3.54 10*6/MM3 (ref 3.77–5.28)
SODIUM SERPL-SCNC: 135 MMOL/L (ref 136–145)
STRESS TARGET HR: 116 BPM
TROPONIN T SERPL-MCNC: 0.06 NG/ML (ref 0–0.03)
TROPONIN T SERPL-MCNC: 0.06 NG/ML (ref 0–0.03)
TSH SERPL DL<=0.05 MIU/L-ACNC: 10.81 UIU/ML (ref 0.27–4.2)
WBC NRBC COR # BLD: 12.2 10*3/MM3 (ref 3.4–10.8)

## 2022-10-31 PROCEDURE — 82962 GLUCOSE BLOOD TEST: CPT

## 2022-10-31 PROCEDURE — 84484 ASSAY OF TROPONIN QUANT: CPT | Performed by: INTERNAL MEDICINE

## 2022-10-31 PROCEDURE — 92526 ORAL FUNCTION THERAPY: CPT

## 2022-10-31 PROCEDURE — 63710000001 INSULIN LISPRO (HUMAN) PER 5 UNITS: Performed by: INTERNAL MEDICINE

## 2022-10-31 PROCEDURE — 94664 DEMO&/EVAL PT USE INHALER: CPT

## 2022-10-31 PROCEDURE — 94761 N-INVAS EAR/PLS OXIMETRY MLT: CPT

## 2022-10-31 PROCEDURE — 97530 THERAPEUTIC ACTIVITIES: CPT

## 2022-10-31 PROCEDURE — 93308 TTE F-UP OR LMTD: CPT | Performed by: INTERNAL MEDICINE

## 2022-10-31 PROCEDURE — 25010000002 FUROSEMIDE PER 20 MG: Performed by: INTERNAL MEDICINE

## 2022-10-31 PROCEDURE — 84443 ASSAY THYROID STIM HORMONE: CPT | Performed by: INTERNAL MEDICINE

## 2022-10-31 PROCEDURE — 25010000002 PIPERACILLIN SOD-TAZOBACTAM PER 1 G

## 2022-10-31 PROCEDURE — 99232 SBSQ HOSP IP/OBS MODERATE 35: CPT | Performed by: INTERNAL MEDICINE

## 2022-10-31 PROCEDURE — 97116 GAIT TRAINING THERAPY: CPT

## 2022-10-31 PROCEDURE — 93010 ELECTROCARDIOGRAM REPORT: CPT | Performed by: INTERNAL MEDICINE

## 2022-10-31 PROCEDURE — 25010000002 FUROSEMIDE PER 20 MG: Performed by: NURSE PRACTITIONER

## 2022-10-31 PROCEDURE — 93308 TTE F-UP OR LMTD: CPT

## 2022-10-31 PROCEDURE — 80048 BASIC METABOLIC PNL TOTAL CA: CPT | Performed by: INTERNAL MEDICINE

## 2022-10-31 PROCEDURE — 85025 COMPLETE CBC W/AUTO DIFF WBC: CPT | Performed by: INTERNAL MEDICINE

## 2022-10-31 PROCEDURE — 36415 COLL VENOUS BLD VENIPUNCTURE: CPT | Performed by: INTERNAL MEDICINE

## 2022-10-31 PROCEDURE — 63710000001 INSULIN GLARGINE PER 5 UNITS: Performed by: INTERNAL MEDICINE

## 2022-10-31 PROCEDURE — 99221 1ST HOSP IP/OBS SF/LOW 40: CPT | Performed by: INTERNAL MEDICINE

## 2022-10-31 PROCEDURE — 94799 UNLISTED PULMONARY SVC/PX: CPT

## 2022-10-31 PROCEDURE — 93005 ELECTROCARDIOGRAM TRACING: CPT | Performed by: INTERNAL MEDICINE

## 2022-10-31 RX ORDER — INSULIN LISPRO 100 [IU]/ML
3-14 INJECTION, SOLUTION INTRAVENOUS; SUBCUTANEOUS
Status: DISCONTINUED | OUTPATIENT
Start: 2022-10-31 | End: 2022-11-15 | Stop reason: HOSPADM

## 2022-10-31 RX ORDER — FUROSEMIDE 10 MG/ML
20 INJECTION INTRAMUSCULAR; INTRAVENOUS EVERY 12 HOURS
Status: DISCONTINUED | OUTPATIENT
Start: 2022-10-31 | End: 2022-11-05

## 2022-10-31 RX ADMIN — METOPROLOL SUCCINATE 25 MG: 25 TABLET, FILM COATED, EXTENDED RELEASE ORAL at 09:25

## 2022-10-31 RX ADMIN — FERROUS SULFATE TAB EC 324 MG (65 MG FE EQUIVALENT) 324 MG: 324 (65 FE) TABLET DELAYED RESPONSE at 09:25

## 2022-10-31 RX ADMIN — PIPERACILLIN AND TAZOBACTAM 3.38 G: 3; .375 INJECTION, POWDER, FOR SOLUTION INTRAVENOUS at 14:22

## 2022-10-31 RX ADMIN — FERROUS SULFATE TAB EC 324 MG (65 MG FE EQUIVALENT) 324 MG: 324 (65 FE) TABLET DELAYED RESPONSE at 19:03

## 2022-10-31 RX ADMIN — DIVALPROEX SODIUM 500 MG: 500 TABLET, DELAYED RELEASE ORAL at 09:25

## 2022-10-31 RX ADMIN — PANTOPRAZOLE SODIUM 40 MG: 40 TABLET, DELAYED RELEASE ORAL at 05:32

## 2022-10-31 RX ADMIN — ACETYLCYSTEINE 4 ML: 200 SOLUTION ORAL; RESPIRATORY (INHALATION) at 08:45

## 2022-10-31 RX ADMIN — IPRATROPIUM BROMIDE AND ALBUTEROL SULFATE 3 ML: .5; 3 SOLUTION RESPIRATORY (INHALATION) at 20:09

## 2022-10-31 RX ADMIN — FUROSEMIDE 20 MG: 10 INJECTION, SOLUTION INTRAMUSCULAR; INTRAVENOUS at 21:56

## 2022-10-31 RX ADMIN — ASPIRIN 81 MG: 81 TABLET, COATED ORAL at 09:25

## 2022-10-31 RX ADMIN — DIVALPROEX SODIUM 500 MG: 500 TABLET, DELAYED RELEASE ORAL at 21:57

## 2022-10-31 RX ADMIN — PIPERACILLIN AND TAZOBACTAM 3.38 G: 3; .375 INJECTION, POWDER, FOR SOLUTION INTRAVENOUS at 09:25

## 2022-10-31 RX ADMIN — MAGNESIUM OXIDE TAB 400 MG (241.3 MG ELEMENTAL MG) 400 MG: 400 (241.3 MG) TAB at 09:25

## 2022-10-31 RX ADMIN — ACETYLCYSTEINE 3 ML: 200 SOLUTION ORAL; RESPIRATORY (INHALATION) at 20:14

## 2022-10-31 RX ADMIN — IPRATROPIUM BROMIDE AND ALBUTEROL SULFATE 3 ML: .5; 3 SOLUTION RESPIRATORY (INHALATION) at 15:30

## 2022-10-31 RX ADMIN — CLOPIDOGREL BISULFATE 75 MG: 75 TABLET ORAL at 09:25

## 2022-10-31 RX ADMIN — INSULIN LISPRO 14 UNITS: 100 INJECTION, SOLUTION INTRAVENOUS; SUBCUTANEOUS at 19:02

## 2022-10-31 RX ADMIN — LOSARTAN POTASSIUM 25 MG: 25 TABLET, FILM COATED ORAL at 09:25

## 2022-10-31 RX ADMIN — FUROSEMIDE 20 MG: 10 INJECTION, SOLUTION INTRAMUSCULAR; INTRAVENOUS at 09:25

## 2022-10-31 RX ADMIN — PIPERACILLIN AND TAZOBACTAM 3.38 G: 3; .375 INJECTION, POWDER, FOR SOLUTION INTRAVENOUS at 23:59

## 2022-10-31 RX ADMIN — PANTOPRAZOLE SODIUM 40 MG: 40 TABLET, DELAYED RELEASE ORAL at 09:26

## 2022-10-31 RX ADMIN — IPRATROPIUM BROMIDE AND ALBUTEROL SULFATE 3 ML: .5; 3 SOLUTION RESPIRATORY (INHALATION) at 12:11

## 2022-10-31 RX ADMIN — LEVOTHYROXINE SODIUM 50 MCG: 50 TABLET ORAL at 05:32

## 2022-10-31 RX ADMIN — Medication 10 ML: at 09:25

## 2022-10-31 RX ADMIN — ARIPIPRAZOLE 2 MG: 2 TABLET ORAL at 21:57

## 2022-10-31 RX ADMIN — INSULIN LISPRO 6 UNITS: 100 INJECTION, SOLUTION INTRAVENOUS; SUBCUTANEOUS at 09:26

## 2022-10-31 RX ADMIN — IPRATROPIUM BROMIDE AND ALBUTEROL SULFATE 3 ML: .5; 3 SOLUTION RESPIRATORY (INHALATION) at 08:45

## 2022-10-31 RX ADMIN — ROSUVASTATIN 20 MG: 10 TABLET, FILM COATED ORAL at 21:57

## 2022-10-31 RX ADMIN — INSULIN GLARGINE 10 UNITS: 100 INJECTION, SOLUTION SUBCUTANEOUS at 21:58

## 2022-11-01 LAB
GLUCOSE BLDC GLUCOMTR-MCNC: 127 MG/DL (ref 70–105)
GLUCOSE BLDC GLUCOMTR-MCNC: 177 MG/DL (ref 70–105)
GLUCOSE BLDC GLUCOMTR-MCNC: 185 MG/DL (ref 70–105)
GLUCOSE BLDC GLUCOMTR-MCNC: 359 MG/DL (ref 70–105)
GLUCOSE BLDC GLUCOMTR-MCNC: 366 MG/DL (ref 70–105)
TROPONIN T SERPL-MCNC: 0.06 NG/ML (ref 0–0.03)

## 2022-11-01 PROCEDURE — 25010000002 FUROSEMIDE PER 20 MG: Performed by: NURSE PRACTITIONER

## 2022-11-01 PROCEDURE — 94664 DEMO&/EVAL PT USE INHALER: CPT

## 2022-11-01 PROCEDURE — 92526 ORAL FUNCTION THERAPY: CPT

## 2022-11-01 PROCEDURE — 84484 ASSAY OF TROPONIN QUANT: CPT | Performed by: INTERNAL MEDICINE

## 2022-11-01 PROCEDURE — 82962 GLUCOSE BLOOD TEST: CPT

## 2022-11-01 PROCEDURE — 63710000001 INSULIN GLARGINE PER 5 UNITS: Performed by: INTERNAL MEDICINE

## 2022-11-01 PROCEDURE — 99232 SBSQ HOSP IP/OBS MODERATE 35: CPT | Performed by: INTERNAL MEDICINE

## 2022-11-01 PROCEDURE — 97535 SELF CARE MNGMENT TRAINING: CPT

## 2022-11-01 PROCEDURE — 94799 UNLISTED PULMONARY SVC/PX: CPT

## 2022-11-01 PROCEDURE — 25010000002 PIPERACILLIN SOD-TAZOBACTAM PER 1 G

## 2022-11-01 PROCEDURE — 99231 SBSQ HOSP IP/OBS SF/LOW 25: CPT | Performed by: INTERNAL MEDICINE

## 2022-11-01 PROCEDURE — 63710000001 INSULIN LISPRO (HUMAN) PER 5 UNITS: Performed by: INTERNAL MEDICINE

## 2022-11-01 RX ORDER — IPRATROPIUM BROMIDE AND ALBUTEROL SULFATE 2.5; .5 MG/3ML; MG/3ML
3 SOLUTION RESPIRATORY (INHALATION)
Status: DISCONTINUED | OUTPATIENT
Start: 2022-11-02 | End: 2022-11-07

## 2022-11-01 RX ADMIN — INSULIN GLARGINE 10 UNITS: 100 INJECTION, SOLUTION SUBCUTANEOUS at 20:54

## 2022-11-01 RX ADMIN — INSULIN LISPRO 12 UNITS: 100 INJECTION, SOLUTION INTRAVENOUS; SUBCUTANEOUS at 12:51

## 2022-11-01 RX ADMIN — LOSARTAN POTASSIUM 25 MG: 25 TABLET, FILM COATED ORAL at 08:58

## 2022-11-01 RX ADMIN — DIVALPROEX SODIUM 500 MG: 500 TABLET, DELAYED RELEASE ORAL at 20:42

## 2022-11-01 RX ADMIN — MAGNESIUM OXIDE TAB 400 MG (241.3 MG ELEMENTAL MG) 400 MG: 400 (241.3 MG) TAB at 08:58

## 2022-11-01 RX ADMIN — ACETYLCYSTEINE 3 ML: 200 SOLUTION ORAL; RESPIRATORY (INHALATION) at 06:55

## 2022-11-01 RX ADMIN — FERROUS SULFATE TAB EC 324 MG (65 MG FE EQUIVALENT) 324 MG: 324 (65 FE) TABLET DELAYED RESPONSE at 17:41

## 2022-11-01 RX ADMIN — CLOPIDOGREL BISULFATE 75 MG: 75 TABLET ORAL at 08:58

## 2022-11-01 RX ADMIN — DOCUSATE SODIUM 100 MG: 100 CAPSULE, LIQUID FILLED ORAL at 20:42

## 2022-11-01 RX ADMIN — ASPIRIN 81 MG: 81 TABLET, COATED ORAL at 08:58

## 2022-11-01 RX ADMIN — FUROSEMIDE 20 MG: 10 INJECTION, SOLUTION INTRAMUSCULAR; INTRAVENOUS at 20:42

## 2022-11-01 RX ADMIN — PIPERACILLIN AND TAZOBACTAM 3.38 G: 3; .375 INJECTION, POWDER, FOR SOLUTION INTRAVENOUS at 16:25

## 2022-11-01 RX ADMIN — INSULIN GLARGINE 10 UNITS: 100 INJECTION, SOLUTION SUBCUTANEOUS at 08:58

## 2022-11-01 RX ADMIN — INSULIN LISPRO 12 UNITS: 100 INJECTION, SOLUTION INTRAVENOUS; SUBCUTANEOUS at 17:41

## 2022-11-01 RX ADMIN — FERROUS SULFATE TAB EC 324 MG (65 MG FE EQUIVALENT) 324 MG: 324 (65 FE) TABLET DELAYED RESPONSE at 08:58

## 2022-11-01 RX ADMIN — PIPERACILLIN AND TAZOBACTAM 3.38 G: 3; .375 INJECTION, POWDER, FOR SOLUTION INTRAVENOUS at 06:20

## 2022-11-01 RX ADMIN — IPRATROPIUM BROMIDE AND ALBUTEROL SULFATE 3 ML: .5; 3 SOLUTION RESPIRATORY (INHALATION) at 06:50

## 2022-11-01 RX ADMIN — LEVOTHYROXINE SODIUM 50 MCG: 50 TABLET ORAL at 06:20

## 2022-11-01 RX ADMIN — ARIPIPRAZOLE 2 MG: 2 TABLET ORAL at 20:42

## 2022-11-01 RX ADMIN — IPRATROPIUM BROMIDE AND ALBUTEROL SULFATE 3 ML: .5; 3 SOLUTION RESPIRATORY (INHALATION) at 15:08

## 2022-11-01 RX ADMIN — ROSUVASTATIN 20 MG: 10 TABLET, FILM COATED ORAL at 20:42

## 2022-11-01 RX ADMIN — PIPERACILLIN AND TAZOBACTAM 3.38 G: 3; .375 INJECTION, POWDER, FOR SOLUTION INTRAVENOUS at 22:43

## 2022-11-01 RX ADMIN — METOPROLOL SUCCINATE 25 MG: 25 TABLET, FILM COATED, EXTENDED RELEASE ORAL at 08:58

## 2022-11-01 RX ADMIN — IPRATROPIUM BROMIDE AND ALBUTEROL SULFATE 3 ML: .5; 3 SOLUTION RESPIRATORY (INHALATION) at 19:10

## 2022-11-01 RX ADMIN — Medication 10 ML: at 08:58

## 2022-11-01 RX ADMIN — DIVALPROEX SODIUM 500 MG: 500 TABLET, DELAYED RELEASE ORAL at 08:58

## 2022-11-01 RX ADMIN — PANTOPRAZOLE SODIUM 40 MG: 40 TABLET, DELAYED RELEASE ORAL at 06:20

## 2022-11-01 RX ADMIN — FUROSEMIDE 20 MG: 10 INJECTION, SOLUTION INTRAMUSCULAR; INTRAVENOUS at 08:58

## 2022-11-01 RX ADMIN — Medication 10 ML: at 20:42

## 2022-11-01 RX ADMIN — ACETYLCYSTEINE 3 ML: 200 SOLUTION ORAL; RESPIRATORY (INHALATION) at 19:10

## 2022-11-01 NOTE — PROGRESS NOTES
[facility] Medicine Services Daily Progress Note    Patient Name: Laura Perkins  : 1940  MRN: [id number]  Primary Care Physician:  Beka Weir MD  Date of admission: 10/23/2022      Subjective      Brief interim history: 84-year-old female with known history of T2DM, hypertension, HLD, bipolar disorder, anemia, S/p bladder surgery,  CVA and history of colon cancer,S/p resection (10/22) . S/p recent colonoscopy which revealed 5 cm mass.  Patient underwent transverse colon resection at [facility] and pathology revealed invasive mucinous adenocarcinoma.  She was subsequently discharged to a local rehab facility on 10/19/2022.  Patient presented to [facility] ED on 10/23/2022 due to 2-day history of generalized body weakness, acute mental status changes/metabolic encephalopathy.  On presentation, she was noted with fever of 102 and tachycardic.  Blood cultures (10/23) which result pending and started on broad-spectrum antibiotic with Zosyn.     10/25/2022: Seen and examined and follow up.  Resting comfortably in bed in no distress or discomfort.      10/26/2022: Seen and examined in follow-up.  Resting comfortably.    10/27/2022: Seen and examined in follow-up.  Event noted for reported patient slipping out of bed last evening with no visible injury on examination.    10/28/2022:  Pt had a cardiac cath with DANIA to LAD.  Psych was consulted to help adjust meds.  Pt is off psych meds secondary to QTc prolongation.  Abilify increase, as well as Depakote for mood stabilization.  Cardiology following with QTc changes as well as EKG.      10/29/2022:  Pt is seen by psych and her meds are titrated.  Pt does not communicate with me and is repeating the words I a saying to her.  Pt is on 2 lit NC that can be titrated down.  Pt will be discharged when her psych meds are adjusted and she is back to her baseline.    10/30/2022:  Pt is very wheezy today and  states, pt is coughing very thick  sputum.  Pt is very confused and not reliable historian.  She denies complains.  Pt is on 2 lit NC, and she oxygenating well, but her wheeze is louder today.  Pt think that her new psych meds, are improving her condition.      10/31/2022.  Patient was seen and examined.  Patient's family complained restless legs last night.    Objective      Vitals:   Temp:  [97.5 °F (36.4 °C)-98.2 °F (36.8 °C)] 98.2 °F (36.8 °C)  Heart Rate:  [] 71  Resp:  [16-24] 18  BP: (149-162)/(48-79) 158/53  Flow (L/min):  [1-5] 3    Physical Exam  Pulmonary:      Breath sounds: Examination of the right-upper field reveals wheezing. Examination of the left-upper field reveals wheezing. Examination of the right-middle field reveals wheezing. Examination of the left-middle field reveals wheezing. Examination of the right-lower field reveals wheezing. Examination of the left-lower field reveals wheezing. Wheezing present.       Constitutional:       General: She is not in acute distress.     Appearance: She is not ill-appearing or toxic-appearing.      Comments: Frail-appearing female, alert and oriented x1.   HENT:      Head: Normocephalic.      Mouth/Throat:      Mouth: Mucous membranes are moist.   Cardiovascular:      Rate and Rhythm: Normal rate.      Pulses: Normal pulses.   Abdominal:      Palpations: Abdomen is soft.   Musculoskeletal:         General: Normal range of motion.      Cervical back: Neck supple.   Skin:     General: Skin is warm.   Neurological:      General: No focal deficit present.      Mental Status: She is alert.         Result Review    Result Review:  I have personally reviewed the results from the time of this admission to 10/31/2022 21:03 EDT and agree with these findings:  [x]  Laboratory  []  Microbiology  [x]  Radiology  []  EKG/Telemetry   [x]  Cardiology/Vascular   []  Pathology  [x]  Old records  []  Other:      Wounds (last 24 hours)     LDA Wound     Row Name 10/31/22 1600 10/31/22 1200 10/31/22 0800        Wound 10/24/22 1344  medial sacral spine Pressure Injury    Wound - Properties Group Placement Date: 10/24/22  -MG Placement Time: 1344  -MG Present on Hospital Admission: Y  -MG Side: --  -MG, middle  Orientation: medial  -MG Location: sacral spine  -MG Primary Wound Type: Pressure inj  -MG    Closure Approximated  -JE Approximated  -JE Approximated  -JE    Base pink;purple  -JE pink;purple  -JE pink;purple  -JE    Periwound redness  -JE redness  -JE redness  -JE    Periwound Temperature warm  -JE warm  -JE warm  -JE    Drainage Amount none  -JE none  -JE none  -JE    Retired Wound - Properties Group Placement Date: 10/24/22  -MG Placement Time: 1344  -MG Present on Hospital Admission: Y  -MG Side: --  -MG, middle  Orientation: medial  -MG Location: sacral spine  -MG Primary Wound Type: Pressure inj  -MG    Retired Wound - Properties Group Date first assessed: 10/24/22  -MG Time first assessed: 1344  -MG Present on Hospital Admission: Y  -MG Side: --  -MG, middle  Location: sacral spine  -MG Primary Wound Type: Pressure inj  -MG       Wound 10/24/22 1348 medial perineum    Wound - Properties Group Placement Date: 10/24/22  -MG Placement Time: 1348  -MG Present on Hospital Admission: Y  -MG Orientation: medial  -MG Location: perineum  -MG    Closure Open to air  -JE Open to air  -JE Open to air  -JE    Periwound pink  -JE pink  -JE pink  -JE    Periwound Temperature warm  -JE warm  -JE warm  -JE    Periwound Skin Turgor firm  -JE firm  -JE firm  -JE    Drainage Amount none  -JE none  -JE none  -JE    Retired Wound - Properties Group Placement Date: 10/24/22  -MG Placement Time: 1348  -MG Present on Hospital Admission: Y  -MG Orientation: medial  -MG Location: perineum  -MG    Retired Wound - Properties Group Date first assessed: 10/24/22  -MG Time first assessed: 1348  -MG Present on Hospital Admission: Y  -MG Location: perineum  -MG    Row Name 10/31/22 0410 10/31/22 0020          Wound 10/24/22 1344  medial  sacral spine Pressure Injury    Wound - Properties Group Placement Date: 10/24/22  -MG Placement Time: 1344  -MG Present on Hospital Admission: Y  -MG Side: --  -MG, middle  Orientation: medial  -MG Location: sacral spine  -MG Primary Wound Type: Pressure inj  -MG    Closure Approximated  -TJ Approximated  -TJ     Base pink;purple  -TJ pink;purple  -TJ     Periwound redness  -TJ redness  -TJ     Periwound Temperature warm  -TJ warm  -TJ     Drainage Amount none  -TJ none  -TJ     Retired Wound - Properties Group Placement Date: 10/24/22  -MG Placement Time: 1344  -MG Present on Hospital Admission: Y  -MG Side: --  -MG, middle  Orientation: medial  -MG Location: sacral spine  -MG Primary Wound Type: Pressure inj  -MG    Retired Wound - Properties Group Date first assessed: 10/24/22  -MG Time first assessed: 1344  -MG Present on Hospital Admission: Y  -MG Side: --  -MG, middle  Location: sacral spine  -MG Primary Wound Type: Pressure inj  -MG       Wound 10/24/22 1348 medial perineum    Wound - Properties Group Placement Date: 10/24/22  -MG Placement Time: 1348  -MG Present on Hospital Admission: Y  -MG Orientation: medial  -MG Location: perineum  -MG    Closure Open to air  -TJ Open to air  -TJ     Periwound pink  -TJ pink  -TJ     Periwound Temperature warm  -TJ warm  -TJ     Periwound Skin Turgor firm  -TJ firm  -TJ     Drainage Amount none  -TJ none  -TJ     Retired Wound - Properties Group Placement Date: 10/24/22  -MG Placement Time: 1348  -MG Present on Hospital Admission: Y  -MG Orientation: medial  -MG Location: perineum  -MG    Retired Wound - Properties Group Date first assessed: 10/24/22  -MG Time first assessed: 1348  -MG Present on Hospital Admission: Y  -MG Location: perineum  -MG          User Key  (r) = Recorded By, (t) = Taken By, (c) = Cosigned By    Initials Name Provider Type    Mary Rubi RN Registered Nurse    Apoorva Merida RN Registered Nurse    Ilda Torres RN  Registered Nurse                  Assessment & Plan        acetylcysteine, 3 mL, Nebulization, BID - RT  ARIPiprazole, 2 mg, Oral, Nightly  aspirin, 81 mg, Oral, Daily  clopidogrel, 300 mg, Oral, Once   And  clopidogrel, 75 mg, Oral, Daily  divalproex, 500 mg, Oral, BID  docusate sodium, 100 mg, Oral, BID  ferrous sulfate, 324 mg, Oral, BID With Meals  furosemide, 20 mg, Intravenous, Q12H  insulin glargine, 10 Units, Subcutaneous, Q12H  insulin lispro, 3-14 Units, Subcutaneous, TID With Meals  ipratropium-albuterol, 3 mL, Nebulization, 4x Daily - RT  levothyroxine, 50 mcg, Oral, Daily  losartan, 25 mg, Oral, Q24H  magnesium oxide, 400 mg, Oral, Daily  metoprolol succinate XL, 25 mg, Oral, Q24H  pantoprazole, 40 mg, Oral, QAM  piperacillin-tazobactam, 3.375 g, Intravenous, Q8H  polyethylene glycol, 17 g, Oral, Daily  rosuvastatin, 20 mg, Oral, Daily             Active Hospital Problems:  Active Hospital Problems    Diagnosis    • **Altered mental status, unspecified altered mental status type    • HAP (hospital-acquired pneumonia)    • Acute respiratory distress    • Elevated troponin    • Sepsis (HCC)    • Type 2 diabetes mellitus (HCC)    • Anxiety associated with depression    • Hypothyroidism (acquired)    • OAB (overactive bladder)    • GERD without esophagitis    • Acute non-ST elevation myocardial infarction (NSTEMI) (formerly [facility])      Added automatically from request for surgery [id number]     • Dyslipidemia      Added automatically from request for surgery [id number]     • Cancer of transverse colon (HCC)      Added automatically from request for surgery [id number]            Assessment/plan     Acute toxic metabolic encephalopathy/AMS      -resolving, and likely multifactorial.  Psych started pt on Abilify and adjusted Depakote for mood stabilization.       -Pt is still confused, and can't give clear ROS.  Meds to be adjusted by Psych.     Probable sepsis      -off Zosyn, now on Augmentin      -Duo nebs and Mucomyst nebs  added for thick secretions and wheezing     Pneumonia involving the left lung      -Continue Augmentin, and supportive care      NSTEMI/Elevated troponin       -scheduled for LHC (10/27), DANIA to LAD.       -Cp free and in the setting of suspected sepsis     History of colorectal cancer (invasive mucinous adenocarcinoma)      -S/p resection (10/22)     Dysphagia      -followed by speech pathologist      CVA      -Aspirin/Crestor     T2DM      -SSI/Lantus     Hypertension     HLD     -Crestor     Hypothyroidism      -Levothyroxine     Depression/anxiety      -Risperidone on hold 2/2 increase QT prolongation and consult Psychiatrist        -Depakote dose adjustment and initiation of Abilify, per Psych.      -Pt is still not at baseline, and is not communicating well, and can't give ROS.       DVT prophylaxis:  Mechanical DVT prophylaxis orders are present.    CODE STATUS:    Code Status (Patient has no pulse and is not breathing): No CPR (Do Not Attempt to Resuscitate)  Medical Interventions (Patient has pulse or is breathing): Full Support       Disposition: This wonderful patient can discharge in the next 24 hours.      Electronically signed by Alejandro Estrada MD, FACP, 10/31/22, 21:03 EDT.        [facility] Hospitalist Team

## 2022-11-01 NOTE — PLAN OF CARE
"Assessment: Laura Perkins presents with ADL impairments below baseline abilities which indicate the need for continued skilled intervention while inpatient. Pt was lying in supine upon arrival with bed linens soiled d/t urinary incontinence. Pt required max A for bed mobility this date. Pt able to roll L<>R with mod A utilizing bed rails. Pt required max A for supine <>sit. Pt sat edge of bed with min A for static sitting and utilized hand rails for UE support. Pt required max A for washing face to support self sitting edge of bed. Pt had increase in nausea sitting edge of bed. Pt tolerated sitting edge of bed for x5 minutes, however demo increased fatigue, requesting to lie down. Pt deficits include decreased activity tolerance, decrease in self care tasks, and global weakness. Pt continues to be at high risk for falls and is unsafe to return home at this time. Tolerating session today without incident. Will continue to follow and progress as tolerated.     Plan/Recommendations:   Moderate Intensity Therapy recommended post-acute care. This is recommended as therapy feels the patient would require 3-4 days per week and wouldn't tolerate \"3 hour daily\" rehab intensity. SNF would be the preferred choice. If the patient does not agree to SNF, arrange HH or OP depending on home bound status. If patient is medically complex, consider LTACH.  Pt desires Skilled Rehab placement at discharge. Pt cooperative; agreeable to therapeutic recommendations and plan of care.   "

## 2022-11-01 NOTE — PROGRESS NOTES
Cardiology Progress Note    Patient Identification:  Name: Laura Perkins  Age: 82 y.o.  Sex: female  :  1940  MRN: [id number]                 Follow Up / Chief Complaint: Elevated troponin, non stemi   Chief Complaint   Patient presents with   • Altered Mental Status       Interval History:  Patient presented from outlying facility with pneumonia.  Noted to have elevated troponin   Patient underwent cardiac cath 10/27/2022 with stent to proximal LAD.       NP NOTE:  Patient seen and examined;  Less short of breath this morning although still coughing especially while eating.  Patient denies any chest pain.  Continues to have clear sputum production.  Will re-consult ST for swallow eval. Patient reports difficulty swallowing at times.   Repeat limited echocardiogram yesterday noted with continued EF 40-45%, mild hypokinesis of apex.  Small pericardial effusion.     Discontinue Q6 troponin draws recheck pro bnp tomorrow     Electronically signed by ROMAN Lynn, 22, 12:16 PM EDT.    Cardiology attending addendum :    I have personally performed a face-to-face diagnostic evaluation, physical exam and reviewed data on this patient.  I have reviewed documentation done by me and nurse practitioner  and corrected as needed.  And agree with the different components of documentation.Greater than 50% of the time spent in the care of this patient was provided by attending consultant/me.           Subjective: Patient seen and examined.  Chart reviewed.  Labs reviewed.  Discussed with RN taking care of patient.  Events noted.  Patient reportedly went into respiratory distress and edema 10/30/2022 with elevated proBNP of 23,000.  2022: Patient is feeling whole lot better no edema.      Objective: Serial troponin 0.872, 0.653, 0.793, 0.546, 0.373  10/26/2022: troponin 0.300, 0.256  Glucose 136, sodium 148 WBC 13.4, hgb 9.0  10/27/2022: troponin 0.175, WBC 12.9 hgb 8.8 EKG with diffuse T wave  abnormalities and QTC prolongation   10/28/2022: troponin 0.136, glucose 215, WBC 12.2   hgb 8.6  10/30/2022: troponin 0.067, pro bnp [id number]  10/31/2022: troponin 0.05, glucose 190, creatinine 0.51  WBC 12.2, hgb 8.4  11/1/2022:  Troponin 0.057, glucose 185      History of Present Illness:        Mrs. Laura Perkins has a Past medical history of      - hypertension  - dyslipidemia   - diabetes   - anemia, GERD  - stroke 2012  - bipolar disorder  - Colon cancer- s/p resection 10/18/2022  - bladder surgery, eye surgery, hysterectomy, hemorrhoidectomy   -  Non smoker  - no allergies  - family history of heart disease in mother         Presented from rehab center to the ED on 10/24/2022 for confusion/alerted mental status.  Patient was recently admitted to [facility] for transverse colon resection on 10/18/2022.  Patient was discharged to rehab. In the ED patient was noted to have fever 102. CXR showed left sided pneumonia and patient was started on IVFs and antibiotics.  Cardiology has been consulted for elevated troponin.   Patient alert and oriented and gives some history but is poor historian and most of information above was obtained from chart review.  She denies any chest pain but does state she is short of breath and coughing.  RN reported patient aspirated and had swallow study now on thickened liquid.      Serial troponins have been elevated but non-trending 0.872, 0.653, 0.793, 0.546, 0.373  EKG showed sinus tachycardia with diffuse T wave inversion that is changed from previous EKG on 10/11/2022 that showed normal sinus rhythm without any ST abnormalities.       Assessment:  :     Acute non-ST elevation MI  Presumed CAD  Hypertension, dyslipidemia, diabetes  Recent colon cancer resection 10/18/2022  Fever, leukocytosis  Altered mental status  Prolonged QTC on psychiatric medications   CAD, NSTEMI, PCI  Acute HFrEF due to systolic dysfunction from ischemic cardiomyopathy        Recommendations /  Plan:         Telemetry is revealing sinus rhythm.  Monitor hemoglobin  Monitor renal function and urine output  Continue diuresis as tolerated  Echo is revealing moderate LV dysfunction  Continue to monitor EKG and QTC.  EKG done 10/31/2022 reviewed/interpreted by me reveals sinus tachycardia with rate of 102 bpm with QT of 382 ms and QTC of 493 ms  Increase activity as tolerated  Continue aspirin, clopidogrel, losartan, metoprolol succinate, rosuvastatin as tolerated  Speech therapy for swallow evaluation again to make sure she is not aspirating  Discussed with patient's  by bedside       Copied text in this portion of the note has been reviewed and is accurate as of 11/1/2022    Past Medical History:  Past Medical History:   Diagnosis Date   • Acid reflux    • Anemia    • Anemia    • Bipolar 1 disorder (HCC)    • Colon cancer (HCC)    • Diabetes mellitus (HCC)     Type 2   • Disease of thyroid gland    • Hyperlipidemia    • Hypertension     TAKEN OFF HTN MEDS OVER 5 YEARS AGO   • Incontinence of urine    • Stroke (HCC)     2012     Past Surgical History:  Past Surgical History:   Procedure Laterality Date   • BLADDER SURGERY     • CARDIAC CATHETERIZATION N/A 10/27/2022    Procedure: Left Heart Cath, possible pci;  Surgeon: Andrea Melvin MD;  Location: [facility] INVASIVE LOCATION;  Service: Cardiovascular;  Laterality: N/A;   • COLON RESECTION N/A 10/18/2022    Procedure: COLON RESECTIOn TRANSVERSE LAPAROSCOPIC;  Surgeon: Toño Michelle MD;  Location: [facility] OR;  Service: General;  Laterality: N/A;   • COLONOSCOPY  09/21/2022    [facility]   • ENDOSCOPY  09/21/2022    [facility]   • EYE SURGERY     • HEMORRHOIDECTOMY     • HYSTERECTOMY          Social History:   Social History     Tobacco Use   • Smoking status: Never   • Smokeless tobacco: Never   Substance Use Topics   • Alcohol use: Never      Family History:  Family History   Problem Relation Age of  "Onset   • Heart disease Mother    • Diabetes Mother    • No Known Problems Father    • Cancer Brother           Allergies:  No Known Allergies  Scheduled Meds:  acetylcysteine, 3 mL, BID - RT  ARIPiprazole, 2 mg, Nightly  aspirin, 81 mg, Daily  clopidogrel, 300 mg, Once   And  clopidogrel, 75 mg, Daily  divalproex, 500 mg, BID  docusate sodium, 100 mg, BID  ferrous sulfate, 324 mg, BID With Meals  furosemide, 20 mg, Q12H  insulin glargine, 10 Units, Q12H  insulin lispro, 3-14 Units, TID With Meals  ipratropium-albuterol, 3 mL, 4x Daily - RT  levothyroxine, 50 mcg, Daily  losartan, 25 mg, Q24H  magnesium oxide, 400 mg, Daily  metoprolol succinate XL, 25 mg, Q24H  pantoprazole, 40 mg, QAM  piperacillin-tazobactam, 3.375 g, Q8H  polyethylene glycol, 17 g, Daily  rosuvastatin, 20 mg, Daily          Review of Systems:   Review of Systems   Unable to perform ROS: mental status change            Constitutional:  Temp:  [97.5 °F (36.4 °C)-98.7 °F (37.1 °C)] 98.5 °F (36.9 °C)  Heart Rate:  [] 74  Resp:  [16-18] 18  BP: (101-161)/(57-75) 155/57    Physical Exam   /57   Pulse 74   Temp 98.5 °F (36.9 °C) (Oral)   Resp 18   Ht 162.6 cm (64\")   Wt 63.4 kg (139 lb 12.4 oz)   SpO2 98%   BMI 23.99 kg/m²   General:  Appears in no acute distress  Eyes: Sclera is anicteric,  conjunctiva is clear   HEENT:  No JVD. Thyroid not visibly enlarged. No mucosal pallor or cyanosis  Respiratory: Respirations regular and unlabored at rest.  Decreased breath sounds at bilateral bases with scattered rhonchi  Cardiovascular: S1,S2 Regular rate and rhythm. No murmur, rub or gallop auscultated.  . No pretibial pitting edema  Gastrointestinal: Abdomen nondistended.  Musculoskeletal:  No abnormal movements  Extremities: No digital clubbing or cyanosis  Skin: Color pink. Skin warm and dry to touch. No rashes  No xanthoma  Neuro: Alert and awake, no lateralizing deficits appreciated    INTAKE AND OUTPUT:    Intake/Output Summary (Last " 24 hours) at 11/1/2022 1731  Last data filed at 11/1/2022 1409  Gross per 24 hour   Intake 240 ml   Output 1150 ml   Net -910 ml       Cardiographics  Telemetry: sinus rhythm     ECG:   ECG 12 Lead QT Measurement   Preliminary Result   HEART RATE= 102  bpm   RR Interval= 600  ms   OH Interval= 157  ms   P Horizontal Axis= -38  deg   P Front Axis= 23  deg   QRSD Interval= 89  ms   QT Interval= 382  ms   QRS Axis= 28  deg   T Wave Axis= 209  deg   - ABNORMAL ECG -   Sinus tachycardia   Atrial premature complexes   Nonspecific T abnormalities, diffuse leads   Electronically Signed By:    Date and Time of Study: 2022-10-31 08:53:04      ECG 12 Lead Other; on psych medications that prolong qtc   Preliminary Result   HEART RATE= 92  bpm   RR Interval= 656  ms   OH Interval= 154  ms   P Horizontal Axis= -11  deg   P Front Axis= 45  deg   QRSD Interval= 90  ms   QT Interval= 360  ms   QRS Axis= 20  deg   T Wave Axis= 193  deg   - ABNORMAL ECG -   Sinus rhythm   Atrial premature complex   Abnrm T, consider ischemia, anterolateral lds   When compared with ECG of 29-Oct-2022 5:08:11,   Nonspecific significant change   Electronically Signed By:    Date and Time of Study: 2022-10-29 16:41:12      ECG 12 Lead QT Measurement   Preliminary Result   HEART RATE= 78  bpm   RR Interval= 772  ms   OH Interval= 139  ms   P Horizontal Axis= 4  deg   P Front Axis= 42  deg   QRSD Interval= 87  ms   QT Interval= 422  ms   QRS Axis= 32  deg   T Wave Axis= 212  deg   - ABNORMAL ECG -   Sinus rhythm   Abnormal T, consider ischemia, diffuse leads   Electronically Signed By:    Date and Time of Study: 2022-10-29 05:08:11      SCANNED - TELEMETRY     Final Result      ECG 12 Lead   Preliminary Result   HEART RATE= 78  bpm   RR Interval= 772  ms   OH Interval= 129  ms   P Horizontal Axis= -38  deg   P Front Axis= 2  deg   QRSD Interval= 90  ms   QT Interval= 469  ms   QRS Axis= 37  deg   T Wave Axis= 232  deg   - ABNORMAL ECG -   Sinus rhythm    Abnormal T, suspect prox. LAD occlu. or CVA   Prolonged QT interval   When compared with ECG of 27-Oct-2022 10:26:26,   Significant repolarization change   Electronically Signed By:    Date and Time of Study: 2022-10-28 05:26:46      ECG 12 Lead QT Measurement   Final Result   HEART RATE= 97  bpm   RR Interval= 620  ms   CA Interval= 154  ms   P Horizontal Axis= 1  deg   P Front Axis= 43  deg   QRSD Interval= 92  ms   QT Interval= 474  ms   QRS Axis= 32  deg   T Wave Axis= 217  deg   - ABNORMAL ECG -   Sinus rhythm   Abnormal T, probable ischemia, widespread   Prolonged QT interval   When compared with ECG of 23-Oct-2022 19:11:40,   Significant change in rhythm   Electronically Signed By: Skyler García ([facility]) 28-Oct-2022 10:11:01   Date and Time of Study: 2022-10-27 10:26:26      ECG 12 Lead   Final Result   HEART RATE= 121  bpm   RR Interval= 496  ms   CA Interval= 96  ms   P Horizontal Axis=   deg   P Front Axis= 206  deg   QRSD Interval= 77  ms   QT Interval= 382  ms   QRS Axis= 37  deg   T Wave Axis= 209  deg   - ABNORMAL ECG -   Sinus or ectopic atrial tachycardia   Abnormal T, consider ischemia, diffuse leads   Prolonged QT interval   When compared with ECG of 11-Oct-2022 14:13:27,   Significant change in rhythm: previously sinus   New or worsened ischemia or infarction   Electronically Signed By: Marino Montes ([facility]) 24-Oct-2022 14:20:30   Date and Time of Study: 2022-10-23 19:11:40      SCANNED - TELEMETRY     Final Result      SCANNED - TELEMETRY     Final Result      SCANNED - TELEMETRY     Final Result      SCANNED - TELEMETRY     Final Result      SCANNED - TELEMETRY     Final Result      SCANNED - TELEMETRY     Final Result      SCANNED - TELEMETRY     Final Result      SCANNED - TELEMETRY     Final Result      SCANNED - TELEMETRY     Final Result      SCANNED - TELEMETRY     Final Result      SCANNED - TELEMETRY     Final Result      SCANNED - TELEMETRY     Final Result      SCANNED - TELEMETRY      Final Result      SCANNED - TELEMETRY     Final Result      SCANNED - TELEMETRY     Final Result      SCANNED - TELEMETRY     Final Result      SCANNED - TELEMETRY     Final Result      SCANNED - TELEMETRY     Final Result      SCANNED - TELEMETRY     Final Result      SCANNED - TELEMETRY     Final Result      SCANNED - TELEMETRY     Final Result      SCANNED - TELEMETRY     Final Result      SCANNED - TELEMETRY     Final Result      SCANNED - TELEMETRY     Final Result      SCANNED - TELEMETRY     Final Result      SCANNED - TELEMETRY     Final Result      SCANNED - TELEMETRY     Final Result      SCANNED - TELEMETRY     Final Result      SCANNED - TELEMETRY     Final Result      SCANNED - TELEMETRY     Final Result      SCANNED - TELEMETRY     Final Result      SCANNED - TELEMETRY     Final Result      SCANNED - TELEMETRY     Final Result      SCANNED - TELEMETRY     Final Result      SCANNED - TELEMETRY     Final Result      SCANNED - TELEMETRY     Final Result      SCANNED - TELEMETRY     Final Result      ECG 12 Lead QT Measurement    (Results Pending)   ECG 12 Lead QT Measurement    (Results Pending)     I have personally reviewed EKG    Echocardiogram: Results for orders placed during the hospital encounter of 10/23/22    Adult Transthoracic Echo Limited W/ Cont if Necessary Per Protocol    Interpretation Summary  Normal LV size.  Mild apical hypokinesis seen.  Estimated LV ejection fraction of 45 to 50%  Normal RV size  Normal atrial size  Aortic valve, mitral valve, tricuspid valve appears structurally normal, no significant regurgitation seen.  Small pericardial effusion seen.  No signs of increased intrapericardial pressure  Proximal aorta appears normal in size.      Lab Review   I have reviewed the labs  Results from last 7 days   Lab Units 11/01/22  0335 10/31/22  1734 10/31/22  0558   TROPONIN T ng/mL 0.057* 0.064* 0.058*     Results from last 7 days   Lab Units 10/29/22  0630   MAGNESIUM mg/dL 1.8  "    Results from last 7 days   Lab Units 10/31/22  0558   SODIUM mmol/L 135*   POTASSIUM mmol/L 3.8   BUN mg/dL 12   CREATININE mg/dL 0.51*   CALCIUM mg/dL 8.5*         Results from last 7 days   Lab Units 10/31/22  0348 10/30/22  0434 10/29/22  0630   WBC 10*3/mm3 12.20* 13.90* 14.20*   HEMOGLOBIN g/dL 8.4* 8.7* 8.7*   HEMATOCRIT % 28.5* 29.5* 29.1*   PLATELETS 10*3/mm3 366 321 297           RADIOLOGY:  Imaging Results (Last 24 Hours)     ** No results found for the last 24 hours. **                )11/1/2022  MD YARA Dunham/Transcription:   \"Dictated utilizing Dragon dictation\".   "

## 2022-11-01 NOTE — CONSULTS
[facility]    Referring Provider: Dr. Alejandro Estrada  Reason for Consultation: Diabetes evaluation & management.    Patient Care Team:  Beka Weir MD as PCP - General (Family Medicine)  Toño Michelle MD as Surgeon (General Surgery)    Chief complaint Altered Mental Status      Subjective .     History of present illness:    This is a  82 y.o. female with type 2 Diabetes on metformin, glipizide, Januvia & Invokana at home.   Admitted with mental status changes. Found to have multifocal pneumonia.  Had been at rehab following colon cancer surgery in mid October.  Blood sugars have been elevated. Not on steroids.    Review of Systems  Review of Systems   Eyes: Negative for blurred vision.   Respiratory: Positive for shortness of breath.    Gastrointestinal: Negative for nausea.   Endocrine: Negative for polyuria.   Neurological: Positive for weakness. Negative for headache.       History  Past Medical History:   Diagnosis Date   • Acid reflux    • Anemia    • Anemia    • Bipolar 1 disorder (HCC)    • Colon cancer (HCC)    • Diabetes mellitus (HCC)     Type 2   • Disease of thyroid gland    • Hyperlipidemia    • Hypertension     TAKEN OFF HTN MEDS OVER 5 YEARS AGO   • Incontinence of urine    • Stroke (HCC)     2012     Past Surgical History:   Procedure Laterality Date   • BLADDER SURGERY     • CARDIAC CATHETERIZATION N/A 10/27/2022    Procedure: Left Heart Cath, possible pci;  Surgeon: Andrea Melvin MD;  Location: [facility] INVASIVE LOCATION;  Service: Cardiovascular;  Laterality: N/A;   • COLON RESECTION N/A 10/18/2022    Procedure: COLON RESECTIOn TRANSVERSE LAPAROSCOPIC;  Surgeon: Toño Michelle MD;  Location: [facility] OR;  Service: General;  Laterality: N/A;   • COLONOSCOPY  09/21/2022    [facility]   • ENDOSCOPY  09/21/2022    [facility]   • EYE SURGERY     • HEMORRHOIDECTOMY     • HYSTERECTOMY       Family History   Problem  Relation Age of Onset   • Heart disease Mother    • Diabetes Mother    • No Known Problems Father    • Cancer Brother      Social History     Tobacco Use   • Smoking status: Never   • Smokeless tobacco: Never   Vaping Use   • Vaping Use: Never used   Substance Use Topics   • Alcohol use: Never   • Drug use: Never     Medications Prior to Admission   Medication Sig Dispense Refill Last Dose   • Canagliflozin (Invokana) 300 MG tablet tablet Take 150 mg by mouth Daily.   10/23/2022 at 0753   • divalproex (DEPAKOTE) 250 MG DR tablet Take 1 tablet by mouth 3 (Three) Times a Day.   10/23/2022 at 1308   • FeroSul 325 (65 Fe) MG tablet Take 1 tablet by mouth 2 (Two) Times a Day.   10/23/2022 at 0753   • FLUoxetine (PROzac) 20 MG capsule Take 1 capsule by mouth Daily.   10/23/2022 at 0753   • glipizide (GLUCOTROL) 10 MG tablet Take 2 tablets by mouth 2 (Two) Times a Day Before Meals.   10/23/2022 at 0753   • levothyroxine (SYNTHROID, LEVOTHROID) 50 MCG tablet Take 1 tablet by mouth Daily.   10/23/2022 at 0406   • LORazepam (ATIVAN) 0.5 MG tablet Take 0.5 mg by mouth 3 (Three) Times a Day.   10/23/2022 at 1308   • metFORMIN ER (GLUCOPHAGE-XR) 500 MG 24 hr tablet Take 500 mg by mouth 2 (two) times a day.   10/23/2022 at 0753   • oxybutynin (DITROPAN) 5 MG tablet Take 5 mg by mouth Every Morning.   10/23/2022 at 0753   • pantoprazole (PROTONIX) 40 MG EC tablet Take 40 mg by mouth Every Morning.   10/23/2022 at 0753   • potassium chloride (MICRO-K) 10 MEQ CR capsule Take 2 capsules by mouth 2 (Two) Times a Day.   10/23/2022   • risperiDONE (risperDAL) 0.5 MG tablet Take 1 tablet by mouth Daily.   10/23/2022 at 42535   • risperiDONE (risperDAL) 0.5 MG tablet Take 2 tablets by mouth 2 (Two) Times a Day. TAKES AT 1500 AND 2000   10/23/2022 at 0242   • rosuvastatin (CRESTOR) 20 MG tablet Take 20 mg by mouth every night at bedtime.   10/22/2022 at 2109   • SITagliptin (JANUVIA) 100 MG tablet Take 1 tablet by mouth Every Evening.    10/22/2022 at 1658     Scheduled Meds:  acetylcysteine, 3 mL, Nebulization, BID - RT  ARIPiprazole, 2 mg, Oral, Nightly  aspirin, 81 mg, Oral, Daily  clopidogrel, 300 mg, Oral, Once   And  clopidogrel, 75 mg, Oral, Daily  divalproex, 500 mg, Oral, BID  docusate sodium, 100 mg, Oral, BID  ferrous sulfate, 324 mg, Oral, BID With Meals  furosemide, 20 mg, Intravenous, Q12H  insulin glargine, 10 Units, Subcutaneous, Q12H  insulin lispro, 3-14 Units, Subcutaneous, TID With Meals  ipratropium-albuterol, 3 mL, Nebulization, 4x Daily - RT  levothyroxine, 50 mcg, Oral, Daily  losartan, 25 mg, Oral, Q24H  magnesium oxide, 400 mg, Oral, Daily  metoprolol succinate XL, 25 mg, Oral, Q24H  pantoprazole, 40 mg, Oral, QAM  piperacillin-tazobactam, 3.375 g, Intravenous, Q8H  polyethylene glycol, 17 g, Oral, Daily  rosuvastatin, 20 mg, Oral, Daily      Continuous Infusions:     PRN Meds:  •  acetaminophen  •  acetaminophen  •  acetaminophen  •  dextrose  •  dextrose  •  glucagon (human recombinant)  •  glucagon (human recombinant)  •  ipratropium-albuterol  •  LORazepam  •  phenol  •  [COMPLETED] Insert peripheral IV **AND** sodium chloride  Allergies:  Patient has no known allergies.    Objective     Vital Signs   Temp:  [97.5 °F (36.4 °C)-98.2 °F (36.8 °C)] 98.2 °F (36.8 °C)  Heart Rate:  [] 71  Resp:  [16-24] 18  BP: (149-162)/(48-79) 158/53    Physical Exam:     General Appearance:    Lethargic   Head:    Normocephalic, without obvious abnormality, atraumatic   Eyes:            Lids and lashes normal, conjunctivae and sclerae normal, no   icterus, no pallor, corneas clear, PERRLA   Throat:   No oral lesions,  oral mucosa moist   Neck:   No adenopathy, supple,  no thyromegaly, no   carotid bruit   Lungs:     Dcreased breath sounds    Heart:    Regular rhythm and normal rate   Chest Wall:    No abnormalities observed   Abdomen:     Normal bowel sounds, soft                 Extremities:   Moves all extremities well, trace  edema               Pulses:   Pulses palpable and equal bilaterally   Skin:   Dry   Neurologic:  DTR absent, able to feel the 10g monofilament       Results Review  I have reviewed the patient's new clinical results, labs & imaging.    Lab Results (last 24 hours)     Procedure Component Value Units Date/Time    POC Glucose Once [[id number]]  (Abnormal) Collected: 10/31/22 2043    Specimen: Blood Updated: 10/31/22 2044     Glucose 383 mg/dL      Comment: Serial Number: [id number]:  [id number]       Troponin [[id number]]  (Abnormal) Collected: 10/31/22 1734    Specimen: Blood Updated: 10/31/22 1801     Troponin T 0.064 ng/mL     Narrative:      Troponin T Reference Range:  <= 0.03 ng/mL-   Negative for AMI  >0.03 ng/mL-     Abnormal for myocardial necrosis.  Clinicians would have to utilize clinical acumen, EKG, Troponin and serial changes to determine if it is an Acute Myocardial Infarction or myocardial injury due to an underlying chronic condition.       Results may be falsely decreased if patient taking Biotin.      POC Glucose Once [[id number]]  (Abnormal) Collected: 10/31/22 1723    Specimen: Blood Updated: 10/31/22 1724     Glucose 510 mg/dL      Comment: Serial Number: [id number]:  [id number]       POC Glucose Once [[id number]]  (Abnormal) Collected: 10/31/22 1125    Specimen: Blood Updated: 10/31/22 1126     Glucose 381 mg/dL      Comment: Serial Number: [id number]:  [id number]       POC Glucose Once [[id number]]  (Abnormal) Collected: 10/31/22 0740    Specimen: Blood Updated: 10/31/22 0742     Glucose 202 mg/dL      Comment: Serial Number: [id number]:  [id number]       POC Activated Clotting Time [[id number]]  (Abnormal) Collected: 10/27/22 1342    Specimen: Arterial Blood Updated: 10/31/22 0719     Activated Clotting Time  196 Seconds      Comment: Serial Number: [id number]:  [id number]       Extra Tubes [[id number]] Collected: 10/31/22 0558    Specimen: Blood, Venous Line Updated: 10/31/22  0701    Narrative:      The following orders were created for panel order Extra Tubes.  Procedure                               Abnormality         Status                     ---------                               -----------         ------                     Gold Top - SST[[id number]]                                   Final result                 Please view results for these tests on the individual orders.    Gold Top - SST [[id number]] Collected: 10/31/22 0558    Specimen: Blood Updated: 10/31/22 0701     Extra Tube Hold for add-ons.     Comment: Auto resulted.       Troponin [[id number]]  (Abnormal) Collected: 10/31/22 0558    Specimen: Blood Updated: 10/31/22 0629     Troponin T 0.058 ng/mL     Narrative:      Troponin T Reference Range:  <= 0.03 ng/mL-   Negative for AMI  >0.03 ng/mL-     Abnormal for myocardial necrosis.  Clinicians would have to utilize clinical acumen, EKG, Troponin and serial changes to determine if it is an Acute Myocardial Infarction or myocardial injury due to an underlying chronic condition.       Results may be falsely decreased if patient taking Biotin.      Basic Metabolic Panel [[id number]]  (Abnormal) Collected: 10/31/22 0558    Specimen: Blood Updated: 10/31/22 0625     Glucose 190 mg/dL      BUN 12 mg/dL      Creatinine 0.51 mg/dL      Sodium 135 mmol/L      Potassium 3.8 mmol/L      Comment: Slight hemolysis detected by analyzer. Results may be affected.        Chloride 100 mmol/L      CO2 24.0 mmol/L      Calcium 8.5 mg/dL      BUN/Creatinine Ratio 23.5     Anion Gap 11.0 mmol/L      eGFR 92.8 mL/min/1.73      Comment: National Kidney Foundation and American Society of Nephrology (ASN) Task Force recommended calculation based on the Chronic Kidney Disease Epidemiology Collaboration (CKD-EPI) equation refit without adjustment for race.       Narrative:      GFR Normal >60  Chronic Kidney Disease <60  Kidney Failure <15    The GFR formula is only valid for adults with stable renal  function between ages 18 and 70.    CBC & Differential [[id number]]  (Abnormal) Collected: 10/31/22 0348    Specimen: Blood Updated: 10/31/22 0427    Narrative:      The following orders were created for panel order CBC & Differential.  Procedure                               Abnormality         Status                     ---------                               -----------         ------                     CBC Auto Differential[[id number]]        Abnormal            Final result                 Please view results for these tests on the individual orders.    CBC Auto Differential [[id number]]  (Abnormal) Collected: 10/31/22 0348    Specimen: Blood Updated: 10/31/22 0427     WBC 12.20 10*3/mm3      RBC 3.54 10*6/mm3      Hemoglobin 8.4 g/dL      Hematocrit 28.5 %      MCV 80.7 fL      MCH 23.8 pg      MCHC 29.5 g/dL      RDW 18.4 %      RDW-SD 52.9 fl      MPV 8.4 fL      Platelets 366 10*3/mm3      Neutrophil % 78.2 %      Lymphocyte % 9.7 %      Monocyte % 9.3 %      Eosinophil % 2.2 %      Basophil % 0.6 %      Neutrophils, Absolute 9.50 10*3/mm3      Lymphocytes, Absolute 1.20 10*3/mm3      Monocytes, Absolute 1.10 10*3/mm3      Eosinophils, Absolute 0.30 10*3/mm3      Basophils, Absolute 0.10 10*3/mm3      nRBC 0.1 /100 WBC     Troponin [[id number]]  (Abnormal) Collected: 10/30/22 2059    Specimen: Blood Updated: 10/30/22 2134     Troponin T 0.067 ng/mL     Narrative:      Troponin T Reference Range:  <= 0.03 ng/mL-   Negative for AMI  >0.03 ng/mL-     Abnormal for myocardial necrosis.  Clinicians would have to utilize clinical acumen, EKG, Troponin and serial changes to determine if it is an Acute Myocardial Infarction or myocardial injury due to an underlying chronic condition.       Results may be falsely decreased if patient taking Biotin.      BNP [[id number]]  (Abnormal) Collected: 10/30/22 2059    Specimen: Blood Updated: 10/30/22 2131     proBNP 23,855.0 pg/mL     Narrative:      Among patients with dyspnea,  NT-proBNP is highly sensitive for the detection of acute congestive heart failure. In addition NT-proBNP of <300 pg/ml effectively rules out acute congestive heart failure with 99% negative predictive value.    Results may be falsely decreased if patient taking Biotin.          Lab Results   Component Value Date    HGBA1C 7.4 (H) 10/24/2022         Assessment & Plan     1. Diabetes type 2, with hyperglycemia  2. Multifocal pneumonia    Will change basal insulin to Q12h.  Continue sliding scale lispro.  Will follow with you & make further suggestions as needed..  Thank you for the consult.        I discussed the patients findings and my recommendations with patient    Matias Santos MD  10/31/22  21:02 EDT

## 2022-11-01 NOTE — PROGRESS NOTES
[facility] Medicine Services Daily Progress Note    Patient Name: Laura Perkins  : 1940  MRN: [id number]  Primary Care Physician:  Beka Weir MD  Date of admission: 10/23/2022      Subjective      Brief interim history: 84-year-old female with known history of cerebrovascular accident, T2DM, hypertension, HLD, bipolar disorder, anemia, S/p bladder surgery,  and history of colon cancer,S/p resection (10/22) . S/p recent colonoscopy which revealed 5 cm mass.  Patient underwent transverse colon resection at [facility] and pathology revealed invasive mucinous adenocarcinoma.  She was subsequently discharged to a local rehab facility on 10/19/2022.  Patient presented to [facility] ED on 10/23/2022 due to 2-day history of generalized body weakness, acute mental status changes/metabolic encephalopathy.  On presentation, she was noted with fever of 102 and tachycardic.  Blood cultures (10/23) which result pending and started on broad-spectrum antibiotic with Zosyn.     10/25/2022: Seen and examined and follow up.  Resting comfortably in bed in no distress or discomfort.      10/26/2022: Seen and examined in follow-up.  Resting comfortably.    10/27/2022: Seen and examined in follow-up.  Event noted for reported patient slipping out of bed last evening with no visible injury on examination.    10/28/2022:  Pt had a cardiac cath with DANIA to LAD.  Psych was consulted to help adjust meds.  Pt is off psych meds secondary to QTc prolongation.  Abilify increase, as well as Depakote for mood stabilization.  Cardiology following with QTc changes as well as EKG.      10/29/2022:  Pt is seen by psych and her meds are titrated.  Pt does not communicate with me and is repeating the words I a saying to her.  Pt is on 2 lit NC that can be titrated down.  Pt will be discharged when her psych meds are adjusted and she is back to her baseline.    10/30/2022:  Pt is very wheezy today and  states, pt  is coughing very thick sputum.  Pt is very confused and not reliable historian.  She denies complains.  Pt is on 2 lit NC, and she oxygenating well, but her wheeze is louder today.  Pt think that her new psych meds, are improving her condition.      10/31/2022.  Patient was seen and examined.  Patient's family complained restless legs last night.      11/1/2022.  Patient was seen and examined.  Patient reported no new symptoms.        Objective      Vitals:   Temp:  [97.5 °F (36.4 °C)-98.7 °F (37.1 °C)] 98.5 °F (36.9 °C)  Heart Rate:  [] 74  Resp:  [16-24] 18  BP: (101-161)/(53-75) 155/57  Flow (L/min):  [3] 3    Physical Exam  Pulmonary:      Breath sounds: Examination of the right-upper field reveals wheezing. Examination of the left-upper field reveals wheezing. Examination of the right-middle field reveals wheezing. Examination of the left-middle field reveals wheezing. Examination of the right-lower field reveals wheezing. Examination of the left-lower field reveals wheezing. Wheezing present.       Constitutional:       General: She is not in acute distress.     Appearance: She is not ill-appearing or toxic-appearing.      Comments: Frail-appearing female, alert and oriented x1.   HENT:      Head: Normocephalic.      Mouth/Throat:      Mouth: Mucous membranes are moist.   Cardiovascular:      Rate and Rhythm: Normal rate.      Pulses: Normal pulses.   Abdominal:      Palpations: Abdomen is soft.   Musculoskeletal:         General: Normal range of motion.      Cervical back: Neck supple.   Skin:     General: Skin is warm.   Neurological:      General: No focal deficit present.      Mental Status: She is alert.         Result Review    Result Review:  I have personally reviewed the results from the time of this admission to 11/1/2022 16:22 EDT and agree with these findings:  [x]  Laboratory  []  Microbiology  [x]  Radiology  []  EKG/Telemetry   [x]  Cardiology/Vascular   []  Pathology  [x]  Old records  []   Other:      Wounds (last 24 hours)     LDA Wound     Row Name 11/01/22 1200 11/01/22 0800 11/01/22 0400       Wound 10/24/22 1344  medial sacral spine Pressure Injury    Wound - Properties Group Placement Date: 10/24/22  -MG Placement Time: 1344  -MG Present on Hospital Admission: Y  -MG Side: --  -MG, middle  Orientation: medial  -MG Location: sacral spine  -MG Primary Wound Type: Pressure inj  -MG    Closure Approximated  -JE Approximated  -JE Approximated  -NC    Base pink;purple  -JE pink;purple  -JE pink;purple  -NC    Periwound redness  -JE redness  -JE redness  -NC    Periwound Temperature warm  -JE warm  -JE warm  -NC    Drainage Amount none  -JE none  -JE none  -NC    Retired Wound - Properties Group Placement Date: 10/24/22  -MG Placement Time: 1344  -MG Present on Hospital Admission: Y  -MG Side: --  -MG, middle  Orientation: medial  -MG Location: sacral spine  -MG Primary Wound Type: Pressure inj  -MG    Retired Wound - Properties Group Date first assessed: 10/24/22  -MG Time first assessed: 1344  -MG Present on Hospital Admission: Y  -MG Side: --  -MG, middle  Location: sacral spine  -MG Primary Wound Type: Pressure inj  -MG       Wound 10/24/22 1348 medial perineum    Wound - Properties Group Placement Date: 10/24/22  -MG Placement Time: 1348  -MG Present on Hospital Admission: Y  -MG Orientation: medial  -MG Location: perineum  -MG    Closure Open to air  -JE Open to air  -JE Open to air  -NC    Base slough;moist  -JE slough;moist  -JE slough;moist  -NC    Periwound pink  -JE pink  -JE pink  -NC    Periwound Temperature warm  -JE warm  -JE warm  -NC    Drainage Amount none  -JE none  -JE none  -NC    Retired Wound - Properties Group Placement Date: 10/24/22  -MG Placement Time: 1348  -MG Present on Hospital Admission: Y  -MG Orientation: medial  -MG Location: perineum  -MG    Retired Wound - Properties Group Date first assessed: 10/24/22  -MG Time first assessed: 1348  -MG Present on Hospital  Admission: Y  -MG Location: perineum  -MG    Row Name 11/01/22 0000 10/31/22 2000          Wound 10/24/22 1344  medial sacral spine Pressure Injury    Wound - Properties Group Placement Date: 10/24/22  -MG Placement Time: 1344  -MG Present on Hospital Admission: Y  -MG Side: --  -MG, middle  Orientation: medial  -MG Location: sacral spine  -MG Primary Wound Type: Pressure inj  -MG    Pressure Injury Stage -- DTPI  -NC     Dressing Appearance -- dry;intact  -NC     Closure Approximated  -NC Approximated  -NC     Base pink;purple  -NC pink;purple  -NC     Periwound redness  -NC redness  -NC     Periwound Temperature warm  -NC warm  -NC     Drainage Amount none  -NC none  -NC     Care, Wound -- cleansed with;soap and water  -NC     Dressing Care -- dressing changed  -NC     Periwound Care -- barrier ointment applied;dry periwound area maintained  -NC     Retired Wound - Properties Group Placement Date: 10/24/22  -MG Placement Time: 1344  -MG Present on Hospital Admission: Y  -MG Side: --  -MG, middle  Orientation: medial  -MG Location: sacral spine  -MG Primary Wound Type: Pressure inj  -MG    Retired Wound - Properties Group Date first assessed: 10/24/22  -MG Time first assessed: 1344  -MG Present on Hospital Admission: Y  -MG Side: --  -MG, middle  Location: sacral spine  -MG Primary Wound Type: Pressure inj  -MG       Wound 10/24/22 1348 medial perineum    Wound - Properties Group Placement Date: 10/24/22  -MG Placement Time: 1348  -MG Present on Hospital Admission: Y  -MG Orientation: medial  -MG Location: perineum  -MG    Pressure Injury Stage -- DTPI  -NC     Dressing Appearance -- open to air  -NC     Closure Open to air  -NC Open to air  -NC     Base slough;moist  -NC slough;moist  -NC     Periwound pink  -NC pink  -NC     Periwound Temperature warm  -NC warm  -NC     Drainage Amount none  -NC none  -NC     Care, Wound -- cleansed with;soap and water  -NC     Dressing Care -- open to air  -NC     Periwound Care  -- dry periwound area maintained;barrier ointment applied  -NC     Retired Wound - Properties Group Placement Date: 10/24/22  -MG Placement Time: 1348  -MG Present on Hospital Admission: Y  -MG Orientation: medial  -MG Location: perineum  -MG    Retired Wound - Properties Group Date first assessed: 10/24/22  -MG Time first assessed: 1348  -MG Present on Hospital Admission: Y  -MG Location: perineum  -MG          User Key  (r) = Recorded By, (t) = Taken By, (c) = Cosigned By    Initials Name Provider Type    Manuel Gillis LPN Licensed Nurse    Mary Rubi RN Registered Nurse    Ilda Torres RN Registered Nurse                  Assessment & Plan        acetylcysteine, 3 mL, Nebulization, BID - RT  ARIPiprazole, 2 mg, Oral, Nightly  aspirin, 81 mg, Oral, Daily  clopidogrel, 300 mg, Oral, Once   And  clopidogrel, 75 mg, Oral, Daily  divalproex, 500 mg, Oral, BID  docusate sodium, 100 mg, Oral, BID  ferrous sulfate, 324 mg, Oral, BID With Meals  furosemide, 20 mg, Intravenous, Q12H  insulin glargine, 10 Units, Subcutaneous, Q12H  insulin lispro, 3-14 Units, Subcutaneous, TID With Meals  ipratropium-albuterol, 3 mL, Nebulization, 4x Daily - RT  levothyroxine, 50 mcg, Oral, Daily  losartan, 25 mg, Oral, Q24H  magnesium oxide, 400 mg, Oral, Daily  metoprolol succinate XL, 25 mg, Oral, Q24H  pantoprazole, 40 mg, Oral, QAM  piperacillin-tazobactam, 3.375 g, Intravenous, Q8H  polyethylene glycol, 17 g, Oral, Daily  rosuvastatin, 20 mg, Oral, Daily             Active Hospital Problems:  Active Hospital Problems    Diagnosis    • **Altered mental status, unspecified altered mental status type    • HAP (hospital-acquired pneumonia)    • Acute respiratory distress    • Elevated troponin    • Sepsis (HCC)    • Type 2 diabetes mellitus (HCC)    • Anxiety associated with depression    • Hypothyroidism (acquired)    • OAB (overactive bladder)    • GERD without esophagitis    • Acute non-ST elevation  myocardial infarction (NSTEMI) (HCC)    • Dyslipidemia    • Cancer of transverse colon (HCC)           Assessment/plan:    -Continue appropriate patient's home medications for other chronic medical conditions.  -Continue the present level of care.  -Patient and family agreed with the plan of care.         Acute toxic metabolic encephalopathy/AMS      -resolving, and likely multifactorial.  Psych started pt on Abilify and adjusted Depakote for mood stabilization.       -Pt is still confused, and can't give clear ROS.  Meds to be adjusted by Psych.     Probable sepsis      -off Zosyn, now on Augmentin      -Duo nebs and Mucomyst nebs added for thick secretions and wheezing     Pneumonia involving the left lung      -Continue Augmentin, and supportive care      NSTEMI/Elevated troponin       -scheduled for C (10/27), DANIA to LAD.       -Cp free and in the setting of suspected sepsis     History of colorectal cancer (invasive mucinous adenocarcinoma)      -S/p resection (10/22)     Dysphagia      -followed by speech pathologist      CVA      -Aspirin/Crestor     T2DM      -SSI/Lantus     Hypertension     HLD     -Crestor     Hypothyroidism      -Levothyroxine     Depression/anxiety      -Risperidone on hold 2/2 increase QT prolongation and consult Psychiatrist        -Depakote dose adjustment and initiation of Abilify, per Psych.      -Pt is still not at baseline, and is not communicating well, and can't give ROS.       DVT prophylaxis:  Mechanical DVT prophylaxis orders are present.    CODE STATUS:    Code Status (Patient has no pulse and is not breathing): No CPR (Do Not Attempt to Resuscitate)  Medical Interventions (Patient has pulse or is breathing): Full Support       Disposition: This wonderful patient can discharge in the next 24 hours.      Electronically signed by Alejandro Estrada MD, FACP, 11/01/22, 16:22 EDT.        [facility] Hospitalist Team

## 2022-11-01 NOTE — PROGRESS NOTES
Daily Progress Note        Altered mental status, unspecified altered mental status type    Cancer of transverse colon (HCC)    HAP (hospital-acquired pneumonia)    Acute respiratory distress    Elevated troponin    Sepsis (HCC)    Type 2 diabetes mellitus (HCC)    Anxiety associated with depression    Hypothyroidism (acquired)    OAB (overactive bladder)    GERD without esophagitis    Acute non-ST elevation myocardial infarction (NSTEMI) (HCC)    Dyslipidemia      Assessment      Acute respiratory distress with new bilateral mixed interstitial markings noted on chest x-ray  Bilateral pleural effusions  Multifocal pneumonia     10/2022 colon resection, invasive adenocarcinoma     10/27/2022 echo  EF 41 to 45%  RVSP less than 35 mmHg  Left ventricular wall hypokinetic    Repeat echo 10/31/2022  EF 45 to 50% with small pericardial effusion seen     10/27/2022 cardiac cath with stent to LAD     Elevated troponin on admission CVA  Hypertension  Diabetes mellitus  Hyperlipidemia  Bipolar       Recommendations:     Titrate oxygen, currently requiring 3 L per NC     Avoid sedating medication     Antibiotic Augmentin was changed to Zosyn  Mucomyst nebulized with albuterol     Aspiration precautions, elevate head of bed  - Speech following    Lasix 20 mg increased to twice daily  Singulair  GI prophylaxis Protonix  Encourage use of I-S and splinting              LOS: 9 days     Subjective         Objective     Vital signs for last 24 hours:  Vitals:    10/31/22 2020 10/31/22 2206 11/01/22 0230 11/01/22 0635   BP:  150/71 101/75 144/60   BP Location:  Right arm Right arm Right arm   Patient Position:  Lying Lying Lying   Pulse: 71 113 80 79   Resp: 18 18 18 18   Temp:  97.9 °F (36.6 °C) 98.5 °F (36.9 °C) 98.7 °F (37.1 °C)   TempSrc:  Axillary Axillary Axillary   SpO2: 100% 96% 97% 92%   Weight:       Height:           Intake/Output last 3 shifts:  I/O last 3 completed shifts:  In: 400 [P.O.:400]  Out: 1700  [Urine:1700]  Intake/Output this shift:  I/O this shift:  In: 240 [P.O.:240]  Out: 600 [Urine:600]      Radiology  Imaging Results (Last 24 Hours)     ** No results found for the last 24 hours. **          Labs:  Results from last 7 days   Lab Units 10/31/22  0348   WBC 10*3/mm3 12.20*   HEMOGLOBIN g/dL 8.4*   HEMATOCRIT % 28.5*   PLATELETS 10*3/mm3 366     Results from last 7 days   Lab Units 10/31/22  0558 10/29/22  0630 10/28/22  0657   SODIUM mmol/L 135*   < > 144   POTASSIUM mmol/L 3.8   < > 3.5   CHLORIDE mmol/L 100   < > 109*   CO2 mmol/L 24.0   < > 23.0   BUN mg/dL 12   < > 15   CREATININE mg/dL 0.51*   < > 0.57   CALCIUM mg/dL 8.5*   < > 8.4*   BILIRUBIN mg/dL  --   --  0.2   ALK PHOS U/L  --   --  93   ALT (SGPT) U/L  --   --  9   AST (SGOT) U/L  --   --  12   GLUCOSE mg/dL 190*   < > 215*    < > = values in this interval not displayed.         Results from last 7 days   Lab Units 10/28/22  0657 10/27/22  0648 10/26/22  0744   ALBUMIN g/dL 2.30* 2.00* 2.50*     Results from last 7 days   Lab Units 11/01/22  0335 10/31/22  1734 10/31/22  0558   TROPONIN T ng/mL 0.057* 0.064* 0.058*         Results from last 7 days   Lab Units 10/29/22  0630   MAGNESIUM mg/dL 1.8         Results from last 7 days   Lab Units 10/31/22  1734   TSH uIU/mL 10.810*           Meds:   SCHEDULE  acetylcysteine, 3 mL, Nebulization, BID - RT  ARIPiprazole, 2 mg, Oral, Nightly  aspirin, 81 mg, Oral, Daily  clopidogrel, 300 mg, Oral, Once   And  clopidogrel, 75 mg, Oral, Daily  divalproex, 500 mg, Oral, BID  docusate sodium, 100 mg, Oral, BID  ferrous sulfate, 324 mg, Oral, BID With Meals  furosemide, 20 mg, Intravenous, Q12H  insulin glargine, 10 Units, Subcutaneous, Q12H  insulin lispro, 3-14 Units, Subcutaneous, TID With Meals  ipratropium-albuterol, 3 mL, Nebulization, 4x Daily - RT  levothyroxine, 50 mcg, Oral, Daily  losartan, 25 mg, Oral, Q24H  magnesium oxide, 400 mg, Oral, Daily  metoprolol succinate XL, 25 mg, Oral,  Q24H  pantoprazole, 40 mg, Oral, QAM  piperacillin-tazobactam, 3.375 g, Intravenous, Q8H  polyethylene glycol, 17 g, Oral, Daily  rosuvastatin, 20 mg, Oral, Daily      Infusions     PRNs  •  acetaminophen  •  acetaminophen  •  acetaminophen  •  dextrose  •  dextrose  •  glucagon (human recombinant)  •  glucagon (human recombinant)  •  ipratropium-albuterol  •  LORazepam  •  phenol  •  [COMPLETED] Insert peripheral IV **AND** sodium chloride    Physical Exam:  Physical Exam  Vitals reviewed.   Pulmonary:      Effort: Pulmonary effort is normal.      Breath sounds: Rhonchi and rales present.   Skin:     General: Skin is warm and dry.   Neurological:      Mental Status: She is alert.         ROS  Review of Systems   Respiratory: Positive for cough and shortness of breath.    Neurological: Positive for weakness.       I have reviewed the patient's new clinical results.    Electronically signed by Shanique Sorto MD

## 2022-11-01 NOTE — THERAPY TREATMENT NOTE
"Subjective: Pt agreeable to therapeutic plan of care. Pt with  present during session. Pt was urinary incontinent upon arrival.   Cognition: oriented to Person and Place    Objective:     Bed Mobility: Max-A   Roll L<>R mod A   Supine <> sit max A   Functional Transfers: N/A or Not attempted.  Functional Ambulation: N/A or Not attempted.    Grooming: Max-A  ADL Position: edge of bed sitting  ADL Comments: max A to wash face       Vitals: WNL   2L 95% O2    Pain: 0 VAS  Location: N/A  Interventions for pain: N/A  Education: Provided education on the importance of mobility in the acute care setting    Assessment: Laura Perkins presents with ADL impairments below baseline abilities which indicate the need for continued skilled intervention while inpatient. Pt was lying in supine upon arrival with bed linens soiled d/t urinary incontinence. Pt required max A for bed mobility this date. Pt able to roll L<>R with mod A utilizing bed rails. Pt required max A for supine <>sit. Pt sat edge of bed with min A for static sitting and utilized hand rails for UE support. Pt required max A for washing face to support self sitting edge of bed. Pt had increase in nausea sitting edge of bed. Pt tolerated sitting edge of bed for x5 minutes, however demo increased fatigue, requesting to lie down. Pt deficits include decreased activity tolerance, decrease in self care tasks, and global weakness. Pt continues to be at high risk for falls and is unsafe to return home at this time. Tolerating session today without incident. Will continue to follow and progress as tolerated.     Plan/Recommendations:   Moderate Intensity Therapy recommended post-acute care. This is recommended as therapy feels the patient would require 3-4 days per week and wouldn't tolerate \"3 hour daily\" rehab intensity. SNF would be the preferred choice. If the patient does not agree to SNF, arrange HH or OP depending on home bound status. If patient is medically " complex, consider LTACH.  Pt desires Skilled Rehab placement at discharge. Pt cooperative; agreeable to therapeutic recommendations and plan of care.     Modified [place]uan: N/A = No pre-op stroke/TIA    Post-Tx Position: Supine with HOB Elevated, Alarms activated and Call light and personal items within reach  PPE: mask, gloves and eye protection

## 2022-11-01 NOTE — THERAPY TREATMENT NOTE
Acute Care - Speech Language Pathology   Swallow Treatment Note [facility]     Patient Name: Laura Perkins  : 1940  MRN: [id number]  Today's Date: 2022               Admit Date: 10/23/2022    Visit Dx:     ICD-10-CM ICD-9-CM   1. Acute non-ST elevation myocardial infarction (NSTEMI) ([facility])  I21.4 410.70   2. Altered mental status, unspecified altered mental status type  R41.82 780.97   3. Fever, unspecified fever cause  R50.9 780.60   4. Pneumonia of left lung due to infectious organism, unspecified part of lung  J18.9 486   5. Elevated troponin  R77.8 790.6   6. Type 2 diabetes mellitus without complication, with long-term current use of insulin ([facility])  E11.9 250.00    Z79.4 V58.67   7. Dyslipidemia  E78.5 272.4     Patient Active Problem List   Diagnosis   • Pneumonia due to COVID-19 virus   • Acute renal injury ([facility])   • Cancer of transverse colon ([facility])   • Altered mental status, unspecified altered mental status type   • HAP (hospital-acquired pneumonia)   • Acute respiratory distress   • Elevated troponin   • Sepsis ([facility])   • Type 2 diabetes mellitus ([facility])   • Anxiety associated with depression   • Hypothyroidism (acquired)   • OAB (overactive bladder)   • GERD without esophagitis   • Acute non-ST elevation myocardial infarction (NSTEMI) ([facility])   • Dyslipidemia     Past Medical History:   Diagnosis Date   • Acid reflux    • Anemia    • Anemia    • Bipolar 1 disorder (HCC)    • Colon cancer ([facility])    • Diabetes mellitus ([facility])     Type 2   • Disease of thyroid gland    • Hyperlipidemia    • Hypertension     TAKEN OFF HTN MEDS OVER 5 YEARS AGO   • Incontinence of urine    • Stroke ([facility])          Past Surgical History:   Procedure Laterality Date   • BLADDER SURGERY     • CARDIAC CATHETERIZATION N/A 10/27/2022    Procedure: Left Heart Cath, possible pci;  Surgeon: Andrea Melvin MD;  Location: [facility] INVASIVE LOCATION;  Service: Cardiovascular;  Laterality: N/A;   • COLON RESECTION N/A  10/18/2022    Procedure: COLON RESECTIOn TRANSVERSE LAPAROSCOPIC;  Surgeon: Toño Michelle MD;  Location: [facility] MAIN OR;  Service: General;  Laterality: N/A;   • COLONOSCOPY  09/21/2022    [facility]   • ENDOSCOPY  09/21/2022    [facility]   • EYE SURGERY     • HEMORRHOIDECTOMY     • HYSTERECTOMY         SLP Recommendation and Plan     SLP Diet Recommendation: puree, nectar thick liquids (NTL BY SPOON) (11/01/22 1600)  Recommended Precautions and Strategies: upright posture during/after eating, small bites of food and sips of liquid, alternate between small bites of food and sips of liquid, general aspiration precautions, reflux precautions, fatigue precautions, 1:1 supervision, other (see comments) (11/01/22 1600)  SLP Rec. for Method of Medication Administration: meds whole, meds crushed, with thick liquids, with puree, as tolerated (11/01/22 1600)     Monitor for Signs of Aspiration: yes, notify SLP if any concerns, cough, elevated WBC count, gurgly voice, throat clearing, fever, upper respiratory, pneumonia, right lower lobe infiltrates (11/01/22 1600)  Recommended Diagnostics: reassess via clinical swallow evaluation, reassess via VFSS (MBS) (11/01/22 1600)           Therapy Frequency (Swallow): 3 days per week, 4 days per week (11/01/22 1600)  Predicted Duration Therapy Intervention (Days): until discharge (11/01/22 1600)           Patient was not wearing a face mask during this therapy encounter. Therapist used appropriate personal protective equipment including mask and gloves.  Mask used was standard procedure mask. Appropriate PPE was worn during the entire therapy session. Hand hygiene was completed before and after therapy session. Patient is not in enhanced droplet precautions.       SWALLOW EVALUATION (last 72 hours)     SLP Adult Swallow Evaluation     Row Name 11/01/22 1600          Document Type therapy note (daily note)  -RZ    Subjective Information --    Patient  "Observations alert  -RZ    Patient/Family/Caregiver Comments/Observations Pt's caregiver was present  -RZ    Patient Effort --    Comment Re-eval order for swallowing was placed on today's date for Laura Perkins d/t \"possible aspiration\". Pt is currently on a recommended diet of puree and NTL (by spoon only). Per nurse report, pt accidently received thin liquids by someone. Nurse reported that this could have been a possible reason pt was coughing. Per caregiver report, pt coughed during her meal she had just finished prior to ST arriving. Pt was not upright in bed near 90 degrees when ST arrived (unknown if pt was upright while eating). ST provided education to pt's caregiver about reflux and safe swallow strategies. Caregiver displayed understanding. Pt reported that she was not sure why she was coughing. While talking to pt in the room, pt vomited. D/t vomiting, ST was unable to re-evaluate pt's swallow on today's date and was only able to gather information from caregiver and pt's nurse. Speech therapy will anticipate to re-evaluate pt tomorrow morning. Plan discussed with pt's nurse and caregiver and both displayed understanding.     ST RECOMMENDATIONS: ST recommends that pt continue a diet of puree and NTL (by spoon only). ST plans to re-evaluate in the am clinically and (if indicated) further instrumental assessment. If pt displays any overt s/s of aspiration please make NPO until re-evaluation of swallow is completed. -RZ          Patient Profile Reviewed yes  -RZ          Additional Documentation Pain Scale: FACES Pre/Post-Treatment (Group)  -RZ          Pain: FACES Scale, Pretreatment 0-->no hurt  -RZ    Posttreatment Pain Rating 0-->no hurt  -RZ          Therapy Frequency (Swallow) 3 days per week;4 days per week  -RZ    Predicted Duration Therapy Intervention (Days) until discharge  -RZ    SLP Diet Recommendation puree;nectar thick liquids  NTL BY SPOON  -RZ    Recommended Diagnostics reassess via " clinical swallow evaluation;reassess via VFSS ([facility])  -RZ    Recommended Precautions and Strategies upright posture during/after eating;small bites of food and sips of liquid;alternate between small bites of food and sips of liquid;general aspiration precautions;reflux precautions;fatigue precautions;1:1 supervision;other (see comments)  -RZ    Oral Care Recommendations Oral Care BID/PRN  -RZ    SLP Rec. for Method of Medication Administration meds whole;meds crushed;with thick liquids;with puree;as tolerated  -RZ    Monitor for Signs of Aspiration yes;notify SLP if any concerns;cough;elevated WBC count;gurgly voice;throat clearing;fever;upper respiratory;pneumonia;right lower lobe infiltrates  -RZ    Anticipated Discharge Disposition (SLP) --          Swallow LTGs Swallow Long Term Goal (free text)  -RZ    Swallow STGs diet tolerance goal selection (SLP)  -RZ    Diet Tolerance Goal Selection (SLP) Swallow Short Term Goal 1;Swallow Short Term Goal 2  -RZ          (LTG) Swallow The patient will maximize swallow function for least restrictive po diet, exhibiting no complications associated with dysphagia, adequate po intake, and demonstrating independent use of safe swallow compensations.  -RZ    [place] (Swallow Long Term Goal) with moderate cues (50-74% accuracy)  -RZ    Time Frame (Swallow Long Term Goal) by discharge  -RZ    Progress/Outcomes (Swallow Long Term Goal) goal ongoing  -RZ    Comment (Swallow Long Term Goal) --          (STG) Swallow 1 The patient will participate in ongoing assessment of swallow, including re-evaluation clinically and/or including instrumental assessment of swallow if indicated, to further assess swallow function in anticipation to initiate a po diet  -RZ    [place] (Swallow Short Term Goal 1) with maximum cues (25-49% accuracy)  -RZ    Time Frame (Swallow Short Term Goal 1) 1 week  -RZ    Progress/Outcomes (Swallow Short Term Goal 1) goal ongoing  -RZ          (STG) Swallow  2 The patient will participate in a full meal assessment to determine safety and adequacy of recommended diet, independent use of safe swallow compensations, and additional goals/recommendations to follow  -RZ    [place] (Swallow Short Term Goal 2) with moderate cues (50-74% accuracy)  -RZ    Time Frame (Swallow Short Term Goal 2) 1 week  -RZ    Progress/Outcomes (Swallow Short Term Goal 2) goal ongoing  -RZ    Comment (Swallow Short Term Goal 2) --          User Key  (r) = Recorded By, (t) = Taken By, (c) = Cosigned By    Initials Name Effective Dates    RZ Sergey Garcia, SLP 08/01/22 -                 EDUCATION  The patient has been educated in the following areas:   Dysphagia (Swallowing Impairment).        SLP GOALS     Row Name 11/01/22 1600          (LTG) Swallow The patient will maximize swallow function for least restrictive po diet, exhibiting no complications associated with dysphagia, adequate po intake, and demonstrating independent use of safe swallow compensations.  -RZ    [place] (Swallow Long Term Goal) with moderate cues (50-74% accuracy)  -RZ    Time Frame (Swallow Long Term Goal) by discharge  -RZ    Progress/Outcomes (Swallow Long Term Goal) goal ongoing  -RZ    Comment (Swallow Long Term Goal) --          (STG) Swallow 1 The patient will participate in ongoing assessment of swallow, including re-evaluation clinically and/or including instrumental assessment of swallow if indicated, to further assess swallow function in anticipation to initiate a po diet  -RZ    [place] (Swallow Short Term Goal 1) with maximum cues (25-49% accuracy)  -RZ    Time Frame (Swallow Short Term Goal 1) 1 week  -RZ    Progress/Outcomes (Swallow Short Term Goal 1) goal ongoing  -RZ          (STG) Swallow 2 The patient will participate in a full meal assessment to determine safety and adequacy of recommended diet, independent use of safe swallow compensations, and additional goals/recommendations to  follow  -RZ    [place] (Swallow Short Term Goal 2) with moderate cues (50-74% accuracy)  -RZ    Time Frame (Swallow Short Term Goal 2) 1 week  -RZ    Progress/Outcomes (Swallow Short Term Goal 2) goal ongoing  -RZ    Comment (Swallow Short Term Goal 2) --          User Key  (r) = Recorded By, (t) = Taken By, (c) = Cosigned By    Initials Name Provider Type    RZ Sergey Garcia, SLP Speech and Language Pathologist                   Time Calculation:          Sergey Garcia, SLP  11/1/2022

## 2022-11-01 NOTE — PLAN OF CARE
Goal Outcome Evaluation:              Outcome Evaluation: patient alert and oriented to persn only, vital signs stable. patients blood sugar better this am but increased throught the day. patient cought up sputum today but not in cup. Swallow evel ordered for todya. Speech said to continue at NTL and pureed diet until reeval in am. patient turned every  2 hours as needed.

## 2022-11-01 NOTE — CONSULTS
Nutrition Services    Patient Name: Laura Perkins  YOB: 1940  MRN: [id number]  Admission date: 10/23/2022    Comment:    Continue magic cup BID    PPE Documentation        PPE Worn By Provider N/A, did not enter pt's room this date   PPE Worn By Patient  N/A     CLINICAL NUTRITION ASSESSMENT      Reason for Assessment Follow up protocol   10/25: Wounds assessment     H&P      Past Medical History:   Diagnosis Date   • Acid reflux    • Anemia    • Anemia    • Bipolar 1 disorder (HCC)    • Colon cancer (HCC)    • Diabetes mellitus (HCC)     Type 2   • Disease of thyroid gland    • Hyperlipidemia    • Hypertension     TAKEN OFF HTN MEDS OVER 5 YEARS AGO   • Incontinence of urine    • Stroke (HCC)     2012       Past Surgical History:   Procedure Laterality Date   • BLADDER SURGERY     • CARDIAC CATHETERIZATION N/A 10/27/2022    Procedure: Left Heart Cath, possible pci;  Surgeon: Andrea Melvin MD;  Location: [facility] CATH INVASIVE LOCATION;  Service: Cardiovascular;  Laterality: N/A;   • COLON RESECTION N/A 10/18/2022    Procedure: COLON RESECTIOn TRANSVERSE LAPAROSCOPIC;  Surgeon: Toño Michelle MD;  Location: [facility] OR;  Service: General;  Laterality: N/A;   • COLONOSCOPY  09/21/2022    [facility]   • ENDOSCOPY  09/21/2022    [facility]   • EYE SURGERY     • HEMORRHOIDECTOMY     • HYSTERECTOMY          Current Problems   Acute toxic metabolic encephalopathy/AMS    Probable sepsis    PNA    Elev Troponin    Hx colorectal cancer  -s/p resection (10/22)    DM    CVA    HTN    HLD    Hypothyroidism    Depression/Anxiety       Encounter Information        Trending Narrative     11/1: Visited pt in room, pt  also present. Pt is confused but pleasant. Pt  reports pt has been eating well on the puree, nectar thickened liquids. SLP following, recommends pt remain on puree and NTL by spoon. NFPE completed and not consistent with nutrition diagnosis of  "malnutrition at this time using AND/ASPEN criteria.     10/25: Pt unavailable x2 attempted visits, chart reviewed. Noted bowel regimen started per GI.     Anthropometrics        Current Height, Weight Height: 162.6 cm (64\")  Weight: 63.4 kg (139 lb 12.4 oz) (10/30/22 0500)       Ideal Body Weight (IBW) 120lb   Usual Body Weight (UBW) MELODY       Trending Weight Hx     This admission: 11/1: 139lb latest wt, pt  reports this is pt typical wt  10/25: current weight greatly decreased from admit weight, unsure of accuracy, will request a new weight             PTA: 10/25: using admit weight, stable x2 years, monitor for reweigh    Wt Readings from Last 30 Encounters:   10/30/22 0500 63.4 kg (139 lb 12.4 oz)   10/29/22 0546 62.5 kg (137 lb 12.6 oz)   10/28/22 0517 61.1 kg (134 lb 11.2 oz)   10/27/22 0730 54.9 kg (121 lb)   10/27/22 0657 54.9 kg (121 lb)   10/27/22 0427 55.2 kg (121 lb 9.6 oz)   10/26/22 0407 54.8 kg (120 lb 14.4 oz)   10/25/22 0339 58.2 kg (128 lb 3.2 oz)   10/23/22 1717 64 kg (141 lb)   10/18/22 1158 64.4 kg (141 lb 15.6 oz)   10/11/22 1327 64.4 kg (142 lb)   09/26/22 1537 63.5 kg (140 lb)   11/02/20 0009 67.6 kg (149 lb 0.5 oz)   10/30/20 0326 69.3 kg (152 lb 12.5 oz)   10/26/20 2329 66.5 kg (146 lb 9.7 oz)   10/26/20 1524 68 kg (150 lb)      BMI kg/m2 Body mass index is 23.99 kg/m².       Labs        Pertinent Labs    Results from last 7 days   Lab Units 10/31/22  0558 10/30/22  0434 10/29/22  0630 10/28/22  0657 10/27/22  0648 10/26/22  0744   SODIUM mmol/L 135* 140 141 144 143 148*   POTASSIUM mmol/L 3.8 4.2 3.9 3.5 3.9 3.6   CHLORIDE mmol/L 100 107 106 109* 107 113*   CO2 mmol/L 24.0 24.0 24.0 23.0 24.0 25.0   BUN mg/dL 12 14 17 15 13 14   CREATININE mg/dL 0.51* 0.44* 0.53* 0.57 0.59 0.57   CALCIUM mg/dL 8.5* 8.0* 8.1* 8.4* 8.3* 8.4*   BILIRUBIN mg/dL  --   --   --  0.2 0.2 0.2   ALK PHOS U/L  --   --   --  93 158* 117   ALT (SGPT) U/L  --   --   --  9 8 6   AST (SGOT) U/L  --   --   --  12 16 " 12   GLUCOSE mg/dL 190* 197* 170* 215* 98 136*     Results from last 7 days   Lab Units 10/31/22  0348 10/30/22  0434 10/29/22  0630 10/28/22  0657   MAGNESIUM mg/dL  --   --  1.8  --    HEMOGLOBIN g/dL 8.4*   < > 8.7*  --    HEMATOCRIT % 28.5*   < > 29.1*  --    TRIGLYCERIDES mg/dL  --   --   --  105    < > = values in this interval not displayed.     COVID19   Date Value Ref Range Status   10/30/2022 Not Detected Not Detected - Ref. Range Final     Lab Results   Component Value Date    HGBA1C 7.4 (H) 10/24/2022        Medications    Scheduled Medications acetylcysteine, 3 mL, Nebulization, BID - RT  ARIPiprazole, 2 mg, Oral, Nightly  aspirin, 81 mg, Oral, Daily  clopidogrel, 300 mg, Oral, Once   And  clopidogrel, 75 mg, Oral, Daily  divalproex, 500 mg, Oral, BID  docusate sodium, 100 mg, Oral, BID  ferrous sulfate, 324 mg, Oral, BID With Meals  furosemide, 20 mg, Intravenous, Q12H  insulin glargine, 10 Units, Subcutaneous, Q12H  insulin lispro, 3-14 Units, Subcutaneous, TID With Meals  ipratropium-albuterol, 3 mL, Nebulization, 4x Daily - RT  levothyroxine, 50 mcg, Oral, Daily  losartan, 25 mg, Oral, Q24H  magnesium oxide, 400 mg, Oral, Daily  metoprolol succinate XL, 25 mg, Oral, Q24H  pantoprazole, 40 mg, Oral, QAM  piperacillin-tazobactam, 3.375 g, Intravenous, Q8H  polyethylene glycol, 17 g, Oral, Daily  rosuvastatin, 20 mg, Oral, Daily        Infusions      PRN Medications •  acetaminophen  •  acetaminophen  •  acetaminophen  •  dextrose  •  dextrose  •  glucagon (human recombinant)  •  glucagon (human recombinant)  •  ipratropium-albuterol  •  LORazepam  •  phenol  •  [COMPLETED] Insert peripheral IV **AND** sodium chloride     Physical Findings        Trending Physical   Appearance, NFPE 11/1: NFPE completed and not consistent with nutrition diagnosis of malnutrition at this time using AND/ASPEN criteria   10/25: Unable to assess in person this date.   --  Edema  none     Bowel Function Stool Output  Stool  Unmeasured Occurrence: 1 (10/31/22 2300)  Bowel Incontinence: Yes (10/31/22 2300)  Stool Amount: small (10/31/22 2300)      Tubes   None   Chewing/Swallowing Followed by ST, on altered consistency diet   Skin DTI sacrum   --  Current Nutrition Orders & Evaluation of Intake       Oral Nutrition     Food Allergies NKFA   Current PO Diet Diet Cardiac, Diabetic/Consistent Carbs, Texture; Healthy Heart; Diabetic - Consistent Carb; Pureed Diet; Thickened Liquids (Nectar)   Supplement Magic cup BID   PO Evaluation     Trending % PO Intake 100% of 1-2 meals/day    --  Nutritional Risk Screening        NRS-2002 Score          Nutrition Diagnosis         Nutrition Dx Problem 1   Swallowing difficulty related to dysphagia as evidenced by SLP recommending puree and NTL by spoon       Nutrition Dx Problem 2        Intervention Goal         Intervention Goal(s) Continue PO intake > 75%  Consumption of ONS     Nutrition Intervention        RD Action Continue Magic Cup BID     Nutrition Prescription          Diet Prescription Pureed, NTL   Supplement Prescription Magic Cups at lunch/dinner (Provides 580 kcals, 18 g protein if consumed)      --  Monitor/Evaluation        Monitor PO intake, Supplement intake, Pertinent labs, Weight, Skin status, GI status         Electronically signed by:  Kathleen Perkins RD  11/01/22 13:33 EDT

## 2022-11-02 ENCOUNTER — APPOINTMENT (OUTPATIENT)
Dept: GENERAL RADIOLOGY | Facility: HOSPITAL | Age: 82
End: 2022-11-02

## 2022-11-02 LAB
BACTERIA SPEC RESP CULT: NORMAL
GLUCOSE BLDC GLUCOMTR-MCNC: 135 MG/DL (ref 70–105)
GLUCOSE BLDC GLUCOMTR-MCNC: 368 MG/DL (ref 70–105)
GLUCOSE BLDC GLUCOMTR-MCNC: 95 MG/DL (ref 70–105)
GRAM STN SPEC: NORMAL
NT-PROBNP SERPL-MCNC: ABNORMAL PG/ML (ref 0–1800)

## 2022-11-02 PROCEDURE — 63710000001 INSULIN LISPRO (HUMAN) PER 5 UNITS: Performed by: INTERNAL MEDICINE

## 2022-11-02 PROCEDURE — 63710000001 INSULIN GLARGINE PER 5 UNITS: Performed by: INTERNAL MEDICINE

## 2022-11-02 PROCEDURE — 94761 N-INVAS EAR/PLS OXIMETRY MLT: CPT

## 2022-11-02 PROCEDURE — 74018 RADEX ABDOMEN 1 VIEW: CPT

## 2022-11-02 PROCEDURE — 97530 THERAPEUTIC ACTIVITIES: CPT

## 2022-11-02 PROCEDURE — 97116 GAIT TRAINING THERAPY: CPT

## 2022-11-02 PROCEDURE — 83880 ASSAY OF NATRIURETIC PEPTIDE: CPT | Performed by: NURSE PRACTITIONER

## 2022-11-02 PROCEDURE — 94664 DEMO&/EVAL PT USE INHALER: CPT

## 2022-11-02 PROCEDURE — 25010000002 PIPERACILLIN SOD-TAZOBACTAM PER 1 G

## 2022-11-02 PROCEDURE — 25010000002 FUROSEMIDE PER 20 MG: Performed by: NURSE PRACTITIONER

## 2022-11-02 PROCEDURE — 99231 SBSQ HOSP IP/OBS SF/LOW 25: CPT | Performed by: INTERNAL MEDICINE

## 2022-11-02 PROCEDURE — 99232 SBSQ HOSP IP/OBS MODERATE 35: CPT | Performed by: INTERNAL MEDICINE

## 2022-11-02 PROCEDURE — 87205 SMEAR GRAM STAIN: CPT | Performed by: INTERNAL MEDICINE

## 2022-11-02 PROCEDURE — 82962 GLUCOSE BLOOD TEST: CPT

## 2022-11-02 PROCEDURE — 94799 UNLISTED PULMONARY SVC/PX: CPT

## 2022-11-02 PROCEDURE — 92526 ORAL FUNCTION THERAPY: CPT

## 2022-11-02 RX ADMIN — MAGNESIUM OXIDE TAB 400 MG (241.3 MG ELEMENTAL MG) 400 MG: 400 (241.3 MG) TAB at 09:38

## 2022-11-02 RX ADMIN — IPRATROPIUM BROMIDE AND ALBUTEROL SULFATE 3 ML: .5; 3 SOLUTION RESPIRATORY (INHALATION) at 09:58

## 2022-11-02 RX ADMIN — INSULIN LISPRO 12 UNITS: 100 INJECTION, SOLUTION INTRAVENOUS; SUBCUTANEOUS at 12:11

## 2022-11-02 RX ADMIN — PIPERACILLIN AND TAZOBACTAM 3.38 G: 3; .375 INJECTION, POWDER, FOR SOLUTION INTRAVENOUS at 23:16

## 2022-11-02 RX ADMIN — ROSUVASTATIN 20 MG: 10 TABLET, FILM COATED ORAL at 20:28

## 2022-11-02 RX ADMIN — DIVALPROEX SODIUM 500 MG: 500 TABLET, DELAYED RELEASE ORAL at 20:28

## 2022-11-02 RX ADMIN — Medication 10 ML: at 20:28

## 2022-11-02 RX ADMIN — DIVALPROEX SODIUM 500 MG: 500 TABLET, DELAYED RELEASE ORAL at 09:38

## 2022-11-02 RX ADMIN — FERROUS SULFATE TAB EC 324 MG (65 MG FE EQUIVALENT) 324 MG: 324 (65 FE) TABLET DELAYED RESPONSE at 17:39

## 2022-11-02 RX ADMIN — ACETYLCYSTEINE 3 ML: 200 SOLUTION ORAL; RESPIRATORY (INHALATION) at 18:58

## 2022-11-02 RX ADMIN — CLOPIDOGREL BISULFATE 75 MG: 75 TABLET ORAL at 09:38

## 2022-11-02 RX ADMIN — FUROSEMIDE 20 MG: 10 INJECTION, SOLUTION INTRAMUSCULAR; INTRAVENOUS at 09:38

## 2022-11-02 RX ADMIN — INSULIN GLARGINE 15 UNITS: 100 INJECTION, SOLUTION SUBCUTANEOUS at 09:40

## 2022-11-02 RX ADMIN — IPRATROPIUM BROMIDE AND ALBUTEROL SULFATE 3 ML: .5; 3 SOLUTION RESPIRATORY (INHALATION) at 18:57

## 2022-11-02 RX ADMIN — FUROSEMIDE 20 MG: 10 INJECTION, SOLUTION INTRAMUSCULAR; INTRAVENOUS at 20:34

## 2022-11-02 RX ADMIN — PIPERACILLIN AND TAZOBACTAM 3.38 G: 3; .375 INJECTION, POWDER, FOR SOLUTION INTRAVENOUS at 05:50

## 2022-11-02 RX ADMIN — PIPERACILLIN AND TAZOBACTAM 3.38 G: 3; .375 INJECTION, POWDER, FOR SOLUTION INTRAVENOUS at 17:31

## 2022-11-02 RX ADMIN — DOCUSATE SODIUM 100 MG: 100 CAPSULE, LIQUID FILLED ORAL at 20:28

## 2022-11-02 RX ADMIN — ASPIRIN 81 MG: 81 TABLET, COATED ORAL at 09:38

## 2022-11-02 RX ADMIN — INSULIN LISPRO 3 UNITS: 100 INJECTION, SOLUTION INTRAVENOUS; SUBCUTANEOUS at 09:39

## 2022-11-02 RX ADMIN — LEVOTHYROXINE SODIUM 50 MCG: 50 TABLET ORAL at 05:50

## 2022-11-02 RX ADMIN — PANTOPRAZOLE SODIUM 40 MG: 40 TABLET, DELAYED RELEASE ORAL at 05:50

## 2022-11-02 RX ADMIN — ARIPIPRAZOLE 2 MG: 2 TABLET ORAL at 20:28

## 2022-11-02 RX ADMIN — LOSARTAN POTASSIUM 25 MG: 25 TABLET, FILM COATED ORAL at 09:38

## 2022-11-02 RX ADMIN — FERROUS SULFATE TAB EC 324 MG (65 MG FE EQUIVALENT) 324 MG: 324 (65 FE) TABLET DELAYED RESPONSE at 09:38

## 2022-11-02 RX ADMIN — ACETYLCYSTEINE 3 ML: 200 SOLUTION ORAL; RESPIRATORY (INHALATION) at 09:58

## 2022-11-02 RX ADMIN — METOPROLOL SUCCINATE 25 MG: 25 TABLET, FILM COATED, EXTENDED RELEASE ORAL at 09:38

## 2022-11-02 NOTE — PROGRESS NOTES
[facility] Medicine Services Daily Progress Note    Patient Name: Laura ePrkins  : 1940  MRN: [id number]  Primary Care Physician:  Beka Weir MD  Date of admission: 10/23/2022      Subjective          Brief interim history:       84-year-old female with known history of cerebrovascular accident, T2DM, hypertension, HLD, bipolar disorder, anemia, S/p bladder surgery,  and history of colon cancer,S/p resection (10/22) . S/p recent colonoscopy which revealed 5 cm mass.  Patient underwent transverse colon resection at [facility] and pathology revealed invasive mucinous adenocarcinoma.  She was subsequently discharged to a local rehab facility on 10/19/2022.  Patient presented to [facility] ED on 10/23/2022 due to 2-day history of generalized body weakness, acute mental status changes/metabolic encephalopathy.  On presentation, she was noted with fever of 102 and tachycardic.  Blood cultures (10/23) which result pending and started on broad-spectrum antibiotic with Zosyn.     10/25/2022: Seen and examined and follow up.  Resting comfortably in bed in no distress or discomfort.      10/26/2022: Seen and examined in follow-up.  Resting comfortably.    10/27/2022: Seen and examined in follow-up.  Event noted for reported patient slipping out of bed last evening with no visible injury on examination.    10/28/2022:  Pt had a cardiac cath with DANIA to LAD.  Psych was consulted to help adjust meds.  Pt is off psych meds secondary to QTc prolongation.  Abilify increase, as well as Depakote for mood stabilization.  Cardiology following with QTc changes as well as EKG.      10/29/2022:  Pt is seen by psych and her meds are titrated.  Pt does not communicate with me and is repeating the words I a saying to her.  Pt is on 2 lit NC that can be titrated down.  Pt will be discharged when her psych meds are adjusted and she is back to her baseline.    10/30/2022:  Pt is very wheezy today and   states, pt is coughing very thick sputum.  Pt is very confused and not reliable historian.  She denies complains.  Pt is on 2 lit NC, and she oxygenating well, but her wheeze is louder today.  Pt think that her new psych meds, are improving her condition.      10/31/2022.  Patient was seen and examined.  Patient's family complained restless legs last night.      11/1/2022.  Patient was seen and examined.  Patient reported no new symptoms.      11/2/2022.  Patient was seen and examined.  Patient's family reported moderate improvement in her symptoms.        Objective      Vitals:   Temp:  [97.4 °F (36.3 °C)-98.5 °F (36.9 °C)] 98.3 °F (36.8 °C)  Heart Rate:  [63-76] 63  Resp:  [16-20] 20  BP: (116-155)/(45-74) 148/45  Flow (L/min):  [3-4] 4    Physical Exam  Vitals reviewed.   Constitutional:       General: She is not in acute distress.  HENT:      Head: Normocephalic and atraumatic.      Nose: Nose normal. No congestion or rhinorrhea.      Mouth/Throat:      Mouth: Mucous membranes are moist.      Pharynx: Oropharynx is clear. No oropharyngeal exudate or posterior oropharyngeal erythema.   Eyes:      Pupils: Pupils are equal, round, and reactive to light.   Cardiovascular:      Pulses: Normal pulses.      Heart sounds: Normal heart sounds. No murmur heard.    No friction rub. No gallop.      Comments: S1 and S2 present.  No tachycardia.  Pulmonary:      Effort: No respiratory distress.      Breath sounds: No wheezing, rhonchi or rales.   Chest:      Chest wall: No tenderness.   Abdominal:      General: Abdomen is flat. Bowel sounds are normal. There is no distension.      Palpations: Abdomen is soft.      Tenderness: There is no right CVA tenderness.   Musculoskeletal:         General: No swelling, tenderness, deformity or signs of injury.      Cervical back: Neck supple. No tenderness.      Right lower leg: No edema.      Left lower leg: No edema.   Skin:     Capillary Refill: Capillary refill takes less than 2  seconds.      Coloration: Skin is not jaundiced.      Findings: No bruising, lesion or rash.   Neurological:      Mental Status: She is alert.      Comments: No facial asymmetry noted.  Gait and station not tested.              Result Review    Result Review:  I have personally reviewed the results from the time of this admission to 11/2/2022 13:27 EDT and agree with these findings:  [x]  Laboratory  []  Microbiology  [x]  Radiology  []  EKG/Telemetry   [x]  Cardiology/Vascular   []  Pathology  [x]  Old records  []  Other:      Status: Completed Collected: 11/02/22 1224    Updated: 11/02/22 1228   Narrative:     DATE OF EXAM:   11/2/2022 11:00 AM       PROCEDURE:   XR ABDOMEN KUB-       INDICATIONS:   Vomiting; I21.4-Non-ST elevation (NSTEMI) myocardial infarction;   R41.82-Altered mental status, unspecified; R50.9-Fever, unspecified;   J18.9-Pneumonia, unspecified organism; R77.8-Other specified   abnormalities of plasma proteins; E11.9-Type 2 diabetes mellitus without   complications; Z79.4-Long term (current) use of insulin;   E78.5-Hyperlipidemia, unspecified       COMPARISON:   CT chest 10/30/2022, CT abdomen and pelvis without contrast 10/24/2022       TECHNIQUE:   Radiographic view(s) of the abdomen obtained.       FINDINGS:   Included lower lungs are abnormal, better, better evaluated on recent   chest CT. No abnormal small bowel distention is seen in a pattern to   suggest small bowel obstruction, although there is a nonspecific paucity   of gas-filled small bowel loops. Stool is seen in the ascending and   transverse colon.       Impression:     Nonspecific, nonobstructive bowel gas pattern.       Electronically Signed By-Lora Monk MD On:11/2/2022 12:26 PM   This report was finalized on [id number] by  Lora Monk MD.             Wounds (last 24 hours)     LDA Wound     Row Name 11/02/22 0430 11/02/22 0017 11/01/22 2057       Wound 10/24/22 1344  medial sacral spine Pressure Injury    Wound -  Properties Group Placement Date: 10/24/22  -MG Placement Time: 1344  -MG Present on Hospital Admission: Y  -MG Side: --  -MG, middle  Orientation: medial  -MG Location: sacral spine  -MG Primary Wound Type: Pressure inj  -MG    Pressure Injury Stage DTPI  -SM DTPI  -SM DTPI  -SM    Dressing Appearance dry;intact  -SM dry;intact  -SM dry;intact  -SM    Closure Approximated  -SM Approximated  -SM Approximated  -SM    Base pink;purple  -SM pink;purple  -SM pink;purple  -SM    Periwound redness  -SM redness  -SM redness  -SM    Periwound Temperature warm  -SM warm  -SM warm  -SM    Drainage Amount none  -SM none  -SM none  -SM    Care, Wound -- -- cleansed with;soap and water  -SM    Periwound Care dry periwound area maintained  -SM dry periwound area maintained  -SM barrier ointment applied  -SM    Retired Wound - Properties Group Placement Date: 10/24/22  -MG Placement Time: 1344  -MG Present on Hospital Admission: Y  -MG Side: --  -MG, middle  Orientation: medial  -MG Location: sacral spine  -MG Primary Wound Type: Pressure inj  -MG    Retired Wound - Properties Group Date first assessed: 10/24/22  -MG Time first assessed: 1344  -MG Present on Hospital Admission: Y  -MG Side: --  -MG, middle  Location: sacral spine  -MG Primary Wound Type: Pressure inj  -MG       Wound 10/24/22 1348 medial perineum    Wound - Properties Group Placement Date: 10/24/22  -MG Placement Time: 1348  -MG Present on Hospital Admission: Y  -MG Orientation: medial  -MG Location: perineum  -MG    Pressure Injury Stage DTPI  -SM DTPI  -SM DTPI  -SM    Dressing Appearance open to air  -SM open to air  -SM open to air  -SM    Closure Open to air  -SM Open to air  -SM Open to air  -SM    Base slough;moist  -SM slough;moist  -SM slough;moist  -SM    Periwound pink  -SM pink  -SM pink  -SM    Periwound Temperature warm  -SM warm  -SM warm  -SM    Drainage Amount none  -SM none  -SM none  -SM    Care, Wound -- -- cleansed with;soap and water  -SM     Dressing Care open to air  -SM -- open to air  -SM    Periwound Care dry periwound area maintained  -SM dry periwound area maintained  -SM barrier ointment applied;dry periwound area maintained  -SM    Retired Wound - Properties Group Placement Date: 10/24/22  -MG Placement Time: 1348  -MG Present on Hospital Admission: Y  -MG Orientation: medial  -MG Location: perineum  -MG    Retired Wound - Properties Group Date first assessed: 10/24/22  -MG Time first assessed: 1348  -MG Present on Hospital Admission: Y  -MG Location: perineum  -MG    Row Name 11/01/22 1600             Wound 10/24/22 1344  medial sacral spine Pressure Injury    Wound - Properties Group Placement Date: 10/24/22  -MG Placement Time: 1344  -MG Present on Hospital Admission: Y  -MG Side: --  -MG, middle  Orientation: medial  -MG Location: sacral spine  -MG Primary Wound Type: Pressure inj  -MG    Closure Approximated  -JE      Base pink;purple  -JE      Periwound redness  -JE      Periwound Temperature warm  -JE      Drainage Amount none  -JE      Retired Wound - Properties Group Placement Date: 10/24/22  -MG Placement Time: 1344  -MG Present on Hospital Admission: Y  -MG Side: --  -MG, middle  Orientation: medial  -MG Location: sacral spine  -MG Primary Wound Type: Pressure inj  -MG    Retired Wound - Properties Group Date first assessed: 10/24/22  -MG Time first assessed: 1344  -MG Present on Hospital Admission: Y  -MG Side: --  -MG, middle  Location: sacral spine  -MG Primary Wound Type: Pressure inj  -MG       Wound 10/24/22 1348 medial perineum    Wound - Properties Group Placement Date: 10/24/22  -MG Placement Time: 1348  -MG Present on Hospital Admission: Y  -MG Orientation: medial  -MG Location: perineum  -MG    Closure Open to air  -JE      Base slough;moist  -JE      Periwound pink  -JE      Periwound Temperature warm  -JE      Drainage Amount none  -JE      Retired Wound - Properties Group Placement Date: 10/24/22  -MG Placement Time:  1348  -MG Present on Hospital Admission: Y  -MG Orientation: medial  -MG Location: perineum  -MG    Retired Wound - Properties Group Date first assessed: 10/24/22  -MG Time first assessed: 1348  -MG Present on Hospital Admission: Y  -MG Location: perineum  -MG          User Key  (r) = Recorded By, (t) = Taken By, (c) = Cosigned By    Initials Name Provider Type    Mary Rubi RN Registered Nurse    Ilda Torres RN Registered Nurse     Robb, Lupe, RN Registered Nurse                  Assessment & Plan        acetylcysteine, 3 mL, Nebulization, BID - RT  ARIPiprazole, 2 mg, Oral, Nightly  aspirin, 81 mg, Oral, Daily  clopidogrel, 300 mg, Oral, Once   And  clopidogrel, 75 mg, Oral, Daily  divalproex, 500 mg, Oral, BID  docusate sodium, 100 mg, Oral, BID  ferrous sulfate, 324 mg, Oral, BID With Meals  furosemide, 20 mg, Intravenous, Q12H  insulin glargine, 15 Units, Subcutaneous, QAM  insulin lispro, 3-14 Units, Subcutaneous, TID With Meals  ipratropium-albuterol, 3 mL, Nebulization, BID - RT  levothyroxine, 50 mcg, Oral, Daily  losartan, 25 mg, Oral, Q24H  magnesium oxide, 400 mg, Oral, Daily  metoprolol succinate XL, 25 mg, Oral, Q24H  pantoprazole, 40 mg, Oral, QAM  piperacillin-tazobactam, 3.375 g, Intravenous, Q8H  polyethylene glycol, 17 g, Oral, Daily  rosuvastatin, 20 mg, Oral, Daily             Active Hospital Problems:  Active Hospital Problems    Diagnosis    • **Altered mental status, unspecified altered mental status type    • HAP (hospital-acquired pneumonia)    • Acute respiratory distress    • Elevated troponin    • Sepsis ([facility])    • Type 2 diabetes mellitus (HCC)    • Anxiety associated with depression    • Hypothyroidism (acquired)    • OAB (overactive bladder)    • GERD without esophagitis    • Acute non-ST elevation myocardial infarction (NSTEMI) ([facility])    • Dyslipidemia    • Cancer of transverse colon ([facility])           Assessment/plan:    -Continue appropriate patient's  home medications for other chronic medical conditions.  -Continue the present level of care.  -Patient and family agreed with the plan of care.         Acute toxic metabolic encephalopathy/AMS      -resolving, and likely multifactorial.  Psych started pt on Abilify and adjusted Depakote for mood stabilization.       -Pt is still confused, and can't give clear ROS.  Meds to be adjusted by Psych.     Probable sepsis      -off Zosyn, now on Augmentin      -Duo nebs and Mucomyst nebs added for thick secretions and wheezing     Pneumonia involving the left lung      -Continue Augmentin, and supportive care      NSTEMI/Elevated troponin       -scheduled for [facility] (10/27), DANIA to LAD.       -Cp free and in the setting of suspected sepsis     History of colorectal cancer (invasive mucinous adenocarcinoma)      -S/p resection (10/22)     Dysphagia      -followed by speech pathologist      CVA      -Aspirin/Crestor     T2DM      -SSI/Lantus     Hypertension     HLD     -Crestor     Hypothyroidism      -Levothyroxine     Depression/anxiety      -Risperidone on hold 2/2 increase QT prolongation and consult Psychiatrist        -Depakote dose adjustment and initiation of Abilify, per Psych.      -Pt is still not at baseline, and is not communicating well, and can't give ROS.       DVT prophylaxis:  Mechanical DVT prophylaxis orders are present.    CODE STATUS:    Code Status (Patient has no pulse and is not breathing): No CPR (Do Not Attempt to Resuscitate)  Medical Interventions (Patient has pulse or is breathing): Full Support       Disposition: This wonderful patient can discharge in the next 24 hours.      Electronically signed by Alejandro Estrada MD, FACP, 11/02/22, 13:27 EDT.        [facility]

## 2022-11-02 NOTE — PLAN OF CARE
Goal Outcome Evaluation:              Outcome Evaluation: patient alert to person only with vital signs stable nasal cannula 4 liters 98%. patient nauseated throughout day with no vomiting noted prn medication given as needed. patient still on NTL and pureed. reviewed swallowing education with  and jhe verbalized understnding. patient bed rest and turn every 2 hours as needed.

## 2022-11-02 NOTE — THERAPY RE-EVALUATION
Acute Care - Speech Language Pathology   Swallow Re-Evaluation [facility]     Patient Name: Laura Perkins  : 1940  MRN: [id number]  Today's Date: 2022               Admit Date: 10/23/2022    Visit Dx:     ICD-10-CM ICD-9-CM   1. Acute non-ST elevation myocardial infarction (NSTEMI) ([facility])  I21.4 410.70   2. Altered mental status, unspecified altered mental status type  R41.82 780.97   3. Fever, unspecified fever cause  R50.9 780.60   4. Pneumonia of left lung due to infectious organism, unspecified part of lung  J18.9 486   5. Elevated troponin  R77.8 790.6   6. Type 2 diabetes mellitus without complication, with long-term current use of insulin ([facility])  E11.9 250.00    Z79.4 V58.67   7. Dyslipidemia  E78.5 272.4     Patient Active Problem List   Diagnosis   • Pneumonia due to COVID-19 virus   • Acute renal injury (HCC)   • Cancer of transverse colon ([facility])   • Altered mental status, unspecified altered mental status type   • HAP (hospital-acquired pneumonia)   • Acute respiratory distress   • Elevated troponin   • Sepsis (HCC)   • Type 2 diabetes mellitus (HCC)   • Anxiety associated with depression   • Hypothyroidism (acquired)   • OAB (overactive bladder)   • GERD without esophagitis   • Acute non-ST elevation myocardial infarction (NSTEMI) ([facility])   • Dyslipidemia     Past Medical History:   Diagnosis Date   • Acid reflux    • Anemia    • Anemia    • Bipolar 1 disorder (HCC)    • Colon cancer (HCC)    • Diabetes mellitus ([facility])     Type 2   • Disease of thyroid gland    • Hyperlipidemia    • Hypertension     TAKEN OFF HTN MEDS OVER 5 YEARS AGO   • Incontinence of urine    • Stroke ([facility])          Past Surgical History:   Procedure Laterality Date   • BLADDER SURGERY     • CARDIAC CATHETERIZATION N/A 10/27/2022    Procedure: Left Heart Cath, possible pci;  Surgeon: Andrea Melvin MD;  Location: [facility] INVASIVE LOCATION;  Service: Cardiovascular;  Laterality: N/A;   • COLON RESECTION N/A  10/18/2022    Procedure: COLON RESECTIOn TRANSVERSE LAPAROSCOPIC;  Surgeon: Toño Michelle MD;  Location: [facility] MAIN OR;  Service: General;  Laterality: N/A;   • COLONOSCOPY  09/21/2022    [facility]   • ENDOSCOPY  09/21/2022    [facility]   • EYE SURGERY     • HEMORRHOIDECTOMY     • HYSTERECTOMY         SLP Recommendation and Plan     Discussed all results with  - known pharyngeal impairment appears appropriately managed with current diet/liquid levels, evidenced by no overt s/s aspiration. Discussed esophageal dysphagia, poor clearance, reflux to UES likely contributing to delayed cough and belching. Extensive education on all precautions for GERD and general aspiration, as listed below. Continue to suggest GI consult versus MD management for GERD/reflux as aforementioned.       Recommendations consistent: puree/NTL BY TSP ONLY. Medications: whole or crushed in puree. Compensations: NECTAR THICK BY SPOON ONLY, strict aspiration precautions (frequent oral care, elevate HOB 45 degrees); GERD precautions which include: consumption of small meals, utilization of thin liquid wash or extra sauces/gravies as indicated, remaining upright for all PO consumption and at least 30 minutes s/p, avoid eating at reasonable time frame based on sleep schedule.      SWALLOW EVALUATION (last 72 hours)     SLP Adult Swallow Evaluation     Row Name 11/02/22 1700       Rehab Evaluation    Document Type re-evaluation  -AB    Subjective Information no complaints  -AB    Patient Observations alert;cooperative  -AB    Patient/Family/Caregiver Comments/Observations pt seen in 2105, cooperates throughout  -AB    Patient Effort adequate  -AB    Comment --    Symptoms Noted During/After Treatment none  -AB       General Information    Patient Profile Reviewed --       Pain    Additional Documentation Pain Scale: Word Pre/Post-Treatment (Group)  -AB       Pain Scale: Word Pre/Post-Treatment    Pain: Word  Scale, Pretreatment 0 - no pain  -AB    Posttreatment Pain Rating 0 - no pain  -AB       Pain Scale: FACES Pre/Post-Treatment    Pain: FACES Scale, Pretreatment --    Posttreatment Pain Rating --                         User Key  (r) = Recorded By, (t) = Taken By, (c) = Cosigned By    Initials Name Effective Dates    Aleksandra Tenorio, MS CCC-SLP 08/01/21 -                 EDUCATION  The patient has been educated in the following areas:   Dysphagia (Swallowing Impairment) Modified Diet Instruction.        SLP GOALS     Row Name 11/02/22 1700       (LTG) Swallow    (LTG) Swallow The patient will maximize swallow function for least restrictive po diet, exhibiting no complications associated with dysphagia, adequate po intake, and demonstrating independent use of safe swallow compensations.  -AB    [place] (Swallow Long Term Goal) with moderate cues (50-74% accuracy)  -AB    Time Frame (Swallow Long Term Goal) by discharge  -AB    Progress/Outcomes (Swallow Long Term Goal) goal ongoing  -AB    Comment (Swallow Long Term Goal) see below goal summary . current diet puree and NTL by spoon  -AB       (STG) Swallow 1    (STG) Swallow 1 The patient will participate in ongoing assessment of swallow, including re-evaluation clinically and/or including instrumental assessment of swallow if indicated, to further assess swallow function in anticipation to initiate a po diet  -AB    [place] (Swallow Short Term Goal 1) with maximum cues (25-49% accuracy)  -AB    Time Frame (Swallow Short Term Goal 1) 1 week  -AB    Progress/Outcomes (Swallow Short Term Goal 1) goal ongoing  -AB    Comment (Swallow Short Term Goal 1) Bedside swallow re-evaluation completed s/p re-evaluation orders received per ROMAN Manning last date 11.1. See prior therapy treatment note, pt declining PO 11.1 d/t nausea/vomiting. KUB this date with nonobstructive bowel gas pattern.      Pt consumes puree and NTL by tsp, agreeable to trials, requesting  cessation eventually by stating “stop.” Pt is very pleasant, cooperates, with spouse at bedside. Oral phase unremarkable with adequate seal, no anterior loss. Pharyngeal phase with no overt s/s aspiration immediately post prandial.  reports coughing with all PO, upon further questioning and re-evaluation coughing occurring s/p meals with belching, cough 5-10 min s/p all PO. Findings consistent from VFSS 10.24 recommending puree/NTL, alternating liquis and solids, reflux precautions. VFSS with “ a column of barium up to the mid chest region, inability to clear with a dry swallow and NTL assist, which results in retrograde flow to the level of the UES. She is at risk of aspiration due to this.”      Discussed all results with  - known pharyngeal impairment appears appropriately managed with current diet/liquid levels, evidenced by no overt s/s aspiration. Discussed esophageal dysphagia, poor clearance, reflux to UES likely contributing to delayed cough and belching. Extensive education on all precautions for GERD and general aspiration, as listed below. Continue to suggest GI consult versus MD management for GERD/reflux as aforementioned.      Recommendations consistent: puree/NTL BY TSP ONLY. Medications: whole or crushed in puree. Compensations: NECTAR THICK BY SPOON ONLY, strict aspiration precautions (frequent oral care, elevate HOB 45 degrees); GERD precautions which include: consumption of small meals, utilization of thin liquid wash or extra sauces/gravies as indicated, remaining upright for all PO consumption and at least 30 minutes s/p, avoid eating at reasonable time frame based on sleep schedule.  -AB       (STG) Swallow 2    (STG) Swallow 2 The patient will participate in a full meal assessment to determine safety and adequacy of recommended diet, independent use of safe swallow compensations, and additional goals/recommendations to follow  -AB    [place] (Swallow Short Term Goal 2) with  moderate cues (50-74% accuracy)  -AB    Time Frame (Swallow Short Term Goal 2) 1 week  -AB    Progress/Outcomes (Swallow Short Term Goal 2) goal ongoing  -AB    Comment (Swallow Short Term Goal 2) --          User Key  (r) = Recorded By, (t) = Taken By, (c) = Cosigned By    Initials Name Provider Type    Aleksandra Tenorio, MS CCC-SLP Speech and Language Pathologist                   Time Calculation:       Therapy Charges for Today     Code Description Service Date Service Provider Modifiers Qty    [id number]  ST TREATMENT SWALLOW 5 11/2/2022 Aleksandra Shrestha, MS CCC-SLP GN 1               Aleksandra Shrestha MS CCC-SLP  11/2/2022

## 2022-11-02 NOTE — PLAN OF CARE
Goal Outcome Evaluation:  Plan of Care Reviewed With: patient, spouse        Progress: no change  Outcome Evaluation: Patient alert and oriented to person,  at bedside, continues on O2@4liters via nc, continues on IV ABX, turn q 2 hrs, VSS.

## 2022-11-02 NOTE — PROGRESS NOTES
Daily Progress Note        Altered mental status, unspecified altered mental status type    Cancer of transverse colon (HCC)    HAP (hospital-acquired pneumonia)    Acute respiratory distress    Elevated troponin    Sepsis (HCC)    Type 2 diabetes mellitus (HCC)    Anxiety associated with depression    Hypothyroidism (acquired)    OAB (overactive bladder)    GERD without esophagitis    Acute non-ST elevation myocardial infarction (NSTEMI) (HCC)    Dyslipidemia      Assessment      Acute respiratory distress with new bilateral mixed interstitial markings noted on chest x-ray  Bilateral pleural effusions  Multifocal pneumonia     10/2022 colon resection, invasive adenocarcinoma     10/27/2022 echo  EF 41 to 45%  RVSP less than 35 mmHg  Left ventricular wall hypokinetic    Repeat echo 10/31/2022  EF 45 to 50% with small pericardial effusion seen     10/27/2022 cardiac cath with stent to LAD     Elevated troponin on admission CVA  Hypertension  Diabetes mellitus  Hyperlipidemia  Bipolar       Recommendations:    Chest x-ray in a.m. to follow-up on pleural effusion     Titrate oxygen, currently requiring 4 L per NC     Avoid sedating medication     Antibiotic  Zosyn  Mucomyst nebulized with albuterol     Aspiration precautions, elevate head of bed  - Speech following    Lasix 20 mg increased to twice daily  Singulair  GI prophylaxis Protonix  Encourage use of I-S and splinting              LOS: 10 days     Subjective         Objective     Vital signs for last 24 hours:  Vitals:    11/01/22 1913 11/01/22 2218 11/02/22 0107 11/02/22 0531   BP:  126/74 124/47 116/51   BP Location:  Right arm Left arm Left arm   Patient Position:  Lying Lying Lying   Pulse: 67 69 69 69   Resp: 18 18 18 18   Temp:  97.4 °F (36.3 °C) 97.7 °F (36.5 °C) 97.8 °F (36.6 °C)   TempSrc:  Oral Oral Oral   SpO2: 92% 97% 95% 93%   Weight:       Height:           Intake/Output last 3 shifts:  I/O last 3 completed shifts:  In: 1200 [P.O.:1200]  Out: 1800  [Urine:1800]  Intake/Output this shift:  No intake/output data recorded.      Radiology  Imaging Results (Last 24 Hours)     ** No results found for the last 24 hours. **          Labs:  Results from last 7 days   Lab Units 10/31/22  0348   WBC 10*3/mm3 12.20*   HEMOGLOBIN g/dL 8.4*   HEMATOCRIT % 28.5*   PLATELETS 10*3/mm3 366     Results from last 7 days   Lab Units 10/31/22  0558 10/29/22  0630 10/28/22  0657   SODIUM mmol/L 135*   < > 144   POTASSIUM mmol/L 3.8   < > 3.5   CHLORIDE mmol/L 100   < > 109*   CO2 mmol/L 24.0   < > 23.0   BUN mg/dL 12   < > 15   CREATININE mg/dL 0.51*   < > 0.57   CALCIUM mg/dL 8.5*   < > 8.4*   BILIRUBIN mg/dL  --   --  0.2   ALK PHOS U/L  --   --  93   ALT (SGPT) U/L  --   --  9   AST (SGOT) U/L  --   --  12   GLUCOSE mg/dL 190*   < > 215*    < > = values in this interval not displayed.         Results from last 7 days   Lab Units 10/28/22  0657 10/27/22  0648 10/26/22  0744   ALBUMIN g/dL 2.30* 2.00* 2.50*     Results from last 7 days   Lab Units 11/01/22  0335 10/31/22  1734 10/31/22  0558   TROPONIN T ng/mL 0.057* 0.064* 0.058*         Results from last 7 days   Lab Units 10/29/22  0630   MAGNESIUM mg/dL 1.8         Results from last 7 days   Lab Units 10/31/22  1734   TSH uIU/mL 10.810*           Meds:   SCHEDULE  acetylcysteine, 3 mL, Nebulization, BID - RT  ARIPiprazole, 2 mg, Oral, Nightly  aspirin, 81 mg, Oral, Daily  clopidogrel, 300 mg, Oral, Once   And  clopidogrel, 75 mg, Oral, Daily  divalproex, 500 mg, Oral, BID  docusate sodium, 100 mg, Oral, BID  ferrous sulfate, 324 mg, Oral, BID With Meals  furosemide, 20 mg, Intravenous, Q12H  insulin glargine, 15 Units, Subcutaneous, QAM  insulin lispro, 3-14 Units, Subcutaneous, TID With Meals  ipratropium-albuterol, 3 mL, Nebulization, BID - RT  levothyroxine, 50 mcg, Oral, Daily  losartan, 25 mg, Oral, Q24H  magnesium oxide, 400 mg, Oral, Daily  metoprolol succinate XL, 25 mg, Oral, Q24H  pantoprazole, 40 mg, Oral,  QAM  piperacillin-tazobactam, 3.375 g, Intravenous, Q8H  polyethylene glycol, 17 g, Oral, Daily  rosuvastatin, 20 mg, Oral, Daily      Infusions     PRNs  •  acetaminophen  •  acetaminophen  •  acetaminophen  •  dextrose  •  dextrose  •  glucagon (human recombinant)  •  glucagon (human recombinant)  •  ipratropium-albuterol  •  LORazepam  •  phenol  •  [COMPLETED] Insert peripheral IV **AND** sodium chloride    Physical Exam:  Physical Exam  Vitals reviewed.   Pulmonary:      Effort: Pulmonary effort is normal.      Breath sounds: Rhonchi present.   Skin:     General: Skin is warm and dry.   Neurological:      Mental Status: She is alert.         ROS  Review of Systems   Respiratory: Positive for cough and shortness of breath.    Neurological: Positive for weakness.       I have reviewed the patient's new clinical results.    Electronically signed by Shanique Sorto MD

## 2022-11-02 NOTE — PLAN OF CARE
Goal Outcome Evaluation:  Plan of Care Reviewed With: patient, spouse        Progress: no change  Outcome Evaluation: Bedside swallow re-evaluation completed s/p re-evaluation orders received per ROMAN Manning last date 11.1. See prior therapy treatment note, pt declining PO 11.1 d/t nausea/vomiting. KUB this date with nonobstructive bowel gas pattern.     Pt consumes puree and NTL by tsp, agreeable to trials, requesting cessation eventually by stating “stop.” Pt is very pleasant, cooperates, with spouse at bedside. Oral phase unremarkable with adequate seal, no anterior loss. Pharyngeal phase with no overt s/s aspiration immediately post prandial.  reports coughing with all PO, upon further questioning and re-evaluation coughing occurring s/p meals with belching, cough 5-10 min s/p all PO. Findings consistent from VFSS 10.24 recommending puree/NTL, alternating liquis and solids, reflux precautions. VFSS with “ a column of barium up to the mid chest region, inability to clear with a dry swallow and NTL assist, which results in retrograde flow to the level of the UES. She is at risk of aspiration due to this.”     Discussed all results with  - known pharyngeal impairment appears appropriately managed with current diet/liquid levels, evidenced by no overt s/s aspiration. Discussed esophageal dysphagia, poor clearance, reflux to UES likely contributing to delayed cough and belching. Extensive education on all precautions for GERD and general aspiration, as listed below. Continue to suggest GI consult versus MD management for GERD/reflux as aforementioned.     Recommendations consistent: puree/NTL BY TSP ONLY. Medications: whole or crushed in puree. Compensations: NECTAR THICK BY SPOON ONLY, strict aspiration precautions (frequent oral care, elevate HOB 45 degrees); GERD precautions which include: consumption of small meals, utilization of thin liquid wash or extra sauces/gravies as indicated, remaining  upright for all PO consumption and at least 30 minutes s/p, avoid eating at reasonable time frame based on sleep schedule.

## 2022-11-02 NOTE — PROGRESS NOTES
"Subjective   Laura Perkins is a 82 y.o. female.   Seen for diabetes f/u.  Swallow study today recommend puree diet, small meals.      Objective     BP (!) 137/35 (BP Location: Left arm, Patient Position: Lying)   Pulse 69   Temp 97.4 °F (36.3 °C) (Oral)   Resp 20   Ht 162.6 cm (64\")   Wt 63.4 kg (139 lb 12.4 oz)   SpO2 95%   BMI 23.99 kg/m²   Blood sugar  95 this am,  368 @ lunch, 135 @ supper    ASSESSMENT    Patient is stable    PLAN    Continue same insulin regimen. No HS Lantus dose.         Matias Santos MD  11/2/2022  18:09 EDT    "

## 2022-11-02 NOTE — PROGRESS NOTES
Cardiology Progress Note    Patient Identification:  Name: Laura Perkins  Age: 82 y.o.  Sex: female  :  1940  MRN: [id number]                 Follow Up / Chief Complaint: Elevated troponin, non stemi   Chief Complaint   Patient presents with   • Altered Mental Status       Interval History:  Patient presented from outlying facility with pneumonia.  Noted to have elevated troponin   Patient underwent cardiac cath 10/27/2022 with stent to proximal LAD.       NP NOTE:  Patient seen and examined; not feeling well today.  Patient denies any chest pain but reports shortness of breath, cough and now nausea and vomiting.   Repeat BNP was ordered but not recollected. Will place order again.   ST note reviewed- did not see due to vomiting yesterday and will see today possibly.     Electronically signed by ROMAN Lynn, 22, 11:40 AM EDT.    Cardiology attending addendum :    I have personally performed a face-to-face diagnostic evaluation, physical exam and reviewed data on this patient.  I have reviewed documentation done by me and nurse practitioner  and corrected as needed.  And agree with the different components of documentation.Greater than 50% of the time spent in the care of this patient was provided by attending consultant/me.             Subjective: Patient seen and examined.  Chart reviewed.  Labs reviewed.  Discussed with RN taking care of patient.  Events noted.  Patient reportedly went into respiratory distress and edema 10/30/2022 with elevated proBNP of 23,000.  2022: Patient is feeling whole lot better no edema.      Objective: Serial troponin 0.872, 0.653, 0.793, 0.546, 0.373  10/26/2022: troponin 0.300, 0.256  Glucose 136, sodium 148 WBC 13.4, hgb 9.0  10/27/2022: troponin 0.175, WBC 12.9 hgb 8.8 EKG with diffuse T wave abnormalities and QTC prolongation   10/28/2022: troponin 0.136, glucose 215, WBC 12.2   hgb 8.6  10/30/2022: troponin 0.067, pro bnp [id number]  10/31/2022: troponin  0.05, glucose 190, creatinine 0.51  WBC 12.2, hgb 8.4  11/1/2022:  Troponin 0.057, glucose 185  11/2/2022: no labs to review       History of Present Illness:        Mrs. Laura Perkins has a Past medical history of      - hypertension  - dyslipidemia   - diabetes   - anemia, GERD  - stroke 2012  - bipolar disorder  - Colon cancer- s/p resection 10/18/2022  - bladder surgery, eye surgery, hysterectomy, hemorrhoidectomy   -  Non smoker  - no allergies  - family history of heart disease in mother         Presented from rehab center to the ED on 10/24/2022 for confusion/alerted mental status.  Patient was recently admitted to [facility] for transverse colon resection on 10/18/2022.  Patient was discharged to rehab. In the ED patient was noted to have fever 102. CXR showed left sided pneumonia and patient was started on IVFs and antibiotics.  Cardiology has been consulted for elevated troponin.   Patient alert and oriented and gives some history but is poor historian and most of information above was obtained from chart review.  She denies any chest pain but does state she is short of breath and coughing.  RN reported patient aspirated and had swallow study now on thickened liquid.      Serial troponins have been elevated but non-trending 0.872, 0.653, 0.793, 0.546, 0.373  EKG showed sinus tachycardia with diffuse T wave inversion that is changed from previous EKG on 10/11/2022 that showed normal sinus rhythm without any ST abnormalities.       Assessment:  :     Acute non-ST elevation MI  Presumed CAD  Hypertension, dyslipidemia, diabetes  Recent colon cancer resection 10/18/2022  Fever, leukocytosis  Altered mental status  Prolonged QTC on psychiatric medications   CAD, NSTEMI, PCI  Acute HFrEF due to systolic dysfunction from ischemic cardiomyopathy        Recommendations / Plan:         Telemetry is revealing sinus rhythm  Monitor renal function and hemoglobin and urine output  proBNP is improved from  93,804-31,120  Continue diuresis as tolerated  Echo is revealing moderate LV dysfunction  Continue to monitor EKG and QTC.  EKG done 10/31/2022 reviewed/interpreted by me reveals sinus tachycardia with rate of 102 bpm with QT of 382 ms and QTC of 493 ms  Increase activity as tolerated  Continue aspirin, clopidogrel, losartan, metoprolol succinate, rosuvastatin as tolerated  Speech therapy for swallow evaluation again to make sure she is not aspirating  Discussed with patient's  by bedside  Patient has significant improvement in symptoms we will continue diuresis and medical management  Will follow-up as outpatient.       Copied text in this portion of the note has been reviewed and is accurate as of 11/2/2022    Past Medical History:  Past Medical History:   Diagnosis Date   • Acid reflux    • Anemia    • Anemia    • Bipolar 1 disorder (HCC)    • Colon cancer (HCC)    • Diabetes mellitus (HCC)     Type 2   • Disease of thyroid gland    • Hyperlipidemia    • Hypertension     TAKEN OFF HTN MEDS OVER 5 YEARS AGO   • Incontinence of urine    • Stroke (HCC)     2012     Past Surgical History:  Past Surgical History:   Procedure Laterality Date   • BLADDER SURGERY     • CARDIAC CATHETERIZATION N/A 10/27/2022    Procedure: Left Heart Cath, possible pci;  Surgeon: Andrea Melvin MD;  Location: [facility] CATH INVASIVE LOCATION;  Service: Cardiovascular;  Laterality: N/A;   • COLON RESECTION N/A 10/18/2022    Procedure: COLON RESECTIOn TRANSVERSE LAPAROSCOPIC;  Surgeon: Toño Michelle MD;  Location: [facility] OR;  Service: General;  Laterality: N/A;   • COLONOSCOPY  09/21/2022    [facility]   • ENDOSCOPY  09/21/2022    [facility]   • EYE SURGERY     • HEMORRHOIDECTOMY     • HYSTERECTOMY          Social History:   Social History     Tobacco Use   • Smoking status: Never   • Smokeless tobacco: Never   Substance Use Topics   • Alcohol use: Never      Family History:  Family History  "  Problem Relation Age of Onset   • Heart disease Mother    • Diabetes Mother    • No Known Problems Father    • Cancer Brother           Allergies:  No Known Allergies  Scheduled Meds:  acetylcysteine, 3 mL, BID - RT  ARIPiprazole, 2 mg, Nightly  aspirin, 81 mg, Daily  clopidogrel, 300 mg, Once   And  clopidogrel, 75 mg, Daily  divalproex, 500 mg, BID  docusate sodium, 100 mg, BID  ferrous sulfate, 324 mg, BID With Meals  furosemide, 20 mg, Q12H  insulin glargine, 15 Units, QAM  insulin lispro, 3-14 Units, TID With Meals  ipratropium-albuterol, 3 mL, BID - RT  levothyroxine, 50 mcg, Daily  losartan, 25 mg, Q24H  magnesium oxide, 400 mg, Daily  metoprolol succinate XL, 25 mg, Q24H  pantoprazole, 40 mg, QAM  piperacillin-tazobactam, 3.375 g, Q8H  polyethylene glycol, 17 g, Daily  rosuvastatin, 20 mg, Daily          Review of Systems:   Review of Systems   Unable to perform ROS: mental status change            Constitutional:  Temp:  [97.4 °F (36.3 °C)-98.3 °F (36.8 °C)] 97.4 °F (36.3 °C)  Heart Rate:  [63-76] 69  Resp:  [18-20] 20  BP: (116-148)/(35-74) 137/35    Physical Exam   BP (!) 137/35 (BP Location: Left arm, Patient Position: Lying)   Pulse 69   Temp 97.4 °F (36.3 °C) (Oral)   Resp 20   Ht 162.6 cm (64\")   Wt 63.4 kg (139 lb 12.4 oz)   SpO2 100%   BMI 23.99 kg/m²   General:  Appears in no acute distress- frail and chronically ill   Eyes: Sclera is anicteric,  conjunctiva is clear   HEENT:  No JVD. Thyroid not visibly enlarged. No mucosal pallor or cyanosis  Respiratory: Respirations regular and unlabored at rest.  Decreased breath sounds at bilateral bases with scattered rhonchi  Cardiovascular: S1,S2 Regular rate and rhythm. No murmur, rub or gallop auscultated.  . No pretibial pitting edema  Gastrointestinal: Abdomen nondistended.  Musculoskeletal:  No abnormal movements  Extremities: No digital clubbing or cyanosis  Skin: Color pink. Skin warm and dry to touch. No rashes  No xanthoma  Neuro: Alert " and awake, no lateralizing deficits appreciated    INTAKE AND OUTPUT:    Intake/Output Summary (Last 24 hours) at 11/2/2022 1953  Last data filed at 11/2/2022 0107  Gross per 24 hour   Intake 480 ml   Output 650 ml   Net -170 ml       Cardiographics  Telemetry: sinus rhythm     ECG:   ECG 12 Lead QT Measurement   Preliminary Result   HEART RATE= 102  bpm   RR Interval= 600  ms   NV Interval= 157  ms   P Horizontal Axis= -38  deg   P Front Axis= 23  deg   QRSD Interval= 89  ms   QT Interval= 382  ms   QRS Axis= 28  deg   T Wave Axis= 209  deg   - ABNORMAL ECG -   Sinus tachycardia   Atrial premature complexes   Nonspecific T abnormalities, diffuse leads   Electronically Signed By:    Date and Time of Study: 2022-10-31 08:53:04      ECG 12 Lead Other; on psych medications that prolong qtc   Preliminary Result   HEART RATE= 92  bpm   RR Interval= 656  ms   NV Interval= 154  ms   P Horizontal Axis= -11  deg   P Front Axis= 45  deg   QRSD Interval= 90  ms   QT Interval= 360  ms   QRS Axis= 20  deg   T Wave Axis= 193  deg   - ABNORMAL ECG -   Sinus rhythm   Atrial premature complex   Abnrm T, consider ischemia, anterolateral lds   When compared with ECG of 29-Oct-2022 5:08:11,   Nonspecific significant change   Electronically Signed By:    Date and Time of Study: 2022-10-29 16:41:12      ECG 12 Lead QT Measurement   Preliminary Result   HEART RATE= 78  bpm   RR Interval= 772  ms   NV Interval= 139  ms   P Horizontal Axis= 4  deg   P Front Axis= 42  deg   QRSD Interval= 87  ms   QT Interval= 422  ms   QRS Axis= 32  deg   T Wave Axis= 212  deg   - ABNORMAL ECG -   Sinus rhythm   Abnormal T, consider ischemia, diffuse leads   Electronically Signed By:    Date and Time of Study: 2022-10-29 05:08:11      SCANNED - TELEMETRY     Final Result      ECG 12 Lead   Preliminary Result   HEART RATE= 78  bpm   RR Interval= 772  ms   NV Interval= 129  ms   P Horizontal Axis= -38  deg   P Front Axis= 2  deg   QRSD Interval= 90  ms   QT  Interval= 469  ms   QRS Axis= 37  deg   T Wave Axis= 232  deg   - ABNORMAL ECG -   Sinus rhythm   Abnormal T, suspect prox. LAD occlu. or CVA   Prolonged QT interval   When compared with ECG of 27-Oct-2022 10:26:26,   Significant repolarization change   Electronically Signed By:    Date and Time of Study: 2022-10-28 05:26:46      ECG 12 Lead QT Measurement   Final Result   HEART RATE= 97  bpm   RR Interval= 620  ms   NC Interval= 154  ms   P Horizontal Axis= 1  deg   P Front Axis= 43  deg   QRSD Interval= 92  ms   QT Interval= 474  ms   QRS Axis= 32  deg   T Wave Axis= 217  deg   - ABNORMAL ECG -   Sinus rhythm   Abnormal T, probable ischemia, widespread   Prolonged QT interval   When compared with ECG of 23-Oct-2022 19:11:40,   Significant change in rhythm   Electronically Signed By: Skyler García ([facility]) 28-Oct-2022 10:11:01   Date and Time of Study: 2022-10-27 10:26:26      ECG 12 Lead   Final Result   HEART RATE= 121  bpm   RR Interval= 496  ms   NC Interval= 96  ms   P Horizontal Axis=   deg   P Front Axis= 206  deg   QRSD Interval= 77  ms   QT Interval= 382  ms   QRS Axis= 37  deg   T Wave Axis= 209  deg   - ABNORMAL ECG -   Sinus or ectopic atrial tachycardia   Abnormal T, consider ischemia, diffuse leads   Prolonged QT interval   When compared with ECG of 11-Oct-2022 14:13:27,   Significant change in rhythm: previously sinus   New or worsened ischemia or infarction   Electronically Signed By: Marino Montes ([facility]) 24-Oct-2022 14:20:30   Date and Time of Study: 2022-10-23 19:11:40      SCANNED - TELEMETRY     Final Result      SCANNED - TELEMETRY     Final Result      SCANNED - TELEMETRY     Final Result      SCANNED - TELEMETRY     Final Result      SCANNED - TELEMETRY     Final Result      SCANNED - TELEMETRY     Final Result      SCANNED - TELEMETRY     Final Result      SCANNED - TELEMETRY     Final Result      SCANNED - TELEMETRY     Final Result      SCANNED - TELEMETRY     Final Result      SCANNED -  TELEMETRY     Final Result      SCANNED - TELEMETRY     Final Result      SCANNED - TELEMETRY     Final Result      SCANNED - TELEMETRY     Final Result      SCANNED - TELEMETRY     Final Result      SCANNED - TELEMETRY     Final Result      SCANNED - TELEMETRY     Final Result      SCANNED - TELEMETRY     Final Result      SCANNED - TELEMETRY     Final Result      SCANNED - TELEMETRY     Final Result      SCANNED - TELEMETRY     Final Result      SCANNED - TELEMETRY     Final Result      SCANNED - TELEMETRY     Final Result      SCANNED - TELEMETRY     Final Result      SCANNED - TELEMETRY     Final Result      SCANNED - TELEMETRY     Final Result      SCANNED - TELEMETRY     Final Result      SCANNED - TELEMETRY     Final Result      SCANNED - TELEMETRY     Final Result      SCANNED - TELEMETRY     Final Result      SCANNED - TELEMETRY     Final Result      SCANNED - TELEMETRY     Final Result      SCANNED - TELEMETRY     Final Result      SCANNED - TELEMETRY     Final Result      SCANNED - TELEMETRY     Final Result      SCANNED - TELEMETRY     Final Result      SCANNED - TELEMETRY     Final Result      SCANNED - TELEMETRY     Final Result      SCANNED - TELEMETRY     Final Result      SCANNED - TELEMETRY     Final Result      SCANNED - TELEMETRY     Final Result      SCANNED - TELEMETRY     Final Result      SCANNED - TELEMETRY     Final Result      ECG 12 Lead QT Measurement    (Results Pending)   ECG 12 Lead QT Measurement    (Results Pending)     I have personally reviewed EKG    Echocardiogram: Results for orders placed during the hospital encounter of 10/23/22    Adult Transthoracic Echo Limited W/ Cont if Necessary Per Protocol    Interpretation Summary  Normal LV size.  Mild apical hypokinesis seen.  Estimated LV ejection fraction of 45 to 50%  Normal RV size  Normal atrial size  Aortic valve, mitral valve, tricuspid valve appears structurally normal, no significant regurgitation seen.  Small pericardial  effusion seen.  No signs of increased intrapericardial pressure  Proximal aorta appears normal in size.      Lab Review   I have reviewed the labs  Results from last 7 days   Lab Units 11/01/22  0335 10/31/22  1734 10/31/22  0558   TROPONIN T ng/mL 0.057* 0.064* 0.058*     Results from last 7 days   Lab Units 10/29/22  0630   MAGNESIUM mg/dL 1.8     Results from last 7 days   Lab Units 10/31/22  0558   SODIUM mmol/L 135*   POTASSIUM mmol/L 3.8   BUN mg/dL 12   CREATININE mg/dL 0.51*   CALCIUM mg/dL 8.5*         Results from last 7 days   Lab Units 10/31/22  0348 10/30/22  0434 10/29/22  0630   WBC 10*3/mm3 12.20* 13.90* 14.20*   HEMOGLOBIN g/dL 8.4* 8.7* 8.7*   HEMATOCRIT % 28.5* 29.5* 29.1*   PLATELETS 10*3/mm3 366 321 297           RADIOLOGY:  Imaging Results (Last 24 Hours)     Procedure Component Value Units Date/Time    XR Abdomen KUB [[id number]] Collected: 11/02/22 1224     Updated: 11/02/22 1228    Narrative:      DATE OF EXAM:  11/2/2022 11:00 AM     PROCEDURE:  XR ABDOMEN KUB-     INDICATIONS:  Vomiting; I21.4-Non-ST elevation (NSTEMI) myocardial infarction;  R41.82-Altered mental status, unspecified; R50.9-Fever, unspecified;  J18.9-Pneumonia, unspecified organism; R77.8-Other specified  abnormalities of plasma proteins; E11.9-Type 2 diabetes mellitus without  complications; Z79.4-Long term (current) use of insulin;  E78.5-Hyperlipidemia, unspecified     COMPARISON:  CT chest 10/30/2022, CT abdomen and pelvis without contrast 10/24/2022     TECHNIQUE:   Radiographic view(s) of the abdomen obtained.     FINDINGS:  Included lower lungs are abnormal, better, better evaluated on recent  chest CT. No abnormal small bowel distention is seen in a pattern to  suggest small bowel obstruction, although there is a nonspecific paucity  of gas-filled small bowel loops. Stool is seen in the ascending and  transverse colon.        Impression:      Nonspecific, nonobstructive bowel gas pattern.     Electronically Signed  "By-Lora Monk MD On:11/2/2022 12:26 PM  This report was finalized on [id number] by  Lora Monk MD.                )11/2/2022  MD YARA Dunham/Transcription:   \"Dictated utilizing Dragon dictation\".   "

## 2022-11-02 NOTE — THERAPY TREATMENT NOTE
"Subjective: Pt agreeable to therapeutic plan of care. Alert oriented to self    Objective:     Bed mobility - Min-A  Transfers - Min-A; needs max cues for hand placement as pt pushes laterally due to buttock pain in sitting  Ambulation - standing only at RW with Min A x 3 reps    Pt found to be incontinent of stool and urine upon arrival.  Total assist for linen change and myrtle care.    Participated in AAROM to BLEs with mod cues.  When transferring to stand, pt incontinent of stool but unaware.  Pt required total assist for standing myrtle care.     Vitals: WNL    Pain: 5 VAS   Location: 'heart' and buttocks  Intervention for pain: Repositioned;myrtle area cleansed, barrier cream and new external female catheter applied.    Education: Provided education on the importance of mobility in the acute care setting and Transfer/Gait training    Assessment: Laura Perkins presents with functional mobility impairments which indicate the need for skilled intervention. Tolerating session today without incident. Pt remains confused but cooperative.  Moves well with min A but limited by frequent fecal incontinence. Will continue to follow and progress as tolerated.     Plan/Recommendations:   Moderate Intensity Therapy recommended post-acute care. This is recommended as therapy feels the patient would require 3-4 days per week and wouldn't tolerate \"3 hour daily\" rehab intensity. SNF would be the preferred choice. If the patient does not agree to SNF, arrange HH or OP depending on home bound status. If patient is medically complex, consider LTACH.. Pt requires no DME at discharge.     Pt desires Skilled Rehab placement at discharge. Pt cooperative; agreeable to therapeutic recommendations and plan of care.         Basic Mobility 6-click:  Rollin = Total, A lot = 2, A little = 3; 4 = None  Supine>Sit:   1 = Total, A lot = 2, A little = 3; 4 = None   Sit>Stand with arms:  1 = Total, A lot = 2, A little = 3; 4 = " None  Bed>Chair:   1 = Total, A lot = 2, A little = 3; 4 = None  Ambulate in room:  1 = Total, A lot = 2, A little = 3; 4 = None  3-5 Steps with railin = Total, A lot = 2, A little = 3; 4 = None  Score: 16    Modified [place]: N/A = No pre-op stroke/TIA    Post-Tx Position: Supine with HOB Elevated, Alarms activated and Call light and personal items within reach  PPE: mask, gloves and eye protection

## 2022-11-02 NOTE — PLAN OF CARE
"Goal Outcome Evaluation:            Assessment: Laura Perkins presents with functional mobility impairments which indicate the need for skilled intervention. Tolerating session today without incident. Pt remains confused but cooperative.  Moves well with min A but limited by frequent fecal incontinence. Will continue to follow and progress as tolerated.      Plan/Recommendations:   Moderate Intensity Therapy recommended post-acute care. This is recommended as therapy feels the patient would require 3-4 days per week and wouldn't tolerate \"3 hour daily\" rehab intensity. SNF would be the preferred choice. If the patient does not agree to SNF, arrange HH or OP depending on home bound status. If patient is medically complex, consider LTACH.. Pt requires no DME at discharge.      Pt desires Skilled Rehab placement at discharge. Pt cooperative; agreeable to therapeutic recommendations and plan of care.      "

## 2022-11-02 NOTE — PROGRESS NOTES
"Subjective   Laura Perkins is a 82 y.o. female.   Seen for diabetes f/u.  To have a swallow study tomorrow.    Objective     /47   Pulse 67   Temp 97.4 °F (36.3 °C) (Oral)   Resp 18   Ht 162.6 cm (64\")   Wt 63.4 kg (139 lb 12.4 oz)   SpO2 92%   BMI 23.99 kg/m²   Blood sugar  185 @ 2am, 127 @ 7 am,  359 @ lunch, 366 @ supper, 177 @ 9p;   TSH 10.81    ASSESSMENT    Patient is Improving    PLAN    Will increase am Lantus dose.  Continue same levothyroxine, since she has been in & out of hosp & rehab the last 3 week & is safer with under treated, rather than over treated thyroid.         Matias Santos MD  11/1/2022  21:31 EDT    "

## 2022-11-03 ENCOUNTER — APPOINTMENT (OUTPATIENT)
Dept: CT IMAGING | Facility: HOSPITAL | Age: 82
End: 2022-11-03

## 2022-11-03 ENCOUNTER — APPOINTMENT (OUTPATIENT)
Dept: GENERAL RADIOLOGY | Facility: HOSPITAL | Age: 82
End: 2022-11-03

## 2022-11-03 LAB
GLUCOSE BLDC GLUCOMTR-MCNC: 193 MG/DL (ref 70–105)
GLUCOSE BLDC GLUCOMTR-MCNC: 298 MG/DL (ref 70–105)
GLUCOSE BLDC GLUCOMTR-MCNC: 334 MG/DL (ref 70–105)
QT INTERVAL: 360 MS
QT INTERVAL: 422 MS

## 2022-11-03 PROCEDURE — 25010000002 FUROSEMIDE PER 20 MG: Performed by: NURSE PRACTITIONER

## 2022-11-03 PROCEDURE — 25010000002 ONDANSETRON PER 1 MG: Performed by: INTERNAL MEDICINE

## 2022-11-03 PROCEDURE — 97535 SELF CARE MNGMENT TRAINING: CPT

## 2022-11-03 PROCEDURE — 63710000001 INSULIN GLARGINE PER 5 UNITS: Performed by: INTERNAL MEDICINE

## 2022-11-03 PROCEDURE — 63710000001 INSULIN LISPRO (HUMAN) PER 5 UNITS: Performed by: INTERNAL MEDICINE

## 2022-11-03 PROCEDURE — 99233 SBSQ HOSP IP/OBS HIGH 50: CPT | Performed by: INTERNAL MEDICINE

## 2022-11-03 PROCEDURE — 82962 GLUCOSE BLOOD TEST: CPT

## 2022-11-03 PROCEDURE — 94664 DEMO&/EVAL PT USE INHALER: CPT

## 2022-11-03 PROCEDURE — 25010000002 PIPERACILLIN SOD-TAZOBACTAM PER 1 G

## 2022-11-03 PROCEDURE — 71045 X-RAY EXAM CHEST 1 VIEW: CPT

## 2022-11-03 PROCEDURE — 99231 SBSQ HOSP IP/OBS SF/LOW 25: CPT | Performed by: INTERNAL MEDICINE

## 2022-11-03 PROCEDURE — 93005 ELECTROCARDIOGRAM TRACING: CPT | Performed by: INTERNAL MEDICINE

## 2022-11-03 PROCEDURE — 94799 UNLISTED PULMONARY SVC/PX: CPT

## 2022-11-03 PROCEDURE — 71250 CT THORAX DX C-: CPT

## 2022-11-03 PROCEDURE — 93010 ELECTROCARDIOGRAM REPORT: CPT | Performed by: INTERNAL MEDICINE

## 2022-11-03 PROCEDURE — 94761 N-INVAS EAR/PLS OXIMETRY MLT: CPT

## 2022-11-03 RX ORDER — ONDANSETRON 2 MG/ML
4 INJECTION INTRAMUSCULAR; INTRAVENOUS EVERY 6 HOURS PRN
Status: DISCONTINUED | OUTPATIENT
Start: 2022-11-03 | End: 2022-11-04

## 2022-11-03 RX ADMIN — FUROSEMIDE 20 MG: 10 INJECTION, SOLUTION INTRAMUSCULAR; INTRAVENOUS at 09:05

## 2022-11-03 RX ADMIN — FUROSEMIDE 20 MG: 10 INJECTION, SOLUTION INTRAMUSCULAR; INTRAVENOUS at 20:56

## 2022-11-03 RX ADMIN — DOCUSATE SODIUM 100 MG: 100 CAPSULE, LIQUID FILLED ORAL at 20:57

## 2022-11-03 RX ADMIN — FERROUS SULFATE TAB EC 324 MG (65 MG FE EQUIVALENT) 324 MG: 324 (65 FE) TABLET DELAYED RESPONSE at 09:05

## 2022-11-03 RX ADMIN — DIVALPROEX SODIUM 500 MG: 500 TABLET, DELAYED RELEASE ORAL at 20:57

## 2022-11-03 RX ADMIN — IPRATROPIUM BROMIDE AND ALBUTEROL SULFATE 3 ML: .5; 3 SOLUTION RESPIRATORY (INHALATION) at 06:40

## 2022-11-03 RX ADMIN — DOCUSATE SODIUM 100 MG: 100 CAPSULE, LIQUID FILLED ORAL at 09:05

## 2022-11-03 RX ADMIN — Medication 10 ML: at 20:56

## 2022-11-03 RX ADMIN — LEVOTHYROXINE SODIUM 50 MCG: 50 TABLET ORAL at 06:18

## 2022-11-03 RX ADMIN — PANTOPRAZOLE SODIUM 40 MG: 40 TABLET, DELAYED RELEASE ORAL at 06:18

## 2022-11-03 RX ADMIN — LOSARTAN POTASSIUM 25 MG: 25 TABLET, FILM COATED ORAL at 09:05

## 2022-11-03 RX ADMIN — IPRATROPIUM BROMIDE AND ALBUTEROL SULFATE 3 ML: .5; 3 SOLUTION RESPIRATORY (INHALATION) at 18:39

## 2022-11-03 RX ADMIN — PIPERACILLIN AND TAZOBACTAM 3.38 G: 3; .375 INJECTION, POWDER, FOR SOLUTION INTRAVENOUS at 22:52

## 2022-11-03 RX ADMIN — METOPROLOL SUCCINATE 25 MG: 25 TABLET, FILM COATED, EXTENDED RELEASE ORAL at 09:05

## 2022-11-03 RX ADMIN — PIPERACILLIN AND TAZOBACTAM 3.38 G: 3; .375 INJECTION, POWDER, FOR SOLUTION INTRAVENOUS at 15:00

## 2022-11-03 RX ADMIN — ONDANSETRON 4 MG: 2 INJECTION INTRAMUSCULAR; INTRAVENOUS at 13:51

## 2022-11-03 RX ADMIN — ASPIRIN 81 MG: 81 TABLET, COATED ORAL at 09:05

## 2022-11-03 RX ADMIN — POLYETHYLENE GLYCOL 3350 17 G: 17 POWDER, FOR SOLUTION ORAL at 09:05

## 2022-11-03 RX ADMIN — INSULIN GLARGINE 15 UNITS: 100 INJECTION, SOLUTION SUBCUTANEOUS at 09:03

## 2022-11-03 RX ADMIN — FERROUS SULFATE TAB EC 324 MG (65 MG FE EQUIVALENT) 324 MG: 324 (65 FE) TABLET DELAYED RESPONSE at 18:55

## 2022-11-03 RX ADMIN — ARIPIPRAZOLE 2 MG: 2 TABLET ORAL at 20:56

## 2022-11-03 RX ADMIN — DIVALPROEX SODIUM 500 MG: 500 TABLET, DELAYED RELEASE ORAL at 09:05

## 2022-11-03 RX ADMIN — MAGNESIUM OXIDE TAB 400 MG (241.3 MG ELEMENTAL MG) 400 MG: 400 (241.3 MG) TAB at 09:05

## 2022-11-03 RX ADMIN — ACETYLCYSTEINE 3 ML: 200 SOLUTION ORAL; RESPIRATORY (INHALATION) at 06:42

## 2022-11-03 RX ADMIN — ROSUVASTATIN 20 MG: 10 TABLET, FILM COATED ORAL at 20:56

## 2022-11-03 RX ADMIN — INSULIN LISPRO 10 UNITS: 100 INJECTION, SOLUTION INTRAVENOUS; SUBCUTANEOUS at 18:55

## 2022-11-03 RX ADMIN — PIPERACILLIN AND TAZOBACTAM 3.38 G: 3; .375 INJECTION, POWDER, FOR SOLUTION INTRAVENOUS at 06:18

## 2022-11-03 RX ADMIN — INSULIN LISPRO 3 UNITS: 100 INJECTION, SOLUTION INTRAVENOUS; SUBCUTANEOUS at 09:03

## 2022-11-03 RX ADMIN — INSULIN LISPRO 10 UNITS: 100 INJECTION, SOLUTION INTRAVENOUS; SUBCUTANEOUS at 13:31

## 2022-11-03 RX ADMIN — ACETYLCYSTEINE 3 ML: 200 SOLUTION ORAL; RESPIRATORY (INHALATION) at 18:40

## 2022-11-03 RX ADMIN — CLOPIDOGREL BISULFATE 75 MG: 75 TABLET ORAL at 09:05

## 2022-11-03 NOTE — PROGRESS NOTES
Cardiology Progress Note    Patient Identification:  Name: Laura Perkins  Age: 82 y.o.  Sex: female  :  1940  MRN: [id number]                 Follow Up / Chief Complaint: Elevated troponin, non stemi   Chief Complaint   Patient presents with   • Altered Mental Status       Interval History:  Patient presented from outlying facility with pneumonia.  Noted to have elevated troponin   Patient underwent cardiac cath 10/27/2022 with stent to proximal LAD.                Subjective: Patient seen and examined.  Chart reviewed.  Labs reviewed.  Discussed with RN taking care of patient.  Events noted.  Patient reportedly went into respiratory distress and edema 10/30/2022 with elevated proBNP of 23,000.  2022: Patient is feeling whole lot better no edema.      Objective: Serial troponin 0.872, 0.653, 0.793, 0.546, 0.373  10/26/2022: troponin 0.300, 0.256  Glucose 136, sodium 148 WBC 13.4, hgb 9.0  10/27/2022: troponin 0.175, WBC 12.9 hgb 8.8 EKG with diffuse T wave abnormalities and QTC prolongation   10/28/2022: troponin 0.136, glucose 215, WBC 12.2   hgb 8.6  10/30/2022: troponin 0.067, pro bnp [id number]  10/31/2022: troponin 0.05, glucose 190, creatinine 0.51  WBC 12.2, hgb 8.4  2022:  Troponin 0.057, glucose 185  2022: no labs to review       History of Present Illness:        Mrs. Laura Perkins has a Past medical history of      - hypertension  - dyslipidemia   - diabetes   - anemia, GERD  - stroke   - bipolar disorder  - Colon cancer- s/p resection 10/18/2022  - bladder surgery, eye surgery, hysterectomy, hemorrhoidectomy   -  Non smoker  - no allergies  - family history of heart disease in mother         Presented from rehab center to the ED on 10/24/2022 for confusion/alerted mental status.  Patient was recently admitted to [facility] for transverse colon resection on 10/18/2022.  Patient was discharged to rehab. In the ED patient was noted to have fever 102. CXR showed left  sided pneumonia and patient was started on IVFs and antibiotics.  Cardiology has been consulted for elevated troponin.   Patient alert and oriented and gives some history but is poor historian and most of information above was obtained from chart review.  She denies any chest pain but does state she is short of breath and coughing.  RN reported patient aspirated and had swallow study now on thickened liquid.      Serial troponins have been elevated but non-trending 0.872, 0.653, 0.793, 0.546, 0.373  EKG showed sinus tachycardia with diffuse T wave inversion that is changed from previous EKG on 10/11/2022 that showed normal sinus rhythm without any ST abnormalities.       Assessment:  :     Acute non-ST elevation MI  Presumed CAD  Hypertension, dyslipidemia, diabetes  Recent colon cancer resection 10/18/2022  Fever, leukocytosis  Altered mental status  Prolonged QTC on psychiatric medications   CAD, NSTEMI, PCI  Acute HFrEF due to systolic dysfunction from ischemic cardiomyopathy        Recommendations / Plan:         Telemetry is revealing sinus rhythm  Monitor renal function and hemoglobin and urine output  proBNP is improved from 23,855-14,071  Continue diuresis as tolerated  Echo is revealing moderate LV dysfunction  Continue to monitor EKG and QTC.  EKG done 10/31/2022 reviewed/interpreted by me reveals sinus tachycardia with rate of 102 bpm with QT of 382 ms and QTC of 493 ms  Increase activity as tolerated  Continue aspirin, clopidogrel, losartan, metoprolol succinate, rosuvastatin as tolerated  Speech therapy for swallow evaluation again to make sure she is not aspirating  Discussed with patient's  by bedside  Patient has significant improvement in symptoms we will continue diuresis and medical management  Patient is not having any symptoms at this time of chest pain but will need physical and occupational therapy to help her with mobility and ambulation  Patient had a echocardiogram which showed small  pericardial effusion but also a EF of about 45 to 50% and is on beta-blockers and losartan.         Past Medical History:  Past Medical History:   Diagnosis Date   • Acid reflux    • Anemia    • Anemia    • Bipolar 1 disorder (HCC)    • Colon cancer (HCC)    • Diabetes mellitus (HCC)     Type 2   • Disease of thyroid gland    • Hyperlipidemia    • Hypertension     TAKEN OFF HTN MEDS OVER 5 YEARS AGO   • Incontinence of urine    • Stroke (HCC)     2012     Past Surgical History:  Past Surgical History:   Procedure Laterality Date   • BLADDER SURGERY     • CARDIAC CATHETERIZATION N/A 10/27/2022    Procedure: Left Heart Cath, possible pci;  Surgeon: Andrea Melvin MD;  Location:  [facility];  Service: Cardiovascular;  Laterality: N/A;   • COLON RESECTION N/A 10/18/2022    Procedure: COLON RESECTIOn TRANSVERSE LAPAROSCOPIC;  Surgeon: Toño Michelle MD;  Location: [facility] OR;  Service: General;  Laterality: N/A;   • COLONOSCOPY  09/21/2022    [facility]   • ENDOSCOPY  09/21/2022    [facility]   • EYE SURGERY     • HEMORRHOIDECTOMY     • HYSTERECTOMY          Social History:   Social History     Tobacco Use   • Smoking status: Never   • Smokeless tobacco: Never   Substance Use Topics   • Alcohol use: Never      Family History:  Family History   Problem Relation Age of Onset   • Heart disease Mother    • Diabetes Mother    • No Known Problems Father    • Cancer Brother           Allergies:  No Known Allergies  Scheduled Meds:  acetylcysteine, 3 mL, BID - RT  ARIPiprazole, 2 mg, Nightly  aspirin, 81 mg, Daily  clopidogrel, 300 mg, Once   And  clopidogrel, 75 mg, Daily  divalproex, 500 mg, BID  docusate sodium, 100 mg, BID  ferrous sulfate, 324 mg, BID With Meals  furosemide, 20 mg, Q12H  insulin glargine, 15 Units, QAM  insulin lispro, 3-14 Units, TID With Meals  ipratropium-albuterol, 3 mL, BID - RT  levothyroxine, 50 mcg, Daily  losartan, 25 mg,  "Q24H  magnesium oxide, 400 mg, Daily  metoprolol succinate XL, 25 mg, Q24H  pantoprazole, 40 mg, QAM  piperacillin-tazobactam, 3.375 g, Q8H  polyethylene glycol, 17 g, Daily  rosuvastatin, 20 mg, Daily          Review of Systems:   Review of Systems   Constitutional: Negative for fever and malaise/fatigue.   HENT: Negative for ear pain and nosebleeds.    Eyes: Negative for blurred vision and double vision.   Cardiovascular: Negative for chest pain, dyspnea on exertion and palpitations.   Respiratory: Negative for cough and shortness of breath.    Skin: Negative for rash.   Musculoskeletal: Negative for joint pain.   Gastrointestinal: Negative for abdominal pain, nausea and vomiting.   Neurological: Negative for focal weakness and headaches.   Psychiatric/Behavioral: Negative for depression. The patient is not nervous/anxious.    All other systems reviewed and are negative.           Constitutional:  Temp:  [97.3 °F (36.3 °C)-98.7 °F (37.1 °C)] 97.9 °F (36.6 °C)  Heart Rate:  [63-82] 68  Resp:  [16-22] 18  BP: (137-149)/(35-51) 141/43    Physical Exam   /43   Pulse 68   Temp 97.9 °F (36.6 °C) (Oral)   Resp 18   Ht 162.6 cm (64\")   Wt 63.4 kg (139 lb 12.4 oz)   SpO2 100%   BMI 23.99 kg/m²   General:  Appears in no acute distress- frail and chronically ill   Eyes: Sclera is anicteric,  conjunctiva is clear   HEENT:  No JVD. Thyroid not visibly enlarged. No mucosal pallor or cyanosis  Respiratory: Respirations regular and unlabored at rest.  Decreased breath sounds at bilateral bases with scattered rhonchi  Cardiovascular: S1,S2 Regular rate and rhythm. No murmur, rub or gallop auscultated.  . No pretibial pitting edema  Gastrointestinal: Abdomen nondistended.  Musculoskeletal:  No abnormal movements  Extremities: No digital clubbing or cyanosis  Skin: Color pink. Skin warm and dry to touch. No rashes  No xanthoma  Neuro: Alert and awake, no lateralizing deficits appreciated    INTAKE AND " OUTPUT:    Intake/Output Summary (Last 24 hours) at 11/3/2022 1658  Last data filed at 11/3/2022 1648  Gross per 24 hour   Intake 600 ml   Output 1100 ml   Net -500 ml       Cardiographics  Telemetry: sinus rhythm     ECG:   ECG 12 Lead QT Measurement   Preliminary Result   HEART RATE= 76  bpm   RR Interval= 808  ms   IA Interval= 155  ms   P Horizontal Axis= -14  deg   P Front Axis= 53  deg   QRSD Interval= 96  ms   QT Interval= 477  ms   QRS Axis= 19  deg   T Wave Axis= 183  deg   - ABNORMAL ECG -   Sinus rhythm   Atrial premature complex   Abnrm T, consider ischemia, anterolateral lds   Prolonged QT interval   When compared with ECG of 31-Oct-2022 8:53:04,   Significant rate decrease   Significant repolarization change   Electronically Signed By:    Date and Time of Study: 2022-11-03 08:17:18      ECG 12 Lead QT Measurement   Preliminary Result   HEART RATE= 102  bpm   RR Interval= 600  ms   IA Interval= 157  ms   P Horizontal Axis= -38  deg   P Front Axis= 23  deg   QRSD Interval= 89  ms   QT Interval= 382  ms   QRS Axis= 28  deg   T Wave Axis= 209  deg   - ABNORMAL ECG -   Sinus tachycardia   Atrial premature complexes   Nonspecific T abnormalities, diffuse leads   Electronically Signed By:    Date and Time of Study: 2022-10-31 08:53:04      ECG 12 Lead Other; on psych medications that prolong qtc   Final Result   HEART RATE= 92  bpm   RR Interval= 656  ms   IA Interval= 154  ms   P Horizontal Axis= -11  deg   P Front Axis= 45  deg   QRSD Interval= 90  ms   QT Interval= 360  ms   QRS Axis= 20  deg   T Wave Axis= 193  deg   - ABNORMAL ECG -   Sinus rhythm   Atrial premature complex   Abnrm T, consider ischemia, anterolateral lds   When compared with ECG of 29-Oct-2022 5:08:11,   Nonspecific significant change   Electronically Signed By: Reyes Cooper ([facility]) 03-Nov-2022 11:14:28   Date and Time of Study: 2022-10-29 16:41:12      ECG 12 Lead QT Measurement   Final Result   HEART RATE= 78  bpm   RR Interval=  772  ms   CT Interval= 139  ms   P Horizontal Axis= 4  deg   P Front Axis= 42  deg   QRSD Interval= 87  ms   QT Interval= 422  ms   QRS Axis= 32  deg   T Wave Axis= 212  deg   - ABNORMAL ECG -   Sinus rhythm   Abnormal T, consider ischemia, diffuse leads   When compared with ECG of 28-Oct-2022 5:26:46,   New or worsened ischemia or infarction   Significant repolarization change   Electronically Signed By: Reyes Cooper ([facility]) 03-Nov-2022 10:47:40   Date and Time of Study: 2022-10-29 05:08:11      SCANNED - TELEMETRY     Final Result      ECG 12 Lead   Preliminary Result   HEART RATE= 78  bpm   RR Interval= 772  ms   CT Interval= 129  ms   P Horizontal Axis= -38  deg   P Front Axis= 2  deg   QRSD Interval= 90  ms   QT Interval= 469  ms   QRS Axis= 37  deg   T Wave Axis= 232  deg   - ABNORMAL ECG -   Sinus rhythm   Abnormal T, suspect prox. LAD occlu. or CVA   Prolonged QT interval   When compared with ECG of 27-Oct-2022 10:26:26,   Significant repolarization change   Electronically Signed By:    Date and Time of Study: 2022-10-28 05:26:46      ECG 12 Lead QT Measurement   Final Result   HEART RATE= 97  bpm   RR Interval= 620  ms   CT Interval= 154  ms   P Horizontal Axis= 1  deg   P Front Axis= 43  deg   QRSD Interval= 92  ms   QT Interval= 474  ms   QRS Axis= 32  deg   T Wave Axis= 217  deg   - ABNORMAL ECG -   Sinus rhythm   Abnormal T, probable ischemia, widespread   Prolonged QT interval   When compared with ECG of 23-Oct-2022 19:11:40,   Significant change in rhythm   Electronically Signed By: Skyler García ([facility]) 28-Oct-2022 10:11:01   Date and Time of Study: 2022-10-27 10:26:26      ECG 12 Lead   Final Result   HEART RATE= 121  bpm   RR Interval= 496  ms   CT Interval= 96  ms   P Horizontal Axis=   deg   P Front Axis= 206  deg   QRSD Interval= 77  ms   QT Interval= 382  ms   QRS Axis= 37  deg   T Wave Axis= 209  deg   - ABNORMAL ECG -   Sinus or ectopic atrial tachycardia   Abnormal T, consider ischemia,  diffuse leads   Prolonged QT interval   When compared with ECG of 11-Oct-2022 14:13:27,   Significant change in rhythm: previously sinus   New or worsened ischemia or infarction   Electronically Signed By: Marino Montes ([facility]) 24-Oct-2022 14:20:30   Date and Time of Study: 2022-10-23 19:11:40      SCANNED - TELEMETRY     Final Result      SCANNED - TELEMETRY     Final Result      SCANNED - TELEMETRY     Final Result      SCANNED - TELEMETRY     Final Result      SCANNED - TELEMETRY     Final Result      SCANNED - TELEMETRY     Final Result      SCANNED - TELEMETRY     Final Result      SCANNED - TELEMETRY     Final Result      SCANNED - TELEMETRY     Final Result      SCANNED - TELEMETRY     Final Result      SCANNED - TELEMETRY     Final Result      SCANNED - TELEMETRY     Final Result      SCANNED - TELEMETRY     Final Result      SCANNED - TELEMETRY     Final Result      SCANNED - TELEMETRY     Final Result      SCANNED - TELEMETRY     Final Result      SCANNED - TELEMETRY     Final Result      SCANNED - TELEMETRY     Final Result      SCANNED - TELEMETRY     Final Result      SCANNED - TELEMETRY     Final Result      SCANNED - TELEMETRY     Final Result      SCANNED - TELEMETRY     Final Result      SCANNED - TELEMETRY     Final Result      SCANNED - TELEMETRY     Final Result      SCANNED - TELEMETRY     Final Result      SCANNED - TELEMETRY     Final Result      SCANNED - TELEMETRY     Final Result      SCANNED - TELEMETRY     Final Result      SCANNED - TELEMETRY     Final Result      SCANNED - TELEMETRY     Final Result      SCANNED - TELEMETRY     Final Result      SCANNED - TELEMETRY     Final Result      SCANNED - TELEMETRY     Final Result      SCANNED - TELEMETRY     Final Result      SCANNED - TELEMETRY     Final Result      SCANNED - TELEMETRY     Final Result      SCANNED - TELEMETRY     Final Result      SCANNED - TELEMETRY     Final Result      SCANNED - TELEMETRY     Final Result      SCANNED -  TELEMETRY     Final Result      SCANNED - TELEMETRY     Final Result      SCANNED - TELEMETRY     Final Result      SCANNED - TELEMETRY     Final Result      SCANNED - TELEMETRY     Final Result      SCANNED - TELEMETRY     Final Result      SCANNED - TELEMETRY     Final Result      ECG 12 Lead QT Measurement    (Results Pending)   ECG 12 Lead QT Measurement    (Results Pending)     I have personally reviewed EKG    Echocardiogram: Results for orders placed during the hospital encounter of 10/23/22    Adult Transthoracic Echo Limited W/ Cont if Necessary Per Protocol    Interpretation Summary  Normal LV size.  Mild apical hypokinesis seen.  Estimated LV ejection fraction of 45 to 50%  Normal RV size  Normal atrial size  Aortic valve, mitral valve, tricuspid valve appears structurally normal, no significant regurgitation seen.  Small pericardial effusion seen.  No signs of increased intrapericardial pressure  Proximal aorta appears normal in size.      Lab Review   I have reviewed the labs  Results from last 7 days   Lab Units 11/01/22  0335 10/31/22  1734 10/31/22  0558   TROPONIN T ng/mL 0.057* 0.064* 0.058*     Results from last 7 days   Lab Units 10/29/22  0630   MAGNESIUM mg/dL 1.8     Results from last 7 days   Lab Units 10/31/22  0558   SODIUM mmol/L 135*   POTASSIUM mmol/L 3.8   BUN mg/dL 12   CREATININE mg/dL 0.51*   CALCIUM mg/dL 8.5*         Results from last 7 days   Lab Units 10/31/22  0348 10/30/22  0434 10/29/22  0630   WBC 10*3/mm3 12.20* 13.90* 14.20*   HEMOGLOBIN g/dL 8.4* 8.7* 8.7*   HEMATOCRIT % 28.5* 29.5* 29.1*   PLATELETS 10*3/mm3 366 321 297           RADIOLOGY:  Imaging Results (Last 24 Hours)     Procedure Component Value Units Date/Time    CT Chest Without Contrast Diagnostic [[id number]] Collected: 11/03/22 1626     Updated: 11/03/22 1636    Narrative:         DATE OF EXAM:  11/3/2022 12:25 PM     PROCEDURE:  CT CHEST WO CONTRAST DIAGNOSTIC-     INDICATIONS:   Pneumonia, complication  suspected, xray done; I21.4-Non-ST elevation  (NSTEMI) myocardial infarction; R41.82-Altered mental status,  unspecified; R50.9-Fever, unspecified; J18.9-Pneumonia, unspecified  organism; R77.8-Other specified abnormalities of plasma proteins;  E11.9-Type 2 diabetes mellitus without complications; Z79.4-Long term  (current) use of insulin; E78.5-Hyperlipidemia, unspecified     COMPARISON:   Chest CT without contrast dated 10/30/2022, single view chest radiograph  dated 11/03/2022     TECHNIQUE:  Routine transaxial slices were obtained through the chest without the  administration of intravenous contrast. Reconstructed coronal and  sagittal images were also obtained. Automated exposure control and  iterative construction methods were used.     FINDINGS:  There are moderate bilateral pleural effusions with complete  opacification with air bronchograms as well as some areas of  endobronchial debris in the left lower lobe and near opacification of  the right lower lobe. There are air debris levels in the right and left  lower lobe bronchi and segmental branches. No significant volume loss.  Overall appearance is concerning for bilateral pneumonia. No axillary  adenopathy. Hilar regions are difficult to assess due to obvious hilar  adenopathy. There are borderline prominent. The trachea is widely  patent. There is patchy groundglass opacity right apex which is a new  finding since 10/30/2002. No pericardial effusion. Extensive vascular  calcification in the thoracic aorta and coronary. No pneumothorax.  Limited images of the upper abdomen demonstrate improvement in  previously noted anasarca. No aggressive focal osseous lesions or acute  bony abnormalities. Mild motion artifact degrades image quality.       Impression:         1. Evolving extensive airspace consolidations with endobronchial debris  concerning for evolving bilateral pneumonia. There is developing  groundglass opacity within the right apex which is new  since 10/30/2022.  Moderate layering bilateral pleural effusions persist.  2. Anasarca previously noted has improved.     Electronically Signed By-Shahram Kenny DO On:11/3/2022 4:34 PM  This report was finalized on [id number] by  Shahram Kenny DO.    XR Chest 1 View [[id number]] Collected: 11/03/22 0637     Updated: 11/03/22 0642    Narrative:         DATE OF EXAM:   11/3/2022 4:50 AM     PROCEDURE:   XR CHEST 1 VW-     INDICATIONS:   Pleural effusion; I21.4-Non-ST elevation (NSTEMI) myocardial infarction;  R41.82-Altered mental status, unspecified; R50.9-Fever, unspecified;  J18.9-Pneumonia, unspecified organism; R77.8-Other specified  abnormalities of plasma proteins; E11.9-Type 2 diabetes mellitus without  complications; Z79.4-Long term (current) use of insulin;  E78.5-Hyperlipidemia, unspecified     COMPARISON:  10/30/2022 and prior     TECHNIQUE:   Portable Chest     FINDINGS:      Study is limited by overlying support and monitoring apparatus. There is  been interval enlargement of a moderate to large left-sided pleural  effusion with associated groundglass and dependent opacities. Evaluation  of the cardiac silhouette is limited by the pleural effusion. Mild  pulmonary vascular congestion is noted with increased proximal of the  interstitial markings on the right. Blunting of the right costophrenic  angle is noted and limited due to overlying support apparatus. Osseous  structures intrinsic no acute abnormality        Impression:      IMPRESSION :   Interval enlargement of left-sided pleural effusion and associated  dependent consolidation[     Slight decrease in size of right-sided pleural effusion given  differences in technique     Diffuse prominence of interstitial markings suggest underlying  interstitial edema and/or pneumonia. Findings are slightly increased  from the comparison study     Electronically Signed By-Des Duckworth On:11/3/2022 6:40 AM  This report was finalized on [id number] by  " Des Duckworth, .                )11/3/2022  MD YARA Arteaga/Transcription:   \"Dictated utilizing Dragon dictation\".   "

## 2022-11-03 NOTE — PLAN OF CARE
"Assessment: Laura Perkins presents with ADL impairments below baseline abilities which indicate the need for continued skilled intervention while inpatien. Pt required mod A for supine to sit this date. Pt tolerated sitting edge of bed x2 minutes, however inconsistently tried to lie back down. Pt unable to voice when fatigued to terminate sitting edge of bed. Pt  requested multiple times for pt to attempt standing this date.  educated pt not appropriate for transfer this date for safety, d/t pt requiring mod A for sitting balance. Pt required max A for myrtle hygiene d/t bowel and bladder incontinence. Pt deficits include decrease cognition, global weakness, decrease balance, and decrease in self care. Pt continues to be at high risk for falls and is unsafe to return home at this time. Tolerating session today without incident. Will continue to follow and progress as tolerated.     Plan/Recommendations:   Moderate Intensity Therapy recommended post-acute care. This is recommended as therapy feels the patient would require 3-4 days per week and wouldn't tolerate \"3 hour daily\" rehab intensity. SNF would be the preferred choice. If the patient does not agree to SNF, arrange HH or OP depending on home bound status. If patient is medically complex, consider LTACH.. Pt requires rolling walker at discharge.     Pt desires Skilled Rehab placement at discharge. Pt cooperative; agreeable to therapeutic recommendations and plan of care.   "

## 2022-11-03 NOTE — PLAN OF CARE
Goal Outcome Evaluation:  Plan of Care Reviewed With: patient, spouse        Progress: no change  Outcome Evaluation: Patient alert with confusion,  at bedside, continues IV ABX, continues on O2 @4liters via nc, external cath, turn q 2 hrs, VSS.

## 2022-11-03 NOTE — PROGRESS NOTES
[facility] Medicine Services Daily Progress Note    Patient Name: Laura Perkins  : 1940  MRN: [id number]  Primary Care Physician:  Beka Weir MD  Date of admission: 10/23/2022      Subjective          Brief interim history:       84-year-old female with known history of cerebrovascular accident, T2DM, hypertension, HLD, bipolar disorder, anemia, S/p bladder surgery,  and history of colon cancer,S/p resection (10/22) . S/p recent colonoscopy which revealed 5 cm mass.  Patient underwent transverse colon resection at [facility] and pathology revealed invasive mucinous adenocarcinoma.  She was subsequently discharged to a local rehab facility on 10/19/2022.  Patient presented to [facility] ED on 10/23/2022 due to 2-day history of generalized body weakness, acute mental status changes/metabolic encephalopathy.  On presentation, she was noted with fever of 102 and tachycardic.  Blood cultures (10/23) which result pending and started on broad-spectrum antibiotic with Zosyn.     10/25/2022: Seen and examined and follow up.  Resting comfortably in bed in no distress or discomfort.      10/26/2022: Seen and examined in follow-up.  Resting comfortably.    10/27/2022: Seen and examined in follow-up.  Event noted for reported patient slipping out of bed last evening with no visible injury on examination.    10/28/2022:  Pt had a cardiac cath with DANIA to LAD.  Psych was consulted to help adjust meds.  Pt is off psych meds secondary to QTc prolongation.  Abilify increase, as well as Depakote for mood stabilization.  Cardiology following with QTc changes as well as EKG.      10/29/2022:  Pt is seen by psych and her meds are titrated.  Pt does not communicate with me and is repeating the words I a saying to her.  Pt is on 2 lit NC that can be titrated down.  Pt will be discharged when her psych meds are adjusted and she is back to her baseline.    10/30/2022:  Pt is very wheezy today and   states, pt is coughing very thick sputum.  Pt is very confused and not reliable historian.  She denies complains.  Pt is on 2 lit NC, and she oxygenating well, but her wheeze is louder today.  Pt think that her new psych meds, are improving her condition.      10/31/2022.  Patient was seen and examined.  Patient's family complained restless legs last night.      11/1/2022.  Patient was seen and examined.  Patient reported no new symptoms.      11/2/2022.  Patient was seen and examined.  Patient's family reported moderate improvement in her symptoms.      11/3/2022.  Patient was seen and examined.  Patient's family reported that patient was coughing all nigh.  Pulmonary is following.          Objective      Vitals:   Temp:  [97.3 °F (36.3 °C)-98.7 °F (37.1 °C)] 97.5 °F (36.4 °C)  Heart Rate:  [63-82] 63  Resp:  [16-22] 19  BP: (137-149)/(35-51) 149/45  Flow (L/min):  [4] 4    Physical Exam  Vitals reviewed.   Constitutional:       Comments: Patient is somnolent but easily aroused.   HENT:      Head: Normocephalic and atraumatic.      Nose: Nose normal. No congestion or rhinorrhea.      Mouth/Throat:      Mouth: Mucous membranes are moist.      Pharynx: Oropharynx is clear. No oropharyngeal exudate or posterior oropharyngeal erythema.   Eyes:      Pupils: Pupils are equal, round, and reactive to light.   Cardiovascular:      Pulses: Normal pulses.      Heart sounds: Normal heart sounds. No murmur heard.    No friction rub. No gallop.      Comments: S1 and S2 present.  No tachycardia.  Pulmonary:      Effort: No respiratory distress.      Breath sounds: No wheezing, rhonchi or rales.   Chest:      Chest wall: No tenderness.   Abdominal:      General: Abdomen is flat. Bowel sounds are normal. There is no distension.      Palpations: Abdomen is soft.      Tenderness: There is no right CVA tenderness.   Musculoskeletal:         General: No swelling, tenderness, deformity or signs of injury.      Cervical back: Neck  supple. No tenderness.      Right lower leg: No edema.      Left lower leg: No edema.   Skin:     Capillary Refill: Capillary refill takes less than 2 seconds.      Coloration: Skin is not jaundiced.      Findings: No bruising, lesion or rash.   Neurological:      Mental Status: She is alert.      Comments: No facial asymmetry noted.  Gait and station not tested.   Psychiatric:      Comments: No agitation.              Result Review    Result Review:  I have personally reviewed the results from the time of this admission to 11/3/2022 14:05 EDT and agree with these findings:  [x]  Laboratory  []  Microbiology  [x]  Radiology  []  EKG/Telemetry   [x]  Cardiology/Vascular   []  Pathology  [x]  Old records  []  Other:      Status: Completed Collected: 11/02/22 1224    Updated: 11/02/22 1228   Narrative:     DATE OF EXAM:   11/2/2022 11:00 AM       PROCEDURE:   XR ABDOMEN KUB-       INDICATIONS:   Vomiting; I21.4-Non-ST elevation (NSTEMI) myocardial infarction;   R41.82-Altered mental status, unspecified; R50.9-Fever, unspecified;   J18.9-Pneumonia, unspecified organism; R77.8-Other specified   abnormalities of plasma proteins; E11.9-Type 2 diabetes mellitus without   complications; Z79.4-Long term (current) use of insulin;   E78.5-Hyperlipidemia, unspecified       COMPARISON:   CT chest 10/30/2022, CT abdomen and pelvis without contrast 10/24/2022       TECHNIQUE:   Radiographic view(s) of the abdomen obtained.       FINDINGS:   Included lower lungs are abnormal, better, better evaluated on recent   chest CT. No abnormal small bowel distention is seen in a pattern to   suggest small bowel obstruction, although there is a nonspecific paucity   of gas-filled small bowel loops. Stool is seen in the ascending and   transverse colon.       Impression:     Nonspecific, nonobstructive bowel gas pattern.       Electronically Signed By-Lora Monk MD On:11/2/2022 12:26 PM   This report was finalized on [id number] by   Lora Monk MD.             Wounds (last 24 hours)     LDA Wound     Row Name 11/03/22 0800 11/03/22 0404 11/03/22 0032       Wound 10/24/22 1344  medial sacral spine Pressure Injury    Wound - Properties Group Placement Date: 10/24/22  -MG Placement Time: 1344  -MG Present on Hospital Admission: Y  -MG Side: --  -MG, middle  Orientation: medial  -MG Location: sacral spine  -MG Primary Wound Type: Pressure inj  -MG    Pressure Injury Stage -- DTPI  -SM DTPI  -SM    Dressing Appearance -- open to air  -SM open to air  -SM    Closure Approximated  -JT Approximated  -SM Approximated  -SM    Base pink  -JT pink  -SM pink  -SM    Periwound redness  -JT redness  -SM redness  -SM    Periwound Temperature warm  -JT warm  -SM warm  -SM    Drainage Amount none  -JT none  -SM none  -SM    Periwound Care -- dry periwound area maintained  -SM dry periwound area maintained;moisturizer applied;barrier ointment applied  -SM    Retired Wound - Properties Group Placement Date: 10/24/22  -MG Placement Time: 1344  -MG Present on Hospital Admission: Y  -MG Side: --  -MG, middle  Orientation: medial  -MG Location: sacral spine  -MG Primary Wound Type: Pressure inj  -MG    Retired Wound - Properties Group Date first assessed: 10/24/22  -MG Time first assessed: 1344  -MG Present on Hospital Admission: Y  -MG Side: --  -MG, middle  Location: sacral spine  -MG Primary Wound Type: Pressure inj  -MG       Wound 10/24/22 1348 medial perineum    Wound - Properties Group Placement Date: 10/24/22  -MG Placement Time: 1348  -MG Present on Hospital Admission: Y  -MG Orientation: medial  -MG Location: perineum  -MG    Pressure Injury Stage -- DTPI  -SM DTPI  -SM    Dressing Appearance -- open to air  -SM open to air  -SM    Closure Open to air  -JT Open to air  -SM Open to air  -SM    Base slough;moist  -JT slough;moist  -SM slough;moist  -SM    Periwound pink  -JT pink  -SM pink  -SM    Periwound Temperature warm  -JT warm  -SM warm  -SM     Periwound Skin Turgor firm  -JT firm  -SM firm  -SM    Drainage Amount none  -JT none  -SM none  -SM    Dressing Care -- open to air  -SM open to air  -SM    Periwound Care -- dry periwound area maintained  -SM dry periwound area maintained;barrier ointment applied  -SM    Retired Wound - Properties Group Placement Date: 10/24/22  -MG Placement Time: 1348  -MG Present on Hospital Admission: Y  -MG Orientation: medial  -MG Location: perineum  -MG    Retired Wound - Properties Group Date first assessed: 10/24/22  -MG Time first assessed: 1348  -MG Present on Hospital Admission: Y  -MG Location: perineum  -MG    Row Name 11/02/22 2029 11/02/22 1600          Wound 10/24/22 1344  medial sacral spine Pressure Injury    Wound - Properties Group Placement Date: 10/24/22  -MG Placement Time: 1344  -MG Present on Hospital Admission: Y  -MG Side: --  -MG, middle  Orientation: medial  -MG Location: sacral spine  -MG Primary Wound Type: Pressure inj  -MG    Pressure Injury Stage DTPI  -SM --     Dressing Appearance dry;intact  -SM --     Closure Approximated  -SM Approximated  -JE     Base pink  -SM pink  -JE     Periwound redness  -SM redness  -JE     Periwound Temperature warm  -SM warm  -JE     Drainage Amount none  -SM none  -JE     Care, Wound cleansed with;soap and water  -SM --     Dressing Care dressing changed  -SM --     Periwound Care dry periwound area maintained  -SM --     Retired Wound - Properties Group Placement Date: 10/24/22  -MG Placement Time: 1344  -MG Present on Hospital Admission: Y  -MG Side: --  -MG, middle  Orientation: medial  -MG Location: sacral spine  -MG Primary Wound Type: Pressure inj  -MG    Retired Wound - Properties Group Date first assessed: 10/24/22  -MG Time first assessed: 1344  -MG Present on Hospital Admission: Y  -MG Side: --  -MG, middle  Location: sacral spine  -MG Primary Wound Type: Pressure inj  -MG       Wound 10/24/22 1348 medial perineum    Wound - Properties Group Placement  Date: 10/24/22  -MG Placement Time: 1348  -MG Present on Hospital Admission: Y  -MG Orientation: medial  -MG Location: perineum  -MG    Pressure Injury Stage DTPI  -SM --     Dressing Appearance open to air  -SM --     Closure Open to air  -SM Open to air  -JE     Base slough;moist  -SM slough;moist  -JE     Periwound pink  -SM pink  -JE     Periwound Temperature warm  -SM warm  -JE     Periwound Skin Turgor firm  -SM --     Drainage Amount none  -SM none  -JE     Care, Wound cleansed with;soap and water  -SM --     Dressing Care open to air  -SM --     Periwound Care dry periwound area maintained  -SM --     Retired Wound - Properties Group Placement Date: 10/24/22  -MG Placement Time: 1348  -MG Present on Hospital Admission: Y  -MG Orientation: medial  -MG Location: perineum  -MG    Retired Wound - Properties Group Date first assessed: 10/24/22  -MG Time first assessed: 1348  -MG Present on Hospital Admission: Y  -MG Location: perineum  -MG          User Key  (r) = Recorded By, (t) = Taken By, (c) = Cosigned By    Initials Name Provider Type    Mary Rubi RN Registered Nurse    Ilda Torres RN Registered Nurse    Mary Elliott RN Registered Nurse    Lupe Hong RN Registered Nurse                  Assessment & Plan    From previous notes and with minor updates.    Brief patient summary:    Patient is a 82 y.o. female who presented to [facility] on 10/23/2022 upon transfer from [facility] rehab where she had been staying since 10/19/2022 after a transverse colon resection for 5 cm mass found on colonoscopy,  at [facility] on 10/18/2022.  Pathology report in progress.  Family in [facility] reported progressive weakness lethargy and alteration in mental status over the past 2 days.She appears frail, will awaken to voice and answer but falls back to sleep. She is oriented to self and follows simple commands.  and family at bedside  assisting with history .  There is no slurred speech or facial droop.  No focal deficits.  She denies headache.  She does report some chest pain.family reports shortness of air and cough productive of brown sputum.  Temperature was 102 on admission, BP was stable she was tachycardic and tachypneic.  She does not normally use home oxygen and is now stable on 4 L via nasal cannula.  She triggered sepsis.  WBCs 13.3, lactic acid normal at 0.8 procalcitonin elevated at 0.32.  Urinalysis was negative for infection.  Chest x-ray per radiology indicated a possible small left midlung infiltrate and bronchitis.  She was started on IV vancomycin and IV cefepime in ED for hospital-acquired pneumonia.  Family reports no past medical history of heart failure or coronary artery disease.  Troponin mildly elevated at 0.87.  Patient reported chest pain but  could give no other details.  May be secondary to tachycardia and chest pain pleuritic, however serial troponins and continuous cardiac monitoring have been ordered. EKG shows no acute ST elevation. and family report she is a DNR with no CPR however they agree to intubation if needed and full support otherwise.  Creatinine mildly elevated at 1.13 BUN 33.    Family reports no history of chronic renal failure.PMH includes hypothyroidism, Bipolar disorder, Diabetes Mellitus type 2 with glucose today 439 and overactive bladder post re-suspension with mesh.  She was given an IV fluid bolus in ED and will be admitted for antibiotics for possible pneumonia with blood cultures pending.  Family concerned about possible aspiration.  He reports she had her esophagus stretched a few months ago and underwent a swallow study prior to stretching which showed esophageal dysmotility. They noticed recently after her being intubated for surgery she spits up food.  Speech has been consulted.  Aspiration precautions in place.          acetylcysteine, 3 mL, Nebulization, BID - RT  ARIPiprazole,  2 mg, Oral, Nightly  aspirin, 81 mg, Oral, Daily  clopidogrel, 300 mg, Oral, Once   And  clopidogrel, 75 mg, Oral, Daily  divalproex, 500 mg, Oral, BID  docusate sodium, 100 mg, Oral, BID  ferrous sulfate, 324 mg, Oral, BID With Meals  furosemide, 20 mg, Intravenous, Q12H  insulin glargine, 15 Units, Subcutaneous, QAM  insulin lispro, 3-14 Units, Subcutaneous, TID With Meals  ipratropium-albuterol, 3 mL, Nebulization, BID - RT  levothyroxine, 50 mcg, Oral, Daily  losartan, 25 mg, Oral, Q24H  magnesium oxide, 400 mg, Oral, Daily  metoprolol succinate XL, 25 mg, Oral, Q24H  pantoprazole, 40 mg, Oral, QAM  piperacillin-tazobactam, 3.375 g, Intravenous, Q8H  polyethylene glycol, 17 g, Oral, Daily  rosuvastatin, 20 mg, Oral, Daily             Active Hospital Problems:  Active Hospital Problems    Diagnosis    • **Altered mental status, unspecified altered mental status type    • HAP (hospital-acquired pneumonia)    • Acute respiratory distress    • Elevated troponin    • Sepsis (HCC)    • Type 2 diabetes mellitus (HCC)    • Anxiety associated with depression    • Hypothyroidism (acquired)    • OAB (overactive bladder)    • GERD without esophagitis    • Acute non-ST elevation myocardial infarction (NSTEMI) ([facility])    • Dyslipidemia    • Cancer of transverse colon ([facility])           Assessment/plan:    -Continue appropriate patient's home medications for other chronic medical conditions.  -Continue the present level of care.  -Patient and family agreed with the plan of care.    Cough.  -Treat with Tessalon Perles.  -Follow pulmonary recommendations.     Acute toxic metabolic encephalopathy/AMS      -resolving, and likely multifactorial.  Psych started pt on Abilify and adjusted Depakote for mood stabilization.       -Pt is still confused, and can't give clear ROS.  Meds to be adjusted by Psych.     Probable sepsis      -off Zosyn, now on Augmentin      -Duo nebs and Mucomyst nebs added for thick secretions and  wheezing     Pneumonia involving the left lung      -Continue Augmentin, and supportive care      NSTEMI/Elevated troponin       -scheduled for [facility] (10/27), DANIA to LAD.       -Cp free and in the setting of suspected sepsis     History of colorectal cancer (invasive mucinous adenocarcinoma)      -S/p resection (10/22)     Dysphagia      -followed by speech pathologist      CVA      -Aspirin/Crestor     T2DM      -SSI/Lantus     Hypertension     HLD     -Crestor     Hypothyroidism      -Levothyroxine     Depression/anxiety      -Risperidone on hold 2/2 increase QT prolongation and consult Psychiatrist        -Depakote dose adjustment and initiation of Abilify, per Psych.      -Pt is still not at baseline, and is not communicating well, and can't give ROS.       DVT prophylaxis:  Mechanical DVT prophylaxis orders are present.    CODE STATUS:    Code Status (Patient has no pulse and is not breathing): No CPR (Do Not Attempt to Resuscitate)  Medical Interventions (Patient has pulse or is breathing): Full Support       Disposition: This wonderful patient can discharge in the next 24 hours.      Electronically signed by Alejandro Estrada MD, FACP, 11/03/22, 14:05 EDT.        [facility] Hospitalist Team

## 2022-11-03 NOTE — PROGRESS NOTES
"Subjective   Laura Perkins is a 82 y.o. female.   Seen for diabetes f/u.  Eating some.      Objective     /43   Pulse 68   Temp 97.9 °F (36.6 °C) (Oral)   Resp 18   Ht 162.6 cm (64\")   Wt 63.4 kg (139 lb 12.4 oz)   SpO2 100%   BMI 23.99 kg/m²   Blood sugar  193 this am,  334 @ lunch, 298 @ supper    ASSESSMENT    Patient is stable    PLAN    Continue same insulin regimen.         Matias Santos MD  11/3/2022  18:06 EDT    "

## 2022-11-03 NOTE — PROGRESS NOTES
Daily Progress Note        Altered mental status, unspecified altered mental status type    Cancer of transverse colon (HCC)    HAP (hospital-acquired pneumonia)    Acute respiratory distress    Elevated troponin    Sepsis (HCC)    Type 2 diabetes mellitus (HCC)    Anxiety associated with depression    Hypothyroidism (acquired)    OAB (overactive bladder)    GERD without esophagitis    Acute non-ST elevation myocardial infarction (NSTEMI) (HCC)    Dyslipidemia      Assessment      Acute respiratory distress with new bilateral mixed interstitial markings noted on chest x-ray  Bilateral pleural effusions  Multifocal pneumonia     10/2022 colon resection, invasive adenocarcinoma     10/27/2022 echo  EF 41 to 45%  RVSP less than 35 mmHg  Left ventricular wall hypokinetic    Repeat echo 10/31/2022  EF 45 to 50% with small pericardial effusion seen     10/27/2022 cardiac cath with stent to LAD     Elevated troponin on admission CVA  Hypertension  Diabetes mellitus  Hyperlipidemia  Bipolar       Recommendations:    Chest CT to assess pleural effusion vs pneumonia   -discussed with patient and  at bedside depending on results patient may benefit thoracentesis vs bronchoscopy      Titrate oxygen, currently requiring 4 L per NC     Avoid sedating medication     Antibiotic  Zosyn  Mucomyst nebulized with albuterol     Aspiration precautions, elevate head of bed  - Speech following    Lasix 20 mg twice daily  Singulair  GI prophylaxis Protonix  Encourage use of I-S and splinting    CXR 11/3/2020     CXR 10/30/2022              LOS: 11 days     Subjective         Objective     Vital signs for last 24 hours:  Vitals:    11/03/22 0640 11/03/22 0642 11/03/22 0646 11/03/22 0647   BP:       BP Location:       Patient Position:       Pulse: 74 74 82 82   Resp: 16 16 18 18   Temp:       TempSrc:       SpO2: 95% 95% 98% 98%   Weight:       Height:           Intake/Output last 3 shifts:  I/O last 3 completed shifts:  In: 960  [P.O.:960]  Out: 1200 [Urine:1200]  Intake/Output this shift:  I/O this shift:  In: 240 [P.O.:240]  Out: 600 [Urine:600]      Radiology  Imaging Results (Last 24 Hours)     Procedure Component Value Units Date/Time    XR Chest 1 View [[id number]] Collected: 11/03/22 0637     Updated: 11/03/22 0642    Narrative:         DATE OF EXAM:   11/3/2022 4:50 AM     PROCEDURE:   XR CHEST 1 VW-     INDICATIONS:   Pleural effusion; I21.4-Non-ST elevation (NSTEMI) myocardial infarction;  R41.82-Altered mental status, unspecified; R50.9-Fever, unspecified;  J18.9-Pneumonia, unspecified organism; R77.8-Other specified  abnormalities of plasma proteins; E11.9-Type 2 diabetes mellitus without  complications; Z79.4-Long term (current) use of insulin;  E78.5-Hyperlipidemia, unspecified     COMPARISON:  10/30/2022 and prior     TECHNIQUE:   Portable Chest     FINDINGS:      Study is limited by overlying support and monitoring apparatus. There is  been interval enlargement of a moderate to large left-sided pleural  effusion with associated groundglass and dependent opacities. Evaluation  of the cardiac silhouette is limited by the pleural effusion. Mild  pulmonary vascular congestion is noted with increased proximal of the  interstitial markings on the right. Blunting of the right costophrenic  angle is noted and limited due to overlying support apparatus. Osseous  structures intrinsic no acute abnormality        Impression:      IMPRESSION :   Interval enlargement of left-sided pleural effusion and associated  dependent consolidation[     Slight decrease in size of right-sided pleural effusion given  differences in technique     Diffuse prominence of interstitial markings suggest underlying  interstitial edema and/or pneumonia. Findings are slightly increased  from the comparison study     Electronically Signed By-Des Duckworth On:11/3/2022 6:40 AM  This report was finalized on [id number] by  Des Duckworth, .    XR Abdomen KUB  [[id number]] Collected: 11/02/22 1224     Updated: 11/02/22 1228    Narrative:      DATE OF EXAM:  11/2/2022 11:00 AM     PROCEDURE:  XR ABDOMEN KUB-     INDICATIONS:  Vomiting; I21.4-Non-ST elevation (NSTEMI) myocardial infarction;  R41.82-Altered mental status, unspecified; R50.9-Fever, unspecified;  J18.9-Pneumonia, unspecified organism; R77.8-Other specified  abnormalities of plasma proteins; E11.9-Type 2 diabetes mellitus without  complications; Z79.4-Long term (current) use of insulin;  E78.5-Hyperlipidemia, unspecified     COMPARISON:  CT chest 10/30/2022, CT abdomen and pelvis without contrast 10/24/2022     TECHNIQUE:   Radiographic view(s) of the abdomen obtained.     FINDINGS:  Included lower lungs are abnormal, better, better evaluated on recent  chest CT. No abnormal small bowel distention is seen in a pattern to  suggest small bowel obstruction, although there is a nonspecific paucity  of gas-filled small bowel loops. Stool is seen in the ascending and  transverse colon.        Impression:      Nonspecific, nonobstructive bowel gas pattern.     Electronically Signed By-Lora Monk MD On:11/2/2022 12:26 PM  This report was finalized on [id number] by  Lora Monk MD.          Labs:  Results from last 7 days   Lab Units 10/31/22  0348   WBC 10*3/mm3 12.20*   HEMOGLOBIN g/dL 8.4*   HEMATOCRIT % 28.5*   PLATELETS 10*3/mm3 366     Results from last 7 days   Lab Units 10/31/22  0558 10/29/22  0630 10/28/22  0657   SODIUM mmol/L 135*   < > 144   POTASSIUM mmol/L 3.8   < > 3.5   CHLORIDE mmol/L 100   < > 109*   CO2 mmol/L 24.0   < > 23.0   BUN mg/dL 12   < > 15   CREATININE mg/dL 0.51*   < > 0.57   CALCIUM mg/dL 8.5*   < > 8.4*   BILIRUBIN mg/dL  --   --  0.2   ALK PHOS U/L  --   --  93   ALT (SGPT) U/L  --   --  9   AST (SGOT) U/L  --   --  12   GLUCOSE mg/dL 190*   < > 215*    < > = values in this interval not displayed.         Results from last 7 days   Lab Units 10/28/22  0657   ALBUMIN g/dL 2.30*      Results from last 7 days   Lab Units 11/01/22  0335 10/31/22  1734 10/31/22  0558   TROPONIN T ng/mL 0.057* 0.064* 0.058*         Results from last 7 days   Lab Units 10/29/22  0630   MAGNESIUM mg/dL 1.8         Results from last 7 days   Lab Units 10/31/22  1734   TSH uIU/mL 10.810*           Meds:   SCHEDULE  acetylcysteine, 3 mL, Nebulization, BID - RT  ARIPiprazole, 2 mg, Oral, Nightly  aspirin, 81 mg, Oral, Daily  clopidogrel, 300 mg, Oral, Once   And  clopidogrel, 75 mg, Oral, Daily  divalproex, 500 mg, Oral, BID  docusate sodium, 100 mg, Oral, BID  ferrous sulfate, 324 mg, Oral, BID With Meals  furosemide, 20 mg, Intravenous, Q12H  insulin glargine, 15 Units, Subcutaneous, QAM  insulin lispro, 3-14 Units, Subcutaneous, TID With Meals  ipratropium-albuterol, 3 mL, Nebulization, BID - RT  levothyroxine, 50 mcg, Oral, Daily  losartan, 25 mg, Oral, Q24H  magnesium oxide, 400 mg, Oral, Daily  metoprolol succinate XL, 25 mg, Oral, Q24H  pantoprazole, 40 mg, Oral, QAM  piperacillin-tazobactam, 3.375 g, Intravenous, Q8H  polyethylene glycol, 17 g, Oral, Daily  rosuvastatin, 20 mg, Oral, Daily      Infusions     PRNs  •  acetaminophen  •  acetaminophen  •  acetaminophen  •  dextrose  •  dextrose  •  glucagon (human recombinant)  •  glucagon (human recombinant)  •  ipratropium-albuterol  •  LORazepam  •  phenol  •  [COMPLETED] Insert peripheral IV **AND** sodium chloride    Physical Exam:  Physical Exam  Vitals reviewed.   Pulmonary:      Effort: Pulmonary effort is normal.      Breath sounds: Rhonchi and rales present.   Skin:     General: Skin is warm and dry.   Neurological:      Mental Status: She is alert.         ROS  Review of Systems   Respiratory: Positive for cough and shortness of breath.    Neurological: Positive for weakness.       I have reviewed the patient's new clinical results.    Electronically signed by Shanique Sorto MD

## 2022-11-03 NOTE — THERAPY TREATMENT NOTE
"Subjective: Pt agreeable to therapeutic plan of care. Pt reported slight nausea this date. Pt  present during therapy session this date.   Cognition: oriented to Person    Objective:     Bed Mobility: Mod-A and Assist x 2   Functional Transfers: N/A or Not attempted.  Functional Ambulation: N/A or Not attempted.    Toileting: Max-A  ADL Position: supine  ADL Comments: Pt bowel and bladder incontinent upon arrival     Lower Body Dressing: Max-A  ADL Position: supine  ADL Comments: donning socks     Vitals: WNL    Pain: 5 VAS  Location: buttocks   Interventions for pain: Repositioned, RN notified and N/A  Education: Provided education on the importance of mobility in the acute care setting and Verbal/Tactile Cues.     Assessment: Laura Perkins presents with ADL impairments below baseline abilities which indicate the need for continued skilled intervention while inpatien. Pt required mod A for supine to sit this date. Pt tolerated sitting edge of bed x2 minutes, however inconsistently tried to lie back down. Pt unable to voice when fatigued to terminate sitting edge of bed. Pt  requested multiple times for pt to attempt standing this date.  educated pt not appropriate for transfer this date for safety, d/t pt requiring mod A for sitting balance. Pt required max A for myrtle hygiene d/t bowel and bladder incontinence. Pt deficits include decrease cognition, global weakness, decrease balance, and decrease in self care. Pt continues to be at high risk for falls and is unsafe to return home at this time. Tolerating session today without incident. Will continue to follow and progress as tolerated.     Plan/Recommendations:   Moderate Intensity Therapy recommended post-acute care. This is recommended as therapy feels the patient would require 3-4 days per week and wouldn't tolerate \"3 hour daily\" rehab intensity. SNF would be the preferred choice. If the patient does not agree to SNF, arrange HH or OP " depending on home bound status. If patient is medically complex, consider LTACH.. Pt requires rolling walker at discharge.     Pt desires Skilled Rehab placement at discharge. Pt cooperative; agreeable to therapeutic recommendations and plan of care.     Modified [place]: N/A = No pre-op stroke/TIA    Post-Tx Position: Supine with HOB Elevated, Alarms activated and Call light and personal items within reach  PPE: gloves and surgical mask

## 2022-11-04 LAB
BASOPHILS # BLD AUTO: 0.1 10*3/MM3 (ref 0–0.2)
BASOPHILS NFR BLD AUTO: 0.6 % (ref 0–1.5)
DEPRECATED RDW RBC AUTO: 52.5 FL (ref 37–54)
EOSINOPHIL # BLD AUTO: 0.2 10*3/MM3 (ref 0–0.4)
EOSINOPHIL NFR BLD AUTO: 1.7 % (ref 0.3–6.2)
ERYTHROCYTE [DISTWIDTH] IN BLOOD BY AUTOMATED COUNT: 18.3 % (ref 12.3–15.4)
GLUCOSE BLDC GLUCOMTR-MCNC: 107 MG/DL (ref 70–105)
GLUCOSE BLDC GLUCOMTR-MCNC: 130 MG/DL (ref 70–105)
GLUCOSE BLDC GLUCOMTR-MCNC: 376 MG/DL (ref 70–105)
HCT VFR BLD AUTO: 26.9 % (ref 34–46.6)
HGB BLD-MCNC: 8.4 G/DL (ref 12–15.9)
HOLD SPECIMEN: NORMAL
INR PPP: 1.08 (ref 0.93–1.1)
LYMPHOCYTES # BLD AUTO: 1.3 10*3/MM3 (ref 0.7–3.1)
LYMPHOCYTES NFR BLD AUTO: 9.9 % (ref 19.6–45.3)
MCH RBC QN AUTO: 23.9 PG (ref 26.6–33)
MCHC RBC AUTO-ENTMCNC: 31.1 G/DL (ref 31.5–35.7)
MCV RBC AUTO: 76.9 FL (ref 79–97)
MONOCYTES # BLD AUTO: 0.9 10*3/MM3 (ref 0.1–0.9)
MONOCYTES NFR BLD AUTO: 6.6 % (ref 5–12)
NEUTROPHILS NFR BLD AUTO: 10.8 10*3/MM3 (ref 1.7–7)
NEUTROPHILS NFR BLD AUTO: 81.2 % (ref 42.7–76)
NRBC BLD AUTO-RTO: 0 /100 WBC (ref 0–0.2)
PLATELET # BLD AUTO: 447 10*3/MM3 (ref 140–450)
PMV BLD AUTO: 7.5 FL (ref 6–12)
PROTHROMBIN TIME: 11.1 SECONDS (ref 9.6–11.7)
RBC # BLD AUTO: 3.5 10*6/MM3 (ref 3.77–5.28)
WBC NRBC COR # BLD: 13.3 10*3/MM3 (ref 3.4–10.8)

## 2022-11-04 PROCEDURE — 97530 THERAPEUTIC ACTIVITIES: CPT

## 2022-11-04 PROCEDURE — 92526 ORAL FUNCTION THERAPY: CPT

## 2022-11-04 PROCEDURE — 85610 PROTHROMBIN TIME: CPT

## 2022-11-04 PROCEDURE — 94799 UNLISTED PULMONARY SVC/PX: CPT

## 2022-11-04 PROCEDURE — 94761 N-INVAS EAR/PLS OXIMETRY MLT: CPT

## 2022-11-04 PROCEDURE — 99231 SBSQ HOSP IP/OBS SF/LOW 25: CPT | Performed by: INTERNAL MEDICINE

## 2022-11-04 PROCEDURE — 63710000001 INSULIN GLARGINE PER 5 UNITS: Performed by: INTERNAL MEDICINE

## 2022-11-04 PROCEDURE — 94664 DEMO&/EVAL PT USE INHALER: CPT

## 2022-11-04 PROCEDURE — 99232 SBSQ HOSP IP/OBS MODERATE 35: CPT | Performed by: INTERNAL MEDICINE

## 2022-11-04 PROCEDURE — 25010000002 PIPERACILLIN SOD-TAZOBACTAM PER 1 G

## 2022-11-04 PROCEDURE — 82962 GLUCOSE BLOOD TEST: CPT

## 2022-11-04 PROCEDURE — 97110 THERAPEUTIC EXERCISES: CPT

## 2022-11-04 PROCEDURE — 63710000001 INSULIN LISPRO (HUMAN) PER 5 UNITS: Performed by: INTERNAL MEDICINE

## 2022-11-04 PROCEDURE — 25010000002 FUROSEMIDE PER 20 MG: Performed by: NURSE PRACTITIONER

## 2022-11-04 PROCEDURE — 85025 COMPLETE CBC W/AUTO DIFF WBC: CPT

## 2022-11-04 RX ADMIN — IPRATROPIUM BROMIDE AND ALBUTEROL SULFATE 3 ML: .5; 3 SOLUTION RESPIRATORY (INHALATION) at 18:55

## 2022-11-04 RX ADMIN — CLOPIDOGREL BISULFATE 75 MG: 75 TABLET ORAL at 09:28

## 2022-11-04 RX ADMIN — DIVALPROEX SODIUM 500 MG: 500 TABLET, DELAYED RELEASE ORAL at 21:17

## 2022-11-04 RX ADMIN — FERROUS SULFATE TAB EC 324 MG (65 MG FE EQUIVALENT) 324 MG: 324 (65 FE) TABLET DELAYED RESPONSE at 17:12

## 2022-11-04 RX ADMIN — FERROUS SULFATE TAB EC 324 MG (65 MG FE EQUIVALENT) 324 MG: 324 (65 FE) TABLET DELAYED RESPONSE at 09:28

## 2022-11-04 RX ADMIN — FUROSEMIDE 20 MG: 10 INJECTION, SOLUTION INTRAMUSCULAR; INTRAVENOUS at 09:29

## 2022-11-04 RX ADMIN — DIVALPROEX SODIUM 500 MG: 500 TABLET, DELAYED RELEASE ORAL at 09:29

## 2022-11-04 RX ADMIN — LOSARTAN POTASSIUM 25 MG: 25 TABLET, FILM COATED ORAL at 09:29

## 2022-11-04 RX ADMIN — INSULIN LISPRO 12 UNITS: 100 INJECTION, SOLUTION INTRAVENOUS; SUBCUTANEOUS at 12:38

## 2022-11-04 RX ADMIN — ROSUVASTATIN 20 MG: 10 TABLET, FILM COATED ORAL at 21:17

## 2022-11-04 RX ADMIN — PIPERACILLIN AND TAZOBACTAM 3.38 G: 3; .375 INJECTION, POWDER, FOR SOLUTION INTRAVENOUS at 15:53

## 2022-11-04 RX ADMIN — DOCUSATE SODIUM 100 MG: 100 CAPSULE, LIQUID FILLED ORAL at 21:17

## 2022-11-04 RX ADMIN — ASPIRIN 81 MG: 81 TABLET, COATED ORAL at 09:28

## 2022-11-04 RX ADMIN — LEVOTHYROXINE SODIUM 50 MCG: 50 TABLET ORAL at 05:58

## 2022-11-04 RX ADMIN — INSULIN GLARGINE 15 UNITS: 100 INJECTION, SOLUTION SUBCUTANEOUS at 08:08

## 2022-11-04 RX ADMIN — IPRATROPIUM BROMIDE AND ALBUTEROL SULFATE 3 ML: .5; 3 SOLUTION RESPIRATORY (INHALATION) at 06:59

## 2022-11-04 RX ADMIN — ARIPIPRAZOLE 2 MG: 2 TABLET ORAL at 21:17

## 2022-11-04 RX ADMIN — METOPROLOL SUCCINATE 25 MG: 25 TABLET, FILM COATED, EXTENDED RELEASE ORAL at 09:29

## 2022-11-04 RX ADMIN — FUROSEMIDE 20 MG: 10 INJECTION, SOLUTION INTRAMUSCULAR; INTRAVENOUS at 21:18

## 2022-11-04 RX ADMIN — PANTOPRAZOLE SODIUM 40 MG: 40 TABLET, DELAYED RELEASE ORAL at 05:58

## 2022-11-04 RX ADMIN — MAGNESIUM OXIDE TAB 400 MG (241.3 MG ELEMENTAL MG) 400 MG: 400 (241.3 MG) TAB at 09:28

## 2022-11-04 RX ADMIN — PIPERACILLIN AND TAZOBACTAM 3.38 G: 3; .375 INJECTION, POWDER, FOR SOLUTION INTRAVENOUS at 05:58

## 2022-11-04 NOTE — PROGRESS NOTES
[facility] Medicine Services Daily Progress Note    Patient Name: Laura Perkins  : 1940  MRN: [id number]  Primary Care Physician:  Beka Weir MD  Date of admission: 10/23/2022      Subjective          Brief interim history:       84-year-old female with known history of cerebrovascular accident, T2DM, hypertension, HLD, bipolar disorder, anemia, S/p bladder surgery,  and history of colon cancer,S/p resection (10/22) . S/p recent colonoscopy which revealed 5 cm mass.  Patient underwent transverse colon resection at [facility] and pathology revealed invasive mucinous adenocarcinoma.  She was subsequently discharged to a local rehab facility on 10/19/2022.  Patient presented to [facility] ED on 10/23/2022 due to 2-day history of generalized body weakness, acute mental status changes/metabolic encephalopathy.  On presentation, she was noted with fever of 102 and tachycardic.  Blood cultures (10/23) which result pending and started on broad-spectrum antibiotic with Zosyn.     10/25/2022: Seen and examined and follow up.  Resting comfortably in bed in no distress or discomfort.      10/26/2022: Seen and examined in follow-up.  Resting comfortably.    10/27/2022: Seen and examined in follow-up.  Event noted for reported patient slipping out of bed last evening with no visible injury on examination.    10/28/2022:  Pt had a cardiac cath with DANIA to LAD.  Psych was consulted to help adjust meds.  Pt is off psych meds secondary to QTc prolongation.  Abilify increase, as well as Depakote for mood stabilization.  Cardiology following with QTc changes as well as EKG.      10/29/2022:  Pt is seen by psych and her meds are titrated.  Pt does not communicate with me and is repeating the words I a saying to her.  Pt is on 2 lit NC that can be titrated down.  Pt will be discharged when her psych meds are adjusted and she is back to her baseline.    10/30/2022:  Pt is very wheezy today and   states, pt is coughing very thick sputum.  Pt is very confused and not reliable historian.  She denies complains.  Pt is on 2 lit NC, and she oxygenating well, but her wheeze is louder today.  Pt think that her new psych meds, are improving her condition.      10/31/2022.  Patient was seen and examined.  Patient's family complained restless legs last night.      11/1/2022.  Patient was seen and examined.  Patient reported no new symptoms.      11/2/2022.  Patient was seen and examined.  Patient's family reported moderate improvement in her symptoms.      11/3/2022.  Patient was seen and examined.  Patient's family reported that patient was coughing all night.  Pulmonary is following.       11/04/2022.  Patient was seen and examined.  Patient's family reported significant improvement in patient's symptoms.          Objective      Vitals:   Temp:  [97.3 °F (36.3 °C)-98.2 °F (36.8 °C)] 97.3 °F (36.3 °C)  Heart Rate:  [62-85] 72  Resp:  [18-20] 18  BP: (136-150)/(42-56) 150/54  Flow (L/min):  [4] 4    Physical Exam  Vitals reviewed.   Constitutional:       Comments: Patient is lying comfortably in bed and in no acute distress.  Family is at the bedside.   HENT:      Head: Normocephalic and atraumatic.      Nose: Nose normal. No congestion or rhinorrhea.      Mouth/Throat:      Mouth: Mucous membranes are moist.      Pharynx: Oropharynx is clear. No oropharyngeal exudate or posterior oropharyngeal erythema.   Eyes:      Pupils: Pupils are equal, round, and reactive to light.   Cardiovascular:      Pulses: Normal pulses.      Heart sounds: Normal heart sounds. No murmur heard.    No friction rub. No gallop.      Comments: S1 and S2 present.  No tachycardia.  Pulmonary:      Effort: No respiratory distress.      Breath sounds: No wheezing, rhonchi or rales.   Chest:      Chest wall: No tenderness.   Abdominal:      General: Abdomen is flat. Bowel sounds are normal. There is no distension.      Palpations: Abdomen is soft.       Tenderness: There is no right CVA tenderness.   Musculoskeletal:         General: No swelling, tenderness, deformity or signs of injury.      Cervical back: Neck supple. No tenderness.      Right lower leg: No edema.      Left lower leg: No edema.   Skin:     Capillary Refill: Capillary refill takes less than 2 seconds.      Coloration: Skin is not jaundiced.      Findings: No bruising, lesion or rash.   Neurological:      Mental Status: She is alert.      Comments: No facial asymmetry noted.  Gait and station not tested.   Psychiatric:      Comments: No agitation.              Result Review    Result Review:  I have personally reviewed the results from the time of this admission to 11/4/2022 19:19 EDT and agree with these findings:  [x]  Laboratory  []  Microbiology  [x]  Radiology  []  EKG/Telemetry   [x]  Cardiology/Vascular   []  Pathology  [x]  Old records  []  Other:      Status: Completed Collected: 11/02/22 1224    Updated: 11/02/22 1228   Narrative:     DATE OF EXAM:   11/2/2022 11:00 AM       PROCEDURE:   XR ABDOMEN KUB-       INDICATIONS:   Vomiting; I21.4-Non-ST elevation (NSTEMI) myocardial infarction;   R41.82-Altered mental status, unspecified; R50.9-Fever, unspecified;   J18.9-Pneumonia, unspecified organism; R77.8-Other specified   abnormalities of plasma proteins; E11.9-Type 2 diabetes mellitus without   complications; Z79.4-Long term (current) use of insulin;   E78.5-Hyperlipidemia, unspecified       COMPARISON:   CT chest 10/30/2022, CT abdomen and pelvis without contrast 10/24/2022       TECHNIQUE:   Radiographic view(s) of the abdomen obtained.       FINDINGS:   Included lower lungs are abnormal, better, better evaluated on recent   chest CT. No abnormal small bowel distention is seen in a pattern to   suggest small bowel obstruction, although there is a nonspecific paucity   of gas-filled small bowel loops. Stool is seen in the ascending and   transverse colon.       Impression:      Nonspecific, nonobstructive bowel gas pattern.       Electronically Signed By-Lora Monk MD On:11/2/2022 12:26 PM   This report was finalized on [id number] by  Lora Monk MD.             Wounds (last 24 hours)     LDA Wound     Row Name 11/04/22 1600 11/04/22 1200 11/04/22 0800       Wound 10/24/22 1344  medial sacral spine Pressure Injury    Wound - Properties Group Placement Date: 10/24/22  -MG Placement Time: 1344  -MG Present on Hospital Admission: Y  -MG Side: --  -MG, middle  Orientation: medial  -MG Location: sacral spine  -MG Primary Wound Type: Pressure inj  -MG    Pressure Injury Stage -- -- DTPI  -VA    Dressing Appearance -- open to air  -BM open to air  -VA    Closure -- -- Approximated  -VA    Base -- -- pink  -VA    Periwound -- -- redness  -VA    Periwound Temperature -- -- warm  -VA    Drainage Amount none  -BM none  -BM none  -VA    Dressing Care -- -- open to air  -VA    Retired Wound - Properties Group Placement Date: 10/24/22  -MG Placement Time: 1344  -MG Present on Hospital Admission: Y  -MG Side: --  -MG, middle  Orientation: medial  -MG Location: sacral spine  -MG Primary Wound Type: Pressure inj  -MG    Retired Wound - Properties Group Date first assessed: 10/24/22  -MG Time first assessed: 1344  -MG Present on Hospital Admission: Y  -MG Side: --  -MG, middle  Location: sacral spine  -MG Primary Wound Type: Pressure inj  -MG       Wound 10/24/22 1348 medial perineum    Wound - Properties Group Placement Date: 10/24/22  -MG Placement Time: 1348  -MG Present on Hospital Admission: Y  -MG Orientation: medial  -MG Location: perineum  -MG    Pressure Injury Stage -- -- DTPI  -VA    Dressing Appearance -- open to air  -BM open to air  -VA    Closure Open to air  -BM Open to air  -BM Open to air  -VA    Base red;moist  -BM red;moist  -BM slough;moist  -VA    Periwound -- -- pink  -VA    Periwound Temperature -- -- warm  -VA    Periwound Skin Turgor -- -- firm  -VA    Drainage Amount  none  -BM none  -BM none  -VA    Care, Wound -- barrier applied  -BM --    Dressing Care -- -- open to air  -VA    Retired Wound - Properties Group Placement Date: 10/24/22  -MG Placement Time: 1348  -MG Present on Hospital Admission: Y  -MG Orientation: medial  -MG Location: perineum  -MG    Retired Wound - Properties Group Date first assessed: 10/24/22  -MG Time first assessed: 1348  -MG Present on Hospital Admission: Y  -MG Location: perineum  -MG    Row Name 11/04/22 0406 11/04/22 0006 11/03/22 2048       Wound 10/24/22 1344  medial sacral spine Pressure Injury    Wound - Properties Group Placement Date: 10/24/22  -MG Placement Time: 1344  -MG Present on Hospital Admission: Y  -MG Side: --  -MG, middle  Orientation: medial  -MG Location: sacral spine  -MG Primary Wound Type: Pressure inj  -MG    Pressure Injury Stage DTPI  -SM DTPI  -SM DTPI  -SM    Dressing Appearance open to air  -SM open to air  -SM open to air  -SM    Closure Approximated  -SM Approximated  -SM Approximated  -SM    Base pink  -SM pink  -SM pink  -SM    Periwound redness  -SM redness  -SM redness  -SM    Periwound Temperature warm  -SM warm  -SM warm  -SM    Drainage Amount none  -SM none  -SM none  -SM    Care, Wound -- -- cleansed with;soap and water  -SM    Dressing Care open to air  -SM open to air  -SM open to air  -SM    Periwound Care dry periwound area maintained  -SM dry periwound area maintained;barrier ointment applied  -SM dry periwound area maintained;barrier ointment applied  -SM    Retired Wound - Properties Group Placement Date: 10/24/22  -MG Placement Time: 1344  -MG Present on Hospital Admission: Y  -MG Side: --  -MG, middle  Orientation: medial  -MG Location: sacral spine  -MG Primary Wound Type: Pressure inj  -MG    Retired Wound - Properties Group Date first assessed: 10/24/22  -MG Time first assessed: 1344  -MG Present on Hospital Admission: Y  -MG Side: --  -MG, middle  Location: sacral spine  -MG Primary Wound Type:  Pressure inj  -MG       Wound 10/24/22 1348 medial perineum    Wound - Properties Group Placement Date: 10/24/22  -MG Placement Time: 1348  -MG Present on Hospital Admission: Y  -MG Orientation: medial  -MG Location: perineum  -MG    Pressure Injury Stage DTPI  -SM DTPI  -SM DTPI  -SM    Dressing Appearance open to air  -SM open to air  -SM open to air  -SM    Closure Open to air  -SM Open to air  -SM Open to air  -SM    Base slough;moist  -SM slough;moist  -SM slough;moist  -SM    Periwound pink  -SM pink  -SM pink  -SM    Periwound Temperature warm  -SM warm  -SM warm  -SM    Periwound Skin Turgor firm  -SM firm  -SM firm  -SM    Drainage Amount none  -SM none  -SM none  -SM    Care, Wound -- -- cleansed with;soap and water  -SM    Dressing Care open to air  -SM open to air  -SM open to air  -SM    Periwound Care dry periwound area maintained  -SM dry periwound area maintained;barrier ointment applied  -SM dry periwound area maintained  -SM    Retired Wound - Properties Group Placement Date: 10/24/22  -MG Placement Time: 1348  -MG Present on Hospital Admission: Y  -MG Orientation: medial  -MG Location: perineum  -MG    Retired Wound - Properties Group Date first assessed: 10/24/22  -MG Time first assessed: 1348  -MG Present on Hospital Admission: Y  -MG Location: perineum  -MG          User Key  (r) = Recorded By, (t) = Taken By, (c) = Cosigned By    Initials Name Provider Type    Ngoc Chavarria RN Registered Nurse    Nancy Dean RN Registered Nurse    Ilda Torres RN Registered Nurse    Lupe Hong, RN Registered Nurse                  Assessment & Plan    From previous notes and with minor updates.    Brief patient summary:    Patient is a 82 y.o. female who presented to [facility] on 10/23/2022 upon transfer from [facility] where she had been staying since 10/19/2022 after a transverse colon resection for 5 cm mass found on colonoscopy,  at [facility] on 10/18/2022.  Pathology report in progress.  Family in [facility] reported progressive weakness lethargy and alteration in mental status over the past 2 days.She appears frail, will awaken to voice and answer but falls back to sleep. She is oriented to self and follows simple commands.  and family at bedside assisting with history .  There is no slurred speech or facial droop.  No focal deficits.  She denies headache.  She does report some chest pain.family reports shortness of air and cough productive of brown sputum.  Temperature was 102 on admission, BP was stable she was tachycardic and tachypneic.  She does not normally use home oxygen and is now stable on 4 L via nasal cannula.  She triggered sepsis.  WBCs 13.3, lactic acid normal at 0.8 procalcitonin elevated at 0.32.  Urinalysis was negative for infection.  Chest x-ray per radiology indicated a possible small left midlung infiltrate and bronchitis.  She was started on IV vancomycin and IV cefepime in ED for hospital-acquired pneumonia.  Family reports no past medical history of heart failure or coronary artery disease.  Troponin mildly elevated at 0.87.  Patient reported chest pain but  could give no other details.  May be secondary to tachycardia and chest pain pleuritic, however serial troponins and continuous cardiac monitoring have been ordered. EKG shows no acute ST elevation. and family report she is a DNR with no CPR however they agree to intubation if needed and full support otherwise.  Creatinine mildly elevated at 1.13 BUN 33.    Family reports no history of chronic renal failure.PMH includes hypothyroidism, Bipolar disorder, Diabetes Mellitus type 2 with glucose today 439 and overactive bladder post re-suspension with mesh.  She was given an IV fluid bolus in ED and will be admitted for antibiotics for possible pneumonia with blood cultures pending.  Family concerned about possible aspiration.  He reports she  had her esophagus stretched a few months ago and underwent a swallow study prior to stretching which showed esophageal dysmotility. They noticed recently after her being intubated for surgery she spits up food.  Speech has been consulted.  Aspiration precautions in place.          ARIPiprazole, 2 mg, Oral, Nightly  aspirin, 81 mg, Oral, Daily  clopidogrel, 300 mg, Oral, Once   And  clopidogrel, 75 mg, Oral, Daily  divalproex, 500 mg, Oral, BID  docusate sodium, 100 mg, Oral, BID  ferrous sulfate, 324 mg, Oral, BID With Meals  furosemide, 20 mg, Intravenous, Q12H  insulin glargine, 15 Units, Subcutaneous, QAM  insulin lispro, 3-14 Units, Subcutaneous, TID With Meals  ipratropium-albuterol, 3 mL, Nebulization, BID - RT  levothyroxine, 50 mcg, Oral, Daily  losartan, 25 mg, Oral, Q24H  magnesium oxide, 400 mg, Oral, Daily  metoprolol succinate XL, 25 mg, Oral, Q24H  pantoprazole, 40 mg, Oral, QAM  piperacillin-tazobactam, 3.375 g, Intravenous, Q8H  polyethylene glycol, 17 g, Oral, Daily  rosuvastatin, 20 mg, Oral, Daily             Active Hospital Problems:  Active Hospital Problems    Diagnosis    • **Altered mental status, unspecified altered mental status type    • HAP (hospital-acquired pneumonia)    • Acute respiratory distress    • Elevated troponin    • Sepsis ([facility])    • Type 2 diabetes mellitus ([facility])    • Anxiety associated with depression    • Hypothyroidism (acquired)    • OAB (overactive bladder)    • GERD without esophagitis    • Acute non-ST elevation myocardial infarction (NSTEMI) ([facility])    • Dyslipidemia    • Cancer of transverse colon ([facility])           Assessment/plan:    -Continue appropriate patient's home medications for other chronic medical conditions.  -Continue the present level of care.  -Patient and family agreed with the plan of care.    Cough.  -Treat with Tessalon Perles.  -Follow pulmonary recommendations.     Acute toxic metabolic encephalopathy/AMS      -resolving, and likely multifactorial.   Psych started pt on Abilify and adjusted Depakote for mood stabilization.       -Pt is still confused, and can't give clear ROS.  Meds to be adjusted by Psych.     Probable sepsis      -off Zosyn, now on Augmentin      -Duo nebs and Mucomyst nebs added for thick secretions and wheezing     Pneumonia involving the left lung      -Continue Augmentin, and supportive care      NSTEMI/Elevated troponin       -scheduled for LHC (10/27), DANIA to LAD.       -Cp free and in the setting of suspected sepsis     History of colorectal cancer (invasive mucinous adenocarcinoma)      -S/p resection (10/22)     Dysphagia      -followed by speech pathologist      CVA      -Aspirin/Crestor     T2DM      -SSI/Lantus     Hypertension     HLD     -Crestor     Hypothyroidism      -Levothyroxine     Depression/anxiety      -Risperidone on hold 2/2 increase QT prolongation and consult Psychiatrist        -Depakote dose adjustment and initiation of Abilify, per Psych.      -Pt is still not at baseline, and is not communicating well, and can't give ROS.       DVT prophylaxis:  Mechanical DVT prophylaxis orders are present.    CODE STATUS:    Code Status (Patient has no pulse and is not breathing): No CPR (Do Not Attempt to Resuscitate)  Medical Interventions (Patient has pulse or is breathing): Full Support       Disposition: This wonderful patient can discharge in the next 24 hours.      Electronically signed by Alejandro Estrada MD, FACP, 11/04/22, 19:19 EDT.        [facility] Hospitalist Team

## 2022-11-04 NOTE — THERAPY TREATMENT NOTE
"Subjective: Pt agreeable to therapeutic plan of care.    Objective:     Bed mobility - Mod-A  Transfers - Min-A and with rolling walker  Ambulation - 0 feet N/A or Not attempted.   Therex: ABIOLA SOTO heel slides, AP, SLR x12 ea    Vitals: Desaturates    Pain: 0 VAS   Location:   Intervention for pain: N/A    Education: Provided education on the importance of mobility in the acute care setting, Verbal/Tactile Cues and Transfer Training    Assessment: Laura Perkins presents with functional mobility impairments which indicate the need for skilled intervention. Pt found slid down in bed against bedrail. Pt requiring mod a and VC for repositioning in bed for improved postural alignment and comfort. Pt oriented to self and agreeable to PT session. Pt requiring min/mod a for bed mobility supine to EOB with HOB elevated. Pt requiring blocking of BLEs to prevent sliding at EOB. STS performed with FWW and min a. Pt able to take 4-5 steps to pivot to recliner. VC for reaching back for chair prior to returning to seated position.  Pt instructed in ABIOLA SOTO to promote active mobility and strengthening. Pt with decreased overall gross motor coordination throughout interventions. Tolerating session today without incident. Will continue to follow and progress as tolerated.     Plan/Recommendations:   Moderate Intensity Therapy recommended post-acute care. This is recommended as therapy feels the patient would require 3-4 days per week and wouldn't tolerate \"3 hour daily\" rehab intensity. SNF would be the preferred choice. If the patient does not agree to SNF, arrange HH or OP depending on home bound status. If patient is medically complex, consider LTACH.. Pt requires no DME at discharge.     Pt desires Skilled Rehab placement at discharge. Pt cooperative; agreeable to therapeutic recommendations and plan of care.         Basic Mobility 6-click:  Rollin = Total, A lot = 2, A little = 3; 4 = None  Supine>Sit:   1 = " Total, A lot = 2, A little = 3; 4 = None   Sit>Stand with arms:  1 = Total, A lot = 2, A little = 3; 4 = None  Bed>Chair:   1 = Total, A lot = 2, A little = 3; 4 = None  Ambulate in room:  1 = Total, A lot = 2, A little = 3; 4 = None  3-5 Steps with railin = Total, A lot = 2, A little = 3; 4 = None  Score: 16    Modified [place]: N/A = No pre-op stroke/TIA    Post-Tx Position: Up in Chair, Alarms activated and Call light and personal items within reach  PPE: gloves, surgical mask and eye protection

## 2022-11-04 NOTE — PROGRESS NOTES
Daily Progress Note        Altered mental status, unspecified altered mental status type    Cancer of transverse colon (HCC)    HAP (hospital-acquired pneumonia)    Acute respiratory distress    Elevated troponin    Sepsis (HCC)    Type 2 diabetes mellitus (HCC)    Anxiety associated with depression    Hypothyroidism (acquired)    OAB (overactive bladder)    GERD without esophagitis    Acute non-ST elevation myocardial infarction (NSTEMI) (HCC)    Dyslipidemia      Assessment      Bilateral pleural effusions worsening since cardiac stent placement  Left more than right  Bilateral lobe pneumonia possible compression atelectasis      10/2022 colon resection, invasive adenocarcinoma     10/27/2022 echo  EF 41 to 45%  RVSP less than 35 mmHg  Left ventricular wall hypokinetic    Repeat echo 10/31/2022  EF 45 to 50% with small pericardial effusion seen     10/27/2022 cardiac cath with stent to LAD     Elevated troponin on admission CVA  Hypertension  Diabetes mellitus  Hyperlipidemia  Bipolar       Recommendations:     Will consult IR for ultrasound-guided left thoracentesis   titrate oxygen, currently requiring 4 L per NC   Antibiotic  Zosyn sputum cultures negative so far   Mucomyst nebulized with albuterol     Aspiration precautions, elevate head of bed  Lasix 20 mg twice daily  Singulair  GI prophylaxis Protonix  Encourage use of I-S and splinting    Discussed with  and patient        CXR 11/3/2020     CXR 10/30/2022          LOS: 12 days     Subjective         Objective     Vital signs for last 24 hours:  Vitals:    11/03/22 2300 11/04/22 0144 11/04/22 0606 11/04/22 0613   BP: 146/43 146/56 137/50    BP Location: Left arm Left arm Left arm    Patient Position: Lying Lying Lying    Pulse: 66 72 74    Resp: 18 18 20    Temp: 97.5 °F (36.4 °C) 98 °F (36.7 °C) 98.2 °F (36.8 °C)    TempSrc: Axillary Oral Oral    SpO2: 100% 96%     Weight:    64.5 kg (142 lb 3.2 oz)   Height:           Intake/Output last 3  shifts:  I/O last 3 completed shifts:  In: 600 [P.O.:600]  Out: 1100 [Urine:1100]  Intake/Output this shift:  I/O this shift:  In: 240 [P.O.:240]  Out: 1800 [Urine:1800]      Radiology  Imaging Results (Last 24 Hours)     Procedure Component Value Units Date/Time    CT Chest Without Contrast Diagnostic [[id number]] Collected: 11/03/22 1626     Updated: 11/03/22 1636    Narrative:         DATE OF EXAM:  11/3/2022 12:25 PM     PROCEDURE:  CT CHEST WO CONTRAST DIAGNOSTIC-     INDICATIONS:   Pneumonia, complication suspected, xray done; I21.4-Non-ST elevation  (NSTEMI) myocardial infarction; R41.82-Altered mental status,  unspecified; R50.9-Fever, unspecified; J18.9-Pneumonia, unspecified  organism; R77.8-Other specified abnormalities of plasma proteins;  E11.9-Type 2 diabetes mellitus without complications; Z79.4-Long term  (current) use of insulin; E78.5-Hyperlipidemia, unspecified     COMPARISON:   Chest CT without contrast dated 10/30/2022, single view chest radiograph  dated 11/03/2022     TECHNIQUE:  Routine transaxial slices were obtained through the chest without the  administration of intravenous contrast. Reconstructed coronal and  sagittal images were also obtained. Automated exposure control and  iterative construction methods were used.     FINDINGS:  There are moderate bilateral pleural effusions with complete  opacification with air bronchograms as well as some areas of  endobronchial debris in the left lower lobe and near opacification of  the right lower lobe. There are air debris levels in the right and left  lower lobe bronchi and segmental branches. No significant volume loss.  Overall appearance is concerning for bilateral pneumonia. No axillary  adenopathy. Hilar regions are difficult to assess due to obvious hilar  adenopathy. There are borderline prominent. The trachea is widely  patent. There is patchy groundglass opacity right apex which is a new  finding since 10/30/2002. No pericardial  effusion. Extensive vascular  calcification in the thoracic aorta and coronary. No pneumothorax.  Limited images of the upper abdomen demonstrate improvement in  previously noted anasarca. No aggressive focal osseous lesions or acute  bony abnormalities. Mild motion artifact degrades image quality.       Impression:         1. Evolving extensive airspace consolidations with endobronchial debris  concerning for evolving bilateral pneumonia. There is developing  groundglass opacity within the right apex which is new since 10/30/2022.  Moderate layering bilateral pleural effusions persist.  2. Anasarca previously noted has improved.     Electronically Signed By-Shahram Kenny DO On:11/3/2022 4:34 PM  This report was finalized on [id number] by  Shahram Kenny DO.    XR Chest 1 View [[id number]] Collected: 11/03/22 0637     Updated: 11/03/22 0642    Narrative:         DATE OF EXAM:   11/3/2022 4:50 AM     PROCEDURE:   XR CHEST 1 VW-     INDICATIONS:   Pleural effusion; I21.4-Non-ST elevation (NSTEMI) myocardial infarction;  R41.82-Altered mental status, unspecified; R50.9-Fever, unspecified;  J18.9-Pneumonia, unspecified organism; R77.8-Other specified  abnormalities of plasma proteins; E11.9-Type 2 diabetes mellitus without  complications; Z79.4-Long term (current) use of insulin;  E78.5-Hyperlipidemia, unspecified     COMPARISON:  10/30/2022 and prior     TECHNIQUE:   Portable Chest     FINDINGS:      Study is limited by overlying support and monitoring apparatus. There is  been interval enlargement of a moderate to large left-sided pleural  effusion with associated groundglass and dependent opacities. Evaluation  of the cardiac silhouette is limited by the pleural effusion. Mild  pulmonary vascular congestion is noted with increased proximal of the  interstitial markings on the right. Blunting of the right costophrenic  angle is noted and limited due to overlying support apparatus. Osseous  structures intrinsic no  acute abnormality        Impression:      IMPRESSION :   Interval enlargement of left-sided pleural effusion and associated  dependent consolidation[     Slight decrease in size of right-sided pleural effusion given  differences in technique     Diffuse prominence of interstitial markings suggest underlying  interstitial edema and/or pneumonia. Findings are slightly increased  from the comparison study     Electronically Signed By-Des Duckworth On:11/3/2022 6:40 AM  This report was finalized on [id number] by  Des Duckworth, .          Labs:  Results from last 7 days   Lab Units 10/31/22  0348   WBC 10*3/mm3 12.20*   HEMOGLOBIN g/dL 8.4*   HEMATOCRIT % 28.5*   PLATELETS 10*3/mm3 366     Results from last 7 days   Lab Units 10/31/22  0558 10/29/22  0630 10/28/22  0657   SODIUM mmol/L 135*   < > 144   POTASSIUM mmol/L 3.8   < > 3.5   CHLORIDE mmol/L 100   < > 109*   CO2 mmol/L 24.0   < > 23.0   BUN mg/dL 12   < > 15   CREATININE mg/dL 0.51*   < > 0.57   CALCIUM mg/dL 8.5*   < > 8.4*   BILIRUBIN mg/dL  --   --  0.2   ALK PHOS U/L  --   --  93   ALT (SGPT) U/L  --   --  9   AST (SGOT) U/L  --   --  12   GLUCOSE mg/dL 190*   < > 215*    < > = values in this interval not displayed.         Results from last 7 days   Lab Units 10/28/22  0657   ALBUMIN g/dL 2.30*     Results from last 7 days   Lab Units 11/01/22  0335 10/31/22  1734 10/31/22  0558   TROPONIN T ng/mL 0.057* 0.064* 0.058*         Results from last 7 days   Lab Units 10/29/22  0630   MAGNESIUM mg/dL 1.8         Results from last 7 days   Lab Units 10/31/22  1734   TSH uIU/mL 10.810*           Meds:   SCHEDULE  ARIPiprazole, 2 mg, Oral, Nightly  aspirin, 81 mg, Oral, Daily  clopidogrel, 300 mg, Oral, Once   And  clopidogrel, 75 mg, Oral, Daily  divalproex, 500 mg, Oral, BID  docusate sodium, 100 mg, Oral, BID  ferrous sulfate, 324 mg, Oral, BID With Meals  furosemide, 20 mg, Intravenous, Q12H  insulin glargine, 15 Units, Subcutaneous, QAM  insulin  lispro, 3-14 Units, Subcutaneous, TID With Meals  ipratropium-albuterol, 3 mL, Nebulization, BID - RT  levothyroxine, 50 mcg, Oral, Daily  losartan, 25 mg, Oral, Q24H  magnesium oxide, 400 mg, Oral, Daily  metoprolol succinate XL, 25 mg, Oral, Q24H  pantoprazole, 40 mg, Oral, QAM  piperacillin-tazobactam, 3.375 g, Intravenous, Q8H  polyethylene glycol, 17 g, Oral, Daily  rosuvastatin, 20 mg, Oral, Daily      Infusions     PRNs  •  acetaminophen  •  acetaminophen  •  acetaminophen  •  dextrose  •  dextrose  •  glucagon (human recombinant)  •  glucagon (human recombinant)  •  ipratropium-albuterol  •  LORazepam  •  ondansetron  •  phenol  •  [COMPLETED] Insert peripheral IV **AND** sodium chloride    Physical Exam:  Physical Exam  Vitals reviewed.   Pulmonary:      Effort: Pulmonary effort is normal.      Breath sounds: Rhonchi and rales present.   Skin:     General: Skin is warm and dry.   Neurological:      Mental Status: She is alert.         ROS  Review of Systems   Respiratory: Positive for cough and shortness of breath.    Neurological: Positive for weakness.       I have reviewed the patient's new clinical results.    Electronically signed by Shanique Sorto MD

## 2022-11-04 NOTE — PROGRESS NOTES
Nutrition Services    Patient Name: Laura Perkins  YOB: 1940  MRN: [id number]  Admission date: 10/23/2022    PPE Documentation        PPE Worn By Provider Did not enter room for this encounter   PPE Worn By Patient  N/A     PROGRESS NOTE      Encounter Information: Progress note to check on PO intakes. Pt continues to accept foods with feeding assistance, and ate 100% x last two meals. Selections reviewed and adequate. Will continue to follow. At this point, ST-recommended pureed diet and Magic Cup ONS remain the best interventions at this time. Could benefit from removal of healthy heart restriction, as pureed diet already is quite restricted; need to keep menu broadened to allow for sufficient energy/PRO intake.        PO Diet: Diet Cardiac, Diabetic/Consistent Carbs, Texture; Healthy Heart; Diabetic - Consistent Carb; Pureed Diet; Thickened Liquids (Nectar)   PO Supplements: Magic Cup BID    PO Intake:  100% intake x 2 most recent meals; will continue to monitor        Current nutrition support:    Nutrition support review:        Labs (reviewed below): Reviewed, management per attending        GI Function:  Last BM 11/4       Nutrition Intervention Updates: Continue Pureed diet, with consistent carbohydrate pattern.     Continue Magic Cups at lunch/dinner (Provides 580 kcals, 18 g protein if consumed)        Results from last 7 days   Lab Units 10/31/22  0558 10/30/22  0434 10/29/22  0630   SODIUM mmol/L 135* 140 141   POTASSIUM mmol/L 3.8 4.2 3.9   CHLORIDE mmol/L 100 107 106   CO2 mmol/L 24.0 24.0 24.0   BUN mg/dL 12 14 17   CREATININE mg/dL 0.51* 0.44* 0.53*   CALCIUM mg/dL 8.5* 8.0* 8.1*   GLUCOSE mg/dL 190* 197* 170*     Results from last 7 days   Lab Units 11/04/22  0822 10/30/22  0434 10/29/22  0630   MAGNESIUM mg/dL  --   --  1.8   HEMOGLOBIN g/dL 8.4*   < > 8.7*   HEMATOCRIT % 26.9*   < > 29.1*    < > = values in this interval not displayed.     COVID19   Date Value Ref Range Status    10/30/2022 Not Detected Not Detected - Ref. Range Final     Lab Results   Component Value Date    HGBA1C 7.4 (H) 10/24/2022       RD to follow up per protocol.    Electronically signed by:  Laura Cruz RD  11/04/22 14:58 EDT

## 2022-11-04 NOTE — PLAN OF CARE
"Assessment: Laura Perkins presents with ADL impairments below baseline abilities which indicate the need for continued skilled intervention while inpatient. Pt has increased motivation to get up and sit in chair this date. Pt completed bed mobility mod A with UE. Pt required min-mod A for static sitting balance. Pt required max cues to reposition and correct leaning balance. Pt utilized bed rails for UE stability. Pt completed STS x3 attempts this date requiring min A with RW. Pt required max cues and repositioning with RW to ambulate 3 steps to chair. Pt demo difficulty with taking steps and following commands. Pt deficits include decrease in functional mobility, decreased balance, global weakness, and decrease in self care tasks. Pt continues to be at high risk for falls and is unsafe to return home at this time. Tolerating session today without incident. Will continue to follow and progress as tolerated.     Plan/Recommendations:   Moderate Intensity Therapy recommended post-acute care. This is recommended as therapy feels the patient would require 3-4 days per week and wouldn't tolerate \"3 hour daily\" rehab intensity. SNF would be the preferred choice. If the patient does not agree to SNF, arrange HH or OP depending on home bound status. If patient is medically complex, consider LTACH.. Pt requires rolling walker at discharge.     Pt desires Skilled Rehab placement at discharge. Pt cooperative; agreeable to therapeutic recommendations and plan of care.     "

## 2022-11-04 NOTE — THERAPY TREATMENT NOTE
Acute Care - Speech Language Pathology   Swallow Treatment Note  [facility]     Patient Name: Laura Perkins  : 1940  MRN: [id number]  Today's Date: 2022               Admit Date: 10/23/2022  Patient was not wearing a face mask during this therapy encounter. Therapist used appropriate personal protective equipment including mask, eye protection and gloves.  Mask used was standard procedure mask. Appropriate PPE was worn during the entire therapy session. Hand hygiene was completed before and after therapy session. Patient is not in enhanced droplet precautions.             Visit Dx:     ICD-10-CM ICD-9-CM   1. Acute non-ST elevation myocardial infarction (NSTEMI) ([facility])  I21.4 410.70   2. Altered mental status, unspecified altered mental status type  R41.82 780.97   3. Fever, unspecified fever cause  R50.9 780.60   4. Pneumonia of left lung due to infectious organism, unspecified part of lung  J18.9 486   5. Elevated troponin  R77.8 790.6   6. Type 2 diabetes mellitus without complication, with long-term current use of insulin ([facility])  E11.9 250.00    Z79.4 V58.67   7. Dyslipidemia  E78.5 272.4     Patient Active Problem List   Diagnosis   • Pneumonia due to COVID-19 virus   • Acute renal injury ([facility])   • Cancer of transverse colon ([facility])   • Altered mental status, unspecified altered mental status type   • HAP (hospital-acquired pneumonia)   • Acute respiratory distress   • Elevated troponin   • Sepsis ([facility])   • Type 2 diabetes mellitus ([facility])   • Anxiety associated with depression   • Hypothyroidism (acquired)   • OAB (overactive bladder)   • GERD without esophagitis   • Acute non-ST elevation myocardial infarction (NSTEMI) ([facility])   • Dyslipidemia     Past Medical History:   Diagnosis Date   • Acid reflux    • Anemia    • Anemia    • Bipolar 1 disorder (HCC)    • Colon cancer ([facility])    • Diabetes mellitus ([facility])     Type 2   • Disease of thyroid gland    • Hyperlipidemia    • Hypertension     TAKEN OFF HTN MEDS  OVER 5 YEARS AGO   • Incontinence of urine    • Stroke (HCC)     2012     Past Surgical History:   Procedure Laterality Date   • BLADDER SURGERY     • CARDIAC CATHETERIZATION N/A 10/27/2022    Procedure: Left Heart Cath, possible pci;  Surgeon: Andrea Melvin MD;  Location:  [facility];  Service: Cardiovascular;  Laterality: N/A;   • COLON RESECTION N/A 10/18/2022    Procedure: COLON RESECTIOn TRANSVERSE LAPAROSCOPIC;  Surgeon: Toño Michelle MD;  Location:  [facility];  Service: General;  Laterality: N/A;   • COLONOSCOPY  09/21/2022    [facility]   • ENDOSCOPY  09/21/2022    [facility]   • EYE SURGERY     • HEMORRHOIDECTOMY     • HYSTERECTOMY         SLP Recommendation and Plan       SWALLOW EVALUATION (last 72 hours)           EDUCATION  The patient has been educated in the following areas:   Dysphagia (Swallowing Impairment) Oral Care/Hydration.        SLP GOALS     Row Name 11/04/22 1100          (LTG) Swallow The patient will maximize swallow function for least restrictive po diet, exhibiting no complications associated with dysphagia, adequate po intake, and demonstrating independent use of safe swallow compensations.  -CB    [place] (Swallow Long Term Goal) with moderate cues (50-74% accuracy)  -CB    Time Frame (Swallow Long Term Goal) by discharge  -CB    Barriers (Swallow Long Term Goal) Patient was seen for DT/meal for breakfast. Most recent chest x-ray indicated interval enlargement of left effusion and associated dependent consolidation. Diffuse prominence of interstitial markings suggested underlying edema and/or pneumonia. Findings are slightly increased from the comparison study.  Patient was fed by . Patient was positioned upright in bed prior to meal. Patient was observed taking whole pills in applesauce without any difficulty. Patient currently on puree with nectar thick liquids by tsp. Patient was observed coughing during  meal at times. Patient was observed taking successive sips of nectar via straw. Patient does have a tendency to take large sips when using straw. Noted intermittent coughing with successive sips via straw. Educated patient's  with regards to providing sips via tsp. Reviewed the VFSS results with patient's  to reiterate safe swallowing strategies. Patient has evidence of GI concerns as VFSS indicated retrograde flow during video. Emphasized remaining upright following meal to secondary to reflux. Continued to encourage  to use tsp when providing sips of nectar as he was not demonstrating during this meal. Noted fatigue during meal. ST will continue to follow to assure safety and tolerance with recommended diet and to assure safe swallowing strategies are utilized during meal.  -CB    Progress/Outcomes (Swallow Long Term Goal) goal ongoing  -CB    Comment (Swallow Long Term Goal) --          (STG) Swallow 1 The patient will participate in ongoing assessment of swallow, including re-evaluation clinically and/or including instrumental assessment of swallow if indicated, to further assess swallow function in anticipation to initiate a po diet  -CB    [place] (Swallow Short Term Goal 1) with maximum cues (25-49% accuracy)  -CB    Time Frame (Swallow Short Term Goal 1) 1 week  -CB    Progress/Outcomes (Swallow Short Term Goal 1) goal ongoing  -CB    Comment (Swallow Short Term Goal 1) --          (STG) Swallow 2 The patient will participate in a full meal assessment to determine safety and adequacy of recommended diet, independent use of safe swallow compensations, and additional goals/recommendations to follow  -CB    [place] (Swallow Short Term Goal 2) with moderate cues (50-74% accuracy)  -CB    Time Frame (Swallow Short Term Goal 2) 1 week  -CB    Progress/Outcomes (Swallow Short Term Goal 2) goal ongoing  -CB          User Key  (r) = Recorded By, (t) = Taken By, (c) = Cosigned By     Initials Name Provider Type    AB Aleksandra Shrestha, MS CCC-SLP Speech and Language Pathologist    Brianne Capellan, SLP Speech and Language Pathologist    Sergey Sultana, SLP Speech and Language Pathologist                   Time Calculation:                SADIQ Howard  11/4/2022

## 2022-11-04 NOTE — PLAN OF CARE
Goal Outcome Evaluation:  Plan of Care Reviewed With: patient, spouse        Progress: no change  Outcome Evaluation: Patient alert with confusion,  at bedside, continues IV ABX, continues on O2 at 4 liters via nc, turn q 2 hrs, VSS.

## 2022-11-04 NOTE — PROGRESS NOTES
Cardiology Progress Note    Patient Identification:  Name: Laura Perkins  Age: 82 y.o.  Sex: female  :  1940  MRN: [id number]                 Follow Up / Chief Complaint: Elevated troponin, non stemi   Chief Complaint   Patient presents with   • Altered Mental Status       Interval History:  Patient presented from outlying facility with pneumonia.  Noted to have elevated troponin   Patient underwent cardiac cath 10/27/2022 with stent to proximal LAD.                Subjective: Patient seen and examined.  Chart reviewed.  Labs reviewed.  Discussed with RN taking care of patient.  Events noted.  Patient reportedly went into respiratory distress and edema 10/30/2022 with elevated proBNP of 23,000.  2022: Patient is feeling whole lot better no edema.      Objective: Serial troponin 0.872, 0.653, 0.793, 0.546, 0.373  10/26/2022: troponin 0.300, 0.256  Glucose 136, sodium 148 WBC 13.4, hgb 9.0  10/27/2022: troponin 0.175, WBC 12.9 hgb 8.8 EKG with diffuse T wave abnormalities and QTC prolongation   10/28/2022: troponin 0.136, glucose 215, WBC 12.2   hgb 8.6  10/30/2022: troponin 0.067, pro bnp [id number]  10/31/2022: troponin 0.05, glucose 190, creatinine 0.51  WBC 12.2, hgb 8.4  2022:  Troponin 0.057, glucose 185  2022: no labs to review       History of Present Illness:        Mrs. Laura Perkins has a Past medical history of      - hypertension  - dyslipidemia   - diabetes   - anemia, GERD  - stroke   - bipolar disorder  - Colon cancer- s/p resection 10/18/2022  - bladder surgery, eye surgery, hysterectomy, hemorrhoidectomy   -  Non smoker  - no allergies  - family history of heart disease in mother         Presented from rehab center to the ED on 10/24/2022 for confusion/alerted mental status.  Patient was recently admitted to [facility] for transverse colon resection on 10/18/2022.  Patient was discharged to rehab. In the ED patient was noted to have fever 102. CXR showed left  sided pneumonia and patient was started on IVFs and antibiotics.  Cardiology has been consulted for elevated troponin.   Patient alert and oriented and gives some history but is poor historian and most of information above was obtained from chart review.  She denies any chest pain but does state she is short of breath and coughing.  RN reported patient aspirated and had swallow study now on thickened liquid.      Serial troponins have been elevated but non-trending 0.872, 0.653, 0.793, 0.546, 0.373  EKG showed sinus tachycardia with diffuse T wave inversion that is changed from previous EKG on 10/11/2022 that showed normal sinus rhythm without any ST abnormalities.       Assessment:  :     Acute non-ST elevation MI  Presumed CAD  Hypertension, dyslipidemia, diabetes  Recent colon cancer resection 10/18/2022  Fever, leukocytosis  Altered mental status  Prolonged QTC on psychiatric medications   CAD, NSTEMI, PCI  Acute HFrEF due to systolic dysfunction from ischemic cardiomyopathy        Recommendations / Plan:         Telemetry is revealing sinus rhythm  Monitor renal function and hemoglobin and urine output  proBNP is improved from 23,855-14,071  Continue diuresis as tolerated  Echo is revealing moderate LV dysfunction  Continue to monitor EKG and QTC.  EKG done 10/31/2022 reviewed/interpreted by me reveals sinus tachycardia with rate of 102 bpm with QT of 382 ms and QTC of 493 ms  Increase activity as tolerated  Continue aspirin, clopidogrel, losartan, metoprolol succinate, rosuvastatin as tolerated  Speech therapy for swallow evaluation again to make sure she is not aspirating  Discussed with patient's  by bedside  Patient has significant improvement in symptoms we will continue diuresis and medical management  Patient is not having any symptoms at this time of chest pain but will need physical and occupational therapy to help her with mobility and ambulation  Patient had a echocardiogram which showed small  pericardial effusion but also a EF of about 45 to 50% and is on beta-blockers and losartan.  Blood pressure and heart rate are stable         Past Medical History:  Past Medical History:   Diagnosis Date   • Acid reflux    • Anemia    • Anemia    • Bipolar 1 disorder (HCC)    • Colon cancer (HCC)    • Diabetes mellitus (HCC)     Type 2   • Disease of thyroid gland    • Hyperlipidemia    • Hypertension     TAKEN OFF HTN MEDS OVER 5 YEARS AGO   • Incontinence of urine    • Stroke (HCC)     2012     Past Surgical History:  Past Surgical History:   Procedure Laterality Date   • BLADDER SURGERY     • CARDIAC CATHETERIZATION N/A 10/27/2022    Procedure: Left Heart Cath, possible pci;  Surgeon: Andrea Melvin MD;  Location:  [facility];  Service: Cardiovascular;  Laterality: N/A;   • COLON RESECTION N/A 10/18/2022    Procedure: COLON RESECTIOn TRANSVERSE LAPAROSCOPIC;  Surgeon: Toño Michelle MD;  Location: [facility] MAIN OR;  Service: General;  Laterality: N/A;   • COLONOSCOPY  09/21/2022    [facility]   • ENDOSCOPY  09/21/2022    [facility]   • EYE SURGERY     • HEMORRHOIDECTOMY     • HYSTERECTOMY          Social History:   Social History     Tobacco Use   • Smoking status: Never   • Smokeless tobacco: Never   Substance Use Topics   • Alcohol use: Never      Family History:  Family History   Problem Relation Age of Onset   • Heart disease Mother    • Diabetes Mother    • No Known Problems Father    • Cancer Brother           Allergies:  No Known Allergies  Scheduled Meds:  ARIPiprazole, 2 mg, Nightly  aspirin, 81 mg, Daily  clopidogrel, 300 mg, Once   And  clopidogrel, 75 mg, Daily  divalproex, 500 mg, BID  docusate sodium, 100 mg, BID  ferrous sulfate, 324 mg, BID With Meals  furosemide, 20 mg, Q12H  insulin glargine, 15 Units, QAM  insulin lispro, 3-14 Units, TID With Meals  ipratropium-albuterol, 3 mL, BID - RT  levothyroxine, 50 mcg, Daily  losartan, 25 mg,  "Q24H  magnesium oxide, 400 mg, Daily  metoprolol succinate XL, 25 mg, Q24H  pantoprazole, 40 mg, QAM  piperacillin-tazobactam, 3.375 g, Q8H  polyethylene glycol, 17 g, Daily  rosuvastatin, 20 mg, Daily          Review of Systems:   Review of Systems   Constitutional: Negative for fever and malaise/fatigue.   HENT: Negative for ear pain and nosebleeds.    Eyes: Negative for blurred vision and double vision.   Cardiovascular: Negative for chest pain, dyspnea on exertion and palpitations.   Respiratory: Negative for cough and shortness of breath.    Skin: Negative for rash.   Musculoskeletal: Negative for joint pain.   Gastrointestinal: Negative for abdominal pain, nausea and vomiting.   Neurological: Negative for focal weakness and headaches.   Psychiatric/Behavioral: Negative for depression. The patient is not nervous/anxious.    All other systems reviewed and are negative.           Constitutional:  Temp:  [97.3 °F (36.3 °C)-98.2 °F (36.8 °C)] 97.3 °F (36.3 °C)  Heart Rate:  [62-85] 85  Resp:  [18-20] 18  BP: (136-150)/(42-56) 150/54    Physical Exam   /54   Pulse 85   Temp 97.3 °F (36.3 °C) (Oral)   Resp 18   Ht 162.6 cm (64\")   Wt 64.5 kg (142 lb 3.2 oz)   SpO2 92%   BMI 24.41 kg/m²   General:  Appears in no acute distress- frail and chronically ill   Eyes: Sclera is anicteric,  conjunctiva is clear   HEENT:  No JVD. Thyroid not visibly enlarged. No mucosal pallor or cyanosis  Respiratory: Respirations regular and unlabored at rest.  Decreased breath sounds at bilateral bases with scattered rhonchi  Cardiovascular: S1,S2 Regular rate and rhythm. No murmur, rub or gallop auscultated.  . No pretibial pitting edema  Gastrointestinal: Abdomen nondistended.  Musculoskeletal:  No abnormal movements  Extremities: No digital clubbing or cyanosis  Skin: Color pink. Skin warm and dry to touch. No rashes  No xanthoma  Neuro: Alert and awake, no lateralizing deficits appreciated    INTAKE AND " OUTPUT:    Intake/Output Summary (Last 24 hours) at 11/4/2022 1824  Last data filed at 11/4/2022 1710  Gross per 24 hour   Intake 720 ml   Output 2150 ml   Net -1430 ml       Cardiographics  Telemetry: sinus rhythm     ECG:   ECG 12 Lead QT Measurement   Preliminary Result   HEART RATE= 76  bpm   RR Interval= 808  ms   WV Interval= 155  ms   P Horizontal Axis= -14  deg   P Front Axis= 53  deg   QRSD Interval= 96  ms   QT Interval= 477  ms   QRS Axis= 19  deg   T Wave Axis= 183  deg   - ABNORMAL ECG -   Sinus rhythm   Atrial premature complex   Abnrm T, consider ischemia, anterolateral lds   Prolonged QT interval   When compared with ECG of 31-Oct-2022 8:53:04,   Significant rate decrease   Significant repolarization change   Electronically Signed By:    Date and Time of Study: 2022-11-03 08:17:18      ECG 12 Lead QT Measurement   Preliminary Result   HEART RATE= 102  bpm   RR Interval= 600  ms   WV Interval= 157  ms   P Horizontal Axis= -38  deg   P Front Axis= 23  deg   QRSD Interval= 89  ms   QT Interval= 382  ms   QRS Axis= 28  deg   T Wave Axis= 209  deg   - ABNORMAL ECG -   Sinus tachycardia   Atrial premature complexes   Nonspecific T abnormalities, diffuse leads   Electronically Signed By:    Date and Time of Study: 2022-10-31 08:53:04      ECG 12 Lead Other; on psych medications that prolong qtc   Final Result   HEART RATE= 92  bpm   RR Interval= 656  ms   WV Interval= 154  ms   P Horizontal Axis= -11  deg   P Front Axis= 45  deg   QRSD Interval= 90  ms   QT Interval= 360  ms   QRS Axis= 20  deg   T Wave Axis= 193  deg   - ABNORMAL ECG -   Sinus rhythm   Atrial premature complex   Abnrm T, consider ischemia, anterolateral lds   When compared with ECG of 29-Oct-2022 5:08:11,   Nonspecific significant change   Electronically Signed By: Reyes Cooper ([facility]) 03-Nov-2022 11:14:28   Date and Time of Study: 2022-10-29 16:41:12      ECG 12 Lead QT Measurement   Final Result   HEART RATE= 78  bpm   RR  Interval= 772  ms   WY Interval= 139  ms   P Horizontal Axis= 4  deg   P Front Axis= 42  deg   QRSD Interval= 87  ms   QT Interval= 422  ms   QRS Axis= 32  deg   T Wave Axis= 212  deg   - ABNORMAL ECG -   Sinus rhythm   Abnormal T, consider ischemia, diffuse leads   When compared with ECG of 28-Oct-2022 5:26:46,   New or worsened ischemia or infarction   Significant repolarization change   Electronically Signed By: Reyes Cooper ([facility]) 03-Nov-2022 10:47:40   Date and Time of Study: 2022-10-29 05:08:11      SCANNED - TELEMETRY     Final Result      ECG 12 Lead   Preliminary Result   HEART RATE= 78  bpm   RR Interval= 772  ms   WY Interval= 129  ms   P Horizontal Axis= -38  deg   P Front Axis= 2  deg   QRSD Interval= 90  ms   QT Interval= 469  ms   QRS Axis= 37  deg   T Wave Axis= 232  deg   - ABNORMAL ECG -   Sinus rhythm   Abnormal T, suspect prox. LAD occlu. or CVA   Prolonged QT interval   When compared with ECG of 27-Oct-2022 10:26:26,   Significant repolarization change   Electronically Signed By:    Date and Time of Study: 2022-10-28 05:26:46      ECG 12 Lead QT Measurement   Final Result   HEART RATE= 97  bpm   RR Interval= 620  ms   WY Interval= 154  ms   P Horizontal Axis= 1  deg   P Front Axis= 43  deg   QRSD Interval= 92  ms   QT Interval= 474  ms   QRS Axis= 32  deg   T Wave Axis= 217  deg   - ABNORMAL ECG -   Sinus rhythm   Abnormal T, probable ischemia, widespread   Prolonged QT interval   When compared with ECG of 23-Oct-2022 19:11:40,   Significant change in rhythm   Electronically Signed By: Skyler García ([facility]) 28-Oct-2022 10:11:01   Date and Time of Study: 2022-10-27 10:26:26      ECG 12 Lead   Final Result   HEART RATE= 121  bpm   RR Interval= 496  ms   WY Interval= 96  ms   P Horizontal Axis=   deg   P Front Axis= 206  deg   QRSD Interval= 77  ms   QT Interval= 382  ms   QRS Axis= 37  deg   T Wave Axis= 209  deg   - ABNORMAL ECG -   Sinus or ectopic atrial tachycardia   Abnormal T, consider  ischemia, diffuse leads   Prolonged QT interval   When compared with ECG of 11-Oct-2022 14:13:27,   Significant change in rhythm: previously sinus   New or worsened ischemia or infarction   Electronically Signed By: Marino Montes ([facility]) 24-Oct-2022 14:20:30   Date and Time of Study: 2022-10-23 19:11:40      SCANNED - TELEMETRY     Final Result      SCANNED - TELEMETRY     Final Result      SCANNED - TELEMETRY     Final Result      SCANNED - TELEMETRY     Final Result      SCANNED - TELEMETRY     Final Result      SCANNED - TELEMETRY     Final Result      SCANNED - TELEMETRY     Final Result      SCANNED - TELEMETRY     Final Result      SCANNED - TELEMETRY     Final Result      SCANNED - TELEMETRY     Final Result      SCANNED - TELEMETRY     Final Result      SCANNED - TELEMETRY     Final Result      SCANNED - TELEMETRY     Final Result      SCANNED - TELEMETRY     Final Result      SCANNED - TELEMETRY     Final Result      SCANNED - TELEMETRY     Final Result      SCANNED - TELEMETRY     Final Result      SCANNED - TELEMETRY     Final Result      SCANNED - TELEMETRY     Final Result      SCANNED - TELEMETRY     Final Result      SCANNED - TELEMETRY     Final Result      SCANNED - TELEMETRY     Final Result      SCANNED - TELEMETRY     Final Result      SCANNED - TELEMETRY     Final Result      SCANNED - TELEMETRY     Final Result      SCANNED - TELEMETRY     Final Result      SCANNED - TELEMETRY     Final Result      SCANNED - TELEMETRY     Final Result      SCANNED - TELEMETRY     Final Result      SCANNED - TELEMETRY     Final Result      SCANNED - TELEMETRY     Final Result      SCANNED - TELEMETRY     Final Result      SCANNED - TELEMETRY     Final Result      SCANNED - TELEMETRY     Final Result      SCANNED - TELEMETRY     Final Result      SCANNED - TELEMETRY     Final Result      SCANNED - TELEMETRY     Final Result      SCANNED - TELEMETRY     Final Result      SCANNED - TELEMETRY     Final Result     "  SCANNED - TELEMETRY     Final Result      SCANNED - TELEMETRY     Final Result      SCANNED - TELEMETRY     Final Result      SCANNED - TELEMETRY     Final Result      SCANNED - TELEMETRY     Final Result      SCANNED - TELEMETRY     Final Result      SCANNED - TELEMETRY     Final Result      SCANNED - TELEMETRY     Final Result      SCANNED - TELEMETRY     Final Result      ECG 12 Lead QT Measurement    (Results Pending)   ECG 12 Lead QT Measurement    (Results Pending)     I have personally reviewed EKG    Echocardiogram: Results for orders placed during the hospital encounter of 10/23/22    Adult Transthoracic Echo Limited W/ Cont if Necessary Per Protocol    Interpretation Summary  Normal LV size.  Mild apical hypokinesis seen.  Estimated LV ejection fraction of 45 to 50%  Normal RV size  Normal atrial size  Aortic valve, mitral valve, tricuspid valve appears structurally normal, no significant regurgitation seen.  Small pericardial effusion seen.  No signs of increased intrapericardial pressure  Proximal aorta appears normal in size.      Lab Review   I have reviewed the labs  Results from last 7 days   Lab Units 11/01/22  0335 10/31/22  1734 10/31/22  0558   TROPONIN T ng/mL 0.057* 0.064* 0.058*     Results from last 7 days   Lab Units 10/29/22  0630   MAGNESIUM mg/dL 1.8     Results from last 7 days   Lab Units 10/31/22  0558   SODIUM mmol/L 135*   POTASSIUM mmol/L 3.8   BUN mg/dL 12   CREATININE mg/dL 0.51*   CALCIUM mg/dL 8.5*         Results from last 7 days   Lab Units 11/04/22  0822 10/31/22  0348 10/30/22  0434   WBC 10*3/mm3 13.30* 12.20* 13.90*   HEMOGLOBIN g/dL 8.4* 8.4* 8.7*   HEMATOCRIT % 26.9* 28.5* 29.5*   PLATELETS 10*3/mm3 447 366 321     Results from last 7 days   Lab Units 11/04/22  0821   INR  1.08       RADIOLOGY:  Imaging Results (Last 24 Hours)     ** No results found for the last 24 hours. **                )11/4/2022  MD YARA Atreaga/Transcription:   \"Dictated " "utilizing Dragon dictation\".   "

## 2022-11-04 NOTE — THERAPY TREATMENT NOTE
"Subjective: Pt agreeable to therapeutic plan of care.   Cognition: arousal/alertness: Alert and Attentive    Objective:     Bed Mobility: Mod-A   Static sitting balance: min-mod A   Functional Transfers: Min-A and with rolling walker   STS x3   Functional Ambulation: Max-A and with rolling walker  Max cueing and positioning RW       Vitals: WNL    Pain: 0 VAS  Location: N/A  Interventions for pain: N/A  Education: Provided education on the importance of mobility in the acute care setting    Assessment: Laura Perkins presents with ADL impairments below baseline abilities which indicate the need for continued skilled intervention while inpatient. Pt has increased motivation to get up and sit in chair this date. Pt completed bed mobility mod A with UE. Pt required min-mod A for static sitting balance. Pt required max cues to reposition and correct leaning balance. Pt utilized bed rails for UE stability. Pt completed STS x3 attempts this date requiring min A with RW. Pt required max cues and repositioning with RW to ambulate 3 steps to chair. Pt demo difficulty with taking steps and following commands. Pt deficits include decrease in functional mobility, decreased balance, global weakness, and decrease in self care tasks. Pt continues to be at high risk for falls and is unsafe to return home at this time. Tolerating session today without incident. Will continue to follow and progress as tolerated.     Plan/Recommendations:   Moderate Intensity Therapy recommended post-acute care. This is recommended as therapy feels the patient would require 3-4 days per week and wouldn't tolerate \"3 hour daily\" rehab intensity. SNF would be the preferred choice. If the patient does not agree to SNF, arrange HH or OP depending on home bound status. If patient is medically complex, consider LTACH.. Pt requires rolling walker at discharge.     Pt desires Skilled Rehab placement at discharge. Pt cooperative; agreeable to therapeutic " recommendations and plan of care.     Modified Dajuan: N/A = No pre-op stroke/TIA    Post-Tx Position: Up in Chair, Alarms activated and Call light and personal items within reach  PPE: gloves and surgical mask

## 2022-11-04 NOTE — PLAN OF CARE
"Assessment: Laura Perkins presents with functional mobility impairments which indicate the need for skilled intervention. Pt found slid down in bed against bedrail. Pt requiring mod a and VC for repositioning in bed for improved postural alignment and comfort. Pt oriented to self and agreeable to PT session. Pt requiring min/mod a for bed mobility supine to EOB with HOB elevated. Pt requiring blocking of BLEs to prevent sliding at EOB. STS performed with FWW and min a. Pt able to take 4-5 steps to pivot to recliner. VC for reaching back for chair prior to returning to seated position.  Pt instructed in BLE AAROM to promote active mobility and strengthening. Pt with decreased overall gross motor coordination throughout interventions. Tolerating session today without incident. Will continue to follow and progress as tolerated.     Plan/Recommendations:   Moderate Intensity Therapy recommended post-acute care. This is recommended as therapy feels the patient would require 3-4 days per week and wouldn't tolerate \"3 hour daily\" rehab intensity. SNF would be the preferred choice. If the patient does not agree to SNF, arrange HH or OP depending on home bound status. If patient is medically complex, consider LTACH.. Pt requires no DME at discharge.     Pt desires Skilled Rehab placement at discharge. Pt cooperative; agreeable to therapeutic recommendations and plan of care.   "

## 2022-11-04 NOTE — PLAN OF CARE
Goal Outcome Evaluation:           Progress: no change  Outcome Evaluation: Pt without complaints during shift, sat up in chair twice today with PT and remains on 4 liters O2. Will continue to monitor

## 2022-11-04 NOTE — CASE MANAGEMENT/SOCIAL WORK
Continued Stay Note  THANH Wharton     Patient Name: Laura Perkins  MRN: [id number]  Today's Date: 11/4/2022    Admit Date: 10/23/2022    Plan: Return to [facility], [facility].   no precert needed.   Discharge Plan     Row Name 11/04/22 1716       Plan    Plan Return to [facility], [facility].   no precert needed.    Patient/Family in Agreement with Plan yes    Plan Comments Barriers to discharge: plan thoracentesis in IR.   Pulmonolgy following. 4L O2.              Expected Discharge Date and Time     Expected Discharge Date Expected Discharge Time    Nov 4, 2022           Phone communication or documentation only - no physical contact with patient or family.  Cherie Galvan RN     Office Phone [phone]  Office Cell [phone]

## 2022-11-05 LAB
ALBUMIN SERPL-MCNC: 2.1 G/DL (ref 3.5–5.2)
ALBUMIN/GLOB SERPL: 0.5 G/DL
ALP SERPL-CCNC: 138 U/L (ref 39–117)
ALT SERPL W P-5'-P-CCNC: 18 U/L (ref 1–33)
ANION GAP SERPL CALCULATED.3IONS-SCNC: 11 MMOL/L (ref 5–15)
ARTERIAL PATENCY WRIST A: POSITIVE
AST SERPL-CCNC: 17 U/L (ref 1–32)
ATMOSPHERIC PRESS: ABNORMAL MM[HG]
BASE EXCESS BLDA CALC-SCNC: 9.6 MMOL/L (ref 0–3)
BASOPHILS # BLD AUTO: 0.1 10*3/MM3 (ref 0–0.2)
BASOPHILS NFR BLD AUTO: 0.4 % (ref 0–1.5)
BDY SITE: ABNORMAL
BILIRUB SERPL-MCNC: 0.2 MG/DL (ref 0–1.2)
BUN SERPL-MCNC: 14 MG/DL (ref 8–23)
BUN/CREAT SERPL: 19.2 (ref 7–25)
CALCIUM SPEC-SCNC: 8.5 MG/DL (ref 8.6–10.5)
CHLORIDE SERPL-SCNC: 94 MMOL/L (ref 98–107)
CO2 BLDA-SCNC: 33.5 MMOL/L (ref 22–29)
CO2 SERPL-SCNC: 33 MMOL/L (ref 22–29)
CREAT SERPL-MCNC: 0.73 MG/DL (ref 0.57–1)
DEPRECATED RDW RBC AUTO: 50.3 FL (ref 37–54)
EGFRCR SERPLBLD CKD-EPI 2021: 81.7 ML/MIN/1.73
EOSINOPHIL # BLD AUTO: 0.1 10*3/MM3 (ref 0–0.4)
EOSINOPHIL NFR BLD AUTO: 1 % (ref 0.3–6.2)
ERYTHROCYTE [DISTWIDTH] IN BLOOD BY AUTOMATED COUNT: 18.1 % (ref 12.3–15.4)
GLOBULIN UR ELPH-MCNC: 4.2 GM/DL
GLUCOSE BLDC GLUCOMTR-MCNC: 168 MG/DL (ref 70–105)
GLUCOSE BLDC GLUCOMTR-MCNC: 186 MG/DL (ref 70–105)
GLUCOSE BLDC GLUCOMTR-MCNC: 247 MG/DL (ref 70–105)
GLUCOSE SERPL-MCNC: 189 MG/DL (ref 65–99)
HCO3 BLDA-SCNC: 32.4 MMOL/L (ref 21–28)
HCT VFR BLD AUTO: 30.2 % (ref 34–46.6)
HEMODILUTION: NO
HGB BLD-MCNC: 8.9 G/DL (ref 12–15.9)
INHALED O2 CONCENTRATION: 48 %
LYMPHOCYTES # BLD AUTO: 0.8 10*3/MM3 (ref 0.7–3.1)
LYMPHOCYTES NFR BLD AUTO: 6 % (ref 19.6–45.3)
MAGNESIUM SERPL-MCNC: 1.7 MG/DL (ref 1.6–2.4)
MCH RBC QN AUTO: 23.4 PG (ref 26.6–33)
MCHC RBC AUTO-ENTMCNC: 29.5 G/DL (ref 31.5–35.7)
MCV RBC AUTO: 79.4 FL (ref 79–97)
MODALITY: ABNORMAL
MONOCYTES # BLD AUTO: 1 10*3/MM3 (ref 0.1–0.9)
MONOCYTES NFR BLD AUTO: 7.1 % (ref 5–12)
NEUTROPHILS NFR BLD AUTO: 11.8 10*3/MM3 (ref 1.7–7)
NEUTROPHILS NFR BLD AUTO: 85.5 % (ref 42.7–76)
NRBC BLD AUTO-RTO: 0 /100 WBC (ref 0–0.2)
PCO2 BLDA: 35.4 MM HG (ref 35–48)
PH BLDA: 7.57 PH UNITS (ref 7.35–7.45)
PLATELET # BLD AUTO: 499 10*3/MM3 (ref 140–450)
PMV BLD AUTO: 7.7 FL (ref 6–12)
PO2 BLDA: 60.9 MM HG (ref 83–108)
POTASSIUM SERPL-SCNC: 3.1 MMOL/L (ref 3.5–5.2)
PROT SERPL-MCNC: 6.3 G/DL (ref 6–8.5)
RBC # BLD AUTO: 3.8 10*6/MM3 (ref 3.77–5.28)
SAO2 % BLDCOA: 94.2 % (ref 94–98)
SODIUM SERPL-SCNC: 138 MMOL/L (ref 136–145)
WBC NRBC COR # BLD: 13.8 10*3/MM3 (ref 3.4–10.8)

## 2022-11-05 PROCEDURE — 25010000002 PIPERACILLIN SOD-TAZOBACTAM PER 1 G

## 2022-11-05 PROCEDURE — 93005 ELECTROCARDIOGRAM TRACING: CPT | Performed by: INTERNAL MEDICINE

## 2022-11-05 PROCEDURE — 85025 COMPLETE CBC W/AUTO DIFF WBC: CPT | Performed by: INTERNAL MEDICINE

## 2022-11-05 PROCEDURE — 94799 UNLISTED PULMONARY SVC/PX: CPT

## 2022-11-05 PROCEDURE — 94761 N-INVAS EAR/PLS OXIMETRY MLT: CPT

## 2022-11-05 PROCEDURE — 80053 COMPREHEN METABOLIC PANEL: CPT | Performed by: INTERNAL MEDICINE

## 2022-11-05 PROCEDURE — 25010000002 FUROSEMIDE PER 20 MG: Performed by: NURSE PRACTITIONER

## 2022-11-05 PROCEDURE — 99232 SBSQ HOSP IP/OBS MODERATE 35: CPT | Performed by: INTERNAL MEDICINE

## 2022-11-05 PROCEDURE — 82962 GLUCOSE BLOOD TEST: CPT

## 2022-11-05 PROCEDURE — 99231 SBSQ HOSP IP/OBS SF/LOW 25: CPT | Performed by: INTERNAL MEDICINE

## 2022-11-05 PROCEDURE — 36600 WITHDRAWAL OF ARTERIAL BLOOD: CPT

## 2022-11-05 PROCEDURE — 63710000001 INSULIN LISPRO (HUMAN) PER 5 UNITS: Performed by: INTERNAL MEDICINE

## 2022-11-05 PROCEDURE — 63710000001 INSULIN GLARGINE PER 5 UNITS: Performed by: INTERNAL MEDICINE

## 2022-11-05 PROCEDURE — 93010 ELECTROCARDIOGRAM REPORT: CPT | Performed by: INTERNAL MEDICINE

## 2022-11-05 PROCEDURE — 82803 BLOOD GASES ANY COMBINATION: CPT

## 2022-11-05 PROCEDURE — 83735 ASSAY OF MAGNESIUM: CPT | Performed by: INTERNAL MEDICINE

## 2022-11-05 RX ORDER — FUROSEMIDE 20 MG/1
20 TABLET ORAL DAILY
Status: DISCONTINUED | OUTPATIENT
Start: 2022-11-06 | End: 2022-11-06

## 2022-11-05 RX ORDER — BENZONATATE 100 MG/1
200 CAPSULE ORAL 3 TIMES DAILY PRN
Status: DISCONTINUED | OUTPATIENT
Start: 2022-11-05 | End: 2022-11-15 | Stop reason: HOSPADM

## 2022-11-05 RX ORDER — MAGNESIUM SULFATE HEPTAHYDRATE 40 MG/ML
4 INJECTION, SOLUTION INTRAVENOUS AS NEEDED
Status: DISCONTINUED | OUTPATIENT
Start: 2022-11-05 | End: 2022-11-15 | Stop reason: HOSPADM

## 2022-11-05 RX ORDER — CALCIUM GLUCONATE 20 MG/ML
2 INJECTION, SOLUTION INTRAVENOUS AS NEEDED
Status: DISCONTINUED | OUTPATIENT
Start: 2022-11-05 | End: 2022-11-15 | Stop reason: HOSPADM

## 2022-11-05 RX ORDER — MAGNESIUM SULFATE HEPTAHYDRATE 40 MG/ML
2 INJECTION, SOLUTION INTRAVENOUS AS NEEDED
Status: DISCONTINUED | OUTPATIENT
Start: 2022-11-05 | End: 2022-11-15 | Stop reason: HOSPADM

## 2022-11-05 RX ORDER — CALCIUM GLUCONATE 20 MG/ML
1 INJECTION, SOLUTION INTRAVENOUS AS NEEDED
Status: DISCONTINUED | OUTPATIENT
Start: 2022-11-05 | End: 2022-11-15 | Stop reason: HOSPADM

## 2022-11-05 RX ORDER — SCOLOPAMINE TRANSDERMAL SYSTEM 1 MG/1
1 PATCH, EXTENDED RELEASE TRANSDERMAL
Status: DISCONTINUED | OUTPATIENT
Start: 2022-11-05 | End: 2022-11-15 | Stop reason: HOSPADM

## 2022-11-05 RX ORDER — PROCHLORPERAZINE EDISYLATE 5 MG/ML
10 INJECTION INTRAMUSCULAR; INTRAVENOUS EVERY 6 HOURS PRN
Status: DISCONTINUED | OUTPATIENT
Start: 2022-11-05 | End: 2022-11-05

## 2022-11-05 RX ORDER — POTASSIUM CHLORIDE 1.5 G/1.77G
40 POWDER, FOR SOLUTION ORAL AS NEEDED
Status: DISCONTINUED | OUTPATIENT
Start: 2022-11-05 | End: 2022-11-15 | Stop reason: HOSPADM

## 2022-11-05 RX ORDER — POTASSIUM CHLORIDE 20 MEQ/1
40 TABLET, EXTENDED RELEASE ORAL AS NEEDED
Status: DISCONTINUED | OUTPATIENT
Start: 2022-11-05 | End: 2022-11-15 | Stop reason: HOSPADM

## 2022-11-05 RX ORDER — GUAIFENESIN 600 MG/1
600 TABLET, EXTENDED RELEASE ORAL EVERY 12 HOURS SCHEDULED
Status: DISCONTINUED | OUTPATIENT
Start: 2022-11-05 | End: 2022-11-15 | Stop reason: HOSPADM

## 2022-11-05 RX ORDER — SPIRONOLACTONE 25 MG/1
25 TABLET ORAL DAILY
Status: DISCONTINUED | OUTPATIENT
Start: 2022-11-05 | End: 2022-11-15 | Stop reason: HOSPADM

## 2022-11-05 RX ADMIN — METOPROLOL SUCCINATE 25 MG: 25 TABLET, FILM COATED, EXTENDED RELEASE ORAL at 09:23

## 2022-11-05 RX ADMIN — DIVALPROEX SODIUM 500 MG: 500 TABLET, DELAYED RELEASE ORAL at 09:23

## 2022-11-05 RX ADMIN — PIPERACILLIN AND TAZOBACTAM 3.38 G: 3; .375 INJECTION, POWDER, FOR SOLUTION INTRAVENOUS at 23:00

## 2022-11-05 RX ADMIN — DOCUSATE SODIUM 100 MG: 100 CAPSULE, LIQUID FILLED ORAL at 09:24

## 2022-11-05 RX ADMIN — ARIPIPRAZOLE 2 MG: 2 TABLET ORAL at 23:29

## 2022-11-05 RX ADMIN — PIPERACILLIN AND TAZOBACTAM 3.38 G: 3; .375 INJECTION, POWDER, FOR SOLUTION INTRAVENOUS at 00:19

## 2022-11-05 RX ADMIN — DOCUSATE SODIUM 100 MG: 100 CAPSULE, LIQUID FILLED ORAL at 23:30

## 2022-11-05 RX ADMIN — SPIRONOLACTONE 25 MG: 25 TABLET ORAL at 12:12

## 2022-11-05 RX ADMIN — PIPERACILLIN AND TAZOBACTAM 3.38 G: 3; .375 INJECTION, POWDER, FOR SOLUTION INTRAVENOUS at 14:41

## 2022-11-05 RX ADMIN — ROSUVASTATIN 20 MG: 10 TABLET, FILM COATED ORAL at 23:34

## 2022-11-05 RX ADMIN — FUROSEMIDE 20 MG: 10 INJECTION, SOLUTION INTRAMUSCULAR; INTRAVENOUS at 09:24

## 2022-11-05 RX ADMIN — INSULIN LISPRO 3 UNITS: 100 INJECTION, SOLUTION INTRAVENOUS; SUBCUTANEOUS at 09:24

## 2022-11-05 RX ADMIN — BENZONATATE 200 MG: 100 CAPSULE ORAL at 12:12

## 2022-11-05 RX ADMIN — PIPERACILLIN AND TAZOBACTAM 3.38 G: 3; .375 INJECTION, POWDER, FOR SOLUTION INTRAVENOUS at 06:33

## 2022-11-05 RX ADMIN — FERROUS SULFATE TAB EC 324 MG (65 MG FE EQUIVALENT) 324 MG: 324 (65 FE) TABLET DELAYED RESPONSE at 16:40

## 2022-11-05 RX ADMIN — POLYETHYLENE GLYCOL 3350 17 G: 17 POWDER, FOR SOLUTION ORAL at 09:24

## 2022-11-05 RX ADMIN — MAGNESIUM OXIDE TAB 400 MG (241.3 MG ELEMENTAL MG) 400 MG: 400 (241.3 MG) TAB at 09:23

## 2022-11-05 RX ADMIN — SCOPALAMINE 1 PATCH: 1 PATCH, EXTENDED RELEASE TRANSDERMAL at 09:45

## 2022-11-05 RX ADMIN — LOSARTAN POTASSIUM 25 MG: 25 TABLET, FILM COATED ORAL at 09:22

## 2022-11-05 RX ADMIN — IPRATROPIUM BROMIDE AND ALBUTEROL SULFATE 3 ML: .5; 3 SOLUTION RESPIRATORY (INHALATION) at 18:41

## 2022-11-05 RX ADMIN — POTASSIUM CHLORIDE 40 MEQ: 1500 TABLET, EXTENDED RELEASE ORAL at 23:33

## 2022-11-05 RX ADMIN — GUAIFENESIN 600 MG: 600 TABLET, EXTENDED RELEASE ORAL at 12:12

## 2022-11-05 RX ADMIN — INSULIN LISPRO 3 UNITS: 100 INJECTION, SOLUTION INTRAVENOUS; SUBCUTANEOUS at 12:12

## 2022-11-05 RX ADMIN — POTASSIUM CHLORIDE 40 MEQ: 1500 TABLET, EXTENDED RELEASE ORAL at 12:13

## 2022-11-05 RX ADMIN — DIVALPROEX SODIUM 500 MG: 500 TABLET, DELAYED RELEASE ORAL at 23:30

## 2022-11-05 RX ADMIN — CLOPIDOGREL BISULFATE 75 MG: 75 TABLET ORAL at 09:23

## 2022-11-05 RX ADMIN — Medication 10 ML: at 09:24

## 2022-11-05 RX ADMIN — INSULIN GLARGINE 15 UNITS: 100 INJECTION, SOLUTION SUBCUTANEOUS at 06:35

## 2022-11-05 RX ADMIN — IPRATROPIUM BROMIDE AND ALBUTEROL SULFATE 3 ML: .5; 3 SOLUTION RESPIRATORY (INHALATION) at 06:17

## 2022-11-05 RX ADMIN — PANTOPRAZOLE SODIUM 40 MG: 40 TABLET, DELAYED RELEASE ORAL at 06:33

## 2022-11-05 RX ADMIN — GUAIFENESIN 600 MG: 600 TABLET, EXTENDED RELEASE ORAL at 23:30

## 2022-11-05 RX ADMIN — FERROUS SULFATE TAB EC 324 MG (65 MG FE EQUIVALENT) 324 MG: 324 (65 FE) TABLET DELAYED RESPONSE at 09:23

## 2022-11-05 RX ADMIN — LEVOTHYROXINE SODIUM 50 MCG: 50 TABLET ORAL at 06:33

## 2022-11-05 RX ADMIN — INSULIN LISPRO 5 UNITS: 100 INJECTION, SOLUTION INTRAVENOUS; SUBCUTANEOUS at 17:54

## 2022-11-05 RX ADMIN — POTASSIUM CHLORIDE 40 MEQ: 1500 TABLET, EXTENDED RELEASE ORAL at 16:40

## 2022-11-05 RX ADMIN — ASPIRIN 81 MG: 81 TABLET, COATED ORAL at 09:24

## 2022-11-05 NOTE — PROGRESS NOTES
"Subjective   Laura Perkins is a 82 y.o. female.   Seen for diabetes f/u.  Eating fair.      Objective     /54   Pulse 72   Temp 97.3 °F (36.3 °C) (Oral)   Resp 18   Ht 162.6 cm (64\")   Wt 64.5 kg (142 lb 3.2 oz)   SpO2 100%   BMI 24.41 kg/m²   Blood sugar  130 this am,  376 @ lunch, 107 @ supper    ASSESSMENT    Patient is stable    PLAN    Continue same insulin regimen.         Matias Santos MD  11/4/2022  21:21 EDT    "

## 2022-11-05 NOTE — PROGRESS NOTES
Daily Progress Note        Altered mental status, unspecified altered mental status type    Cancer of transverse colon (HCC)    HAP (hospital-acquired pneumonia)    Acute respiratory distress    Elevated troponin    Sepsis (HCC)    Type 2 diabetes mellitus (HCC)    Anxiety associated with depression    Hypothyroidism (acquired)    OAB (overactive bladder)    GERD without esophagitis    Acute non-ST elevation myocardial infarction (NSTEMI) (HCC)    Dyslipidemia      Assessment      ABG noted with mixed respiratory and metabolic alkalosis  Hypokalemia  Bilateral pleural effusions worsening since cardiac stent placement  Left more than right  Bilateral lobe pneumonia possible compression atelectasis      10/2022 colon resection, invasive adenocarcinoma     10/27/2022 echo  EF 41 to 45%  RVSP less than 35 mmHg  Left ventricular wall hypokinetic    Repeat echo 10/31/2022  EF 45 to 50% with small pericardial effusion seen     10/27/2022 cardiac cath with stent to LAD     Elevated troponin on admission CVA  Hypertension  Diabetes mellitus  Hyperlipidemia  Bipolar       Recommendations:     Change IV Lasix to p.o. Lasix 20 mg daily as well as Aldactone 25 mg daily    titrate oxygen,    Antibiotic  Zosyn sputum cultures negative so far   Mucomyst nebulized with albuterol    Due to recent cardiac stent and being on Plavix unable to do thoracentesis     Aspiration precautions, elevate head of bed    Singulair  GI prophylaxis Protonix  Encourage use of I-S and splinting    Discussed with  and patient        CXR 11/3/2020     CXR 10/30/2022          LOS: 13 days     Subjective         Objective     Vital signs for last 24 hours:  Vitals:    11/05/22 0617 11/05/22 0622 11/05/22 0922 11/05/22 1037   BP:   155/49 169/48   BP Location:    Left arm   Patient Position:    Lying   Pulse: 73 67 75 74   Resp: 18 18  18   Temp:    97.7 °F (36.5 °C)   TempSrc:    Oral   SpO2: 92% 92%  96%   Weight:       Height:            Intake/Output last 3 shifts:  I/O last 3 completed shifts:  In: 720 [P.O.:720]  Out: 2150 [Urine:2150]  Intake/Output this shift:  I/O this shift:  In: 480 [P.O.:480]  Out: 400 [Urine:400]      Radiology  Imaging Results (Last 24 Hours)     ** No results found for the last 24 hours. **          Labs:  Results from last 7 days   Lab Units 11/05/22  0819   WBC 10*3/mm3 13.80*   HEMOGLOBIN g/dL 8.9*   HEMATOCRIT % 30.2*   PLATELETS 10*3/mm3 499*     Results from last 7 days   Lab Units 11/05/22  0819   SODIUM mmol/L 138   POTASSIUM mmol/L 3.1*   CHLORIDE mmol/L 94*   CO2 mmol/L 33.0*   BUN mg/dL 14   CREATININE mg/dL 0.73   CALCIUM mg/dL 8.5*   BILIRUBIN mg/dL 0.2   ALK PHOS U/L 138*   ALT (SGPT) U/L 18   AST (SGOT) U/L 17   GLUCOSE mg/dL 189*     Results from last 7 days   Lab Units 11/05/22  0953   PH, ARTERIAL pH units 7.570*   PO2 ART mm Hg 60.9*   PCO2, ARTERIAL mm Hg 35.4   HCO3 ART mmol/L 32.4*     Results from last 7 days   Lab Units 11/05/22  0819   ALBUMIN g/dL 2.10*     Results from last 7 days   Lab Units 11/01/22  0335 10/31/22  1734 10/31/22  0558   TROPONIN T ng/mL 0.057* 0.064* 0.058*             Results from last 7 days   Lab Units 11/04/22  0821   INR  1.08     Results from last 7 days   Lab Units 10/31/22  1734   TSH uIU/mL 10.810*           Meds:   SCHEDULE  ARIPiprazole, 2 mg, Oral, Nightly  aspirin, 81 mg, Oral, Daily  clopidogrel, 300 mg, Oral, Once   And  clopidogrel, 75 mg, Oral, Daily  divalproex, 500 mg, Oral, BID  docusate sodium, 100 mg, Oral, BID  ferrous sulfate, 324 mg, Oral, BID With Meals  furosemide, 20 mg, Intravenous, Q12H  insulin glargine, 15 Units, Subcutaneous, QAM  insulin lispro, 3-14 Units, Subcutaneous, TID With Meals  ipratropium-albuterol, 3 mL, Nebulization, BID - RT  levothyroxine, 50 mcg, Oral, Daily  losartan, 25 mg, Oral, Q24H  magnesium oxide, 400 mg, Oral, Daily  metoprolol succinate XL, 25 mg, Oral, Q24H  pantoprazole, 40 mg, Oral,  QAM  piperacillin-tazobactam, 3.375 g, Intravenous, Q8H  polyethylene glycol, 17 g, Oral, Daily  rosuvastatin, 20 mg, Oral, Daily  Scopolamine, 1 patch, Transdermal, Q72H      Infusions     PRNs  •  acetaminophen  •  acetaminophen  •  acetaminophen  •  Calcium Gluconate-NaCl **AND** calcium gluconate **AND** Calcium, Ionized  •  dextrose  •  dextrose  •  glucagon (human recombinant)  •  glucagon (human recombinant)  •  ipratropium-albuterol  •  LORazepam  •  magnesium sulfate **OR** magnesium sulfate **OR** magnesium sulfate  •  phenol  •  potassium & sodium phosphates **OR** potassium & sodium phosphates  •  potassium chloride  •  potassium chloride  •  [COMPLETED] Insert peripheral IV **AND** sodium chloride    Physical Exam:  Physical Exam  Vitals reviewed.   Pulmonary:      Effort: Pulmonary effort is normal.      Breath sounds: Rhonchi and rales present.   Skin:     General: Skin is warm and dry.   Neurological:      Mental Status: She is alert.         ROS  Review of Systems   Respiratory: Positive for cough and shortness of breath.    Neurological: Positive for weakness.       I have reviewed the patient's new clinical results.    Electronically signed by Shanique Sorto MD

## 2022-11-05 NOTE — PROGRESS NOTES
[facility] Daily Progress Note    Patient Name: Laura Perkins  : 1940  MRN: [id number]  Primary Care Physician:  Beka Weir MD  Date of admission: 10/23/2022      Subjective          Brief interim history:       84-year-old female with known history of cerebrovascular accident, T2DM, hypertension, HLD, bipolar disorder, anemia, S/p bladder surgery,  and history of colon cancer,S/p resection (10/22) . S/p recent colonoscopy which revealed 5 cm mass.  Patient underwent transverse colon resection at [facility] and pathology revealed invasive mucinous adenocarcinoma.  She was subsequently discharged to a local rehab facility on 10/19/2022.  Patient presented to [facility] ED on 10/23/2022 due to 2-day history of generalized body weakness, acute mental status changes/metabolic encephalopathy.  On presentation, she was noted with fever of 102 and tachycardic.  Blood cultures (10/23) which result pending and started on broad-spectrum antibiotic with Zosyn.     10/25/2022: Seen and examined and follow up.  Resting comfortably in bed in no distress or discomfort.      10/26/2022: Seen and examined in follow-up.  Resting comfortably.    10/27/2022: Seen and examined in follow-up.  Event noted for reported patient slipping out of bed last evening with no visible injury on examination.    10/28/2022:  Pt had a cardiac cath with DANIA to LAD.  Psych was consulted to help adjust meds.  Pt is off psych meds secondary to QTc prolongation.  Abilify increase, as well as Depakote for mood stabilization.  Cardiology following with QTc changes as well as EKG.      10/29/2022:  Pt is seen by psych and her meds are titrated.  Pt does not communicate with me and is repeating the words I a saying to her.  Pt is on 2 lit NC that can be titrated down.  Pt will be discharged when her psych meds are adjusted and she is back to her baseline.    10/30/2022:  Pt is very wheezy today and   states, pt is coughing very thick sputum.  Pt is very confused and not reliable historian.  She denies complains.  Pt is on 2 lit NC, and she oxygenating well, but her wheeze is louder today.  Pt think that her new psych meds, are improving her condition.      10/31/2022.  Patient was seen and examined.  Patient's family complained restless legs last night.      11/1/2022.  Patient was seen and examined.  Patient reported no new symptoms.      11/2/2022.  Patient was seen and examined.  Patient's family reported moderate improvement in her symptoms.      11/3/2022.  Patient was seen and examined.  Patient's family reported that patient was coughing all night.  Pulmonary is following.       11/04/2022.  Patient was seen and examined.  Patient's family reported significant improvement in patient's symptoms.      11/05/2022.  Patient was seen and examined.  Patient's family reported no overnight events.  Patient was noted to be coughing.  Tessalon Perles was ordered.          Objective      Vitals:   Temp:  [97.3 °F (36.3 °C)-98.5 °F (36.9 °C)] 97.7 °F (36.5 °C)  Heart Rate:  [63-85] 74  Resp:  [18-20] 18  BP: (102-169)/(42-89) 169/48  Flow (L/min):  [4-6] 6    Physical Exam  Vitals reviewed.   Constitutional:       Comments: Patient is lying comfortably in bed and in no acute distress.  Family is at the bedside.   HENT:      Head: Normocephalic and atraumatic.      Nose: Nose normal. No congestion or rhinorrhea.      Mouth/Throat:      Mouth: Mucous membranes are moist.      Pharynx: Oropharynx is clear. No oropharyngeal exudate or posterior oropharyngeal erythema.   Eyes:      Pupils: Pupils are equal, round, and reactive to light.   Cardiovascular:      Pulses: Normal pulses.      Heart sounds: Normal heart sounds. No murmur heard.    No friction rub. No gallop.      Comments: S1 and S2 present.  No tachycardia.  Pulmonary:      Effort: No respiratory distress.      Breath sounds: No wheezing, rhonchi or rales.       Comments: Decreased air entry bilaterally.  Mild rhonchi.  Chest:      Chest wall: No tenderness.   Abdominal:      General: Abdomen is flat. Bowel sounds are normal. There is no distension.      Palpations: Abdomen is soft. There is no mass.      Tenderness: There is no abdominal tenderness. There is no right CVA tenderness, left CVA tenderness, guarding or rebound.      Hernia: No hernia is present.   Musculoskeletal:         General: No swelling, tenderness, deformity or signs of injury.      Cervical back: Neck supple. No tenderness.      Right lower leg: No edema.      Left lower leg: No edema.   Skin:     Capillary Refill: Capillary refill takes less than 2 seconds.      Coloration: Skin is not jaundiced.      Findings: No bruising, lesion or rash.   Neurological:      Mental Status: She is alert.      Comments: No facial asymmetry noted.  Gait and station not tested.   Psychiatric:      Comments: No agitation.              Result Review    Result Review:  I have personally reviewed the results from the time of this admission to 11/5/2022 11:51 EDT and agree with these findings:  [x]  Laboratory  []  Microbiology  [x]  Radiology  []  EKG/Telemetry   [x]  Cardiology/Vascular   []  Pathology  [x]  Old records  []  Other:      Status: Completed Collected: 11/02/22 1224    Updated: 11/02/22 1228   Narrative:     DATE OF EXAM:   11/2/2022 11:00 AM       PROCEDURE:   XR ABDOMEN KUB-       INDICATIONS:   Vomiting; I21.4-Non-ST elevation (NSTEMI) myocardial infarction;   R41.82-Altered mental status, unspecified; R50.9-Fever, unspecified;   J18.9-Pneumonia, unspecified organism; R77.8-Other specified   abnormalities of plasma proteins; E11.9-Type 2 diabetes mellitus without   complications; Z79.4-Long term (current) use of insulin;   E78.5-Hyperlipidemia, unspecified       COMPARISON:   CT chest 10/30/2022, CT abdomen and pelvis without contrast 10/24/2022       TECHNIQUE:   Radiographic view(s) of the abdomen  obtained.       FINDINGS:   Included lower lungs are abnormal, better, better evaluated on recent   chest CT. No abnormal small bowel distention is seen in a pattern to   suggest small bowel obstruction, although there is a nonspecific paucity   of gas-filled small bowel loops. Stool is seen in the ascending and   transverse colon.       Impression:     Nonspecific, nonobstructive bowel gas pattern.       Electronically Signed By-Lora Monk MD On:11/2/2022 12:26 PM   This report was finalized on [id number] by  Lora Monk MD.             Wounds (last 24 hours)     LDA Wound     Row Name 11/05/22 0800 11/05/22 0400 11/05/22 0000       Wound 10/24/22 1344  medial sacral spine Pressure Injury    Wound - Properties Group Placement Date: 10/24/22  -MG Placement Time: 1344  -MG Present on Hospital Admission: Y  -MG Side: --  -MG, middle  Orientation: medial  - Location: sacral spine  -MG Primary Wound Type: Pressure inj  -MG    Pressure Injury Stage -- -- DTPI  -BE    Dressing Appearance open to air  -BM -- open to air  -BE    Closure Open to air  -BM Approximated  -BE Approximated  -BE    Base pink  -BM pink  -BE pink  -BE    Drainage Amount none  -BM -- --    Dressing Care -- open to air  -BE open to air  -BE    Periwound Care -- dry periwound area maintained  -BE dry periwound area maintained  -BE    Retired Wound - Properties Group Placement Date: 10/24/22  -MG Placement Time: 1344  -MG Present on Hospital Admission: Y  -MG Side: --  -MG, middle  Orientation: medial  - Location: sacral spine  -MG Primary Wound Type: Pressure inj  -MG    Retired Wound - Properties Group Date first assessed: 10/24/22  -MG Time first assessed: 1344  -MG Present on Hospital Admission: Y  -MG Side: --  -MG, middle  Location: sacral spine  -MG Primary Wound Type: Pressure inj  -MG       Wound 10/24/22 1348 medial perineum    Wound - Properties Group Placement Date: 10/24/22  -MG Placement Time: 1348  -MG Present on Hospital  Admission: Y  -MG Orientation: medial  -MG Location: perineum  -MG    Pressure Injury Stage -- -- DTPI  -BE    Dressing Appearance open to air  -BM open to air  -BE open to air  -BE    Closure Open to air  -BM Open to air  -BE Open to air  -BE    Base moist;red  -BM red  -BE red  -BE    Care, Wound barrier applied  -BM -- barrier applied  -BE    Dressing Care -- open to air  -BE open to air  -BE    Periwound Care -- dry periwound area maintained  -BE dry periwound area maintained  -BE    Retired Wound - Properties Group Placement Date: 10/24/22  -MG Placement Time: 1348  -MG Present on Hospital Admission: Y  -MG Orientation: medial  -MG Location: perineum  -MG    Retired Wound - Properties Group Date first assessed: 10/24/22  -MG Time first assessed: 1348  -MG Present on Hospital Admission: Y  -MG Location: perineum  -MG    Row Name 11/04/22 1957 11/04/22 1600 11/04/22 1200       Wound 10/24/22 1344  medial sacral spine Pressure Injury    Wound - Properties Group Placement Date: 10/24/22  -MG Placement Time: 1344  -MG Present on Hospital Admission: Y  -MG Side: --  -MG, middle  Orientation: medial  -MG Location: sacral spine  -MG Primary Wound Type: Pressure inj  -MG    Pressure Injury Stage DTPI  -BE -- --    Dressing Appearance open to air  -BE -- open to air  -BM    Closure Approximated  -BE -- --    Base pink  -BE -- --    Drainage Amount -- none  -BM none  -BM    Care, Wound soap and water;cleansed with  -BE -- --    Dressing Care open to air  -BE -- --    Periwound Care dry periwound area maintained  -BE -- --    Retired Wound - Properties Group Placement Date: 10/24/22  -MG Placement Time: 1344  -MG Present on Hospital Admission: Y  -MG Side: --  -MG, middle  Orientation: medial  -MG Location: sacral spine  -MG Primary Wound Type: Pressure inj  -MG    Retired Wound - Properties Group Date first assessed: 10/24/22  -MG Time first assessed: 1344  -MG Present on Hospital Admission: Y  -MG Side: --  -MG, middle   Location: sacral spine  -MG Primary Wound Type: Pressure inj  -MG       Wound 10/24/22 1348 medial perineum    Wound - Properties Group Placement Date: 10/24/22  -MG Placement Time: 1348  -MG Present on Hospital Admission: Y  -MG Orientation: medial  -MG Location: perineum  -MG    Pressure Injury Stage DTPI  -BE -- --    Dressing Appearance open to air  -BE -- open to air  -BM    Closure -- Open to air  -BM Open to air  -BM    Base red  -BE red;moist  -BM red;moist  -BM    Drainage Amount -- none  -BM none  -BM    Care, Wound barrier applied  -BE -- barrier applied  -BM    Dressing Care open to air  -BE -- --    Periwound Care dry periwound area maintained  -BE -- --    Retired Wound - Properties Group Placement Date: 10/24/22  -MG Placement Time: 1348  -MG Present on Hospital Admission: Y  -MG Orientation: medial  -MG Location: perineum  -MG    Retired Wound - Properties Group Date first assessed: 10/24/22  -MG Time first assessed: 1348  -MG Present on Hospital Admission: Y  -MG Location: perineum  -MG          User Key  (r) = Recorded By, (t) = Taken By, (c) = Cosigned By    Initials Name Provider Type    Nancy Dean RN Registered Nurse    Ilda Torres RN Registered Nurse    BE Tino Campa LPN Licensed Nurse                  Assessment & Plan    From previous notes and with minor updates.    Brief patient summary:    Patient is a 82 y.o. female with past medical history of bipolar 1 disorder, colon cancer, diabetes mellitus type 2, hypothyroidism, hypertension, hyperlipidemia, GERD, anemia and a stroke unknown who presented to [facility] on 10/23/2022 upon transfer from [facility] rehab where she had been staying since 10/19/2022 after a transverse colon resection for 5 cm mass found on colonoscopy,  at [facility] on 10/18/2022.  Pathology report in progress.  Family in [facility] reported progressive weakness lethargy and alteration in mental status  over the past 2 days.She appears frail, will awaken to voice and answer but falls back to sleep. She is oriented to self and follows simple commands.  and family at bedside assisting with history .  There is no slurred speech or facial droop.  No focal deficits.  She denies headache.  She does report some chest pain.family reports shortness of air and cough productive of brown sputum.  Temperature was 102 on admission, BP was stable she was tachycardic and tachypneic.  She does not normally use home oxygen and is now stable on 4 L via nasal cannula.  She triggered sepsis.  WBCs 13.3, lactic acid normal at 0.8 procalcitonin elevated at 0.32.  Urinalysis was negative for infection.  Chest x-ray per radiology indicated a possible small left midlung infiltrate and bronchitis.  She was started on IV vancomycin and IV cefepime in ED for hospital-acquired pneumonia.  Family reports no past medical history of heart failure or coronary artery disease.  Troponin mildly elevated at 0.87.  Patient reported chest pain but  could give no other details.  May be secondary to tachycardia and chest pain pleuritic, however serial troponins and continuous cardiac monitoring have been ordered. EKG shows no acute ST elevation. and family report she is a DNR with no CPR however they agree to intubation if needed and full support otherwise.  Creatinine mildly elevated at 1.13 BUN 33.    Family reports no history of chronic renal failure.PMH includes hypothyroidism, Bipolar disorder, Diabetes Mellitus type 2 with glucose today 439 and overactive bladder post re-suspension with mesh.  She was given an IV fluid bolus in ED and will be admitted for antibiotics for possible pneumonia with blood cultures pending.  Family concerned about possible aspiration.  He reports she had her esophagus stretched a few months ago and underwent a swallow study prior to stretching which showed esophageal dysmotility. They noticed recently after  her being intubated for surgery she spits up food.  Speech has been consulted.  Aspiration precautions in place.          ARIPiprazole, 2 mg, Oral, Nightly  aspirin, 81 mg, Oral, Daily  clopidogrel, 300 mg, Oral, Once   And  clopidogrel, 75 mg, Oral, Daily  divalproex, 500 mg, Oral, BID  docusate sodium, 100 mg, Oral, BID  ferrous sulfate, 324 mg, Oral, BID With Meals  furosemide, 20 mg, Intravenous, Q12H  insulin glargine, 15 Units, Subcutaneous, QAM  insulin lispro, 3-14 Units, Subcutaneous, TID With Meals  ipratropium-albuterol, 3 mL, Nebulization, BID - RT  levothyroxine, 50 mcg, Oral, Daily  losartan, 25 mg, Oral, Q24H  magnesium oxide, 400 mg, Oral, Daily  metoprolol succinate XL, 25 mg, Oral, Q24H  pantoprazole, 40 mg, Oral, QAM  piperacillin-tazobactam, 3.375 g, Intravenous, Q8H  polyethylene glycol, 17 g, Oral, Daily  rosuvastatin, 20 mg, Oral, Daily  Scopolamine, 1 patch, Transdermal, Q72H             Active Hospital Problems:  Active Hospital Problems    Diagnosis    • **Altered mental status, unspecified altered mental status type    • HAP (hospital-acquired pneumonia)    • Acute respiratory distress    • Elevated troponin    • Sepsis (HCC)    • Type 2 diabetes mellitus (HCC)    • Anxiety associated with depression    • Hypothyroidism (acquired)    • OAB (overactive bladder)    • GERD without esophagitis    • Acute non-ST elevation myocardial infarction (NSTEMI) ([facility])    • Dyslipidemia    • Cancer of transverse colon ([facility])           Assessment/plan:    -Continue appropriate patient's home medications for other chronic medical conditions.  -Continue the present level of care.  -Patient and family agreed with the plan of care.      Cough.  -Improving.  -Treat with Tessalon Perles.  -Follow pulmonary recommendations.     Acute toxic metabolic encephalopathy/AMS      -resolving, and likely multifactorial.  Psych started pt on Abilify and adjusted Depakote for mood stabilization.       -Pt is still confused,  and can't give clear ROS.  Meds to be adjusted by Psych.     Probable sepsis      -off Zosyn, now on Augmentin      -Duo nebs and Mucomyst nebs added for thick secretions and wheezing     Pneumonia involving the left lung      -Continue Augmentin, and supportive care      NSTEMI/Elevated troponin       -scheduled for C (10/27), DANIA to LAD.       -Cp free and in the setting of suspected sepsis     History of colorectal cancer (invasive mucinous adenocarcinoma)      -S/p resection (10/22)     Dysphagia      -followed by speech pathologist      CVA      -Aspirin/Crestor     T2DM      -SSI/Lantus     Hypertension     HLD     -Crestor     Hypothyroidism      -Levothyroxine     Depression/anxiety      -Risperidone on hold 2/2 increase QT prolongation and consult Psychiatrist        -Depakote dose adjustment and initiation of Abilify, per Psych.      -Pt is still not at baseline, and is not communicating well, and can't give ROS.       DVT prophylaxis:  Mechanical DVT prophylaxis orders are present.    CODE STATUS:    Code Status (Patient has no pulse and is not breathing): No CPR (Do Not Attempt to Resuscitate)  Medical Interventions (Patient has pulse or is breathing): Full Support       Disposition: This wonderful patient can discharge in the next 24 hours.      Electronically signed by Alejandro Estrada MD, FACP, 11/05/22, 11:51 EDT.        [facility]ist Team

## 2022-11-05 NOTE — PROGRESS NOTES
Cardiology Progress Note    Patient Identification:  Name: Laura Perkins  Age: 82 y.o.  Sex: female  :  1940  MRN: [id number]                 Follow Up / Chief Complaint: Elevated troponin, non stemi   Chief Complaint   Patient presents with   • Altered Mental Status       Interval History:  Patient presented from outlying facility with pneumonia.  Noted to have elevated troponin   Patient underwent cardiac cath 10/27/2022 with stent to proximal LAD.                Subjective: Patient seen and examined.  Chart reviewed.  Labs reviewed.  Discussed with RN taking care of patient.  Events noted.  Patient reportedly went into respiratory distress and edema 10/30/2022 with elevated proBNP of 23,000.  2022: Patient is feeling whole lot better no edema.      Objective: Serial troponin 0.872, 0.653, 0.793, 0.546, 0.373  10/26/2022: troponin 0.300, 0.256  Glucose 136, sodium 148 WBC 13.4, hgb 9.0  10/27/2022: troponin 0.175, WBC 12.9 hgb 8.8 EKG with diffuse T wave abnormalities and QTC prolongation   10/28/2022: troponin 0.136, glucose 215, WBC 12.2   hgb 8.6  10/30/2022: troponin 0.067, pro bnp [id number]  10/31/2022: troponin 0.05, glucose 190, creatinine 0.51  WBC 12.2, hgb 8.4  2022:  Troponin 0.057, glucose 185  2022: no labs to review       History of Present Illness:        Mrs. Laura Perkins has a Past medical history of      - hypertension  - dyslipidemia   - diabetes   - anemia, GERD  - stroke   - bipolar disorder  - Colon cancer- s/p resection 10/18/2022  - bladder surgery, eye surgery, hysterectomy, hemorrhoidectomy   -  Non smoker  - no allergies  - family history of heart disease in mother         Presented from rehab center to the ED on 10/24/2022 for confusion/alerted mental status.  Patient was recently admitted to [facility] for transverse colon resection on 10/18/2022.  Patient was discharged to rehab. In the ED patient was noted to have fever 102. CXR showed left  sided pneumonia and patient was started on IVFs and antibiotics.  Cardiology has been consulted for elevated troponin.   Patient alert and oriented and gives some history but is poor historian and most of information above was obtained from chart review.  She denies any chest pain but does state she is short of breath and coughing.  RN reported patient aspirated and had swallow study now on thickened liquid.      Serial troponins have been elevated but non-trending 0.872, 0.653, 0.793, 0.546, 0.373  EKG showed sinus tachycardia with diffuse T wave inversion that is changed from previous EKG on 10/11/2022 that showed normal sinus rhythm without any ST abnormalities.       Assessment:  :     Acute non-ST elevation MI  Presumed CAD  Hypertension, dyslipidemia, diabetes  Recent colon cancer resection 10/18/2022  Fever, leukocytosis  Altered mental status  Prolonged QTC on psychiatric medications   CAD, NSTEMI, PCI  Acute HFrEF due to systolic dysfunction from ischemic cardiomyopathy        Recommendations / Plan:         Telemetry is revealing sinus rhythm  Monitor renal function and hemoglobin and urine output  proBNP is improved from 23,855-14,071  Continue diuresis as tolerated  Echo is revealing moderate LV dysfunction  Continue to monitor EKG and QTC.  EKG done 10/31/2022 reviewed/interpreted by me reveals sinus tachycardia with rate of 102 bpm with QT of 382 ms and QTC of 493 ms  Increase activity as tolerated  Continue aspirin, clopidogrel, losartan, metoprolol succinate, rosuvastatin as tolerated  Speech therapy for swallow evaluation again to make sure she is not aspirating  Discussed with patient's  by bedside  Patient has significant improvement in symptoms we will continue diuresis and medical management  Patient is not having any symptoms at this time of chest pain but will need physical and occupational therapy to help her with mobility and ambulation  Patient had a echocardiogram which showed small  pericardial effusion but also a EF of about 45 to 50% and is on beta-blockers and losartan.  Blood pressure and heart rate are stable         Past Medical History:  Past Medical History:   Diagnosis Date   • Acid reflux    • Anemia    • Anemia    • Bipolar 1 disorder (HCC)    • Colon cancer (HCC)    • Diabetes mellitus (HCC)     Type 2   • Disease of thyroid gland    • Hyperlipidemia    • Hypertension     TAKEN OFF HTN MEDS OVER 5 YEARS AGO   • Incontinence of urine    • Stroke (HCC)     2012     Past Surgical History:  Past Surgical History:   Procedure Laterality Date   • BLADDER SURGERY     • CARDIAC CATHETERIZATION N/A 10/27/2022    Procedure: Left Heart Cath, possible pci;  Surgeon: Andrea Melvin MD;  Location:  [facility];  Service: Cardiovascular;  Laterality: N/A;   • COLON RESECTION N/A 10/18/2022    Procedure: COLON RESECTIOn TRANSVERSE LAPAROSCOPIC;  Surgeon: Toño Michelle MD;  Location: [facility] MAIN OR;  Service: General;  Laterality: N/A;   • COLONOSCOPY  09/21/2022    [facility]   • ENDOSCOPY  09/21/2022    [facility]   • EYE SURGERY     • HEMORRHOIDECTOMY     • HYSTERECTOMY          Social History:   Social History     Tobacco Use   • Smoking status: Never   • Smokeless tobacco: Never   Substance Use Topics   • Alcohol use: Never      Family History:  Family History   Problem Relation Age of Onset   • Heart disease Mother    • Diabetes Mother    • No Known Problems Father    • Cancer Brother           Allergies:  No Known Allergies  Scheduled Meds:  ARIPiprazole, 2 mg, Nightly  aspirin, 81 mg, Daily  clopidogrel, 300 mg, Once   And  clopidogrel, 75 mg, Daily  divalproex, 500 mg, BID  docusate sodium, 100 mg, BID  ferrous sulfate, 324 mg, BID With Meals  [START ON 11/6/2022] furosemide, 20 mg, Daily  guaiFENesin, 600 mg, Q12H  insulin glargine, 15 Units, QAM  insulin lispro, 3-14 Units, TID With Meals  ipratropium-albuterol, 3 mL, BID -  "RT  levothyroxine, 50 mcg, Daily  losartan, 25 mg, Q24H  magnesium oxide, 400 mg, Daily  metoprolol succinate XL, 25 mg, Q24H  pantoprazole, 40 mg, QAM  piperacillin-tazobactam, 3.375 g, Q8H  polyethylene glycol, 17 g, Daily  rosuvastatin, 20 mg, Daily  Scopolamine, 1 patch, Q72H  spironolactone, 25 mg, Daily          Review of Systems:   Review of Systems   Constitutional: Negative for fever and malaise/fatigue.   HENT: Negative for ear pain and nosebleeds.    Eyes: Negative for blurred vision and double vision.   Cardiovascular: Negative for chest pain, dyspnea on exertion and palpitations.   Respiratory: Negative for cough and shortness of breath.    Skin: Negative for rash.   Musculoskeletal: Negative for joint pain.   Gastrointestinal: Negative for abdominal pain, nausea and vomiting.   Neurological: Negative for focal weakness and headaches.   Psychiatric/Behavioral: Negative for depression. The patient is not nervous/anxious.    All other systems reviewed and are negative.           Constitutional:  Temp:  [97.3 °F (36.3 °C)-98.5 °F (36.9 °C)] 97.7 °F (36.5 °C)  Heart Rate:  [63-85] 74  Resp:  [18-20] 18  BP: (102-169)/(45-89) 169/48    Physical Exam   /48 (BP Location: Left arm, Patient Position: Lying)   Pulse 74   Temp 97.7 °F (36.5 °C) (Oral)   Resp 18   Ht 162.6 cm (64\")   Wt 65.4 kg (144 lb 2.9 oz)   SpO2 96%   BMI 24.75 kg/m²   General:  Appears in no acute distress- frail and chronically ill   Eyes: Sclera is anicteric,  conjunctiva is clear   HEENT:  No JVD. Thyroid not visibly enlarged. No mucosal pallor or cyanosis  Respiratory: Respirations regular and unlabored at rest.  Decreased breath sounds at bilateral bases with scattered rhonchi  Cardiovascular: S1,S2 Regular rate and rhythm. No murmur, rub or gallop auscultated.  . No pretibial pitting edema  Gastrointestinal: Abdomen nondistended.  Musculoskeletal:  No abnormal movements  Extremities: No digital clubbing or cyanosis  Skin: " Color pink. Skin warm and dry to touch. No rashes  No xanthoma  Neuro: Alert and awake, no lateralizing deficits appreciated    INTAKE AND OUTPUT:    Intake/Output Summary (Last 24 hours) at 11/5/2022 1412  Last data filed at 11/5/2022 1354  Gross per 24 hour   Intake 1320 ml   Output 1600 ml   Net -280 ml       Cardiographics  Telemetry: sinus rhythm     ECG:   ECG 12 Lead QT Measurement   Preliminary Result   HEART RATE= 74  bpm   RR Interval= 812  ms   WI Interval= 115  ms   P Horizontal Axis= -74  deg   P Front Axis= -14  deg   QRSD Interval= 92  ms   QT Interval= 516  ms   QRS Axis= 25  deg   T Wave Axis= 196  deg   - ABNORMAL ECG -   Sinus rhythm   Abnrm T, probable ischemia, anterolateral lds   Prolonged QT interval   When compared with ECG of 03-Nov-2022 8:17:18,   Nonspecific significant change   Electronically Signed By:    Date and Time of Study: 2022-11-05 09:56:04      ECG 12 Lead QT Measurement   Preliminary Result   HEART RATE= 76  bpm   RR Interval= 808  ms   WI Interval= 155  ms   P Horizontal Axis= -14  deg   P Front Axis= 53  deg   QRSD Interval= 96  ms   QT Interval= 477  ms   QRS Axis= 19  deg   T Wave Axis= 183  deg   - ABNORMAL ECG -   Sinus rhythm   Atrial premature complex   Abnrm T, consider ischemia, anterolateral lds   Prolonged QT interval   When compared with ECG of 31-Oct-2022 8:53:04,   Significant rate decrease   Significant repolarization change   Electronically Signed By:    Date and Time of Study: 2022-11-03 08:17:18      ECG 12 Lead QT Measurement   Preliminary Result   HEART RATE= 102  bpm   RR Interval= 600  ms   WI Interval= 157  ms   P Horizontal Axis= -38  deg   P Front Axis= 23  deg   QRSD Interval= 89  ms   QT Interval= 382  ms   QRS Axis= 28  deg   T Wave Axis= 209  deg   - ABNORMAL ECG -   Sinus tachycardia   Atrial premature complexes   Nonspecific T abnormalities, diffuse leads   Electronically Signed By:    Date and Time of Study: 2022-10-31 08:53:04      ECG 12 Lead  Other; on psych medications that prolong qtc   Final Result   HEART RATE= 92  bpm   RR Interval= 656  ms   SC Interval= 154  ms   P Horizontal Axis= -11  deg   P Front Axis= 45  deg   QRSD Interval= 90  ms   QT Interval= 360  ms   QRS Axis= 20  deg   T Wave Axis= 193  deg   - ABNORMAL ECG -   Sinus rhythm   Atrial premature complex   Abnrm T, consider ischemia, anterolateral lds   When compared with ECG of 29-Oct-2022 5:08:11,   Nonspecific significant change   Electronically Signed By: Reyes Cooper ([facility]) 03-Nov-2022 11:14:28   Date and Time of Study: 2022-10-29 16:41:12      ECG 12 Lead QT Measurement   Final Result   HEART RATE= 78  bpm   RR Interval= 772  ms   SC Interval= 139  ms   P Horizontal Axis= 4  deg   P Front Axis= 42  deg   QRSD Interval= 87  ms   QT Interval= 422  ms   QRS Axis= 32  deg   T Wave Axis= 212  deg   - ABNORMAL ECG -   Sinus rhythm   Abnormal T, consider ischemia, diffuse leads   When compared with ECG of 28-Oct-2022 5:26:46,   New or worsened ischemia or infarction   Significant repolarization change   Electronically Signed By: Reyes Cooper ([facility]) 03-Nov-2022 10:47:40   Date and Time of Study: 2022-10-29 05:08:11      SCANNED - TELEMETRY     Final Result      ECG 12 Lead   Preliminary Result   HEART RATE= 78  bpm   RR Interval= 772  ms   SC Interval= 129  ms   P Horizontal Axis= -38  deg   P Front Axis= 2  deg   QRSD Interval= 90  ms   QT Interval= 469  ms   QRS Axis= 37  deg   T Wave Axis= 232  deg   - ABNORMAL ECG -   Sinus rhythm   Abnormal T, suspect prox. LAD occlu. or CVA   Prolonged QT interval   When compared with ECG of 27-Oct-2022 10:26:26,   Significant repolarization change   Electronically Signed By:    Date and Time of Study: 2022-10-28 05:26:46      ECG 12 Lead QT Measurement   Final Result   HEART RATE= 97  bpm   RR Interval= 620  ms   SC Interval= 154  ms   P Horizontal Axis= 1  deg   P Front Axis= 43  deg   QRSD Interval= 92  ms   QT Interval= 474  ms    QRS Axis= 32  deg   T Wave Axis= 217  deg   - ABNORMAL ECG -   Sinus rhythm   Abnormal T, probable ischemia, widespread   Prolonged QT interval   When compared with ECG of 23-Oct-2022 19:11:40,   Significant change in rhythm   Electronically Signed By: Skyler García ([facility]) 28-Oct-2022 10:11:01   Date and Time of Study: 2022-10-27 10:26:26      ECG 12 Lead   Final Result   HEART RATE= 121  bpm   RR Interval= 496  ms   DE Interval= 96  ms   P Horizontal Axis=   deg   P Front Axis= 206  deg   QRSD Interval= 77  ms   QT Interval= 382  ms   QRS Axis= 37  deg   T Wave Axis= 209  deg   - ABNORMAL ECG -   Sinus or ectopic atrial tachycardia   Abnormal T, consider ischemia, diffuse leads   Prolonged QT interval   When compared with ECG of 11-Oct-2022 14:13:27,   Significant change in rhythm: previously sinus   New or worsened ischemia or infarction   Electronically Signed By: Marino Montes ([facility]) 24-Oct-2022 14:20:30   Date and Time of Study: 2022-10-23 19:11:40      SCANNED - TELEMETRY     Final Result      SCANNED - TELEMETRY     Final Result      SCANNED - TELEMETRY     Final Result      SCANNED - TELEMETRY     Final Result      SCANNED - TELEMETRY     Final Result      SCANNED - TELEMETRY     Final Result      SCANNED - TELEMETRY     Final Result      SCANNED - TELEMETRY     Final Result      SCANNED - TELEMETRY     Final Result      SCANNED - TELEMETRY     Final Result      SCANNED - TELEMETRY     Final Result      SCANNED - TELEMETRY     Final Result      SCANNED - TELEMETRY     Final Result      SCANNED - TELEMETRY     Final Result      SCANNED - TELEMETRY     Final Result      SCANNED - TELEMETRY     Final Result      SCANNED - TELEMETRY     Final Result      SCANNED - TELEMETRY     Final Result      SCANNED - TELEMETRY     Final Result      SCANNED - TELEMETRY     Final Result      SCANNED - TELEMETRY     Final Result      SCANNED - TELEMETRY     Final Result      SCANNED - TELEMETRY     Final Result      SCANNED -  TELEMETRY     Final Result      SCANNED - TELEMETRY     Final Result      SCANNED - TELEMETRY     Final Result      SCANNED - TELEMETRY     Final Result      SCANNED - TELEMETRY     Final Result      SCANNED - TELEMETRY     Final Result      SCANNED - TELEMETRY     Final Result      SCANNED - TELEMETRY     Final Result      SCANNED - TELEMETRY     Final Result      SCANNED - TELEMETRY     Final Result      SCANNED - TELEMETRY     Final Result      SCANNED - TELEMETRY     Final Result      SCANNED - TELEMETRY     Final Result      SCANNED - TELEMETRY     Final Result      SCANNED - TELEMETRY     Final Result      SCANNED - TELEMETRY     Final Result      SCANNED - TELEMETRY     Final Result      SCANNED - TELEMETRY     Final Result      SCANNED - TELEMETRY     Final Result      SCANNED - TELEMETRY     Final Result      SCANNED - TELEMETRY     Final Result      SCANNED - TELEMETRY     Final Result      SCANNED - TELEMETRY     Final Result      SCANNED - TELEMETRY     Final Result      SCANNED - TELEMETRY     Final Result      SCANNED - TELEMETRY     Final Result      SCANNED - TELEMETRY     Final Result      SCANNED - TELEMETRY     Final Result      ECG 12 Lead QT Measurement    (Results Pending)   ECG 12 Lead QT Measurement    (Results Pending)     I have personally reviewed EKG    Echocardiogram: Results for orders placed during the hospital encounter of 10/23/22    Adult Transthoracic Echo Limited W/ Cont if Necessary Per Protocol    Interpretation Summary  Normal LV size.  Mild apical hypokinesis seen.  Estimated LV ejection fraction of 45 to 50%  Normal RV size  Normal atrial size  Aortic valve, mitral valve, tricuspid valve appears structurally normal, no significant regurgitation seen.  Small pericardial effusion seen.  No signs of increased intrapericardial pressure  Proximal aorta appears normal in size.      Lab Review   I have reviewed the labs  Results from last 7 days   Lab Units 11/01/22  6940  "10/31/22  1734 10/31/22  0558   TROPONIN T ng/mL 0.057* 0.064* 0.058*         Results from last 7 days   Lab Units 11/05/22  0819   SODIUM mmol/L 138   POTASSIUM mmol/L 3.1*   BUN mg/dL 14   CREATININE mg/dL 0.73   CALCIUM mg/dL 8.5*         Results from last 7 days   Lab Units 11/05/22  0819 11/04/22  0822 10/31/22  0348   WBC 10*3/mm3 13.80* 13.30* 12.20*   HEMOGLOBIN g/dL 8.9* 8.4* 8.4*   HEMATOCRIT % 30.2* 26.9* 28.5*   PLATELETS 10*3/mm3 499* 447 366     Results from last 7 days   Lab Units 11/04/22  0821   INR  1.08       RADIOLOGY:  Imaging Results (Last 24 Hours)     ** No results found for the last 24 hours. **                )11/5/2022  MD YARA Arteaga/Transcription:   \"Dictated utilizing Dragon dictation\".   "

## 2022-11-05 NOTE — PLAN OF CARE
Goal Outcome Evaluation:  Plan of Care Reviewed With: patient        Progress: improving  Outcome Evaluation: Pt has bee nesting throughout the shift without complaints. She has been tolerating her diet without choking/aspiration. Her cath site has remained clean, dry, and intact.

## 2022-11-05 NOTE — PROGRESS NOTES
"Subjective   Laura Perkins is a 82 y.o. female.   Seen for diabetes f/u.  Sleepy today      Objective     /43 (BP Location: Left arm, Patient Position: Lying)   Pulse 64   Temp 92.7 °F (33.7 °C) (Oral)   Resp 18   Ht 162.6 cm (64\")   Wt 65.4 kg (144 lb 2.9 oz)   SpO2 100%   BMI 24.75 kg/m²   Blood sugar  168 this am,  186 @ lunch, 2247 @ supper    ASSESSMENT    Patient is stable    PLAN    Will increase basal insulin.         Matias Santos MD  11/5/2022  18:11 EDT    "

## 2022-11-06 ENCOUNTER — APPOINTMENT (OUTPATIENT)
Dept: GENERAL RADIOLOGY | Facility: HOSPITAL | Age: 82
End: 2022-11-06

## 2022-11-06 LAB
ALBUMIN SERPL-MCNC: 2 G/DL (ref 3.5–5.2)
ALBUMIN/GLOB SERPL: 0.5 G/DL
ALP SERPL-CCNC: 121 U/L (ref 39–117)
ALT SERPL W P-5'-P-CCNC: 14 U/L (ref 1–33)
ANION GAP SERPL CALCULATED.3IONS-SCNC: 9 MMOL/L (ref 5–15)
AST SERPL-CCNC: 13 U/L (ref 1–32)
BASOPHILS # BLD AUTO: 0.1 10*3/MM3 (ref 0–0.2)
BASOPHILS NFR BLD AUTO: 0.4 % (ref 0–1.5)
BILIRUB SERPL-MCNC: 0.2 MG/DL (ref 0–1.2)
BUN SERPL-MCNC: 12 MG/DL (ref 8–23)
BUN/CREAT SERPL: 18.8 (ref 7–25)
CALCIUM SPEC-SCNC: 8.5 MG/DL (ref 8.6–10.5)
CHLORIDE SERPL-SCNC: 98 MMOL/L (ref 98–107)
CO2 SERPL-SCNC: 30 MMOL/L (ref 22–29)
CREAT SERPL-MCNC: 0.64 MG/DL (ref 0.57–1)
DEPRECATED RDW RBC AUTO: 53.4 FL (ref 37–54)
EGFRCR SERPLBLD CKD-EPI 2021: 87.8 ML/MIN/1.73
EOSINOPHIL # BLD AUTO: 0.1 10*3/MM3 (ref 0–0.4)
EOSINOPHIL NFR BLD AUTO: 0.3 % (ref 0.3–6.2)
ERYTHROCYTE [DISTWIDTH] IN BLOOD BY AUTOMATED COUNT: 18.3 % (ref 12.3–15.4)
GLOBULIN UR ELPH-MCNC: 4.1 GM/DL
GLUCOSE BLDC GLUCOMTR-MCNC: 184 MG/DL (ref 70–105)
GLUCOSE BLDC GLUCOMTR-MCNC: 258 MG/DL (ref 70–105)
GLUCOSE BLDC GLUCOMTR-MCNC: 352 MG/DL (ref 70–105)
GLUCOSE SERPL-MCNC: 231 MG/DL (ref 65–99)
HCT VFR BLD AUTO: 29 % (ref 34–46.6)
HGB BLD-MCNC: 8.6 G/DL (ref 12–15.9)
LYMPHOCYTES # BLD AUTO: 0.9 10*3/MM3 (ref 0.7–3.1)
LYMPHOCYTES NFR BLD AUTO: 5.3 % (ref 19.6–45.3)
MCH RBC QN AUTO: 23.2 PG (ref 26.6–33)
MCHC RBC AUTO-ENTMCNC: 29.8 G/DL (ref 31.5–35.7)
MCV RBC AUTO: 77.8 FL (ref 79–97)
MONOCYTES # BLD AUTO: 0.9 10*3/MM3 (ref 0.1–0.9)
MONOCYTES NFR BLD AUTO: 5 % (ref 5–12)
NEUTROPHILS NFR BLD AUTO: 15.7 10*3/MM3 (ref 1.7–7)
NEUTROPHILS NFR BLD AUTO: 89 % (ref 42.7–76)
NRBC BLD AUTO-RTO: 0.1 /100 WBC (ref 0–0.2)
PLATELET # BLD AUTO: 505 10*3/MM3 (ref 140–450)
PMV BLD AUTO: 7.4 FL (ref 6–12)
POTASSIUM SERPL-SCNC: 4.5 MMOL/L (ref 3.5–5.2)
PROT SERPL-MCNC: 6.1 G/DL (ref 6–8.5)
QT INTERVAL: 382 MS
QT INTERVAL: 469 MS
QT INTERVAL: 477 MS
RBC # BLD AUTO: 3.73 10*6/MM3 (ref 3.77–5.28)
SODIUM SERPL-SCNC: 137 MMOL/L (ref 136–145)
WBC NRBC COR # BLD: 17.6 10*3/MM3 (ref 3.4–10.8)

## 2022-11-06 PROCEDURE — 63710000001 INSULIN GLARGINE PER 5 UNITS: Performed by: INTERNAL MEDICINE

## 2022-11-06 PROCEDURE — 0 MAGNESIUM SULFATE 4 GM/100ML SOLUTION: Performed by: INTERNAL MEDICINE

## 2022-11-06 PROCEDURE — 94799 UNLISTED PULMONARY SVC/PX: CPT

## 2022-11-06 PROCEDURE — 80053 COMPREHEN METABOLIC PANEL: CPT | Performed by: INTERNAL MEDICINE

## 2022-11-06 PROCEDURE — 82962 GLUCOSE BLOOD TEST: CPT

## 2022-11-06 PROCEDURE — 94761 N-INVAS EAR/PLS OXIMETRY MLT: CPT

## 2022-11-06 PROCEDURE — 25010000002 PIPERACILLIN SOD-TAZOBACTAM PER 1 G

## 2022-11-06 PROCEDURE — 99231 SBSQ HOSP IP/OBS SF/LOW 25: CPT | Performed by: INTERNAL MEDICINE

## 2022-11-06 PROCEDURE — 85025 COMPLETE CBC W/AUTO DIFF WBC: CPT | Performed by: INTERNAL MEDICINE

## 2022-11-06 PROCEDURE — 63710000001 INSULIN LISPRO (HUMAN) PER 5 UNITS: Performed by: INTERNAL MEDICINE

## 2022-11-06 PROCEDURE — 25010000002 FUROSEMIDE PER 20 MG: Performed by: INTERNAL MEDICINE

## 2022-11-06 PROCEDURE — 71045 X-RAY EXAM CHEST 1 VIEW: CPT

## 2022-11-06 PROCEDURE — 94664 DEMO&/EVAL PT USE INHALER: CPT

## 2022-11-06 PROCEDURE — 99232 SBSQ HOSP IP/OBS MODERATE 35: CPT | Performed by: INTERNAL MEDICINE

## 2022-11-06 RX ORDER — FUROSEMIDE 10 MG/ML
20 INJECTION INTRAMUSCULAR; INTRAVENOUS EVERY 12 HOURS
Status: DISCONTINUED | OUTPATIENT
Start: 2022-11-06 | End: 2022-11-14

## 2022-11-06 RX ADMIN — DOCUSATE SODIUM 100 MG: 100 CAPSULE, LIQUID FILLED ORAL at 09:26

## 2022-11-06 RX ADMIN — FUROSEMIDE 20 MG: 10 INJECTION, SOLUTION INTRAMUSCULAR; INTRAVENOUS at 14:09

## 2022-11-06 RX ADMIN — PIPERACILLIN AND TAZOBACTAM 3.38 G: 3; .375 INJECTION, POWDER, FOR SOLUTION INTRAVENOUS at 14:12

## 2022-11-06 RX ADMIN — INSULIN GLARGINE 16 UNITS: 100 INJECTION, SOLUTION SUBCUTANEOUS at 09:27

## 2022-11-06 RX ADMIN — INSULIN LISPRO 3 UNITS: 100 INJECTION, SOLUTION INTRAVENOUS; SUBCUTANEOUS at 17:53

## 2022-11-06 RX ADMIN — LEVOTHYROXINE SODIUM 50 MCG: 50 TABLET ORAL at 05:59

## 2022-11-06 RX ADMIN — CLOPIDOGREL BISULFATE 75 MG: 75 TABLET ORAL at 09:26

## 2022-11-06 RX ADMIN — FERROUS SULFATE TAB EC 324 MG (65 MG FE EQUIVALENT) 324 MG: 324 (65 FE) TABLET DELAYED RESPONSE at 09:26

## 2022-11-06 RX ADMIN — MAGNESIUM OXIDE TAB 400 MG (241.3 MG ELEMENTAL MG) 400 MG: 400 (241.3 MG) TAB at 09:26

## 2022-11-06 RX ADMIN — LOSARTAN POTASSIUM 25 MG: 25 TABLET, FILM COATED ORAL at 09:40

## 2022-11-06 RX ADMIN — SPIRONOLACTONE 25 MG: 25 TABLET ORAL at 09:40

## 2022-11-06 RX ADMIN — FUROSEMIDE 20 MG: 20 TABLET ORAL at 09:40

## 2022-11-06 RX ADMIN — INSULIN LISPRO 12 UNITS: 100 INJECTION, SOLUTION INTRAVENOUS; SUBCUTANEOUS at 11:44

## 2022-11-06 RX ADMIN — DIVALPROEX SODIUM 500 MG: 500 TABLET, DELAYED RELEASE ORAL at 09:26

## 2022-11-06 RX ADMIN — GUAIFENESIN 600 MG: 600 TABLET, EXTENDED RELEASE ORAL at 09:26

## 2022-11-06 RX ADMIN — PIPERACILLIN AND TAZOBACTAM 3.38 G: 3; .375 INJECTION, POWDER, FOR SOLUTION INTRAVENOUS at 09:40

## 2022-11-06 RX ADMIN — IPRATROPIUM BROMIDE AND ALBUTEROL SULFATE 3 ML: .5; 3 SOLUTION RESPIRATORY (INHALATION) at 07:56

## 2022-11-06 RX ADMIN — INSULIN LISPRO 5 UNITS: 100 INJECTION, SOLUTION INTRAVENOUS; SUBCUTANEOUS at 09:26

## 2022-11-06 RX ADMIN — ASPIRIN 81 MG: 81 TABLET, COATED ORAL at 09:26

## 2022-11-06 RX ADMIN — MAGNESIUM SULFATE HEPTAHYDRATE 4 G: 40 INJECTION, SOLUTION INTRAVENOUS at 14:13

## 2022-11-06 RX ADMIN — IPRATROPIUM BROMIDE AND ALBUTEROL SULFATE 3 ML: .5; 3 SOLUTION RESPIRATORY (INHALATION) at 18:23

## 2022-11-06 RX ADMIN — METOPROLOL SUCCINATE 25 MG: 25 TABLET, FILM COATED, EXTENDED RELEASE ORAL at 09:39

## 2022-11-06 RX ADMIN — PANTOPRAZOLE SODIUM 40 MG: 40 TABLET, DELAYED RELEASE ORAL at 09:26

## 2022-11-06 NOTE — PROGRESS NOTES
Daily Progress Note        Altered mental status, unspecified altered mental status type    Cancer of transverse colon (HCC)    HAP (hospital-acquired pneumonia)    Acute respiratory distress    Elevated troponin    Sepsis (HCC)    Type 2 diabetes mellitus (HCC)    Anxiety associated with depression    Hypothyroidism (acquired)    OAB (overactive bladder)    GERD without esophagitis    Acute non-ST elevation myocardial infarction (NSTEMI) (HCC)    Dyslipidemia      Assessment      ABG noted with mixed respiratory and metabolic alkalosis  Hypokalemia  Bilateral pleural effusions worsening since cardiac stent placement  Left more than right  Bilateral lobe pneumonia possible compression atelectasis      10/2022 colon resection, invasive adenocarcinoma     10/27/2022 echo  EF 41 to 45%  RVSP less than 35 mmHg  Left ventricular wall hypokinetic    Repeat echo 10/31/2022  EF 45 to 50% with small pericardial effusion seen     10/27/2022 cardiac cath with stent to LAD     Elevated troponin on admission CVA  Hypertension  Diabetes mellitus  Hyperlipidemia  Bipolar       Recommendations:     Change Lasix to 20 mg IV every 12   Continue Aldactone 25 mg daily    Discussed with  at the bedside long-term prognosis and need for rehab    titrate oxygen,    Antibiotic  Zosyn sputum cultures negative so far   Mucomyst nebulized with albuterol    Due to recent cardiac stent and being on Plavix unable to do thoracentesis     Aspiration precautions, elevate head of bed    Singulair  GI prophylaxis Protonix  Encourage use of I-S and splinting    Discussed with  and patient        CXR 11/3/2020     CXR 10/30/2022          LOS: 14 days     Subjective         Objective     Vital signs for last 24 hours:  Vitals:    11/06/22 0536 11/06/22 0756 11/06/22 0800 11/06/22 0939   BP: 156/49   162/48   BP Location: Left arm      Patient Position: Lying      Pulse: 67 87 78 92   Resp: 20 21 21 16   Temp: 98 °F (36.7 °C)   99.1 °F (37.3  °C)   TempSrc: Oral   Axillary   SpO2: 92% 93% 96% 100%   Weight: 65.9 kg (145 lb 4.5 oz)      Height:           Intake/Output last 3 shifts:  I/O last 3 completed shifts:  In: 1790 [P.O.:840; IV Piggyback:950]  Out: 1700 [Urine:1700]  Intake/Output this shift:  I/O this shift:  In: 240 [P.O.:240]  Out: -       Radiology  Imaging Results (Last 24 Hours)     ** No results found for the last 24 hours. **          Labs:  Results from last 7 days   Lab Units 11/06/22  0745   WBC 10*3/mm3 17.60*   HEMOGLOBIN g/dL 8.6*   HEMATOCRIT % 29.0*   PLATELETS 10*3/mm3 505*     Results from last 7 days   Lab Units 11/06/22  0745   SODIUM mmol/L 137   POTASSIUM mmol/L 4.5   CHLORIDE mmol/L 98   CO2 mmol/L 30.0*   BUN mg/dL 12   CREATININE mg/dL 0.64   CALCIUM mg/dL 8.5*   BILIRUBIN mg/dL 0.2   ALK PHOS U/L 121*   ALT (SGPT) U/L 14   AST (SGOT) U/L 13   GLUCOSE mg/dL 231*     Results from last 7 days   Lab Units 11/05/22  0953   PH, ARTERIAL pH units 7.570*   PO2 ART mm Hg 60.9*   PCO2, ARTERIAL mm Hg 35.4   HCO3 ART mmol/L 32.4*     Results from last 7 days   Lab Units 11/06/22  0745 11/05/22  0819   ALBUMIN g/dL 2.00* 2.10*     Results from last 7 days   Lab Units 11/01/22  0335 10/31/22  1734 10/31/22  0558   TROPONIN T ng/mL 0.057* 0.064* 0.058*         Results from last 7 days   Lab Units 11/05/22  1716   MAGNESIUM mg/dL 1.7     Results from last 7 days   Lab Units 11/04/22  0821   INR  1.08     Results from last 7 days   Lab Units 10/31/22  1734   TSH uIU/mL 10.810*           Meds:   SCHEDULE  ARIPiprazole, 2 mg, Oral, Nightly  aspirin, 81 mg, Oral, Daily  clopidogrel, 75 mg, Oral, Daily  divalproex, 500 mg, Oral, BID  docusate sodium, 100 mg, Oral, BID  ferrous sulfate, 324 mg, Oral, BID With Meals  furosemide, 20 mg, Oral, Daily  guaiFENesin, 600 mg, Oral, Q12H  insulin glargine, 16 Units, Subcutaneous, QAM  insulin lispro, 3-14 Units, Subcutaneous, TID With Meals  ipratropium-albuterol, 3 mL, Nebulization, BID -  RT  levothyroxine, 50 mcg, Oral, Daily  losartan, 25 mg, Oral, Q24H  magnesium oxide, 400 mg, Oral, Daily  metoprolol succinate XL, 25 mg, Oral, Q24H  pantoprazole, 40 mg, Oral, QAM  piperacillin-tazobactam, 3.375 g, Intravenous, Q8H  polyethylene glycol, 17 g, Oral, Daily  rosuvastatin, 20 mg, Oral, Daily  Scopolamine, 1 patch, Transdermal, Q72H  spironolactone, 25 mg, Oral, Daily      Infusions     PRNs  •  acetaminophen  •  acetaminophen  •  acetaminophen  •  benzonatate  •  Calcium Gluconate-NaCl **AND** calcium gluconate **AND** Calcium, Ionized  •  dextrose  •  dextrose  •  glucagon (human recombinant)  •  ipratropium-albuterol  •  LORazepam  •  magnesium sulfate **OR** magnesium sulfate **OR** magnesium sulfate  •  phenol  •  potassium & sodium phosphates **OR** potassium & sodium phosphates  •  potassium chloride  •  potassium chloride  •  [COMPLETED] Insert peripheral IV **AND** sodium chloride    Physical Exam:  Physical Exam  Vitals reviewed.   Pulmonary:      Effort: Pulmonary effort is normal.      Breath sounds: Rhonchi and rales present.   Skin:     General: Skin is warm and dry.   Neurological:      Mental Status: She is alert.         ROS  Review of Systems   Respiratory: Positive for cough and shortness of breath.    Neurological: Positive for weakness.       I have reviewed the patient's new clinical results.    Electronically signed by Shanique Sorto MD

## 2022-11-06 NOTE — PLAN OF CARE
Problem: Pain Acute  Goal: Acceptable Pain Control and Functional Ability  Intervention: Prevent or Manage Pain  Recent Flowsheet Documentation  Taken 11/6/2022 0441 by Heather Velez RN  Medication Review/Management: medications reviewed  Taken 11/6/2022 0200 by Heather Velez RN  Medication Review/Management: medications reviewed  Taken 11/6/2022 0030 by Heather Velez RN  Medication Review/Management: medications reviewed  Taken 11/5/2022 2200 by Heather Velez RN  Medication Review/Management: medications reviewed     Problem: Fall Injury Risk  Goal: Absence of Fall and Fall-Related Injury  Intervention: Identify and Manage Contributors  Recent Flowsheet Documentation  Taken 11/6/2022 0441 by Heather Velez RN  Medication Review/Management: medications reviewed  Taken 11/6/2022 0200 by Heather Velez RN  Medication Review/Management: medications reviewed  Taken 11/6/2022 0030 by Heather Velez RN  Medication Review/Management: medications reviewed  Taken 11/5/2022 2200 by Heather Velez RN  Medication Review/Management: medications reviewed  Intervention: Promote Injury-Free Environment  Recent Flowsheet Documentation  Taken 11/6/2022 0441 by Heather Velez RN  Safety Promotion/Fall Prevention: safety round/check completed  Taken 11/6/2022 0200 by Heather Velez RN  Safety Promotion/Fall Prevention: safety round/check completed  Taken 11/6/2022 0030 by Heather Velez RN  Safety Promotion/Fall Prevention: safety round/check completed  Taken 11/5/2022 2200 by Heather Velez RN  Safety Promotion/Fall Prevention: safety round/check completed  Taken 11/5/2022 2015 by Heather Velez RN  Safety Promotion/Fall Prevention: safety round/check completed     Problem: Adult Inpatient Plan of Care  Goal: Absence of Hospital-Acquired Illness or Injury  Intervention: Identify and Manage Fall Risk  Recent Flowsheet Documentation  Taken 11/6/2022 0441  by Heather Velez RN  Safety Promotion/Fall Prevention: safety round/check completed  Taken 11/6/2022 0200 by Heather Velez RN  Safety Promotion/Fall Prevention: safety round/check completed  Taken 11/6/2022 0030 by Heather Velez RN  Safety Promotion/Fall Prevention: safety round/check completed  Taken 11/5/2022 2200 by Heather Velez RN  Safety Promotion/Fall Prevention: safety round/check completed  Taken 11/5/2022 2015 by Heather Velez RN  Safety Promotion/Fall Prevention: safety round/check completed  Intervention: Prevent Infection  Recent Flowsheet Documentation  Taken 11/6/2022 0441 by Heather Velez RN  Infection Prevention: hand hygiene promoted  Taken 11/5/2022 2015 by Heather Velez RN  Infection Prevention: visitors restricted/screened   Goal Outcome Evaluation:

## 2022-11-06 NOTE — PROGRESS NOTES
"Subjective   Laura Perkins is a 82 y.o. female.   Seen for diabetes f/u.  Lethargic.      Objective     /55   Pulse 84   Temp 97.6 °F (36.4 °C) (Oral)   Resp 20   Ht 162.6 cm (64\")   Wt 65.9 kg (145 lb 4.5 oz)   SpO2 90%   BMI 24.94 kg/m²   Blood sugar  231 this am,  352 @ lunch, 184 @ supper    ASSESSMENT    Patient is stable    PLAN    Continue same insulin doses.         Matias Santos MD  11/6/2022  18:09 EST    "

## 2022-11-06 NOTE — NURSING NOTE
When RN went into pt room to give dinner time insulin pt  was feeding her and RN noticed there was vomit all over pt pillow and neck, pt  stated pt had been vomiting. Pt made NPO and stat CXR ordered, dr patel notified. Husbanded educated not to give pt any food or drinks until evaluated by speech therapy.

## 2022-11-06 NOTE — PROGRESS NOTES
Cardiology Progress Note    Patient Identification:  Name: Laura Perkins  Age: 82 y.o.  Sex: female  :  1940  MRN: [id number]                 Follow Up / Chief Complaint: Elevated troponin, non stemi   Chief Complaint   Patient presents with   • Altered Mental Status       Interval History:  Patient presented from outlying facility with pneumonia.  Noted to have elevated troponin   Patient underwent cardiac cath 10/27/2022 with stent to proximal LAD.          Subjective: Patient seen and examined.  Chart reviewed.  Labs reviewed.  Discussed with RN taking care of patient.  Patient's  is giving history since patient is confused.  Is complaining of psych issues with agitation.      Objective: Serial troponin 0.872, 0.653, 0.793, 0.546, 0.373  10/26/2022: troponin 0.300, 0.256  Glucose 136, sodium 148 WBC 13.4, hgb 9.0  10/27/2022: troponin 0.175, WBC 12.9 hgb 8.8 EKG with diffuse T wave abnormalities and QTC prolongation   10/28/2022: troponin 0.136, glucose 215, WBC 12.2   hgb 8.6  10/30/2022: troponin 0.067, pro bnp [id number]  10/31/2022: troponin 0.05, glucose 190, creatinine 0.51  WBC 12.2, hgb 8.4  2022:  Troponin 0.057, glucose 185  2022: no labs to review   2022: Glucose elevated, CBC reveals a white count of 17.6 and hemoglobin of 8.8      History of Present Illness:        Mrs. Laura Perkins has a Past medical history of      - hypertension  - dyslipidemia   - diabetes   - anemia, GERD  - stroke   - bipolar disorder  - Colon cancer- s/p resection 10/18/2022  - bladder surgery, eye surgery, hysterectomy, hemorrhoidectomy   -  Non smoker  - no allergies  - family history of heart disease in mother         Presented from rehab center to the ED on 10/24/2022 for confusion/alerted mental status.  Patient was recently admitted to [facility] for transverse colon resection on 10/18/2022.  Patient was discharged to rehab. In the ED patient was noted to have fever 102.  CXR showed left sided pneumonia and patient was started on IVFs and antibiotics.  Cardiology has been consulted for elevated troponin.   Patient alert and oriented and gives some history but is poor historian and most of information above was obtained from chart review.  She denies any chest pain but does state she is short of breath and coughing.  RN reported patient aspirated and had swallow study now on thickened liquid.      Serial troponins have been elevated but non-trending 0.872, 0.653, 0.793, 0.546, 0.373  EKG showed sinus tachycardia with diffuse T wave inversion that is changed from previous EKG on 10/11/2022 that showed normal sinus rhythm without any ST abnormalities.       Assessment:  :     Acute non-ST elevation MI  Presumed CAD  Hypertension, dyslipidemia, diabetes  Recent colon cancer resection 10/18/2022  Fever, leukocytosis  Altered mental status  Prolonged QTC on psychiatric medications   CAD, NSTEMI, PCI  Acute HFrEF due to systolic dysfunction from ischemic cardiomyopathy        Recommendations / Plan:         Telemetry is revealing sinus rhythm  Monitor renal function and hemoglobin and urine output  proBNP is improved from 23,855-14,071  Continue diuresis as tolerated  Echo is revealing moderate LV dysfunction  Continue to monitor EKG and QTC.  EKG done 10/31/2022 reviewed/interpreted by me reveals sinus tachycardia with rate of 102 bpm with QT of 382 ms and QTC of 493 ms  Repeat EKG 11/5/2022 reveals acute DC of over 500.  Increase activity as tolerated  Continue aspirin, clopidogrel, losartan, metoprolol succinate, rosuvastatin as tolerated  Speech therapy for swallow evaluation again to make sure she is not aspirating   Discussed with patient's  by bedside  Patient has significant improvement in symptoms we will continue diuresis and medical management  Will follow-up as outpatient.       Copied text in this portion of the note has been reviewed and is accurate as of  11/6/2022    Past Medical History:  Past Medical History:   Diagnosis Date   • Acid reflux    • Anemia    • Anemia    • Bipolar 1 disorder (HCC)    • Colon cancer (HCC)    • Diabetes mellitus (HCC)     Type 2   • Disease of thyroid gland    • Hyperlipidemia    • Hypertension     TAKEN OFF HTN MEDS OVER 5 YEARS AGO   • Incontinence of urine    • Stroke (HCC)     2012     Past Surgical History:  Past Surgical History:   Procedure Laterality Date   • BLADDER SURGERY     • CARDIAC CATHETERIZATION N/A 10/27/2022    Procedure: Left Heart Cath, possible pci;  Surgeon: Andrea Melvin MD;  Location:  [facility];  Service: Cardiovascular;  Laterality: N/A;   • COLON RESECTION N/A 10/18/2022    Procedure: COLON RESECTIOn TRANSVERSE LAPAROSCOPIC;  Surgeon: Toño Michelle MD;  Location: [facility] OR;  Service: General;  Laterality: N/A;   • COLONOSCOPY  09/21/2022    [facility]   • ENDOSCOPY  09/21/2022    [facility]   • EYE SURGERY     • HEMORRHOIDECTOMY     • HYSTERECTOMY          Social History:   Social History     Tobacco Use   • Smoking status: Never   • Smokeless tobacco: Never   Substance Use Topics   • Alcohol use: Never      Family History:  Family History   Problem Relation Age of Onset   • Heart disease Mother    • Diabetes Mother    • No Known Problems Father    • Cancer Brother           Allergies:  No Known Allergies  Scheduled Meds:  ARIPiprazole, 2 mg, Nightly  aspirin, 81 mg, Daily  clopidogrel, 75 mg, Daily  divalproex, 500 mg, BID  docusate sodium, 100 mg, BID  ferrous sulfate, 324 mg, BID With Meals  furosemide, 20 mg, Q12H  guaiFENesin, 600 mg, Q12H  insulin glargine, 16 Units, QAM  insulin lispro, 3-14 Units, TID With Meals  ipratropium-albuterol, 3 mL, BID - RT  levothyroxine, 50 mcg, Daily  losartan, 25 mg, Q24H  magnesium oxide, 400 mg, Daily  metoprolol succinate XL, 25 mg, Q24H  pantoprazole, 40 mg, QAM  piperacillin-tazobactam, 3.375 g,  "Q8H  polyethylene glycol, 17 g, Daily  rosuvastatin, 20 mg, Daily  Scopolamine, 1 patch, Q72H  spironolactone, 25 mg, Daily          Review of Systems:   Review of Systems   Unable to perform ROS: mental status change            Constitutional:  Temp:  [92.7 °F (33.7 °C)-99.6 °F (37.6 °C)] 99.6 °F (37.6 °C)  Heart Rate:  [64-92] 80  Resp:  [16-21] 18  BP: (140-180)/(43-81) 160/45    Physical Exam   /45   Pulse 80   Temp 99.6 °F (37.6 °C) (Axillary)   Resp 18   Ht 162.6 cm (64\")   Wt 65.9 kg (145 lb 4.5 oz)   SpO2 91%   BMI 24.94 kg/m²   General:  Appears in no acute distress- frail and chronically ill   Eyes: Sclera is anicteric,  conjunctiva is clear   HEENT:  No JVD. Thyroid not visibly enlarged. No mucosal pallor or cyanosis  Respiratory: Respirations regular and unlabored at rest.  Decreased breath sounds at bilateral bases with scattered rhonchi  Cardiovascular: S1,S2 Regular rate and rhythm. No murmur, rub or gallop auscultated.  . No pretibial pitting edema  Gastrointestinal: Abdomen nondistended.  Musculoskeletal:  No abnormal movements  Extremities: No digital clubbing or cyanosis  Skin: Color pink. Skin warm and dry to touch. No rashes  No xanthoma  Neuro: Alert and awake, no lateralizing deficits appreciated    INTAKE AND OUTPUT:    Intake/Output Summary (Last 24 hours) at 11/6/2022 1631  Last data filed at 11/6/2022 1500  Gross per 24 hour   Intake 1430 ml   Output --   Net 1430 ml       Cardiographics  Telemetry: sinus rhythm     ECG:   ECG 12 Lead QT Measurement   Preliminary Result   HEART RATE= 74  bpm   RR Interval= 812  ms   TX Interval= 115  ms   P Horizontal Axis= -74  deg   P Front Axis= -14  deg   QRSD Interval= 92  ms   QT Interval= 516  ms   QRS Axis= 25  deg   T Wave Axis= 196  deg   - ABNORMAL ECG -   Sinus rhythm   Abnrm T, probable ischemia, anterolateral lds   Prolonged QT interval   When compared with ECG of 03-Nov-2022 8:17:18,   Nonspecific significant change "   Electronically Signed By:    Date and Time of Study: 2022-11-05 09:56:04      ECG 12 Lead QT Measurement   Final Result   HEART RATE= 76  bpm   RR Interval= 808  ms   WI Interval= 155  ms   P Horizontal Axis= -14  deg   P Front Axis= 53  deg   QRSD Interval= 96  ms   QT Interval= 477  ms   QRS Axis= 19  deg   T Wave Axis= 183  deg   - ABNORMAL ECG -   Sinus rhythm   Atrial premature complex   Abnrm T, consider ischemia, anterolateral lds   Prolonged QT interval   When compared with ECG of 31-Oct-2022 8:53:04,   Significant rate decrease   Significant repolarization change   Electronically Signed By: Andrea Melvin ([facility]) 06-Nov-2022 14:45:24   Date and Time of Study: 2022-11-03 08:17:18      ECG 12 Lead QT Measurement   Final Result   HEART RATE= 102  bpm   RR Interval= 600  ms   WI Interval= 157  ms   P Horizontal Axis= -38  deg   P Front Axis= 23  deg   QRSD Interval= 89  ms   QT Interval= 382  ms   QRS Axis= 28  deg   T Wave Axis= 209  deg   - ABNORMAL ECG -   Sinus tachycardia   Atrial premature complexes   Nonspecific T abnormalities, diffuse leads   When compared with ECG of 29-Oct-2022 16:41:12,   No significant change   Electronically Signed By: Andrea Melvin ([facility]) 06-Nov-2022 14:45:34   Date and Time of Study: 2022-10-31 08:53:04      ECG 12 Lead Other; on psych medications that prolong qtc   Final Result   HEART RATE= 92  bpm   RR Interval= 656  ms   WI Interval= 154  ms   P Horizontal Axis= -11  deg   P Front Axis= 45  deg   QRSD Interval= 90  ms   QT Interval= 360  ms   QRS Axis= 20  deg   T Wave Axis= 193  deg   - ABNORMAL ECG -   Sinus rhythm   Atrial premature complex   Abnrm T, consider ischemia, anterolateral lds   When compared with ECG of 29-Oct-2022 5:08:11,   Nonspecific significant change   Electronically Signed By: Reyes Cooper ([facility]) 03-Nov-2022 11:14:28   Date and Time of Study: 2022-10-29 16:41:12      ECG 12 Lead QT Measurement   Final Result   HEART RATE= 78  bpm   RR  Interval= 772  ms   CT Interval= 139  ms   P Horizontal Axis= 4  deg   P Front Axis= 42  deg   QRSD Interval= 87  ms   QT Interval= 422  ms   QRS Axis= 32  deg   T Wave Axis= 212  deg   - ABNORMAL ECG -   Sinus rhythm   Abnormal T, consider ischemia, diffuse leads   When compared with ECG of 28-Oct-2022 5:26:46,   New or worsened ischemia or infarction   Significant repolarization change   Electronically Signed By: Reyes Cooper ([facility]) 03-Nov-2022 10:47:40   Date and Time of Study: 2022-10-29 05:08:11      SCANNED - TELEMETRY     Final Result      ECG 12 Lead   Final Result   HEART RATE= 78  bpm   RR Interval= 772  ms   CT Interval= 129  ms   P Horizontal Axis= -38  deg   P Front Axis= 2  deg   QRSD Interval= 90  ms   QT Interval= 469  ms   QRS Axis= 37  deg   T Wave Axis= 232  deg   - ABNORMAL ECG -   Sinus rhythm   Abnormal T, suspect prox. LAD occlu. or CVA   Prolonged QT interval   When compared with ECG of 27-Oct-2022 10:26:26,   Significant repolarization change   Electronically Signed By: Andrea Melvin ([facility]) 06-Nov-2022 14:45:45   Date and Time of Study: 2022-10-28 05:26:46      ECG 12 Lead QT Measurement   Final Result   HEART RATE= 97  bpm   RR Interval= 620  ms   CT Interval= 154  ms   P Horizontal Axis= 1  deg   P Front Axis= 43  deg   QRSD Interval= 92  ms   QT Interval= 474  ms   QRS Axis= 32  deg   T Wave Axis= 217  deg   - ABNORMAL ECG -   Sinus rhythm   Abnormal T, probable ischemia, widespread   Prolonged QT interval   When compared with ECG of 23-Oct-2022 19:11:40,   Significant change in rhythm   Electronically Signed By: Skyler García ([facility]) 28-Oct-2022 10:11:01   Date and Time of Study: 2022-10-27 10:26:26      ECG 12 Lead   Final Result   HEART RATE= 121  bpm   RR Interval= 496  ms   CT Interval= 96  ms   P Horizontal Axis=   deg   P Front Axis= 206  deg   QRSD Interval= 77  ms   QT Interval= 382  ms   QRS Axis= 37  deg   T Wave Axis= 209  deg   - ABNORMAL ECG -   Sinus or ectopic  atrial tachycardia   Abnormal T, consider ischemia, diffuse leads   Prolonged QT interval   When compared with ECG of 11-Oct-2022 14:13:27,   Significant change in rhythm: previously sinus   New or worsened ischemia or infarction   Electronically Signed By: Marino Montes ([facility]) 24-Oct-2022 14:20:30   Date and Time of Study: 2022-10-23 19:11:40      SCANNED - TELEMETRY     Final Result      SCANNED - TELEMETRY     Final Result      SCANNED - TELEMETRY     Final Result      SCANNED - TELEMETRY     Final Result      SCANNED - TELEMETRY     Final Result      SCANNED - TELEMETRY     Final Result      SCANNED - TELEMETRY     Final Result      SCANNED - TELEMETRY     Final Result      SCANNED - TELEMETRY     Final Result      SCANNED - TELEMETRY     Final Result      SCANNED - TELEMETRY     Final Result      SCANNED - TELEMETRY     Final Result      SCANNED - TELEMETRY     Final Result      SCANNED - TELEMETRY     Final Result      SCANNED - TELEMETRY     Final Result      SCANNED - TELEMETRY     Final Result      SCANNED - TELEMETRY     Final Result      SCANNED - TELEMETRY     Final Result      SCANNED - TELEMETRY     Final Result      SCANNED - TELEMETRY     Final Result      SCANNED - TELEMETRY     Final Result      SCANNED - TELEMETRY     Final Result      SCANNED - TELEMETRY     Final Result      SCANNED - TELEMETRY     Final Result      SCANNED - TELEMETRY     Final Result      SCANNED - TELEMETRY     Final Result      SCANNED - TELEMETRY     Final Result      SCANNED - TELEMETRY     Final Result      SCANNED - TELEMETRY     Final Result      SCANNED - TELEMETRY     Final Result      SCANNED - TELEMETRY     Final Result      SCANNED - TELEMETRY     Final Result      SCANNED - TELEMETRY     Final Result      SCANNED - TELEMETRY     Final Result      SCANNED - TELEMETRY     Final Result      SCANNED - TELEMETRY     Final Result      SCANNED - TELEMETRY     Final Result      SCANNED - TELEMETRY     Final Result       SCANNED - TELEMETRY     Final Result      SCANNED - TELEMETRY     Final Result      SCANNED - TELEMETRY     Final Result      SCANNED - TELEMETRY     Final Result      SCANNED - TELEMETRY     Final Result      SCANNED - TELEMETRY     Final Result      SCANNED - TELEMETRY     Final Result      SCANNED - TELEMETRY     Final Result      SCANNED - TELEMETRY     Final Result      SCANNED - TELEMETRY     Final Result      SCANNED - TELEMETRY     Final Result      SCANNED - TELEMETRY     Final Result      SCANNED - TELEMETRY     Final Result      ECG 12 Lead QT Measurement    (Results Pending)   ECG 12 Lead QT Measurement    (Results Pending)     I have personally reviewed EKG    Echocardiogram: Results for orders placed during the hospital encounter of 10/23/22    Adult Transthoracic Echo Limited W/ Cont if Necessary Per Protocol    Interpretation Summary  Normal LV size.  Mild apical hypokinesis seen.  Estimated LV ejection fraction of 45 to 50%  Normal RV size  Normal atrial size  Aortic valve, mitral valve, tricuspid valve appears structurally normal, no significant regurgitation seen.  Small pericardial effusion seen.  No signs of increased intrapericardial pressure  Proximal aorta appears normal in size.      Lab Review   I have reviewed the labs  Results from last 7 days   Lab Units 11/01/22  0335 10/31/22  1734 10/31/22  0558   TROPONIN T ng/mL 0.057* 0.064* 0.058*     Results from last 7 days   Lab Units 11/05/22  1716   MAGNESIUM mg/dL 1.7     Results from last 7 days   Lab Units 11/06/22  0745   SODIUM mmol/L 137   POTASSIUM mmol/L 4.5   BUN mg/dL 12   CREATININE mg/dL 0.64   CALCIUM mg/dL 8.5*         Results from last 7 days   Lab Units 11/06/22  0745 11/05/22  0819 11/04/22  0822   WBC 10*3/mm3 17.60* 13.80* 13.30*   HEMOGLOBIN g/dL 8.6* 8.9* 8.4*   HEMATOCRIT % 29.0* 30.2* 26.9*   PLATELETS 10*3/mm3 505* 499* 447     Results from last 7 days   Lab Units 11/04/22  0821   INR  1.08       RADIOLOGY:  Imaging  "Results (Last 24 Hours)     ** No results found for the last 24 hours. **                )11/6/2022  MD YARA Dunham/Transcription:   \"Dictated utilizing Dragon dictation\".   "

## 2022-11-06 NOTE — PROGRESS NOTES
[facility] Daily Progress Note    Patient Name: Laura Perkins  : 1940  MRN: [id number]  Primary Care Physician:  Beka Weir MD  Date of admission: 10/23/2022      Subjective          Brief interim history:       84-year-old female with known history of cerebrovascular accident, T2DM, hypertension, HLD, bipolar disorder, anemia, S/p bladder surgery,  and history of colon cancer,S/p resection (10/22) . S/p recent colonoscopy which revealed 5 cm mass.  Patient underwent transverse colon resection at [facility] and pathology revealed invasive mucinous adenocarcinoma.  She was subsequently discharged to a local rehab facility on 10/19/2022.  Patient presented to [facility] ED on 10/23/2022 due to 2-day history of generalized body weakness, acute mental status changes/metabolic encephalopathy.  On presentation, she was noted with fever of 102 and tachycardic.  Blood cultures (10/23) which result pending and started on broad-spectrum antibiotic with Zosyn.     10/25/2022: Seen and examined and follow up.  Resting comfortably in bed in no distress or discomfort.      10/26/2022: Seen and examined in follow-up.  Resting comfortably.    10/27/2022: Seen and examined in follow-up.  Event noted for reported patient slipping out of bed last evening with no visible injury on examination.    10/28/2022:  Pt had a cardiac cath with DANIA to LAD.  Psych was consulted to help adjust meds.  Pt is off psych meds secondary to QTc prolongation.  Abilify increase, as well as Depakote for mood stabilization.  Cardiology following with QTc changes as well as EKG.      10/29/2022:  Pt is seen by psych and her meds are titrated.  Pt does not communicate with me and is repeating the words I a saying to her.  Pt is on 2 lit NC that can be titrated down.  Pt will be discharged when her psych meds are adjusted and she is back to her baseline.    10/30/2022:  Pt is very wheezy today and   states, pt is coughing very thick sputum.  Pt is very confused and not reliable historian.  She denies complains.  Pt is on 2 lit NC, and she oxygenating well, but her wheeze is louder today.  Pt think that her new psych meds, are improving her condition.      10/31/2022.  Patient was seen and examined.  Patient's family complained restless legs last night.      11/1/2022.  Patient was seen and examined.  Patient reported no new symptoms.      11/2/2022.  Patient was seen and examined.  Patient's family reported moderate improvement in her symptoms.      11/3/2022.  Patient was seen and examined.  Patient's family reported that patient was coughing all night.  Pulmonary is following.       11/04/2022.  Patient was seen and examined.  Patient's family reported significant improvement in patient's symptoms.      11/05/2022.  Patient was seen and examined.  Patient's family reported no overnight events.  Patient was noted to be coughing.  Tessalon Perles was ordered.      11/6/2022.  Patient was seen and examined.  Patient's family reported moderate improvement in her symptoms.              Objective      Vitals:   Temp:  [92.7 °F (33.7 °C)-99.1 °F (37.3 °C)] 99.1 °F (37.3 °C)  Heart Rate:  [64-92] 92  Resp:  [16-21] 16  BP: (135-180)/(37-81) 162/48  Flow (L/min):  [4-6] 4    Physical Exam  Vitals reviewed.   Constitutional:       Comments: Patient is somnolent but easily aroused.  Family is at the bedside.   HENT:      Head: Normocephalic and atraumatic.      Nose: Nose normal. No congestion or rhinorrhea.      Mouth/Throat:      Mouth: Mucous membranes are moist.      Pharynx: Oropharynx is clear. No oropharyngeal exudate or posterior oropharyngeal erythema.   Eyes:      Pupils: Pupils are equal, round, and reactive to light.   Cardiovascular:      Pulses: Normal pulses.      Heart sounds: Normal heart sounds. No murmur heard.    No friction rub. No gallop.      Comments: S1 and S2 present.  No  tachycardia.  Pulmonary:      Effort: No respiratory distress.      Breath sounds: No wheezing, rhonchi or rales.      Comments: Improved air entry bilaterally.  Chest:      Chest wall: No tenderness.   Abdominal:      General: Abdomen is flat. Bowel sounds are normal. There is no distension.      Palpations: Abdomen is soft. There is no mass.      Tenderness: There is no abdominal tenderness. There is no right CVA tenderness, left CVA tenderness, guarding or rebound.      Hernia: No hernia is present.   Musculoskeletal:         General: No swelling, tenderness, deformity or signs of injury.      Cervical back: Neck supple. No tenderness.      Right lower leg: No edema.      Left lower leg: No edema.   Skin:     Capillary Refill: Capillary refill takes less than 2 seconds.      Coloration: Skin is not jaundiced.      Findings: No bruising, lesion or rash.   Neurological:      Mental Status: She is alert.      Comments: No facial asymmetry noted.  Gait and station not tested.   Psychiatric:         Behavior: Behavior normal.      Comments: No agitation.              Result Review    Result Review:  I have personally reviewed the results from the time of this admission to 11/6/2022 12:21 EST and agree with these findings:  [x]  Laboratory  []  Microbiology  [x]  Radiology  []  EKG/Telemetry   [x]  Cardiology/Vascular   []  Pathology  [x]  Old records  []  Other:      Status: Completed Collected: 11/02/22 1224    Updated: 11/02/22 1228   Narrative:     DATE OF EXAM:   11/2/2022 11:00 AM       PROCEDURE:   XR ABDOMEN KUB-       INDICATIONS:   Vomiting; I21.4-Non-ST elevation (NSTEMI) myocardial infarction;   R41.82-Altered mental status, unspecified; R50.9-Fever, unspecified;   J18.9-Pneumonia, unspecified organism; R77.8-Other specified   abnormalities of plasma proteins; E11.9-Type 2 diabetes mellitus without   complications; Z79.4-Long term (current) use of insulin;   E78.5-Hyperlipidemia, unspecified        COMPARISON:   CT chest 10/30/2022, CT abdomen and pelvis without contrast 10/24/2022       TECHNIQUE:   Radiographic view(s) of the abdomen obtained.       FINDINGS:   Included lower lungs are abnormal, better, better evaluated on recent   chest CT. No abnormal small bowel distention is seen in a pattern to   suggest small bowel obstruction, although there is a nonspecific paucity   of gas-filled small bowel loops. Stool is seen in the ascending and   transverse colon.       Impression:     Nonspecific, nonobstructive bowel gas pattern.       Electronically Signed By-Lora Monk MD On:11/2/2022 12:26 PM   This report was finalized on [id number] by  Lora Monk MD.             Wounds (last 24 hours)     LDA Wound     Row Name 11/06/22 1200 11/06/22 0800 11/06/22 0441       Wound 10/24/22 1344  medial sacral spine Pressure Injury    Wound - Properties Group Placement Date: 10/24/22  -MG Placement Time: 1344  -MG Present on Hospital Admission: Y  -MG Side: --  -MG, middle  Orientation: medial  -MG Location: sacral spine  -MG Primary Wound Type: Pressure inj  -MG    Pressure Injury Stage -- DTPI  -BM --    Closure Open to air  -BM Open to air  -BM Open to air  -DH    Base pink  -BM pink  -BM pink  -DH    Drainage Amount none  -BM none  -BM none  -DH    Retired Wound - Properties Group Placement Date: 10/24/22  -MG Placement Time: 1344  -MG Present on Hospital Admission: Y  -MG Side: --  -MG, middle  Orientation: medial  -MG Location: sacral spine  -MG Primary Wound Type: Pressure inj  -MG    Retired Wound - Properties Group Date first assessed: 10/24/22  -MG Time first assessed: 1344  -MG Present on Hospital Admission: Y  -MG Side: --  -MG, middle  Location: sacral spine  -MG Primary Wound Type: Pressure inj  -MG       Wound 10/24/22 1348 medial perineum    Wound - Properties Group Placement Date: 10/24/22  -MG Placement Time: 1348  -MG Present on Hospital Admission: Y  -MG Orientation: medial  -MG Location:  perineum  -MG    Dressing Appearance -- open to air  -BM --    Closure Open to air  -BM Open to air  -BM Open to air  -DH    Base pink;red  -BM pink;red  -BM moist;red  -DH    Drainage Amount none  -BM none  -BM --    Care, Wound -- barrier applied  -BM --    Retired Wound - Properties Group Placement Date: 10/24/22  -MG Placement Time: 1348  -MG Present on Hospital Admission: Y  -MG Orientation: medial  -MG Location: perineum  -MG    Retired Wound - Properties Group Date first assessed: 10/24/22  -MG Time first assessed: 1348  -MG Present on Hospital Admission: Y  -MG Location: perineum  -MG    Row Name 11/06/22 0030 11/05/22 2015 11/05/22 1600       Wound 10/24/22 1344  medial sacral spine Pressure Injury    Wound - Properties Group Placement Date: 10/24/22  -MG Placement Time: 1344  -MG Present on Hospital Admission: Y  -MG Side: --  -MG, middle  Orientation: medial  -MG Location: sacral spine  -MG Primary Wound Type: Pressure inj  -MG    Closure Open to air  -DH Open to air  -DH Open to air  -BM    Base pink  -DH pink  -DH pink  -BM    Drainage Amount none  -DH none  -DH none  -BM    Retired Wound - Properties Group Placement Date: 10/24/22  -MG Placement Time: 1344  -MG Present on Hospital Admission: Y  -MG Side: --  -MG, middle  Orientation: medial  -MG Location: sacral spine  -MG Primary Wound Type: Pressure inj  -MG    Retired Wound - Properties Group Date first assessed: 10/24/22  -MG Time first assessed: 1344  -MG Present on Hospital Admission: Y  -MG Side: --  -MG, middle  Location: sacral spine  -MG Primary Wound Type: Pressure inj  -MG       Wound 10/24/22 1348 medial perineum    Wound - Properties Group Placement Date: 10/24/22  -MG Placement Time: 1348  -MG Present on Hospital Admission: Y  -MG Orientation: medial  -MG Location: perineum  -MG    Closure Open to air  -DH Open to air  -DH Open to air  -BM    Base moist;red  -DH moist;red  -DH moist;red  -BM    Retired Wound - Properties Group Placement  Date: 10/24/22  -MG Placement Time: 1348  -MG Present on Hospital Admission: Y  -MG Orientation: medial  -MG Location: perineum  -MG    Retired Wound - Properties Group Date first assessed: 10/24/22  -MG Time first assessed: 1348  -MG Present on Hospital Admission: Y  -MG Location: perineum  -MG          User Key  (r) = Recorded By, (t) = Taken By, (c) = Cosigned By    Initials Name Provider Type     Heather Velez RN Registered Nurse     Nancy Puente RN Registered Nurse    Ilda Torres RN Registered Nurse                  Assessment & Plan    From previous notes and with minor updates.    Brief patient summary:    Patient is a 82 y.o. female with past medical history of hypertension, bipolar 1 disorder, colon cancer, diabetes mellitus type 2, hypothyroidism, hyperlipidemia, GERD, anemia and a stroke unknown who presented to [facility] on 10/23/2022 upon transfer from [facility] rehab where she had been staying since 10/19/2022 after a transverse colon resection for 5 cm mass found on colonoscopy,  at [facility] on 10/18/2022.  Pathology report in progress.  Family in [facility] reported progressive weakness lethargy and alteration in mental status over the past 2 days.She appears frail, will awaken to voice and answer but falls back to sleep. She is oriented to self and follows simple commands.  and family at bedside assisting with history .  There is no slurred speech or facial droop.  No focal deficits.  She denies headache.  She does report some chest pain.family reports shortness of air and cough productive of brown sputum.  Temperature was 102 on admission, BP was stable she was tachycardic and tachypneic.  She does not normally use home oxygen and is now stable on 4 L via nasal cannula.  She triggered sepsis.  WBCs 13.3, lactic acid normal at 0.8 procalcitonin elevated at 0.32.  Urinalysis was negative for infection.  Chest x-ray per radiology  indicated a possible small left midlung infiltrate and bronchitis.  She was started on IV vancomycin and IV cefepime in ED for hospital-acquired pneumonia.  Family reports no past medical history of heart failure or coronary artery disease.  Troponin mildly elevated at 0.87.  Patient reported chest pain but  could give no other details.  May be secondary to tachycardia and chest pain pleuritic, however serial troponins and continuous cardiac monitoring have been ordered. EKG shows no acute ST elevation. and family report she is a DNR with no CPR however they agree to intubation if needed and full support otherwise.  Creatinine mildly elevated at 1.13 BUN 33.    Family reports no history of chronic renal failure.PMH includes hypothyroidism, Bipolar disorder, Diabetes Mellitus type 2 with glucose today 439 and overactive bladder post re-suspension with mesh.  She was given an IV fluid bolus in ED and will be admitted for antibiotics for possible pneumonia with blood cultures pending.  Family concerned about possible aspiration.  He reports she had her esophagus stretched a few months ago and underwent a swallow study prior to stretching which showed esophageal dysmotility. They noticed recently after her being intubated for surgery she spits up food.  Speech has been consulted.  Aspiration precautions in place.          ARIPiprazole, 2 mg, Oral, Nightly  aspirin, 81 mg, Oral, Daily  clopidogrel, 75 mg, Oral, Daily  divalproex, 500 mg, Oral, BID  docusate sodium, 100 mg, Oral, BID  ferrous sulfate, 324 mg, Oral, BID With Meals  furosemide, 20 mg, Intravenous, Q12H  guaiFENesin, 600 mg, Oral, Q12H  insulin glargine, 16 Units, Subcutaneous, QAM  insulin lispro, 3-14 Units, Subcutaneous, TID With Meals  ipratropium-albuterol, 3 mL, Nebulization, BID - RT  levothyroxine, 50 mcg, Oral, Daily  losartan, 25 mg, Oral, Q24H  magnesium oxide, 400 mg, Oral, Daily  metoprolol succinate XL, 25 mg, Oral, Q24H  pantoprazole,  40 mg, Oral, QAM  piperacillin-tazobactam, 3.375 g, Intravenous, Q8H  polyethylene glycol, 17 g, Oral, Daily  rosuvastatin, 20 mg, Oral, Daily  Scopolamine, 1 patch, Transdermal, Q72H  spironolactone, 25 mg, Oral, Daily             Active Hospital Problems:  Active Hospital Problems    Diagnosis    • **Altered mental status, unspecified altered mental status type    • HAP (hospital-acquired pneumonia)    • Acute respiratory distress    • Elevated troponin    • Sepsis (HCC)    • Type 2 diabetes mellitus (HCC)    • Anxiety associated with depression    • Hypothyroidism (acquired)    • OAB (overactive bladder)    • GERD without esophagitis    • Acute non-ST elevation myocardial infarction (NSTEMI) (HCC)    • Dyslipidemia    • Cancer of transverse colon (HCC)           Assessment/plan:      -Continue appropriate patient's home medications for other chronic medical conditions.  -Continue the present level of care.  -Patient and family agreed with the plan of care.      Cough.  -Improving.  -Treat with Tessalon Perles.  -Patient is also on Mucinex.     Acute toxic metabolic encephalopathy/AMS      -resolving, and likely multifactorial.  Psych started pt on Abilify and adjusted Depakote for mood stabilization.       -Pt is still confused, and can't give clear ROS.  Meds to be adjusted by Psych.     Probable sepsis      -off Zosyn, now on Augmentin      -Duo nebs and Mucomyst nebs added for thick secretions and wheezing     Pneumonia involving the left lung      -Continue Augmentin, and supportive care      NSTEMI/Elevated troponin       -scheduled for [facility] (10/27), DANIA to LAD.       -Cp free and in the setting of suspected sepsis     History of colorectal cancer (invasive mucinous adenocarcinoma)      -S/p resection (10/22)     Dysphagia      -followed by speech pathologist       CVA      -Aspirin/Crestor     T2DM      -SSI/Lantus     Hypertension     HLD     -Crestor     Hypothyroidism      -Levothyroxine     Depression/anxiety      -Risperidone on hold 2/2 increase QT prolongation and consult Psychiatrist        -Depakote dose adjustment and initiation of Abilify, per Psych.      -Pt is still not at baseline, and is not communicating well, and can't give ROS.       DVT prophylaxis:  Mechanical DVT prophylaxis orders are present.    CODE STATUS:    Code Status (Patient has no pulse and is not breathing): No CPR (Do Not Attempt to Resuscitate)  Medical Interventions (Patient has pulse or is breathing): Full Support       Disposition: This wonderful patient can discharge in the next 24 hours.      Electronically signed by Alejandro Esrtada MD, FACP, 11/06/22, 12:21 EST.        [facility]ist Team

## 2022-11-07 ENCOUNTER — APPOINTMENT (OUTPATIENT)
Dept: GENERAL RADIOLOGY | Facility: HOSPITAL | Age: 82
End: 2022-11-07

## 2022-11-07 PROBLEM — J96.01 ACUTE RESPIRATORY FAILURE WITH HYPOXIA (HCC): Status: ACTIVE | Noted: 2022-11-07

## 2022-11-07 PROBLEM — J90 PLEURAL EFFUSION: Status: ACTIVE | Noted: 2022-11-07

## 2022-11-07 LAB
AMYLASE FLD-CCNC: 24 U/L
APPEARANCE FLD: CLEAR
COLOR FLD: YELLOW
GLUCOSE BLDC GLUCOMTR-MCNC: 110 MG/DL (ref 70–105)
GLUCOSE BLDC GLUCOMTR-MCNC: 155 MG/DL (ref 70–105)
GLUCOSE BLDC GLUCOMTR-MCNC: 209 MG/DL (ref 70–105)
GLUCOSE BLDC GLUCOMTR-MCNC: 344 MG/DL (ref 70–105)
LDH FLD-CCNC: 99 U/L
LYMPHOCYTES NFR FLD MANUAL: 24 %
MAGNESIUM SERPL-MCNC: 2.5 MG/DL (ref 1.6–2.4)
MESOTHL CELL NFR FLD MANUAL: 8 %
METHOD: NORMAL
MONOCYTES NFR FLD: 4 %
NEUTROPHILS NFR FLD MANUAL: 64 %
NUC CELL # FLD: 727 /MM3
PH FLD: 7.5 [PH] (ref 6.5–7.5)
PROT FLD-MCNC: 2.4 G/DL

## 2022-11-07 PROCEDURE — 88108 CYTOPATH CONCENTRATE TECH: CPT | Performed by: INTERNAL MEDICINE

## 2022-11-07 PROCEDURE — 63710000001 INSULIN GLARGINE PER 5 UNITS: Performed by: INTERNAL MEDICINE

## 2022-11-07 PROCEDURE — 99222 1ST HOSP IP/OBS MODERATE 55: CPT | Performed by: NURSE PRACTITIONER

## 2022-11-07 PROCEDURE — 92526 ORAL FUNCTION THERAPY: CPT

## 2022-11-07 PROCEDURE — 82962 GLUCOSE BLOOD TEST: CPT

## 2022-11-07 PROCEDURE — 99232 SBSQ HOSP IP/OBS MODERATE 35: CPT | Performed by: INTERNAL MEDICINE

## 2022-11-07 PROCEDURE — 89051 BODY FLUID CELL COUNT: CPT | Performed by: INTERNAL MEDICINE

## 2022-11-07 PROCEDURE — 83986 ASSAY PH BODY FLUID NOS: CPT | Performed by: INTERNAL MEDICINE

## 2022-11-07 PROCEDURE — 63710000001 INSULIN LISPRO (HUMAN) PER 5 UNITS: Performed by: INTERNAL MEDICINE

## 2022-11-07 PROCEDURE — 87070 CULTURE OTHR SPECIMN AEROBIC: CPT | Performed by: INTERNAL MEDICINE

## 2022-11-07 PROCEDURE — 84157 ASSAY OF PROTEIN OTHER: CPT | Performed by: INTERNAL MEDICINE

## 2022-11-07 PROCEDURE — 94799 UNLISTED PULMONARY SVC/PX: CPT

## 2022-11-07 PROCEDURE — 25010000002 MORPHINE PER 10 MG: Performed by: INTERNAL MEDICINE

## 2022-11-07 PROCEDURE — 87205 SMEAR GRAM STAIN: CPT | Performed by: INTERNAL MEDICINE

## 2022-11-07 PROCEDURE — 71045 X-RAY EXAM CHEST 1 VIEW: CPT

## 2022-11-07 PROCEDURE — 83735 ASSAY OF MAGNESIUM: CPT | Performed by: INTERNAL MEDICINE

## 2022-11-07 PROCEDURE — 0W9B30Z DRAINAGE OF LEFT PLEURAL CAVITY WITH DRAINAGE DEVICE, PERCUTANEOUS APPROACH: ICD-10-PCS | Performed by: INTERNAL MEDICINE

## 2022-11-07 PROCEDURE — 97116 GAIT TRAINING THERAPY: CPT

## 2022-11-07 PROCEDURE — 82150 ASSAY OF AMYLASE: CPT | Performed by: INTERNAL MEDICINE

## 2022-11-07 PROCEDURE — 94761 N-INVAS EAR/PLS OXIMETRY MLT: CPT

## 2022-11-07 PROCEDURE — 97530 THERAPEUTIC ACTIVITIES: CPT

## 2022-11-07 PROCEDURE — 83615 LACTATE (LD) (LDH) ENZYME: CPT | Performed by: INTERNAL MEDICINE

## 2022-11-07 PROCEDURE — 25010000002 FUROSEMIDE PER 20 MG: Performed by: INTERNAL MEDICINE

## 2022-11-07 PROCEDURE — 94664 DEMO&/EVAL PT USE INHALER: CPT

## 2022-11-07 PROCEDURE — 84478 ASSAY OF TRIGLYCERIDES: CPT | Performed by: INTERNAL MEDICINE

## 2022-11-07 PROCEDURE — 99497 ADVNCD CARE PLAN 30 MIN: CPT | Performed by: NURSE PRACTITIONER

## 2022-11-07 RX ORDER — HYDROCODONE BITARTRATE AND ACETAMINOPHEN 7.5; 325 MG/1; MG/1
1 TABLET ORAL EVERY 4 HOURS PRN
Status: DISPENSED | OUTPATIENT
Start: 2022-11-07 | End: 2022-11-14

## 2022-11-07 RX ORDER — MORPHINE SULFATE 2 MG/ML
2 INJECTION, SOLUTION INTRAMUSCULAR; INTRAVENOUS ONCE AS NEEDED
Status: COMPLETED | OUTPATIENT
Start: 2022-11-07 | End: 2022-11-07

## 2022-11-07 RX ORDER — IPRATROPIUM BROMIDE AND ALBUTEROL SULFATE 2.5; .5 MG/3ML; MG/3ML
3 SOLUTION RESPIRATORY (INHALATION)
Status: DISCONTINUED | OUTPATIENT
Start: 2022-11-07 | End: 2022-11-08

## 2022-11-07 RX ORDER — RISPERIDONE 0.25 MG/1
0.5 TABLET ORAL DAILY
Status: DISCONTINUED | OUTPATIENT
Start: 2022-11-07 | End: 2022-11-15 | Stop reason: HOSPADM

## 2022-11-07 RX ORDER — INSULIN LISPRO 100 [IU]/ML
5 INJECTION, SOLUTION INTRAVENOUS; SUBCUTANEOUS
Status: DISCONTINUED | OUTPATIENT
Start: 2022-11-08 | End: 2022-11-08

## 2022-11-07 RX ORDER — BUDESONIDE 0.5 MG/2ML
0.5 INHALANT ORAL
Status: DISCONTINUED | OUTPATIENT
Start: 2022-11-07 | End: 2022-11-15 | Stop reason: HOSPADM

## 2022-11-07 RX ORDER — LORAZEPAM 0.5 MG/1
0.5 TABLET ORAL EVERY 8 HOURS PRN
Status: DISCONTINUED | OUTPATIENT
Start: 2022-11-07 | End: 2022-11-07 | Stop reason: SDUPTHER

## 2022-11-07 RX ADMIN — INSULIN LISPRO 3 UNITS: 100 INJECTION, SOLUTION INTRAVENOUS; SUBCUTANEOUS at 11:54

## 2022-11-07 RX ADMIN — GUAIFENESIN 600 MG: 600 TABLET, EXTENDED RELEASE ORAL at 20:15

## 2022-11-07 RX ADMIN — RISPERIDONE 0.5 MG: 0.25 TABLET ORAL at 13:20

## 2022-11-07 RX ADMIN — MORPHINE SULFATE 2 MG: 2 INJECTION, SOLUTION INTRAMUSCULAR; INTRAVENOUS at 12:01

## 2022-11-07 RX ADMIN — HYDROCODONE BITARTRATE AND ACETAMINOPHEN 1 TABLET: 7.5; 325 TABLET ORAL at 11:13

## 2022-11-07 RX ADMIN — INSULIN GLARGINE 16 UNITS: 100 INJECTION, SOLUTION SUBCUTANEOUS at 08:40

## 2022-11-07 RX ADMIN — ROSUVASTATIN 20 MG: 10 TABLET, FILM COATED ORAL at 20:15

## 2022-11-07 RX ADMIN — DOCUSATE SODIUM 100 MG: 100 CAPSULE, LIQUID FILLED ORAL at 20:15

## 2022-11-07 RX ADMIN — ARIPIPRAZOLE 2 MG: 2 TABLET ORAL at 20:15

## 2022-11-07 RX ADMIN — BUDESONIDE 0.5 MG: 0.5 INHALANT RESPIRATORY (INHALATION) at 18:29

## 2022-11-07 RX ADMIN — INSULIN LISPRO 5 UNITS: 100 INJECTION, SOLUTION INTRAVENOUS; SUBCUTANEOUS at 08:40

## 2022-11-07 RX ADMIN — CLOPIDOGREL BISULFATE 75 MG: 75 TABLET ORAL at 09:02

## 2022-11-07 RX ADMIN — DIVALPROEX SODIUM 500 MG: 500 TABLET, DELAYED RELEASE ORAL at 09:02

## 2022-11-07 RX ADMIN — FUROSEMIDE 20 MG: 10 INJECTION, SOLUTION INTRAMUSCULAR; INTRAVENOUS at 14:47

## 2022-11-07 RX ADMIN — IPRATROPIUM BROMIDE AND ALBUTEROL SULFATE 3 ML: .5; 3 SOLUTION RESPIRATORY (INHALATION) at 18:29

## 2022-11-07 RX ADMIN — LORAZEPAM 0.5 MG: 0.5 TABLET ORAL at 14:48

## 2022-11-07 RX ADMIN — FERROUS SULFATE TAB EC 324 MG (65 MG FE EQUIVALENT) 324 MG: 324 (65 FE) TABLET DELAYED RESPONSE at 17:27

## 2022-11-07 RX ADMIN — FUROSEMIDE 20 MG: 10 INJECTION, SOLUTION INTRAMUSCULAR; INTRAVENOUS at 04:04

## 2022-11-07 RX ADMIN — IPRATROPIUM BROMIDE AND ALBUTEROL SULFATE 3 ML: .5; 3 SOLUTION RESPIRATORY (INHALATION) at 06:17

## 2022-11-07 RX ADMIN — DIVALPROEX SODIUM 500 MG: 500 TABLET, DELAYED RELEASE ORAL at 20:15

## 2022-11-07 RX ADMIN — IPRATROPIUM BROMIDE AND ALBUTEROL SULFATE 3 ML: .5; 3 SOLUTION RESPIRATORY (INHALATION) at 14:50

## 2022-11-07 NOTE — PLAN OF CARE
"Goal Outcome Evaluation:     Bed mobility - Min-A  Supine to sit  Transfers - Min-A and with rolling walker sit to stand  Ambulation - 14 feet Mod-A and with rolling walker  Very unsteady      Moderate Intensity Therapy recommended post-acute care. This is recommended as therapy feels the patient would require 3-4 days per week and wouldn't tolerate \"3 hour daily\" rehab intensity. SNF would be the preferred choice. If the patient does not agree to SNF, arrange HH or OP depending on home bound status. If patient is medically complex, consider LTACH.. Pt requires no DME at discharge.     Pt desires Skilled Rehab placement at discharge. Pt cooperative; agreeable to therapeutic recommendations and plan of care.       "

## 2022-11-07 NOTE — PLAN OF CARE
Goal Outcome Evaluation:               Re-eval of swallow completed this date.   Pt reportedly vomited after PO yesterday and was then made NPO until ST re-eval. Pt in bed upon entry, repositioned to be sitting up at 90 degree hip flexion for PO trials. Spouse at bedside reporting pt vomited after PO yesterday. Spouse was unsure if strict reflux precautions were followed prior to vomiting (sitting upright for PO and for min of 30min following, slow rate, smaller more frequent meals as opposed to 3 large meals) Pt takes trials of NTL via spoon and puree pudding for re-eval. Oral transit is WFL and pt adequately clears oral cavity betwen bites. Pt w/dry coughing noted 2x over course of multiple bites and sips, no overt clinical s/s of aspiration demonstrated at anytime.     Recommend resume puree diet w/NTL via spoon following STRICT REFLUX PRECAUTIONS due to poor esophageal clearance and reflux to the level of the UES seen on most recent VFSS. Extensive education re: VFSS results and reflux precautions completed w/spouse. ST to continue to follow for diet tolerance w/further recs as indicated.

## 2022-11-07 NOTE — PROGRESS NOTES
[facility] Medicine Services Daily Progress Note    Patient Name: Laura Perkins  : 1940  MRN: [id number]  Primary Care Physician:  Beka Weir MD  Date of admission: 10/23/2022      Subjective          Brief interim history:       84-year-old female with known history of cerebrovascular accident, T2DM, hypertension, HLD, bipolar disorder, anemia, S/p bladder surgery,  and history of colon cancer,S/p resection (10/22) . S/p recent colonoscopy which revealed 5 cm mass.  Patient underwent transverse colon resection at [facility], [place] and pathology revealed invasive mucinous adenocarcinoma.  She was subsequently discharged to a local rehab facility on 10/19/2022.  Patient presented to [facility] ED on 10/23/2022 due to 2-day history of generalized body weakness, acute mental status changes/metabolic encephalopathy.  On presentation, she was noted with fever of 102 and tachycardic.  Blood cultures (10/23) which result pending and started on broad-spectrum antibiotic with Zosyn.     10/25/2022: Seen and examined and follow up.  Resting comfortably in bed in no distress or discomfort.      10/26/2022: Seen and examined in follow-up.  Resting comfortably.    10/27/2022: Seen and examined in follow-up.  Event noted for reported patient slipping out of bed last evening with no visible injury on examination.    10/28/2022:  Pt had a cardiac cath with DANIA to LAD.  Psych was consulted to help adjust meds.  Pt is off psych meds secondary to QTc prolongation.  Abilify increase, as well as Depakote for mood stabilization.  Cardiology following with QTc changes as well as EKG.      10/29/2022:  Pt is seen by psych and her meds are titrated.  Pt does not communicate with me and is repeating the words I a saying to her.  Pt is on 2 lit NC that can be titrated down.  Pt will be discharged when her psych meds are adjusted and she is back to her baseline.    10/30/2022:  Pt is very wheezy today and   states, pt is coughing very thick sputum.  Pt is very confused and not reliable historian.  She denies complains.  Pt is on 2 lit NC, and she oxygenating well, but her wheeze is louder today.  Pt think that her new psych meds, are improving her condition.      10/31/2022.  Patient was seen and examined.  Patient's family complained restless legs last night.      11/1/2022.  Patient was seen and examined.  Patient reported no new symptoms.      11/2/2022.  Patient was seen and examined.  Patient's family reported moderate improvement in her symptoms.      11/3/2022.  Patient was seen and examined.  Patient's family reported that patient was coughing all night.  Pulmonary is following.       11/04/2022.  Patient was seen and examined.  Patient's family reported significant improvement in patient's symptoms.      11/05/2022.  Patient was seen and examined.  Patient's family reported no overnight events.  Patient was noted to be coughing.  Tessalon Perles was ordered.      11/6/2022.  Patient was seen and examined.  Patient's family reported moderate improvement in her symptoms.      11/7/2022.  Patient was seen and examined.  Patient is status post thoracentesis.  Family reported musculoskeletal pain.  Pain control was reviewed.              Objective      Vitals:   Temp:  [97.4 °F (36.3 °C)-98.4 °F (36.9 °C)] 98.4 °F (36.9 °C)  Heart Rate:  [67-87] 83  Resp:  [18-20] 20  BP: (130-161)/(42-54) 130/42  Flow (L/min):  [2.5-4] 2.5    Physical Exam  Vitals reviewed.   Constitutional:       Appearance: She is ill-appearing.      Comments: Patient is tossing and turning around the bed in distress.   HENT:      Head: Normocephalic and atraumatic.      Nose: Nose normal. No congestion or rhinorrhea.      Mouth/Throat:      Mouth: Mucous membranes are moist.      Pharynx: Oropharynx is clear. No oropharyngeal exudate or posterior oropharyngeal erythema.   Eyes:      Pupils: Pupils are equal, round, and reactive to light.    Cardiovascular:      Pulses: Normal pulses.      Heart sounds: Normal heart sounds. No murmur heard.    No friction rub. No gallop.      Comments: S1 and S2 present.  No tachycardia.  Pulmonary:      Effort: No respiratory distress.      Breath sounds: No wheezing, rhonchi or rales.      Comments: Improved air entry bilaterally.  Chest:      Chest wall: No tenderness.   Abdominal:      General: Abdomen is flat. Bowel sounds are normal. There is no distension.      Palpations: Abdomen is soft. There is no mass.      Tenderness: There is no abdominal tenderness. There is no right CVA tenderness, left CVA tenderness, guarding or rebound.      Hernia: No hernia is present.   Musculoskeletal:         General: No swelling, tenderness, deformity or signs of injury.      Cervical back: Neck supple. No tenderness.      Right lower leg: No edema.      Left lower leg: No edema.   Skin:     Capillary Refill: Capillary refill takes less than 2 seconds.      Coloration: Skin is not jaundiced.      Findings: No bruising, lesion or rash.   Neurological:      Mental Status: She is alert.      Comments: No facial asymmetry noted.  Gait and station not tested.   Psychiatric:      Comments: Patient is slightly agitated.              Result Review    Result Review:  I have personally reviewed the results from the time of this admission to 11/7/2022 18:08 EST and agree with these findings:  [x]  Laboratory  []  Microbiology  [x]  Radiology  []  EKG/Telemetry   [x]  Cardiology/Vascular   []  Pathology  [x]  Old records  []  Other:      Status: Completed Collected: 11/02/22 1224    Updated: 11/02/22 1228   Narrative:     DATE OF EXAM:   11/2/2022 11:00 AM       PROCEDURE:   XR ABDOMEN KUB-       INDICATIONS:   Vomiting; I21.4-Non-ST elevation (NSTEMI) myocardial infarction;   R41.82-Altered mental status, unspecified; R50.9-Fever, unspecified;   J18.9-Pneumonia, unspecified organism; R77.8-Other specified   abnormalities of plasma  proteins; E11.9-Type 2 diabetes mellitus without   complications; Z79.4-Long term (current) use of insulin;   E78.5-Hyperlipidemia, unspecified       COMPARISON:   CT chest 10/30/2022, CT abdomen and pelvis without contrast 10/24/2022       TECHNIQUE:   Radiographic view(s) of the abdomen obtained.       FINDINGS:   Included lower lungs are abnormal, better, better evaluated on recent   chest CT. No abnormal small bowel distention is seen in a pattern to   suggest small bowel obstruction, although there is a nonspecific paucity   of gas-filled small bowel loops. Stool is seen in the ascending and   transverse colon.       Impression:     Nonspecific, nonobstructive bowel gas pattern.       Electronically Signed By-Lora Monk MD On:11/2/2022 12:26 PM   This report was finalized on [id number] by  Lora Monk MD.             Wounds (last 24 hours)     LDA Wound     Row Name 11/07/22 1525 11/07/22 1210 11/07/22 0810       Wound 10/24/22 1344  medial sacral spine Pressure Injury    Wound - Properties Group Placement Date: 10/24/22  -MG Placement Time: 1344  -MG Present on Hospital Admission: Y  -MG Side: --  -MG, middle  Orientation: medial  -MG Location: sacral spine  -MG Primary Wound Type: Pressure inj  -MG    Dressing Appearance dry;intact  -CL -- --    Closure -- Open to air  -CL Open to air  -CL    Base -- pink  -CL pink  -CL    Drainage Amount -- none  -CL none  -CL    Retired Wound - Properties Group Placement Date: 10/24/22  -MG Placement Time: 1344  -MG Present on Hospital Admission: Y  -MG Side: --  -MG, middle  Orientation: medial  -MG Location: sacral spine  -MG Primary Wound Type: Pressure inj  -MG    Retired Wound - Properties Group Date first assessed: 10/24/22  -MG Time first assessed: 1344  -MG Present on Hospital Admission: Y  -MG Side: --  -MG, middle  Location: sacral spine  -MG Primary Wound Type: Pressure inj  -MG       Wound 10/24/22 1348 medial perineum    Wound - Properties Group  Placement Date: 10/24/22  -MG Placement Time: 1348  -MG Present on Hospital Admission: Y  -MG Orientation: medial  -MG Location: perineum  -MG    Dressing Appearance dry;intact  -CL -- --    Closure -- Open to air  -CL Open to air  -CL    Base -- pink;red  -CL pink;red  -CL    Drainage Amount -- none  -CL none  -CL    Retired Wound - Properties Group Placement Date: 10/24/22  -MG Placement Time: 1348  -MG Present on Hospital Admission: Y  -MG Orientation: medial  -MG Location: perineum  -MG    Retired Wound - Properties Group Date first assessed: 10/24/22  -MG Time first assessed: 1348  -MG Present on Hospital Admission: Y  -MG Location: perineum  -MG    Row Name 11/07/22 0400 11/07/22 0002 11/06/22 2000       Wound 10/24/22 1344  medial sacral spine Pressure Injury    Wound - Properties Group Placement Date: 10/24/22  -MG Placement Time: 1344  -MG Present on Hospital Admission: Y  -MG Side: --  -MG, middle  Orientation: medial  -MG Location: sacral spine  -MG Primary Wound Type: Pressure inj  -MG    Closure Open to air  -DH Open to air  -DH Open to air  -DH    Base pink  -DH pink  -DH pink  -DH    Drainage Amount none  -DH none  -DH none  -DH    Retired Wound - Properties Group Placement Date: 10/24/22  -MG Placement Time: 1344  -MG Present on Hospital Admission: Y  -MG Side: --  -MG, middle  Orientation: medial  -MG Location: sacral spine  -MG Primary Wound Type: Pressure inj  -MG    Retired Wound - Properties Group Date first assessed: 10/24/22  -MG Time first assessed: 1344  -MG Present on Hospital Admission: Y  -MG Side: --  -MG, middle  Location: sacral spine  -MG Primary Wound Type: Pressure inj  -MG       Wound 10/24/22 1348 medial perineum    Wound - Properties Group Placement Date: 10/24/22  -MG Placement Time: 1348  -MG Present on Hospital Admission: Y  -MG Orientation: medial  -MG Location: perineum  -MG    Closure Open to air  -DH Open to air  -DH Open to air  -DH    Base pink;red  -DH pink;red  -DH  pink;red  -DH    Drainage Amount none  -DH none  -DH none  -DH    Retired Wound - Properties Group Placement Date: 10/24/22  -MG Placement Time: 1348  -MG Present on Hospital Admission: Y  -MG Orientation: medial  -MG Location: perineum  -MG    Retired Wound - Properties Group Date first assessed: 10/24/22  -MG Time first assessed: 1348  -MG Present on Hospital Admission: Y  -MG Location: perineum  -MG          User Key  (r) = Recorded By, (t) = Taken By, (c) = Cosigned By    Initials Name Provider Type    Heather Kaplan RN Registered Nurse    Demetrice Douglas RN Registered Nurse    Ilda Torres RN Registered Nurse                  Assessment & Plan    From previous notes and with minor updates.    Brief patient summary:    Patient is a 82 y.o. female with past medical history of hypothyroidism, hypertension, bipolar 1 disorder, colon cancer, diabetes mellitus type 2, hyperlipidemia, GERD, anemia and a stroke unknown who presented to [facility] on 10/23/2022 upon transfer from [facility] rehab where she had been staying since 10/19/2022 after a transverse colon resection for 5 cm mass found on colonoscopy,  at [facility] on 10/18/2022.  Pathology report in progress.  Family in [facility] reported progressive weakness lethargy and alteration in mental status over the past 2 days.She appears frail, will awaken to voice and answer but falls back to sleep. She is oriented to self and follows simple commands.  and family at bedside assisting with history .  There is no slurred speech or facial droop.  No focal deficits.  She denies headache.  She does report some chest pain.family reports shortness of air and cough productive of brown sputum.  Temperature was 102 on admission, BP was stable she was tachycardic and tachypneic.  She does not normally use home oxygen and is now stable on 4 L via nasal cannula.  She triggered sepsis.  WBCs 13.3, lactic acid normal  at 0.8 procalcitonin elevated at 0.32.  Urinalysis was negative for infection.  Chest x-ray per radiology indicated a possible small left midlung infiltrate and bronchitis.  She was started on IV vancomycin and IV cefepime in ED for hospital-acquired pneumonia.  Family reports no past medical history of heart failure or coronary artery disease.  Troponin mildly elevated at 0.87.  Patient reported chest pain but  could give no other details.  May be secondary to tachycardia and chest pain pleuritic, however serial troponins and continuous cardiac monitoring have been ordered. EKG shows no acute ST elevation. and family report she is a DNR with no CPR however they agree to intubation if needed and full support otherwise.  Creatinine mildly elevated at 1.13 BUN 33.    Family reports no history of chronic renal failure.PMH includes hypothyroidism, Bipolar disorder, Diabetes Mellitus type 2 with glucose today 439 and overactive bladder post re-suspension with mesh.  She was given an IV fluid bolus in ED and will be admitted for antibiotics for possible pneumonia with blood cultures pending.  Family concerned about possible aspiration.  He reports she had her esophagus stretched a few months ago and underwent a swallow study prior to stretching which showed esophageal dysmotility. They noticed recently after her being intubated for surgery she spits up food.  Speech has been consulted.  Aspiration precautions in place.          ARIPiprazole, 2 mg, Oral, Nightly  aspirin, 81 mg, Oral, Daily  budesonide, 0.5 mg, Nebulization, BID - RT  clopidogrel, 75 mg, Oral, Daily  divalproex, 500 mg, Oral, BID  docusate sodium, 100 mg, Oral, BID  ferrous sulfate, 324 mg, Oral, BID With Meals  furosemide, 20 mg, Intravenous, Q12H  guaiFENesin, 600 mg, Oral, Q12H  insulin glargine, 16 Units, Subcutaneous, QAM  insulin lispro, 3-14 Units, Subcutaneous, TID With Meals  ipratropium-albuterol, 3 mL, Nebulization, 4x Daily -  RT  levothyroxine, 50 mcg, Oral, Daily  losartan, 25 mg, Oral, Q24H  magnesium oxide, 400 mg, Oral, Daily  metoprolol succinate XL, 25 mg, Oral, Q24H  pantoprazole, 40 mg, Oral, QAM  polyethylene glycol, 17 g, Oral, Daily  risperiDONE, 0.5 mg, Oral, Daily  rosuvastatin, 20 mg, Oral, Daily  Scopolamine, 1 patch, Transdermal, Q72H  spironolactone, 25 mg, Oral, Daily             Active Hospital Problems:  Active Hospital Problems    Diagnosis    • **Altered mental status, unspecified altered mental status type    • HAP (hospital-acquired pneumonia)    • Acute respiratory distress    • Elevated troponin    • Sepsis (HCC)    • Type 2 diabetes mellitus (HCC)    • Anxiety associated with depression    • Hypothyroidism (acquired)    • OAB (overactive bladder)    • GERD without esophagitis    • Acute non-ST elevation myocardial infarction (NSTEMI) (HCC)    • Dyslipidemia    • Cancer of transverse colon (HCC)           Assessment/plan:      -Continue appropriate patient's home medications for other chronic medical conditions.  -Continue the present level of care.  -Patient and family agreed with the plan of care.    Pleural effusion.  -Patient is status postthoracentesis.  -Follow pulmonary recommendations.      Cough.  -Improving.  -Treat with Tessalon Perles.  -Patient is also on Mucinex.     Acute toxic metabolic encephalopathy/AMS      -resolving, and likely multifactorial.  Psych started pt on Abilify and adjusted Depakote for mood stabilization.       -Pt is still confused, and can't give clear ROS.  Meds to be adjusted by Psych.     Probable sepsis      -off Zosyn, now on Augmentin      -Duo nebs and Mucomyst nebs added for thick secretions and wheezing     Pneumonia involving the left lung      -Continue Augmentin, and supportive care      NSTEMI/Elevated troponin       -scheduled for [facility] (10/27), DANIA to LAD.       -Cp free and in the setting of suspected sepsis     History of colorectal cancer (invasive mucinous  adenocarcinoma)      -S/p resection (10/22)     Dysphagia      -followed by speech pathologist      CVA      -Aspirin/Crestor     T2DM      -SSI/Lantus     Hypertension     HLD     -Crestor     Hypothyroidism      -Levothyroxine     Depression/anxiety      -Risperidone on hold 2/2 increase QT prolongation and consult Psychiatrist        -Depakote dose adjustment and initiation of Abilify, per Psych.      -Pt is still not at baseline, and is not communicating well, and can't give ROS.       DVT prophylaxis:  Mechanical DVT prophylaxis orders are present.    CODE STATUS:    Medical Intervention Limits: NO intubation (DNI)  Level Of Support Discussed With: Next of Kin (If No Surrogate); Health Care Surrogate  Code Status (Patient has no pulse and is not breathing): No CPR (Do Not Attempt to Resuscitate)  Medical Interventions (Patient has pulse or is breathing): Limited Support  Release to patient: Routine Release       Disposition: This wonderful patient can discharge in the next 24 hours.      Electronically signed by Alejandro Estrada MD, FACP, 11/07/22, 18:08 EST.        [facility] Hospitalist Team

## 2022-11-07 NOTE — PROCEDURES
Thoracentesis procedure     Name: Laura Perkins  MRN: [id number]  :  1940  Age: 82 y.o.  Sex: female    Time of procedure: 17:27 EST      Date of Operation: 2022     Pre-op Diagnosis: Pleural effusion  Site: Left side  Post-op Diagnosis: same    Surgeon: Reed Perez MD    Informed consent obtained  Time out performed with staff confirming patient ID, procedure and location.    Sample: Pleural fluids  Procedure: X-ray was reviewed, informed consent was obtained. Patient was sitting, physical exam was used to localize the fluid then ultrasound was used to localize the fluid which was at the 10th intercostal space posterior axillary line (left) side , preparing the site with chlorhexidine, then lidocaine 1% 5 mL was used as a local anesthesia for the skin tissue above the rib, pleura, then into the pleural space yellow fluid was draining, 0.5 cm incision was made, a drainage catheter was placed, the guidewire passed above the rib edge, into the pleural space, yellow fluid draining total amount (1100) , patient tolerated the procedure very well. No active complication chest x-ray to follow. Fluid was sent out to the lab.     Reed Perez MD. D, ABSM.  2022  17:27 EST

## 2022-11-07 NOTE — PLAN OF CARE
Problem: Pain Acute  Goal: Acceptable Pain Control and Functional Ability  Outcome: Ongoing, Progressing  Intervention: Prevent or Manage Pain  Recent Flowsheet Documentation  Taken 11/7/2022 0810 by Demetrice Monzon RN  Sleep/Rest Enhancement:   regular sleep/rest pattern promoted   relaxation techniques promoted     Problem: Fall Injury Risk  Goal: Absence of Fall and Fall-Related Injury  Outcome: Ongoing, Progressing  Intervention: Promote Injury-Free Environment  Recent Flowsheet Documentation  Taken 11/7/2022 1210 by Demetrice Monzon, RN  Safety Promotion/Fall Prevention:   safety round/check completed   room organization consistent   nonskid shoes/slippers when out of bed  Taken 11/7/2022 1021 by Demetrice Monzon, RN  Safety Promotion/Fall Prevention:   room organization consistent   safety round/check completed   nonskid shoes/slippers when out of bed   fall prevention program maintained  Taken 11/7/2022 0810 by Demetrice Monzon, RN  Safety Promotion/Fall Prevention:   room organization consistent   safety round/check completed   nonskid shoes/slippers when out of bed   Goal Outcome Evaluation:              Outcome Evaluation: Pt has had some complaints of pain this shift. See MAR for new orders. Thoracentesis was also performed today by Dr. Perez at bedside this AM

## 2022-11-07 NOTE — CONSULTS
Palliative Care Consultation    Patient Name: Laura Perkins  : 1940  MRN: [id number]  Allergies: Patient has no known allergies.    Requesting clinician:  Natalie  Reason for consult: Consultation for clarification of goals of care and code status.      Patient Code Status:   Code Status and Medical Interventions:   Ordered at: 10/23/22 2120     Code Status (Patient has no pulse and is not breathing):    No CPR (Do Not Attempt to Resuscitate)     Medical Interventions (Patient has pulse or is breathing):    Full Support           Chief Complaint:    weakness    History of Present Illness    Luara Perkins is a 82 y.o. female who presented to [facility] ED on 10/23 with reports of generalized weakness and altered mental status. Patient unable to participate in HPI at time of presentation.  assisted. He stated patient had been less communicative and more lethargic. EMS reported likely fever. No reports of chest pain, nausea, vomiting, or shortness of breath.     In ED:Troponin 0.872, Procalcitonin 0.32, WBC 13.3, Hgb 11.6, Platelets 380, Glucose 439, Creatinine 1.13, Albumin 3.4, Alk phos 138, Lactate 0.8    Patient noted to have a fever of 102 in ED. Cultures obtained. Started on IV antibiotics.     XR Chest 1 View   Result Date: 10/23/2022   1. Questionable small focal infiltrate in the periphery left midlung zone. 2. Bilateral diffuse increased lung markings consistent with chronic lung disease and bronchitis  Electronically Signed By-Tony Bonds MD On:10/23/2022 6:39 PM This report was finalized on [id number] by  Tony Bonds MD.    Cultures obtained, patient started on IV antibiotics. Family concerned for aspiration.Speech following.     Cardiology consulted for elevated troponin and likely NSTEMI. Conservative management suggested. Patient with continued chest pain. Taken for cardiac cath on 10/27.     Patient had a fall on 10/27. More agitated.  concerned for bipolar med  adjustments. Psych consulted.     Patient with worsening respiratory status on 10/30. Pulmonary consulted. Noted with multifocal pneumonia.     11/7 Palliative consult for goals of care discussion in an acutely ill patient.     VITAL SIGNS:   Temp:  [97.4 °F (36.3 °C)-99.6 °F (37.6 °C)] 97.4 °F (36.3 °C)  Heart Rate:  [67-84] 69  Resp:  [18-20] 18  BP: (144-161)/(45-55) 161/46       PMH: CVA, DM, HTN, HLD, Bipolar, Colon Ca    Past Surgical History:   Procedure Laterality Date   • BLADDER SURGERY     • CARDIAC CATHETERIZATION N/A 10/27/2022    Procedure: Left Heart Cath, possible pci;  Surgeon: Andrea Melvin MD;  Location: [facility] CATH INVASIVE LOCATION;  Service: Cardiovascular;  Laterality: N/A;   • COLON RESECTION N/A 10/18/2022    Procedure: COLON RESECTIOn TRANSVERSE LAPAROSCOPIC;  Surgeon: Toño Michelle MD;  Location: [facility] OR;  Service: General;  Laterality: N/A;   • COLONOSCOPY  09/21/2022    [facility]   • ENDOSCOPY  09/21/2022    [facility]   • EYE SURGERY     • HEMORRHOIDECTOMY     • HYSTERECTOMY         Family History   Problem Relation Age of Onset   • Heart disease Mother    • Diabetes Mother    • No Known Problems Father    • Cancer Brother        Social History     Tobacco Use   • Smoking status: Never   • Smokeless tobacco: Never   Vaping Use   • Vaping Use: Never used   Substance Use Topics   • Alcohol use: Never   • Drug use: Never           LABS:    Results from last 7 days   Lab Units 11/06/22  0745   WBC 10*3/mm3 17.60*   HEMOGLOBIN g/dL 8.6*   HEMATOCRIT % 29.0*   PLATELETS 10*3/mm3 505*     Results from last 7 days   Lab Units 11/06/22  0745   SODIUM mmol/L 137   POTASSIUM mmol/L 4.5   CHLORIDE mmol/L 98   CO2 mmol/L 30.0*   BUN mg/dL 12   CREATININE mg/dL 0.64   GLUCOSE mg/dL 231*   CALCIUM mg/dL 8.5*     Results from last 7 days   Lab Units 11/06/22  0745   SODIUM mmol/L 137   POTASSIUM mmol/L 4.5   CHLORIDE mmol/L 98   CO2 mmol/L 30.0*   BUN  mg/dL 12   CREATININE mg/dL 0.64   CALCIUM mg/dL 8.5*   BILIRUBIN mg/dL 0.2   ALK PHOS U/L 121*   ALT (SGPT) U/L 14   AST (SGOT) U/L 13   GLUCOSE mg/dL 231*         IMAGING STUDIES:  XR Chest 1 View    Result Date: 11/6/2022    1.  Stable cardiomegaly. 2.  No change in prominent interstitial markings favored to be due to interstitial edema. 3.  No change in left basilar airspace disease and moderate left pleural effusion.   Electronically Signed By-Mk Waldrop MD On:11/6/2022 6:22 PM This report was finalized on [id number] by  Mk Waldrop MD.        I reviewed the patient's new clinical results including labs, imaging, and vitals.        Scheduled Meds:  ARIPiprazole, 2 mg, Oral, Nightly  aspirin, 81 mg, Oral, Daily  budesonide, 0.5 mg, Nebulization, BID - RT  clopidogrel, 75 mg, Oral, Daily  divalproex, 500 mg, Oral, BID  docusate sodium, 100 mg, Oral, BID  ferrous sulfate, 324 mg, Oral, BID With Meals  furosemide, 20 mg, Intravenous, Q12H  guaiFENesin, 600 mg, Oral, Q12H  insulin glargine, 16 Units, Subcutaneous, QAM  insulin lispro, 3-14 Units, Subcutaneous, TID With Meals  ipratropium-albuterol, 3 mL, Nebulization, 4x Daily - RT  levothyroxine, 50 mcg, Oral, Daily  losartan, 25 mg, Oral, Q24H  magnesium oxide, 400 mg, Oral, Daily  metoprolol succinate XL, 25 mg, Oral, Q24H  pantoprazole, 40 mg, Oral, QAM  polyethylene glycol, 17 g, Oral, Daily  rosuvastatin, 20 mg, Oral, Daily  Scopolamine, 1 patch, Transdermal, Q72H  spironolactone, 25 mg, Oral, Daily      Continuous Infusions:       I have reviewed patient's current medication list.     Review of Systems   Constitutional: Positive for fatigue.   Neurological: Positive for weakness.   All other systems reviewed and are negative.        Physical Exam  Vitals and nursing note reviewed.   Constitutional:       Appearance: She is ill-appearing.   HENT:      Head: Normocephalic and atraumatic.      Nose: Nose normal.      Comments: Nasal canula       Mouth/Throat:      Mouth: Mucous membranes are dry.      Pharynx: Oropharynx is clear.   Eyes:      Extraocular Movements: Extraocular movements intact.      Conjunctiva/sclera: Conjunctivae normal.      Pupils: Pupils are equal, round, and reactive to light.   Cardiovascular:      Rate and Rhythm: Normal rate.      Pulses: Normal pulses.   Pulmonary:      Effort: Pulmonary effort is normal.   Abdominal:      General: Abdomen is flat.   Musculoskeletal:         General: Normal range of motion.      Cervical back: Normal range of motion.   Skin:     General: Skin is warm and dry.      Coloration: Skin is pale.   Neurological:      General: No focal deficit present.      Mental Status: She is alert. She is disoriented.             PROBLEM LIST:    Altered mental status, unspecified altered mental status type    Cancer of transverse colon (HCC)    HAP (hospital-acquired pneumonia)    Acute respiratory distress    Elevated troponin    Sepsis (HCC)    Type 2 diabetes mellitus (HCC)    Anxiety associated with depression    Hypothyroidism (acquired)    OAB (overactive bladder)    GERD without esophagitis    Acute non-ST elevation myocardial infarction (NSTEMI) (HCC)    Dyslipidemia          ASSESSMENT/PLAN:    Altered mental status: likely multifactorial related to infectious process. Improving.     Sepsis: with elevated WBC, fevers, and sinus tach. Patient lactic normal. Left lobe infiltrate noted on CT. On IV antibiotics. Fluids obtained.     Acute hypoxic respiratory failure: with declining respiratory status. Pulmonary consulted on 10/30. Noted with multifocal pneumonia and pleural effusions. On IV antibiotics.     Elevated troponin: no history of CAD. Trended per primary. Cardiology consulted for NSTEMI. Echo ordered. Taken for cardiac cath on 10/27. Stent to proximal LAD.     Colon mass: 5cm with recent resection. Pathology pending.     DM/Anxiety/Hypothyroidism/GERD/Bipolar: chronic conditions per primary.    These   illnesses and their management contribute to the need for a palliative consult and advanced care planning.      ADVANCED CARE PLANNIN/7 Met with spouse at bedside this am. Patient is awake and alert, although frequently tossing in the bed, limited in her communication. We discussed the patients current clinical status and overall goals of care. Spouse tells me that prior to this recent hospitalization, patient was able to ambulate around their home with a walker, she was able to make food and eat independently. He does state that she had been experiencing an increase in short term memory loss, but was still communicative and involved in conversation. We discussed her recent cancer diagnosis. He tells me that the patient had a recent resection at [facility] and was told that there was no need for additional follow up with oncologist because all margins were negative and she had no additional cancer on any scans. He states that his goal is ultimately for her to improve enough that she can return home safely with him. He states that her pneumonia, bipolar medication management, and swallowing issues have significantly changed her baseline and he knows that he cannot manage her care at home. He is agreeable to inpatient rehab at discharge and still wants to continue with aggressive treatment at this time. We discussed advanced directives. Patient tells me that they have 3 living children, but that he is the patients POA. He also states that he believes that the patient has a living will but he knows that she would not want to go on the ventilator and she would not want CPR. I will update code status to DNR/DNI. We will continue to follow pending patients clinical course. This information was shared with nursing.       Advanced Directives: Patient has advance directive, copy in chart  Health Care Directive on file: No  Health Care Surrogate:      Palliative Performance Scale Score:    Comments:            Decisional Capacity: questionable  Patient's understanding of illness: unknown  Patient goals of care:  DNR/DNI      Thank you for this consult and allowing us to participate in patient's plan of care. Palliative Care Team will continue to follow patient.       I spent 56 minutes reviewing medical and medication records, assessing and examining patient, discussing with family, answering questions, providing some guidance about a plan and documentation of care, and coordinating care with other healthcare members. More than 50% of time spent face to face discussing disease education, current clinical status, and medication management.     I spent an additional 23 minutes on advanced care planning, goals of care, and code status discussion.  I obtained consent for ACP discussion.     Mila Gaona, APRN  11/7/2022

## 2022-11-07 NOTE — CASE MANAGEMENT/SOCIAL WORK
Continued Stay Note  THANH Wharton     Patient Name: Laura Perkins  MRN: [id number]  Today's Date: 11/7/2022    Admit Date: 10/23/2022    Plan: Return to [facility]. no precert needed.   Discharge Plan     Row Name 11/07/22 1509       Plan    Plan Return to [facility], [facility]. no precert needed.    Patient/Family in Agreement with Plan yes    Plan Comments Barriers to discharge: thoracentesis today.              Expected Discharge Date and Time     Expected Discharge Date Expected Discharge Time    Nov 9, 2022           Phone communication or documentation only - no physical contact with patient or family.  Cherie Galvan RN     Office Phone [phone]  Office Cell [phone]

## 2022-11-07 NOTE — THERAPY RE-EVALUATION
Acute Care - Speech Language Pathology   Swallow Re-Evaluation [facility]     Patient Name: Laura Perkins  : 1940  MRN: [id number]  Today's Date: 2022               Admit Date: 10/23/2022    Visit Dx:     ICD-10-CM ICD-9-CM   1. Acute non-ST elevation myocardial infarction (NSTEMI) ([facility])  I21.4 410.70   2. Altered mental status, unspecified altered mental status type  R41.82 780.97   3. Fever, unspecified fever cause  R50.9 780.60   4. Pneumonia of left lung due to infectious organism, unspecified part of lung  J18.9 486   5. Elevated troponin  R77.8 790.6   6. Type 2 diabetes mellitus without complication, with long-term current use of insulin ([facility])  E11.9 250.00    Z79.4 V58.67   7. Dyslipidemia  E78.5 272.4     Patient Active Problem List   Diagnosis   • Pneumonia due to COVID-19 virus   • Acute renal injury (HCC)   • Cancer of transverse colon ([facility])   • Altered mental status, unspecified altered mental status type   • HAP (hospital-acquired pneumonia)   • Acute respiratory distress   • Elevated troponin   • Sepsis (HCC)   • Type 2 diabetes mellitus (HCC)   • Anxiety associated with depression   • Hypothyroidism (acquired)   • OAB (overactive bladder)   • GERD without esophagitis   • Acute non-ST elevation myocardial infarction (NSTEMI) ([facility])   • Dyslipidemia     Past Medical History:   Diagnosis Date   • Acid reflux    • Anemia    • Anemia    • Bipolar 1 disorder (HCC)    • Colon cancer (HCC)    • Diabetes mellitus ([facility])     Type 2   • Disease of thyroid gland    • Hyperlipidemia    • Hypertension     TAKEN OFF HTN MEDS OVER 5 YEARS AGO   • Incontinence of urine    • Stroke ([facility])          Past Surgical History:   Procedure Laterality Date   • BLADDER SURGERY     • CARDIAC CATHETERIZATION N/A 10/27/2022    Procedure: Left Heart Cath, possible pci;  Surgeon: Andrea Melvin MD;  Location: [facility] INVASIVE LOCATION;  Service: Cardiovascular;  Laterality: N/A;   • COLON RESECTION N/A  10/18/2022    Procedure: COLON RESECTIOn TRANSVERSE LAPAROSCOPIC;  Surgeon: Toño Michelle MD;  Location: [facility] MAIN OR;  Service: General;  Laterality: N/A;   • COLONOSCOPY  09/21/2022    [facility]   • ENDOSCOPY  09/21/2022    [facility]   • EYE SURGERY     • HEMORRHOIDECTOMY     • HYSTERECTOMY         SLP Recommendation and Plan  SLP Swallowing Diagnosis: moderate, mod-severe, oral dysphagia, pharyngeal dysphagia (11/07/22 1300)  SLP Diet Recommendation: puree, nectar thick liquids (ntl by spoon) (11/07/22 1300)  Recommended Precautions and Strategies: upright posture during/after eating, small bites of food and sips of liquid, alternate between small bites of food and sips of liquid, general aspiration precautions, reflux precautions, fatigue precautions, 1:1 supervision, other (see comments) (11/07/22 1300)  SLP Rec. for Method of Medication Administration: meds whole, meds crushed, with thick liquids, with puree, as tolerated (11/07/22 1300)     Monitor for Signs of Aspiration: yes, notify SLP if any concerns, cough, elevated WBC count, gurgly voice, throat clearing, fever, upper respiratory, pneumonia, right lower lobe infiltrates (11/07/22 1300)  Recommended Diagnostics: reassess via clinical swallow evaluation, reassess via VFSS ([facility]) (11/07/22 1300)  Swallow Criteria for Skilled Therapeutic Interventions Met: demonstrates skilled criteria (11/07/22 1300)  Anticipated Discharge Disposition (SLP): extended care facility (11/07/22 1300)  Rehab Potential/Prognosis, Swallowing: adequate, monitor progress closely (11/07/22 1300)  Therapy Frequency (Swallow): PRN (11/07/22 1300)  Predicted Duration Therapy Intervention (Days): until discharge (11/07/22 1300)                            PPE: surgical mask, eye protection, gloves                   SWALLOW EVALUATION (last 72 hours)     SLP Adult Swallow Evaluation     Row Name 11/07/22 1300          Subjective Information complains  of;pain  following thoracentesis  -EC    Patient Observations alert;cooperative  -EC    Patient/Family/Caregiver Comments/Observations spouse at bedside  -EC    Patient Effort good  -EC    Comment Re-eval of swallow completed this date. Pt reportedly vomited after PO yesterday and was then made NPO until ST re-eval. Pt in bed upon entry, repositioned to be sitting up at 90 degree hip flexion for PO trials. Spouse at bedside reporting pt vomited after PO yesterday. Spouse was unsure if strict reflux precautions were followed prior to vomiting (sitting upright for PO and for min of 30min following, slow rate, smaller more frequent meals as opposed to 3 large meals) Pt takes trials of NTL via spoon and puree pudding for re-eval. Oral transit is WFL and pt adequately clears oral cavity betwen bites. Pt w/dry coughing noted 2x over course of multiple bites and sips, no overt clinical s/s of aspiration demonstrated at anytime. Recommend resume puree diet w/NTL via spoon following STRICT REFLUX PRECAUTIONS due to poor esophageal clearance and reflux to the level of the UES seen on most recent VFSS. Extensive education re: VFSS results and reflux precautions completed w/spouse. ST to continue to follow for diet tolerance w/further recs as indicated.  -EC    Symptoms Noted During/After Treatment none  -EC          Patient Profile Reviewed yes  -EC          SLP Swallowing Diagnosis moderate;mod-severe;oral dysphagia;pharyngeal dysphagia  -EC    Functional Impact risk of aspiration/pneumonia;risk of malnutrition;risk of dehydration  -EC    Rehab Potential/Prognosis, Swallowing adequate, monitor progress closely  -EC    Swallow Criteria for Skilled Therapeutic Interventions Met demonstrates skilled criteria  -EC          Care Plan Review care plan/treatment goals reviewed;patient/other agree to care plan  -EC    Care Plan Review, Other Participant(s) spouse  -EC          Therapy Frequency (Swallow) PRN  -EC    Predicted  Duration Therapy Intervention (Days) until discharge  -EC    SLP Diet Recommendation puree;nectar thick liquids  ntl by spoon  -EC    Recommended Diagnostics reassess via clinical swallow evaluation;reassess via [place]  -EC    Recommended Precautions and Strategies upright posture during/after eating;small bites of food and sips of liquid;alternate between small bites of food and sips of liquid;general aspiration precautions;reflux precautions;fatigue precautions;1:1 supervision;other (see comments)  -EC    Oral Care Recommendations Oral Care BID/PRN  -EC    SLP Rec. for Method of Medication Administration meds whole;meds crushed;with thick liquids;with puree;as tolerated  -EC    Monitor for Signs of Aspiration yes;notify SLP if any concerns;cough;elevated WBC count;gurgly voice;throat clearing;fever;upper respiratory;pneumonia;right lower lobe infiltrates  -EC    Anticipated Discharge Disposition (SLP) extended care facility  -EC          Swallow LTGs Swallow Long Term Goal (free text)  -EC    Swallow STGs diet tolerance goal selection (SLP)  -EC    Diet Tolerance Goal Selection (SLP) Swallow Short Term Goal 1;Swallow Short Term Goal 2  -EC          (LTG) Swallow The patient will maximize swallow function for least restrictive po diet, exhibiting no complications associated with dysphagia, adequate po intake, and demonstrating independent use of safe swallow compensations.  -EC    [place] (Swallow Long Term Goal) with moderate cues (50-74% accuracy)  -EC    Time Frame (Swallow Long Term Goal) by discharge  -EC    Progress/Outcomes (Swallow Long Term Goal) goal ongoing  -EC          (STG) Swallow 1 The patient will participate in ongoing assessment of swallow, including re-evaluation clinically and/or including instrumental assessment of swallow if indicated, to further assess swallow function in anticipation to initiate a po diet  -EC    [place] (Swallow Short Term Goal 1) with maximum cues (25-49%  accuracy)  -EC    Time Frame (Swallow Short Term Goal 1) 1 week  -EC    Progress/Outcomes (Swallow Short Term Goal 1) goal ongoing  -EC          (STG) Swallow 2 The patient will participate in a full meal assessment to determine safety and adequacy of recommended diet, independent use of safe swallow compensations, and additional goals/recommendations to follow  -EC    [place] (Swallow Short Term Goal 2) with moderate cues (50-74% accuracy)  -EC    Time Frame (Swallow Short Term Goal 2) 1 week  -EC    Progress/Outcomes (Swallow Short Term Goal 2) goal ongoing  -EC          User Key  (r) = Recorded By, (t) = Taken By, (c) = Cosigned By    Initials Name Effective Dates    EC Harika Carranzath 06/16/21 -                 EDUCATION  The patient has been educated in the following areas:   Dysphagia (Swallowing Impairment).        SLP GOALS     Row Name 11/07/22 1300             (LTG) Swallow    (LTG) Swallow The patient will maximize swallow function for least restrictive po diet, exhibiting no complications associated with dysphagia, adequate po intake, and demonstrating independent use of safe swallow compensations.  -EC      [place] (Swallow Long Term Goal) with moderate cues (50-74% accuracy)  -EC      Time Frame (Swallow Long Term Goal) by discharge  -EC      Progress/Outcomes (Swallow Long Term Goal) goal ongoing  -EC         (STG) Swallow 1    (STG) Swallow 1 The patient will participate in ongoing assessment of swallow, including re-evaluation clinically and/or including instrumental assessment of swallow if indicated, to further assess swallow function in anticipation to initiate a po diet  -EC      [place] (Swallow Short Term Goal 1) with maximum cues (25-49% accuracy)  -EC      Time Frame (Swallow Short Term Goal 1) 1 week  -EC      Progress/Outcomes (Swallow Short Term Goal 1) goal ongoing  -EC         (STG) Swallow 2    (STG) Swallow 2 The patient will participate in a full meal assessment to  determine safety and adequacy of recommended diet, independent use of safe swallow compensations, and additional goals/recommendations to follow  -EC      [place] (Swallow Short Term Goal 2) with moderate cues (50-74% accuracy)  -EC      Time Frame (Swallow Short Term Goal 2) 1 week  -EC      Progress/Outcomes (Swallow Short Term Goal 2) goal ongoing  -EC            User Key  (r) = Recorded By, (t) = Taken By, (c) = Cosigned By    Initials Name Provider Type    EC Winifred Carranza Speech and Language Pathologist                   Time Calculation:                Winifred Carranza  11/7/2022

## 2022-11-07 NOTE — PROGRESS NOTES
"PULMONARY CRITICAL CARE PROGRESS  NOTE      PATIENT IDENTIFICATION:  Name: Laura Perkins  MRN: [id number]  :  1940     Age: 82 y.o.  Sex: female    DATE OF Note:  2022   Referring Physician: Alejandro Estrada MD                  Subjective:   Laying in bed, very weak, chest x-ray shows large left pleural effusion, no SOB, no chest or abdominal pain, no nausea or vomiting, no bowel or bladder issues reported      Objective:  tMax 24 hrs: Temp (24hrs), Av °F (36.7 °C), Min:97.4 °F (36.3 °C), Max:99.6 °F (37.6 °C)      Vitals Ranges:   Temp:  [97.4 °F (36.3 °C)-99.6 °F (37.6 °C)] 97.8 °F (36.6 °C)  Heart Rate:  [67-84] 75  Resp:  [18-20] 20  BP: (138-161)/(45-55) 138/54    Intake and Output Last 3 Shifts:   I/O last 3 completed shifts:  In: 1430 [P.O.:480; IV Piggyback:950]  Out: 2500 [Urine:2500]    Exam:  /54   Pulse 75   Temp 97.8 °F (36.6 °C) (Oral)   Resp 20   Ht 162.6 cm (64\")   Wt 65 kg (143 lb 4.8 oz)   SpO2 96%   BMI 24.60 kg/m²     General Appearance: Alert  HEENT:  Normocephalic, without obvious abnormality. Conjunctivae/corneas clear.  Normal external ear canals. Nares normal, no drainage     Neck:  Supple, symmetrical, trachea midline. No JVD.  Lungs /Chest wall:   Bilateral basal rhonchi, respirations unlabored, symmetrical wall movement.     Heart:  Regular rate and rhythm, systolic murmur PMI left sternal border  Abdomen: Soft, nontender, no masses, no organomegaly.    Extremities: Trace edema, no clubbing or cyanosis        Medications:  ARIPiprazole, 2 mg, Oral, Nightly  aspirin, 81 mg, Oral, Daily  budesonide, 0.5 mg, Nebulization, BID - RT  clopidogrel, 75 mg, Oral, Daily  divalproex, 500 mg, Oral, BID  docusate sodium, 100 mg, Oral, BID  ferrous sulfate, 324 mg, Oral, BID With Meals  furosemide, 20 mg, Intravenous, Q12H  guaiFENesin, 600 mg, Oral, Q12H  insulin glargine, 16 Units, Subcutaneous, QAM  insulin lispro, 3-14 Units, Subcutaneous, TID With " Meals  ipratropium-albuterol, 3 mL, Nebulization, 4x Daily - RT  levothyroxine, 50 mcg, Oral, Daily  losartan, 25 mg, Oral, Q24H  magnesium oxide, 400 mg, Oral, Daily  metoprolol succinate XL, 25 mg, Oral, Q24H  pantoprazole, 40 mg, Oral, QAM  polyethylene glycol, 17 g, Oral, Daily  risperiDONE, 0.5 mg, Oral, Daily  rosuvastatin, 20 mg, Oral, Daily  Scopolamine, 1 patch, Transdermal, Q72H  spironolactone, 25 mg, Oral, Daily        Infusion:        PRN:  •  acetaminophen  •  acetaminophen  •  acetaminophen  •  benzonatate  •  Calcium Gluconate-NaCl **AND** calcium gluconate **AND** Calcium, Ionized  •  dextrose  •  dextrose  •  glucagon (human recombinant)  •  HYDROcodone-acetaminophen  •  ipratropium-albuterol  •  LORazepam  •  LORazepam  •  magnesium sulfate **OR** magnesium sulfate **OR** magnesium sulfate  •  Morphine  •  phenol  •  potassium & sodium phosphates **OR** potassium & sodium phosphates  •  potassium chloride  •  potassium chloride  •  [COMPLETED] Insert peripheral IV **AND** sodium chloride  Data Review:  All labs (24hrs):   Recent Results (from the past 24 hour(s))   POC Glucose Once    Collection Time: 11/06/22  4:51 PM    Specimen: Blood   Result Value Ref Range    Glucose 184 (H) 70 - 105 mg/dL   Magnesium    Collection Time: 11/07/22  4:39 AM    Specimen: Blood   Result Value Ref Range    Magnesium 2.5 (H) 1.6 - 2.4 mg/dL   POC Glucose Once    Collection Time: 11/07/22  7:37 AM    Specimen: Blood   Result Value Ref Range    Glucose 209 (H) 70 - 105 mg/dL   pH, Body Fluid - Body Fluid, Pleural Cavity    Collection Time: 11/07/22 10:33 AM    Specimen: Pleural Cavity; Body Fluid   Result Value Ref Range    pH, Fluid 7.50 6.50 - 7.50   Body fluid cell count - Body Fluid, Pleural Cavity    Collection Time: 11/07/22 10:33 AM    Specimen: Pleural Cavity; Body Fluid   Result Value Ref Range    Color, Fluid Yellow     Appearance, Fluid Clear Clear    Nucleated Cells, Fluid 727 /mm3    Method: Automated  DXH Analyzer    POC Glucose Once    Collection Time: 11/07/22 11:45 AM    Specimen: Blood   Result Value Ref Range    Glucose 155 (H) 70 - 105 mg/dL        Imaging:  XR Chest 1 View  Narrative:    DATE OF EXAM:   11/7/2022 11:24 AM     PROCEDURE:   XR CHEST 1 VW-     INDICATIONS:   chest tube; I21.4-Non-ST elevation (NSTEMI) myocardial infarction;  R41.82-Altered mental status, unspecified; R50.9-Fever, unspecified;  J18.9-Pneumonia, unspecified organism; R77.8-Other specified  abnormalities of plasma proteins; E11.9-Type 2 diabetes mellitus without  complications; Z79.4-Long term (current) use of insulin;  E78.5-Hyperlipidemia, unspecified     COMPARISON:  11/06/2022     TECHNIQUE:   Portable chest radiograph.     FINDINGS:   There is no chest tube visualized. The left-sided large effusion is  decreased in size. Persistent bibasilar airspace disease left greater  than right. No pneumothorax. No significant effusion on the right. Small  residual left effusion. There is a chronic appearing fracture deformity  at the left proximal humerus partially imaged     Impression: 1. Decrease in size of left pleural effusion with small residual  effusion. No pneumothorax.  2. Persistent bibasilar airspace disease left greater than right.  3. No chest tube visualized.     Electronically Signed By-James Win MD On:11/7/2022 11:29 AM  This report was finalized on [id number] by  James Win MD.       ASSESSMENT:  AMS  Mixed respiratory and metabolic alkalosis  Bilateral pleural effusion left greater than right  Bilateral pneumonia and possible compressive atelectasis  CVA  Hypertension  Bipolar disorder  Diabetes mellitus type 2  Invasive adenocarcinoma of the colon s/p resection 10/22  CAD s/p stents 10/27    PLAN:  S/p thoracentesis with 1100 cc removed some of the fluid sent for culture  We will get a chest x-ray in a.m.  Bronchodilators  Inhaled corticosteroids  Incentive spirometer  Diuresis monitor MARIAN's  Electrolytes/  glycemic control  DVT and GI prophylaxis    Discussed with Dr. Chris Jacobson, ROMAN  11/7/2022  11:54 EST    I personally have examined  and interviewed the patient. I have reviewed the history, data, problems, assessment and plan with our NP.  Total Critical care time in direct medical management (   ) minutes, This time specifically excludes time spent performing procedures.    Reed Perez MD   11/7/2022  20:59 EST        Dr Gann

## 2022-11-07 NOTE — THERAPY TREATMENT NOTE
"Subjective: Pt agreeable to therapeutic plan of care.    Objective:     Bed mobility - Min-A  Supine to sit  Transfers - Min-A and with rolling walker sit to stand  Ambulation - 14 feet Mod-A and with rolling walker  Very unsteady    Vitals: WNL    Pain: 0 VAS   Location: N/A  Intervention for pain: N/A    Education: Provided education on the importance of mobility in the acute care setting, Verbal/Tactile Cues, Transfer Training and Gait Training    Assessment: Laura Perkins presents with functional mobility impairments which indicate the need for skilled intervention. Pt is making progress with her mobility.  Pt seems to have fairly strong ble's but very poor balance during gait training. Unsure of pt's mobility at SNF where she has been.  Tolerating session today without incident. Will continue to follow and progress as tolerated.     Plan/Recommendations:   Moderate Intensity Therapy recommended post-acute care. This is recommended as therapy feels the patient would require 3-4 days per week and wouldn't tolerate \"3 hour daily\" rehab intensity. SNF would be the preferred choice. If the patient does not agree to SNF, arrange HH or OP depending on home bound status. If patient is medically complex, consider LTACH.. Pt requires no DME at discharge.     Pt desires Skilled Rehab placement at discharge. Pt cooperative; agreeable to therapeutic recommendations and plan of care.     Basic Mobility 6-click:  Rollin = Total, A lot = 2, A little = 3; 4 = None  Supine>Sit:   1 = Total, A lot = 2, A little = 3; 4 = None   Sit>Stand with arms:  1 = Total, A lot = 2, A little = 3; 4 = None  Bed>Chair:   1 = Total, A lot = 2, A little = 3; 4 = None  Ambulate in room:  1 = Total, A lot = 2, A little = 3; 4 = None  3-5 Steps with railin = Total, A lot = 2, A little = 3; 4 = None  Score: 16    Modified [place]: N/A = No pre-op stroke/TIA    Post-Tx Position: Up in Chair, Alarms activated and Call light and " personal items within reach pt's  present  PPE: gloves, surgical mask and eye protection

## 2022-11-07 NOTE — PROGRESS NOTES
Cardiology Progress Note    Patient Identification:  Name: Laura Perkins  Age: 82 y.o.  Sex: female  :  1940  MRN: [id number]                 Follow Up / Chief Complaint: Elevated troponin, non stemi   Chief Complaint   Patient presents with   • Altered Mental Status       Interval History:  Patient presented from outlying facility with pneumonia.  Noted to have elevated troponin   Patient underwent cardiac cath 10/27/2022 with stent to proximal LAD.          Subjective: Patient seen and examined.  Chart reviewed.  Labs reviewed.  Discussed with RN taking care of patient.  Patient's  is giving history since patient is confused.  Is complaining of psych issues with agitation.      Objective: Serial troponin 0.872, 0.653, 0.793, 0.546, 0.373  10/26/2022: troponin 0.300, 0.256  Glucose 136, sodium 148 WBC 13.4, hgb 9.0  10/27/2022: troponin 0.175, WBC 12.9 hgb 8.8 EKG with diffuse T wave abnormalities and QTC prolongation   10/28/2022: troponin 0.136, glucose 215, WBC 12.2   hgb 8.6  10/30/2022: troponin 0.067, pro bnp [id number]  10/31/2022: troponin 0.05, glucose 190, creatinine 0.51  WBC 12.2, hgb 8.4  2022:  Troponin 0.057, glucose 185  2022: no labs to review   2022: Glucose elevated, CBC reveals a white count of 17.6 and hemoglobin of 8.8  2022: Chest x-ray reveals persistent bibasilar airspace disease left greater than right      History of Present Illness:        Mrs. Laura Perkins has a Past medical history of      - hypertension  - dyslipidemia   - diabetes   - anemia, GERD  - stroke   - bipolar disorder  - Colon cancer- s/p resection 10/18/2022  - bladder surgery, eye surgery, hysterectomy, hemorrhoidectomy   -  Non smoker  - no allergies  - family history of heart disease in mother         Presented from rehab center to the ED on 10/24/2022 for confusion/alerted mental status.  Patient was recently admitted to [facility] for transverse colon resection on  10/18/2022.  Patient was discharged to rehab. In the ED patient was noted to have fever 102. CXR showed left sided pneumonia and patient was started on IVFs and antibiotics.  Cardiology has been consulted for elevated troponin.   Patient alert and oriented and gives some history but is poor historian and most of information above was obtained from chart review.  She denies any chest pain but does state she is short of breath and coughing.  RN reported patient aspirated and had swallow study now on thickened liquid.      Serial troponins have been elevated but non-trending 0.872, 0.653, 0.793, 0.546, 0.373  EKG showed sinus tachycardia with diffuse T wave inversion that is changed from previous EKG on 10/11/2022 that showed normal sinus rhythm without any ST abnormalities.       Assessment:  :     Acute non-ST elevation MI  Presumed CAD  Hypertension, dyslipidemia, diabetes  Recent colon cancer resection 10/18/2022  Fever, leukocytosis  Altered mental status  Prolonged QTC on psychiatric medications   CAD, NSTEMI, PCI  Acute HFrEF due to systolic dysfunction from ischemic cardiomyopathy        Recommendations / Plan:         Telemetry is revealing sinus rhythm  Monitor renal function and hemoglobin and urine output  proBNP is improved from 23,855-14,071  Continue diuresis as tolerated  Echo is revealing moderate LV dysfunction  Continue to monitor EKG and QTC.  EKG done 10/31/2022 reviewed/interpreted by me reveals sinus tachycardia with rate of 102 bpm with QT of 382 ms and QTC of 493 ms  Repeat EKG 11/5/2022 reveals acute DC of over 500.  Increase activity as tolerated  Continue aspirin, clopidogrel, losartan, metoprolol succinate, rosuvastatin as tolerated  Patient has significant improvement in symptoms we will continue diuresis and medical management  Discussed with pulmonary patient is getting a pleural tap today.  Will follow-up as outpatient.       Copied text in this portion of the note has been reviewed and  is accurate as of 11/7/2022    Past Medical History:  Past Medical History:   Diagnosis Date   • Acid reflux    • Anemia    • Anemia    • Bipolar 1 disorder (HCC)    • Colon cancer (HCC)    • Diabetes mellitus (HCC)     Type 2   • Disease of thyroid gland    • Hyperlipidemia    • Hypertension     TAKEN OFF HTN MEDS OVER 5 YEARS AGO   • Incontinence of urine    • Stroke (HCC)     2012     Past Surgical History:  Past Surgical History:   Procedure Laterality Date   • BLADDER SURGERY     • CARDIAC CATHETERIZATION N/A 10/27/2022    Procedure: Left Heart Cath, possible pci;  Surgeon: Andrea Melvin MD;  Location:  [facility];  Service: Cardiovascular;  Laterality: N/A;   • COLON RESECTION N/A 10/18/2022    Procedure: COLON RESECTIOn TRANSVERSE LAPAROSCOPIC;  Surgeon: Toño Michelle MD;  Location: [facility] OR;  Service: General;  Laterality: N/A;   • COLONOSCOPY  09/21/2022    [facility]   • ENDOSCOPY  09/21/2022    [facility]   • EYE SURGERY     • HEMORRHOIDECTOMY     • HYSTERECTOMY          Social History:   Social History     Tobacco Use   • Smoking status: Never   • Smokeless tobacco: Never   Substance Use Topics   • Alcohol use: Never      Family History:  Family History   Problem Relation Age of Onset   • Heart disease Mother    • Diabetes Mother    • No Known Problems Father    • Cancer Brother           Allergies:  No Known Allergies  Scheduled Meds:  ARIPiprazole, 2 mg, Nightly  aspirin, 81 mg, Daily  budesonide, 0.5 mg, BID - RT  clopidogrel, 75 mg, Daily  divalproex, 500 mg, BID  docusate sodium, 100 mg, BID  ferrous sulfate, 324 mg, BID With Meals  furosemide, 20 mg, Q12H  guaiFENesin, 600 mg, Q12H  insulin glargine, 16 Units, QAM  insulin lispro, 3-14 Units, TID With Meals  ipratropium-albuterol, 3 mL, 4x Daily - RT  levothyroxine, 50 mcg, Daily  losartan, 25 mg, Q24H  magnesium oxide, 400 mg, Daily  metoprolol succinate XL, 25 mg,  "Q24H  pantoprazole, 40 mg, QAM  polyethylene glycol, 17 g, Daily  risperiDONE, 0.5 mg, Daily  rosuvastatin, 20 mg, Daily  Scopolamine, 1 patch, Q72H  spironolactone, 25 mg, Daily          Review of Systems:   Review of Systems   Unable to perform ROS: mental status change            Constitutional:  Temp:  [97.4 °F (36.3 °C)-98.4 °F (36.9 °C)] 98.4 °F (36.9 °C)  Heart Rate:  [67-87] 83  Resp:  [18-20] 20  BP: (130-161)/(42-54) 130/42    Physical Exam   /42   Pulse 83   Temp 98.4 °F (36.9 °C)   Resp 20   Ht 162.6 cm (64\")   Wt 65 kg (143 lb 4.8 oz)   SpO2 96%   BMI 24.60 kg/m²   General:  Appears in no acute distress- frail and chronically ill   Eyes: Sclera is anicteric,  conjunctiva is clear   HEENT:  No JVD. Thyroid not visibly enlarged. No mucosal pallor or cyanosis  Respiratory: Respirations regular and unlabored at rest.  Decreased breath sounds at bilateral bases with scattered rhonchi  Cardiovascular: S1,S2 Regular rate and rhythm. No murmur, rub or gallop auscultated.  . No pretibial pitting edema  Gastrointestinal: Abdomen nondistended.  Musculoskeletal:  No abnormal movements  Extremities: No digital clubbing or cyanosis  Skin: Color pink. Skin warm and dry to touch. No rashes  No xanthoma  Neuro: Alert and awake, no lateralizing deficits appreciated    INTAKE AND OUTPUT:    Intake/Output Summary (Last 24 hours) at 11/7/2022 1813  Last data filed at 11/7/2022 0700  Gross per 24 hour   Intake 0 ml   Output 1000 ml   Net -1000 ml       Cardiographics  Telemetry: sinus rhythm     ECG:   ECG 12 Lead QT Measurement   Preliminary Result   HEART RATE= 74  bpm   RR Interval= 812  ms   OK Interval= 115  ms   P Horizontal Axis= -74  deg   P Front Axis= -14  deg   QRSD Interval= 92  ms   QT Interval= 516  ms   QRS Axis= 25  deg   T Wave Axis= 196  deg   - ABNORMAL ECG -   Sinus rhythm   Abnrm T, probable ischemia, anterolateral lds   Prolonged QT interval   When compared with ECG of 03-Nov-2022 8:17:18, "   Nonspecific significant change   Electronically Signed By:    Date and Time of Study: 2022-11-05 09:56:04      ECG 12 Lead QT Measurement   Final Result   HEART RATE= 76  bpm   RR Interval= 808  ms   NE Interval= 155  ms   P Horizontal Axis= -14  deg   P Front Axis= 53  deg   QRSD Interval= 96  ms   QT Interval= 477  ms   QRS Axis= 19  deg   T Wave Axis= 183  deg   - ABNORMAL ECG -   Sinus rhythm   Atrial premature complex   Abnrm T, consider ischemia, anterolateral lds   Prolonged QT interval   When compared with ECG of 31-Oct-2022 8:53:04,   Significant rate decrease   Significant repolarization change   Electronically Signed By: Andrea Melvin ([facility]) 06-Nov-2022 14:45:24   Date and Time of Study: 2022-11-03 08:17:18      ECG 12 Lead QT Measurement   Final Result   HEART RATE= 102  bpm   RR Interval= 600  ms   NE Interval= 157  ms   P Horizontal Axis= -38  deg   P Front Axis= 23  deg   QRSD Interval= 89  ms   QT Interval= 382  ms   QRS Axis= 28  deg   T Wave Axis= 209  deg   - ABNORMAL ECG -   Sinus tachycardia   Atrial premature complexes   Nonspecific T abnormalities, diffuse leads   When compared with ECG of 29-Oct-2022 16:41:12,   No significant change   Electronically Signed By: Andrea Melvin ([facility]) 06-Nov-2022 14:45:34   Date and Time of Study: 2022-10-31 08:53:04      ECG 12 Lead Other; on psych medications that prolong qtc   Final Result   HEART RATE= 92  bpm   RR Interval= 656  ms   NE Interval= 154  ms   P Horizontal Axis= -11  deg   P Front Axis= 45  deg   QRSD Interval= 90  ms   QT Interval= 360  ms   QRS Axis= 20  deg   T Wave Axis= 193  deg   - ABNORMAL ECG -   Sinus rhythm   Atrial premature complex   Abnrm T, consider ischemia, anterolateral lds   When compared with ECG of 29-Oct-2022 5:08:11,   Nonspecific significant change   Electronically Signed By: Reyes Cooper ([facility]) 03-Nov-2022 11:14:28   Date and Time of Study: 2022-10-29 16:41:12      ECG 12 Lead QT Measurement   Final  Result   HEART RATE= 78  bpm   RR Interval= 772  ms   UT Interval= 139  ms   P Horizontal Axis= 4  deg   P Front Axis= 42  deg   QRSD Interval= 87  ms   QT Interval= 422  ms   QRS Axis= 32  deg   T Wave Axis= 212  deg   - ABNORMAL ECG -   Sinus rhythm   Abnormal T, consider ischemia, diffuse leads   When compared with ECG of 28-Oct-2022 5:26:46,   New or worsened ischemia or infarction   Significant repolarization change   Electronically Signed By: Reyes Cooper ([facility]) 03-Nov-2022 10:47:40   Date and Time of Study: 2022-10-29 05:08:11      SCANNED - TELEMETRY     Final Result      ECG 12 Lead   Final Result   HEART RATE= 78  bpm   RR Interval= 772  ms   UT Interval= 129  ms   P Horizontal Axis= -38  deg   P Front Axis= 2  deg   QRSD Interval= 90  ms   QT Interval= 469  ms   QRS Axis= 37  deg   T Wave Axis= 232  deg   - ABNORMAL ECG -   Sinus rhythm   Abnormal T, suspect prox. LAD occlu. or CVA   Prolonged QT interval   When compared with ECG of 27-Oct-2022 10:26:26,   Significant repolarization change   Electronically Signed By: Andrea Melvin ([facility]) 06-Nov-2022 14:45:45   Date and Time of Study: 2022-10-28 05:26:46      ECG 12 Lead QT Measurement   Final Result   HEART RATE= 97  bpm   RR Interval= 620  ms   UT Interval= 154  ms   P Horizontal Axis= 1  deg   P Front Axis= 43  deg   QRSD Interval= 92  ms   QT Interval= 474  ms   QRS Axis= 32  deg   T Wave Axis= 217  deg   - ABNORMAL ECG -   Sinus rhythm   Abnormal T, probable ischemia, widespread   Prolonged QT interval   When compared with ECG of 23-Oct-2022 19:11:40,   Significant change in rhythm   Electronically Signed By: Skyler García ([facility]) 28-Oct-2022 10:11:01   Date and Time of Study: 2022-10-27 10:26:26      ECG 12 Lead   Final Result   HEART RATE= 121  bpm   RR Interval= 496  ms   UT Interval= 96  ms   P Horizontal Axis=   deg   P Front Axis= 206  deg   QRSD Interval= 77  ms   QT Interval= 382  ms   QRS Axis= 37  deg   T Wave Axis= 209  deg   -  ABNORMAL ECG -   Sinus or ectopic atrial tachycardia   Abnormal T, consider ischemia, diffuse leads   Prolonged QT interval   When compared with ECG of 11-Oct-2022 14:13:27,   Significant change in rhythm: previously sinus   New or worsened ischemia or infarction   Electronically Signed By: Marino Montes ([facility]) 24-Oct-2022 14:20:30   Date and Time of Study: 2022-10-23 19:11:40      SCANNED - TELEMETRY     Final Result      SCANNED - TELEMETRY     Final Result      SCANNED - TELEMETRY     Final Result      SCANNED - TELEMETRY     Final Result      SCANNED - TELEMETRY     Final Result      SCANNED - TELEMETRY     Final Result      SCANNED - TELEMETRY     Final Result      SCANNED - TELEMETRY     Final Result      SCANNED - TELEMETRY     Final Result      SCANNED - TELEMETRY     Final Result      SCANNED - TELEMETRY     Final Result      SCANNED - TELEMETRY     Final Result      SCANNED - TELEMETRY     Final Result      SCANNED - TELEMETRY     Final Result      SCANNED - TELEMETRY     Final Result      SCANNED - TELEMETRY     Final Result      SCANNED - TELEMETRY     Final Result      SCANNED - TELEMETRY     Final Result      SCANNED - TELEMETRY     Final Result      SCANNED - TELEMETRY     Final Result      SCANNED - TELEMETRY     Final Result      SCANNED - TELEMETRY     Final Result      SCANNED - TELEMETRY     Final Result      SCANNED - TELEMETRY     Final Result      SCANNED - TELEMETRY     Final Result      SCANNED - TELEMETRY     Final Result      SCANNED - TELEMETRY     Final Result      SCANNED - TELEMETRY     Final Result      SCANNED - TELEMETRY     Final Result      SCANNED - TELEMETRY     Final Result      SCANNED - TELEMETRY     Final Result      SCANNED - TELEMETRY     Final Result      SCANNED - TELEMETRY     Final Result      SCANNED - TELEMETRY     Final Result      SCANNED - TELEMETRY     Final Result      SCANNED - TELEMETRY     Final Result      SCANNED - TELEMETRY     Final Result      SCANNED -  TELEMETRY     Final Result      SCANNED - TELEMETRY     Final Result      SCANNED - TELEMETRY     Final Result      SCANNED - TELEMETRY     Final Result      SCANNED - TELEMETRY     Final Result      SCANNED - TELEMETRY     Final Result      SCANNED - TELEMETRY     Final Result      SCANNED - TELEMETRY     Final Result      SCANNED - TELEMETRY     Final Result      SCANNED - TELEMETRY     Final Result      SCANNED - TELEMETRY     Final Result      SCANNED - TELEMETRY     Final Result      SCANNED - TELEMETRY     Final Result      SCANNED - TELEMETRY     Final Result      SCANNED - TELEMETRY     Final Result      SCANNED - TELEMETRY     Final Result      SCANNED - TELEMETRY     Final Result      SCANNED - TELEMETRY     Final Result      SCANNED - TELEMETRY     Final Result      SCANNED - TELEMETRY     Final Result      ECG 12 Lead QT Measurement    (Results Pending)   ECG 12 Lead QT Measurement    (Results Pending)     I have personally reviewed EKG    Echocardiogram: Results for orders placed during the hospital encounter of 10/23/22    Adult Transthoracic Echo Limited W/ Cont if Necessary Per Protocol    Interpretation Summary  Normal LV size.  Mild apical hypokinesis seen.  Estimated LV ejection fraction of 45 to 50%  Normal RV size  Normal atrial size  Aortic valve, mitral valve, tricuspid valve appears structurally normal, no significant regurgitation seen.  Small pericardial effusion seen.  No signs of increased intrapericardial pressure  Proximal aorta appears normal in size.      Lab Review   I have reviewed the labs  Results from last 7 days   Lab Units 11/01/22  0335   TROPONIN T ng/mL 0.057*     Results from last 7 days   Lab Units 11/07/22  0439   MAGNESIUM mg/dL 2.5*     Results from last 7 days   Lab Units 11/06/22  0745   SODIUM mmol/L 137   POTASSIUM mmol/L 4.5   BUN mg/dL 12   CREATININE mg/dL 0.64   CALCIUM mg/dL 8.5*         Results from last 7 days   Lab Units 11/06/22  0745 11/05/22  0819  11/04/22  0822   WBC 10*3/mm3 17.60* 13.80* 13.30*   HEMOGLOBIN g/dL 8.6* 8.9* 8.4*   HEMATOCRIT % 29.0* 30.2* 26.9*   PLATELETS 10*3/mm3 505* 499* 447     Results from last 7 days   Lab Units 11/04/22  0821   INR  1.08       RADIOLOGY:  Imaging Results (Last 24 Hours)     Procedure Component Value Units Date/Time    XR Chest 1 View [[id number]] Collected: 11/07/22 1126     Updated: 11/07/22 1131    Narrative:         DATE OF EXAM:   11/7/2022 11:24 AM     PROCEDURE:   XR CHEST 1 VW-     INDICATIONS:   chest tube; I21.4-Non-ST elevation (NSTEMI) myocardial infarction;  R41.82-Altered mental status, unspecified; R50.9-Fever, unspecified;  J18.9-Pneumonia, unspecified organism; R77.8-Other specified  abnormalities of plasma proteins; E11.9-Type 2 diabetes mellitus without  complications; Z79.4-Long term (current) use of insulin;  E78.5-Hyperlipidemia, unspecified     COMPARISON:  11/06/2022     TECHNIQUE:   Portable chest radiograph.     FINDINGS:   There is no chest tube visualized. The left-sided large effusion is  decreased in size. Persistent bibasilar airspace disease left greater  than right. No pneumothorax. No significant effusion on the right. Small  residual left effusion. There is a chronic appearing fracture deformity  at the left proximal humerus partially imaged       Impression:      1. Decrease in size of left pleural effusion with small residual  effusion. No pneumothorax.  2. Persistent bibasilar airspace disease left greater than right.  3. No chest tube visualized.     Electronically Signed By-James Win MD On:11/7/2022 11:29 AM  This report was finalized on [id number] by  James Win MD.    XR Chest 1 View [[id number]] Collected: 11/06/22 1821     Updated: 11/06/22 1824    Narrative:      XR CHEST 1 VW-     Date of Exam: 11/6/2022 5:46 PM     Indication: possible aspiration; I21.4-Non-ST elevation (NSTEMI)  myocardial infarction; R41.82-Altered mental status, unspecified;  R50.9-Fever,  "unspecified; J18.9-Pneumonia, unspecified organism;  R77.8-Other specified abnormalities of plasma proteins; E11.9-Type 2  diabetes mellitus without complications; Z79.4-Long term (current) use  of insulin; E78.5-Hyperlipidemia, unspecified.     Comparison: 11/03/2022     Technique: A single view of the chest was obtained.     FINDINGS:      Cardiomegaly is stable.  Pulmonary vessels are indistinct  consistent with pulmonary vascular congestion.  There is been no change  in left basilar airspace disease and moderate-sized left pleural  effusion.  There prominent interstitial markings throughout both lungs  favoring interstitial edema.  There is some hazy right basilar airspace  disease similar to the prior exam.  No new right-sided airspace  consolidation.  No right pleural effusion.  No evidence pneumothorax.             Impression:            1.  Stable cardiomegaly.  2.  No change in prominent interstitial markings favored to be due to  interstitial edema.  3.  No change in left basilar airspace disease and moderate left pleural  effusion.        Electronically Signed By-Mk Waldrop MD On:11/6/2022 6:22 PM  This report was finalized on [id number] by  Mk Waldrop MD.                )11/7/2022  Andrea Melvin MD      [facility] Dragon/Transcription:   \"Dictated utilizing Dragon dictation\".   "

## 2022-11-08 ENCOUNTER — APPOINTMENT (OUTPATIENT)
Dept: GENERAL RADIOLOGY | Facility: HOSPITAL | Age: 82
End: 2022-11-08

## 2022-11-08 LAB
GLUCOSE BLDC GLUCOMTR-MCNC: 151 MG/DL (ref 70–105)
GLUCOSE BLDC GLUCOMTR-MCNC: 213 MG/DL (ref 70–105)
GLUCOSE BLDC GLUCOMTR-MCNC: 225 MG/DL (ref 70–105)
GLUCOSE BLDC GLUCOMTR-MCNC: 271 MG/DL (ref 70–105)
GLUCOSE BLDC GLUCOMTR-MCNC: 309 MG/DL (ref 70–105)
GLUCOSE BLDC GLUCOMTR-MCNC: 344 MG/DL (ref 70–105)
LAB AP CASE REPORT: NORMAL
LAB AP DIAGNOSIS COMMENT: NORMAL
NT-PROBNP SERPL-MCNC: ABNORMAL PG/ML (ref 0–1800)
PATH REPORT.FINAL DX SPEC: NORMAL
PATH REPORT.GROSS SPEC: NORMAL
QT INTERVAL: 427 MS
QT INTERVAL: 516 MS
TRIGL FLD-MCNC: 17 MG/DL

## 2022-11-08 PROCEDURE — 94799 UNLISTED PULMONARY SVC/PX: CPT

## 2022-11-08 PROCEDURE — 63710000001 INSULIN LISPRO (HUMAN) PER 5 UNITS: Performed by: INTERNAL MEDICINE

## 2022-11-08 PROCEDURE — 71045 X-RAY EXAM CHEST 1 VIEW: CPT

## 2022-11-08 PROCEDURE — 99232 SBSQ HOSP IP/OBS MODERATE 35: CPT | Performed by: INTERNAL MEDICINE

## 2022-11-08 PROCEDURE — 93005 ELECTROCARDIOGRAM TRACING: CPT | Performed by: PSYCHIATRY & NEUROLOGY

## 2022-11-08 PROCEDURE — 94664 DEMO&/EVAL PT USE INHALER: CPT

## 2022-11-08 PROCEDURE — 83880 ASSAY OF NATRIURETIC PEPTIDE: CPT | Performed by: NURSE PRACTITIONER

## 2022-11-08 PROCEDURE — 82962 GLUCOSE BLOOD TEST: CPT

## 2022-11-08 PROCEDURE — 63710000001 INSULIN GLARGINE PER 5 UNITS: Performed by: INTERNAL MEDICINE

## 2022-11-08 PROCEDURE — 25010000002 FUROSEMIDE PER 20 MG: Performed by: INTERNAL MEDICINE

## 2022-11-08 PROCEDURE — 93010 ELECTROCARDIOGRAM REPORT: CPT | Performed by: INTERNAL MEDICINE

## 2022-11-08 PROCEDURE — 97530 THERAPEUTIC ACTIVITIES: CPT

## 2022-11-08 PROCEDURE — 99232 SBSQ HOSP IP/OBS MODERATE 35: CPT | Performed by: PSYCHIATRY & NEUROLOGY

## 2022-11-08 PROCEDURE — 92526 ORAL FUNCTION THERAPY: CPT

## 2022-11-08 PROCEDURE — 94761 N-INVAS EAR/PLS OXIMETRY MLT: CPT

## 2022-11-08 PROCEDURE — 97168 OT RE-EVAL EST PLAN CARE: CPT

## 2022-11-08 RX ORDER — INSULIN LISPRO 100 [IU]/ML
6 INJECTION, SOLUTION INTRAVENOUS; SUBCUTANEOUS
Status: DISCONTINUED | OUTPATIENT
Start: 2022-11-08 | End: 2022-11-13

## 2022-11-08 RX ORDER — FLUOXETINE HYDROCHLORIDE 20 MG/1
20 CAPSULE ORAL DAILY
Status: DISCONTINUED | OUTPATIENT
Start: 2022-11-08 | End: 2022-11-15 | Stop reason: HOSPADM

## 2022-11-08 RX ADMIN — INSULIN LISPRO 5 UNITS: 100 INJECTION, SOLUTION INTRAVENOUS; SUBCUTANEOUS at 11:23

## 2022-11-08 RX ADMIN — LEVOTHYROXINE SODIUM 50 MCG: 50 TABLET ORAL at 04:15

## 2022-11-08 RX ADMIN — INSULIN LISPRO 6 UNITS: 100 INJECTION, SOLUTION INTRAVENOUS; SUBCUTANEOUS at 18:07

## 2022-11-08 RX ADMIN — Medication 10 ML: at 19:57

## 2022-11-08 RX ADMIN — HYDROCODONE BITARTRATE AND ACETAMINOPHEN 1 TABLET: 7.5; 325 TABLET ORAL at 15:14

## 2022-11-08 RX ADMIN — FUROSEMIDE 20 MG: 10 INJECTION, SOLUTION INTRAMUSCULAR; INTRAVENOUS at 15:14

## 2022-11-08 RX ADMIN — INSULIN LISPRO 5 UNITS: 100 INJECTION, SOLUTION INTRAVENOUS; SUBCUTANEOUS at 08:57

## 2022-11-08 RX ADMIN — PANTOPRAZOLE SODIUM 40 MG: 40 TABLET, DELAYED RELEASE ORAL at 08:51

## 2022-11-08 RX ADMIN — RISPERIDONE 0.5 MG: 0.25 TABLET ORAL at 08:51

## 2022-11-08 RX ADMIN — FUROSEMIDE 20 MG: 10 INJECTION, SOLUTION INTRAMUSCULAR; INTRAVENOUS at 03:25

## 2022-11-08 RX ADMIN — LORAZEPAM 0.5 MG: 0.5 TABLET ORAL at 16:16

## 2022-11-08 RX ADMIN — ROSUVASTATIN 20 MG: 10 TABLET, FILM COATED ORAL at 19:56

## 2022-11-08 RX ADMIN — LOSARTAN POTASSIUM 25 MG: 25 TABLET, FILM COATED ORAL at 08:52

## 2022-11-08 RX ADMIN — FLUOXETINE 20 MG: 20 CAPSULE ORAL at 16:16

## 2022-11-08 RX ADMIN — BUDESONIDE 0.5 MG: 0.5 INHALANT RESPIRATORY (INHALATION) at 06:35

## 2022-11-08 RX ADMIN — INSULIN GLARGINE 16 UNITS: 100 INJECTION, SOLUTION SUBCUTANEOUS at 08:58

## 2022-11-08 RX ADMIN — MAGNESIUM OXIDE TAB 400 MG (241.3 MG ELEMENTAL MG) 400 MG: 400 (241.3 MG) TAB at 08:51

## 2022-11-08 RX ADMIN — GUAIFENESIN 600 MG: 600 TABLET, EXTENDED RELEASE ORAL at 08:51

## 2022-11-08 RX ADMIN — ASPIRIN 81 MG: 81 TABLET, COATED ORAL at 08:52

## 2022-11-08 RX ADMIN — INSULIN LISPRO 10 UNITS: 100 INJECTION, SOLUTION INTRAVENOUS; SUBCUTANEOUS at 11:23

## 2022-11-08 RX ADMIN — FERROUS SULFATE TAB EC 324 MG (65 MG FE EQUIVALENT) 324 MG: 324 (65 FE) TABLET DELAYED RESPONSE at 08:52

## 2022-11-08 RX ADMIN — IPRATROPIUM BROMIDE AND ALBUTEROL SULFATE 3 ML: .5; 3 SOLUTION RESPIRATORY (INHALATION) at 06:35

## 2022-11-08 RX ADMIN — METOPROLOL SUCCINATE 25 MG: 25 TABLET, FILM COATED, EXTENDED RELEASE ORAL at 08:52

## 2022-11-08 RX ADMIN — INSULIN LISPRO 10 UNITS: 100 INJECTION, SOLUTION INTRAVENOUS; SUBCUTANEOUS at 18:07

## 2022-11-08 RX ADMIN — IPRATROPIUM BROMIDE AND ALBUTEROL SULFATE 3 ML: .5; 3 SOLUTION RESPIRATORY (INHALATION) at 19:43

## 2022-11-08 RX ADMIN — LORAZEPAM 0.5 MG: 0.5 TABLET ORAL at 04:15

## 2022-11-08 RX ADMIN — IPRATROPIUM BROMIDE AND ALBUTEROL SULFATE 3 ML: .5; 3 SOLUTION RESPIRATORY (INHALATION) at 15:05

## 2022-11-08 RX ADMIN — CLOPIDOGREL BISULFATE 75 MG: 75 TABLET ORAL at 08:52

## 2022-11-08 RX ADMIN — SCOPALAMINE 1 PATCH: 1 PATCH, EXTENDED RELEASE TRANSDERMAL at 09:48

## 2022-11-08 RX ADMIN — BUDESONIDE 0.5 MG: 0.5 INHALANT RESPIRATORY (INHALATION) at 19:44

## 2022-11-08 RX ADMIN — DIVALPROEX SODIUM 500 MG: 500 TABLET, DELAYED RELEASE ORAL at 08:51

## 2022-11-08 RX ADMIN — SPIRONOLACTONE 25 MG: 25 TABLET ORAL at 08:51

## 2022-11-08 RX ADMIN — DOCUSATE SODIUM 100 MG: 100 CAPSULE, LIQUID FILLED ORAL at 19:56

## 2022-11-08 RX ADMIN — DIVALPROEX SODIUM 500 MG: 500 TABLET, DELAYED RELEASE ORAL at 19:56

## 2022-11-08 RX ADMIN — GUAIFENESIN 600 MG: 600 TABLET, EXTENDED RELEASE ORAL at 19:56

## 2022-11-08 RX ADMIN — FERROUS SULFATE TAB EC 324 MG (65 MG FE EQUIVALENT) 324 MG: 324 (65 FE) TABLET DELAYED RESPONSE at 18:07

## 2022-11-08 RX ADMIN — HYDROCODONE BITARTRATE AND ACETAMINOPHEN 1 TABLET: 7.5; 325 TABLET ORAL at 08:50

## 2022-11-08 RX ADMIN — IPRATROPIUM BROMIDE AND ALBUTEROL SULFATE 3 ML: .5; 3 SOLUTION RESPIRATORY (INHALATION) at 10:30

## 2022-11-08 NOTE — THERAPY RE-EVALUATION
Patient Name: Laura Perkins  : 1940    MRN: [id number]                              Today's Date: 2022       Admit Date: 10/23/2022    Visit Dx:     ICD-10-CM ICD-9-CM   1. Acute non-ST elevation myocardial infarction (NSTEMI) ([facility])  I21.4 410.70   2. Altered mental status, unspecified altered mental status type  R41.82 780.97   3. Fever, unspecified fever cause  R50.9 780.60   4. Pneumonia of left lung due to infectious organism, unspecified part of lung  J18.9 486   5. Elevated troponin  R77.8 790.6   6. Type 2 diabetes mellitus without complication, with long-term current use of insulin ([facility])  E11.9 250.00    Z79.4 V58.67   7. Dyslipidemia  E78.5 272.4     Patient Active Problem List   Diagnosis   • Pneumonia due to COVID-19 virus   • Acute renal injury ([facility])   • Cancer of transverse colon ([facility])   • Altered mental status, unspecified altered mental status type   • HAP (hospital-acquired pneumonia)   • Acute respiratory distress   • Elevated troponin   • Sepsis ([facility])   • Type 2 diabetes mellitus ([facility])   • Anxiety associated with depression   • Hypothyroidism (acquired)   • OAB (overactive bladder)   • GERD without esophagitis   • Acute non-ST elevation myocardial infarction (NSTEMI) ([facility])   • Dyslipidemia   • Acute respiratory failure with hypoxia ([facility])   • Pleural effusion     Past Medical History:   Diagnosis Date   • Acid reflux    • Anemia    • Anemia    • Bipolar 1 disorder (HCC)    • Colon cancer (HCC)    • Diabetes mellitus ([facility])     Type 2   • Disease of thyroid gland    • Hyperlipidemia    • Hypertension     TAKEN OFF HTN MEDS OVER 5 YEARS AGO   • Incontinence of urine    • Stroke ([facility])          Past Surgical History:   Procedure Laterality Date   • BLADDER SURGERY     • CARDIAC CATHETERIZATION N/A 10/27/2022    Procedure: Left Heart Cath, possible pci;  Surgeon: Andrea Melvin MD;  Location: [facility] CATH INVASIVE LOCATION;  Service: Cardiovascular;  Laterality: N/A;   • COLON  RESECTION N/A 10/18/2022    Procedure: COLON RESECTIOn TRANSVERSE LAPAROSCOPIC;  Surgeon: Toño Michelle MD;  Location: [facility] MAIN OR;  Service: General;  Laterality: N/A;   • COLONOSCOPY  09/21/2022    [facility]   • ENDOSCOPY  09/21/2022    [facility]   • EYE SURGERY     • HEMORRHOIDECTOMY     • HYSTERECTOMY        General Information     Row Name 11/08/22 1446          OT Time and Intention    Document Type re-evaluation  -MS     Mode of Treatment occupational therapy  -MS     Row Name 11/08/22 1446          General Information    Patient Profile Reviewed yes  -MS     Prior Level of Function mod assist:;ADL's  -MS     Existing Precautions/Restrictions fall;oxygen therapy device and L/min  -MS     Barriers to Rehab medically complex;previous functional deficit;cognitive status  -MS     Row Name 11/08/22 1513 11/08/22 1446       Occupational Profile    Reason for Services/Referral (Occupational Profile) Pt is being re-evaluated this date d/t extended admit. Pt is an 83 y/o female who presented to [facility] 10/23/22 from [facility] with c/o fever and confusion. Patient is diagnosed with altered mental status and has hx of recent transverse colectomy and pneumonia. Pt s/p heart cath on 10/27. PMH includes bipolar. Pt has been in and out of rehab since January of 2022. Per pt OT eval 10/28, pt completed bed mobility min A x2, STS to toilet mod A x2, and required min A for sititng balance. Pt had increased confusion and was unabel to assess BUE strength. This date, pt was alert and oriented x1. Pt required min A to roll L<>R, however pt required max A for supine to sit. Pt required max A for sitting balance and demo decrease in BUE strength. Pt inconsistent with following commands and began to appear fatigue. Pt laid down and required max A to encourage to participate further in therapy session. Pt not progressed to transfer this date d/t increased fatigue and diffdiculty following  commands. Pt demo functional decline compared to previous evaluation, indicating further need for skilled OT intervention. OT recommend SNF when medically appropriate for d/c and potential transition to LTC or home with 24/7 care. OT to follow while admitted at [facility].  -MS Pt is being re-evaluated this date d/t extended admit. Pt is an 83 y/o female who presented to [facility] 10/23/22 from [facility] with c/o fever and confusion. Patient is diagnosed with altered mental status and has hx of recent transverse colectomy and pneumonia. Pt s/p heart cath on 10/27. PMH includes bipolar. Pt has been in and out of rehab since January of 2022.  -MS    Environmental Supports and Barriers (Occupational Profile) -- supportive family  -MS    Patient Goals (Occupational Profile) -- return home  -MS    Row Name 11/08/22 1446          Living Environment    People in Home spouse  -MS     Name(s) of People in Home [place]  -MS     Row Name 11/08/22 1446          Cognition    Orientation Status (Cognition) oriented to;person  -MS     Row Name 11/08/22 1446          Safety Issues, Functional Mobility    Safety Issues Affecting Function (Mobility) ability to follow commands;impulsivity;insight into deficits/self-awareness;problem-solving;sequencing abilities  -MS     Impairments Affecting Function (Mobility) balance;cognition;endurance/activity tolerance;strength;shortness of breath  -MS     Cognitive Impairments, Mobility Safety/Performance attention;awareness, need for assistance;insight into deficits/self-awareness;judgment  -MS           User Key  (r) = Recorded By, (t) = Taken By, (c) = Cosigned By    Initials Name Provider Type    Alis Turcios, OT Occupational Therapist                 Mobility/ADL's     Row Name 11/08/22 1510          Bed Mobility    Bed Mobility bed mobility (all) activities  -MS     All Activities, [place] (Bed Mobility) maximum assist (25% patient effort)  -MS     Assistive Device (Bed Mobility) bed  rails;head of bed elevated  -MS     Row Name 11/08/22 1510          Sit-Stand Transfer    Sit-Stand [place] (Transfers) not tested  -MS     Row Name 11/08/22 1510          Functional Mobility    Functional Mobility- Ind. Level not tested  -MS     Row Name 11/08/22 1510          Activities of Daily Living    BADL Assessment/Intervention grooming  -MS     Row Name 11/08/22 1510          Grooming Assessment/Training    [place] Level (Grooming) wash face, hands;maximum assist (25% patient effort)  -MS     Position (Grooming) supine  -MS           User Key  (r) = Recorded By, (t) = Taken By, (c) = Cosigned By    Initials Name Provider Type    Alis Turcios OT Occupational Therapist               Obj/Interventions     Row Name 11/08/22 1511          Sensory Assessment (Somatosensory)    Sensory Assessment (Somatosensory) unable/difficult to assess  -MS     Row Name 11/08/22 1511          Vision Assessment/Intervention    Visual Impairment/Limitations unable/difficult to assess  -MS     Row Name 11/08/22 1511          Range of Motion Comprehensive    General Range of Motion bilateral upper extremity ROM WFL  -MS     Row Name 11/08/22 1511          Strength Comprehensive (MMT)    General Manual Muscle Testing (MMT) Assessment upper extremity strength deficits identified  -MS     Comment, General Manual Muscle Testing (MMT) Assessment MMT not assessed d/t limited ability to follow commands  -MS     Row Name 11/08/22 1511          Balance    Balance Assessment sitting static balance;sitting dynamic balance  -MS     Static Sitting Balance maximum assist  -MS     Dynamic Sitting Balance maximum assist  -MS     Position, Sitting Balance sitting edge of bed  -MS           User Key  (r) = Recorded By, (t) = Taken By, (c) = Cosigned By    Initials Name Provider Type    Alis Turcios OT Occupational Therapist               Goals/Plan     Row Name 11/08/22 1515          Dressing Goal 1 (OT)    Activity/Device  (Dressing Goal 1, OT) upper body dressing  -MS     [place]/Cues Needed (Dressing Goal 1, OT) minimum assist (75% or more patient effort)  -MS     Time Frame (Dressing Goal 1, OT) long term goal (LTG);2 weeks  -MS     Progress/Outcome (Dressing Goal 1, OT) progress slower than expected  -MS     Row Name 11/08/22 1515          Toileting Goal 1 (OT)    Activity/Device (Toileting Goal 1, OT) toileting skills, all  -MS     [place] Level/Cues Needed (Toileting Goal 1, OT) maximum assist (25-49% patient effort)  -MS     Time Frame (Toileting Goal 1, OT) long term goal (LTG);2 weeks  -MS     Progress/Outcome (Toileting Goal 1, OT) progress slower than expected  -MS     Row Name 11/08/22 1515          Grooming Goal 1 (OT)    Activity/Device (Grooming Goal 1, OT) grooming skills, all  -MS     [place] (Grooming Goal 1, OT) minimum assist (75% or more patient effort)  -MS     Time Frame (Grooming Goal 1, OT) long term goal (LTG);2 weeks  -MS     Progress/Outcome (Grooming Goal 1, OT) progress slower than expected  -MS     Row Name 11/08/22 1515          Therapy Assessment/Plan (OT)    Planned Therapy Interventions (OT) activity tolerance training;adaptive equipment training;BADL retraining;occupation/activity based interventions;functional balance retraining;patient/caregiver education/training;transfer/mobility retraining;strengthening exercise  -MS           User Key  (r) = Recorded By, (t) = Taken By, (c) = Cosigned By    Initials Name Provider Type    Alis Turcios OT Occupational Therapist               Clinical Impression     Row Name 11/08/22 1512          Pain Assessment    Pain Intervention(s) Repositioned;Nursing Notified;Emotional support;Therapeutic presence;Rest  -MS     Additional Documentation Pain Scale: FACES Pre/Post-Treatment (Group)  -MS     Row Name 11/08/22 1512          Pain Scale: FACES Pre/Post-Treatment    Pain: FACES Scale, Pretreatment 0-->no hurt  -MS     Posttreatment Pain  Rating 6-->hurts even more  -MS     Pain Location - --  buttocks  -MS     Row Name 11/08/22 1512          Plan of Care Review    Plan of Care Reviewed With patient;spouse  -MS     Progress no change  -MS     Outcome Evaluation Per pt OT eval 10/28, pt completed bed mobility min A x2, STS to toilet mod A x2, and required min A for sititng balance. Pt had increased confusion and was unabel to assess BUE strength. This date, pt was alert and oriented x1. Pt required min A to roll L<>R, however pt required max A for supine to sit. Pt required max A for sitting balance and demo decrease in BUE strength. Pt inconsistent with following commands and began to appear fatigue. Pt laid down and required max A to encourage to participate further in therapy session. Pt not progressed to transfer this date d/t increased fatigue and diffdiculty following commands. Pt demo functional decline compared to previous evaluation, indicating further need for skilled OT intervention. OT recommend SNF when medically appropriate for d/c and potential transition to LTC or home with 24/7 care. OT to follow while admitted at [facility].  -MS     Row Name 11/08/22 1512          Therapy Assessment/Plan (OT)    Patient/Family Therapy Goal Statement (OT) return home  -MS     Rehab Potential (OT) fair, will monitor progress closely  -MS     Criteria for Skilled Therapeutic Interventions Met (OT) yes;meets criteria;skilled treatment is necessary  -MS     Therapy Frequency (OT) 5 times/wk  -MS     Predicted Duration of Therapy Intervention (OT) until d/c  -MS     Row Name 11/08/22 1512          Therapy Plan Review/Discharge Plan (OT)    Anticipated Discharge Disposition (OT) skilled nursing facility  -MS     Row Name 11/08/22 1512          Vital Signs    Pre SpO2 (%) 79  -MS     O2 Delivery Pre Treatment nasal cannula  -MS     O2 Delivery Intra Treatment nasal cannula  -MS     Post SpO2 (%) 98  -MS     O2 Delivery Post Treatment nasal cannula  -MS     Pre  Patient Position Supine  -MS     Intra Patient Position Sitting  -MS     Post Patient Position Supine  -MS     Row Name 11/08/22 1512          Positioning and Restraints    Pre-Treatment Position in bed  -MS     Post Treatment Position bed  -MS     In Bed notified nsg;supine;call light within reach;encouraged to call for assist;exit alarm on;with family/caregiver  -MS           User Key  (r) = Recorded By, (t) = Taken By, (c) = Cosigned By    Initials Name Provider Type    Alis Turcios OT Occupational Therapist               Outcome Measures     Row Name 11/08/22 1516          How much help from another is currently needed...    Putting on and taking off regular lower body clothing? 1  -MS     Bathing (including washing, rinsing, and drying) 1  -MS     Toileting (which includes using toilet bed pan or urinal) 1  -MS     Putting on and taking off regular upper body clothing 1  -MS     Taking care of personal grooming (such as brushing teeth) 1  -MS     Eating meals 1  -MS     AM-PAC 6 Clicks Score (OT) 6  -MS     Row Name 11/08/22 1516          Functional Assessment    Outcome Measure Options AM-PAC 6 Clicks Daily Activity (OT)  -MS           User Key  (r) = Recorded By, (t) = Taken By, (c) = Cosigned By    Initials Name Provider Type    Alis Turcios OT Occupational Therapist                Occupational Therapy Education     Title: PT OT SLP Therapies (Done)     Topic: Occupational Therapy (Done)     Point: ADL training (Done)     Description:   Instruct learner(s) on proper safety adaptation and remediation techniques during self care or transfers.   Instruct in proper use of assistive devices.              Learning Progress Summary           Patient Acceptance, E,TB, VU by MS at 11/8/2022 1517    Acceptance, E, VU,NR by TIMUR at 11/7/2022 2000    Acceptance, E, VU,NR by GILBERT at 10/30/2022 2351    Acceptance, E, VU,NR by TJ at 10/30/2022 0021    Eager, E, VU by KARLIE at 10/28/2022 1456   Family Acceptance, E,  VU,NR by TJ at 10/30/2022 2351    Acceptance, E, VU,NR by TJ at 10/30/2022 0021    Eager, E, VU by BL at 10/28/2022 1456   Significant Other Acceptance, E, VU,NR by KD at 11/7/2022 2000                   Point: Home exercise program (Done)     Description:   Instruct learner(s) on appropriate technique for monitoring, assisting and/or progressing therapeutic exercises/activities.              Learning Progress Summary           Patient Acceptance, E,TB, VU by MS at 11/8/2022 1517    Acceptance, E, VU,NR by KD at 11/7/2022 2000    Acceptance, E, VU,NR by TJ at 10/30/2022 2351    Acceptance, E, VU,NR by TJ at 10/30/2022 0021    Eager, E, VU by BL at 10/28/2022 1456   Family Acceptance, E, VU,NR by TJ at 10/30/2022 2351    Acceptance, E, VU,NR by TJ at 10/30/2022 0021    Eager, E, VU by BL at 10/28/2022 1456   Significant Other Acceptance, E, VU,NR by KD at 11/7/2022 2000                   Point: Precautions (Done)     Description:   Instruct learner(s) on prescribed precautions during self-care and functional transfers.              Learning Progress Summary           Patient Acceptance, E,TB, VU by MS at 11/8/2022 1517    Acceptance, E, VU,NR by KD at 11/7/2022 2000    Acceptance, E, VU,NR by TJ at 10/30/2022 2351    Acceptance, E, VU,NR by TJ at 10/30/2022 0021    Eager, E, VU by BL at 10/28/2022 1456   Family Acceptance, E, VU,NR by TJ at 10/30/2022 2351    Acceptance, E, VU,NR by TJ at 10/30/2022 0021    Eager, E, VU by BL at 10/28/2022 1456   Significant Other Acceptance, E, VU,NR by KD at 11/7/2022 2000                   Point: Body mechanics (Done)     Description:   Instruct learner(s) on proper positioning and spine alignment during self-care, functional mobility activities and/or exercises.              Learning Progress Summary           Patient Acceptance, E,TB, VU by MS at 11/8/2022 1517    Acceptance, E, VU,NR by TIMUR at 11/7/2022 2000    Acceptance, E, VU,NR by GILBERT at 10/30/2022 2351    Acceptance, E,  VU,NR by TJ at 10/30/2022 0021    Eager, E, VU by BL at 10/28/2022 1456   Family Acceptance, E, VU,NR by  at 10/30/2022 2351    Acceptance, E, VU,NR by  at 10/30/2022 0021    Eager, E, VU by BL at 10/28/2022 1456   Significant Other Acceptance, E, VU,NR by  at 11/7/2022 2000                               User Key     Initials Effective Dates Name Provider Type Discipline     06/16/21 -  Reena Austin, RN Registered Nurse Nurse     06/16/21 -  Apoorva Norman, RN Registered Nurse Nurse    MS 07/13/22 -  Alis Bustillo OT Occupational Therapist OT     09/22/22 -  Columba Arzate OT Occupational Therapist OT              OT Recommendation and Plan  Planned Therapy Interventions (OT): activity tolerance training, adaptive equipment training, BADL retraining, occupation/activity based interventions, functional balance retraining, patient/caregiver education/training, transfer/mobility retraining, strengthening exercise  Therapy Frequency (OT): 5 times/wk  Plan of Care Review  Plan of Care Reviewed With: patient, spouse  Progress: no change  Outcome Evaluation: Per pt OT eval 10/28, pt completed bed mobility min A x2, STS to toilet mod A x2, and required min A for sititng balance. Pt had increased confusion and was unabel to assess BUE strength. This date, pt was alert and oriented x1. Pt required min A to roll L<>R, however pt required max A for supine to sit. Pt required max A for sitting balance and demo decrease in BUE strength. Pt inconsistent with following commands and began to appear fatigue. Pt laid down and required max A to encourage to participate further in therapy session. Pt not progressed to transfer this date d/t increased fatigue and diffdiculty following commands. Pt demo functional decline compared to previous evaluation, indicating further need for skilled OT intervention. OT recommend SNF when medically appropriate for d/c and potential transition to LTC or home with 24/7 care. OT  to follow while admitted at [facility].     Time Calculation:    Time Calculation- OT     Row Name 11/08/22 1517             Time Calculation- OT    OT Start Time 1254  -MS      OT Stop Time 1329  -MS      OT Time Calculation (min) 35 min  -MS      Total Timed Code Minutes- OT 35 minute(s)  -MS      OT Received On 11/08/22  -MS      OT - Next Appointment 11/09/22  -MS      OT Goal Re-Cert Due Date 11/22/22  -MS            User Key  (r) = Recorded By, (t) = Taken By, (c) = Cosigned By    Initials Name Provider Type    MS Alis Bustillo OT Occupational Therapist              Therapy Charges for Today     Code Description Service Date Service Provider Modifiers Qty    37641844146 HC OT THERAPEUTIC ACT EA 15 MIN 11/8/2022 Alis Bustillo OT GO 2    89765941792 HC OT RE-EVAL 2 11/8/2022 Alis Bustillo OT GO 1               Alis Bustillo OT  11/8/2022

## 2022-11-08 NOTE — PLAN OF CARE
Problem: Pain Acute  Goal: Acceptable Pain Control and Functional Ability  Outcome: Ongoing, Progressing  Intervention: Prevent or Manage Pain  Recent Flowsheet Documentation  Taken 11/8/2022 0900 by Demetrice Monzon RN  Sleep/Rest Enhancement:   regular sleep/rest pattern promoted   relaxation techniques promoted  Intervention: Develop Pain Management Plan  Recent Flowsheet Documentation  Taken 11/8/2022 0900 by Demetrice Monzon RN  Pain Management Interventions:   see MAR   quiet environment facilitated   relaxation techniques promoted  Intervention: Optimize Psychosocial Wellbeing  Recent Flowsheet Documentation  Taken 11/8/2022 0900 by Demetrice Monzon RN  Supportive Measures:   self-care encouraged   relaxation techniques promoted     Problem: Fall Injury Risk  Goal: Absence of Fall and Fall-Related Injury  Outcome: Ongoing, Progressing  Intervention: Promote Injury-Free Environment  Recent Flowsheet Documentation  Taken 11/8/2022 1415 by Demetrice Monzon RN  Safety Promotion/Fall Prevention:   room organization consistent   safety round/check completed   nonskid shoes/slippers when out of bed   fall prevention program maintained  Taken 11/8/2022 1012 by Demetrice Monzon RN  Safety Promotion/Fall Prevention:   room organization consistent   safety round/check completed   nonskid shoes/slippers when out of bed   fall prevention program maintained   Goal Outcome Evaluation:              Outcome Evaluation: Psych was called d/t husbands continued concerns with patient's psych med regimen. Dr. Brown to see today. Stat ekg ordered per Dr. Connelly request. Patient's oxygen was weaned down to 2L. Patient alert to person and time however still slightly foggy on place and situation. Patient calm and cooperative today with less restlessness and complaints of pain.

## 2022-11-08 NOTE — NURSING NOTE
Patient's  very upset doesn't feel like the doctors are doing anything for his wife and wants to speak to administration. House supervisor called and advised to make charge nurse aware and go up the chain of command as needed.

## 2022-11-08 NOTE — PROGRESS NOTES
"PULMONARY CRITICAL CARE PROGRESS  NOTE      PATIENT IDENTIFICATION:  Name: Laura Perkins  MRN: [id number]  :  1940     Age: 82 y.o.  Sex: female    DATE OF Note:  2022   Referring Physician: Alejandro Estrada MD                  Subjective:   Appears better today, sitting up in bed eating, currently on 3 L O2 , no SOB, no chest or abdominal pain, no bowel or bladder issues reported      Objective:  tMax 24 hrs: Temp (24hrs), Av.8 °F (36.6 °C), Min:97.4 °F (36.3 °C), Max:98.4 °F (36.9 °C)      Vitals Ranges:   Temp:  [97.4 °F (36.3 °C)-98.4 °F (36.9 °C)] 97.4 °F (36.3 °C)  Heart Rate:  [] 91  Resp:  [16-22] 18  BP: (111-146)/(33-62) 111/54    Intake and Output Last 3 Shifts:   I/O last 3 completed shifts:  In: 0   Out: 1700 [Urine:1700]    Exam:  /54   Pulse 91   Temp 97.4 °F (36.3 °C) (Oral)   Resp 18   Ht 162.6 cm (64\")   Wt 56.8 kg (125 lb 3.5 oz) Comment: extra blankets removed  SpO2 100%   BMI 21.49 kg/m²     General Appearance: Alert  HEENT:  Normocephalic, without obvious abnormality. Conjunctivae/corneas clear.  Normal external ear canals. Nares normal, no drainage     Neck:  Supple, symmetrical, trachea midline. No JVD.  Lungs /Chest wall:   Bilateral basal rhonchi improving, respirations unlabored, symmetrical wall movement.     Heart:  Regular rate and rhythm, systolic murmur PMI left sternal border  Abdomen: Soft, nontender, no masses, no organomegaly.    Extremities: Trace edema, no clubbing or cyanosis        Medications:  ARIPiprazole, 2 mg, Oral, Nightly  aspirin, 81 mg, Oral, Daily  budesonide, 0.5 mg, Nebulization, BID - RT  clopidogrel, 75 mg, Oral, Daily  divalproex, 500 mg, Oral, BID  docusate sodium, 100 mg, Oral, BID  ferrous sulfate, 324 mg, Oral, BID With Meals  furosemide, 20 mg, Intravenous, Q12H  guaiFENesin, 600 mg, Oral, Q12H  insulin glargine, 16 Units, Subcutaneous, QAM  insulin lispro, 3-14 Units, Subcutaneous, TID With Meals  insulin lispro, 5 " Units, Subcutaneous, TID With Meals  ipratropium-albuterol, 3 mL, Nebulization, 4x Daily - RT  levothyroxine, 50 mcg, Oral, Daily  losartan, 25 mg, Oral, Q24H  magnesium oxide, 400 mg, Oral, Daily  metoprolol succinate XL, 25 mg, Oral, Q24H  pantoprazole, 40 mg, Oral, QAM  polyethylene glycol, 17 g, Oral, Daily  risperiDONE, 0.5 mg, Oral, Daily  rosuvastatin, 20 mg, Oral, Daily  Scopolamine, 1 patch, Transdermal, Q72H  spironolactone, 25 mg, Oral, Daily        Infusion:        PRN:  •  acetaminophen  •  acetaminophen  •  acetaminophen  •  benzonatate  •  Calcium Gluconate-NaCl **AND** calcium gluconate **AND** Calcium, Ionized  •  dextrose  •  dextrose  •  glucagon (human recombinant)  •  HYDROcodone-acetaminophen  •  ipratropium-albuterol  •  LORazepam  •  magnesium sulfate **OR** magnesium sulfate **OR** magnesium sulfate  •  phenol  •  potassium & sodium phosphates **OR** potassium & sodium phosphates  •  potassium chloride  •  potassium chloride  •  [COMPLETED] Insert peripheral IV **AND** sodium chloride  Data Review:  All labs (24hrs):   Recent Results (from the past 24 hour(s))   POC Glucose Once    Collection Time: 11/07/22 11:45 AM    Specimen: Blood   Result Value Ref Range    Glucose 155 (H) 70 - 105 mg/dL   POC Glucose Once    Collection Time: 11/07/22  4:57 PM    Specimen: Blood   Result Value Ref Range    Glucose 110 (H) 70 - 105 mg/dL   POC Glucose Once    Collection Time: 11/07/22  7:45 PM    Specimen: Blood   Result Value Ref Range    Glucose 344 (H) 70 - 105 mg/dL   BNP    Collection Time: 11/08/22  4:46 AM    Specimen: Blood   Result Value Ref Range    proBNP 13,684.0 (H) 0.0 - 1,800.0 pg/mL   POC Glucose Once    Collection Time: 11/08/22  7:31 AM    Specimen: Blood   Result Value Ref Range    Glucose 213 (H) 70 - 105 mg/dL   ECG 12 Lead QT Measurement    Collection Time: 11/08/22 10:37 AM   Result Value Ref Range    QT Interval 427 ms   POC Glucose Once    Collection Time: 11/08/22 11:16 AM     Specimen: Blood   Result Value Ref Range    Glucose 344 (H) 70 - 105 mg/dL        Imaging:  XR Chest 1 View  Narrative:    DATE OF EXAM:   11/8/2022 4:32 AM     PROCEDURE:   XR CHEST 1 VW-     INDICATIONS:   Shortness of breath; I21.4-Non-ST elevation (NSTEMI) myocardial  infarction; R41.82-Altered mental status, unspecified; R50.9-Fever,  unspecified; J18.9-Pneumonia, unspecified organism; R77.8-Other  specified abnormalities of plasma proteins; E11.9-Type 2 diabetes  mellitus without complications; Z79.4-Long term (current) use of  insulin; E78.5-Hyperlipidemia, unspecified     COMPARISON:  11/07/2022     TECHNIQUE:   Portable chest radiograph.     FINDINGS:    Redemonstration of bibasilar airspace disease left greater than right  with mild worsening from the prior study. No pneumothorax. Small  bilateral pleural effusions. Heart size top normal, exaggerated by  technique. Osseous structures grossly intact.     Impression: Persistent bibasilar airspace disease left greater than right which may  relate to pneumonia, mildly worsened from the prior study.     Stable small bilateral pleural effusions.     Electronically Signed By-James Win MD On:11/8/2022 8:01 AM  This report was finalized on [id number] by  James Win MD.       ASSESSMENT:  AMS  Mixed respiratory and metabolic alkalosis  Bilateral pleural effusion left greater than right  Bilateral pneumonia and possible compressive atelectasis  CVA  Hypertension  Bipolar disorder  Diabetes mellitus type 2  Invasive adenocarcinoma of the colon s/p resection 10/22  CAD s/p stents 10/27       PLAN:  Encouraged to be out of bed  PT/OT/ST  S/p thoracentesis and fluid was transudate  Bronchodilators  Inhaled corticosteroids  Diuresis and monitor MARIAN's  Incentive spirometer  Electrolytes/ glycemic control  DVT and GI prophylaxis    Discussed with Dr Chris Jacobson, APRN   11/8/2022  11:18 EST    I personally have examined  and interviewed the patient. I  have reviewed the history, data, problems, assessment and plan with our NP.  Total Critical care time in direct medical management (   ) minutes, This time specifically excludes time spent performing procedures.    Reed Perez MD   11/8/2022  19:23 EST

## 2022-11-08 NOTE — PROGRESS NOTES
Daily Progress Note    Patient Care Team:  Beka Weir MD as PCP - General (Family Medicine)  Toño Michelle MD as Surgeon (General Surgery)    Chief Complaint: Follow-up type 2 diabetes    HPI: Patient seen and examined today.  Blood sugar log reviewed, blood sugar high, she is eating fair.    ROS:   Constitutional:  Denies fatigue, tiredness.    Respiratory: denies cough, shortness of breath.   Cardiovascular:  denies chest pain, edema   GI:  Denies abdominal pain, nausea, vomiting.         Vitals:    11/08/22 1034   BP:    Pulse: 91   Resp: 18   Temp:    SpO2: 100%     Body mass index is 21.49 kg/m².    Physical Exam:  GEN: NAD, conversant  CV: RRR  LUNG: CTA        Results Review:     I reviewed the patient's new clinical results.    Glucose   Date Value Ref Range Status   11/06/2022 231 (H) 65 - 99 mg/dL Final     Sodium   Date Value Ref Range Status   11/06/2022 137 136 - 145 mmol/L Final     Potassium   Date Value Ref Range Status   11/06/2022 4.5 3.5 - 5.2 mmol/L Final     CO2   Date Value Ref Range Status   11/06/2022 30.0 (H) 22.0 - 29.0 mmol/L Final     Chloride   Date Value Ref Range Status   11/06/2022 98 98 - 107 mmol/L Final     Anion Gap   Date Value Ref Range Status   11/06/2022 9.0 5.0 - 15.0 mmol/L Final     Creatinine   Date Value Ref Range Status   11/06/2022 0.64 0.57 - 1.00 mg/dL Final     BUN   Date Value Ref Range Status   11/06/2022 12 8 - 23 mg/dL Final     BUN/Creatinine Ratio   Date Value Ref Range Status   11/06/2022 18.8 7.0 - 25.0 Final     Calcium   Date Value Ref Range Status   11/06/2022 8.5 (L) 8.6 - 10.5 mg/dL Final     Alkaline Phosphatase   Date Value Ref Range Status   11/06/2022 121 (H) 39 - 117 U/L Final     Total Protein   Date Value Ref Range Status   11/06/2022 6.1 6.0 - 8.5 g/dL Final     ALT (SGPT)   Date Value Ref Range Status   11/06/2022 14 1 - 33 U/L Final     AST (SGOT)   Date Value Ref Range Status   11/06/2022 13 1 - 32 U/L Final     Total  Bilirubin   Date Value Ref Range Status   11/06/2022 0.2 0.0 - 1.2 mg/dL Final     Albumin   Date Value Ref Range Status   11/06/2022 2.00 (L) 3.50 - 5.20 g/dL Final     Globulin   Date Value Ref Range Status   11/06/2022 4.1 gm/dL Final     Magnesium   Date Value Ref Range Status   11/07/2022 2.5 (H) 1.6 - 2.4 mg/dL Final     Lab Results   Component Value Date    HGBA1C 7.4 (H) 10/24/2022    HGBA1C 10.3 (H) 10/27/2020    HGBA1C 8.5 (H) 06/04/2019     No results found for: GLUF, MICROALBUR  Results from last 7 days   Lab Units 11/08/22  1321 11/08/22  1116 11/08/22  0731 11/07/22  1945 11/07/22  1657 11/07/22  1145   GLUCOSE mg/dL 271* 344* 213* 344* 110* 155*             Medication Review: Reviewed.     ARIPiprazole, 2 mg, Oral, Nightly  aspirin, 81 mg, Oral, Daily  budesonide, 0.5 mg, Nebulization, BID - RT  clopidogrel, 75 mg, Oral, Daily  divalproex, 500 mg, Oral, BID  docusate sodium, 100 mg, Oral, BID  ferrous sulfate, 324 mg, Oral, BID With Meals  furosemide, 20 mg, Intravenous, Q12H  guaiFENesin, 600 mg, Oral, Q12H  insulin glargine, 16 Units, Subcutaneous, QAM  insulin lispro, 3-14 Units, Subcutaneous, TID With Meals  insulin lispro, 5 Units, Subcutaneous, TID With Meals  ipratropium-albuterol, 3 mL, Nebulization, 4x Daily - RT  levothyroxine, 50 mcg, Oral, Daily  losartan, 25 mg, Oral, Q24H  magnesium oxide, 400 mg, Oral, Daily  metoprolol succinate XL, 25 mg, Oral, Q24H  pantoprazole, 40 mg, Oral, QAM  polyethylene glycol, 17 g, Oral, Daily  risperiDONE, 0.5 mg, Oral, Daily  rosuvastatin, 20 mg, Oral, Daily  Scopolamine, 1 patch, Transdermal, Q72H  spironolactone, 25 mg, Oral, Daily              Assessment and plan:  Diabetes Mellitus type II with hyperglycemia:   Uncontrolled, currently on Lantus 16 units daily with Humalog 5 with each meal.  Also on Humalog sliding scale.  Will increase Lantus to 18 units daily along with Humalog 6 with each meal along with Humalog sliding scale. Follow blood sugars and  make further adjustments as needed.     Hypothyroidism: Currently on levothyroxine supplementation     Hyperlipidemia: On rosuvastatin.    Ami Singleton MD. FACE

## 2022-11-08 NOTE — PLAN OF CARE
Goal Outcome Evaluation:  Plan of Care Reviewed With: patient, spouse        Progress: no change  Outcome Evaluation: Per pt OT eval 10/28, pt completed bed mobility min A x2, STS to toilet mod A x2, and required min A for sititng balance. Pt had increased confusion and was unabel to assess BUE strength. This date, pt was alert and oriented x1. Pt required min A to roll L<>R, however pt required max A for supine to sit. Pt required max A for sitting balance and demo decrease in BUE strength. Pt inconsistent with following commands and began to appear fatigue. Pt laid down and required max A to encourage to participate further in therapy session. Pt not progressed to transfer this date d/t increased fatigue and diffdiculty following commands. Pt demo functional decline compared to previous evaluation, indicating further need for skilled OT intervention. OT recommend SNF when medically appropriate for d/c and potential transition to LTC or home with 24/7 care. OT to follow while admitted at [facility].

## 2022-11-08 NOTE — CONSULTS
Nutrition Services    Patient Name: Laura Perkins  YOB: 1940  MRN: [id number]  Admission date: 10/23/2022    Comment:    Continue magic cup BID    PPE Documentation        PPE Worn By Provider Mask, eye protection   PPE Worn By Patient  N/A     CLINICAL NUTRITION ASSESSMENT      Reason for Assessment Follow up protocol   10/25: Wounds assessment     H&P      Past Medical History:   Diagnosis Date   • Acid reflux    • Anemia    • Anemia    • Bipolar 1 disorder (HCC)    • Colon cancer (HCC)    • Diabetes mellitus (HCC)     Type 2   • Disease of thyroid gland    • Hyperlipidemia    • Hypertension     TAKEN OFF HTN MEDS OVER 5 YEARS AGO   • Incontinence of urine    • Stroke (HCC)     2012       Past Surgical History:   Procedure Laterality Date   • BLADDER SURGERY     • CARDIAC CATHETERIZATION N/A 10/27/2022    Procedure: Left Heart Cath, possible pci;  Surgeon: Andrea Melvin MD;  Location: [facility] CATH INVASIVE LOC[facility];  Service: Cardiovascular;  Laterality: N/A;   • COLON RESECTION N/A 10/18/2022    Procedure: COLON RESECTIOn TRANSVERSE LAPAROSCOPIC;  Surgeon: Toño Michelle MD;  Location: [facility] OR;  Service: General;  Laterality: N/A;   • COLONOSCOPY  09/21/2022    [facility]   • ENDOSCOPY  09/21/2022    [facility]   • EYE SURGERY     • HEMORRHOIDECTOMY     • HYSTERECTOMY          Current Problems   Acute toxic metabolic encephalopathy/AMS    Probable sepsis    PNA    Elev Troponin    Hx colorectal cancer  -s/p resection (10/22)    DM    CVA    HTN    HLD    Hypothyroidism    Depression/Anxiety       Encounter Information        Trending Narrative     11/8: Per EMR review, pt had vomited after PO on 11/6, made NPO yesterday for [facility].  Visited pt in room with  at bedside.  reported pt with good appetite and intake today - meal tray noted at bedside with what looked like almost 100% intake. When asked about recent N/V,  reported  "the vomiting consistent with documentation, but contributed it to pt lying down immediately after meal in question and not sitting up to allow for digestion to take place. Writer encouraged  to have pt sit up for 30 min or more after meals. Pt's  also desired for pt to have a more liberalized diet similar to pt's diet at home and was also curious about the length of time pt would be restricted to pureed texture. Pt's  recalled a typical breakfast at home of scrambled eggs, sausage and pancakes, cut and manipulated into more of a mechanically soft diet texture. Writer reached out to SLP to determine length of time that pureed diet is recommended and seek clarity on any potential for advancement to mechanically soft. Awaiting reply from SLP. If pureed diet is recommended long term, pt's  would like education on meal options. Pt is consuming Magic Cups, per ONS. Wt is down, see wt hx section below. NFPE completed, see details below.    11/1: Visited pt in room, pt  also present. Pt is confused but pleasant. Pt  reports pt has been eating well on the puree, nectar thickened liquids. SLP following, recommends pt remain on puree and NTL by spoon. NFPE completed and not consistent with nutrition diagnosis of malnutrition at this time using AND/ASPEN criteria.     10/25: Pt unavailable x2 attempted visits, chart reviewed. Noted bowel regimen started per GI.     Anthropometrics        Current Height, Weight Height: 162.6 cm (64\")  Weight: 56.8 kg (125 lb 3.5 oz) (extra blankets removed) (11/08/22 0500)       Ideal Body Weight (IBW) 120lb   Usual Body Weight (UBW) ~140#, per  report on 11/8 (stated she would be weighed about every 2 weeks, PTA)       Trending Weight Hx     This admission: 11/8: 125# - per nursing note, extra blankets were removed from bed before weighing. If accurate, current wt is down 10% from UBW, per 's report.  11/1: 139lb latest wt, pt  " reports this is pt typical wt  10/25: current weight greatly decreased from admit weight, unsure of accuracy, will request a new weight             PTA: 10/25: using admit weight, stable x2 years    Wt Readings from Last 30 Encounters:   11/08/22 0500 56.8 kg (125 lb 3.5 oz)   11/07/22 0611 65 kg (143 lb 4.8 oz)   11/06/22 0536 65.9 kg (145 lb 4.5 oz)   11/05/22 0500 65.4 kg (144 lb 2.9 oz)   11/04/22 0613 64.5 kg (142 lb 3.2 oz)   10/30/22 0500 63.4 kg (139 lb 12.4 oz)   10/29/22 0546 62.5 kg (137 lb 12.6 oz)   10/28/22 0517 61.1 kg (134 lb 11.2 oz)   10/27/22 0730 54.9 kg (121 lb)   10/27/22 0657 54.9 kg (121 lb)   10/27/22 0427 55.2 kg (121 lb 9.6 oz)   10/26/22 0407 54.8 kg (120 lb 14.4 oz)   10/25/22 0339 58.2 kg (128 lb 3.2 oz)   10/23/22 1717 64 kg (141 lb)   10/18/22 1158 64.4 kg (141 lb 15.6 oz)   10/11/22 1327 64.4 kg (142 lb)   09/26/22 1537 63.5 kg (140 lb)   11/02/20 0009 67.6 kg (149 lb 0.5 oz)   10/30/20 0326 69.3 kg (152 lb 12.5 oz)   10/26/20 2329 66.5 kg (146 lb 9.7 oz)   10/26/20 1524 68 kg (150 lb)      BMI kg/m2 Body mass index is 21.49 kg/m².       Labs        Pertinent Labs Hyperglycemia  -endo following  -insulin regimen in place   Results from last 7 days   Lab Units 11/06/22  0745 11/05/22  0819   SODIUM mmol/L 137 138   POTASSIUM mmol/L 4.5 3.1*   CHLORIDE mmol/L 98 94*   CO2 mmol/L 30.0* 33.0*   BUN mg/dL 12 14   CREATININE mg/dL 0.64 0.73   CALCIUM mg/dL 8.5* 8.5*   BILIRUBIN mg/dL 0.2 0.2   ALK PHOS U/L 121* 138*   ALT (SGPT) U/L 14 18   AST (SGOT) U/L 13 17   GLUCOSE mg/dL 231* 189*     Results from last 7 days   Lab Units 11/07/22  0439 11/06/22  0745 11/05/22  1716   MAGNESIUM mg/dL 2.5*  --  1.7   HEMOGLOBIN g/dL  --  8.6*  --    HEMATOCRIT %  --  29.0*  --      COVID19   Date Value Ref Range Status   10/30/2022 Not Detected Not Detected - Ref. Range Final     Lab Results   Component Value Date    HGBA1C 7.4 (H) 10/24/2022        Medications    Scheduled Medications ARIPiprazole, 2  mg, Oral, Nightly  aspirin, 81 mg, Oral, Daily  budesonide, 0.5 mg, Nebulization, BID - RT  clopidogrel, 75 mg, Oral, Daily  divalproex, 500 mg, Oral, BID  docusate sodium, 100 mg, Oral, BID  ferrous sulfate, 324 mg, Oral, BID With Meals  furosemide, 20 mg, Intravenous, Q12H  guaiFENesin, 600 mg, Oral, Q12H  insulin glargine, 16 Units, Subcutaneous, QAM  insulin lispro, 3-14 Units, Subcutaneous, TID With Meals  insulin lispro, 5 Units, Subcutaneous, TID With Meals  ipratropium-albuterol, 3 mL, Nebulization, 4x Daily - RT  levothyroxine, 50 mcg, Oral, Daily  losartan, 25 mg, Oral, Q24H  magnesium oxide, 400 mg, Oral, Daily  metoprolol succinate XL, 25 mg, Oral, Q24H  pantoprazole, 40 mg, Oral, QAM  polyethylene glycol, 17 g, Oral, Daily  risperiDONE, 0.5 mg, Oral, Daily  rosuvastatin, 20 mg, Oral, Daily  Scopolamine, 1 patch, Transdermal, Q72H  spironolactone, 25 mg, Oral, Daily        Infusions      PRN Medications •  acetaminophen  •  acetaminophen  •  acetaminophen  •  benzonatate  •  Calcium Gluconate-NaCl **AND** calcium gluconate **AND** Calcium, Ionized  •  dextrose  •  dextrose  •  glucagon (human recombinant)  •  HYDROcodone-acetaminophen  •  ipratropium-albuterol  •  LORazepam  •  magnesium sulfate **OR** magnesium sulfate **OR** magnesium sulfate  •  phenol  •  potassium & sodium phosphates **OR** potassium & sodium phosphates  •  potassium chloride  •  potassium chloride  •  [COMPLETED] Insert peripheral IV **AND** sodium chloride     Physical Findings        Trending Physical   Appearance, NFPE 11/8: NFPE completed and mild to moderate wasting was found in the temporal, clavicle and calf region, though not enough evidence is available for a malnutrition dx at this time.   11/1: NFPE completed and not consistent with nutrition diagnosis of malnutrition at this time using AND/ASPEN criteria   10/25: Unable to assess in person this date.   --  Edema  none     Bowel Function Stool Output  Stool Unmeasured  Occurrence: 1 (11/08/22 0522)  Bowel Incontinence: Yes (11/08/22 0522)  Stool Amount: moderate (11/08/22 0522)      Tubes   None   Chewing/Swallowing Followed by ST, on altered consistency diet   Skin DTI sacrum   --  Current Nutrition Orders & Evaluation of Intake       Oral Nutrition     Food Allergies NKFA   Current PO Diet Diet Texture, Diabetic/Consistent Carbs; Diabetic - Consistent Carb; Pureed Diet; Thickened Liquids (Nectar)   Supplement Magic cup BID   PO Evaluation     Trending % PO Intake 75% x 1 snack so far today. Was NPO most of yesterday for Speech eval., Had been trending at % of meals prior to NPO.     --  Nutritional Risk Screening        NRS-2002 Score          Nutrition Diagnosis         Nutrition Dx Problem 1 Swallowing difficulty related to dysphagia as evidenced by SLP recommending puree and NTL by spoon       Nutrition Dx Problem 2        Intervention Goal         Intervention Goal(s) Continue PO intake > 75%  Consumption of ONS.     Nutrition Intervention        RD Action Continue Magic Cup BID     Nutrition Prescription          Diet Prescription Pureed, NTL   Supplement Prescription Magic Cups at lunch/dinner (Provides 580 kcals, 18 g protein if consumed)      --  Monitor/Evaluation        Monitor PO intake, Supplement intake, Pertinent labs, Weight, Skin status, GI status         Electronically signed by:  Amaris Trimble  11/08/22 10:42 EST

## 2022-11-08 NOTE — PROGRESS NOTES
Palliative Care Social Work Progress Note    Code Status:do not resuscitate    Goals of Care: Limited Additonal Intervention     Narrative: Palliative care  met with pt to assess for goals of care.  Pt was awake at time of contact, but did not engage in discussion.  Pt's  at bedside was very attentive to patient and relayed several times that he wants to be able to get pt back to her baseline.  Hospice was discussed, and pt appeared interested; but when discussion of his further aggressive intentions regarding pt's psychiatric needs and rehab needs it was apparent that hospice was not consistent with treatment goals.   reports he wants to continue to care for the pt at home, and hopes that he can continue to do so if the pt recovers to baseline, which he firmly believes is possible.  Emotional support provided.    Plan:  -Will continue to follow.          MADELAINE Osborne

## 2022-11-08 NOTE — PROGRESS NOTES
Chief complaint fatigue, restless legs     Subjective .     History of present illness:  The patient is a 82 y.o. female who was admitted secondary to decreased LOC. PMHx: bipolar d/o, colon cancer, DM, hx of CVA . Psych consult was requested by Dr Bassett to adjust psych meds, the pt was taken off risperidone and fluoxetine 2ry to QTc prolongation.  The pt was unable to provide any hx 2ry to decreased LOC, information was obtained from her  who was in the room. He reported long hx of bipolar d/o and cognitive decline, she was stable for few 10 years on depakote, prozac and risperidone. The pt lives with her  and he is her primary caregiver. When the pt is on her baseline, she is calm but forgetful, requires assistance with ADLs, ambulation, however, mood is stable, denied AVH/SI/HI, did not appear to be paranoid or suspicious, does not get out of the house at night, compliant with meds and tx. After being off meds he noticed increased irritability. Memory - usually does not remember recent events.   Past psyhc hx: bipolar d/o, inpt at [facility] in 2012 after stroke 2ry to mood changes, no hx of SAs     Today the pt was more still somnolent and exhausted  No agitation, no confusion     Pt's  expressed concerns about her condition     Review of Systems   All systems were reviewed and negative except for:  Constitution:  positive for fatigue  Respiratory: positive for  shortness of air  Musculoskeletal: positive for  back pain, muscle pain and muscle weakness  Neurological: positive for  difficulty walking and weakness  Behavioral/Psych: positive for  excessive irritability    History       Medications Prior to Admission   Medication Sig Dispense Refill Last Dose   • Canagliflozin (Invokana) 300 MG tablet tablet Take 150 mg by mouth Daily.   10/23/2022 at 0753   • divalproex (DEPAKOTE) 250 MG DR tablet Take 1 tablet by mouth 3 (Three) Times a Day.   10/23/2022 at 1308   • FeroSul 325 (65 Fe)  MG tablet Take 1 tablet by mouth 2 (Two) Times a Day.   10/23/2022 at 0753   • FLUoxetine (PROzac) 20 MG capsule Take 1 capsule by mouth Daily.   10/23/2022 at 0753   • glipizide (GLUCOTROL) 10 MG tablet Take 2 tablets by mouth 2 (Two) Times a Day Before Meals.   10/23/2022 at 0753   • levothyroxine (SYNTHROID, LEVOTHROID) 50 MCG tablet Take 1 tablet by mouth Daily.   10/23/2022 at 0406   • LORazepam (ATIVAN) 0.5 MG tablet Take 0.5 mg by mouth 3 (Three) Times a Day.   10/23/2022 at 1308   • metFORMIN ER (GLUCOPHAGE-XR) 500 MG 24 hr tablet Take 500 mg by mouth 2 (two) times a day.   10/23/2022 at 0753   • oxybutynin (DITROPAN) 5 MG tablet Take 5 mg by mouth Every Morning.   10/23/2022 at 0753   • pantoprazole (PROTONIX) 40 MG EC tablet Take 40 mg by mouth Every Morning.   10/23/2022 at 0753   • potassium chloride (MICRO-K) 10 MEQ CR capsule Take 2 capsules by mouth 2 (Two) Times a Day.   10/23/2022   • risperiDONE (risperDAL) 0.5 MG tablet Take 2 tablets by mouth 2 (Two) Times a Day. TAKES AT 1500 AND 2000   10/23/2022 at 0242   • rosuvastatin (CRESTOR) 20 MG tablet Take 20 mg by mouth every night at bedtime.   10/22/2022 at 2109   • SITagliptin (JANUVIA) 100 MG tablet Take 1 tablet by mouth Every Evening.   10/22/2022 at 1658        Scheduled Meds:  aspirin, 81 mg, Oral, Daily  budesonide, 0.5 mg, Nebulization, BID - RT  clopidogrel, 75 mg, Oral, Daily  divalproex, 500 mg, Oral, BID  docusate sodium, 100 mg, Oral, BID  ferrous sulfate, 324 mg, Oral, BID With Meals  FLUoxetine, 20 mg, Oral, Daily  furosemide, 20 mg, Intravenous, Q12H  guaiFENesin, 600 mg, Oral, Q12H  [START ON 11/9/2022] insulin glargine, 18 Units, Subcutaneous, QAM  insulin lispro, 3-14 Units, Subcutaneous, TID With Meals  insulin lispro, 6 Units, Subcutaneous, TID With Meals  ipratropium-albuterol, 3 mL, Nebulization, 4x Daily - RT  levothyroxine, 50 mcg, Oral, Daily  losartan, 25 mg, Oral, Q24H  magnesium oxide, 400 mg, Oral, Daily  metoprolol  "succinate XL, 25 mg, Oral, Q24H  pantoprazole, 40 mg, Oral, QAM  polyethylene glycol, 17 g, Oral, Daily  risperiDONE, 0.5 mg, Oral, Daily  rosuvastatin, 20 mg, Oral, Daily  Scopolamine, 1 patch, Transdermal, Q72H  spironolactone, 25 mg, Oral, Daily         Continuous Infusions:       PRN Meds:  •  acetaminophen  •  acetaminophen  •  acetaminophen  •  benzonatate  •  Calcium Gluconate-NaCl **AND** calcium gluconate **AND** Calcium, Ionized  •  dextrose  •  dextrose  •  glucagon (human recombinant)  •  HYDROcodone-acetaminophen  •  ipratropium-albuterol  •  LORazepam  •  magnesium sulfate **OR** magnesium sulfate **OR** magnesium sulfate  •  phenol  •  potassium & sodium phosphates **OR** potassium & sodium phosphates  •  potassium chloride  •  potassium chloride  •  [COMPLETED] Insert peripheral IV **AND** sodium chloride      Allergies:  Patient has no known allergies.      Objective     Vital Signs   /47   Pulse 99   Temp 98.3 °F (36.8 °C) (Oral)   Resp 20   Ht 162.6 cm (64\")   Wt 56.8 kg (125 lb 3.5 oz) Comment: extra blankets removed  SpO2 98%   BMI 21.49 kg/m²     Physical Exam:     General Appearance:    In NAD       Mental Status Exam:    Hygiene:   good  Cooperation:  limited   Eye Contact:  Poor  Psychomotor Behavior:  Slow  Affect:  Blunted  Hopelessness: Denies  Speech:  Normal  Thought Progress:  Goal directed, Linear and poverty of thoughts   Thought Content:  Mood congruent  Suicidal:  None  Homicidal:  None  Hallucinations:  None  Delusion:  None  Memory:  decreased   Orientation:  Person, Place and Situation  Reliability:  fair  Insight:  Fair  Judgement:  Fair  Impulse Control:  Fair  Physical/Medical Issues:  Yes      Medications and allergies reviewed     Lab Results   Component Value Date    GLUCOSE 231 (H) 11/06/2022    CALCIUM 8.5 (L) 11/06/2022     11/06/2022    K 4.5 11/06/2022    CO2 30.0 (H) 11/06/2022    CL 98 11/06/2022    BUN 12 11/06/2022    CREATININE 0.64 11/06/2022 "    EGFRIFNONA 37 (L) 11/03/2020    BCR 18.8 11/06/2022    ANIONGAP 9.0 11/06/2022       Last Urine Toxicity     LAST URINE TOXICITY RESULTS Latest Ref Rng & Units 5/26/2020 5/7/2019    AMPHETAMINES SCREEN, URINE NA NEGATIVE NEGATIVE    BARBITURATES SCREEN NA NEGATIVE NEGATIVE    BENZODIAZEPINE SCREEN, URINE NA NEGATIVE NEGATIVE    COCAINE SCREEN, URINE NA NEGATIVE NEGATIVE    ETHANOL UR Cutoff=0.020 mg/dL - Negative    METHADONE SCREEN, URINE NA NEGATIVE NEGATIVE          No results found for: PHENYTOIN, PHENOBARB, VALPROATE, CBMZ    Lab Results   Component Value Date     11/06/2022    BUN 12 11/06/2022    CREATININE 0.64 11/06/2022    TSH 10.810 (H) 10/31/2022    WBC 17.60 (H) 11/06/2022       Brief Urine Lab Results  (Last result in the past 365 days)      Color   Clarity   Blood   Leuk Est   Nitrite   Protein   CREAT   Urine HCG        10/23/22 1758 Yellow   Clear   Negative   Negative   Negative   Negative               EKG 10/29/22  HR 78    QTc 481     EKG 11/8/2022 QTc 495   VPA 30.6     Assessment & Plan       Altered mental status, unspecified altered mental status type    Cancer of transverse colon (HCC)    HAP (hospital-acquired pneumonia)    Acute respiratory distress    Elevated troponin    Sepsis (HCC)    Type 2 diabetes mellitus (HCC)    Anxiety associated with depression    Hypothyroidism (acquired)    OAB (overactive bladder)    GERD without esophagitis    Acute non-ST elevation myocardial infarction (NSTEMI) (HCC)    Dyslipidemia    Acute respiratory failure with hypoxia (HCC)    Pleural effusion          Assessment:  Bipolar d/o type 2   Hypoactive Delirium due to medical condition (TME)     Treatment Plan:  the pt is more alert and awake  QTc 534 on 10/28, today improved 495   D/c  abilify 2 mg po QHS - not effective   Restart prozac 20 mg po QAM    cont risperidone 0.5 mg po QD   Cont to provide support     [facility] -  to arrange  f/u if the get d/c home     Pt's  was concerned about her health and prolonged hospital stay, about his ability to take care of the pt after d/c   Issues with meds and QTc issues explained   Will follow   Treatment Plan discussed with: Patient and nursing     I discussed the patients findings and my recommendations with patient and nursing staff    I have reviewed and approved the behavioral health treatment plans and problem list. Yes     Referring MD has access to consult report and progress notes in EMR     Megan Brown MD  11/08/22  15:19 EST

## 2022-11-08 NOTE — PROGRESS NOTES
Daily Progress Note    Patient Care Team:  Beka Weir MD as PCP - General (Family Medicine)  Toño Michelle MD as Surgeon (General Surgery)    Chief Complaint: Follow-up type 2 diabetes    HPI: Patient seen and examined today, BS log reviewed. BS are high. Eating well.     ROS:   Constitutional:  Denies fatigue, tiredness.    Respiratory: denies cough, shortness of breath.   Cardiovascular:  denies chest pain, edema   GI:  Denies abdominal pain, nausea, vomiting.         Vitals:    11/07/22 2037   BP: 146/62   Pulse: 98   Resp: 20   Temp: 97.7 °F (36.5 °C)   SpO2: 93%     Body mass index is 24.6 kg/m².    Physical Exam:  GEN: NAD, conversant  CV: RRR  LUNG: CTA  PSYCH: Alert and awake.       Results Review:     I reviewed the patient's new clinical results.    Glucose   Date Value Ref Range Status   11/06/2022 231 (H) 65 - 99 mg/dL Final     Sodium   Date Value Ref Range Status   11/06/2022 137 136 - 145 mmol/L Final     Potassium   Date Value Ref Range Status   11/06/2022 4.5 3.5 - 5.2 mmol/L Final     CO2   Date Value Ref Range Status   11/06/2022 30.0 (H) 22.0 - 29.0 mmol/L Final     Chloride   Date Value Ref Range Status   11/06/2022 98 98 - 107 mmol/L Final     Anion Gap   Date Value Ref Range Status   11/06/2022 9.0 5.0 - 15.0 mmol/L Final     Creatinine   Date Value Ref Range Status   11/06/2022 0.64 0.57 - 1.00 mg/dL Final     BUN   Date Value Ref Range Status   11/06/2022 12 8 - 23 mg/dL Final     BUN/Creatinine Ratio   Date Value Ref Range Status   11/06/2022 18.8 7.0 - 25.0 Final     Calcium   Date Value Ref Range Status   11/06/2022 8.5 (L) 8.6 - 10.5 mg/dL Final     Alkaline Phosphatase   Date Value Ref Range Status   11/06/2022 121 (H) 39 - 117 U/L Final     Total Protein   Date Value Ref Range Status   11/06/2022 6.1 6.0 - 8.5 g/dL Final     ALT (SGPT)   Date Value Ref Range Status   11/06/2022 14 1 - 33 U/L Final     AST (SGOT)   Date Value Ref Range Status   11/06/2022 13 1 - 32  U/L Final     Total Bilirubin   Date Value Ref Range Status   11/06/2022 0.2 0.0 - 1.2 mg/dL Final     Albumin   Date Value Ref Range Status   11/06/2022 2.00 (L) 3.50 - 5.20 g/dL Final     Globulin   Date Value Ref Range Status   11/06/2022 4.1 gm/dL Final     Magnesium   Date Value Ref Range Status   11/07/2022 2.5 (H) 1.6 - 2.4 mg/dL Final     Lab Results   Component Value Date    HGBA1C 7.4 (H) 10/24/2022    HGBA1C 10.3 (H) 10/27/2020    HGBA1C 8.5 (H) 06/04/2019       Results from last 7 days   Lab Units 11/07/22  1945 11/07/22  1657 11/07/22  1145 11/07/22  0737 11/06/22  1651 11/06/22  1129   GLUCOSE mg/dL 344* 110* 155* 209* 184* 352*             Medication Review: Reviewed.     ARIPiprazole, 2 mg, Oral, Nightly  aspirin, 81 mg, Oral, Daily  budesonide, 0.5 mg, Nebulization, BID - RT  clopidogrel, 75 mg, Oral, Daily  divalproex, 500 mg, Oral, BID  docusate sodium, 100 mg, Oral, BID  ferrous sulfate, 324 mg, Oral, BID With Meals  furosemide, 20 mg, Intravenous, Q12H  guaiFENesin, 600 mg, Oral, Q12H  insulin glargine, 16 Units, Subcutaneous, QAM  insulin lispro, 3-14 Units, Subcutaneous, TID With Meals  ipratropium-albuterol, 3 mL, Nebulization, 4x Daily - RT  levothyroxine, 50 mcg, Oral, Daily  losartan, 25 mg, Oral, Q24H  magnesium oxide, 400 mg, Oral, Daily  metoprolol succinate XL, 25 mg, Oral, Q24H  pantoprazole, 40 mg, Oral, QAM  polyethylene glycol, 17 g, Oral, Daily  risperiDONE, 0.5 mg, Oral, Daily  rosuvastatin, 20 mg, Oral, Daily  Scopolamine, 1 patch, Transdermal, Q72H  spironolactone, 25 mg, Oral, Daily        Assessment and plan:  Diabetes Mellitus type II with hyperglycemia:  uncontrolled, will add Humalog 5 units with each meal and continue Lantus 16 units daily in the morning.  Follow blood sugars and make further adjustments as needed.    Hypothyroidism: Currently on levothyroxine supplementation    Hyperlipidemia: Currently on atorvastatin.    Ami Singleton MD. FACE

## 2022-11-08 NOTE — PLAN OF CARE
Goal Outcome Evaluation:   Skilled ST targeting dysphagia was conducted today. Pt was seen for meal assessment and family education. Pt's  was hoping pt could have her diet upgraded. Pt has been more alert. Clinician explained that liquids could not be upgraded unless pt has a follow-up VFSS. It has been 2 weeks since her previous VFSS. Pt was slightly restless today hawever was able to come upright in bed for lunch meal. Pt was fed by her . pt was seen consuming puree meat, mashed ptotatoes, peas and drinking NT lemonade by cup. Pt had slow but functional oral transit of puree food. No labial spuillage from the cup on the NTL. Digital palpation suggests timely swallow. After the swallow, pt had clear vocal qualty and no cough or other overt s/s of aspiration.     Due to pt having improved mentation and strength, it is rec that pt have follow-up VFSS in am for possible diet upgrade. Pt should continue current puree/NTL diet until VFSS. education provided to pt and her  on the rationale for follow-up VFSS. they verbalized understanding. ST will follow-up with recs from VFSS.

## 2022-11-08 NOTE — PLAN OF CARE
Goal Outcome Evaluation:  Plan of Care Reviewed With: patient        Progress: no change   The patient has been in good spirits and her vitals have been stable. The patient has been fairly restless tonight and has not much sleep.

## 2022-11-08 NOTE — THERAPY TREATMENT NOTE
Acute Care - Speech Language Pathology   Swallow Treatment Note  Dio     Patient Name: Laura Perkins  : 1940  MRN: [id number]  Today's Date: 2022               Admit Date: 10/23/2022  Patient was not wearing a face mask during this therapy encounter. Therapist used appropriate personal protective equipment including mask, eye protection and gloves.  Mask used was standard procedure mask. Appropriate PPE was worn during the entire therapy session. Hand hygiene was completed before and after therapy session. Patient is not in enhanced droplet precautions.       Visit Dx:     ICD-10-CM ICD-9-CM   1. Acute non-ST elevation myocardial infarction (NSTEMI) ([facility])  I21.4 410.70   2. Altered mental status, unspecified altered mental status type  R41.82 780.97   3. Fever, unspecified fever cause  R50.9 780.60   4. Pneumonia of left lung due to infectious organism, unspecified part of lung  J18.9 486   5. Elevated troponin  R77.8 790.6   6. Type 2 diabetes mellitus without complication, with long-term current use of insulin ([facility])  E11.9 250.00    Z79.4 V58.67   7. Dyslipidemia  E78.5 272.4     Patient Active Problem List   Diagnosis   • Pneumonia due to COVID-19 virus   • Acute renal injury ([facility])   • Cancer of transverse colon ([facility])   • Altered mental status, unspecified altered mental status type   • HAP (hospital-acquired pneumonia)   • Acute respiratory distress   • Elevated troponin   • Sepsis ([facility])   • Type 2 diabetes mellitus ([facility])   • Anxiety associated with depression   • Hypothyroidism (acquired)   • OAB (overactive bladder)   • GERD without esophagitis   • Acute non-ST elevation myocardial infarction (NSTEMI) ([facility])   • Dyslipidemia   • Acute respiratory failure with hypoxia ([facility])   • Pleural effusion     Past Medical History:   Diagnosis Date   • Acid reflux    • Anemia    • Anemia    • Bipolar 1 disorder ([facility])    • Colon cancer ([facility])    • Diabetes mellitus ([facility])     Type 2   • Disease of thyroid  gland    • Hyperlipidemia    • Hypertension     TAKEN OFF HTN MEDS OVER 5 YEARS AGO   • Incontinence of urine    • Stroke (HCC)     2012     Past Surgical History:   Procedure Laterality Date   • BLADDER SURGERY     • CARDIAC CATHETERIZATION N/A 10/27/2022    Procedure: Left Heart Cath, possible pci;  Surgeon: Andrea Melvin MD;  Location:  [facility];  Service: Cardiovascular;  Laterality: N/A;   • COLON RESECTION N/A 10/18/2022    Procedure: COLON RESECTIOn TRANSVERSE LAPAROSCOPIC;  Surgeon: Toño Michelle MD;  Location:  [facility];  Service: General;  Laterality: N/A;   • COLONOSCOPY  09/21/2022    [facility]   • ENDOSCOPY  09/21/2022    [facility]   • EYE SURGERY     • HEMORRHOIDECTOMY     • HYSTERECTOMY          SWALLOW EVALUATION (last 72 hours)     SLP Adult Swallow Evaluation     Row Name 11/08/22 9939          Document Type therapy note (daily note)  -CP    Subjective Information no complaints  -CP    Patient Observations alert;cooperative  -CP    Patient/Family/Caregiver Comments/Observations spouse at bedside  -CP    Patient Effort good  -CP    Comment Skilled ST targeting dysphagia was conducted today. Pt was seen for meal assessment and family education. Pt's  was hoping pt could have her diet upgraded. Pt has been more alert. Clinician explained that liquids could not be upgraded unless pt has a follow-up VFSS. It has been 2 weeks since her previous VFSS. Pt was slightly restless today hawever was able to come upright in bed for lunch meal. Pt was fed by her . pt was seen consuming puree meat, mashed ptotatoes, peas and drinking NT lemonade by cup. Pt had slow but functional oral transit of puree food. No labial spuillage from the cup on the NTL. Digital palpation suggests timely swallow. After the swallow, pt had clear vocal qualty and no cough or other overt s/s of aspiration. Due to pt having improved mentation and  strength, it is rec that pt have follow-up VFSS in am for possible diet upgrade. Pt should continue current puree/NTL diet until VFSS. education provided to pt and her  on the rationale for follow-up VFSS. they verbalized understanding. ST will follow-up with recs from VFSS.  -CP          User Key  (r) = Recorded By, (t) = Taken By, (c) = Cosigned By    Initials Name Effective Dates    CP Dayanara Javier, SLP 06/16/21 -                 EDUCATION  The patient has been educated in the following areas:   Dysphagia (Swallowing Impairment).        SLP GOALS     Row Name 11/08/22 1515 11/07/22 1300          (LTG) Swallow    (LTG) Swallow The patient will maximize swallow function for least restrictive po diet, exhibiting no complications associated with dysphagia, adequate po intake, and demonstrating independent use of safe swallow compensations.  -CP The patient will maximize swallow function for least restrictive po diet, exhibiting no complications associated with dysphagia, adequate po intake, and demonstrating independent use of safe swallow compensations.  -EC     [place] (Swallow Long Term Goal) with moderate cues (50-74% accuracy)  -CP with moderate cues (50-74% accuracy)  -EC     Time Frame (Swallow Long Term Goal) by discharge  -CP by discharge  -EC     Barriers (Swallow Long Term Goal) Restlessness  -CP --     Progress/Outcomes (Swallow Long Term Goal) good progress toward goal  -CP goal ongoing  -EC     Comment (Swallow Long Term Goal) See above note, pt having follow-up VFSS in am for possible diet upgrade.  -CP --        (STG) Swallow 1    (STG) Swallow 1 The patient will participate in ongoing assessment of swallow, including re-evaluation clinically and/or including instrumental assessment of swallow if indicated, to further assess swallow function in anticipation to initiate a po diet  -CP The patient will participate in ongoing assessment of swallow, including re-evaluation clinically and/or  including instrumental assessment of swallow if indicated, to further assess swallow function in anticipation to initiate a po diet  -EC     [place] (Swallow Short Term Goal 1) with maximum cues (25-49% accuracy)  -CP with maximum cues (25-49% accuracy)  -EC     Time Frame (Swallow Short Term Goal 1) 1 week  -CP 1 week  -EC     Progress/Outcomes (Swallow Short Term Goal 1) good progress toward goal  -CP goal ongoing  -EC     Comment (Swallow Short Term Goal 1) See above note  -CP --        (STG) Swallow 2    (STG) Swallow 2 The patient will participate in a full meal assessment to determine safety and adequacy of recommended diet, independent use of safe swallow compensations, and additional goals/recommendations to follow  -CP The patient will participate in a full meal assessment to determine safety and adequacy of recommended diet, independent use of safe swallow compensations, and additional goals/recommendations to follow  -EC     [place] (Swallow Short Term Goal 2) with moderate cues (50-74% accuracy)  -CP with moderate cues (50-74% accuracy)  -EC     Time Frame (Swallow Short Term Goal 2) 1 week  -CP 1 week  -EC     Progress/Outcomes (Swallow Short Term Goal 2) goal ongoing  -CP goal ongoing  -EC     Comment (Swallow Short Term Goal 2) see above note  -CP --           User Key  (r) = Recorded By, (t) = Taken By, (c) = Cosigned By    Initials Name Provider Type    Dayanara Corey, SLP Speech and Language Pathologist    Winifred Maldonado Speech and Language Pathologist                   Time Calculation:                SADIQ Todd  11/8/2022

## 2022-11-08 NOTE — PROGRESS NOTES
Cardiology Progress Note    Patient Identification:  Name: Laura Perkins  Age: 82 y.o.  Sex: female  :  1940  MRN: [id number]                 Follow Up / Chief Complaint: Elevated troponin, non stemi   Chief Complaint   Patient presents with   • Altered Mental Status       Interval History:  Patient presented from outlying facility with pneumonia.  Noted to have elevated troponin   Patient underwent cardiac cath 10/27/2022 with stent to proximal LAD.          Subjective: Patient seen and examined.  Chart reviewed.  Labs reviewed.  Discussed with RN taking care of patient.  Patient seems more awake and alert without complaints      Objective: Serial troponin 0.872, 0.653, 0.793, 0.546, 0.373  10/26/2022: troponin 0.300, 0.256  Glucose 136, sodium 148 WBC 13.4, hgb 9.0  10/27/2022: troponin 0.175, WBC 12.9 hgb 8.8 EKG with diffuse T wave abnormalities and QTC prolongation   10/28/2022: troponin 0.136, glucose 215, WBC 12.2   hgb 8.6  10/30/2022: troponin 0.067, pro bnp [id number]  10/31/2022: troponin 0.05, glucose 190, creatinine 0.51  WBC 12.2, hgb 8.4  2022:  Troponin 0.057, glucose 185  2022: no labs to review   2022: Glucose elevated, CBC reveals a white count of 17.6 and hemoglobin of 8.8  2022: Chest x-ray reveals persistent bibasilar airspace disease left greater than right  2022: Glucose elevated, WBC 7.6, hemoglobin 8.6      History of Present Illness:        Mrs. Laura Perkins has a Past medical history of      - hypertension  - dyslipidemia   - diabetes   - anemia, GERD  - stroke   - bipolar disorder  - Colon cancer- s/p resection 10/18/2022  - bladder surgery, eye surgery, hysterectomy, hemorrhoidectomy   -  Non smoker  - no allergies  - family history of heart disease in mother         Presented from rehab center to the ED on 10/24/2022 for confusion/alerted mental status.  Patient was recently admitted to [facility] for transverse colon resection on  10/18/2022.  Patient was discharged to rehab. In the ED patient was noted to have fever 102. CXR showed left sided pneumonia and patient was started on IVFs and antibiotics.  Cardiology has been consulted for elevated troponin.   Patient alert and oriented and gives some history but is poor historian and most of information above was obtained from chart review.  She denies any chest pain but does state she is short of breath and coughing.  RN reported patient aspirated and had swallow study now on thickened liquid.      Serial troponins have been elevated but non-trending 0.872, 0.653, 0.793, 0.546, 0.373  EKG showed sinus tachycardia with diffuse T wave inversion that is changed from previous EKG on 10/11/2022 that showed normal sinus rhythm without any ST abnormalities.       Assessment:  :     Acute non-ST elevation MI  Presumed CAD  Hypertension, dyslipidemia, diabetes  Recent colon cancer resection 10/18/2022  Fever, leukocytosis  Altered mental status  Prolonged QTC on psychiatric medications   CAD, NSTEMI, PCI  Acute HFrEF due to systolic dysfunction from ischemic cardiomyopathy        Recommendations / Plan:         Telemetry is revealing sinus rhythm  Monitor renal function and hemoglobin and urine output  proBNP is improved from 23,855-14,071--14,684  Continue diuresis as tolerated  Echo is revealing moderate LV dysfunction  Continue to monitor EKG and QTC.  EKG done 10/31/2022 reviewed/interpreted by me reveals sinus tachycardia with rate of 102 bpm with QT of 382 ms and QTC of 493 ms.  Increase activity as tolerated  Continue aspirin, clopidogrel, losartan, metoprolol succinate, rosuvastatin as tolerated  Patient has significant improvement in symptoms we will continue diuresis and medical management  Patient had pleural tap and removal of fluid 11/7/2022  Will follow-up as outpatient.  Please call me back if I can be of any further assistance       Copied text in this portion of the note has been  reviewed and is accurate as of 11/8/2022    Past Medical History:  Past Medical History:   Diagnosis Date   • Acid reflux    • Anemia    • Anemia    • Bipolar 1 disorder (HCC)    • Colon cancer (HCC)    • Diabetes mellitus (HCC)     Type 2   • Disease of thyroid gland    • Hyperlipidemia    • Hypertension     TAKEN OFF HTN MEDS OVER 5 YEARS AGO   • Incontinence of urine    • Stroke (HCC)     2012     Past Surgical History:  Past Surgical History:   Procedure Laterality Date   • BLADDER SURGERY     • CARDIAC CATHETERIZATION N/A 10/27/2022    Procedure: Left Heart Cath, possible pci;  Surgeon: Andrea Melvin MD;  Location:  [facility];  Service: Cardiovascular;  Laterality: N/A;   • COLON RESECTION N/A 10/18/2022    Procedure: COLON RESECTIOn TRANSVERSE LAPAROSCOPIC;  Surgeon: Tooñ Michelle MD;  Location: [facility] MAIN OR;  Service: General;  Laterality: N/A;   • COLONOSCOPY  09/21/2022    [facility]   • ENDOSCOPY  09/21/2022    [facility]   • EYE SURGERY     • HEMORRHOIDECTOMY     • HYSTERECTOMY          Social History:   Social History     Tobacco Use   • Smoking status: Never   • Smokeless tobacco: Never   Substance Use Topics   • Alcohol use: Never      Family History:  Family History   Problem Relation Age of Onset   • Heart disease Mother    • Diabetes Mother    • No Known Problems Father    • Cancer Brother           Allergies:  No Known Allergies  Scheduled Meds:  ARIPiprazole, 2 mg, Nightly  aspirin, 81 mg, Daily  budesonide, 0.5 mg, BID - RT  clopidogrel, 75 mg, Daily  divalproex, 500 mg, BID  docusate sodium, 100 mg, BID  ferrous sulfate, 324 mg, BID With Meals  furosemide, 20 mg, Q12H  guaiFENesin, 600 mg, Q12H  insulin glargine, 16 Units, QAM  insulin lispro, 3-14 Units, TID With Meals  insulin lispro, 5 Units, TID With Meals  ipratropium-albuterol, 3 mL, 4x Daily - RT  levothyroxine, 50 mcg, Daily  losartan, 25 mg, Q24H  magnesium oxide, 400 mg,  "Daily  metoprolol succinate XL, 25 mg, Q24H  pantoprazole, 40 mg, QAM  polyethylene glycol, 17 g, Daily  risperiDONE, 0.5 mg, Daily  rosuvastatin, 20 mg, Daily  Scopolamine, 1 patch, Q72H  spironolactone, 25 mg, Daily          Review of Systems:   Review of Systems   Unable to perform ROS: mental status change            Constitutional:  Temp:  [97.5 °F (36.4 °C)-98.4 °F (36.9 °C)] 97.5 °F (36.4 °C)  Heart Rate:  [] 83  Resp:  [16-22] 18  BP: (130-146)/(33-62) 141/49    Physical Exam   /49 (BP Location: Right arm, Patient Position: Lying)   Pulse 83   Temp 97.5 °F (36.4 °C) (Oral)   Resp 18   Ht 162.6 cm (64\")   Wt 56.8 kg (125 lb 3.5 oz) Comment: extra blankets removed  SpO2 100%   BMI 21.49 kg/m²   General:  Appears in no acute distress- frail and chronically ill   Eyes: Sclera is anicteric,  conjunctiva is clear   HEENT:  No JVD. Thyroid not visibly enlarged. No mucosal pallor or cyanosis  Respiratory: Respirations regular and unlabored at rest.  Decreased breath sounds at bilateral bases with scattered rhonchi  Cardiovascular: S1,S2 Regular rate and rhythm. No murmur, rub or gallop auscultated.  . No pretibial pitting edema  Gastrointestinal: Abdomen nondistended.  Musculoskeletal:  No abnormal movements  Extremities: No digital clubbing or cyanosis  Skin: Color pink. Skin warm and dry to touch. No rashes  No xanthoma  Neuro: Alert and awake, no lateralizing deficits appreciated    INTAKE AND OUTPUT:    Intake/Output Summary (Last 24 hours) at 11/8/2022 0837  Last data filed at 11/8/2022 0522  Gross per 24 hour   Intake 0 ml   Output 700 ml   Net -700 ml       Cardiographics  Telemetry: sinus rhythm     ECG:   ECG 12 Lead QT Measurement   Preliminary Result   HEART RATE= 74  bpm   RR Interval= 812  ms   WY Interval= 115  ms   P Horizontal Axis= -74  deg   P Front Axis= -14  deg   QRSD Interval= 92  ms   QT Interval= 516  ms   QRS Axis= 25  deg   T Wave Axis= 196  deg   - ABNORMAL ECG -   Sinus " rhythm   Abnrm T, probable ischemia, anterolateral lds   Prolonged QT interval   When compared with ECG of 03-Nov-2022 8:17:18,   Nonspecific significant change   Electronically Signed By:    Date and Time of Study: 2022-11-05 09:56:04      ECG 12 Lead QT Measurement   Final Result   HEART RATE= 76  bpm   RR Interval= 808  ms   AK Interval= 155  ms   P Horizontal Axis= -14  deg   P Front Axis= 53  deg   QRSD Interval= 96  ms   QT Interval= 477  ms   QRS Axis= 19  deg   T Wave Axis= 183  deg   - ABNORMAL ECG -   Sinus rhythm   Atrial premature complex   Abnrm T, consider ischemia, anterolateral lds   Prolonged QT interval   When compared with ECG of 31-Oct-2022 8:53:04,   Significant rate decrease   Significant repolarization change   Electronically Signed By: Andrea Melvin ([facility]) 06-Nov-2022 14:45:24   Date and Time of Study: 2022-11-03 08:17:18      ECG 12 Lead QT Measurement   Final Result   HEART RATE= 102  bpm   RR Interval= 600  ms   AK Interval= 157  ms   P Horizontal Axis= -38  deg   P Front Axis= 23  deg   QRSD Interval= 89  ms   QT Interval= 382  ms   QRS Axis= 28  deg   T Wave Axis= 209  deg   - ABNORMAL ECG -   Sinus tachycardia   Atrial premature complexes   Nonspecific T abnormalities, diffuse leads   When compared with ECG of 29-Oct-2022 16:41:12,   No significant change   Electronically Signed By: Andrea Melvin ([facility]) 06-Nov-2022 14:45:34   Date and Time of Study: 2022-10-31 08:53:04      ECG 12 Lead Other; on psych medications that prolong qtc   Final Result   HEART RATE= 92  bpm   RR Interval= 656  ms   AK Interval= 154  ms   P Horizontal Axis= -11  deg   P Front Axis= 45  deg   QRSD Interval= 90  ms   QT Interval= 360  ms   QRS Axis= 20  deg   T Wave Axis= 193  deg   - ABNORMAL ECG -   Sinus rhythm   Atrial premature complex   Abnrm T, consider ischemia, anterolateral lds   When compared with ECG of 29-Oct-2022 5:08:11,   Nonspecific significant change   Electronically Signed By: Allison  Reyes ([facility]) 03-Nov-2022 11:14:28   Date and Time of Study: 2022-10-29 16:41:12      ECG 12 Lead QT Measurement   Final Result   HEART RATE= 78  bpm   RR Interval= 772  ms   NV Interval= 139  ms   P Horizontal Axis= 4  deg   P Front Axis= 42  deg   QRSD Interval= 87  ms   QT Interval= 422  ms   QRS Axis= 32  deg   T Wave Axis= 212  deg   - ABNORMAL ECG -   Sinus rhythm   Abnormal T, consider ischemia, diffuse leads   When compared with ECG of 28-Oct-2022 5:26:46,   New or worsened ischemia or infarction   Significant repolarization change   Electronically Signed By: Reyes Cooper ([facility]) 03-Nov-2022 10:47:40   Date and Time of Study: 2022-10-29 05:08:11      SCANNED - TELEMETRY     Final Result      ECG 12 Lead   Final Result   HEART RATE= 78  bpm   RR Interval= 772  ms   NV Interval= 129  ms   P Horizontal Axis= -38  deg   P Front Axis= 2  deg   QRSD Interval= 90  ms   QT Interval= 469  ms   QRS Axis= 37  deg   T Wave Axis= 232  deg   - ABNORMAL ECG -   Sinus rhythm   Abnormal T, suspect prox. LAD occlu. or CVA   Prolonged QT interval   When compared with ECG of 27-Oct-2022 10:26:26,   Significant repolarization change   Electronically Signed By: Andrea Melvin ([facility]) 06-Nov-2022 14:45:45   Date and Time of Study: 2022-10-28 05:26:46      ECG 12 Lead QT Measurement   Final Result   HEART RATE= 97  bpm   RR Interval= 620  ms   NV Interval= 154  ms   P Horizontal Axis= 1  deg   P Front Axis= 43  deg   QRSD Interval= 92  ms   QT Interval= 474  ms   QRS Axis= 32  deg   T Wave Axis= 217  deg   - ABNORMAL ECG -   Sinus rhythm   Abnormal T, probable ischemia, widespread   Prolonged QT interval   When compared with ECG of 23-Oct-2022 19:11:40,   Significant change in rhythm   Electronically Signed By: Skyler García ([facility]) 28-Oct-2022 10:11:01   Date and Time of Study: 2022-10-27 10:26:26      ECG 12 Lead   Final Result   HEART RATE= 121  bpm   RR Interval= 496  ms   NV Interval= 96  ms   P Horizontal Axis=    deg   P Front Axis= 206  deg   QRSD Interval= 77  ms   QT Interval= 382  ms   QRS Axis= 37  deg   T Wave Axis= 209  deg   - ABNORMAL ECG -   Sinus or ectopic atrial tachycardia   Abnormal T, consider ischemia, diffuse leads   Prolonged QT interval   When compared with ECG of 11-Oct-2022 14:13:27,   Significant change in rhythm: previously sinus   New or worsened ischemia or infarction   Electronically Signed By: Marino Montes ([facility]) 24-Oct-2022 14:20:30   Date and Time of Study: 2022-10-23 19:11:40      SCANNED - TELEMETRY     Final Result      SCANNED - TELEMETRY     Final Result      SCANNED - TELEMETRY     Final Result      SCANNED - TELEMETRY     Final Result      SCANNED - TELEMETRY     Final Result      SCANNED - TELEMETRY     Final Result      SCANNED - TELEMETRY     Final Result      SCANNED - TELEMETRY     Final Result      SCANNED - TELEMETRY     Final Result      SCANNED - TELEMETRY     Final Result      SCANNED - TELEMETRY     Final Result      SCANNED - TELEMETRY     Final Result      SCANNED - TELEMETRY     Final Result      SCANNED - TELEMETRY     Final Result      SCANNED - TELEMETRY     Final Result      SCANNED - TELEMETRY     Final Result      SCANNED - TELEMETRY     Final Result      SCANNED - TELEMETRY     Final Result      SCANNED - TELEMETRY     Final Result      SCANNED - TELEMETRY     Final Result      SCANNED - TELEMETRY     Final Result      SCANNED - TELEMETRY     Final Result      SCANNED - TELEMETRY     Final Result      SCANNED - TELEMETRY     Final Result      SCANNED - TELEMETRY     Final Result      SCANNED - TELEMETRY     Final Result      SCANNED - TELEMETRY     Final Result      SCANNED - TELEMETRY     Final Result      SCANNED - TELEMETRY     Final Result      SCANNED - TELEMETRY     Final Result      SCANNED - TELEMETRY     Final Result      SCANNED - TELEMETRY     Final Result      SCANNED - TELEMETRY     Final Result      SCANNED - TELEMETRY     Final Result      SCANNED -  TELEMETRY     Final Result      SCANNED - TELEMETRY     Final Result      SCANNED - TELEMETRY     Final Result      SCANNED - TELEMETRY     Final Result      SCANNED - TELEMETRY     Final Result      SCANNED - TELEMETRY     Final Result      SCANNED - TELEMETRY     Final Result      SCANNED - TELEMETRY     Final Result      SCANNED - TELEMETRY     Final Result      SCANNED - TELEMETRY     Final Result      SCANNED - TELEMETRY     Final Result      SCANNED - TELEMETRY     Final Result      SCANNED - TELEMETRY     Final Result      SCANNED - TELEMETRY     Final Result      SCANNED - TELEMETRY     Final Result      SCANNED - TELEMETRY     Final Result      SCANNED - TELEMETRY     Final Result      SCANNED - TELEMETRY     Final Result      SCANNED - TELEMETRY     Final Result      SCANNED - TELEMETRY     Final Result      SCANNED - TELEMETRY     Final Result      SCANNED - TELEMETRY     Final Result      SCANNED - TELEMETRY     Final Result      SCANNED - TELEMETRY     Final Result      SCANNED - TELEMETRY     Final Result      SCANNED - TELEMETRY     Final Result      SCANNED - TELEMETRY     Final Result      SCANNED - TELEMETRY     Final Result      SCANNED - TELEMETRY     Final Result      ECG 12 Lead QT Measurement    (Results Pending)   ECG 12 Lead QT Measurement    (Results Pending)     I have personally reviewed EKG    Echocardiogram: Results for orders placed during the hospital encounter of 10/23/22    Adult Transthoracic Echo Limited W/ Cont if Necessary Per Protocol    Interpretation Summary  Normal LV size.  Mild apical hypokinesis seen.  Estimated LV ejection fraction of 45 to 50%  Normal RV size  Normal atrial size  Aortic valve, mitral valve, tricuspid valve appears structurally normal, no significant regurgitation seen.  Small pericardial effusion seen.  No signs of increased intrapericardial pressure  Proximal aorta appears normal in size.      Lab Review   I have reviewed the labs      Results from  last 7 days   Lab Units 11/07/22  0439   MAGNESIUM mg/dL 2.5*     Results from last 7 days   Lab Units 11/06/22  0745   SODIUM mmol/L 137   POTASSIUM mmol/L 4.5   BUN mg/dL 12   CREATININE mg/dL 0.64   CALCIUM mg/dL 8.5*         Results from last 7 days   Lab Units 11/06/22  0745 11/05/22  0819 11/04/22  0822   WBC 10*3/mm3 17.60* 13.80* 13.30*   HEMOGLOBIN g/dL 8.6* 8.9* 8.4*   HEMATOCRIT % 29.0* 30.2* 26.9*   PLATELETS 10*3/mm3 505* 499* 447     Results from last 7 days   Lab Units 11/04/22  0821   INR  1.08       RADIOLOGY:  Imaging Results (Last 24 Hours)     Procedure Component Value Units Date/Time    XR Chest 1 View [[id number]] Collected: 11/08/22 0800     Updated: 11/08/22 0803    Narrative:         DATE OF EXAM:   11/8/2022 4:32 AM     PROCEDURE:   XR CHEST 1 VW-     INDICATIONS:   Shortness of breath; I21.4-Non-ST elevation (NSTEMI) myocardial  infarction; R41.82-Altered mental status, unspecified; R50.9-Fever,  unspecified; J18.9-Pneumonia, unspecified organism; R77.8-Other  specified abnormalities of plasma proteins; E11.9-Type 2 diabetes  mellitus without complications; Z79.4-Long term (current) use of  insulin; E78.5-Hyperlipidemia, unspecified     COMPARISON:  11/07/2022     TECHNIQUE:   Portable chest radiograph.     FINDINGS:    Redemonstration of bibasilar airspace disease left greater than right  with mild worsening from the prior study. No pneumothorax. Small  bilateral pleural effusions. Heart size top normal, exaggerated by  technique. Osseous structures grossly intact.       Impression:      Persistent bibasilar airspace disease left greater than right which may  relate to pneumonia, mildly worsened from the prior study.     Stable small bilateral pleural effusions.     Electronically Signed By-James Win MD On:11/8/2022 8:01 AM  This report was finalized on [id number] by  James Win MD.    XR Chest 1 View [[id number]] Collected: 11/07/22 1126     Updated: 11/07/22 1131    Narrative:    "      DATE OF EXAM:   11/7/2022 11:24 AM     PROCEDURE:   XR CHEST 1 VW-     INDICATIONS:   chest tube; I21.4-Non-ST elevation (NSTEMI) myocardial infarction;  R41.82-Altered mental status, unspecified; R50.9-Fever, unspecified;  J18.9-Pneumonia, unspecified organism; R77.8-Other specified  abnormalities of plasma proteins; E11.9-Type 2 diabetes mellitus without  complications; Z79.4-Long term (current) use of insulin;  E78.5-Hyperlipidemia, unspecified     COMPARISON:  11/06/2022     TECHNIQUE:   Portable chest radiograph.     FINDINGS:   There is no chest tube visualized. The left-sided large effusion is  decreased in size. Persistent bibasilar airspace disease left greater  than right. No pneumothorax. No significant effusion on the right. Small  residual left effusion. There is a chronic appearing fracture deformity  at the left proximal humerus partially imaged       Impression:      1. Decrease in size of left pleural effusion with small residual  effusion. No pneumothorax.  2. Persistent bibasilar airspace disease left greater than right.  3. No chest tube visualized.     Electronically Signed By-James Win MD On:11/7/2022 11:29 AM  This report was finalized on [id number] by  James Win MD.                )11/8/2022  MD YARA Dunham/Transcription:   \"Dictated utilizing Dragon dictation\".   "

## 2022-11-09 ENCOUNTER — APPOINTMENT (OUTPATIENT)
Dept: GENERAL RADIOLOGY | Facility: HOSPITAL | Age: 82
End: 2022-11-09

## 2022-11-09 LAB
GLUCOSE BLDC GLUCOMTR-MCNC: 146 MG/DL (ref 70–105)
GLUCOSE BLDC GLUCOMTR-MCNC: 352 MG/DL (ref 70–105)
GLUCOSE BLDC GLUCOMTR-MCNC: 365 MG/DL (ref 70–105)
GLUCOSE BLDC GLUCOMTR-MCNC: 410 MG/DL (ref 70–105)
GLUCOSE BLDC GLUCOMTR-MCNC: 94 MG/DL (ref 70–105)

## 2022-11-09 PROCEDURE — 94799 UNLISTED PULMONARY SVC/PX: CPT

## 2022-11-09 PROCEDURE — 63710000001 INSULIN LISPRO (HUMAN) PER 5 UNITS: Performed by: INTERNAL MEDICINE

## 2022-11-09 PROCEDURE — 97530 THERAPEUTIC ACTIVITIES: CPT

## 2022-11-09 PROCEDURE — 94664 DEMO&/EVAL PT USE INHALER: CPT

## 2022-11-09 PROCEDURE — 97164 PT RE-EVAL EST PLAN CARE: CPT

## 2022-11-09 PROCEDURE — 71045 X-RAY EXAM CHEST 1 VIEW: CPT

## 2022-11-09 PROCEDURE — 63710000001 INSULIN GLARGINE PER 5 UNITS: Performed by: INTERNAL MEDICINE

## 2022-11-09 PROCEDURE — 74230 X-RAY XM SWLNG FUNCJ C+: CPT

## 2022-11-09 PROCEDURE — 25010000002 NA FERRIC GLUC CPLX PER 12.5 MG: Performed by: NURSE PRACTITIONER

## 2022-11-09 PROCEDURE — 92611 MOTION FLUOROSCOPY/SWALLOW: CPT

## 2022-11-09 PROCEDURE — 99231 SBSQ HOSP IP/OBS SF/LOW 25: CPT | Performed by: PSYCHIATRY & NEUROLOGY

## 2022-11-09 PROCEDURE — 25010000002 FUROSEMIDE PER 20 MG: Performed by: INTERNAL MEDICINE

## 2022-11-09 PROCEDURE — 99232 SBSQ HOSP IP/OBS MODERATE 35: CPT | Performed by: INTERNAL MEDICINE

## 2022-11-09 PROCEDURE — 94761 N-INVAS EAR/PLS OXIMETRY MLT: CPT

## 2022-11-09 PROCEDURE — 82962 GLUCOSE BLOOD TEST: CPT

## 2022-11-09 PROCEDURE — 92526 ORAL FUNCTION THERAPY: CPT

## 2022-11-09 RX ORDER — INSULIN LISPRO 100 [IU]/ML
10 INJECTION, SOLUTION INTRAVENOUS; SUBCUTANEOUS ONCE
Status: COMPLETED | OUTPATIENT
Start: 2022-11-10 | End: 2022-11-10

## 2022-11-09 RX ORDER — IPRATROPIUM BROMIDE AND ALBUTEROL SULFATE 2.5; .5 MG/3ML; MG/3ML
3 SOLUTION RESPIRATORY (INHALATION)
Status: DISCONTINUED | OUTPATIENT
Start: 2022-11-09 | End: 2022-11-15 | Stop reason: HOSPADM

## 2022-11-09 RX ADMIN — LEVOTHYROXINE SODIUM 50 MCG: 50 TABLET ORAL at 04:57

## 2022-11-09 RX ADMIN — BARIUM SULFATE 50 ML: 400 SUSPENSION ORAL at 10:06

## 2022-11-09 RX ADMIN — INSULIN GLARGINE 18 UNITS: 100 INJECTION, SOLUTION SUBCUTANEOUS at 10:36

## 2022-11-09 RX ADMIN — ROSUVASTATIN 20 MG: 10 TABLET, FILM COATED ORAL at 21:35

## 2022-11-09 RX ADMIN — DIVALPROEX SODIUM 500 MG: 500 TABLET, DELAYED RELEASE ORAL at 21:35

## 2022-11-09 RX ADMIN — BUDESONIDE 0.5 MG: 0.5 INHALANT RESPIRATORY (INHALATION) at 07:29

## 2022-11-09 RX ADMIN — HYDROCODONE BITARTRATE AND ACETAMINOPHEN 1 TABLET: 7.5; 325 TABLET ORAL at 10:35

## 2022-11-09 RX ADMIN — LOSARTAN POTASSIUM 25 MG: 25 TABLET, FILM COATED ORAL at 10:31

## 2022-11-09 RX ADMIN — FERROUS SULFATE TAB EC 324 MG (65 MG FE EQUIVALENT) 324 MG: 324 (65 FE) TABLET DELAYED RESPONSE at 10:31

## 2022-11-09 RX ADMIN — FUROSEMIDE 20 MG: 10 INJECTION, SOLUTION INTRAMUSCULAR; INTRAVENOUS at 03:22

## 2022-11-09 RX ADMIN — DIVALPROEX SODIUM 500 MG: 500 TABLET, DELAYED RELEASE ORAL at 10:32

## 2022-11-09 RX ADMIN — INSULIN LISPRO 6 UNITS: 100 INJECTION, SOLUTION INTRAVENOUS; SUBCUTANEOUS at 13:22

## 2022-11-09 RX ADMIN — FERROUS SULFATE TAB EC 324 MG (65 MG FE EQUIVALENT) 324 MG: 324 (65 FE) TABLET DELAYED RESPONSE at 17:03

## 2022-11-09 RX ADMIN — INSULIN LISPRO 6 UNITS: 100 INJECTION, SOLUTION INTRAVENOUS; SUBCUTANEOUS at 10:31

## 2022-11-09 RX ADMIN — ASPIRIN 81 MG: 81 TABLET, COATED ORAL at 10:32

## 2022-11-09 RX ADMIN — GUAIFENESIN 600 MG: 600 TABLET, EXTENDED RELEASE ORAL at 10:36

## 2022-11-09 RX ADMIN — INSULIN LISPRO 12 UNITS: 100 INJECTION, SOLUTION INTRAVENOUS; SUBCUTANEOUS at 13:22

## 2022-11-09 RX ADMIN — IPRATROPIUM BROMIDE AND ALBUTEROL SULFATE 3 ML: .5; 3 SOLUTION RESPIRATORY (INHALATION) at 14:55

## 2022-11-09 RX ADMIN — GUAIFENESIN 600 MG: 600 TABLET, EXTENDED RELEASE ORAL at 21:35

## 2022-11-09 RX ADMIN — SODIUM CHLORIDE 250 MG: 9 INJECTION, SOLUTION INTRAVENOUS at 22:09

## 2022-11-09 RX ADMIN — FUROSEMIDE 20 MG: 10 INJECTION, SOLUTION INTRAMUSCULAR; INTRAVENOUS at 16:27

## 2022-11-09 RX ADMIN — HYDROCODONE BITARTRATE AND ACETAMINOPHEN 1 TABLET: 7.5; 325 TABLET ORAL at 16:27

## 2022-11-09 RX ADMIN — RISPERIDONE 0.5 MG: 0.25 TABLET ORAL at 10:32

## 2022-11-09 RX ADMIN — LORAZEPAM 0.5 MG: 0.5 TABLET ORAL at 01:03

## 2022-11-09 RX ADMIN — CLOPIDOGREL BISULFATE 75 MG: 75 TABLET ORAL at 10:32

## 2022-11-09 RX ADMIN — FLUOXETINE 20 MG: 20 CAPSULE ORAL at 10:31

## 2022-11-09 RX ADMIN — IPRATROPIUM BROMIDE AND ALBUTEROL SULFATE 3 ML: .5; 3 SOLUTION RESPIRATORY (INHALATION) at 11:46

## 2022-11-09 RX ADMIN — Medication 10 ML: at 10:32

## 2022-11-09 RX ADMIN — SPIRONOLACTONE 25 MG: 25 TABLET ORAL at 10:32

## 2022-11-09 RX ADMIN — METOPROLOL SUCCINATE 25 MG: 25 TABLET, FILM COATED, EXTENDED RELEASE ORAL at 10:36

## 2022-11-09 RX ADMIN — PANTOPRAZOLE SODIUM 40 MG: 40 TABLET, DELAYED RELEASE ORAL at 10:31

## 2022-11-09 RX ADMIN — MAGNESIUM OXIDE TAB 400 MG (241.3 MG ELEMENTAL MG) 400 MG: 400 (241.3 MG) TAB at 10:31

## 2022-11-09 NOTE — PLAN OF CARE
Goal Outcome Evaluation:         Patient was re-evaluated via VFSS this date, following overt s/s aspiration noted at bedside during breakfast with NTL.      Exam was significantly limited due to poor patient participation. The patient required extensive verbal prompts a to accept  diagnostic amounts of barium.  On 1/2 trials thin liquid by spoon there is penetration immediately to the level of the vocal folds which is non-transient/does not clear with a swallow. Possible aspiration of thin difficult to visualize due to limited view/shoulder obstruction. Penetration of NTL by spoon noted during the swallow. This is non transient but does not progress and is not seen on cup trial.      At this time, recommend continue pureed diet with NTL. Cup is okay if all precautions followed.       Precautions:   FULL FEED  Strict 90 degree hip flexion during and for at least 30 min follow all PO given established esophageal dysphagia/retention.   SMALL bites and sips fed at slow rate     Pt remains at risk of aspiration given reliance on full feed, and proper caregiver/feeder education/training is recommended in order to reduce this risk.

## 2022-11-09 NOTE — MBS/VFSS/FEES
Acute Care - Speech Language Pathology   Swallow Re-Evaluation [facility]     Patient Name: Laura Perkins  : 1940  MRN: [id number]  Today's Date: 2022               Admit Date: 10/23/2022    Visit Dx:     ICD-10-CM ICD-9-CM   1. Acute non-ST elevation myocardial infarction (NSTEMI) (Formerly [facility])  I21.4 410.70   2. Altered mental status, unspecified altered mental status type  R41.82 780.97   3. Fever, unspecified fever cause  R50.9 780.60   4. Pneumonia of left lung due to infectious organism, unspecified part of lung  J18.9 486   5. Elevated troponin  R77.8 790.6   6. Type 2 diabetes mellitus without complication, with long-term current use of insulin (Formerly [facility])  E11.9 250.00    Z79.4 V58.67   7. Dyslipidemia  E78.5 272.4     Patient Active Problem List   Diagnosis   • Pneumonia due to COVID-19 virus   • Acute renal injury (HCC)   • Cancer of transverse colon (Formerly [facility])   • Altered mental status, unspecified altered mental status type   • HAP (hospital-acquired pneumonia)   • Acute respiratory distress   • Elevated troponin   • Sepsis (HCC)   • Type 2 diabetes mellitus (HCC)   • Anxiety associated with depression   • Hypothyroidism (acquired)   • OAB (overactive bladder)   • GERD without esophagitis   • Acute non-ST elevation myocardial infarction (NSTEMI) (Formerly [facility])   • Dyslipidemia   • Acute respiratory failure with hypoxia (Formerly [facility])   • Pleural effusion     Past Medical History:   Diagnosis Date   • Acid reflux    • Anemia    • Anemia    • Bipolar 1 disorder (Formerly [facility])    • Colon cancer (Formerly [facility])    • Diabetes mellitus (Formerly [facility])     Type 2   • Disease of thyroid gland    • Hyperlipidemia    • Hypertension     TAKEN OFF HTN MEDS OVER 5 YEARS AGO   • Incontinence of urine    • Stroke (Formerly [facility])          Past Surgical History:   Procedure Laterality Date   • BLADDER SURGERY     • CARDIAC CATHETERIZATION N/A 10/27/2022    Procedure: Left Heart Cath, possible pci;  Surgeon: Andrea Melvin MD;  Location: [facility] INVASIVE LOCATION;   Service: Cardiovascular;  Laterality: N/A;   • COLON RESECTION N/A 10/18/2022    Procedure: COLON RESECTIOn TRANSVERSE LAPAROSCOPIC;  Surgeon: Toño Michelle MD;  Location: [facility];  Service: General;  Laterality: N/A;   • COLONOSCOPY  09/21/2022    [facility]   • ENDOSCOPY  09/21/2022    [facility]   • EYE SURGERY     • HEMORRHOIDECTOMY     • HYSTERECTOMY       Patient was not wearing a face mask during this therapy encounter. The patient's mask was removed to allow po trials to be administered for purposes of this assessment. The patient's mask was replaced prior to being transported back up to their room. Therapist used appropriate personal protective equipment including mask, eye protection and gloves.  Mask used was standard procedure mask. Appropriate PPE was worn during the entire therapy session. Hand hygiene was completed before and after therapy session. Patient is not in enhanced droplet precautions.             SLP Recommendation and Plan     SLP Diet Recommendation: puree, nectar thick liquids (11/09/22 1200) CUP OK as long as pt is upright, neutral head positioning, small sips.   Recommended Precautions and Strategies: upright posture during/after eating, small bites of food and sips of liquid, alternate between small bites of food and sips of liquid, general aspiration precautions, reflux precautions, fatigue precautions, 1:1 supervision, other (see comments) (11/09/22 1200)  SLP Rec. for Method of Medication Administration: meds crushed, with thick liquids, with puree (11/09/22 1200)     Monitor for Signs of Aspiration: yes, right lower lobe infiltrates, pneumonia (11/09/22 1200)  Recommended Diagnostics: reassess via clinical swallow evaluation, reassess via VFSS ([facility]) (11/09/22 1200)     Anticipated Discharge Disposition (SLP): extended care facility (11/09/22 1200)     Therapy Frequency (Swallow): PRN (11/09/22 1200)  Predicted Duration Therapy Intervention  (Days): until discharge (11/09/22 1200)            SWALLOW EVALUATION (last 72 hours)     SLP Adult Swallow Evaluation     Row Name 11/09/22 1200          Document Type re-evaluation  -    Subjective Information --    Patient Observations Pt awake and alert at bedside. Required persistent stim during VFSS despite being completed within ~30 minutes following bedside assessment.     Patient/Family/Caregiver Comments/Observations Spouse feeding, required prompts. See below.     Patient Profile Reviewed yes  -MC    Pertinent History Of Current Problem Pt is an 82 year old female established with ST services during this admission for AMS. She underwent VFSS on 10/24 initially, with the following recommendations:It is recommended that the patient's diet be changed to puree/NTL at this time. She should be up at a full 90 degree angle for all meals/medications, and remain up for at least 30 minutes following all meals/po to aid in clearing the retained esophageal material. Liquids are recommended to be alternated with puree consistencies. Due to her impulsivity, which increases her risk of penetration/aspiration, she would benefit from taking all NTL via SPOON only at this time. It is recommended that GI be consulted due to possible esophageal concerns. ST will follow up with a full meal assessment, dysphagia therapy, and further goals/recommendations/re-evaluation as indicated. Results discussed with patient and relayed to nursing staff.        Strict reflux precautions and GI f/u recommended due to full BA esophageal column and reflux to the level of the UES.     She was seen today for clinical follow up and VFSS -    Current Method of Nutrition nectar/syrup-thick liquids;pureed  -    Prior Level of Function-Swallowing --  thin liquids, STC  -    Plans/Goals Discussed with patient;spouse/S.O.;other (see comments)  RN, NP  -MC    Family Goals for Discharge --  comfort base but not hospice  -          Clinical  Swallow Evaluation Summary Pt seen at bedside during breakfast meal for re-evaluation of swallow and VFSS candidacy/readiness.  feeding. Counseling provided regarding safe swallow strategies and positioning as pt was found being fed at reclined position in bed, with large bolus sizes which well exceeded tsp.  amenable to modify bite size to smaller bites and to provide NTL by tsp as pt found coughing with NTL by cup. Pt continued coughing however once provided NTL by tsp. No s/s aspiration appreciated with puree, and no visible oral stasis. Coughing reactions to NTL were also witnessed by cardiac nurse practitioner. A VFSS was agreed upon by pt's s/o, NP, RN, and by the patient herself.  See below for results  -          Utensils Used spoon;cup;straw  -    Consistencies Trialed thin liquids;pureed;nectar/syrup-thick liquids  -          Oral Prep Phase impaired oral phase of swallowing  -    VFSS Summary Patient was a evaluated in the lateral projection with the following inconsistencies: thin, NTL, puree.    Exam was significantly limited due to poor patient participation which included but not limited to blowing into straw with thin liquids as well as gagging and anterior loss/expectoration. The patient required extensive verbal prompts and encouragement by SLP and assistant in order to accept sufficient diagnostic and lots of barium. There's consistent premature spillage to the level to performs with all trials. With purée this is evidence by tongue pumping. On 1/2 trials thin liquid by spoon there is penetration immediately to the level of the vocal folds which is non-transient/does not clear with a swallow. Possible aspiration of thin difficult to visualize due to limited view/shoulder obstruction. Penetration of NTL by spoon noted during the swallow. This is non transient but does not progress and is not seen on cup trial.     At this time, recommend continue pureed diet with NTL. Cup is  okay if all precautions followed.   Precautions:   FULL FEED  Strict 90 degree hip flexion during and for at least 30 min follow all PO given established esophageal dysphagia/retention.   SMALL bites and sips fed at slow rate    Pt remains at risk of aspiration given reliance on full feed, and proper caregiver/feeder education/training is recommended in order to reduce this risk.         -          Initiation of Pharyngeal Swallow bolus in pyriform sinuses;bolus in valleculae  -    Pharyngeal Phase impaired pharyngeal phase of swallowing  -    Anatomical abnormalities noted --  extended neck posture  -    Penetration Before the Swallow thin liquids  -    Penetration During the Swallow nectar-thick liquids  -    Aspiration During the Swallow thin liquids  -          SLP Swallowing Diagnosis Oropharyngeal dysphagia, acute on chronic, in the setting of AMS, weakness    Functional Impact Risk of aspiration, risk of pna, risk of malnutrition          Therapy Frequency (Swallow) PRN  -    Predicted Duration Therapy Intervention (Days) until discharge  -    SLP Diet Recommendation puree;nectar thick liquids  -    Recommended Diagnostics reassess via clinical swallow evaluation;reassess via VFSS ([facility] [place] [facility])  -    Recommended Precautions and Strategies upright posture during/after eating;small bites of food and sips of liquid;alternate between small bites of food and sips of liquid;general aspiration precautions;reflux precautions;fatigue precautions;1:1 supervision;other (see comments)  -    Oral Care Recommendations Oral Care BID/PRN  -    SLP Rec. for Method of Medication Administration meds crushed;with thick liquids;with puree  -    Monitor for Signs of Aspiration yes;right lower lobe infiltrates;pneumonia  -    Anticipated Discharge Disposition (SLP) extended care facility  -          Swallow LTGs Swallow Long Term Goal (free text)  -    Swallow STGs diet tolerance goal selection (SLP)  -     Diet Tolerance Goal Selection (SLP) Swallow Short Term Goal 1;Swallow Short Term Goal 2;Other (see comments)  -MC          (LTG) Swallow The patient will maximize swallow function for least restrictive po diet, exhibiting no complications associated with dysphagia, adequate po intake, and demonstrating independent use of safe swallow compensations.  -MC    [place] (Swallow Long Term Goal) with moderate cues (50-74% accuracy)  -MC    Time Frame (Swallow Long Term Goal) by discharge  -MC    Barriers (Swallow Long Term Goal) confusion, restlessness, reliance on feeding  -MC          (STG) Swallow 1 The patient will participate in ongoing assessment of swallow, including re-evaluation clinically and/or including instrumental assessment of swallow if indicated, to further assess swallow function in anticipation to initiate a po diet  -MC    [place] (Swallow Short Term Goal 1) with maximum cues (25-49% accuracy)  -MC    Time Frame (Swallow Short Term Goal 1) by discharge  -MC    Progress/Outcomes (Swallow Short Term Goal 1) goal ongoing  -MC          (STG) Swallow 2 Caregiver will demonstrate understanding of safe swallow strategies, positioning, and feeding rate.  -MC    [place] (Swallow Short Term Goal 2) with minimal cues (75-90% accuracy)  -MC    Time Frame (Swallow Short Term Goal 2) 1 week;by discharge  -MC    Progress/Outcomes (Swallow Short Term Goal 2) new goal  -MC          User Key  (r) = Recorded By, (t) = Taken By, (c) = Cosigned By    Initials Name Effective Dates    CP Dayanara Javier, SLP 06/16/21 -     EC Winifred Carranza 06/16/21 -     MC Randi Stein SLP 08/16/22 -               VFSS review and education was conducted this date. Individuals educated included: patient and other. Education methods/means included verbal review face to face.  Education barriers: cognitive deficit Further follow up recommended while in house and at next level of care.          SLP GOALS     Row Name  11/09/22 1200          (LTG) Swallow The patient will maximize swallow function for least restrictive po diet, exhibiting no complications associated with dysphagia, adequate po intake, and demonstrating independent use of safe swallow compensations.  -MC    [place] (Swallow Long Term Goal) with moderate cues (50-74% accuracy)  -MC    Time Frame (Swallow Long Term Goal) by discharge  -MC    Barriers (Swallow Long Term Goal) confusion, restlessness, reliance on feeding  -MC    Progress/Outcomes (Swallow Long Term Goal) --    Comment (Swallow Long Term Goal) --          (STG) Swallow 1 The patient will participate in ongoing assessment of swallow, including re-evaluation clinically and/or including instrumental assessment of swallow if indicated, to further assess swallow function in anticipation to initiate a po diet  -MC    [place] (Swallow Short Term Goal 1) with maximum cues (25-49% accuracy)  -MC    Time Frame (Swallow Short Term Goal 1) by discharge  -MC    Progress/Outcomes (Swallow Short Term Goal 1) goal ongoing  -MC    Comment (Swallow Short Term Goal 1) --          (STG) Swallow 2 Caregiver will demonstrate understanding of safe swallow strategies, positioning, and feeding rate.  -MC    [place] (Swallow Short Term Goal 2) with minimal cues (75-90% accuracy)  -MC    Time Frame (Swallow Short Term Goal 2) 1 week;by discharge  -MC    Progress/Outcomes (Swallow Short Term Goal 2) new goal  -MC    Comment (Swallow Short Term Goal 2) --          User Key  (r) = Recorded By, (t) = Taken By, (c) = Cosigned By    Initials Name Provider Type    Dayanara Corey SLP Speech and Language Pathologist    Winifred Maldonado Speech and Language Pathologist    Randi Louis SLP Speech and Language Pathologist                 Time Calculation:                SADIQ Avendaño  11/9/2022

## 2022-11-09 NOTE — CONSULTS
Hematology/Oncology Inpatient Consultation    Patient name: Laura Perkins  : 1940  MRN: [id number]  Referring Provider: ROMAN Duggan  Reason for Consultation: Stage IIa transverse adenocarcinoma of colon    Chief complaint: Mental status changes    History of present illness:    82 y.o. female presented to [facility] ER on 10/23/2022 for mental status changes.  She was residing at [facility] and for 2 days she was noticed to be more lethargic and she was not speaking as much.  Her O2 saturations were mildly low and on admission to the ER she had a fever of 102.  She was diagnosed with pneumonia.  White count 13.3, hemoglobin 11.6, MCV 77.6 and platelets 380.  CMP was unremarkable.  Head CT showed no acute abnormality, there were some chronic small vessel ischemia changes.  CT A/P showed no nephrolithiasis.  There was no GI or  obstruction.  Large stool burden was present.  There is no evidence of acute abnormality.  Bibasilar consolidations were seen and subacute moderate compression fractures of L3 and L5 were present without malalignment.  Chest CT was limited due to respiratory motion artifact.  There were moderate to large layering bilateral pleural effusions with compressive atelectasis on both lungs.  Airspace disease was seen in bilateral lower lobes suggesting multifocal pneumonia.  Cardiomegaly with trace pericardial effusion was present as well as coronary artery atherosclerotic vascular disease.  Her troponin was elevated and on 10/27/2022 she underwent a cardiac catheterization by Dr. Miguel.  A drug-eluting stent was placed to the LAD.  TSH 10.81 (0.27-4.2), with history of hypothyroidism.  Follow-up chest CT on 11/3/2022, showed evolving extensive airspace consolidations and new right apex opacity and moderate bilateral pleural effusions.  Anasarca had improved.  On 2022, she underwent a left thoracentesis, pathology showed atypical cells (mesothelial  cells versus malignant) and fluid was consistent with transudate.  Her last CBC on 11/6/2022, showed a white count of 17.6, hemoglobin 8.6, MCV 77.8 and platelets 505.  Chest x-ray showed cardiomegaly left lower lobe opacities and mild pulmonary congestion.  Echocardiogram showed an EF of 45-50% and a small pericardial effusion.  Palliative care has been consulted and the patient is DNR and possibly interested in hospice care.  Additional disciplines consulted include endocrine, pulmonary, psychiatry, cardiology and gastroenterology.  Discharged from [facility] on 10/21/2022 following a 3-day admission for a laparoscopic partial transverse colectomy performed by Dr. PERI Michelle.  She had undergone an EGD/colonoscopy by Dr. Pope as an outpatient on 9/21/22 and was found to have a 5 cm mass in the transverse colon. CT a/p showed an abnormal colon wall thickening in the mid transverse colon with a prominent adjacent mesenteric lymph node.  There was a new L5 fx and L3 fx with some height loss.  The duodenal polyp path showed polypoid peptic duodenitis with prominent Brunner's glands.  A gastric polyp was hyperplastic, foveolar type.  Multiple colon polyps were removed that were tubular adenomas, a single serrated polyp with focal lamina propria spindle cell proliferation, hyperplastic polyp, a low grade dysplastic tubular adenoma and the transverse mass was invasive carcinoma.  Molecular testing revealed the mass to have loss of nuclear expression on IHC in MLH1 and PMS2.  Her 2 Jason was neg (1+).  BRAF mutation was detected codon 600 /D.  She was referred to [facility] but was too sick to be seen at the time of the appt.    Her colectomy was uneventful.  MSI on the tumor was the same. 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  Tumor size was 11 cm with invasion thru the muscularis propria and pericolonic tissue.  Margins were neg for malignancy.  There  was no lymphovascular or perineural invasion.  0/20 lymph nodes were involved. Stage IIA (pT3, pN0).        11/09/22  Hematology/Oncology was consulted for her diagnosis of invasive moderately differentiated mucinous adenocarcinoma.      Past Medical History: DM in the past few years.  Bipolar disorder, hypothyroidism, CVA.  Surgical History:Hysterectomy in 1983 for uterine prolapse.    Social History:She lives in [place] with her .  She has been a housewife.  She did not smoke or drink alcohol.   Family History: Her brother had throat cancer.  Allergies: NKA    PCP: Beka Weir MD    History:  Past Medical History:   Diagnosis Date   • Acid reflux    • Anemia    • Anemia    • Bipolar 1 disorder (HCC)    • Colon cancer (HCC)    • Diabetes mellitus (HCC)     Type 2   • Disease of thyroid gland    • Hyperlipidemia    • Hypertension     TAKEN OFF HTN MEDS OVER 5 YEARS AGO   • Incontinence of urine    • Stroke (HCC)     2012   ,   Past Surgical History:   Procedure Laterality Date   • BLADDER SURGERY     • CARDIAC CATHETERIZATION N/A 10/27/2022    Procedure: Left Heart Cath, possible pci;  Surgeon: Andrea Melvin MD;  Location: [facility] INVASIVE LOCATION;  Service: Cardiovascular;  Laterality: N/A;   • COLON RESECTION N/A 10/18/2022    Procedure: COLON RESECTIOn TRANSVERSE LAPAROSCOPIC;  Surgeon: Toño Michelle MD;  Location: [facility] OR;  Service: General;  Laterality: N/A;   • COLONOSCOPY  09/21/2022    [facility]   • ENDOSCOPY  09/21/2022    [facility]   • EYE SURGERY     • HEMORRHOIDECTOMY     • HYSTERECTOMY     ,   Family History   Problem Relation Age of Onset   • Heart disease Mother    • Diabetes Mother    • No Known Problems Father    • Cancer Brother    ,   Social History     Tobacco Use   • Smoking status: Never   • Smokeless tobacco: Never   Vaping Use   • Vaping Use: Never used   Substance Use Topics   • Alcohol use: Never   • Drug use: Never    ,   Medications Prior to Admission   Medication Sig Dispense Refill Last Dose   • Canagliflozin (Invokana) 300 MG tablet tablet Take 150 mg by mouth Daily.   10/23/2022 at 0753   • divalproex (DEPAKOTE) 250 MG DR tablet Take 1 tablet by mouth 3 (Three) Times a Day.   10/23/2022 at 1308   • FeroSul 325 (65 Fe) MG tablet Take 1 tablet by mouth 2 (Two) Times a Day.   10/23/2022 at 0753   • FLUoxetine (PROzac) 20 MG capsule Take 1 capsule by mouth Daily.   10/23/2022 at 0753   • glipizide (GLUCOTROL) 10 MG tablet Take 2 tablets by mouth 2 (Two) Times a Day Before Meals.   10/23/2022 at 0753   • levothyroxine (SYNTHROID, LEVOTHROID) 50 MCG tablet Take 1 tablet by mouth Daily.   10/23/2022 at 0406   • LORazepam (ATIVAN) 0.5 MG tablet Take 0.5 mg by mouth 3 (Three) Times a Day.   10/23/2022 at 1308   • metFORMIN ER (GLUCOPHAGE-XR) 500 MG 24 hr tablet Take 500 mg by mouth 2 (two) times a day.   10/23/2022 at 0753   • oxybutynin (DITROPAN) 5 MG tablet Take 5 mg by mouth Every Morning.   10/23/2022 at 0753   • pantoprazole (PROTONIX) 40 MG EC tablet Take 40 mg by mouth Every Morning.   10/23/2022 at 0753   • potassium chloride (MICRO-K) 10 MEQ CR capsule Take 2 capsules by mouth 2 (Two) Times a Day.   10/23/2022   • risperiDONE (risperDAL) 0.5 MG tablet Take 2 tablets by mouth 2 (Two) Times a Day. TAKES AT 1500 AND 2000   10/23/2022 at 0242   • rosuvastatin (CRESTOR) 20 MG tablet Take 20 mg by mouth every night at bedtime.   10/22/2022 at 2109   • SITagliptin (JANUVIA) 100 MG tablet Take 1 tablet by mouth Every Evening.   10/22/2022 at 1658   , Scheduled Meds:  aspirin, 81 mg, Oral, Daily  budesonide, 0.5 mg, Nebulization, BID - RT  clopidogrel, 75 mg, Oral, Daily  divalproex, 500 mg, Oral, BID  docusate sodium, 100 mg, Oral, BID  ferrous sulfate, 324 mg, Oral, BID With Meals  FLUoxetine, 20 mg, Oral, Daily  furosemide, 20 mg, Intravenous, Q12H  guaiFENesin, 600 mg, Oral, Q12H  insulin glargine, 18 Units, Subcutaneous,  QAM  insulin lispro, 3-14 Units, Subcutaneous, TID With Meals  insulin lispro, 6 Units, Subcutaneous, TID With Meals  ipratropium-albuterol, 3 mL, Nebulization, 4x Daily - RT  levothyroxine, 50 mcg, Oral, Daily  losartan, 25 mg, Oral, Q24H  magnesium oxide, 400 mg, Oral, Daily  metoprolol succinate XL, 25 mg, Oral, Q24H  pantoprazole, 40 mg, Oral, QAM  polyethylene glycol, 17 g, Oral, Daily  risperiDONE, 0.5 mg, Oral, Daily  rosuvastatin, 20 mg, Oral, Daily  Scopolamine, 1 patch, Transdermal, Q72H  spironolactone, 25 mg, Oral, Daily    , Continuous Infusions:   , PRN Meds:  •  acetaminophen  •  acetaminophen  •  acetaminophen  •  benzonatate  •  Calcium Gluconate-NaCl **AND** calcium gluconate **AND** Calcium, Ionized  •  dextrose  •  dextrose  •  glucagon (human recombinant)  •  HYDROcodone-acetaminophen  •  LORazepam  •  magnesium sulfate **OR** magnesium sulfate **OR** magnesium sulfate  •  phenol  •  potassium & sodium phosphates **OR** potassium & sodium phosphates  •  potassium chloride  •  potassium chloride  •  [COMPLETED] Insert peripheral IV **AND** sodium chloride   Allergies:  Patient has no known allergies.    ROS:  Review of Systems   Constitutional: Negative for activity change, chills, fatigue, fever and unexpected weight change.   HENT: Negative for congestion, dental problem, hearing loss, mouth sores, nosebleeds, sore throat and trouble swallowing.    Eyes: Negative for photophobia and visual disturbance.   Respiratory: Positive for cough. Negative for chest tightness and shortness of breath.    Cardiovascular: Negative for chest pain, palpitations and leg swelling.   Gastrointestinal: Negative for abdominal distention, abdominal pain, blood in stool, constipation, diarrhea, nausea and vomiting.        Bowel incontinence.    Endocrine: Negative for cold intolerance and heat intolerance.   Genitourinary: Negative for decreased urine volume, difficulty urinating, dysuria, frequency, hematuria and  "urgency.        Incontinence.    Musculoskeletal: Negative for arthralgias and gait problem.   Skin: Negative for rash and wound.   Neurological: Negative for dizziness, tremors, weakness, light-headedness, numbness and headaches.   Hematological: Negative for adenopathy. Does not bruise/bleed easily.   Psychiatric/Behavioral: Negative for confusion and hallucinations. The patient is not nervous/anxious.    All other systems reviewed and are negative.       Objective     Vital Signs:   /49   Pulse 87   Temp 97.9 °F (36.6 °C) (Oral)   Resp 16   Ht 162.6 cm (64\")   Wt 56 kg (123 lb 7.3 oz)   SpO2 100%   BMI 21.19 kg/m²     Physical Exam:  Physical Exam  Vitals and nursing note reviewed.   Constitutional:       General: She is not in acute distress.     Appearance: She is well-developed and normal weight. She is ill-appearing. She is not diaphoretic.      Comments: Keeps eyes closed during exam.   HENT:      Head: Normocephalic and atraumatic.      Comments: alopecia.     Right Ear: External ear normal.      Left Ear: External ear normal.      Nose: Nose normal.      Comments: Oxygen per NC.     Mouth/Throat:      Mouth: Mucous membranes are moist.      Pharynx: Oropharynx is clear. No oropharyngeal exudate or posterior oropharyngeal erythema.      Comments: Dental fillings.   Eyes:      General: No scleral icterus.     Extraocular Movements: Extraocular movements intact.      Conjunctiva/sclera: Conjunctivae normal.      Pupils: Pupils are equal, round, and reactive to light.   Cardiovascular:      Rate and Rhythm: Normal rate and regular rhythm.      Heart sounds: Normal heart sounds. No murmur heard.     Comments: Cardiac monitor leads.   Pulmonary:      Effort: Pulmonary effort is normal. No respiratory distress.      Breath sounds: Normal breath sounds. No wheezing or rales.   Abdominal:      General: Bowel sounds are normal. There is no distension.      Palpations: Abdomen is soft. There is no mass. "      Tenderness: There is no abdominal tenderness. There is no guarding.      Comments: 8 cm vertical scar above umbilicus that is healing well.     Genitourinary:     Comments: External urinary catheter.   Musculoskeletal:         General: No swelling, tenderness or deformity. Normal range of motion.      Cervical back: Normal range of motion and neck supple.      Right lower leg: No edema.      Left lower leg: No edema.      Comments: Left hand O2 monitor.    Lymphadenopathy:      Cervical: No cervical adenopathy.      Upper Body:      Right upper body: No supraclavicular adenopathy.      Left upper body: No supraclavicular adenopathy.   Skin:     General: Skin is warm and dry.      Coloration: Skin is not pale.      Findings: No bruising, erythema or rash.   Neurological:      General: No focal deficit present.      Mental Status: She is oriented to person, place, and time.      Coordination: Coordination normal.      Comments: Minimal speech.           Results Review:  Lab Results (last 48 hours)     Procedure Component Value Units Date/Time    POC Glucose Once [[id number]]  (Abnormal) Collected: 11/09/22 1119    Specimen: Blood Updated: 11/09/22 1120     Glucose 365 mg/dL      Comment: Serial Number: [id number]:  [id number]       POC Glucose Once [[id number]]  (Abnormal) Collected: 11/09/22 0712    Specimen: Blood Updated: 11/09/22 0714     Glucose 146 mg/dL      Comment: Serial Number: [id number]:  [id number]       Body Fluid Culture - Body Fluid, Pleural Cavity [[id number]] Collected: 11/07/22 1033    Specimen: Body Fluid from Pleural Cavity Updated: 11/09/22 0711     Body Fluid Culture No growth at 2 days     Gram Stain Moderate (3+) WBCs per low power field      No organisms seen    POC Glucose Once [[id number]]  (Abnormal) Collected: 11/08/22 2059    Specimen: Blood Updated: 11/08/22 2101     Glucose 151 mg/dL      Comment: Serial Number: [id number]:  [id number]       POC Glucose Once  "[[id number]]  (Abnormal) Collected: 11/08/22 1801    Specimen: Blood Updated: 11/08/22 1802     Glucose 309 mg/dL      Comment: Serial Number: [id number]:  [id number]       POC Glucose Once [[id number]]  (Abnormal) Collected: 11/08/22 1628    Specimen: Blood Updated: 11/08/22 1630     Glucose 225 mg/dL      Comment: Serial Number: [id number]:  [id number]       Non-gynecologic Cytology [[id number]] Collected: 11/07/22 1034    Specimen: Body Fluid from Pleura Updated: 11/08/22 1434     Case Report --     Medical Cytology Report                           Case: [id number]                                  Authorizing Provider:  Reed Perez MD         Collected:           11/07/2022 10:34 AM          Ordering Location:     [facility]       Received:            11/07/2022 11:15 AM                                 PROGRESS CARE                                                                Pathologist:           Eric Castro MD                                                             Specimen:    Pleura                                                                                      Final Diagnosis --     Pleural fluid, smears and cytospin preparation:    Atypical cells present, see comment    Background of mesothelial cells, histiocytes and acute and chronic inflammatory cells    JPR/sms        Comment --     The patient's clinical history is noted. Scattered atypical cells are present throughout the specimen. Differential diagnosis includes degenerating mesothelial cells versus malignancy. Further clinical evaluation for a lung mass is recommended.     JPR/sms        Gross Description --     1. Pleura.  Received in carbowax and designated \"Pleural fluid\" are 16 mL of cloudy, green colored fluid. Particulate matter is present. This specimen is processed as per protocol.          Triglycerides, Body Fluid - Body Fluid, Pleural Cavity [[id number]] Collected: 11/07/22 1033    Specimen: Body Fluid " from Pleural Cavity Updated: 11/08/22 1411     Triglycerides, Fluid 17 mg/dL      Comment: The reference interval(s) and other method performance specifications  have not been established for this body fluid. The test result must be  integrated into the clinical context for interpretation.       Narrative:      Performed at:  [address] [place] [address]  [id number]  : Eleazar Thurman PhD, Phone:  [phone]    POC Glucose Once [[id number]]  (Abnormal) Collected: 11/08/22 1321    Specimen: Blood Updated: 11/08/22 1323     Glucose 271 mg/dL      Comment: Serial Number: [id number]:  [id number]       POC Glucose Once [[id number]]  (Abnormal) Collected: 11/08/22 1116    Specimen: Blood Updated: 11/08/22 1117     Glucose 344 mg/dL      Comment: Serial Number: [id number]:  [id number]       POC Glucose Once [[id number]]  (Abnormal) Collected: 11/08/22 0731    Specimen: Blood Updated: 11/08/22 0732     Glucose 213 mg/dL      Comment: Serial Number: [id number]:  [id number]       BNP [[id number]]  (Abnormal) Collected: 11/08/22 0446    Specimen: Blood Updated: 11/08/22 0537     proBNP 13,684.0 pg/mL     Narrative:      Among patients with dyspnea, NT-proBNP is highly sensitive for the detection of acute congestive heart failure. In addition NT-proBNP of <300 pg/ml effectively rules out acute congestive heart failure with 99% negative predictive value.    Results may be falsely decreased if patient taking Biotin.      POC Glucose Once [[id number]]  (Abnormal) Collected: 11/07/22 1945    Specimen: Blood Updated: 11/07/22 1947     Glucose 344 mg/dL      Comment: Serial Number: [id number]:  [id number]       POC Glucose Once [[id number]]  (Abnormal) Collected: 11/07/22 1657    Specimen: Blood Updated: 11/07/22 1658     Glucose 110 mg/dL      Comment: Serial Number: [id number]:  [id number]       Protein, Body Fluid - Body Fluid, Pleural Cavity [[id number]] Collected: 11/07/22  1033    Specimen: Body Fluid from Pleural Cavity Updated: 11/07/22 1632     Protein, Total, Fluid 2.4 g/dL     Narrative:      No Reference Ranges Established.    A serous fluid total fluid (TP) greater than 50 percent of the serum TP suggests the fluid is an exudate.      1. Pleural TP/Serum TP >0.5  2. Pleural LD/Serum LD >0.6  3. Pleural LD >2/3 of the upper limit of normal for serum LDH    This test was developed, it performance characteristics determined and judged suitable for clinical purposes by [facility].  It has not been cleared or approved by the FDA.  The laboratory is regulated under CLIA as qualified to perform high-complexity testing.     Amylase, Body Fluid - Body Fluid, Pleural Cavity [[id number]] Collected: 11/07/22 1033    Specimen: Body Fluid from Pleural Cavity Updated: 11/07/22 1632     Amylase, Fluid 24 U/L     Narrative:      No Reference Ranges Established.    This test was developed, it performance characteristics determined and judged suitable for clinical purposes by [facility].  It has not been cleared or approved by the FDA.  The laboratory is regulated under CLIA as qualified to perform high-complexity testing.     Lactate Dehydrogenase, Body Fluid - Body Fluid, Pleural Cavity [[id number]] Collected: 11/07/22 1033    Specimen: Body Fluid from Pleural Cavity Updated: 11/07/22 1632     Lactate Dehydrogenase (LD), Fluid 99 U/L     Narrative:      No Reference Ranges Established.    Serous fluid LDH greater than 60 percent of the serum LDH or serous fluid LDH two-thirds of the upper limit of normal for serum LDH suggests the fluid is an exudate.     1. Pleural TP/Serum TP >0.5  2. Pleural LD/Serum LD >0.6  3. Pleural LD >2/3 of the upper limit of normal for serum LDH    This test was developed, it performance characteristics determined and judged suitable for clinical purposes by [facility].  It has not been  cleared or approved by the FDA.  The laboratory is regulated under CLIA as qualified to perform high-complexity testing.            Pending Results: CEA, D-dimer, iron studies, Vit B12, folate, retic, hapto, SPEP, copper.      Imaging Reviewed:   CT Chest Without Contrast Diagnostic    Result Date: 11/3/2022   1. Evolving extensive airspace consolidations with endobronchial debris concerning for evolving bilateral pneumonia. There is developing groundglass opacity within the right apex which is new since 10/30/2022. Moderate layering bilateral pleural effusions persist. 2. Anasarca previously noted has improved.  Electronically Signed By-Shahram Kenny DO On:11/3/2022 4:34 PM This report was finalized on [id number] by  Shahram Kenny DO.    FL Video Swallow With Speech Single Contrast    Result Date: 11/9/2022  Modified barium swallow study with fluoroscopy. Please refer to the speech pathologist report for findings and dietary recommendations.  Electronically Signed By-Millicent Santos MD On:11/9/2022 10:16 AM This report was finalized on [id number] by  Millicent Santos MD.    XR Chest 1 View    Result Date: 11/9/2022  Cardiomegaly and mild pulmonary vascular congestion, similar to slightly increased in the prior study Patchy opacities at the left lung base may represent atelectasis, asymmetric pulmonary edema or pneumonia. Electronically Signed: Des Duckworth MD 11/9/2022 10:32 EST Workstation ID: [id number]    XR Chest 1 View    Result Date: 11/8/2022  Persistent bibasilar airspace disease left greater than right which may relate to pneumonia, mildly worsened from the prior study.  Stable small bilateral pleural effusions.  Electronically Signed By-James Win MD On:11/8/2022 8:01 AM This report was finalized on [id number] by  James Win MD.    XR Chest 1 View    Result Date: 11/7/2022  1. Decrease in size of left pleural effusion with small residual effusion. No pneumothorax. 2. Persistent bibasilar  airspace disease left greater than right. 3. No chest tube visualized.  Electronically Signed By-James Win MD On:11/7/2022 11:29 AM This report was finalized on [id number] by  James Win MD.    XR Chest 1 View    Result Date: 11/6/2022    1.  Stable cardiomegaly. 2.  No change in prominent interstitial markings favored to be due to interstitial edema. 3.  No change in left basilar airspace disease and moderate left pleural effusion.   Electronically Signed By-Mk Waldrop MD On:11/6/2022 6:22 PM This report was finalized on [id number] by  Mk Waldrop MD.    XR Chest 1 View    Result Date: 11/3/2022  IMPRESSION : Interval enlargement of left-sided pleural effusion and associated dependent consolidation[  Slight decrease in size of right-sided pleural effusion given differences in technique  Diffuse prominence of interstitial markings suggest underlying interstitial edema and/or pneumonia. Findings are slightly increased from the comparison study  Electronically Signed By-Des Duckworth On:11/3/2022 6:40 AM This report was finalized on [id number] by  Des Duckworth, .    XR Abdomen KUB    Result Date: 11/2/2022  Nonspecific, nonobstructive bowel gas pattern.  Electronically Signed By-Lora Monk MD On:11/2/2022 12:26 PM This report was finalized on [id number] by  Lora Monk MD.      I have reviewed the patient's labs, imaging, reports, and other clinician documentation.         Assessment & Plan       ASSESSMENT  1. 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  Given early stage, her age and performance status, observation only is recommended.  Could do genetic evaluation for peoples syndrome as an OP.   2. Leukocytosis-due to sepsis and PNA.  Afebrile now.   3. Chronic microcytic anemia-Will check anemia w/u.  She was given 1 dose of  Ferrlecit 125mg on 10/24 and she is taking oral Fe.  On Protonix. Hgb lower. Will give additional iron IV.     4. Acute thrombocytosis-due to BRANDY.   5. H/o CVA-CT head w/o acute stroke.  H/o prolonged hospitalization.  On ASA and Plavix.  Will check D-dimer.   6. Respiratory failure and PNA-s/p abx.  Per pulmonary.   7. CHF, HTN, HLD, pericardial effusion, S/p PCI x 1, NSTEMI-s/p heart cath.  On dual platelet therapy for 6 months. Per Cardiology.    8. Compression fractures, DM, hypothyroidism and bipolar disorder-per Endocrine, PMD and psychiatry.    PLAN  1. D-dimer.  2. Anemia w/u.   3. Genetic evaluation for peoples syndrome as an OP.   4. Observation with CT's and colonoscopy in 1 year unless patient opts for hospice.   5.   Ferrlecit 250 mg IV x 2.     Pt. seen and evaluated by Dr. Contreras.   Electronically signed by ROMAN Aviles, 11/09/22, 5:48 PM EST.        I have personally performed a face-to-face diagnostic evaluation on this patient. I have performed a complete history and physical examination, reviewed laboratory studies, and radiographic examinations.  I have completed the majority and substantive portion of the medical decision making.  I have formulated the assessment on this patient and the plan of action as noted above. I have discussed the case with Zahira Ha NP, have edited/reviewed the note, and agree with the care plan.  The patient was hospitalized with shortness of air.  On examination, she has hair loss, 8 cm vertical healing scar above the umbilicus and external urinary catheter present.  Labs are significant for a microcytic anemia.  She is a s/p transverse colectomy for a stage IIa cancer of the colon which does not need adjuvant therapy given her general condition.  Would recommend treating the anemia which would help with oxygenation and performance status.        I discussed the patient's findings and my recommendations with patient and family.    Thank you for this  consult.  We will be happy to follow along in the care of this patient.     Part of this note may be an electronic transcription/translation of spoken language to printed text using the Dragon Dictation System.    Electronically signed by Abdiel Contreras MD, 11/09/22, 6:14 PM EST.

## 2022-11-09 NOTE — PLAN OF CARE
Goal Outcome Evaluation:  Laura Perkins presents with ADL impairments below baseline abilities which indicate the need for continued skilled intervention while inpatient. Pt completed washing face with MIN A.  Pt requires cues to open eyes.  Pt t/f from supine to sitting on EOB with MAX A.  Pt completed sit to standing transfers using Rw with MAX A x 2.  Pt t/f to chair using RW with MOD A x 2.Tolerating session today without incident. Will continue to follow and progress as tolerated.

## 2022-11-09 NOTE — PROGRESS NOTES
Chief complaint  Fatigue   Subjective .     History of present illness:  The patient is a 82 y.o. female who was admitted secondary to decreased LOC. PMHx: bipolar d/o, colon cancer, DM, hx of CVA . Psych consult was requested by Dr Bassett to adjust psych meds, the pt was taken off risperidone and fluoxetine 2ry to QTc prolongation.  The pt was unable to provide any hx 2ry to decreased LOC, information was obtained from her  who was in the room. He reported long hx of bipolar d/o and cognitive decline, she was stable for few 10 years on depakote, prozac and risperidone. The pt lives with her  and he is her primary caregiver. When the pt is on her baseline, she is calm but forgetful, requires assistance with ADLs, ambulation, however, mood is stable, denied AVH/SI/HI, did not appear to be paranoid or suspicious, does not get out of the house at night, compliant with meds and tx. After being off meds he noticed increased irritability. Memory - usually does not remember recent events.   Past psyhc hx: bipolar d/o, inpt at [facility] in 2012 after stroke 2ry to mood changes, no hx of SAs     Today the pt remains very  somnolent and exhausted, briefly opened her eyes when her name was called but unable to maintain conversation   No agitation     Pt's  was in the room but he was sleeping     Review of Systems   All systems were reviewed and negative except for:  Constitution:  positive for fatigue  Respiratory: positive for  shortness of air  Musculoskeletal: positive for  back pain, muscle pain and muscle weakness  Neurological: positive for  difficulty walking and weakness  Behavioral/Psych: positive for  depression    History       Medications Prior to Admission   Medication Sig Dispense Refill Last Dose   • Canagliflozin (Invokana) 300 MG tablet tablet Take 150 mg by mouth Daily.   10/23/2022 at 0753   • divalproex (DEPAKOTE) 250 MG DR tablet Take 1 tablet by mouth 3 (Three) Times a Day.    10/23/2022 at 1308   • FeroSul 325 (65 Fe) MG tablet Take 1 tablet by mouth 2 (Two) Times a Day.   10/23/2022 at 0753   • FLUoxetine (PROzac) 20 MG capsule Take 1 capsule by mouth Daily.   10/23/2022 at 0753   • glipizide (GLUCOTROL) 10 MG tablet Take 2 tablets by mouth 2 (Two) Times a Day Before Meals.   10/23/2022 at 0753   • levothyroxine (SYNTHROID, LEVOTHROID) 50 MCG tablet Take 1 tablet by mouth Daily.   10/23/2022 at 0406   • LORazepam (ATIVAN) 0.5 MG tablet Take 0.5 mg by mouth 3 (Three) Times a Day.   10/23/2022 at 1308   • metFORMIN ER (GLUCOPHAGE-XR) 500 MG 24 hr tablet Take 500 mg by mouth 2 (two) times a day.   10/23/2022 at 0753   • oxybutynin (DITROPAN) 5 MG tablet Take 5 mg by mouth Every Morning.   10/23/2022 at 0753   • pantoprazole (PROTONIX) 40 MG EC tablet Take 40 mg by mouth Every Morning.   10/23/2022 at 0753   • potassium chloride (MICRO-K) 10 MEQ CR capsule Take 2 capsules by mouth 2 (Two) Times a Day.   10/23/2022   • risperiDONE (risperDAL) 0.5 MG tablet Take 2 tablets by mouth 2 (Two) Times a Day. TAKES AT 1500 AND 2000   10/23/2022 at 0242   • rosuvastatin (CRESTOR) 20 MG tablet Take 20 mg by mouth every night at bedtime.   10/22/2022 at 2109   • SITagliptin (JANUVIA) 100 MG tablet Take 1 tablet by mouth Every Evening.   10/22/2022 at 1658        Scheduled Meds:  aspirin, 81 mg, Oral, Daily  budesonide, 0.5 mg, Nebulization, BID - RT  clopidogrel, 75 mg, Oral, Daily  divalproex, 500 mg, Oral, BID  docusate sodium, 100 mg, Oral, BID  ferrous sulfate, 324 mg, Oral, BID With Meals  FLUoxetine, 20 mg, Oral, Daily  furosemide, 20 mg, Intravenous, Q12H  guaiFENesin, 600 mg, Oral, Q12H  insulin glargine, 18 Units, Subcutaneous, QAM  insulin lispro, 3-14 Units, Subcutaneous, TID With Meals  insulin lispro, 6 Units, Subcutaneous, TID With Meals  ipratropium-albuterol, 3 mL, Nebulization, 4x Daily - RT  levothyroxine, 50 mcg, Oral, Daily  losartan, 25 mg, Oral, Q24H  magnesium oxide, 400 mg, Oral,  "Daily  metoprolol succinate XL, 25 mg, Oral, Q24H  pantoprazole, 40 mg, Oral, QAM  polyethylene glycol, 17 g, Oral, Daily  risperiDONE, 0.5 mg, Oral, Daily  rosuvastatin, 20 mg, Oral, Daily  Scopolamine, 1 patch, Transdermal, Q72H  spironolactone, 25 mg, Oral, Daily         Continuous Infusions:      Allergies:  Patient has no known allergies.      Objective     Vital Signs   /46   Pulse 89   Temp 97.8 °F (36.6 °C) (Oral)   Resp 16   Ht 162.6 cm (64\")   Wt 56 kg (123 lb 7.3 oz)   SpO2 100%   BMI 21.19 kg/m²     Physical Exam:     General Appearance:    In NAD       Mental Status Exam:    Hygiene:   good  Cooperation:  limited   Eye Contact:  Poor  Psychomotor Behavior:  Slow  Affect:  Blunted  Hopelessness: Denies  Speech:  Normal  Thought Progress:  poverty of thoughts   Thought Content:  Mood congruent  Suicidal:  None  Homicidal:  None  Hallucinations:  None  Delusion:  None  Memory:  decreased   Orientation:  Person, Place and Situation  Reliability:  fair  Insight:  Fair  Judgement:  Fair  Impulse Control:  Fair  Physical/Medical Issues:  Yes      Medications and allergies reviewed     Lab Results   Component Value Date    GLUCOSE 231 (H) 11/06/2022    CALCIUM 8.5 (L) 11/06/2022     11/06/2022    K 4.5 11/06/2022    CO2 30.0 (H) 11/06/2022    CL 98 11/06/2022    BUN 12 11/06/2022    CREATININE 0.64 11/06/2022    EGFRIFNONA 37 (L) 11/03/2020    BCR 18.8 11/06/2022    ANIONGAP 9.0 11/06/2022       Last Urine Toxicity     LAST URINE TOXICITY RESULTS Latest Ref Rng & Units 5/26/2020 5/7/2019    AMPHETAMINES SCREEN, URINE NA NEGATIVE NEGATIVE    BARBITURATES SCREEN NA NEGATIVE NEGATIVE    BENZODIAZEPINE SCREEN, URINE NA NEGATIVE NEGATIVE    COCAINE SCREEN, URINE NA NEGATIVE NEGATIVE    ETHANOL UR Cutoff=0.020 mg/dL - Negative    METHADONE SCREEN, URINE NA NEGATIVE NEGATIVE          No results found for: PHENYTOIN, PHENOBARB, VALPROATE, CBMZ    Lab Results   Component Value Date     " 11/06/2022    BUN 12 11/06/2022    CREATININE 0.64 11/06/2022    TSH 10.810 (H) 10/31/2022    WBC 17.60 (H) 11/06/2022       Brief Urine Lab Results  (Last result in the past 365 days)      Color   Clarity   Blood   Leuk Est   Nitrite   Protein   CREAT   Urine HCG        10/23/22 1758 Yellow   Clear   Negative   Negative   Negative   Negative               EKG 10/29/22  HR 78    QTc 481     EKG 11/8/2022 QTc 495   VPA 30.6     Assessment & Plan       Altered mental status, unspecified altered mental status type    Cancer of transverse colon (HCC)    HAP (hospital-acquired pneumonia)    Acute respiratory distress    Elevated troponin    Sepsis (HCC)    Type 2 diabetes mellitus (HCC)    Anxiety associated with depression    Hypothyroidism (acquired)    OAB (overactive bladder)    GERD without esophagitis    Acute non-ST elevation myocardial infarction (NSTEMI) (HCC)    Dyslipidemia    Acute respiratory failure with hypoxia (HCC)    Pleural effusion          Assessment:  Bipolar d/o type 2   Hypoactive Delirium due to medical condition (TME)     Treatment Plan:  the pt remains weak    cont prozac 20 mg po QAM    cont risperidone 0.5 mg po QD   Cont to provide support     F[facility]u with [facility] in [facility] to arrange f/u if the get d/c home     Will follow PRN     Treatment Plan discussed with: Patient and nursing     I discussed the patients findings and my recommendations with patient and nursing staff    I have reviewed and approved the behavioral health treatment plans and problem list. Yes     Referring MD has access to consult report and progress notes in EMR     Megan Brown MD  11/09/22  14:32 EST

## 2022-11-09 NOTE — PLAN OF CARE
Problem: Pain Acute  Goal: Acceptable Pain Control and Functional Ability  Outcome: Ongoing, Progressing     Problem: Adult Inpatient Plan of Care  Goal: Plan of Care Review  Outcome: Ongoing, Progressing  Flowsheets (Taken 11/9/2022 1440)  Outcome Evaluation: Patient much more alert today and participating in care. She has been sitting up in the chair and was able to work with PT. Still complaining of shoulder pain at times but pain relieved with PRN medications see MAR.  Goal: Patient-Specific Goal (Individualized)  Outcome: Ongoing, Progressing  Goal: Absence of Hospital-Acquired Illness or Injury  Outcome: Ongoing, Progressing  Intervention: Identify and Manage Fall Risk  Recent Flowsheet Documentation  Taken 11/9/2022 1400 by Demetrice Monzon, RN  Safety Promotion/Fall Prevention:   safety round/check completed   room organization consistent   nonskid shoes/slippers when out of bed   fall prevention program maintained  Taken 11/9/2022 0845 by Demetrice Monzon, RN  Safety Promotion/Fall Prevention:   nonskid shoes/slippers when out of bed   room organization consistent   safety round/check completed  Goal: Optimal Comfort and Wellbeing  Outcome: Ongoing, Progressing  Goal: Readiness for Transition of Care  Outcome: Ongoing, Progressing   Goal Outcome Evaluation:              Outcome Evaluation: Patient much more alert today and participating in care. She has been sitting up in the chair and was able to work with PT. Still complaining of shoulder pain at times but pain relieved with PRN medications see MAR.

## 2022-11-09 NOTE — CASE MANAGEMENT/SOCIAL WORK
Continued Stay Note  THANH Wharton     Patient Name: Laura Perkins  MRN: [id number]  Today's Date: 11/9/2022    Admit Date: 10/23/2022    Plan: Return to [facility]. no precert needed.   Discharge Plan     Row Name 11/09/22 1707       Plan    Plan Return to [facility]. no precert needed.    Plan Comments Barriers to discharge: new oncology consult 11/9/22.  Swallow eval 11/9              Expected Discharge Date and Time     Expected Discharge Date Expected Discharge Time    Nov 10, 2022         Phone communication or documentation only - no physical contact with patient or family.  Cherie Galvan RN      Office Phone [phone]  Office Cell [phone]

## 2022-11-09 NOTE — PROGRESS NOTES
Cardiology Progress Note    Patient Identification:  Name: Laura Perkins  Age: 82 y.o.  Sex: female  :  1940  MRN: [id number]                 Follow Up / Chief Complaint: Elevated troponin, non stemi   Chief Complaint   Patient presents with   • Altered Mental Status       Interval History:  Patient presented from outlying facility with pneumonia.  Noted to have elevated troponin   Patient underwent cardiac cath 10/27/2022 with stent to proximal LAD.     NP NOTE: patient more alert this morning.  Being seen by speech therapy and coughing with nectar thick liquids.  Patient to go down for repeat video swallow today. Repeat xray this morning noted questionable mild pulmonary edema vs pneumonia.  repeat pro bnp still high 13,684 patient remains on IV lasix     Electronically signed by ROMAN Lynn, 22, 11:13 AM EST.    Cardiology attending addendum :    I have personally performed a face-to-face diagnostic evaluation, physical exam and reviewed data on this patient.  I have reviewed documentation done by me and nurse practitioner  and corrected as needed.  And agree with the different components of documentation.Greater than 50% of the time spent in the care of this patient was provided by attending consultant/me.    Subjective: Patient seen and examined.  Chart reviewed.  Labs reviewed.  Discussed with RN taking care of patient.  Patient is sitting in chair.      Objective: Serial troponin 0.872, 0.653, 0.793, 0.546, 0.373  10/26/2022: troponin 0.300, 0.256  Glucose 136, sodium 148 WBC 13.4, hgb 9.0  10/27/2022: troponin 0.175, WBC 12.9 hgb 8.8 EKG with diffuse T wave abnormalities and QTC prolongation   10/28/2022: troponin 0.136, glucose 215, WBC 12.2   hgb 8.6  10/30/2022: troponin 0.067, pro bnp [id number]  10/31/2022: troponin 0.05, glucose 190, creatinine 0.51  WBC 12.2, hgb 8.4  2022:  Troponin 0.057, glucose 185  2022: no labs to review   2022: Glucose elevated, CBC reveals a  white count of 17.6 and hemoglobin of 8.8  11/7/2022: Chest x-ray reveals persistent bibasilar airspace disease left greater than right  11/8/2022: Glucose elevated, WBC 7.6, hemoglobin 8.6  probnp [id number]  11/9/2022: glucose elevated       History of Present Illness:        Mrs. Laura Perkins has a Past medical history of      - hypertension  - dyslipidemia   - diabetes   - anemia, GERD  - stroke 2012  - bipolar disorder  - Colon cancer- s/p resection 10/18/2022  - bladder surgery, eye surgery, hysterectomy, hemorrhoidectomy   -  Non smoker  - no allergies  - family history of heart disease in mother         Presented from rehab center to the ED on 10/24/2022 for confusion/alerted mental status.  Patient was recently admitted to [facility] for transverse colon resection on 10/18/2022.  Patient was discharged to rehab. In the ED patient was noted to have fever 102. CXR showed left sided pneumonia and patient was started on IVFs and antibiotics.  Cardiology has been consulted for elevated troponin.   Patient alert and oriented and gives some history but is poor historian and most of information above was obtained from chart review.  She denies any chest pain but does state she is short of breath and coughing.  RN reported patient aspirated and had swallow study now on thickened liquid.      Serial troponins have been elevated but non-trending 0.872, 0.653, 0.793, 0.546, 0.373  EKG showed sinus tachycardia with diffuse T wave inversion that is changed from previous EKG on 10/11/2022 that showed normal sinus rhythm without any ST abnormalities.       Assessment:  :     Acute non-ST elevation MI  Presumed CAD  Hypertension, dyslipidemia, diabetes  Recent colon cancer resection 10/18/2022  Fever, leukocytosis  Altered mental status  Prolonged QTC on psychiatric medications   CAD, NSTEMI, PCI  Acute HFrEF due to systolic dysfunction from ischemic cardiomyopathy        Recommendations / Plan:          Telemetry is revealing sinus rhythm  Monitor renal function and hemoglobin and urine output  proBNP is improved from 23,855-14,071--14,684  Continue diuresis as tolerated  Echo is revealing moderate LV dysfunction  Continue to monitor EKG and QTC.  EKG done 10/31/2022 reviewed/interpreted by me reveals sinus tachycardia with rate of 102 bpm with QT of 382 ms and QTC of 493 ms.  Repeat EKG 11/8/2022 reviewed/interpreted by me reveals sinus rhythm with rate of 81 bpm with QT interval of 427 ms and QTC of 480  Increase activity as tolerated  Continue aspirin, clopidogrel, losartan, metoprolol succinate, rosuvastatin as tolerated  Patient has significant improvement in symptoms we will continue diuresis and medical management  Patient had pleural tap and removal of fluid 11/7/2022  Will follow-up as outpatient.  Please call me back if I can be of any further assistance       Copied text in this portion of the note has been reviewed and is accurate as of 11/9/2022    Past Medical History:  Past Medical History:   Diagnosis Date   • Acid reflux    • Anemia    • Anemia    • Bipolar 1 disorder (HCC)    • Colon cancer (HCC)    • Diabetes mellitus (HCC)     Type 2   • Disease of thyroid gland    • Hyperlipidemia    • Hypertension     TAKEN OFF HTN MEDS OVER 5 YEARS AGO   • Incontinence of urine    • Stroke (HCC)     2012     Past Surgical History:  Past Surgical History:   Procedure Laterality Date   • BLADDER SURGERY     • CARDIAC CATHETERIZATION N/A 10/27/2022    Procedure: Left Heart Cath, possible pci;  Surgeon: Andrea Melvin MD;  Location: [facility] INVASIVE LOC[facility];  Service: Cardiovascular;  Laterality: N/A;   • COLON RESECTION N/A 10/18/2022    Procedure: COLON RESECTIOn TRANSVERSE LAPAROSCOPIC;  Surgeon: Toño Michelle MD;  Location: [facility] OR;  Service: General;  Laterality: N/A;   • [facility]   • [facility]  "  • EYE SURGERY     • HEMORRHOIDECTOMY     • HYSTERECTOMY          Social History:   Social History     Tobacco Use   • Smoking status: Never   • Smokeless tobacco: Never   Substance Use Topics   • Alcohol use: Never      Family History:  Family History   Problem Relation Age of Onset   • Heart disease Mother    • Diabetes Mother    • No Known Problems Father    • Cancer Brother           Allergies:  No Known Allergies  Scheduled Meds:  aspirin, 81 mg, Daily  budesonide, 0.5 mg, BID - RT  clopidogrel, 75 mg, Daily  divalproex, 500 mg, BID  docusate sodium, 100 mg, BID  ferrous sulfate, 324 mg, BID With Meals  FLUoxetine, 20 mg, Daily  furosemide, 20 mg, Q12H  guaiFENesin, 600 mg, Q12H  insulin glargine, 18 Units, QAM  insulin lispro, 3-14 Units, TID With Meals  insulin lispro, 6 Units, TID With Meals  levothyroxine, 50 mcg, Daily  losartan, 25 mg, Q24H  magnesium oxide, 400 mg, Daily  metoprolol succinate XL, 25 mg, Q24H  pantoprazole, 40 mg, QAM  polyethylene glycol, 17 g, Daily  risperiDONE, 0.5 mg, Daily  rosuvastatin, 20 mg, Daily  Scopolamine, 1 patch, Q72H  spironolactone, 25 mg, Daily          Review of Systems:   Review of Systems   Constitutional: Negative for chills and fever.   Cardiovascular: Negative for chest pain and palpitations.   Respiratory: Negative for cough and hemoptysis.    Gastrointestinal: Negative for nausea and vomiting.            Constitutional:  Temp:  [97 °F (36.1 °C)-98.8 °F (37.1 °C)] 97 °F (36.1 °C)  Heart Rate:  [] 73  Resp:  [16-22] 16  BP: (111-156)/(38-60) 156/38    Physical Exam   BP (!) 156/38 (BP Location: Left arm, Patient Position: Lying)   Pulse 73   Temp 97 °F (36.1 °C) (Axillary)   Resp 16   Ht 162.6 cm (64\")   Wt 56 kg (123 lb 7.3 oz)   SpO2 100%   BMI 21.19 kg/m²   General:  Appears in no acute distress- frail and chronically ill   Eyes: Sclera is anicteric,  conjunctiva is clear   HEENT:  No JVD. Thyroid not visibly enlarged. No mucosal pallor or " cyanosis  Respiratory: Respirations regular and unlabored at rest.  Decreased breath sounds at bilateral bases with scattered rhonchi improving   Cardiovascular: S1,S2 Regular rate and rhythm. No murmur, rub or gallop auscultated.  . No pretibial pitting edema  Gastrointestinal: Abdomen nondistended.  Musculoskeletal:  No abnormal movements  Extremities: No digital clubbing or cyanosis  Skin: Color pink. Skin warm and dry to touch. No rashes  No xanthoma  Neuro: Alert and awake, no lateralizing deficits appreciated    INTAKE AND OUTPUT:    Intake/Output Summary (Last 24 hours) at 11/9/2022 0859  Last data filed at 11/9/2022 0500  Gross per 24 hour   Intake 1080 ml   Output 650 ml   Net 430 ml       Cardiographics  Telemetry: sinus rhythm     ECG:   ECG 12 Lead QT Measurement   Final Result   HEART RATE= 81  bpm   RR Interval= 772  ms   ID Interval= 142  ms   P Horizontal Axis= -11  deg   P Front Axis= 28  deg   QRSD Interval= 96  ms   QT Interval= 427  ms   QRS Axis= 29  deg   T Wave Axis= 195  deg   - ABNORMAL ECG -   Sinus rhythm   Atrial premature complex   Abnormal T, suspect prox. LAD occlu. or CVA   When compared with ECG of 05-Nov-2022 9:56:04,   Significant repolarization change   Electronically Signed By: Skyler García ([facility]) 08-Nov-2022 18:06:10   Date and Time of Study: 2022-11-08 10:37:22      ECG 12 Lead QT Measurement   Final Result   HEART RATE= 74  bpm   RR Interval= 812  ms   ID Interval= 115  ms   P Horizontal Axis= -74  deg   P Front Axis= -14  deg   QRSD Interval= 92  ms   QT Interval= 516  ms   QRS Axis= 25  deg   T Wave Axis= 196  deg   - ABNORMAL ECG -   Sinus rhythm   Abnrm T, probable ischemia, anterolateral lds   Prolonged QT interval   When compared with ECG of 03-Nov-2022 8:17:18,   Nonspecific significant change   Electronically Signed By: Jackie Mercado ([facility]) 08-Nov-2022 13:45:20   Date and Time of Study: 2022-11-05 09:56:04      ECG 12 Lead QT Measurement   Final Result   HEART RATE=  76  bpm   RR Interval= 808  ms   ID Interval= 155  ms   P Horizontal Axis= -14  deg   P Front Axis= 53  deg   QRSD Interval= 96  ms   QT Interval= 477  ms   QRS Axis= 19  deg   T Wave Axis= 183  deg   - ABNORMAL ECG -   Sinus rhythm   Atrial premature complex   Abnrm T, consider ischemia, anterolateral lds   Prolonged QT interval   When compared with ECG of 31-Oct-2022 8:53:04,   Significant rate decrease   Significant repolarization change   Electronically Signed By: Andrea Melvin ([facility]) 06-Nov-2022 14:45:24   Date and Time of Study: 2022-11-03 08:17:18      ECG 12 Lead QT Measurement   Final Result   HEART RATE= 102  bpm   RR Interval= 600  ms   ID Interval= 157  ms   P Horizontal Axis= -38  deg   P Front Axis= 23  deg   QRSD Interval= 89  ms   QT Interval= 382  ms   QRS Axis= 28  deg   T Wave Axis= 209  deg   - ABNORMAL ECG -   Sinus tachycardia   Atrial premature complexes   Nonspecific T abnormalities, diffuse leads   When compared with ECG of 29-Oct-2022 16:41:12,   No significant change   Electronically Signed By: Andrea Melvin ([facility]) 06-Nov-2022 14:45:34   Date and Time of Study: 2022-10-31 08:53:04      ECG 12 Lead Other; on psych medications that prolong qtc   Final Result   HEART RATE= 92  bpm   RR Interval= 656  ms   ID Interval= 154  ms   P Horizontal Axis= -11  deg   P Front Axis= 45  deg   QRSD Interval= 90  ms   QT Interval= 360  ms   QRS Axis= 20  deg   T Wave Axis= 193  deg   - ABNORMAL ECG -   Sinus rhythm   Atrial premature complex   Abnrm T, consider ischemia, anterolateral lds   When compared with ECG of 29-Oct-2022 5:08:11,   Nonspecific significant change   Electronically Signed By: Reyes Cooper ([facility]) 03-Nov-2022 11:14:28   Date and Time of Study: 2022-10-29 16:41:12      ECG 12 Lead QT Measurement   Final Result   HEART RATE= 78  bpm   RR Interval= 772  ms   ID Interval= 139  ms   P Horizontal Axis= 4  deg   P Front Axis= 42  deg   QRSD Interval= 87  ms   QT Interval= 422   ms   QRS Axis= 32  deg   T Wave Axis= 212  deg   - ABNORMAL ECG -   Sinus rhythm   Abnormal T, consider ischemia, diffuse leads   When compared with ECG of 28-Oct-2022 5:26:46,   New or worsened ischemia or infarction   Significant repolarization change   Electronically Signed By: Reeys Cooper ([facility]) 03-Nov-2022 10:47:40   Date and Time of Study: 2022-10-29 05:08:11      SCANNED - TELEMETRY     Final Result      ECG 12 Lead   Final Result   HEART RATE= 78  bpm   RR Interval= 772  ms   OH Interval= 129  ms   P Horizontal Axis= -38  deg   P Front Axis= 2  deg   QRSD Interval= 90  ms   QT Interval= 469  ms   QRS Axis= 37  deg   T Wave Axis= 232  deg   - ABNORMAL ECG -   Sinus rhythm   Abnormal T, suspect prox. LAD occlu. or CVA   Prolonged QT interval   When compared with ECG of 27-Oct-2022 10:26:26,   Significant repolarization change   Electronically Signed By: Andrea Melvin ([facility]) 06-Nov-2022 14:45:45   Date and Time of Study: 2022-10-28 05:26:46      ECG 12 Lead QT Measurement   Final Result   HEART RATE= 97  bpm   RR Interval= 620  ms   OH Interval= 154  ms   P Horizontal Axis= 1  deg   P Front Axis= 43  deg   QRSD Interval= 92  ms   QT Interval= 474  ms   QRS Axis= 32  deg   T Wave Axis= 217  deg   - ABNORMAL ECG -   Sinus rhythm   Abnormal T, probable ischemia, widespread   Prolonged QT interval   When compared with ECG of 23-Oct-2022 19:11:40,   Significant change in rhythm   Electronically Signed By: Skyler García ([facility]) 28-Oct-2022 10:11:01   Date and Time of Study: 2022-10-27 10:26:26      ECG 12 Lead   Final Result   HEART RATE= 121  bpm   RR Interval= 496  ms   OH Interval= 96  ms   P Horizontal Axis=   deg   P Front Axis= 206  deg   QRSD Interval= 77  ms   QT Interval= 382  ms   QRS Axis= 37  deg   T Wave Axis= 209  deg   - ABNORMAL ECG -   Sinus or ectopic atrial tachycardia   Abnormal T, consider ischemia, diffuse leads   Prolonged QT interval   When compared with ECG of 11-Oct-2022 14:13:27,    Significant change in rhythm: previously sinus   New or worsened ischemia or infarction   Electronically Signed By: Marino Montes ([facility]) 24-Oct-2022 14:20:30   Date and Time of Study: 2022-10-23 19:11:40      SCANNED - TELEMETRY     Final Result      SCANNED - TELEMETRY     Final Result      SCANNED - TELEMETRY     Final Result      SCANNED - TELEMETRY     Final Result      SCANNED - TELEMETRY     Final Result      SCANNED - TELEMETRY     Final Result      SCANNED - TELEMETRY     Final Result      SCANNED - TELEMETRY     Final Result      SCANNED - TELEMETRY     Final Result      SCANNED - TELEMETRY     Final Result      SCANNED - TELEMETRY     Final Result      SCANNED - TELEMETRY     Final Result      SCANNED - TELEMETRY     Final Result      SCANNED - TELEMETRY     Final Result      SCANNED - TELEMETRY     Final Result      SCANNED - TELEMETRY     Final Result      SCANNED - TELEMETRY     Final Result      SCANNED - TELEMETRY     Final Result      SCANNED - TELEMETRY     Final Result      SCANNED - TELEMETRY     Final Result      SCANNED - TELEMETRY     Final Result      SCANNED - TELEMETRY     Final Result      SCANNED - TELEMETRY     Final Result      SCANNED - TELEMETRY     Final Result      SCANNED - TELEMETRY     Final Result      SCANNED - TELEMETRY     Final Result      SCANNED - TELEMETRY     Final Result      SCANNED - TELEMETRY     Final Result      SCANNED - TELEMETRY     Final Result      SCANNED - TELEMETRY     Final Result      SCANNED - TELEMETRY     Final Result      SCANNED - TELEMETRY     Final Result      SCANNED - TELEMETRY     Final Result      SCANNED - TELEMETRY     Final Result      SCANNED - TELEMETRY     Final Result      SCANNED - TELEMETRY     Final Result      SCANNED - TELEMETRY     Final Result      SCANNED - TELEMETRY     Final Result      SCANNED - TELEMETRY     Final Result      SCANNED - TELEMETRY     Final Result      SCANNED - TELEMETRY     Final Result      SCANNED -  TELEMETRY     Final Result      SCANNED - TELEMETRY     Final Result      SCANNED - TELEMETRY     Final Result      SCANNED - TELEMETRY     Final Result      SCANNED - TELEMETRY     Final Result      SCANNED - TELEMETRY     Final Result      SCANNED - TELEMETRY     Final Result      SCANNED - TELEMETRY     Final Result      SCANNED - TELEMETRY     Final Result      SCANNED - TELEMETRY     Final Result      SCANNED - TELEMETRY     Final Result      SCANNED - TELEMETRY     Final Result      SCANNED - TELEMETRY     Final Result      SCANNED - TELEMETRY     Final Result      SCANNED - TELEMETRY     Final Result      SCANNED - TELEMETRY     Final Result      SCANNED - TELEMETRY     Final Result      SCANNED - TELEMETRY     Final Result      SCANNED - TELEMETRY     Final Result      SCANNED - TELEMETRY     Final Result      SCANNED - TELEMETRY     Final Result      SCANNED - TELEMETRY     Final Result      SCANNED - TELEMETRY     Final Result      SCANNED - TELEMETRY     Final Result      SCANNED - TELEMETRY     Final Result      SCANNED - TELEMETRY     Final Result      SCANNED - TELEMETRY     Final Result      SCANNED - TELEMETRY     Final Result      SCANNED - TELEMETRY     Final Result      SCANNED - TELEMETRY     Final Result      SCANNED - TELEMETRY     Final Result      SCANNED - TELEMETRY     Final Result      SCANNED - TELEMETRY     Final Result      SCANNED - TELEMETRY     Final Result      ECG 12 Lead QT Measurement    (Results Pending)   ECG 12 Lead QT Measurement    (Results Pending)     I have personally reviewed EKG    Echocardiogram: Results for orders placed during the hospital encounter of 10/23/22    Adult Transthoracic Echo Limited W/ Cont if Necessary Per Protocol    Interpretation Summary  Normal LV size.  Mild apical hypokinesis seen.  Estimated LV ejection fraction of 45 to 50%  Normal RV size  Normal atrial size  Aortic valve, mitral valve, tricuspid valve appears structurally normal, no  "significant regurgitation seen.  Small pericardial effusion seen.  No signs of increased intrapericardial pressure  Proximal aorta appears normal in size.      Lab Review   I have reviewed the labs      Results from last 7 days   Lab Units 11/07/22  0439   MAGNESIUM mg/dL 2.5*     Results from last 7 days   Lab Units 11/06/22  0745   SODIUM mmol/L 137   POTASSIUM mmol/L 4.5   BUN mg/dL 12   CREATININE mg/dL 0.64   CALCIUM mg/dL 8.5*         Results from last 7 days   Lab Units 11/06/22  0745 11/05/22  0819 11/04/22  0822   WBC 10*3/mm3 17.60* 13.80* 13.30*   HEMOGLOBIN g/dL 8.6* 8.9* 8.4*   HEMATOCRIT % 29.0* 30.2* 26.9*   PLATELETS 10*3/mm3 505* 499* 447     Results from last 7 days   Lab Units 11/04/22  0821   INR  1.08       RADIOLOGY:  Imaging Results (Last 24 Hours)     Procedure Component Value Units Date/Time    XR Chest 1 View [[id number]] Resulted: 11/09/22 0535     Updated: 11/09/22 0535                )11/9/2022  MD YARA Dunham/Transcription:   \"Dictated utilizing Dragon dictation\".   "

## 2022-11-09 NOTE — THERAPY TREATMENT NOTE
"Subjective: Pt agreeable to therapeutic plan of care.  Cognition: oriented to Person, Place and Time    Objective:     Bed Mobility: Max-A   Functional Transfers: Mod-A, Assist x 2 and with rolling walker  Functional Ambulation: Mod-A, Assist x 2 and with rolling walker    Grooming: Min-A  ADL Position: edge of bed sitting  ADL Comments: washing face    Vitals: WNL    Pain: 3 VAS  Location: stomach  Interventions for pain: Increased Activity and Therapeutic Presence  Education: Provided education on the importance of mobility in the acute care setting and Transfer Training    Assessment: Laura Perkins presents with ADL impairments below baseline abilities which indicate the need for continued skilled intervention while inpatient. Pt completed washing face with MIN A.  Pt requires cues to open eyes.  Pt t/f from supine to sitting on EOB with MAX A.  Pt completed sit to standing transfers using Rw with MAX A x 2.  Pt t/f to chair using RW with MOD A x 2.Tolerating session today without incident. Will continue to follow and progress as tolerated.     Plan/Recommendations:   Moderate Intensity Therapy recommended post-acute care. This is recommended as therapy feels the patient would require 3-4 days per week and wouldn't tolerate \"3 hour daily\" rehab intensity. SNF would be the preferred choice. If the patient does not agree to SNF, arrange HH or OP depending on home bound status. If patient is medically complex, consider LTACH.. Pt requires no DME at discharge.     Pt desires Skilled Rehab placement at discharge. Pt cooperative; agreeable to therapeutic recommendations and plan of care.     Modified [place]: N/A = No pre-op stroke/TIA    Post-Tx Position: Up in Chair, Staff Present, Alarms activated and Call light and personal items within reach  PPE: gloves and surgical mask  "

## 2022-11-09 NOTE — PROGRESS NOTES
"PULMONARY CRITICAL CARE PROGRESS  NOTE      PATIENT IDENTIFICATION:  Name: Laura Prekins  MRN: [id number]  :  1940     Age: 82 y.o.  Sex: female    DATE OF Note:  2022   Referring Physician: Alejandro Estrada MD                  Subjective:   Laying in bed, currently on 3 L no SOB, no chest or abdominal pain, appetite poor, no nausea or vomiting, no bowel or bladder issues    Objective:  tMax 24 hrs: Temp (24hrs), Av.9 °F (36.6 °C), Min:97 °F (36.1 °C), Max:98.8 °F (37.1 °C)      Vitals Ranges:   Temp:  [97 °F (36.1 °C)-98.8 °F (37.1 °C)] 97.9 °F (36.6 °C)  Heart Rate:  [] 102  Resp:  [16-22] 16  BP: (123-156)/(38-60) 135/49    Intake and Output Last 3 Shifts:   I/O last 3 completed shifts:  In: 1080 [P.O.:1080]  Out: 1350 [Urine:1350]    Exam:  /49   Pulse 102   Temp 97.9 °F (36.6 °C) (Oral)   Resp 16   Ht 162.6 cm (64\")   Wt 56 kg (123 lb 7.3 oz)   SpO2 94%   BMI 21.19 kg/m²     General Appearance: Alert  HEENT:  Normocephalic, without obvious abnormality. Conjunctivae/corneas clear.  Normal external ear canals. Nares normal, no drainage     Neck:  Supple, symmetrical, trachea midline. No JVD.  Lungs /Chest wall:   Bilateral basal rhonchi, respirations unlabored, symmetrical wall movement.     Heart:  Regular rate and rhythm, systolic murmur PMI left sternal border  Abdomen: Soft, nontender, no masses, no organomegaly.    Extremities: Trace edema, no clubbing or cyanosis        Medications:  aspirin, 81 mg, Oral, Daily  budesonide, 0.5 mg, Nebulization, BID - RT  clopidogrel, 75 mg, Oral, Daily  divalproex, 500 mg, Oral, BID  docusate sodium, 100 mg, Oral, BID  ferrous sulfate, 324 mg, Oral, BID With Meals  FLUoxetine, 20 mg, Oral, Daily  furosemide, 20 mg, Intravenous, Q12H  guaiFENesin, 600 mg, Oral, Q12H  insulin glargine, 18 Units, Subcutaneous, QAM  insulin lispro, 3-14 Units, Subcutaneous, TID With Meals  insulin lispro, 6 Units, Subcutaneous, TID With " Meals  levothyroxine, 50 mcg, Oral, Daily  losartan, 25 mg, Oral, Q24H  magnesium oxide, 400 mg, Oral, Daily  metoprolol succinate XL, 25 mg, Oral, Q24H  pantoprazole, 40 mg, Oral, QAM  polyethylene glycol, 17 g, Oral, Daily  risperiDONE, 0.5 mg, Oral, Daily  rosuvastatin, 20 mg, Oral, Daily  Scopolamine, 1 patch, Transdermal, Q72H  spironolactone, 25 mg, Oral, Daily        Infusion:        PRN:    acetaminophen    acetaminophen    acetaminophen    benzonatate    Calcium Gluconate-NaCl **AND** calcium gluconate **AND** Calcium, Ionized    dextrose    dextrose    glucagon (human recombinant)    HYDROcodone-acetaminophen    ipratropium-albuterol    LORazepam    magnesium sulfate **OR** magnesium sulfate **OR** magnesium sulfate    phenol    potassium & sodium phosphates **OR** potassium & sodium phosphates    potassium chloride    potassium chloride    [COMPLETED] Insert peripheral IV **AND** sodium chloride  Data Review:  All labs (24hrs):   Recent Results (from the past 24 hour(s))   POC Glucose Once    Collection Time: 11/08/22  1:21 PM    Specimen: Blood   Result Value Ref Range    Glucose 271 (H) 70 - 105 mg/dL   POC Glucose Once    Collection Time: 11/08/22  4:28 PM    Specimen: Blood   Result Value Ref Range    Glucose 225 (H) 70 - 105 mg/dL   POC Glucose Once    Collection Time: 11/08/22  6:01 PM    Specimen: Blood   Result Value Ref Range    Glucose 309 (H) 70 - 105 mg/dL   POC Glucose Once    Collection Time: 11/08/22  8:59 PM    Specimen: Blood   Result Value Ref Range    Glucose 151 (H) 70 - 105 mg/dL   POC Glucose Once    Collection Time: 11/09/22  7:12 AM    Specimen: Blood   Result Value Ref Range    Glucose 146 (H) 70 - 105 mg/dL   POC Glucose Once    Collection Time: 11/09/22 11:19 AM    Specimen: Blood   Result Value Ref Range    Glucose 365 (H) 70 - 105 mg/dL        Imaging:  XR Chest 1 View  Narrative: XR CHEST 1 VW    Date of Exam: 11/9/2022 5:22 EST    Indication: Shortness of  breath.    Comparison Exams: 11/8/2022 and prior    FINDINGS:  Study is limited by overlying support and monitoring apparatus.    There is stable cardiomegaly. Persistent moderate pulmonary vascular congestion    Increased patchy opacity noted at the lung bases left greater than right similar to the prior study. No pneumothorax noted. There are small subpleural effusion suspected. No  Tubes are noted. Osseous structures are unremarkable.  Impression: Cardiomegaly and mild pulmonary vascular congestion, similar to slightly increased in the prior study    Patchy opacities at the left lung base may represent atelectasis, asymmetric pulmonary edema or pneumonia.    Electronically Signed: Des Duckworth MD 11/9/2022 10:32 EST Workstation ID: [id number]  FL Video Swallow With Speech Single Contrast  Narrative: DATE OF EXAM:  11/9/2022 10:03 AM     PROCEDURE:  FL VIDEO SWALLOW W SPEECH SINGLE-CONTRAST-     INDICATIONS:  dysphagia; I21.4-Non-ST elevation (NSTEMI) myocardial infarction;  R41.82-Altered mental status, unspecified; R50.9-Fever, unspecified;  J18.9-Pneumonia, unspecified organism; R77.8-Other specified  abnormalities of plasma proteins; E11.9-Type 2 diabetes mellitus without  complications; Z79.4-Long term (current) use of insulin;  E78.5-Hyperlipidemia, unspecified     COMPARISON:  Modified barium swallow study 10/24/2022.     TECHNIQUE:   This examination was performed in conjunction with speech pathology.  Lateral video fluoroscopic evaluation of the swallowing mechanism was  performed while correlate administering to the patient various  consistency food items mixed with barium.     Fluoroscopic Time: 0.9 minutes        Number of Images: 10 spot fluoroscopic series images        FINDINGS:  Modified barium swallow study was performed utilizing fluoroscopy. The  study was performed by a dedicated speech pathologist. I was present  during the entire examination.      Impression: Modified barium swallow study  with fluoroscopy. Please refer to the  speech pathologist report for findings and dietary recommendations.     Electronically Signed By-Millicent Santos MD On:11/9/2022 10:16 AM  This report was finalized on [id number] by  Millicent Santos MD.       ASSESSMENT:  AMS  Mixed respiratory and metabolic alkalosis  Bilateral pleural effusion left greater than right  Bilateral pneumonia and possible compressive atelectasis  CVA  Hypertension  Bipolar disorder  Diabetes mellitus type 2  Invasive adenocarcinoma of the colon s/p resection 10/22  CAD s/p stents 10/27    PLAN:  Consult oncology  Monitor intake  DNR/DNI  Bronchodilators  Inhaled corticosteroids  Incentive spirometer  S/p thoracentesis-transudate  Diuresis and monitor MARIAN's  Electrolytes/ glycemic control  No DVT and GI prophylaxis    Discussed with Dr. Chris Jacobson, ROMAN  11/9/2022  11:22 EST    I personally have examined  and interviewed the patient. I have reviewed the history, data, problems, assessment and plan with our NP.  Total Critical care time in direct medical management (   ) minutes, This time specifically excludes time spent performing procedures.    Reed Perez MD   11/9/2022  19:47 EST

## 2022-11-09 NOTE — THERAPY RE-EVALUATION
Patient Name: Laura Perkins  : 1940    MRN: [id number]                              Today's Date: 2022       Admit Date: 10/23/2022    Visit Dx:     ICD-10-CM ICD-9-CM   1. Acute non-ST elevation myocardial infarction (NSTEMI) ([facility])  I21.4 410.70   2. Altered mental status, unspecified altered mental status type  R41.82 780.97   3. Fever, unspecified fever cause  R50.9 780.60   4. Pneumonia of left lung due to infectious organism, unspecified part of lung  J18.9 486   5. Elevated troponin  R77.8 790.6   6. Type 2 diabetes mellitus without complication, with long-term current use of insulin ([facility])  E11.9 250.00    Z79.4 V58.67   7. Dyslipidemia  E78.5 272.4     Patient Active Problem List   Diagnosis   • Pneumonia due to COVID-19 virus   • Acute renal injury ([facility])   • Cancer of transverse colon ([facility])   • Altered mental status, unspecified altered mental status type   • HAP (hospital-acquired pneumonia)   • Acute respiratory distress   • Elevated troponin   • Sepsis ([facility])   • Type 2 diabetes mellitus ([facility])   • Anxiety associated with depression   • Hypothyroidism (acquired)   • OAB (overactive bladder)   • GERD without esophagitis   • Acute non-ST elevation myocardial infarction (NSTEMI) ([facility])   • Dyslipidemia   • Acute respiratory failure with hypoxia ([facility])   • Pleural effusion     Past Medical History:   Diagnosis Date   • Acid reflux    • Anemia    • Anemia    • Bipolar 1 disorder (HCC)    • Colon cancer (HCC)    • Diabetes mellitus ([facility])     Type 2   • Disease of thyroid gland    • Hyperlipidemia    • Hypertension     TAKEN OFF HTN MEDS OVER 5 YEARS AGO   • Incontinence of urine    • Stroke ([facility])          Past Surgical History:   Procedure Laterality Date   • BLADDER SURGERY     • CARDIAC CATHETERIZATION N/A 10/27/2022    Procedure: Left Heart Cath, possible pci;  Surgeon: Andrea Melvin MD;  Location: [facility] CATH INVASIVE LOCATION;  Service: Cardiovascular;  Laterality: N/A;   • COLON  RESECTION N/A 10/18/2022    Procedure: COLON RESECTIOn TRANSVERSE LAPAROSCOPIC;  Surgeon: Toño Michelle MD;  Location: [facility] MAIN OR;  Service: General;  Laterality: N/A;   • COLONOSCOPY  09/21/2022    [facility]   • ENDOSCOPY  09/21/2022    [facility]   • EYE SURGERY     • HEMORRHOIDECTOMY     • HYSTERECTOMY        General Information     Row Name 11/09/22 1341          Physical Therapy Time and Intention    Document Type re-evaluation  -NS     Mode of Treatment co-treatment;physical therapy  -NS     Row Name 11/09/22 1341          General Information    Patient Profile Reviewed yes  -NS     Prior Level of Function max assist:;ADL's;w/c or scooter  -NS     Existing Precautions/Restrictions fall;oxygen therapy device and L/min  -NS     Barriers to Rehab medically complex;previous functional deficit  -NS     Row Name 11/09/22 1341          Living Environment    People in Home spouse  -NS     Row Name 11/09/22 1341          Cognition    Orientation Status (Cognition) oriented x 4  -NS     Row Name 11/09/22 1341          Safety Issues, Functional Mobility    Safety Issues Affecting Function (Mobility) ability to follow commands;friction/shear risk  -NS     Impairments Affecting Function (Mobility) balance;cognition;endurance/activity tolerance;strength  -NS     Cognitive Impairments, Mobility Safety/Performance attention  -NS           User Key  (r) = Recorded By, (t) = Taken By, (c) = Cosigned By    Initials Name Provider Type    NS Leonila Biswas, PT Physical Therapist               Mobility     Row Name 11/09/22 1342          Bed Mobility    Bed Mobility supine-sit  -NS     Supine-Sit [place] (Bed Mobility) maximum assist (25% patient effort)  -NS     Assistive Device (Bed Mobility) bed rails;head of bed elevated  -NS     Row Name 11/09/22 1342          Bed-Chair Transfer    Bed-Chair [place] (Transfers) moderate assist (50% patient effort);2 person assist  -NS     Row  Name 11/09/22 1342          Sit-Stand Transfer    Sit-Stand [place] (Transfers) moderate assist (50% patient effort);2 person assist  -NS           User Key  (r) = Recorded By, (t) = Taken By, (c) = Cosigned By    Initials Name Provider Type    Leonila Hoffman PT Physical Therapist               Obj/Interventions     Row Name 11/09/22 1343          Range of Motion Comprehensive    General Range of Motion no range of motion deficits identified  -NS     Comment, General Range of Motion BLE WFL  -NS     Row Name 11/09/22 1343          Strength Comprehensive (MMT)    Comment, General Manual Muscle Testing (MMT) Assessment gross lower extremity strength noted through functional transfers; unable to follow commands consistently for MMT  -NS     Row Name 11/09/22 1343          Balance    Balance Assessment sitting static balance;sit to stand dynamic balance;standing static balance;standing dynamic balance  -NS     Static Sitting Balance contact guard  -NS     Position, Sitting Balance sitting edge of bed  -NS     Sit to Stand Dynamic Balance moderate assist;2-person assist  -NS     Static Standing Balance moderate assist;2-person assist  -NS     Dynamic Standing Balance moderate assist;2-person assist  -NS           User Key  (r) = Recorded By, (t) = Taken By, (c) = Cosigned By    Initials Name Provider Type    Leonila Hoffman PT Physical Therapist               Goals/Plan     Row Name 11/09/22 1347          Bed Mobility Goal 1 (PT)    Activity/Assistive Device (Bed Mobility Goal 1, PT) bed mobility activities, all  -NS     [place] Level/Cues Needed (Bed Mobility Goal 1, PT) minimum assist (75% or more patient effort)  -NS     Time Frame (Bed Mobility Goal 1, PT) long term goal (LTG);2 weeks  -NS     Progress/Outcomes (Bed Mobility Goal 1, PT) progress slower than expected  -NS     Row Name 11/09/22 1347          Transfer Goal 1 (PT)    Activity/Assistive Device (Transfer Goal 1, PT)  sit-to-stand/stand-to-sit;wheelchair transfer  -NS     [place] Level/Cues Needed (Transfer Goal 1, PT) minimum assist (75% or more patient effort)  -NS     Time Frame (Transfer Goal 1, PT) long term goal (LTG);2 weeks  -NS     Progress/Outcome (Transfer Goal 1, PT) continuing progress toward goal  -NS     Row Name 11/09/22 1347          Problem Specific Goal 1 (PT)    Progress/Outcome (Problem Specific Goal 1, PT) good progress toward goal  -NS     [facility] Name 11/09/22 1347          Therapy Assessment/Plan (PT)    Planned Therapy Interventions (PT) balance training;bed mobility training;gait training;home exercise program;patient/family education;strengthening;transfer training;wheelchair management/propulsion training  -NS           User Key  (r) = Recorded By, (t) = Taken By, (c) = Cosigned By    Initials Name Provider Type    Leonila Hoffman, PT Physical Therapist               Clinical Impression     [facility] Name 11/09/22 1345          Pain    Pretreatment Pain Rating 0/10 - no pain  -NS     Posttreatment Pain Rating 0/10 - no pain  -NS     [facility] Name 11/09/22 1345          Plan of Care Review    Plan of Care Reviewed With patient  -NS     Outcome Evaluation Pt seen for physical therapy re-evaluation due to length of stay. Pt continuing to function below baseline with deficits in strength, balance, cognition and activity tolerance. She is requiring max A for bed mobility and mod Ax2 for transfers. Pt inconsistenly following commands and inconsistently answers questions appropriately however, is oriented x4. Pt will continue to require skilled PT while in current setting and SNF placement at d/c.  -NS     Row Name 11/09/22 1343          Therapy Assessment/Plan (PT)    Criteria for Skilled Interventions Met (PT) yes;meets criteria  -NS     Therapy Frequency (PT) 3 times/wk  -NS     Predicted Duration of Therapy Intervention (PT) until d/c  -NS     Row Name 11/09/22 1349          Vital Signs    Pre Patient  Position Side Lying  -NS     Post Patient Position Sitting  -NS     Row Name 11/09/22 1345          Positioning and Restraints    Pre-Treatment Position in bed  -NS     Post Treatment Position chair  -NS     In Chair notified nsg;reclined;sitting;legs elevated;call light within reach;encouraged to call for assist;exit alarm on;with family/caregiver  -NS           User Key  (r) = Recorded By, (t) = Taken By, (c) = Cosigned By    Initials Name Provider Type    Leonila Hoffman PT Physical Therapist               Outcome Measures     Row Name 11/09/22 1348          How much help from another person do you currently need...    Turning from your back to your side while in flat bed without using bedrails? 3  -NS     Moving from lying on back to sitting on the side of a flat bed without bedrails? 2  -NS     Moving to and from a bed to a chair (including a wheelchair)? 2  -NS     Standing up from a chair using your arms (e.g., wheelchair, bedside chair)? 2  -NS     Climbing 3-5 steps with a railing? 1  -NS     To walk in hospital room? 1  -NS     AM-PAC 6 Clicks Score (PT) 11  -NS     Highest level of mobility 4 --> Transferred to chair/commode  -NS     Row Name 11/09/22 1348          Functional Assessment    Outcome Measure Options AM-PAC 6 Clicks Basic Mobility (PT)  -NS           User Key  (r) = Recorded By, (t) = Taken By, (c) = Cosigned By    Initials Name Provider Type    Leonila Hoffman PT Physical Therapist                             Physical Therapy Education     Title: PT OT SLP Therapies (Done)     Topic: Physical Therapy (Done)     Point: Mobility training (Done)     Learning Progress Summary           Patient Acceptance, E, VU,NR by TIMUR at 11/8/2022 2000    Acceptance, E, VU,NR by ITMUR at 11/7/2022 2000    Acceptance, E, VU,NR by GILBERT at 10/30/2022 2351    Acceptance, E, VU,NR by TJ at 10/30/2022 0021    Acceptance, E,TB, VU by TW at 10/25/2022 1709    Acceptance, E, VU by OD at 10/25/2022 1623   Family  Acceptance, E, VU,NR by KD at 11/8/2022 2000    Acceptance, E, VU,NR by TJ at 10/30/2022 2351    Acceptance, E, VU,NR by TJ at 10/30/2022 0021   Significant Other Acceptance, E, VU,NR by KD at 11/7/2022 2000                   Point: Home exercise program (Done)     Learning Progress Summary           Patient Acceptance, E, VU,NR by KD at 11/8/2022 2000    Acceptance, E, VU,NR by KD at 11/7/2022 2000    Acceptance, E, VU,NR by TJ at 10/30/2022 2351    Acceptance, E, VU,NR by TJ at 10/30/2022 0021    Acceptance, E,TB, VU by TW at 10/25/2022 1709    Acceptance, E, VU by OD at 10/25/2022 1623   Family Acceptance, E, VU,NR by KD at 11/8/2022 2000    Acceptance, E, VU,NR by TJ at 10/30/2022 2351    Acceptance, E, VU,NR by TJ at 10/30/2022 0021   Significant Other Acceptance, E, VU,NR by KD at 11/7/2022 2000                   Point: Body mechanics (Done)     Learning Progress Summary           Patient Acceptance, E, VU,NR by KD at 11/8/2022 2000    Acceptance, E, VU,NR by KD at 11/7/2022 2000    Acceptance, E, VU,NR by TJ at 10/30/2022 2351    Acceptance, E, VU,NR by TJ at 10/30/2022 0021    Acceptance, E,TB, VU by TW at 10/25/2022 1709    Acceptance, E, VU by OD at 10/25/2022 1623   Family Acceptance, E, VU,NR by KD at 11/8/2022 2000    Acceptance, E, VU,NR by TJ at 10/30/2022 2351    Acceptance, E, VU,NR by TJ at 10/30/2022 0021   Significant Other Acceptance, E, VU,NR by KD at 11/7/2022 2000                   Point: Precautions (Done)     Learning Progress Summary           Patient Acceptance, E, VU,NR by KD at 11/8/2022 2000    Acceptance, E, VU,NR by KD at 11/7/2022 2000    Acceptance, E, VU,NR by TJ at 10/30/2022 2351    Acceptance, E, VU,NR by TJ at 10/30/2022 0021    Acceptance, E,TB, VU by TW at 10/25/2022 1709    Acceptance, E, VU by OD at 10/25/2022 1623   Family Acceptance, E, VU,NR by KD at 11/8/2022 2000    Acceptance, E, VU,NR by TJ at 10/30/2022 2351    Acceptance, E, VU,NR by TJ at 10/30/2022 0021    Significant Other Acceptance, E, VU,NR by  at 11/7/2022 2000                               User Key     Initials Effective Dates Name Provider Type Discipline     06/16/21 -  Reena Austin, RN Registered Nurse Nurse    TJ 06/16/21 -  Apoorva Norman, RN Registered Nurse Nurse    TW 08/26/22 -  Javid Carias LPN Licensed Nurse Nurse    OD 08/29/22 -  Greta Shen PT Student PT Student PT              PT Recommendation and Plan  Planned Therapy Interventions (PT): balance training, bed mobility training, gait training, home exercise program, patient/family education, strengthening, transfer training, wheelchair management/propulsion training  Plan of Care Reviewed With: patient  Outcome Evaluation: Pt seen for physical therapy re-evaluation due to length of stay. Pt continuing to function below baseline with deficits in strength, balance, cognition and activity tolerance. She is requiring max A for bed mobility and mod Ax2 for transfers. Pt inconsistenly following commands and inconsistently answers questions appropriately however, is oriented x4. Pt will continue to require skilled PT while in current setting and SNF placement at d/c.     Time Calculation:    PT Charges     Row Name 11/09/22 1349             Time Calculation    Start Time 1028  -NS      Stop Time 1041  -NS      Time Calculation (min) 13 min  -NS      PT Received On 11/09/22  -NS      PT - Next Appointment 11/11/22  -NS      PT Goal Re-Cert Due Date 11/23/22  -NS         Time Calculation- PT    Total Timed Code Minutes- PT 0 minute(s)  -NS            User Key  (r) = Recorded By, (t) = Taken By, (c) = Cosigned By    Initials Name Provider Type    NS Leonila Biswas, YULIA Physical Therapist              Therapy Charges for Today     Code Description Service Date Service Provider Modifiers Qty    96684047268 HC PT RE-EVAL ESTABLISHED PLAN 2 11/9/2022 Leonila Biswas PT GP 1          PT G-Codes  Outcome Measure Options: AM-PAC 6 Clicks  Basic Mobility (PT)  AM-PAC 6 Clicks Score (PT): 11  AM-PAC 6 Clicks Score (OT): 6    Leonila Biswas, PT  11/9/2022

## 2022-11-09 NOTE — PROGRESS NOTES
[facility] Medicine Services Daily Progress Note    Patient Name: Laura Perkins  : 1940  MRN: [id number]  Primary Care Physician:  Beka Weir MD  Date of admission: 10/23/2022      Subjective          Brief interim history:       84-year-old female with known history of cerebrovascular accident, T2DM, hypertension, HLD, bipolar disorder, anemia, S/p bladder surgery,  and history of colon cancer,S/p resection (10/22) . S/p recent colonoscopy which revealed 5 cm mass.  Patient underwent transverse colon resection at [facility] and pathology revealed invasive mucinous adenocarcinoma.  She was subsequently discharged to a local rehab facility on 10/19/2022.  Patient presented to [facility] ED on 10/23/2022 due to 2-day history of generalized body weakness, acute mental status changes/metabolic encephalopathy.  On presentation, she was noted with fever of 102 and tachycardic.  Blood cultures (10/23) which result pending and started on broad-spectrum antibiotic with Zosyn.     10/25/2022: Seen and examined and follow up.  Resting comfortably in bed in no distress or discomfort.      10/26/2022: Seen and examined in follow-up.  Resting comfortably.    10/27/2022: Seen and examined in follow-up.  Event noted for reported patient slipping out of bed last evening with no visible injury on examination.    10/28/2022:  Pt had a cardiac cath with DANIA to LAD.  Psych was consulted to help adjust meds.  Pt is off psych meds secondary to QTc prolongation.  Abilify increase, as well as Depakote for mood stabilization.  Cardiology following with QTc changes as well as EKG.      10/29/2022:  Pt is seen by psych and her meds are titrated.  Pt does not communicate with me and is repeating the words I a saying to her.  Pt is on 2 lit NC that can be titrated down.  Pt will be discharged when her psych meds are adjusted and she is back to her baseline.    10/30/2022:  Pt is very wheezy today and   states, pt is coughing very thick sputum.  Pt is very confused and not reliable historian.  She denies complains.  Pt is on 2 lit NC, and she oxygenating well, but her wheeze is louder today.  Pt think that her new psych meds, are improving her condition.      10/31/2022.  Patient was seen and examined.  Patient's family complained restless legs last night.      11/1/2022.  Patient was seen and examined.  Patient reported no new symptoms.      11/2/2022.  Patient was seen and examined.  Patient's family reported moderate improvement in her symptoms.      11/3/2022.  Patient was seen and examined.  Patient's family reported that patient was coughing all night.  Pulmonary is following.       11/04/2022.  Patient was seen and examined.  Patient's family reported significant improvement in patient's symptoms.      11/05/2022.  Patient was seen and examined.  Patient's family reported no overnight events.  Patient was noted to be coughing.  Tessalon Perles was ordered.      11/6/2022.  Patient was seen and examined.  Patient's family reported moderate improvement in her symptoms.      11/7/2022.  Patient was seen and examined.  Patient is status post thoracentesis.  Family reported musculoskeletal pain.  Pain control was reviewed.      11/8/2022.  Patient was seen and examined.  Patient's family requested a surgical consult to discuss her psych meds.              Objective      Vitals:   Temp:  [97.4 °F (36.3 °C)-98.3 °F (36.8 °C)] 97.5 °F (36.4 °C)  Heart Rate:  [] 100  Resp:  [16-20] 18  BP: (111-146)/(47-62) 123/54  Flow (L/min):  [2.5-3] 3    Physical Exam  Vitals reviewed.   Constitutional:       Appearance: She is ill-appearing.      Comments: Patient is lying comfortably in bed and in no acute distress.   HENT:      Head: Normocephalic and atraumatic.      Nose: Nose normal. No congestion or rhinorrhea.      Mouth/Throat:      Mouth: Mucous membranes are moist.      Pharynx: Oropharynx is clear. No  oropharyngeal exudate or posterior oropharyngeal erythema.   Eyes:      Pupils: Pupils are equal, round, and reactive to light.   Cardiovascular:      Pulses: Normal pulses.      Heart sounds: Normal heart sounds. No murmur heard.    No friction rub. No gallop.      Comments: S1 and S2 present.  No tachycardia.  Pulmonary:      Effort: No respiratory distress.      Breath sounds: No wheezing, rhonchi or rales.      Comments: Improved air entry bilaterally.  Chest:      Chest wall: No tenderness.   Abdominal:      General: Abdomen is flat. Bowel sounds are normal. There is no distension.      Palpations: Abdomen is soft. There is no mass.      Tenderness: There is no abdominal tenderness. There is no right CVA tenderness, left CVA tenderness, guarding or rebound.      Hernia: No hernia is present.   Musculoskeletal:         General: No swelling, tenderness, deformity or signs of injury.      Cervical back: Neck supple. No tenderness.      Right lower leg: No edema.      Left lower leg: No edema.   Skin:     Capillary Refill: Capillary refill takes less than 2 seconds.      Coloration: Skin is not jaundiced.      Findings: No bruising, lesion or rash.   Neurological:      Mental Status: She is alert.      Comments: No facial asymmetry noted.  Gait and station not tested.   Psychiatric:      Comments: Patient is slightly agitated.              Result Review    Result Review:  I have personally reviewed the results from the time of this admission to 11/8/2022 19:02 EST and agree with these findings:  [x]  Laboratory  []  Microbiology  [x]  Radiology  []  EKG/Telemetry   [x]  Cardiology/Vascular   []  Pathology  [x]  Old records  []  Other:      Status: Completed Collected: 11/02/22 1224    Updated: 11/02/22 1228   Narrative:     DATE OF EXAM:   11/2/2022 11:00 AM       PROCEDURE:   XR ABDOMEN KUB-       INDICATIONS:   Vomiting; I21.4-Non-ST elevation (NSTEMI) myocardial infarction;   R41.82-Altered mental status,  unspecified; R50.9-Fever, unspecified;   J18.9-Pneumonia, unspecified organism; R77.8-Other specified   abnormalities of plasma proteins; E11.9-Type 2 diabetes mellitus without   complications; Z79.4-Long term (current) use of insulin;   E78.5-Hyperlipidemia, unspecified       COMPARISON:   CT chest 10/30/2022, CT abdomen and pelvis without contrast 10/24/2022       TECHNIQUE:   Radiographic view(s) of the abdomen obtained.       FINDINGS:   Included lower lungs are abnormal, better, better evaluated on recent   chest CT. No abnormal small bowel distention is seen in a pattern to   suggest small bowel obstruction, although there is a nonspecific paucity   of gas-filled small bowel loops. Stool is seen in the ascending and   transverse colon.       Impression:     Nonspecific, nonobstructive bowel gas pattern.       Electronically Signed By-Lora Monk MD On:11/2/2022 12:26 PM   This report was finalized on [id number] by  Lora Monk MD.             Wounds (last 24 hours)     LDA Wound     Row Name 11/08/22 1640 11/08/22 1205 11/08/22 0900       Wound 10/24/22 1344  medial sacral spine Pressure Injury    Wound - Properties Group Placement Date: 10/24/22  -MG Placement Time: 1344  -MG Present on Hospital Admission: Y  -MG Side: --  -MG, middle  Orientation: medial  -MG Location: sacral spine  -MG Primary Wound Type: Pressure inj  -MG    Pressure Injury Stage -- -- DTPI  -CL    Closure Open to air  -CL Open to air  -CL Open to air  -CL    Dressing Care -- -- open to air  -CL    Retired Wound - Properties Group Placement Date: 10/24/22  -MG Placement Time: 1344  -MG Present on Hospital Admission: Y  -MG Side: --  -MG, middle  Orientation: medial  -MG Location: sacral spine  -MG Primary Wound Type: Pressure inj  -MG    Retired Wound - Properties Group Date first assessed: 10/24/22  -MG Time first assessed: 1344  -MG Present on Hospital Admission: Y  -MG Side: --  -MG, middle  Location: sacral spine  -MG Primary  Wound Type: Pressure inj  -MG       [REMOVED] Wound 10/24/22 1348 medial perineum    Wound - Properties Group Placement Date: 10/24/22  -MG Placement Time: 1348  -MG Present on Hospital Admission: Y  -MG Orientation: medial  -MG Location: perineum  -MG Removal Date: 11/07/22  -KD Removal Time: 2000 -KD Wound Outcome: Converged  -KD    Retired Wound - Properties Group Placement Date: 10/24/22  -MG Placement Time: 1348  -MG Present on Hospital Admission: Y  -MG Orientation: medial  -MG Location: perineum  -MG Removal Date: 11/07/22  -KD Removal Time: 2000 -KD Wound Outcome: Converged  -KD    Retired Wound - Properties Group Date first assessed: 10/24/22  -MG Time first assessed: 1348  -MG Present on Hospital Admission: Y  -MG Location: perineum  -MG Resolution Date: 11/07/22  -KD Resolution Time: 2000 -KD Wound Outcome: Converged  -KD    Row Name 11/07/22 2000             Wound 10/24/22 1344  medial sacral spine Pressure Injury    Wound - Properties Group Placement Date: 10/24/22  -MG Placement Time: 1344  -MG Present on Hospital Admission: Y  -MG Side: --  -MG, middle  Orientation: medial  -MG Location: sacral spine  -MG Primary Wound Type: Pressure inj  -MG    Base pink;red;non-blanchable;dry  -KD      Periwound redness  -KD      Drainage Amount none  -KD      Care, Wound cleansed with;soap and water  -KD      Dressing Care open to air  -KD      Periwound Care dry periwound area maintained;topical treatment applied  -KD      Retired Wound - Properties Group Placement Date: 10/24/22  -MG Placement Time: 1344  -MG Present on Hospital Admission: Y  -MG Side: --  -MG, middle  Orientation: medial  -MG Location: sacral spine  -MG Primary Wound Type: Pressure inj  -MG    Retired Wound - Properties Group Date first assessed: 10/24/22  -MG Time first assessed: 1344  -MG Present on Hospital Admission: Y  -MG Side: --  -MG, middle  Location: sacral spine  -MG Primary Wound Type: Pressure inj  -MG       [REMOVED] Wound  10/24/22 1348 medial perineum    Wound - Properties Group Placement Date: 10/24/22  -MG Placement Time: 1348  -MG Present on Hospital Admission: Y  -MG Orientation: medial  -MG Location: perineum  -MG Removal Date: 11/07/22  -KD Removal Time: 2000 -KD Wound Outcome: Converged  -KD    Retired Wound - Properties Group Placement Date: 10/24/22  -MG Placement Time: 1348  -MG Present on Hospital Admission: Y  -MG Orientation: medial  -MG Location: perineum  -MG Removal Date: 11/07/22  -KD Removal Time: 2000 -KD Wound Outcome: Converged  -KD    Retired Wound - Properties Group Date first assessed: 10/24/22  -MG Time first assessed: 1348  -MG Present on Hospital Admission: Y  -MG Location: perineum  -MG Resolution Date: 11/07/22  -KD Resolution Time: 2000 -KD Wound Outcome: Converged  -KD          User Key  (r) = Recorded By, (t) = Taken By, (c) = Cosigned By    Initials Name Provider Type    Reena Buenrostro, RN Registered Nurse    Demetrice Douglas RN Registered Nurse    Ilda Torres RN Registered Nurse                  Assessment & Plan    From previous notes and with minor updates.    Brief patient summary:    Patient is a 82 y.o. female with past medical history of GERD, hypothyroidism, hypertension, bipolar 1 disorder, colon cancer, diabetes mellitus type 2, hyperlipidemia, anemia and a stroke unknown who presented to [facility] on 10/23/2022 upon transfer from [facility] rehab where she had been staying since 10/19/2022 after a transverse colon resection for 5 cm mass found on colonoscopy,  at [facility] on 10/18/2022.  Pathology report in progress.  Family in [facility] reported progressive weakness lethargy and alteration in mental status over the past 2 days.She appears frail, will awaken to voice and answer but falls back to sleep. She is oriented to self and follows simple commands.  and family at bedside assisting with history .  There is no slurred  speech or facial droop.  No focal deficits.  She denies headache.  She does report some chest pain.family reports shortness of air and cough productive of brown sputum.  Temperature was 102 on admission, BP was stable she was tachycardic and tachypneic.  She does not normally use home oxygen and is now stable on 4 L via nasal cannula.  She triggered sepsis.  WBCs 13.3, lactic acid normal at 0.8 procalcitonin elevated at 0.32.  Urinalysis was negative for infection.  Chest x-ray per radiology indicated a possible small left midlung infiltrate and bronchitis.  She was started on IV vancomycin and IV cefepime in ED for hospital-acquired pneumonia.  Family reports no past medical history of heart failure or coronary artery disease.  Troponin mildly elevated at 0.87.  Patient reported chest pain but  could give no other details.  May be secondary to tachycardia and chest pain pleuritic, however serial troponins and continuous cardiac monitoring have been ordered. EKG shows no acute ST elevation. and family report she is a DNR with no CPR however they agree to intubation if needed and full support otherwise.  Creatinine mildly elevated at 1.13 BUN 33.    Family reports no history of chronic renal failure.PMH includes hypothyroidism, Bipolar disorder, Diabetes Mellitus type 2 with glucose today 439 and overactive bladder post re-suspension with mesh.  She was given an IV fluid bolus in ED and will be admitted for antibiotics for possible pneumonia with blood cultures pending.  Family concerned about possible aspiration.  He reports she had her esophagus stretched a few months ago and underwent a swallow study prior to stretching which showed esophageal dysmotility. They noticed recently after her being intubated for surgery she spits up food.  Speech has been consulted.  Aspiration precautions in place.          aspirin, 81 mg, Oral, Daily  budesonide, 0.5 mg, Nebulization, BID - RT  clopidogrel, 75 mg, Oral,  Daily  divalproex, 500 mg, Oral, BID  docusate sodium, 100 mg, Oral, BID  ferrous sulfate, 324 mg, Oral, BID With Meals  FLUoxetine, 20 mg, Oral, Daily  furosemide, 20 mg, Intravenous, Q12H  guaiFENesin, 600 mg, Oral, Q12H  [START ON 11/9/2022] insulin glargine, 18 Units, Subcutaneous, QAM  insulin lispro, 3-14 Units, Subcutaneous, TID With Meals  insulin lispro, 6 Units, Subcutaneous, TID With Meals  ipratropium-albuterol, 3 mL, Nebulization, 4x Daily - RT  levothyroxine, 50 mcg, Oral, Daily  losartan, 25 mg, Oral, Q24H  magnesium oxide, 400 mg, Oral, Daily  metoprolol succinate XL, 25 mg, Oral, Q24H  pantoprazole, 40 mg, Oral, QAM  polyethylene glycol, 17 g, Oral, Daily  risperiDONE, 0.5 mg, Oral, Daily  rosuvastatin, 20 mg, Oral, Daily  Scopolamine, 1 patch, Transdermal, Q72H  spironolactone, 25 mg, Oral, Daily             Active Hospital Problems:  Active Hospital Problems    Diagnosis    • **Altered mental status, unspecified altered mental status type    • Acute respiratory failure with hypoxia (HCC)    • Pleural effusion    • HAP (hospital-acquired pneumonia)    • Sepsis (HCC)    • Elevated troponin    • Acute respiratory distress    • Type 2 diabetes mellitus (HCC)    • Anxiety associated with depression    • Hypothyroidism (acquired)    • OAB (overactive bladder)    • GERD without esophagitis    • Acute non-ST elevation myocardial infarction (NSTEMI) ([facility])    • Dyslipidemia    • Cancer of transverse colon (HCC)           Assessment/plan:      -Continue appropriate patient's home medications for other chronic medical conditions.  -Continue the present level of care.  -Patient and family agreed with the plan of care.    Pleural effusion.  -Patient is status postthoracentesis.  -Follow pulmonary recommendations.      Cough.  -Much improved.  -Treat with Tessalon Perles.  -Patient is also on Mucinex.     Acute toxic metabolic encephalopathy/AMS      -resolving, and likely multifactorial.  Psych started pt on  Abilify and adjusted Depakote for mood stabilization.       -Pt is still confused, and can't give clear ROS.  Meds to be adjusted by Psych.     Probable sepsis      -off Zosyn, now on Augmentin      -Duo nebs and Mucomyst nebs added for thick secretions and wheezing     Pneumonia involving the left lung      -Continue Augmentin, and supportive care      NSTEMI/Elevated troponin       -scheduled for LHC (10/27), DANIA to LAD.       -Cp free and in the setting of suspected sepsis     History of colorectal cancer (invasive mucinous adenocarcinoma)      -S/p resection (10/22)     Dysphagia      -followed by speech pathologist      CVA      -Aspirin/Crestor     T2DM      -SSI/Lantus     Hypertension     HLD     -Crestor     Hypothyroidism      -Levothyroxine     Depression/anxiety      -Risperidone on hold 2/2 increase QT prolongation and consult Psychiatrist        -Depakote dose adjustment and initiation of Abilify, per Psych.      -Pt is still not at baseline, and is not communicating well, and can't give ROS.       DVT prophylaxis:  Mechanical DVT prophylaxis orders are present.    CODE STATUS:    Medical Intervention Limits: NO intubation (DNI)  Level Of Support Discussed With: Next of Kin (If No Surrogate); Health Care Surrogate  Code Status (Patient has no pulse and is not breathing): No CPR (Do Not Attempt to Resuscitate)  Medical Interventions (Patient has pulse or is breathing): Limited Support  Release to patient: Routine Release       Disposition: This wonderful patient can discharge in the next 24 hours.      Electronically signed by Alejandro Estrada MD, FACP, 11/08/22, 19:02 EST.        [facility] Hospitalist Team

## 2022-11-09 NOTE — PLAN OF CARE
Goal Outcome Evaluation:  Plan of Care Reviewed With: patient        Progress: improving   The patient appears to have slept better tonight than the previous night. She nor her  have had any complaints. The patient's vitals have been stable and she currently appears to be resting comfortably at this time.

## 2022-11-09 NOTE — PROGRESS NOTES
[facility] Medicine Services Daily Progress Note    Patient Name: Laura Perkins  : 1940  MRN: [id number]  Primary Care Physician:  Beka Weir MD  Date of admission: 10/23/2022      Subjective          Brief interim history:       84-year-old female with known history of cerebrovascular accident, T2DM, hypertension, HLD, bipolar disorder, anemia, S/p bladder surgery,  and history of colon cancer,S/p resection (10/22) . S/p recent colonoscopy which revealed 5 cm mass.  Patient underwent transverse colon resection at [facility] and pathology revealed invasive mucinous adenocarcinoma.  She was subsequently discharged to a local rehab facility on 10/19/2022.  Patient presented to [facility] ED on 10/23/2022 due to 2-day history of generalized body weakness, acute mental status changes/metabolic encephalopathy.  On presentation, she was noted with fever of 102 and tachycardic.  Blood cultures (10/23) which result pending and started on broad-spectrum antibiotic with Zosyn.     10/25/2022: Seen and examined and follow up.  Resting comfortably in bed in no distress or discomfort.      10/26/2022: Seen and examined in follow-up.  Resting comfortably.    10/27/2022: Seen and examined in follow-up.  Event noted for reported patient slipping out of bed last evening with no visible injury on examination.    10/28/2022:  Pt had a cardiac cath with DANIA to LAD.  Psych was consulted to help adjust meds.  Pt is off psych meds secondary to QTc prolongation.  Abilify increase, as well as Depakote for mood stabilization.  Cardiology following with QTc changes as well as EKG.      10/29/2022:  Pt is seen by psych and her meds are titrated.  Pt does not communicate with me and is repeating the words I a saying to her.  Pt is on 2 lit NC that can be titrated down.  Pt will be discharged when her psych meds are adjusted and she is back to her baseline.    10/30/2022:  Pt is very wheezy today and   states, pt is coughing very thick sputum.  Pt is very confused and not reliable historian.  She denies complains.  Pt is on 2 lit NC, and she oxygenating well, but her wheeze is louder today.  Pt think that her new psych meds, are improving her condition.      10/31/2022.  Patient was seen and examined.  Patient's family complained restless legs last night.      11/1/2022.  Patient was seen and examined.  Patient reported no new symptoms.      11/2/2022.  Patient was seen and examined.  Patient's family reported moderate improvement in her symptoms.      11/3/2022.  Patient was seen and examined.  Patient's family reported that patient was coughing all night.  Pulmonary is following.       11/04/2022.  Patient was seen and examined.  Patient's family reported significant improvement in patient's symptoms.      11/05/2022.  Patient was seen and examined.  Patient's family reported no overnight events.  Patient was noted to be coughing.  Tessalon Perles was ordered.      11/6/2022.  Patient was seen and examined.  Patient's family reported moderate improvement in her symptoms.      11/7/2022.  Patient was seen and examined.  Patient is status post thoracentesis.  Family reported musculoskeletal pain.  Pain control was reviewed.      11/8/2022.  Patient was seen and examined.  Patient's family requested a surgical consult to discuss her psych meds.      11/9/2022.  Patient was seen and examined.  Patient's family reported multiple episodes of her limb movement last night.  Psych is following.            Objective      Vitals:   Temp:  [97 °F (36.1 °C)-98.8 °F (37.1 °C)] 98.3 °F (36.8 °C)  Heart Rate:  [] 87  Resp:  [16-22] 16  BP: (123-161)/(38-60) 161/49  Flow (L/min):  [2-3] 2    Physical Exam  Vitals reviewed.   Constitutional:       General: She is not in acute distress.     Appearance: She is ill-appearing.   HENT:      Head: Normocephalic and atraumatic.      Nose: Nose normal. No congestion or rhinorrhea.       Mouth/Throat:      Mouth: Mucous membranes are moist.      Pharynx: Oropharynx is clear. No oropharyngeal exudate or posterior oropharyngeal erythema.   Eyes:      Pupils: Pupils are equal, round, and reactive to light.   Cardiovascular:      Pulses: Normal pulses.      Heart sounds: Normal heart sounds. No murmur heard.    No friction rub. No gallop.      Comments: S1 and S2 present.  No tachycardia.  Pulmonary:      Effort: No respiratory distress.      Breath sounds: No wheezing, rhonchi or rales.      Comments: Improved air entry bilaterally.  Chest:      Chest wall: No tenderness.   Abdominal:      General: Abdomen is flat. Bowel sounds are normal. There is no distension.      Palpations: Abdomen is soft. There is no mass.      Tenderness: There is no abdominal tenderness. There is no right CVA tenderness, left CVA tenderness, guarding or rebound.      Hernia: No hernia is present.   Musculoskeletal:         General: No swelling, tenderness, deformity or signs of injury.      Cervical back: Neck supple. No tenderness.      Right lower leg: No edema.      Left lower leg: No edema.   Skin:     Capillary Refill: Capillary refill takes less than 2 seconds.      Coloration: Skin is not jaundiced.      Findings: No bruising, lesion or rash.   Neurological:      Mental Status: She is alert.      Comments: No facial asymmetry noted.  Gait and station not tested.   Psychiatric:      Comments: Patient is slightly agitated.              Result Review    Result Review:  I have personally reviewed the results from the time of this admission to 11/9/2022 17:00 EST and agree with these findings:  [x]  Laboratory  []  Microbiology  [x]  Radiology  []  EKG/Telemetry   [x]  Cardiology/Vascular   []  Pathology  [x]  Old records  []  Other:      Status: Completed Collected: 11/02/22 1224    Updated: 11/02/22 1228   Narrative:     DATE OF EXAM:   11/2/2022 11:00 AM       PROCEDURE:   XR ABDOMEN KUB-       INDICATIONS:    Vomiting; I21.4-Non-ST elevation (NSTEMI) myocardial infarction;   R41.82-Altered mental status, unspecified; R50.9-Fever, unspecified;   J18.9-Pneumonia, unspecified organism; R77.8-Other specified   abnormalities of plasma proteins; E11.9-Type 2 diabetes mellitus without   complications; Z79.4-Long term (current) use of insulin;   E78.5-Hyperlipidemia, unspecified       COMPARISON:   CT chest 10/30/2022, CT abdomen and pelvis without contrast 10/24/2022       TECHNIQUE:   Radiographic view(s) of the abdomen obtained.       FINDINGS:   Included lower lungs are abnormal, better, better evaluated on recent   chest CT. No abnormal small bowel distention is seen in a pattern to   suggest small bowel obstruction, although there is a nonspecific paucity   of gas-filled small bowel loops. Stool is seen in the ascending and   transverse colon.       Impression:     Nonspecific, nonobstructive bowel gas pattern.       Electronically Signed By-Lora Monk MD On:11/2/2022 12:26 PM   This report was finalized on [id number] by  Lora Monk MD.             Wounds (last 24 hours)     LDA Wound     Row Name 11/09/22 1259 11/09/22 0845 11/08/22 2000       Wound 10/24/22 1344  medial sacral spine Pressure Injury    Wound - Properties Group Placement Date: 10/24/22  -MG Placement Time: 1344  -MG Present on Hospital Admission: Y  -MG Side: --  -MG, middle  Orientation: medial  -MG Location: sacral spine  -MG Primary Wound Type: Pressure inj  -MG    Closure Open to air  -CL Open to air  -CL Open to air  -KD    Base -- -- pink;red;blanchable;non-blanchable  -KD    Periwound -- -- redness  -KD    Drainage Amount -- -- none  -KD    Care, Wound -- -- cleansed with;soap and water  -KD    Periwound Care -- -- dry periwound area maintained;topical treatment applied  -KD    Retired Wound - Properties Group Placement Date: 10/24/22  -MG Placement Time: 1344  -MG Present on Hospital Admission: Y  -MG Side: --  -MG, middle   Orientation: medial  -MG Location: sacral spine  -MG Primary Wound Type: Pressure inj  -MG    Retired Wound - Properties Group Date first assessed: 10/24/22  -MG Time first assessed: 1344  -MG Present on Hospital Admission: Y  -MG Side: --  -MG, middle  Location: sacral spine  -MG Primary Wound Type: Pressure inj  -MG          User Key  (r) = Recorded By, (t) = Taken By, (c) = Cosigned By    Initials Name Provider Type    Reena Buenrostro, RN Registered Nurse    Demetrice Douglas RN Registered Nurse    Ilda Torres RN Registered Nurse                  Assessment & Plan    From previous notes and with minor updates.    Brief patient summary:    Patient is a 82 y.o. female with past medical history of diabetes mellitus type 2, GERD, hypothyroidism, hypertension, bipolar 1 disorder, colon cancer, hyperlipidemia, anemia and a stroke unknown who presented to [facility] on 10/23/2022 upon transfer from [facility] rehab where she had been staying since 10/19/2022 after a transverse colon resection for 5 cm mass found on colonoscopy,  at [facility] on 10/18/2022.  Pathology report in progress.  Family in [facility] reported progressive weakness lethargy and alteration in mental status over the past 2 days.She appears frail, will awaken to voice and answer but falls back to sleep. She is oriented to self and follows simple commands.  and family at bedside assisting with history .  There is no slurred speech or facial droop.  No focal deficits.  She denies headache.  She does report some chest pain.family reports shortness of air and cough productive of brown sputum.  Temperature was 102 on admission, BP was stable she was tachycardic and tachypneic.  She does not normally use home oxygen and is now stable on 4 L via nasal cannula.  She triggered sepsis.  WBCs 13.3, lactic acid normal at 0.8 procalcitonin elevated at 0.32.  Urinalysis was negative for infection.  Chest  x-ray per radiology indicated a possible small left midlung infiltrate and bronchitis.  She was started on IV vancomycin and IV cefepime in ED for hospital-acquired pneumonia.  Family reports no past medical history of heart failure or coronary artery disease.  Troponin mildly elevated at 0.87.  Patient reported chest pain but  could give no other details.  May be secondary to tachycardia and chest pain pleuritic, however serial troponins and continuous cardiac monitoring have been ordered. EKG shows no acute ST elevation. and family report she is a DNR with no CPR however they agree to intubation if needed and full support otherwise.  Creatinine mildly elevated at 1.13 BUN 33.    Family reports no history of chronic renal failure.PMH includes hypothyroidism, Bipolar disorder, Diabetes Mellitus type 2 with glucose today 439 and overactive bladder post re-suspension with mesh.  She was given an IV fluid bolus in ED and will be admitted for antibiotics for possible pneumonia with blood cultures pending.  Family concerned about possible aspiration.  He reports she had her esophagus stretched a few months ago and underwent a swallow study prior to stretching which showed esophageal dysmotility. They noticed recently after her being intubated for surgery she spits up food.  Speech has been consulted.  Aspiration precautions in place.          aspirin, 81 mg, Oral, Daily  budesonide, 0.5 mg, Nebulization, BID - RT  clopidogrel, 75 mg, Oral, Daily  divalproex, 500 mg, Oral, BID  docusate sodium, 100 mg, Oral, BID  ferrous sulfate, 324 mg, Oral, BID With Meals  FLUoxetine, 20 mg, Oral, Daily  furosemide, 20 mg, Intravenous, Q12H  guaiFENesin, 600 mg, Oral, Q12H  insulin glargine, 18 Units, Subcutaneous, QAM  insulin lispro, 3-14 Units, Subcutaneous, TID With Meals  insulin lispro, 6 Units, Subcutaneous, TID With Meals  ipratropium-albuterol, 3 mL, Nebulization, 4x Daily - RT  levothyroxine, 50 mcg, Oral,  Daily  losartan, 25 mg, Oral, Q24H  magnesium oxide, 400 mg, Oral, Daily  metoprolol succinate XL, 25 mg, Oral, Q24H  pantoprazole, 40 mg, Oral, QAM  polyethylene glycol, 17 g, Oral, Daily  risperiDONE, 0.5 mg, Oral, Daily  rosuvastatin, 20 mg, Oral, Daily  Scopolamine, 1 patch, Transdermal, Q72H  spironolactone, 25 mg, Oral, Daily             Active Hospital Problems:  Active Hospital Problems    Diagnosis    • **Altered mental status, unspecified altered mental status type    • Acute respiratory failure with hypoxia (HCC)    • Pleural effusion    • HAP (hospital-acquired pneumonia)    • Sepsis (HCC)    • Elevated troponin    • Acute respiratory distress    • Type 2 diabetes mellitus (HCC)    • Anxiety associated with depression    • Hypothyroidism (acquired)    • OAB (overactive bladder)    • GERD without esophagitis    • Acute non-ST elevation myocardial infarction (NSTEMI) (HCC)    • Dyslipidemia    • Cancer of transverse colon (HCC)           Assessment/plan:      -Continue appropriate patient's home medications for other chronic medical conditions.  -Continue the present level of care.  -Patient and family agreed with the plan of care.    Pleural effusion.  -Patient is status postthoracentesis.  -Follow pulmonary recommendations.      Cough.  -Much improved.  -Treat with Tessalon Perles.  -Patient is also on Mucinex.     Acute toxic metabolic encephalopathy/AMS      -resolving, and likely multifactorial.  Psych started pt on Abilify and adjusted Depakote for mood stabilization.       -Pt is still confused, and can't give clear ROS.  Meds to be adjusted by Psych.     Probable sepsis      -off Zosyn, now on Augmentin      -Duo nebs and Mucomyst nebs added for thick secretions and wheezing     Pneumonia involving the left lung      -Continue Augmentin, and supportive care      NSTEMI/Elevated troponin       -scheduled for [facility] (10/27), DANIA to LAD.       -Cp free and in the setting of suspected sepsis     History  of colorectal cancer (invasive mucinous adenocarcinoma)      -S/p resection (10/22)     Dysphagia      -followed by speech pathologist      CVA      -Aspirin/Crestor     T2DM      -SSI/Lantus     Hypertension     HLD     -Crestor     Hypothyroidism      -Levothyroxine     Depression/anxiety      -Risperidone on hold 2/2 increase QT prolongation and consult Psychiatrist        -Depakote dose adjustment and initiation of Abilify, per Psych.      -Pt is still not at baseline, and is not communicating well, and can't give ROS.       DVT prophylaxis:  Mechanical DVT prophylaxis orders are present.    CODE STATUS:    Medical Intervention Limits: NO intubation (DNI)  Level Of Support Discussed With: Next of Kin (If No Surrogate); Health Care Surrogate  Code Status (Patient has no pulse and is not breathing): No CPR (Do Not Attempt to Resuscitate)  Medical Interventions (Patient has pulse or is breathing): Limited Support  Release to patient: Routine Release       Disposition: This wonderful patient can discharge in the next 24 hours.      Electronically signed by Alejandro Estrada MD, FACP, 11/09/22, 17:00 EST.        [facility] Hospitalist Team

## 2022-11-10 ENCOUNTER — APPOINTMENT (OUTPATIENT)
Dept: CARDIOLOGY | Facility: HOSPITAL | Age: 82
End: 2022-11-10

## 2022-11-10 ENCOUNTER — APPOINTMENT (OUTPATIENT)
Dept: NUCLEAR MEDICINE | Facility: HOSPITAL | Age: 82
End: 2022-11-10

## 2022-11-10 LAB
ANION GAP SERPL CALCULATED.3IONS-SCNC: 11 MMOL/L (ref 5–15)
ANISOCYTOSIS BLD QL: ABNORMAL
BACTERIA FLD CULT: NORMAL
BH CV LOWER VASCULAR LEFT COMMON FEMORAL AUGMENT: NORMAL
BH CV LOWER VASCULAR LEFT COMMON FEMORAL COMPETENT: NORMAL
BH CV LOWER VASCULAR LEFT COMMON FEMORAL COMPRESS: NORMAL
BH CV LOWER VASCULAR LEFT COMMON FEMORAL PHASIC: NORMAL
BH CV LOWER VASCULAR LEFT COMMON FEMORAL SPONT: NORMAL
BH CV LOWER VASCULAR LEFT DISTAL FEMORAL COMPRESS: NORMAL
BH CV LOWER VASCULAR LEFT GASTRONEMIUS COMPRESS: NORMAL
BH CV LOWER VASCULAR LEFT GREATER SAPH AK COMPRESS: NORMAL
BH CV LOWER VASCULAR LEFT GREATER SAPH BK COMPRESS: NORMAL
BH CV LOWER VASCULAR LEFT MID FEMORAL AUGMENT: NORMAL
BH CV LOWER VASCULAR LEFT MID FEMORAL COMPETENT: NORMAL
BH CV LOWER VASCULAR LEFT MID FEMORAL COMPRESS: NORMAL
BH CV LOWER VASCULAR LEFT MID FEMORAL PHASIC: NORMAL
BH CV LOWER VASCULAR LEFT MID FEMORAL SPONT: NORMAL
BH CV LOWER VASCULAR LEFT PERONEAL COMPRESS: NORMAL
BH CV LOWER VASCULAR LEFT POPLITEAL AUGMENT: NORMAL
BH CV LOWER VASCULAR LEFT POPLITEAL COMPETENT: NORMAL
BH CV LOWER VASCULAR LEFT POPLITEAL COMPRESS: NORMAL
BH CV LOWER VASCULAR LEFT POPLITEAL PHASIC: NORMAL
BH CV LOWER VASCULAR LEFT POPLITEAL SPONT: NORMAL
BH CV LOWER VASCULAR LEFT POSTERIOR TIBIAL COMPRESS: NORMAL
BH CV LOWER VASCULAR LEFT PROXIMAL FEMORAL COMPRESS: NORMAL
BH CV LOWER VASCULAR LEFT SAPHENOFEMORAL JUNCTION COMPRESS: NORMAL
BH CV LOWER VASCULAR RIGHT COMMON FEMORAL AUGMENT: NORMAL
BH CV LOWER VASCULAR RIGHT COMMON FEMORAL COMPETENT: NORMAL
BH CV LOWER VASCULAR RIGHT COMMON FEMORAL COMPRESS: NORMAL
BH CV LOWER VASCULAR RIGHT COMMON FEMORAL PHASIC: NORMAL
BH CV LOWER VASCULAR RIGHT COMMON FEMORAL SPONT: NORMAL
BH CV LOWER VASCULAR RIGHT DISTAL FEMORAL COMPRESS: NORMAL
BH CV LOWER VASCULAR RIGHT GASTRONEMIUS COMPRESS: NORMAL
BH CV LOWER VASCULAR RIGHT GREATER SAPH AK COMPRESS: NORMAL
BH CV LOWER VASCULAR RIGHT GREATER SAPH BK COMPRESS: NORMAL
BH CV LOWER VASCULAR RIGHT MID FEMORAL AUGMENT: NORMAL
BH CV LOWER VASCULAR RIGHT MID FEMORAL COMPETENT: NORMAL
BH CV LOWER VASCULAR RIGHT MID FEMORAL COMPRESS: NORMAL
BH CV LOWER VASCULAR RIGHT MID FEMORAL PHASIC: NORMAL
BH CV LOWER VASCULAR RIGHT MID FEMORAL SPONT: NORMAL
BH CV LOWER VASCULAR RIGHT PERONEAL COMPRESS: NORMAL
BH CV LOWER VASCULAR RIGHT POPLITEAL AUGMENT: NORMAL
BH CV LOWER VASCULAR RIGHT POPLITEAL COMPETENT: NORMAL
BH CV LOWER VASCULAR RIGHT POPLITEAL COMPRESS: NORMAL
BH CV LOWER VASCULAR RIGHT POPLITEAL PHASIC: NORMAL
BH CV LOWER VASCULAR RIGHT POPLITEAL SPONT: NORMAL
BH CV LOWER VASCULAR RIGHT POSTERIOR TIBIAL COMPRESS: NORMAL
BH CV LOWER VASCULAR RIGHT PROXIMAL FEMORAL COMPRESS: NORMAL
BH CV LOWER VASCULAR RIGHT SAPHENOFEMORAL JUNCTION COMPRESS: NORMAL
BUN SERPL-MCNC: 38 MG/DL (ref 8–23)
BUN/CREAT SERPL: 26.6 (ref 7–25)
CALCIUM SPEC-SCNC: 8.3 MG/DL (ref 8.6–10.5)
CHLORIDE SERPL-SCNC: 98 MMOL/L (ref 98–107)
CO2 SERPL-SCNC: 32 MMOL/L (ref 22–29)
CREAT SERPL-MCNC: 1.43 MG/DL (ref 0.57–1)
D DIMER PPP FEU-MCNC: 4.15 MG/L (FEU) (ref 0–0.59)
DEPRECATED RDW RBC AUTO: 51.6 FL (ref 37–54)
EGFRCR SERPLBLD CKD-EPI 2021: 36.5 ML/MIN/1.73
EOSINOPHIL # BLD MANUAL: 0.52 10*3/MM3 (ref 0–0.4)
EOSINOPHIL NFR BLD MANUAL: 7 % (ref 0.3–6.2)
ERYTHROCYTE [DISTWIDTH] IN BLOOD BY AUTOMATED COUNT: 18.2 % (ref 12.3–15.4)
FERRITIN SERPL-MCNC: 197 NG/ML (ref 13–150)
FOLATE SERPL-MCNC: 3.86 NG/ML (ref 4.78–24.2)
GLUCOSE BLDC GLUCOMTR-MCNC: 150 MG/DL (ref 70–105)
GLUCOSE BLDC GLUCOMTR-MCNC: 254 MG/DL (ref 70–105)
GLUCOSE BLDC GLUCOMTR-MCNC: 318 MG/DL (ref 70–105)
GLUCOSE BLDC GLUCOMTR-MCNC: 339 MG/DL (ref 70–105)
GLUCOSE SERPL-MCNC: 172 MG/DL (ref 65–99)
GRAM STN SPEC: NORMAL
GRAM STN SPEC: NORMAL
HAPTOGLOB SERPL-MCNC: 399 MG/DL (ref 30–200)
HCT VFR BLD AUTO: 23.2 % (ref 34–46.6)
HCT VFR BLD AUTO: 23.6 % (ref 34–46.6)
HGB BLD-MCNC: 7.4 G/DL (ref 12–15.9)
HGB BLD-MCNC: 7.4 G/DL (ref 12–15.9)
IRON 24H UR-MRATE: 38 MCG/DL (ref 37–145)
IRON SATN MFR SERPL: 16 % (ref 20–50)
LARGE PLATELETS: ABNORMAL
LYMPHOCYTES # BLD MANUAL: 1.04 10*3/MM3 (ref 0.7–3.1)
LYMPHOCYTES NFR BLD MANUAL: 4 % (ref 5–12)
MAXIMAL PREDICTED HEART RATE: 137 BPM
MCH RBC QN AUTO: 24.4 PG (ref 26.6–33)
MCHC RBC AUTO-ENTMCNC: 31.8 G/DL (ref 31.5–35.7)
MCV RBC AUTO: 76.6 FL (ref 79–97)
MONOCYTES # BLD: 0.3 10*3/MM3 (ref 0.1–0.9)
NEUTROPHILS # BLD AUTO: 5.55 10*3/MM3 (ref 1.7–7)
NEUTROPHILS NFR BLD MANUAL: 70 % (ref 42.7–76)
NEUTS BAND NFR BLD MANUAL: 5 % (ref 0–5)
PLATELET # BLD AUTO: 429 10*3/MM3 (ref 140–450)
PMV BLD AUTO: 7.1 FL (ref 6–12)
POIKILOCYTOSIS BLD QL SMEAR: ABNORMAL
POTASSIUM SERPL-SCNC: 3.3 MMOL/L (ref 3.5–5.2)
RBC # BLD AUTO: 3.03 10*6/MM3 (ref 3.77–5.28)
RETICS # AUTO: 0.11 10*6/MM3 (ref 0.02–0.13)
RETICS/RBC NFR AUTO: 3.71 % (ref 0.7–1.9)
SCAN SLIDE: NORMAL
SODIUM SERPL-SCNC: 141 MMOL/L (ref 136–145)
STRESS TARGET HR: 116 BPM
TIBC SERPL-MCNC: 238 MCG/DL (ref 298–536)
TRANSFERRIN SERPL-MCNC: 160 MG/DL (ref 200–360)
VARIANT LYMPHS NFR BLD MANUAL: 14 % (ref 19.6–45.3)
VIT B12 BLD-MCNC: 632 PG/ML (ref 211–946)
WBC MORPH BLD: NORMAL
WBC NRBC COR # BLD: 7.4 10*3/MM3 (ref 3.4–10.8)

## 2022-11-10 PROCEDURE — 84466 ASSAY OF TRANSFERRIN: CPT | Performed by: NURSE PRACTITIONER

## 2022-11-10 PROCEDURE — 63710000001 INSULIN LISPRO (HUMAN) PER 5 UNITS: Performed by: INTERNAL MEDICINE

## 2022-11-10 PROCEDURE — 25010000002 NA FERRIC GLUC CPLX PER 12.5 MG: Performed by: NURSE PRACTITIONER

## 2022-11-10 PROCEDURE — 84165 PROTEIN E-PHORESIS SERUM: CPT | Performed by: NURSE PRACTITIONER

## 2022-11-10 PROCEDURE — 82607 VITAMIN B-12: CPT | Performed by: NURSE PRACTITIONER

## 2022-11-10 PROCEDURE — 85014 HEMATOCRIT: CPT | Performed by: NURSE PRACTITIONER

## 2022-11-10 PROCEDURE — 94799 UNLISTED PULMONARY SVC/PX: CPT

## 2022-11-10 PROCEDURE — 85018 HEMOGLOBIN: CPT | Performed by: NURSE PRACTITIONER

## 2022-11-10 PROCEDURE — 83540 ASSAY OF IRON: CPT | Performed by: NURSE PRACTITIONER

## 2022-11-10 PROCEDURE — 83010 ASSAY OF HAPTOGLOBIN QUANT: CPT | Performed by: NURSE PRACTITIONER

## 2022-11-10 PROCEDURE — 0 TECHNETIUM ALBUMIN AGGREGATED: Performed by: INTERNAL MEDICINE

## 2022-11-10 PROCEDURE — 85007 BL SMEAR W/DIFF WBC COUNT: CPT | Performed by: NURSE PRACTITIONER

## 2022-11-10 PROCEDURE — 63710000001 INSULIN GLARGINE PER 5 UNITS: Performed by: INTERNAL MEDICINE

## 2022-11-10 PROCEDURE — 63710000001 INSULIN LISPRO (HUMAN) PER 5 UNITS: Performed by: STUDENT IN AN ORGANIZED HEALTH CARE EDUCATION/TRAINING PROGRAM

## 2022-11-10 PROCEDURE — A9540 TC99M MAA: HCPCS | Performed by: INTERNAL MEDICINE

## 2022-11-10 PROCEDURE — 85045 AUTOMATED RETICULOCYTE COUNT: CPT | Performed by: NURSE PRACTITIONER

## 2022-11-10 PROCEDURE — 85379 FIBRIN DEGRADATION QUANT: CPT | Performed by: NURSE PRACTITIONER

## 2022-11-10 PROCEDURE — 94664 DEMO&/EVAL PT USE INHALER: CPT

## 2022-11-10 PROCEDURE — 25010000002 FUROSEMIDE PER 20 MG: Performed by: INTERNAL MEDICINE

## 2022-11-10 PROCEDURE — 82746 ASSAY OF FOLIC ACID SERUM: CPT | Performed by: NURSE PRACTITIONER

## 2022-11-10 PROCEDURE — 78580 LUNG PERFUSION IMAGING: CPT

## 2022-11-10 PROCEDURE — 99232 SBSQ HOSP IP/OBS MODERATE 35: CPT | Performed by: INTERNAL MEDICINE

## 2022-11-10 PROCEDURE — 93970 EXTREMITY STUDY: CPT

## 2022-11-10 PROCEDURE — 80048 BASIC METABOLIC PNL TOTAL CA: CPT | Performed by: NURSE PRACTITIONER

## 2022-11-10 PROCEDURE — 94761 N-INVAS EAR/PLS OXIMETRY MLT: CPT

## 2022-11-10 PROCEDURE — 82525 ASSAY OF COPPER: CPT | Performed by: NURSE PRACTITIONER

## 2022-11-10 PROCEDURE — 82728 ASSAY OF FERRITIN: CPT | Performed by: NURSE PRACTITIONER

## 2022-11-10 PROCEDURE — 82962 GLUCOSE BLOOD TEST: CPT

## 2022-11-10 PROCEDURE — 84155 ASSAY OF PROTEIN SERUM: CPT | Performed by: NURSE PRACTITIONER

## 2022-11-10 PROCEDURE — 85025 COMPLETE CBC W/AUTO DIFF WBC: CPT | Performed by: NURSE PRACTITIONER

## 2022-11-10 RX ORDER — FERROUS SULFATE TAB EC 324 MG (65 MG FE EQUIVALENT) 324 (65 FE) MG
324 TABLET DELAYED RESPONSE ORAL
Status: DISCONTINUED | OUTPATIENT
Start: 2022-11-11 | End: 2022-11-15 | Stop reason: HOSPADM

## 2022-11-10 RX ADMIN — INSULIN LISPRO 6 UNITS: 100 INJECTION, SOLUTION INTRAVENOUS; SUBCUTANEOUS at 17:26

## 2022-11-10 RX ADMIN — GUAIFENESIN 600 MG: 600 TABLET, EXTENDED RELEASE ORAL at 21:25

## 2022-11-10 RX ADMIN — INSULIN LISPRO 10 UNITS: 100 INJECTION, SOLUTION INTRAVENOUS; SUBCUTANEOUS at 00:02

## 2022-11-10 RX ADMIN — INSULIN LISPRO 10 UNITS: 100 INJECTION, SOLUTION INTRAVENOUS; SUBCUTANEOUS at 12:55

## 2022-11-10 RX ADMIN — INSULIN LISPRO 8 UNITS: 100 INJECTION, SOLUTION INTRAVENOUS; SUBCUTANEOUS at 17:26

## 2022-11-10 RX ADMIN — POTASSIUM CHLORIDE 40 MEQ: 1500 TABLET, EXTENDED RELEASE ORAL at 21:25

## 2022-11-10 RX ADMIN — RISPERIDONE 0.5 MG: 0.25 TABLET ORAL at 11:27

## 2022-11-10 RX ADMIN — SODIUM CHLORIDE 250 MG: 9 INJECTION, SOLUTION INTRAVENOUS at 12:48

## 2022-11-10 RX ADMIN — DIVALPROEX SODIUM 500 MG: 500 TABLET, DELAYED RELEASE ORAL at 21:25

## 2022-11-10 RX ADMIN — MAGNESIUM OXIDE TAB 400 MG (241.3 MG ELEMENTAL MG) 400 MG: 400 (241.3 MG) TAB at 11:28

## 2022-11-10 RX ADMIN — HYDROCODONE BITARTRATE AND ACETAMINOPHEN 1 TABLET: 7.5; 325 TABLET ORAL at 22:27

## 2022-11-10 RX ADMIN — DOCUSATE SODIUM 100 MG: 100 CAPSULE, LIQUID FILLED ORAL at 21:28

## 2022-11-10 RX ADMIN — ASPIRIN 81 MG: 81 TABLET, COATED ORAL at 11:29

## 2022-11-10 RX ADMIN — EMPAGLIFLOZIN 10 MG: 10 TABLET, FILM COATED ORAL at 11:27

## 2022-11-10 RX ADMIN — FUROSEMIDE 20 MG: 10 INJECTION, SOLUTION INTRAMUSCULAR; INTRAVENOUS at 03:10

## 2022-11-10 RX ADMIN — DIVALPROEX SODIUM 500 MG: 500 TABLET, DELAYED RELEASE ORAL at 11:29

## 2022-11-10 RX ADMIN — METOPROLOL SUCCINATE 25 MG: 25 TABLET, FILM COATED, EXTENDED RELEASE ORAL at 11:27

## 2022-11-10 RX ADMIN — INSULIN LISPRO 6 UNITS: 100 INJECTION, SOLUTION INTRAVENOUS; SUBCUTANEOUS at 11:31

## 2022-11-10 RX ADMIN — FUROSEMIDE 20 MG: 10 INJECTION, SOLUTION INTRAMUSCULAR; INTRAVENOUS at 17:26

## 2022-11-10 RX ADMIN — DOCUSATE SODIUM 100 MG: 100 CAPSULE, LIQUID FILLED ORAL at 11:28

## 2022-11-10 RX ADMIN — POLYETHYLENE GLYCOL 3350 17 G: 17 POWDER, FOR SOLUTION ORAL at 11:30

## 2022-11-10 RX ADMIN — IPRATROPIUM BROMIDE AND ALBUTEROL SULFATE 3 ML: .5; 3 SOLUTION RESPIRATORY (INHALATION) at 15:12

## 2022-11-10 RX ADMIN — PANTOPRAZOLE SODIUM 40 MG: 40 TABLET, DELAYED RELEASE ORAL at 06:17

## 2022-11-10 RX ADMIN — LOSARTAN POTASSIUM 25 MG: 25 TABLET, FILM COATED ORAL at 11:29

## 2022-11-10 RX ADMIN — BUDESONIDE 0.5 MG: 0.5 INHALANT RESPIRATORY (INHALATION) at 18:33

## 2022-11-10 RX ADMIN — GUAIFENESIN 600 MG: 600 TABLET, EXTENDED RELEASE ORAL at 11:28

## 2022-11-10 RX ADMIN — INSULIN GLARGINE 18 UNITS: 100 INJECTION, SOLUTION SUBCUTANEOUS at 06:16

## 2022-11-10 RX ADMIN — ROSUVASTATIN 20 MG: 10 TABLET, FILM COATED ORAL at 21:25

## 2022-11-10 RX ADMIN — FLUOXETINE 20 MG: 20 CAPSULE ORAL at 11:27

## 2022-11-10 RX ADMIN — CLOPIDOGREL BISULFATE 75 MG: 75 TABLET ORAL at 11:29

## 2022-11-10 RX ADMIN — KIT FOR THE PREPARATION OF TECHNETIUM TC 99M ALBUMIN AGGREGATED 1 DOSE: 2.5 INJECTION, POWDER, FOR SOLUTION INTRAVENOUS at 12:22

## 2022-11-10 RX ADMIN — LEVOTHYROXINE SODIUM 50 MCG: 50 TABLET ORAL at 06:17

## 2022-11-10 RX ADMIN — SPIRONOLACTONE 25 MG: 25 TABLET ORAL at 11:27

## 2022-11-10 NOTE — PROGRESS NOTES
Advised to see patient, patient was off floor for test.  Blood sugar log reviewed, she did not receive Humalog this morning.  We will continue current regimen and follow blood sugars.

## 2022-11-10 NOTE — PROGRESS NOTES
"PULMONARY CRITICAL CARE PROGRESS  NOTE      PATIENT IDENTIFICATION:  Name: Laura Perkins  MRN: [id number]  :  1940     Age: 82 y.o.  Sex: female    DATE OF Note:  11/10/2022   Referring Physician: Alejandro Estrada MD                  Subjective:   In bed resting, currently on 2 L, no SOB, no chest or abdominal pain, no bowel or bladder issues reported    Objective:  tMax 24 hrs: Temp (24hrs), Av.4 °F (36.9 °C), Min:97.8 °F (36.6 °C), Max:98.9 °F (37.2 °C)      Vitals Ranges:   Temp:  [97.8 °F (36.6 °C)-98.9 °F (37.2 °C)] 98.3 °F (36.8 °C)  Heart Rate:  [71-92] 92  Resp:  [14-20] 16  BP: (123-165)/(37-61) 139/37    Intake and Output Last 3 Shifts:   I/O last 3 completed shifts:  In: 1320 [P.O.:1320]  Out: 950 [Urine:950]    Exam:  BP (!) 139/37   Pulse 92   Temp 98.3 °F (36.8 °C) (Oral)   Resp 16   Ht 162.6 cm (64\")   Wt 54.8 kg (120 lb 13 oz)   SpO2 98%   BMI 20.74 kg/m²     General Appearance: Alert, sleepy    HEENT:  Normocephalic, without obvious abnormality. Conjunctivae/corneas clear.  Normal external ear canals. Nares normal, no drainage     Neck:  Supple, symmetrical, trachea midline. No JVD.  Lungs /Chest wall:   Bilateral basal rhonchi, respirations unlabored, symmetrical wall movement.     Heart:  Regular rate and rhythm, systolic murmur PMI left sternal border  Abdomen: Soft, nontender, no masses, no organomegaly.    Extremities: Trace edema, no clubbing or cyanosis        Medications:  aspirin, 81 mg, Oral, Daily  budesonide, 0.5 mg, Nebulization, BID - RT  clopidogrel, 75 mg, Oral, Daily  divalproex, 500 mg, Oral, BID  docusate sodium, 100 mg, Oral, BID  empagliflozin, 10 mg, Oral, Daily  ferric gluconate, 250 mg, Intravenous, Daily  [START ON 2022] ferrous sulfate, 324 mg, Oral, Daily With Breakfast  FLUoxetine, 20 mg, Oral, Daily  furosemide, 20 mg, Intravenous, Q12H  guaiFENesin, 600 mg, Oral, Q12H  insulin glargine, 18 Units, Subcutaneous, QAM  insulin lispro, 3-14 Units, " Subcutaneous, TID With Meals  insulin lispro, 6 Units, Subcutaneous, TID With Meals  ipratropium-albuterol, 3 mL, Nebulization, 4x Daily - RT  levothyroxine, 50 mcg, Oral, Daily  losartan, 25 mg, Oral, Q24H  magnesium oxide, 400 mg, Oral, Daily  metoprolol succinate XL, 25 mg, Oral, Q24H  pantoprazole, 40 mg, Oral, QAM  polyethylene glycol, 17 g, Oral, Daily  risperiDONE, 0.5 mg, Oral, Daily  rosuvastatin, 20 mg, Oral, Daily  Scopolamine, 1 patch, Transdermal, Q72H  spironolactone, 25 mg, Oral, Daily        Infusion:        PRN:  •  acetaminophen  •  acetaminophen  •  acetaminophen  •  benzonatate  •  Calcium Gluconate-NaCl **AND** calcium gluconate **AND** Calcium, Ionized  •  dextrose  •  dextrose  •  glucagon (human recombinant)  •  HYDROcodone-acetaminophen  •  LORazepam  •  magnesium sulfate **OR** magnesium sulfate **OR** magnesium sulfate  •  phenol  •  potassium & sodium phosphates **OR** potassium & sodium phosphates  •  potassium chloride  •  potassium chloride  •  [COMPLETED] Insert peripheral IV **AND** sodium chloride  Data Review:  All labs (24hrs):   Recent Results (from the past 24 hour(s))   POC Glucose Once    Collection Time: 11/09/22  1:15 PM    Specimen: Blood   Result Value Ref Range    Glucose 352 (H) 70 - 105 mg/dL   POC Glucose Once    Collection Time: 11/09/22  4:27 PM    Specimen: Blood   Result Value Ref Range    Glucose 94 70 - 105 mg/dL   POC Glucose Once    Collection Time: 11/09/22 11:21 PM    Specimen: Blood   Result Value Ref Range    Glucose 410 (C) 70 - 105 mg/dL   POC Glucose Once    Collection Time: 11/10/22  1:07 AM    Specimen: Blood   Result Value Ref Range    Glucose 318 (H) 70 - 105 mg/dL   D-dimer, Quantitative    Collection Time: 11/10/22  3:48 AM    Specimen: Blood   Result Value Ref Range    D-Dimer, Quantitative 4.15 (H) 0.00 - 0.59 mg/L (FEU)   Vitamin B12    Collection Time: 11/10/22  3:48 AM    Specimen: Blood   Result Value Ref Range    Vitamin B-12 095 211 - 400  pg/mL   Folate    Collection Time: 11/10/22  3:48 AM    Specimen: Blood   Result Value Ref Range    Folate 3.86 (L) 4.78 - 24.20 ng/mL   Ferritin    Collection Time: 11/10/22  3:48 AM    Specimen: Blood   Result Value Ref Range    Ferritin 197.00 (H) 13.00 - 150.00 ng/mL   Haptoglobin    Collection Time: 11/10/22  3:48 AM    Specimen: Blood   Result Value Ref Range    Haptoglobin 399 (H) 30 - 200 mg/dL   Iron Profile    Collection Time: 11/10/22  3:48 AM    Specimen: Blood   Result Value Ref Range    Iron 38 37 - 145 mcg/dL    Iron Saturation 16 (L) 20 - 50 %    Transferrin 160 (L) 200 - 360 mg/dL    TIBC 238 (L) 298 - 536 mcg/dL   Reticulocytes    Collection Time: 11/10/22  3:48 AM    Specimen: Blood   Result Value Ref Range    Reticulocyte % 3.71 (H) 0.70 - 1.90 %    Reticulocyte Absolute 0.1131 0.0200 - 0.1300 10*6/mm3   Basic Metabolic Panel    Collection Time: 11/10/22  3:48 AM    Specimen: Blood   Result Value Ref Range    Glucose 172 (H) 65 - 99 mg/dL    BUN 38 (H) 8 - 23 mg/dL    Creatinine 1.43 (H) 0.57 - 1.00 mg/dL    Sodium 141 136 - 145 mmol/L    Potassium 3.3 (L) 3.5 - 5.2 mmol/L    Chloride 98 98 - 107 mmol/L    CO2 32.0 (H) 22.0 - 29.0 mmol/L    Calcium 8.3 (L) 8.6 - 10.5 mg/dL    BUN/Creatinine Ratio 26.6 (H) 7.0 - 25.0    Anion Gap 11.0 5.0 - 15.0 mmol/L    eGFR 36.5 (L) >60.0 mL/min/1.73   CBC Auto Differential    Collection Time: 11/10/22  3:48 AM    Specimen: Blood   Result Value Ref Range    WBC 7.40 3.40 - 10.80 10*3/mm3    RBC 3.03 (L) 3.77 - 5.28 10*6/mm3    Hemoglobin 7.4 (L) 12.0 - 15.9 g/dL    Hematocrit 23.2 (L) 34.0 - 46.6 %    MCV 76.6 (L) 79.0 - 97.0 fL    MCH 24.4 (L) 26.6 - 33.0 pg    MCHC 31.8 31.5 - 35.7 g/dL    RDW 18.2 (H) 12.3 - 15.4 %    RDW-SD 51.6 37.0 - 54.0 fl    MPV 7.1 6.0 - 12.0 fL    Platelets 429 140 - 450 10*3/mm3   Scan Slide    Collection Time: 11/10/22  3:48 AM    Specimen: Blood   Result Value Ref Range    Scan Slide     Manual Differential    Collection Time:  11/10/22  3:48 AM    Specimen: Blood   Result Value Ref Range    Neutrophil % 70.0 42.7 - 76.0 %    Lymphocyte % 14.0 (L) 19.6 - 45.3 %    Monocyte % 4.0 (L) 5.0 - 12.0 %    Eosinophil % 7.0 (H) 0.3 - 6.2 %    Bands %  5.0 0.0 - 5.0 %    Neutrophils Absolute 5.55 1.70 - 7.00 10*3/mm3    Lymphocytes Absolute 1.04 0.70 - 3.10 10*3/mm3    Monocytes Absolute 0.30 0.10 - 0.90 10*3/mm3    Eosinophils Absolute 0.52 (H) 0.00 - 0.40 10*3/mm3    Anisocytosis Slight/1+ None Seen    Poikilocytes Slight/1+ None Seen    WBC Morphology Normal Normal    Large Platelets Slight/1+ None Seen   POC Glucose Once    Collection Time: 11/10/22  7:18 AM    Specimen: Blood   Result Value Ref Range    Glucose 150 (H) 70 - 105 mg/dL   Duplex Venous Lower Extremity - Bilateral CAR    Collection Time: 11/10/22 10:34 AM   Result Value Ref Range    Target HR (85%) 116 bpm    Max. Pred. HR (100%) 137 bpm    Right Common Femoral Spont Y     Right Common Femoral Competent Y     Right Common Femoral Phasic Y     Right Common Femoral Compress C     Right Common Femoral Augment Y     Right Saphenofemoral Junction Compress C     Right Proximal Femoral Compress C     Right Mid Femoral Spont Y     Right Mid Femoral Competent Y     Right Mid Femoral Phasic Y     Right Mid Femoral Compress C     Right Mid Femoral Augment Y     Right Distal Femoral Compress C     Right Popliteal Spont Y     Right Popliteal Competent Y     Right Popliteal Phasic Y     Right Popliteal Compress C     Right Popliteal Augment Y     Right Posterior Tibial Compress C     Right Peroneal Compress C     Right Gastronemius Compress C     Right Greater Saph AK Compress C     Right Greater Saph BK Compress C     Left Common Femoral Spont Y     Left Common Femoral Competent Y     Left Common Femoral Phasic Y     Left Common Femoral Compress C     Left Common Femoral Augment Y     Left Saphenofemoral Junction Compress C     Left Proximal Femoral Compress C     Left Mid Femoral Spont Y      Left Mid Femoral Competent Y     Left Mid Femoral Phasic Y     Left Mid Femoral Compress C     Left Mid Femoral Augment Y     Left Distal Femoral Compress C     Left Popliteal Spont Y     Left Popliteal Competent Y     Left Popliteal Phasic Y     Left Popliteal Compress C     Left Popliteal Augment Y     Left Posterior Tibial Compress C     Left Peroneal Compress C     Left Gastronemius Compress C     Left Greater Saph AK Compress C     Left Greater Saph BK Compress C    POC Glucose Once    Collection Time: 11/10/22 11:10 AM    Specimen: Blood   Result Value Ref Range    Glucose 339 (H) 70 - 105 mg/dL        Imaging:  Duplex Venous Lower Extremity - Bilateral CAR  •  Normal bilateral lower extremity venous duplex scan.       ASSESSMENT:  AMS  Mixed respiratory and metabolic alkalosis  Bilateral pleural effusion left greater than right  Bilateral pneumonia and possible compressive atelectasis  CVA  Hypertension  Bipolar disorder  Diabetes mellitus type 2  Invasive adenocarcinoma of the colon s/p resection 10/22  CAD s/p stents 10/27          PLAN:  Pleural effusion on CXR appears to be coming back, will monitor   Get CXR in am   Bronchodilators  Inhaled corticosteroids  Oncology following   Diuresis and monitor MARIAN's   Electrolytes/ glycemic control  DNR/DNI  No DVT and GI prophylaxis    Discussed with Dr. Chris Jacobson, ROMAN  11/10/2022  12:21 EST    I personally have examined  and interviewed the patient. I have reviewed the history, data, problems, assessment and plan with our NP.  Total Critical care time in direct medical management (   ) minutes, This time specifically excludes time spent performing procedures.    Reed Perez MD   11/10/2022  19:14 EST

## 2022-11-10 NOTE — PLAN OF CARE
Goal Outcome Evaluation:  Plan of Care Reviewed With: patient        Progress: improving  Outcome Evaluation: Pt has been resting throught the shift without complaints.

## 2022-11-10 NOTE — SIGNIFICANT NOTE
11/10/22 1425   OTHER   Discipline occupational therapist   Rehab Time/Intention   Session Not Performed patient/family declined treatment  (Pt going down for test, will f/u tommorow.)   Therapy Assessment/Plan (PT)   Criteria for Skilled Interventions Met (PT) yes;meets criteria   Recommendation   OT - Next Appointment 11/11/22

## 2022-11-10 NOTE — SIGNIFICANT NOTE
11/10/22 1522   OTHER   Discipline physical therapist   Rehab Time/Intention   Session Not Performed patient unavailable for treatment  (gone for testing at 14:25)   Recommendation   PT - Next Appointment 11/11/22

## 2022-11-10 NOTE — PROGRESS NOTES
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 female presented to [facility] on 10/23/2022 for mental status changes.  She was residing at [facility] and for 2 days she was noticed to be more lethargic and she was not speaking as much.  Her O2 saturations were mildly low and on admission to the ER she had a fever of 102.  She was diagnosed with pneumonia.  White count 13.3, hemoglobin 11.6, MCV 77.6 and platelets 380.  CMP was unremarkable.  Head CT showed no acute abnormality, there were some chronic small vessel ischemia changes.  CT A/P showed no nephrolithiasis.  There was no GI or  obstruction.  Large stool burden was present.  There is no evidence of acute abnormality.  Bibasilar consolidations were seen and subacute moderate compression fractures of L3 and L5 were present without malalignment.  Chest CT was limited due to respiratory motion artifact.  There were moderate to large layering bilateral pleural effusions with compressive atelectasis on both lungs.  Airspace disease was seen in bilateral lower lobes suggesting multifocal pneumonia.  Cardiomegaly with trace pericardial effusion was present as well as coronary artery atherosclerotic vascular disease.  Her troponin was elevated and on 10/27/2022 she underwent a cardiac catheterization by Dr. Miguel.  A drug-eluting stent was placed to the LAD.  TSH 10.81 (0.27-4.2), with history of hypothyroidism.  Follow-up chest CT on 11/3/2022, showed evolving extensive airspace consolidations and new right apex opacity and moderate bilateral pleural effusions.  Anasarca had improved.  On 2022, she underwent a left thoracentesis, pathology showed atypical cells (mesothelial cells versus malignant) and fluid was consistent with  transudate.  Her last CBC on 11/6/2022, showed a white count of 17.6, hemoglobin 8.6, MCV 77.8 and platelets 505.  Chest x-ray showed cardiomegaly left lower lobe opacities and mild pulmonary congestion.  Echocardiogram showed an EF of 45-50% and a small pericardial effusion.  Palliative care has been consulted and the patient is DNR and possibly interested in hospice care.  Additional disciplines consulted include endocrine, pulmonary, psychiatry, cardiology and gastroenterology.  Discharged from [facility] on 10/21/2022 following a 3-day admission for a laparoscopic partial transverse colectomy performed by Dr. PERI Michelle.  She had undergone an EGD/colonoscopy by Dr. Pope as an outpatient on 9/21/22 and was found to have a 5 cm mass in the transverse colon. CT a/p showed an abnormal colon wall thickening in the mid transverse colon with a prominent adjacent mesenteric lymph node.  There was a new L5 fx and L3 fx with some height loss.  The duodenal polyp path showed polypoid peptic duodenitis with prominent Brunner's glands.  A gastric polyp was hyperplastic, foveolar type.  Multiple colon polyps were removed that were tubular adenomas, a single serrated polyp with focal lamina propria spindle cell proliferation, hyperplastic polyp, a low grade dysplastic tubular adenoma and the transverse mass was invasive carcinoma.  Molecular testing revealed the mass to have loss of nuclear expression on IHC in MLH1 and PMS2.  Her 2 Jason was neg (1+).  BRAF mutation was detected codon 600 /D.  She was referred to [facility] but was too sick to be seen at the time of the appt.    Her colectomy was uneventful.  MSI on the tumor was the same. 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  Tumor size was 11 cm with invasion thru the muscularis propria and pericolonic tissue.  Margins were neg for malignancy.  There was no lymphovascular or perineural invasion.  0/20  lymph nodes were involved. Stage IIA (pT3, pN0).         11/09/22  Hematology/Oncology was consulted for her diagnosis of invasive moderately differentiated mucinous adenocarcinoma.       Past Medical History: DM in the past few years.  Bipolar disorder, hypothyroidism, CVA.  Surgical History:Hysterectomy in 1983 for uterine prolapse.    Social History:She lives in [place] with her .  She has been a housewife.  She did not smoke or drink alcohol.   Family History: Her brother had throat cancer.  Allergies: NKA     PCP: Beka Weir MD    INTERVAL HISTORY:  • 11/10/2022- iron 38 (), iron saturation 16 (20-50), TIBC 238 (298-536), ferritin 197 ().  Initiated on Ferrlecit 250 mg IV daily x2.  • 11/10/2022- white blood cell count 7.4, hemoglobin 7.4, platelets 429.  D-dimer 4.15 (< 0.6).  Retic 3.71.  Creatinine 1.43.    History of present illness reviewed since last visit and changes noted on 11/10/22.    Subjective   Reports she feels okay with no complaints.  She was eating with assistance of her .    ROS:  Review of Systems   Constitutional: Negative for activity change, chills, fatigue, fever and unexpected weight change.   HENT: Negative for congestion, dental problem, hearing loss, mouth sores, nosebleeds, sore throat and trouble swallowing.    Eyes: Negative for photophobia and visual disturbance.   Respiratory: Negative for cough, chest tightness and shortness of breath.    Cardiovascular: Negative for chest pain, palpitations and leg swelling.   Gastrointestinal: Negative for abdominal distention, abdominal pain, blood in stool, constipation, diarrhea, nausea and vomiting.   Endocrine: Negative for cold intolerance and heat intolerance.   Genitourinary: Negative for decreased urine volume, difficulty urinating, dysuria, frequency, hematuria and urgency.   Musculoskeletal: Negative for arthralgias and gait problem.   Skin: Negative for rash and wound.   Neurological: Negative  for dizziness, tremors, weakness, light-headedness, numbness and headaches.   Hematological: Negative for adenopathy. Does not bruise/bleed easily.   Psychiatric/Behavioral: Negative for confusion and hallucinations. The patient is not nervous/anxious.    All other systems reviewed and are negative.       MEDICATIONS:    Scheduled Meds:  aspirin, 81 mg, Oral, Daily  budesonide, 0.5 mg, Nebulization, BID - RT  clopidogrel, 75 mg, Oral, Daily  divalproex, 500 mg, Oral, BID  docusate sodium, 100 mg, Oral, BID  empagliflozin, 10 mg, Oral, Daily  ferric gluconate, 250 mg, Intravenous, Daily  [START ON 11/11/2022] ferrous sulfate, 324 mg, Oral, Daily With Breakfast  FLUoxetine, 20 mg, Oral, Daily  furosemide, 20 mg, Intravenous, Q12H  guaiFENesin, 600 mg, Oral, Q12H  insulin glargine, 18 Units, Subcutaneous, QAM  insulin lispro, 3-14 Units, Subcutaneous, TID With Meals  insulin lispro, 6 Units, Subcutaneous, TID With Meals  ipratropium-albuterol, 3 mL, Nebulization, 4x Daily - RT  levothyroxine, 50 mcg, Oral, Daily  losartan, 25 mg, Oral, Q24H  magnesium oxide, 400 mg, Oral, Daily  metoprolol succinate XL, 25 mg, Oral, Q24H  pantoprazole, 40 mg, Oral, QAM  polyethylene glycol, 17 g, Oral, Daily  risperiDONE, 0.5 mg, Oral, Daily  rosuvastatin, 20 mg, Oral, Daily  Scopolamine, 1 patch, Transdermal, Q72H  spironolactone, 25 mg, Oral, Daily       Continuous Infusions:      PRN Meds:  •  acetaminophen  •  acetaminophen  •  acetaminophen  •  benzonatate  •  Calcium Gluconate-NaCl **AND** calcium gluconate **AND** Calcium, Ionized  •  dextrose  •  dextrose  •  glucagon (human recombinant)  •  HYDROcodone-acetaminophen  •  LORazepam  •  magnesium sulfate **OR** magnesium sulfate **OR** magnesium sulfate  •  phenol  •  potassium & sodium phosphates **OR** potassium & sodium phosphates  •  potassium chloride  •  potassium chloride  •  [COMPLETED] Insert peripheral IV **AND** sodium chloride     ALLERGIES:  No Known  "Allergies    Objective    VITALS:   /51 (BP Location: Left arm, Patient Position: Lying)   Pulse 76   Temp 98.6 °F (37 °C) (Axillary)   Resp 14   Ht 162.6 cm (64\")   Wt 54.8 kg (120 lb 13 oz)   SpO2 97%   BMI 20.74 kg/m²     PHYSICAL EXAM:  Physical Exam  Vitals and nursing note reviewed.   Constitutional:       General: She is not in acute distress.     Appearance: Normal appearance. She is well-developed and normal weight. She is not diaphoretic.   HENT:      Head: Normocephalic and atraumatic.      Comments: Alopecia.     Right Ear: External ear normal.      Left Ear: External ear normal.      Nose: Nose normal.      Comments: Nasal cannula O2.     Mouth/Throat:      Mouth: Mucous membranes are moist.      Pharynx: Oropharynx is clear. No oropharyngeal exudate or posterior oropharyngeal erythema.      Comments: Dental fillings.  Eyes:      General: No scleral icterus.     Extraocular Movements: Extraocular movements intact.      Conjunctiva/sclera: Conjunctivae normal.      Pupils: Pupils are equal, round, and reactive to light.   Cardiovascular:      Rate and Rhythm: Normal rate and regular rhythm.      Heart sounds: Normal heart sounds. No murmur heard.     Comments: Cardiac monitor leads.  Pulmonary:      Effort: Pulmonary effort is normal. No respiratory distress.      Breath sounds: Normal breath sounds. No wheezing or rales.   Abdominal:      General: Bowel sounds are normal. There is no distension.      Palpations: Abdomen is soft. There is no mass.      Tenderness: There is no abdominal tenderness. There is no guarding.      Comments: Midline vertical scar above umbilicus.   Genitourinary:     Comments: Deferred   Musculoskeletal:         General: No swelling, tenderness or deformity. Normal range of motion.      Cervical back: Normal range of motion and neck supple.      Right lower leg: No edema.      Left lower leg: No edema.      Comments: Left upper extremity O2 monitor. "   Lymphadenopathy:      Cervical: No cervical adenopathy.      Upper Body:      Right upper body: No supraclavicular adenopathy.      Left upper body: No supraclavicular adenopathy.   Skin:     General: Skin is warm and dry.      Coloration: Skin is not pale.      Findings: No bruising, erythema or rash.      Comments: Right upper extremity IV.   Neurological:      General: No focal deficit present.      Mental Status: She is alert and oriented to person, place, and time.      Coordination: Coordination normal.   Psychiatric:         Mood and Affect: Mood normal.         Behavior: Behavior normal.         Thought Content: Thought content normal.         Judgment: Judgment normal.           RECENT LABS:  Lab Results (last 24 hours)     Procedure Component Value Units Date/Time    Body Fluid Culture - Body Fluid, Pleural Cavity [[id number]] Collected: 11/07/22 1033    Specimen: Body Fluid from Pleural Cavity Updated: 11/10/22 0827     Body Fluid Culture No growth at 3 days     Gram Stain Moderate (3+) WBCs per low power field      No organisms seen    POC Glucose Once [[id number]]  (Abnormal) Collected: 11/10/22 0718    Specimen: Blood Updated: 11/10/22 0719     Glucose 150 mg/dL      Comment: Serial Number: [id number]:  [id number]       Manual Differential [[id number]]  (Abnormal) Collected: 11/10/22 0348    Specimen: Blood Updated: 11/10/22 0437     Neutrophil % 70.0 %      Lymphocyte % 14.0 %      Monocyte % 4.0 %      Eosinophil % 7.0 %      Bands %  5.0 %      Neutrophils Absolute 5.55 10*3/mm3      Lymphocytes Absolute 1.04 10*3/mm3      Monocytes Absolute 0.30 10*3/mm3      Eosinophils Absolute 0.52 10*3/mm3      Anisocytosis Slight/1+     Poikilocytes Slight/1+     WBC Morphology Normal     Large Platelets Slight/1+    CBC & Differential [[id number]]  (Abnormal) Collected: 11/10/22 0348    Specimen: Blood Updated: 11/10/22 0437    Narrative:      The following orders were created for panel order CBC &  Differential.  Procedure                               Abnormality         Status                     ---------                               -----------         ------                     CBC Auto Differential[[id number]]        Abnormal            Final result               Scan Slide[[id number]]                                       Final result                 Please view results for these tests on the individual orders.    Scan Slide [[id number]] Collected: 11/10/22 0348    Specimen: Blood Updated: 11/10/22 0437     Scan Slide --     Comment: See Manual Differential Results       CBC Auto Differential [[id number]]  (Abnormal) Collected: 11/10/22 0348    Specimen: Blood Updated: 11/10/22 0437     WBC 7.40 10*3/mm3      RBC 3.03 10*6/mm3      Hemoglobin 7.4 g/dL      Hematocrit 23.2 %      MCV 76.6 fL      MCH 24.4 pg      MCHC 31.8 g/dL      RDW 18.2 %      RDW-SD 51.6 fl      MPV 7.1 fL      Platelets 429 10*3/mm3     Narrative:      Modified report. Previous result was Hemogram on 11/10/2022 at 0413 EST.  The previously reported component NRBC is no longer being reported. Previous result was 0.1 /100 WBC (Reference Range: 0.0-0.2 /100 WBC) on 11/10/2022 at 0413 EST.    Ferritin [[id number]]  (Abnormal) Collected: 11/10/22 0348    Specimen: Blood Updated: 11/10/22 0427     Ferritin 197.00 ng/mL     Narrative:      Results may be falsely decreased if patient taking Biotin.      Basic Metabolic Panel [[id number]]  (Abnormal) Collected: 11/10/22 0348    Specimen: Blood Updated: 11/10/22 0425     Glucose 172 mg/dL      BUN 38 mg/dL      Creatinine 1.43 mg/dL      Sodium 141 mmol/L      Potassium 3.3 mmol/L      Chloride 98 mmol/L      CO2 32.0 mmol/L      Calcium 8.3 mg/dL      BUN/Creatinine Ratio 26.6     Anion Gap 11.0 mmol/L      eGFR 36.5 mL/min/1.73      Comment: National Kidney Foundation and American Society of Nephrology (ASN) Task Force recommended calculation based on the Chronic Kidney Disease Epidemiology  Collaboration (CKD-EPI) equation refit without adjustment for race.       Narrative:      GFR Normal >60  Chronic Kidney Disease <60  Kidney Failure <15    The GFR formula is only valid for adults with stable renal function between ages 18 and 70.    Iron Profile [[id number]]  (Abnormal) Collected: 11/10/22 0348    Specimen: Blood Updated: 11/10/22 0423     Iron 38 mcg/dL      Iron Saturation 16 %      Transferrin 160 mg/dL      TIBC 238 mcg/dL     D-dimer, Quantitative [[id number]]  (Abnormal) Collected: 11/10/22 0348    Specimen: Blood Updated: 11/10/22 0417     D-Dimer, Quantitative 4.15 mg/L (FEU)     Narrative:      Reference Range  --------------------------------------------------------------------     < 0.50   Negative Predictive Value  0.50-0.59   Indeterminate    >= 0.60   Probable VTE             A very low percentage of patients with DVT may yield D-Dimer results   below the cut-off of 0.50 mg/L FEU.  This is known to be more   prevalent in patients with distal DVT.             Results of this test should always be interpreted in conjunction with   the patient's medical history, clinical presentation and other   findings.  Clinical diagnosis should not be based on the result of   INNOVANCE D-Dimer alone.    Reticulocytes [[id number]]  (Abnormal) Collected: 11/10/22 0348    Specimen: Blood Updated: 11/10/22 0407     Reticulocyte % 3.71 %      Reticulocyte Absolute 0.1131 10*6/mm3     Vitamin B12 [[id number]] Collected: 11/10/22 0348    Specimen: Blood Updated: 11/10/22 0356    Folate [[id number]] Collected: 11/10/22 0348    Specimen: Blood Updated: 11/10/22 0356    Copper, Serum [[id number]] Collected: 11/10/22 0348    Specimen: Blood Updated: 11/10/22 0356    Haptoglobin [[id number]] Collected: 11/10/22 0348    Specimen: Blood Updated: 11/10/22 0356    Protein Elec + Interp, Serum [[id number]] Collected: 11/10/22 0348    Specimen: Blood Updated: 11/10/22 0356    POC Glucose Once [[id number]]  (Abnormal)  Collected: 11/10/22 0107    Specimen: Blood Updated: 11/10/22 0108     Glucose 318 mg/dL      Comment: Serial Number: [id number]:  [id number]       POC Glucose Once [[id number]]  (Abnormal) Collected: 11/09/22 2321    Specimen: Blood Updated: 11/09/22 2322     Glucose 410 mg/dL      Comment: Serial Number: [id number]:  [id number]       POC Glucose Once [[id number]]  (Normal) Collected: 11/09/22 1627    Specimen: Blood Updated: 11/09/22 1628     Glucose 94 mg/dL      Comment: Serial Number: [id number]:  [id number]       POC Glucose Once [[id number]]  (Abnormal) Collected: 11/09/22 1315    Specimen: Blood Updated: 11/09/22 1316     Glucose 352 mg/dL      Comment: Serial Number: [id number]:  [id number]       POC Glucose Once [[id number]]  (Abnormal) Collected: 11/09/22 1119    Specimen: Blood Updated: 11/09/22 1120     Glucose 365 mg/dL      Comment: Serial Number: [id number]:  [id number]             PENDING RESULTS: CEA, haptoglobin, SPEP, copper, vitamin B12, bilateral lower extremity venous Dopplers.    IMAGING REVIEWED:  FL Video Swallow With Speech Single Contrast    Result Date: 11/9/2022  Modified barium swallow study with fluoroscopy. Please refer to the speech pathologist report for findings and dietary recommendations.  Electronically Signed By-Millicent Santos MD On:11/9/2022 10:16 AM This report was finalized on [id number] by  Millicent Santos MD.    XR Chest 1 View    Result Date: 11/9/2022  Cardiomegaly and mild pulmonary vascular congestion, similar to slightly increased in the prior study Patchy opacities at the left lung base may represent atelectasis, asymmetric pulmonary edema or pneumonia. Electronically Signed: Des Duckworth MD 11/9/2022 10:32 EST Workstation ID: [id number]      I have reviewed the patient's labs, imaging, reports, and other clinician documentation.    Assessment & Plan   ASSESSMENT:  1. 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  Given early stage, her age and performance status, observation only is recommended.  Could do genetic evaluation for peoples syndrome as an OP.   2. Leukocytosis-due to sepsis and PNA.    Afebrile and white count has normalized.  3. Chronic microcytic anemia-anemia work-up in progress.  S/p 1 dose of Ferrlecit 125mg on 10/24 and on oral Fe.  On Protonix.  Iron studies confirm BRANDY and additional Ferrlecit given.  Retic appropriately elevated.  4. Acute thrombocytosis-due to BRANDY.  Resolved.  5. H/o CVA an elevated D-dimer-CT head w/o acute stroke.  On ASA and Plavix.    D-dimer elevated.  Will check venous Dopplers.     6. Respiratory failure and PNA-s/p abx.  Per pulmonary.   7. CHF, HTN, HLD, pericardial effusion, S/p PCI x 1, NSTEMI-s/p heart cath.  On dual platelet therapy for 6 months. Per Cardiology.    8. Compression fractures, DM, hypothyroidism, acute renal insufficiency and bipolar disorder-per Endocrine, PMD and psychiatry.  Creatinine elevation 11/10.     PLAN  1. Bilateral lower extremity venous Dopplers.    2. Anemia w/u in progress.   3. We will follow CBC and recommend to transfuse for hemoglobin < 7.  H&H at 1600.  4. Genetic evaluation for peoples syndrome as an OP.   5. Observation with CT's and colonoscopy in 1 year unless patient opts for hospice.   5.   Continue Ferrlecit 250 mg IV x 2, D 2/2..      Pt. seen and evaluated by Dr. Contreras.   Electronically signed by ROMAN Aviles, 11/10/22, 8:58 AM EST.      I have personally performed a face-to-face diagnostic evaluation on this patient. I have performed a complete history and physical examination, reviewed laboratory studies, and radiographic examinations.  I have completed the majority and substantive portion of the medical decision making.  I have formulated the assessment on this patient and the plan of action as  noted above. I have discussed the case with Zahira Ha NP, have edited/reviewed the note, and agree with the care plan.  The patient herself denies any problems and per , she has been eating well.  On examination, she has a healing vertical midline scar above the umbilicus.  Labs are significant for a microcytic anemia.  She is receiving IV iron replacement.  D-dimer is high and hence we will check a venous Dopplers of lower extremities.          I discussed the patient's findings and my recommendations with patient.    Part of this note may be an electronic transcription/translation of spoken language to printed text using the Dragon Dictation System.    Electronically signed by Abdiel Contreras MD, 11/10/22, 6:23 PM EST.

## 2022-11-10 NOTE — CASE MANAGEMENT/SOCIAL WORK
"Continued Stay Note   Dio     Patient Name: Laura Perkins  MRN: [id number]  Today's Date: 11/10/2022    Admit Date: 10/23/2022    Plan: Return to [facility]. no precert needed.   Discharge Plan     Row Name 11/10/22 1709       Plan    Plan Return to [facility]. no precert needed.    Patient/Family in Agreement with Plan yes    Plan Comments Barriers to discharge: per Pulm note, \"pleural effusion appears to be coming back\" CXR in am.              Expected Discharge Date and Time     Expected Discharge Date Expected Discharge Time    Nov 11, 2022         Phone communication or documentation only - no physical contact with patient or family.  Cherie Galvan RN      Office Phone [phone]  Office Cell [phone]    "

## 2022-11-10 NOTE — PROGRESS NOTES
Daily Progress Note    Patient Care Team:  Beka Weir MD as PCP - General (Family Medicine)  Toño Michelle MD as Surgeon (General Surgery)  Abdiel Contreras MD as Consulting Physician (Hematology and Oncology)    Chief Complaint: Follow-up type 2 diabetes    HPI: Patient seen and examined today.  Blood sugar log reviewed.  Blood sugar still high.    ROS:   Constitutional:  Denies fatigue, tiredness.     Respiratory: denies cough, shortness of breath.   Cardiovascular:  denies chest pain, edema   GI:  Denies abdominal pain, nausea, vomiting.         Vitals:    11/09/22 1648   BP: 161/49   Pulse: 87   Resp: 16   Temp: 98.3 °F (36.8 °C)   SpO2: 96%     Body mass index is 21.19 kg/m².    Physical Exam:  GEN: NAD, conversant  CV: RRR  LUNG: CTA      Results Review:     I reviewed the patient's new clinical results.    Magnesium   Date Value Ref Range Status   11/07/2022 2.5 (H) 1.6 - 2.4 mg/dL Final     Lab Results   Component Value Date    HGBA1C 7.4 (H) 10/24/2022    HGBA1C 10.3 (H) 10/27/2020    HGBA1C 8.5 (H) 06/04/2019       Results from last 7 days   Lab Units 11/09/22  1627 11/09/22  1315 11/09/22  1119 11/09/22  0712 11/08/22  2059 11/08/22  1801   GLUCOSE mg/dL 94 352* 365* 146* 151* 309*       G patient seen and examined today.      Medication Review: Reviewed.     aspirin, 81 mg, Oral, Daily  budesonide, 0.5 mg, Nebulization, BID - RT  clopidogrel, 75 mg, Oral, Daily  divalproex, 500 mg, Oral, BID  docusate sodium, 100 mg, Oral, BID  ferric gluconate, 250 mg, Intravenous, Daily  ferrous sulfate, 324 mg, Oral, BID With Meals  FLUoxetine, 20 mg, Oral, Daily  furosemide, 20 mg, Intravenous, Q12H  guaiFENesin, 600 mg, Oral, Q12H  insulin glargine, 18 Units, Subcutaneous, QAM  insulin lispro, 3-14 Units, Subcutaneous, TID With Meals  insulin lispro, 6 Units, Subcutaneous, TID With Meals  ipratropium-albuterol, 3 mL, Nebulization, 4x Daily - RT  levothyroxine, 50 mcg, Oral, Daily  losartan, 25  mg, Oral, Q24H  magnesium oxide, 400 mg, Oral, Daily  metoprolol succinate XL, 25 mg, Oral, Q24H  pantoprazole, 40 mg, Oral, QAM  polyethylene glycol, 17 g, Oral, Daily  risperiDONE, 0.5 mg, Oral, Daily  rosuvastatin, 20 mg, Oral, Daily  Scopolamine, 1 patch, Transdermal, Q72H  spironolactone, 25 mg, Oral, Daily        Assessment and plan:  Diabetes Mellitus type II with hyperglycemia:   Uncontrolled, currently on Lantus 18 units daily with Humalog 6 with each meal.  Also on Humalog sliding scale. Will add Jardiance 10 mg po daily.      Hypothyroidism: Currently on levothyroxine supplementation     Hyperlipidemia: On rosuvastatin.    Ami Singleton MD. FACE

## 2022-11-10 NOTE — PROGRESS NOTES
Cardiology Progress Note    Patient Identification:  Name: Laura Perkins  Age: 82 y.o.  Sex: female  :  1940  MRN: [id number]                 Follow Up / Chief Complaint: Elevated troponin, non stemi   Chief Complaint   Patient presents with   • Altered Mental Status       Interval History:  Patient presented from outlying facility with pneumonia.  Noted to have elevated troponin   Patient underwent cardiac cath 10/27/2022 with stent to proximal LAD.     NP NOTE: patient and  sleeping this morning.  No distress noted.  Normal sinus rhythm on telemetry. D-dimer elevated venous doppler pending     Electronically signed by Rachelle Manning, ROMAN, 11/10/22, 10:39 AM EST.    Cardiology attending addendum :    I have personally performed a face-to-face diagnostic evaluation, physical exam and reviewed data on this patient.  I have reviewed documentation done by me and nurse practitioner  and corrected as needed.  And agree with the different components of documentation.Greater than 50% of the time spent in the care of this patient was provided by attending consultant/me.      Subjective: Patient seen and examined.  Chart reviewed.  Labs reviewed.  Discussed with RN taking care of patient.        Objective: Serial troponin 0.872, 0.653, 0.793, 0.546, 0.373  10/26/2022: troponin 0.300, 0.256  Glucose 136, sodium 148 WBC 13.4, hgb 9.0  10/27/2022: troponin 0.175, WBC 12.9 hgb 8.8 EKG with diffuse T wave abnormalities and QTC prolongation   10/28/2022: troponin 0.136, glucose 215, WBC 12.2   hgb 8.6  10/30/2022: troponin 0.067, pro bnp [id number]  10/31/2022: troponin 0.05, glucose 190, creatinine 0.51  WBC 12.2, hgb 8.4  2022:  Troponin 0.057, glucose 185  2022: no labs to review   2022: Glucose elevated, CBC reveals a white count of 17.6 and hemoglobin of 8.8  2022: Chest x-ray reveals persistent bibasilar airspace disease left greater than right  2022: Glucose elevated, WBC 7.6, hemoglobin  8.6  probnp [id number]  11/9/2022: glucose elevated   11/10/2022: glucose elevated, potassium 3.3, bun 38 creatinine 1.43, D-Dimer 4.15, hgb 7.4       History of Present Illness:        Mrs. Laura Perkins has a Past medical history of      - hypertension  - dyslipidemia   - diabetes   - anemia, GERD  - stroke 2012  - bipolar disorder  - Colon cancer- s/p resection 10/18/2022  - bladder surgery, eye surgery, hysterectomy, hemorrhoidectomy   -  Non smoker  - no allergies  - family history of heart disease in mother         Presented from rehab center to the ED on 10/24/2022 for confusion/alerted mental status.  Patient was recently admitted to [facility] for transverse colon resection on 10/18/2022.  Patient was discharged to rehab. In the ED patient was noted to have fever 102. CXR showed left sided pneumonia and patient was started on IVFs and antibiotics.  Cardiology has been consulted for elevated troponin.   Patient alert and oriented and gives some history but is poor historian and most of information above was obtained from chart review.  She denies any chest pain but does state she is short of breath and coughing.  RN reported patient aspirated and had swallow study now on thickened liquid.      Serial troponins have been elevated but non-trending 0.872, 0.653, 0.793, 0.546, 0.373  EKG showed sinus tachycardia with diffuse T wave inversion that is changed from previous EKG on 10/11/2022 that showed normal sinus rhythm without any ST abnormalities.       Assessment:  :     Acute non-ST elevation MI  Presumed CAD  Hypertension, dyslipidemia, diabetes  Recent colon cancer resection 10/18/2022  Fever, leukocytosis  Altered mental status  Prolonged QTC on psychiatric medications   CAD, NSTEMI, PCI  Acute HFrEF due to systolic dysfunction from ischemic cardiomyopathy        Recommendations / Plan:         Telemetry is revealing sinus rhythm  Monitor renal function and hemoglobin and urine output  proBNP  is improved from 23,855-14,071--14,684--13,684  Continue diuresis as tolerated  Echo is revealing moderate LV dysfunction  Continue to monitor EKG and QTC.  EKG done 10/31/2022 reviewed/interpreted by me reveals sinus tachycardia with rate of 102 bpm with QT of 382 ms and QTC of 493 ms.  Repeat EKG 11/8/2022 reviewed/interpreted by me reveals sinus rhythm with rate of 81 bpm with QT interval of 427 ms and QTC of 480  Increase activity as tolerated  Continue aspirin, clopidogrel, losartan, metoprolol succinate, rosuvastatin as tolerated  Patient has significant improvement in symptoms we will continue diuresis and medical management  Patient had pleural tap and removal of fluid 11/7/2022, will repeat x-ray in a.m. to monitor fluid  Patient's D-dimer is elevated, VQ scan 11/10/2020 shows patchy decrease perfusion in left lung similar to distribution of opacity on chest x-ray consistent with multifocal pneumonia.  Bilateral venous Dopplers 11/10/2022 negative for DVT    Will follow-up as outpatient.  Please call me back if I can be of any further assistance       Copied text in this portion of the note has been reviewed and is accurate as of 11/10/2022    Past Medical History:  Past Medical History:   Diagnosis Date   • Acid reflux    • Anemia    • Anemia    • Bipolar 1 disorder (HCC)    • Colon cancer (HCC)    • Diabetes mellitus (HCC)     Type 2   • Disease of thyroid gland    • Hyperlipidemia    • Hypertension     TAKEN OFF HTN MEDS OVER 5 YEARS AGO   • Incontinence of urine    • Stroke (HCC)     2012     Past Surgical History:  Past Surgical History:   Procedure Laterality Date   • BLADDER SURGERY     • CARDIAC CATHETERIZATION N/A 10/27/2022    Procedure: Left Heart Cath, possible pci;  Surgeon: Andrea Melvin MD;  Location: [facility] CATH INVASIVE LOCATION;  Service: Cardiovascular;  Laterality: N/A;   • COLON RESECTION N/A 10/18/2022    Procedure: COLON RESECTIOn TRANSVERSE LAPAROSCOPIC;  Surgeon:  Toño Michelle MD;  Location: [facility] OR;  Service: General;  Laterality: N/A;   • COLONOSCOPY  09/21/2022    [facility]   • ENDOSCOPY  09/21/2022    [facility]   • EYE SURGERY     • HEMORRHOIDECTOMY     • HYSTERECTOMY          Social History:   Social History     Tobacco Use   • Smoking status: Never   • Smokeless tobacco: Never   Substance Use Topics   • Alcohol use: Never      Family History:  Family History   Problem Relation Age of Onset   • Heart disease Mother    • Diabetes Mother    • No Known Problems Father    • Cancer Brother           Allergies:  No Known Allergies  Scheduled Meds:  aspirin, 81 mg, Daily  budesonide, 0.5 mg, BID - RT  clopidogrel, 75 mg, Daily  divalproex, 500 mg, BID  docusate sodium, 100 mg, BID  empagliflozin, 10 mg, Daily  ferric gluconate, 250 mg, Daily  ferrous sulfate, 324 mg, BID With Meals  FLUoxetine, 20 mg, Daily  furosemide, 20 mg, Q12H  guaiFENesin, 600 mg, Q12H  insulin glargine, 18 Units, QAM  insulin lispro, 3-14 Units, TID With Meals  insulin lispro, 6 Units, TID With Meals  ipratropium-albuterol, 3 mL, 4x Daily - RT  levothyroxine, 50 mcg, Daily  losartan, 25 mg, Q24H  magnesium oxide, 400 mg, Daily  metoprolol succinate XL, 25 mg, Q24H  pantoprazole, 40 mg, QAM  polyethylene glycol, 17 g, Daily  risperiDONE, 0.5 mg, Daily  rosuvastatin, 20 mg, Daily  Scopolamine, 1 patch, Q72H  spironolactone, 25 mg, Daily          Review of Systems:   Review of Systems   Constitutional: Negative for chills and fever.   Cardiovascular: Negative for chest pain and palpitations.   Respiratory: Negative for cough and hemoptysis.    Gastrointestinal: Negative for nausea and vomiting.            Constitutional:  Temp:  [97.8 °F (36.6 °C)-98.9 °F (37.2 °C)] 98.6 °F (37 °C)  Heart Rate:  [] 76  Resp:  [14-20] 14  BP: (123-165)/(46-61) 130/51    Physical Exam   /51 (BP Location: Left arm, Patient Position: Lying)   Pulse 76   Temp 98.6 °F (37  "°C) (Axillary)   Resp 14   Ht 162.6 cm (64\")   Wt 54.8 kg (120 lb 13 oz)   SpO2 97%   BMI 20.74 kg/m²   General:  Appears in no acute distress- frail and chronically ill   Eyes: Sclera is anicteric,  conjunctiva is clear   HEENT:  No JVD. Thyroid not visibly enlarged. No mucosal pallor or cyanosis  Respiratory: Respirations regular and unlabored at rest.  Scattered rhonchi left base  Cardiovascular: S1,S2 Regular rate and rhythm. No murmur, rub or gallop auscultated.  . No pretibial pitting edema  Gastrointestinal: Abdomen nondistended.  Musculoskeletal:  No abnormal movements  Extremities: No digital clubbing or cyanosis  Skin: Color pink. Skin warm and dry to touch. No rashes  No xanthoma  Neuro: Alert and awake, no lateralizing deficits appreciated    INTAKE AND OUTPUT:    Intake/Output Summary (Last 24 hours) at 11/10/2022 0737  Last data filed at 11/10/2022 0500  Gross per 24 hour   Intake 960 ml   Output 300 ml   Net 660 ml       Cardiographics  Telemetry: sinus rhythm     ECG:   ECG 12 Lead QT Measurement   Final Result   HEART RATE= 81  bpm   RR Interval= 772  ms   KY Interval= 142  ms   P Horizontal Axis= -11  deg   P Front Axis= 28  deg   QRSD Interval= 96  ms   QT Interval= 427  ms   QRS Axis= 29  deg   T Wave Axis= 195  deg   - ABNORMAL ECG -   Sinus rhythm   Atrial premature complex   Abnormal T, suspect prox. LAD occlu. or CVA   When compared with ECG of 05-Nov-2022 9:56:04,   Significant repolarization change   Electronically Signed By: Skyler García ([facility]) 08-Nov-2022 18:06:10   Date and Time of Study: 2022-11-08 10:37:22      ECG 12 Lead QT Measurement   Final Result   HEART RATE= 74  bpm   RR Interval= 812  ms   KY Interval= 115  ms   P Horizontal Axis= -74  deg   P Front Axis= -14  deg   QRSD Interval= 92  ms   QT Interval= 516  ms   QRS Axis= 25  deg   T Wave Axis= 196  deg   - ABNORMAL ECG -   Sinus rhythm   Abnrm T, probable ischemia, anterolateral lds   Prolonged QT interval   When " compared with ECG of 03-Nov-2022 8:17:18,   Nonspecific significant change   Electronically Signed By: Jackie Mercado ([facility]) 08-Nov-2022 13:45:20   Date and Time of Study: 2022-11-05 09:56:04      ECG 12 Lead QT Measurement   Final Result   HEART RATE= 76  bpm   RR Interval= 808  ms   WI Interval= 155  ms   P Horizontal Axis= -14  deg   P Front Axis= 53  deg   QRSD Interval= 96  ms   QT Interval= 477  ms   QRS Axis= 19  deg   T Wave Axis= 183  deg   - ABNORMAL ECG -   Sinus rhythm   Atrial premature complex   Abnrm T, consider ischemia, anterolateral lds   Prolonged QT interval   When compared with ECG of 31-Oct-2022 8:53:04,   Significant rate decrease   Significant repolarization change   Electronically Signed By: Andrea Melvin ([facility]) 06-Nov-2022 14:45:24   Date and Time of Study: 2022-11-03 08:17:18      ECG 12 Lead QT Measurement   Final Result   HEART RATE= 102  bpm   RR Interval= 600  ms   WI Interval= 157  ms   P Horizontal Axis= -38  deg   P Front Axis= 23  deg   QRSD Interval= 89  ms   QT Interval= 382  ms   QRS Axis= 28  deg   T Wave Axis= 209  deg   - ABNORMAL ECG -   Sinus tachycardia   Atrial premature complexes   Nonspecific T abnormalities, diffuse leads   When compared with ECG of 29-Oct-2022 16:41:12,   No significant change   Electronically Signed By: Andrea Melvin ([facility]) 06-Nov-2022 14:45:34   Date and Time of Study: 2022-10-31 08:53:04      ECG 12 Lead Other; on psych medications that prolong qtc   Final Result   HEART RATE= 92  bpm   RR Interval= 656  ms   WI Interval= 154  ms   P Horizontal Axis= -11  deg   P Front Axis= 45  deg   QRSD Interval= 90  ms   QT Interval= 360  ms   QRS Axis= 20  deg   T Wave Axis= 193  deg   - ABNORMAL ECG -   Sinus rhythm   Atrial premature complex   Abnrm T, consider ischemia, anterolateral lds   When compared with ECG of 29-Oct-2022 5:08:11,   Nonspecific significant change   Electronically Signed By: Reyes Cooper ([facility]) 03-Nov-2022 11:14:28    Date and Time of Study: 2022-10-29 16:41:12      ECG 12 Lead QT Measurement   Final Result   HEART RATE= 78  bpm   RR Interval= 772  ms   CT Interval= 139  ms   P Horizontal Axis= 4  deg   P Front Axis= 42  deg   QRSD Interval= 87  ms   QT Interval= 422  ms   QRS Axis= 32  deg   T Wave Axis= 212  deg   - ABNORMAL ECG -   Sinus rhythm   Abnormal T, consider ischemia, diffuse leads   When compared with ECG of 28-Oct-2022 5:26:46,   New or worsened ischemia or infarction   Significant repolarization change   Electronically Signed By: Reyes Cooper ([facility]) 03-Nov-2022 10:47:40   Date and Time of Study: 2022-10-29 05:08:11      SCANNED - TELEMETRY     Final Result      ECG 12 Lead   Final Result   HEART RATE= 78  bpm   RR Interval= 772  ms   CT Interval= 129  ms   P Horizontal Axis= -38  deg   P Front Axis= 2  deg   QRSD Interval= 90  ms   QT Interval= 469  ms   QRS Axis= 37  deg   T Wave Axis= 232  deg   - ABNORMAL ECG -   Sinus rhythm   Abnormal T, suspect prox. LAD occlu. or CVA   Prolonged QT interval   When compared with ECG of 27-Oct-2022 10:26:26,   Significant repolarization change   Electronically Signed By: Andrea Melvin ([facility]) 06-Nov-2022 14:45:45   Date and Time of Study: 2022-10-28 05:26:46      ECG 12 Lead QT Measurement   Final Result   HEART RATE= 97  bpm   RR Interval= 620  ms   CT Interval= 154  ms   P Horizontal Axis= 1  deg   P Front Axis= 43  deg   QRSD Interval= 92  ms   QT Interval= 474  ms   QRS Axis= 32  deg   T Wave Axis= 217  deg   - ABNORMAL ECG -   Sinus rhythm   Abnormal T, probable ischemia, widespread   Prolonged QT interval   When compared with ECG of 23-Oct-2022 19:11:40,   Significant change in rhythm   Electronically Signed By: Skyler García ([facility]) 28-Oct-2022 10:11:01   Date and Time of Study: 2022-10-27 10:26:26      ECG 12 Lead   Final Result   HEART RATE= 121  bpm   RR Interval= 496  ms   CT Interval= 96  ms   P Horizontal Axis=   deg   P Front Axis= 206  deg   QRSD  Interval= 77  ms   QT Interval= 382  ms   QRS Axis= 37  deg   T Wave Axis= 209  deg   - ABNORMAL ECG -   Sinus or ectopic atrial tachycardia   Abnormal T, consider ischemia, diffuse leads   Prolonged QT interval   When compared with ECG of 11-Oct-2022 14:13:27,   Significant change in rhythm: previously sinus   New or worsened ischemia or infarction   Electronically Signed By: Marino Montes ([facility]) 24-Oct-2022 14:20:30   Date and Time of Study: 2022-10-23 19:11:40      SCANNED - TELEMETRY     Final Result      SCANNED - TELEMETRY     Final Result      SCANNED - TELEMETRY     Final Result      SCANNED - TELEMETRY     Final Result      SCANNED - TELEMETRY     Final Result      SCANNED - TELEMETRY     Final Result      SCANNED - TELEMETRY     Final Result      SCANNED - TELEMETRY     Final Result      SCANNED - TELEMETRY     Final Result      SCANNED - TELEMETRY     Final Result      SCANNED - TELEMETRY     Final Result      SCANNED - TELEMETRY     Final Result      SCANNED - TELEMETRY     Final Result      SCANNED - TELEMETRY     Final Result      SCANNED - TELEMETRY     Final Result      SCANNED - TELEMETRY     Final Result      SCANNED - TELEMETRY     Final Result      SCANNED - TELEMETRY     Final Result      SCANNED - TELEMETRY     Final Result      SCANNED - TELEMETRY     Final Result      SCANNED - TELEMETRY     Final Result      SCANNED - TELEMETRY     Final Result      SCANNED - TELEMETRY     Final Result      SCANNED - TELEMETRY     Final Result      SCANNED - TELEMETRY     Final Result      SCANNED - TELEMETRY     Final Result      SCANNED - TELEMETRY     Final Result      SCANNED - TELEMETRY     Final Result      SCANNED - TELEMETRY     Final Result      SCANNED - TELEMETRY     Final Result      SCANNED - TELEMETRY     Final Result      SCANNED - TELEMETRY     Final Result      SCANNED - TELEMETRY     Final Result      SCANNED - TELEMETRY     Final Result      SCANNED - TELEMETRY     Final Result      SCANNED  - TELEMETRY     Final Result      SCANNED - TELEMETRY     Final Result      SCANNED - TELEMETRY     Final Result      SCANNED - TELEMETRY     Final Result      SCANNED - TELEMETRY     Final Result      SCANNED - TELEMETRY     Final Result      SCANNED - TELEMETRY     Final Result      SCANNED - TELEMETRY     Final Result      SCANNED - TELEMETRY     Final Result      SCANNED - TELEMETRY     Final Result      SCANNED - TELEMETRY     Final Result      SCANNED - TELEMETRY     Final Result      SCANNED - TELEMETRY     Final Result      SCANNED - TELEMETRY     Final Result      SCANNED - TELEMETRY     Final Result      SCANNED - TELEMETRY     Final Result      SCANNED - TELEMETRY     Final Result      SCANNED - TELEMETRY     Final Result      SCANNED - TELEMETRY     Final Result      SCANNED - TELEMETRY     Final Result      SCANNED - TELEMETRY     Final Result      SCANNED - TELEMETRY     Final Result      SCANNED - TELEMETRY     Final Result      SCANNED - TELEMETRY     Final Result      SCANNED - TELEMETRY     Final Result      SCANNED - TELEMETRY     Final Result      SCANNED - TELEMETRY     Final Result      SCANNED - TELEMETRY     Final Result      SCANNED - TELEMETRY     Final Result      SCANNED - TELEMETRY     Final Result      SCANNED - TELEMETRY     Final Result      SCANNED - TELEMETRY     Final Result      SCANNED - TELEMETRY     Final Result      SCANNED - TELEMETRY     Final Result      SCANNED - TELEMETRY     Final Result      SCANNED - TELEMETRY     Final Result      SCANNED - TELEMETRY     Final Result      SCANNED - TELEMETRY     Final Result      SCANNED - TELEMETRY     Final Result      SCANNED - TELEMETRY     Final Result      SCANNED - TELEMETRY     Final Result      SCANNED - TELEMETRY     Final Result      SCANNED - TELEMETRY     Final Result      SCANNED - TELEMETRY     Final Result      SCANNED - TELEMETRY     Final Result      SCANNED - TELEMETRY     Final Result      SCANNED - TELEMETRY      Final Result      SCANNED - TELEMETRY     Final Result      ECG 12 Lead QT Measurement    (Results Pending)     I have personally reviewed EKG    Echocardiogram: Results for orders placed during the hospital encounter of 10/23/22    Adult Transthoracic Echo Limited W/ Cont if Necessary Per Protocol    Interpretation Summary  Normal LV size.  Mild apical hypokinesis seen.  Estimated LV ejection fraction of 45 to 50%  Normal RV size  Normal atrial size  Aortic valve, mitral valve, tricuspid valve appears structurally normal, no significant regurgitation seen.  Small pericardial effusion seen.  No signs of increased intrapericardial pressure  Proximal aorta appears normal in size.      Lab Review   I have reviewed the labs      Results from last 7 days   Lab Units 11/07/22  0439   MAGNESIUM mg/dL 2.5*     Results from last 7 days   Lab Units 11/10/22  0348   SODIUM mmol/L 141   POTASSIUM mmol/L 3.3*   BUN mg/dL 38*   CREATININE mg/dL 1.43*   CALCIUM mg/dL 8.3*         Results from last 7 days   Lab Units 11/10/22  0348 11/06/22  0745 11/05/22  0819   WBC 10*3/mm3 7.40 17.60* 13.80*   HEMOGLOBIN g/dL 7.4* 8.6* 8.9*   HEMATOCRIT % 23.2* 29.0* 30.2*   PLATELETS 10*3/mm3 429 505* 499*     Results from last 7 days   Lab Units 11/04/22  0821   INR  1.08       RADIOLOGY:  Imaging Results (Last 24 Hours)     Procedure Component Value Units Date/Time    XR Chest 1 View [[id number]] Collected: 11/09/22 1026     Updated: 11/09/22 1034    Narrative:      XR CHEST 1 VW    Date of Exam: 11/9/2022 5:22 EST    Indication: Shortness of breath.    Comparison Exams: 11/8/2022 and prior    FINDINGS:  Study is limited by overlying support and monitoring apparatus.    There is stable cardiomegaly. Persistent moderate pulmonary vascular congestion    Increased patchy opacity noted at the lung bases left greater than right similar to the prior study. No pneumothorax noted. There are small subpleural effusion suspected. No  Tubes are noted.  Osseous structures are unremarkable.      Impression:      Cardiomegaly and mild pulmonary vascular congestion, similar to slightly increased in the prior study    Patchy opacities at the left lung base may represent atelectasis, asymmetric pulmonary edema or pneumonia.    Electronically Signed: Des Duckworth MD 11/9/2022 10:32 EST Workstation ID: [id number]    FL Video Swallow With Speech Single Contrast [[id number]] Collected: 11/09/22 1014     Updated: 11/09/22 1018    Narrative:      DATE OF EXAM:  11/9/2022 10:03 AM     PROCEDURE:  FL VIDEO SWALLOW W SPEECH SINGLE-CONTRAST-     INDICATIONS:  dysphagia; I21.4-Non-ST elevation (NSTEMI) myocardial infarction;  R41.82-Altered mental status, unspecified; R50.9-Fever, unspecified;  J18.9-Pneumonia, unspecified organism; R77.8-Other specified  abnormalities of plasma proteins; E11.9-Type 2 diabetes mellitus without  complications; Z79.4-Long term (current) use of insulin;  E78.5-Hyperlipidemia, unspecified     COMPARISON:  Modified barium swallow study 10/24/2022.     TECHNIQUE:   This examination was performed in conjunction with speech pathology.  Lateral video fluoroscopic evaluation of the swallowing mechanism was  performed while correlate administering to the patient various  consistency food items mixed with barium.     Fluoroscopic Time: 0.9 minutes        Number of Images: 10 spot fluoroscopic series images        FINDINGS:  Modified barium swallow study was performed utilizing fluoroscopy. The  study was performed by a dedicated speech pathologist. I was present  during the entire examination.        Impression:      Modified barium swallow study with fluoroscopy. Please refer to the  speech pathologist report for findings and dietary recommendations.     Electronically Signed By-Millicent Santos MD On:11/9/2022 10:16 AM  This report was finalized on [id number] by  Millicent Santos MD.                )11/10/2022  Andrea Melvin MD      EMR  "Dragon/Transcription:   \"Dictated utilizing Dragon dictation\".   "

## 2022-11-10 NOTE — PLAN OF CARE
Goal Outcome Evaluation:      Pleasant and cooperative with care. Spouse at bedside attentive to patient. Doppler lower extremities, VQ scan ordered for d-dimer of 4.15. Stable throughout the shift. Continuing to monitor.

## 2022-11-11 ENCOUNTER — APPOINTMENT (OUTPATIENT)
Dept: GENERAL RADIOLOGY | Facility: HOSPITAL | Age: 82
End: 2022-11-11

## 2022-11-11 ENCOUNTER — APPOINTMENT (OUTPATIENT)
Dept: CARDIOLOGY | Facility: HOSPITAL | Age: 82
End: 2022-11-11

## 2022-11-11 LAB
ALBUMIN SERPL ELPH-MCNC: 1.7 G/DL (ref 2.9–4.4)
ALBUMIN/GLOB SERPL: 0.5 {RATIO} (ref 0.7–1.7)
ALPHA1 GLOB SERPL ELPH-MCNC: 0.4 G/DL (ref 0–0.4)
ALPHA2 GLOB SERPL ELPH-MCNC: 0.9 G/DL (ref 0.4–1)
ANION GAP SERPL CALCULATED.3IONS-SCNC: 10 MMOL/L (ref 5–15)
B-GLOBULIN SERPL ELPH-MCNC: 1.1 G/DL (ref 0.7–1.3)
BACTERIA UR QL AUTO: ABNORMAL /HPF
BASOPHILS # BLD AUTO: 0.1 10*3/MM3 (ref 0–0.2)
BASOPHILS NFR BLD AUTO: 0.5 % (ref 0–1.5)
BH CV UPPER VENOUS LEFT INTERNAL JUGULAR AUGMENT: NORMAL
BH CV UPPER VENOUS LEFT INTERNAL JUGULAR COMPRESS: NORMAL
BH CV UPPER VENOUS LEFT INTERNAL JUGULAR PHASIC: NORMAL
BH CV UPPER VENOUS LEFT INTERNAL JUGULAR SPONT: NORMAL
BH CV UPPER VENOUS LEFT SUBCLAVIAN AUGMENT: NORMAL
BH CV UPPER VENOUS LEFT SUBCLAVIAN COMPRESS: NORMAL
BH CV UPPER VENOUS LEFT SUBCLAVIAN PHASIC: NORMAL
BH CV UPPER VENOUS LEFT SUBCLAVIAN SPONT: NORMAL
BH CV UPPER VENOUS RIGHT AXILLARY AUGMENT: NORMAL
BH CV UPPER VENOUS RIGHT AXILLARY COMPRESS: NORMAL
BH CV UPPER VENOUS RIGHT AXILLARY PHASIC: NORMAL
BH CV UPPER VENOUS RIGHT AXILLARY SPONT: NORMAL
BH CV UPPER VENOUS RIGHT BASILIC FOREARM COMPRESS: NORMAL
BH CV UPPER VENOUS RIGHT BASILIC UPPER COMPRESS: NORMAL
BH CV UPPER VENOUS RIGHT BRACHIAL COMPRESS: NORMAL
BH CV UPPER VENOUS RIGHT CEPHALIC FOREARM COMPRESS: NORMAL
BH CV UPPER VENOUS RIGHT CEPHALIC UPPER COMPRESS: NORMAL
BH CV UPPER VENOUS RIGHT INTERNAL JUGULAR AUGMENT: NORMAL
BH CV UPPER VENOUS RIGHT INTERNAL JUGULAR COMPRESS: NORMAL
BH CV UPPER VENOUS RIGHT INTERNAL JUGULAR PHASIC: NORMAL
BH CV UPPER VENOUS RIGHT INTERNAL JUGULAR SPONT: NORMAL
BH CV UPPER VENOUS RIGHT RADIAL COMPRESS: NORMAL
BH CV UPPER VENOUS RIGHT SUBCLAVIAN AUGMENT: NORMAL
BH CV UPPER VENOUS RIGHT SUBCLAVIAN COMPRESS: NORMAL
BH CV UPPER VENOUS RIGHT SUBCLAVIAN PHASIC: NORMAL
BH CV UPPER VENOUS RIGHT SUBCLAVIAN SPONT: NORMAL
BH CV UPPER VENOUS RIGHT ULNAR COMPRESS: NORMAL
BILIRUB UR QL STRIP: NEGATIVE
BUN SERPL-MCNC: 36 MG/DL (ref 8–23)
BUN/CREAT SERPL: 33 (ref 7–25)
CALCIUM SPEC-SCNC: 8.3 MG/DL (ref 8.6–10.5)
CHLORIDE SERPL-SCNC: 102 MMOL/L (ref 98–107)
CLARITY UR: ABNORMAL
CO2 SERPL-SCNC: 30 MMOL/L (ref 22–29)
COLOR UR: YELLOW
CREAT SERPL-MCNC: 1.09 MG/DL (ref 0.57–1)
DEPRECATED RDW RBC AUTO: 52.5 FL (ref 37–54)
EGFRCR SERPLBLD CKD-EPI 2021: 50.5 ML/MIN/1.73
EOSINOPHIL # BLD AUTO: 0.2 10*3/MM3 (ref 0–0.4)
EOSINOPHIL NFR BLD AUTO: 1.3 % (ref 0.3–6.2)
ERYTHROCYTE [DISTWIDTH] IN BLOOD BY AUTOMATED COUNT: 18.4 % (ref 12.3–15.4)
GAMMA GLOB SERPL ELPH-MCNC: 1.1 G/DL (ref 0.4–1.8)
GLOBULIN SER CALC-MCNC: 3.6 G/DL (ref 2.2–3.9)
GLUCOSE BLDC GLUCOMTR-MCNC: 109 MG/DL (ref 70–105)
GLUCOSE BLDC GLUCOMTR-MCNC: 252 MG/DL (ref 70–105)
GLUCOSE BLDC GLUCOMTR-MCNC: 382 MG/DL (ref 70–105)
GLUCOSE SERPL-MCNC: 91 MG/DL (ref 65–99)
GLUCOSE UR STRIP-MCNC: ABNORMAL MG/DL
HCT VFR BLD AUTO: 23.8 % (ref 34–46.6)
HGB BLD-MCNC: 7.4 G/DL (ref 12–15.9)
HGB UR QL STRIP.AUTO: NEGATIVE
HYALINE CASTS UR QL AUTO: ABNORMAL /LPF
KETONES UR QL STRIP: NEGATIVE
LABORATORY COMMENT REPORT: ABNORMAL
LEUKOCYTE ESTERASE UR QL STRIP.AUTO: ABNORMAL
LYMPHOCYTES # BLD AUTO: 1.5 10*3/MM3 (ref 0.7–3.1)
LYMPHOCYTES NFR BLD AUTO: 7.9 % (ref 19.6–45.3)
M PROTEIN SERPL ELPH-MCNC: ABNORMAL G/DL
MAXIMAL PREDICTED HEART RATE: 137 BPM
MCH RBC QN AUTO: 23.8 PG (ref 26.6–33)
MCHC RBC AUTO-ENTMCNC: 31 G/DL (ref 31.5–35.7)
MCV RBC AUTO: 76.8 FL (ref 79–97)
MONOCYTES # BLD AUTO: 0.7 10*3/MM3 (ref 0.1–0.9)
MONOCYTES NFR BLD AUTO: 3.6 % (ref 5–12)
NEUTROPHILS NFR BLD AUTO: 16.8 10*3/MM3 (ref 1.7–7)
NEUTROPHILS NFR BLD AUTO: 86.7 % (ref 42.7–76)
NITRITE UR QL STRIP: NEGATIVE
NRBC BLD AUTO-RTO: 0 /100 WBC (ref 0–0.2)
PH UR STRIP.AUTO: 7 [PH] (ref 5–8)
PLATELET # BLD AUTO: 472 10*3/MM3 (ref 140–450)
PMV BLD AUTO: 7 FL (ref 6–12)
POTASSIUM SERPL-SCNC: 4.1 MMOL/L (ref 3.5–5.2)
PROT PATTERN SERPL ELPH-IMP: ABNORMAL
PROT SERPL-MCNC: 5.3 G/DL (ref 6–8.5)
PROT UR QL STRIP: NEGATIVE
RBC # BLD AUTO: 3.1 10*6/MM3 (ref 3.77–5.28)
RBC # UR STRIP: ABNORMAL /HPF
REF LAB TEST METHOD: ABNORMAL
SODIUM SERPL-SCNC: 142 MMOL/L (ref 136–145)
SP GR UR STRIP: 1.02 (ref 1–1.03)
SQUAMOUS #/AREA URNS HPF: ABNORMAL /HPF
STRESS TARGET HR: 116 BPM
UROBILINOGEN UR QL STRIP: ABNORMAL
WBC # UR STRIP: ABNORMAL /HPF
WBC NRBC COR # BLD: 19.3 10*3/MM3 (ref 3.4–10.8)
YEAST URNS QL MICRO: ABNORMAL /HPF

## 2022-11-11 PROCEDURE — 94799 UNLISTED PULMONARY SVC/PX: CPT

## 2022-11-11 PROCEDURE — 63710000001 INSULIN LISPRO (HUMAN) PER 5 UNITS: Performed by: INTERNAL MEDICINE

## 2022-11-11 PROCEDURE — 97530 THERAPEUTIC ACTIVITIES: CPT

## 2022-11-11 PROCEDURE — 80048 BASIC METABOLIC PNL TOTAL CA: CPT | Performed by: NURSE PRACTITIONER

## 2022-11-11 PROCEDURE — 94664 DEMO&/EVAL PT USE INHALER: CPT

## 2022-11-11 PROCEDURE — 85025 COMPLETE CBC W/AUTO DIFF WBC: CPT | Performed by: NURSE PRACTITIONER

## 2022-11-11 PROCEDURE — 99232 SBSQ HOSP IP/OBS MODERATE 35: CPT | Performed by: INTERNAL MEDICINE

## 2022-11-11 PROCEDURE — 25010000002 FUROSEMIDE PER 20 MG: Performed by: INTERNAL MEDICINE

## 2022-11-11 PROCEDURE — 87040 BLOOD CULTURE FOR BACTERIA: CPT | Performed by: INTERNAL MEDICINE

## 2022-11-11 PROCEDURE — 63710000001 INSULIN GLARGINE PER 5 UNITS: Performed by: INTERNAL MEDICINE

## 2022-11-11 PROCEDURE — 94761 N-INVAS EAR/PLS OXIMETRY MLT: CPT

## 2022-11-11 PROCEDURE — 82962 GLUCOSE BLOOD TEST: CPT

## 2022-11-11 PROCEDURE — 81001 URINALYSIS AUTO W/SCOPE: CPT | Performed by: INTERNAL MEDICINE

## 2022-11-11 PROCEDURE — 94760 N-INVAS EAR/PLS OXIMETRY 1: CPT

## 2022-11-11 PROCEDURE — 71045 X-RAY EXAM CHEST 1 VIEW: CPT

## 2022-11-11 PROCEDURE — 97535 SELF CARE MNGMENT TRAINING: CPT

## 2022-11-11 PROCEDURE — 93971 EXTREMITY STUDY: CPT

## 2022-11-11 RX ORDER — FOLIC ACID 1 MG/1
1 TABLET ORAL DAILY
Status: DISCONTINUED | OUTPATIENT
Start: 2022-11-11 | End: 2022-11-15 | Stop reason: HOSPADM

## 2022-11-11 RX ADMIN — INSULIN LISPRO 6 UNITS: 100 INJECTION, SOLUTION INTRAVENOUS; SUBCUTANEOUS at 08:29

## 2022-11-11 RX ADMIN — ACETAMINOPHEN 650 MG: 325 TABLET, FILM COATED ORAL at 14:48

## 2022-11-11 RX ADMIN — INSULIN LISPRO 8 UNITS: 100 INJECTION, SOLUTION INTRAVENOUS; SUBCUTANEOUS at 12:08

## 2022-11-11 RX ADMIN — FLUOXETINE 20 MG: 20 CAPSULE ORAL at 08:29

## 2022-11-11 RX ADMIN — LOSARTAN POTASSIUM 25 MG: 25 TABLET, FILM COATED ORAL at 08:29

## 2022-11-11 RX ADMIN — BUDESONIDE 0.5 MG: 0.5 INHALANT RESPIRATORY (INHALATION) at 06:49

## 2022-11-11 RX ADMIN — DOCUSATE SODIUM 100 MG: 100 CAPSULE, LIQUID FILLED ORAL at 08:28

## 2022-11-11 RX ADMIN — INSULIN LISPRO 12 UNITS: 100 INJECTION, SOLUTION INTRAVENOUS; SUBCUTANEOUS at 17:48

## 2022-11-11 RX ADMIN — ACETAMINOPHEN 650 MG: 325 TABLET, FILM COATED ORAL at 20:54

## 2022-11-11 RX ADMIN — GUAIFENESIN 600 MG: 600 TABLET, EXTENDED RELEASE ORAL at 20:54

## 2022-11-11 RX ADMIN — ASPIRIN 81 MG: 81 TABLET, COATED ORAL at 08:28

## 2022-11-11 RX ADMIN — DIVALPROEX SODIUM 500 MG: 500 TABLET, DELAYED RELEASE ORAL at 20:54

## 2022-11-11 RX ADMIN — IPRATROPIUM BROMIDE AND ALBUTEROL SULFATE 3 ML: .5; 3 SOLUTION RESPIRATORY (INHALATION) at 19:55

## 2022-11-11 RX ADMIN — FUROSEMIDE 20 MG: 10 INJECTION, SOLUTION INTRAMUSCULAR; INTRAVENOUS at 04:19

## 2022-11-11 RX ADMIN — INSULIN LISPRO 6 UNITS: 100 INJECTION, SOLUTION INTRAVENOUS; SUBCUTANEOUS at 12:07

## 2022-11-11 RX ADMIN — HYDROCODONE BITARTRATE AND ACETAMINOPHEN 1 TABLET: 7.5; 325 TABLET ORAL at 06:04

## 2022-11-11 RX ADMIN — FUROSEMIDE 20 MG: 10 INJECTION, SOLUTION INTRAMUSCULAR; INTRAVENOUS at 14:48

## 2022-11-11 RX ADMIN — INSULIN LISPRO 6 UNITS: 100 INJECTION, SOLUTION INTRAVENOUS; SUBCUTANEOUS at 17:47

## 2022-11-11 RX ADMIN — FERROUS SULFATE TAB EC 324 MG (65 MG FE EQUIVALENT) 324 MG: 324 (65 FE) TABLET DELAYED RESPONSE at 08:29

## 2022-11-11 RX ADMIN — LEVOTHYROXINE SODIUM 50 MCG: 50 TABLET ORAL at 06:03

## 2022-11-11 RX ADMIN — EMPAGLIFLOZIN 10 MG: 10 TABLET, FILM COATED ORAL at 08:28

## 2022-11-11 RX ADMIN — FOLIC ACID 1 MG: 1 TABLET ORAL at 08:42

## 2022-11-11 RX ADMIN — IPRATROPIUM BROMIDE AND ALBUTEROL SULFATE 3 ML: .5; 3 SOLUTION RESPIRATORY (INHALATION) at 06:49

## 2022-11-11 RX ADMIN — METOPROLOL SUCCINATE 25 MG: 25 TABLET, FILM COATED, EXTENDED RELEASE ORAL at 08:28

## 2022-11-11 RX ADMIN — INSULIN GLARGINE 18 UNITS: 100 INJECTION, SOLUTION SUBCUTANEOUS at 06:08

## 2022-11-11 RX ADMIN — POLYETHYLENE GLYCOL 3350 17 G: 17 POWDER, FOR SOLUTION ORAL at 08:29

## 2022-11-11 RX ADMIN — SCOPALAMINE 1 PATCH: 1 PATCH, EXTENDED RELEASE TRANSDERMAL at 10:11

## 2022-11-11 RX ADMIN — GUAIFENESIN 600 MG: 600 TABLET, EXTENDED RELEASE ORAL at 08:29

## 2022-11-11 RX ADMIN — PANTOPRAZOLE SODIUM 40 MG: 40 TABLET, DELAYED RELEASE ORAL at 06:03

## 2022-11-11 RX ADMIN — MAGNESIUM OXIDE TAB 400 MG (241.3 MG ELEMENTAL MG) 400 MG: 400 (241.3 MG) TAB at 08:28

## 2022-11-11 RX ADMIN — ROSUVASTATIN 20 MG: 10 TABLET, FILM COATED ORAL at 20:54

## 2022-11-11 RX ADMIN — BUDESONIDE 0.5 MG: 0.5 INHALANT RESPIRATORY (INHALATION) at 19:57

## 2022-11-11 RX ADMIN — CLOPIDOGREL BISULFATE 75 MG: 75 TABLET ORAL at 08:28

## 2022-11-11 RX ADMIN — IPRATROPIUM BROMIDE AND ALBUTEROL SULFATE 3 ML: .5; 3 SOLUTION RESPIRATORY (INHALATION) at 16:02

## 2022-11-11 RX ADMIN — POTASSIUM CHLORIDE 40 MEQ: 1500 TABLET, EXTENDED RELEASE ORAL at 02:11

## 2022-11-11 RX ADMIN — SPIRONOLACTONE 25 MG: 25 TABLET ORAL at 08:28

## 2022-11-11 RX ADMIN — DIVALPROEX SODIUM 500 MG: 500 TABLET, DELAYED RELEASE ORAL at 08:29

## 2022-11-11 RX ADMIN — RISPERIDONE 0.5 MG: 0.25 TABLET ORAL at 08:29

## 2022-11-11 NOTE — PLAN OF CARE
Goal Outcome Evaluation:  Laura Perkins presents with ADL impairments below baseline abilities which indicate the need for continued skilled intervention while inpatient. Pt t/f from supine to sitting on EOB with MOD-MAX A.  Pt required MOD A to maintain EOBsitting due to R lateral lean noted.  Pt completed LBD task of donning pull up with MAX A, pt required DEP to don socks.  Pt completed sit to stand transfers using RW with MOD A x 2.  Pt completed short distance functional mobility to chair using Rw with MOD A x 2.  Pt completed combing hair with MAX A.Tolerating session today without incident. Will continue to follow and progress as tolerated.

## 2022-11-11 NOTE — PROGRESS NOTES
Cardiology Progress Note    Patient Identification:  Name: Laura Perkins  Age: 82 y.o.  Sex: female  :  1940  MRN: [id number]                 Follow Up / Chief Complaint: Elevated troponin, non stemi   Chief Complaint   Patient presents with   • Altered Mental Status       Interval History:  Patient presented from outlying facility with pneumonia.  Noted to have elevated troponin   Patient underwent cardiac cath 10/27/2022 with stent to proximal LAD.     NP NOTE: patient seen, no distress noted.  Sleeping intermittently sitting up in bed.  Patient denies any chest pain. Feeling a little better. VQ scan negative for PE, shows likely multifocal pneumonia, venous dopplers negative. Continue diuresis     Electronically signed by Rachelle Manning, ROMAN, 22, 10:49 AM EST.    Cardiology attending addendum :    I have personally performed a face-to-face diagnostic evaluation, physical exam and reviewed data on this patient.  I have reviewed documentation done by me and nurse practitioner  and corrected as needed.  And agree with the different components of documentation.Greater than 50% of the time spent in the care of this patient was provided by attending consultant/me.            Subjective: Patient seen and examined.  Chart reviewed.  Labs reviewed.  Discussed with RN taking care of patient.        Objective: Serial troponin 0.872, 0.653, 0.793, 0.546, 0.373  10/26/2022: troponin 0.300, 0.256  Glucose 136, sodium 148 WBC 13.4, hgb 9.0  10/27/2022: troponin 0.175, WBC 12.9 hgb 8.8 EKG with diffuse T wave abnormalities and QTC prolongation   10/28/2022: troponin 0.136, glucose 215, WBC 12.2   hgb 8.6  10/30/2022: troponin 0.067, pro bnp 11181  10/31/2022: troponin 0.05, glucose 190, creatinine 0.51  WBC 12.2, hgb 8.4  2022:  Troponin 0.057, glucose 185  2022: no labs to review   2022: Glucose elevated, CBC reveals a white count of 17.6 and hemoglobin of 8.8  2022: Chest x-ray reveals  persistent bibasilar airspace disease left greater than right  11/8/2022: Glucose elevated, WBC 7.6, hemoglobin 8.6  probnp 82380  11/9/2022: glucose elevated   11/10/2022: glucose elevated, potassium 3.3, bun 38 creatinine 1.43, D-Dimer 4.15, hgb 7.4   11/11/2022: potassium 4.1, bun 36 creatinine 1.09 WBC 19.3 hgb 7.4 plts 472      History of Present Illness:        Mrs. Laura Perkins has a Past medical history of      - hypertension  - dyslipidemia   - diabetes   - anemia, GERD  - stroke 2012  - bipolar disorder  - Colon cancer- s/p resection 10/18/2022  - bladder surgery, eye surgery, hysterectomy, hemorrhoidectomy   -  Non smoker  - no allergies  - family history of heart disease in mother         Presented from rehab center to the ED on 10/24/2022 for confusion/alerted mental status.  Patient was recently admitted to [facility] for transverse colon resection on 10/18/2022.  Patient was discharged to rehab. In the ED patient was noted to have fever 102. CXR showed left sided pneumonia and patient was started on IVFs and antibiotics.  Cardiology has been consulted for elevated troponin.   Patient alert and oriented and gives some history but is poor historian and most of information above was obtained from chart review.  She denies any chest pain but does state she is short of breath and coughing.  RN reported patient aspirated and had swallow study now on thickened liquid.      Serial troponins have been elevated but non-trending 0.872, 0.653, 0.793, 0.546, 0.373  EKG showed sinus tachycardia with diffuse T wave inversion that is changed from previous EKG on 10/11/2022 that showed normal sinus rhythm without any ST abnormalities.       Assessment:  :     Acute non-ST elevation MI  Presumed CAD  Hypertension, dyslipidemia, diabetes  Recent colon cancer resection 10/18/2022  Fever, leukocytosis  Altered mental status  Prolonged QTC on psychiatric medications   CAD, NSTEMI, PCI  Acute HFrEF due to  systolic dysfunction from ischemic cardiomyopathy        Recommendations / Plan:         Telemetry is revealing sinus rhythm  Monitor renal function and hemoglobin and urine output  proBNP is improved from 23,855-14,071--14,684--13,684  Continue diuresis as tolerated  Echo is revealing moderate LV dysfunction  Continue to monitor EKG and QTC.  EKG done 10/31/2022 reviewed/interpreted by me reveals sinus tachycardia with rate of 102 bpm with QT of 382 ms and QTC of 493 ms.  Repeat EKG 11/8/2022 reviewed/interpreted by me reveals sinus rhythm with rate of 81 bpm with QT interval of 427 ms and QTC of 480  Increase activity as tolerated  Continue aspirin, clopidogrel, losartan, metoprolol succinate, rosuvastatin as tolerated  Patient has significant improvement in symptoms we will continue diuresis and medical management  Patient had pleural tap and removal of fluid 11/7/2022, will repeat x-ray in a.m. to monitor fluid  Patient's D-dimer is elevated, VQ scan 11/10/2020 shows patchy decrease perfusion in left lung similar to distribution of opacity on chest x-ray consistent with multifocal pneumonia.  Bilateral venous Dopplers 11/10/2022 negative for DVT    Patient is getting better.  Will follow-up as needed please call me back if I can be of assistance.  Discussed with  by bedside       Copied text in this portion of the note has been reviewed and is accurate as of 11/11/2022    Past Medical History:  Past Medical History:   Diagnosis Date   • Acid reflux    • Anemia    • Anemia    • Bipolar 1 disorder (HCC)    • Colon cancer (HCC)    • Diabetes mellitus (HCC)     Type 2   • Disease of thyroid gland    • Hyperlipidemia    • Hypertension     TAKEN OFF HTN MEDS OVER 5 YEARS AGO   • Incontinence of urine    • Stroke (HCC)     2012     Past Surgical History:  Past Surgical History:   Procedure Laterality Date   • BLADDER SURGERY     • CARDIAC CATHETERIZATION N/A 10/27/2022    Procedure: Left Heart Cath, possible  pci;  Surgeon: Andrea Melvin MD;  Location: [facility] CATH INVASIVE LOCATION;  Service: Cardiovascular;  Laterality: N/A;   • COLON RESECTION N/A 10/18/2022    Procedure: COLON RESECTIOn TRANSVERSE LAPAROSCOPIC;  Surgeon: Toño Michelle MD;  Location: [facility] OR;  Service: General;  Laterality: N/A;   • COLONOSCOPY  09/21/2022    [facility]   • ENDOSCOPY  09/21/2022    [facility]   • EYE SURGERY     • HEMORRHOIDECTOMY     • HYSTERECTOMY          Social History:   Social History     Tobacco Use   • Smoking status: Never   • Smokeless tobacco: Never   Substance Use Topics   • Alcohol use: Never      Family History:  Family History   Problem Relation Age of Onset   • Heart disease Mother    • Diabetes Mother    • No Known Problems Father    • Cancer Brother           Allergies:  No Known Allergies  Scheduled Meds:  aspirin, 81 mg, Daily  budesonide, 0.5 mg, BID - RT  clopidogrel, 75 mg, Daily  divalproex, 500 mg, BID  docusate sodium, 100 mg, BID  empagliflozin, 10 mg, Daily  ferrous sulfate, 324 mg, Daily With Breakfast  FLUoxetine, 20 mg, Daily  folic acid, 1 mg, Daily  furosemide, 20 mg, Q12H  guaiFENesin, 600 mg, Q12H  insulin glargine, 18 Units, QAM  insulin lispro, 3-14 Units, TID With Meals  insulin lispro, 6 Units, TID With Meals  ipratropium-albuterol, 3 mL, 4x Daily - RT  levothyroxine, 50 mcg, Daily  losartan, 25 mg, Q24H  magnesium oxide, 400 mg, Daily  metoprolol succinate XL, 25 mg, Q24H  pantoprazole, 40 mg, QAM  polyethylene glycol, 17 g, Daily  risperiDONE, 0.5 mg, Daily  rosuvastatin, 20 mg, Daily  Scopolamine, 1 patch, Q72H  spironolactone, 25 mg, Daily          Review of Systems:   Review of Systems   Constitutional: Negative for chills and fever.   Cardiovascular: Negative for chest pain and palpitations.   Respiratory: Negative for cough and hemoptysis.    Gastrointestinal: Negative for nausea and vomiting.            Constitutional:  Temp:  [98.3 °F  "(36.8 °C)-100.4 °F (38 °C)] 100.4 °F (38 °C)  Heart Rate:  [] 91  Resp:  [14-24] 18  BP: (112-158)/(37-65) 126/46    Physical Exam   /46 (BP Location: Left arm, Patient Position: Lying)   Pulse 91   Temp 100.4 °F (38 °C) (Axillary)   Resp 18   Ht 162.6 cm (64\")   Wt 54.8 kg (120 lb 13 oz)   SpO2 100%   BMI 20.74 kg/m²   General:  Appears in no acute distress- frail and chronically ill   Eyes: Sclera is anicteric,  conjunctiva is clear   HEENT:  No JVD. Thyroid not visibly enlarged. No mucosal pallor or cyanosis  Respiratory: Respirations regular and unlabored at rest.  Scattered rhonchi left base  Cardiovascular: S1,S2 Regular rate and rhythm. No murmur, rub or gallop auscultated.  . No pretibial pitting edema  Gastrointestinal: Abdomen nondistended.  Musculoskeletal:  No abnormal movements  Extremities: No digital clubbing or cyanosis  Skin: Color pink. Skin warm and dry to touch. No rashes  No xanthoma  Neuro: Alert and awake, no lateralizing deficits appreciated    INTAKE AND OUTPUT:    Intake/Output Summary (Last 24 hours) at 11/11/2022 1433  Last data filed at 11/11/2022 1427  Gross per 24 hour   Intake 1440 ml   Output --   Net 1440 ml       Cardiographics  Telemetry: sinus rhythm     ECG:   ECG 12 Lead QT Measurement   Final Result   HEART RATE= 81  bpm   RR Interval= 772  ms   IA Interval= 142  ms   P Horizontal Axis= -11  deg   P Front Axis= 28  deg   QRSD Interval= 96  ms   QT Interval= 427  ms   QRS Axis= 29  deg   T Wave Axis= 195  deg   - ABNORMAL ECG -   Sinus rhythm   Atrial premature complex   Abnormal T, suspect prox. LAD occlu. or CVA   When compared with ECG of 05-Nov-2022 9:56:04,   Significant repolarization change   Electronically Signed By: Skyler García ([facility]) 08-Nov-2022 18:06:10   Date and Time of Study: 2022-11-08 10:37:22      ECG 12 Lead QT Measurement   Final Result   HEART RATE= 74  bpm   RR Interval= 812  ms   IA Interval= 115  ms   P Horizontal Axis= -74  deg   P " Front Axis= -14  deg   QRSD Interval= 92  ms   QT Interval= 516  ms   QRS Axis= 25  deg   T Wave Axis= 196  deg   - ABNORMAL ECG -   Sinus rhythm   Abnrm T, probable ischemia, anterolateral lds   Prolonged QT interval   When compared with ECG of 03-Nov-2022 8:17:18,   Nonspecific significant change   Electronically Signed By: Jackie Mercado ([facility]) 08-Nov-2022 13:45:20   Date and Time of Study: 2022-11-05 09:56:04      ECG 12 Lead QT Measurement   Final Result   HEART RATE= 76  bpm   RR Interval= 808  ms   CO Interval= 155  ms   P Horizontal Axis= -14  deg   P Front Axis= 53  deg   QRSD Interval= 96  ms   QT Interval= 477  ms   QRS Axis= 19  deg   T Wave Axis= 183  deg   - ABNORMAL ECG -   Sinus rhythm   Atrial premature complex   Abnrm T, consider ischemia, anterolateral lds   Prolonged QT interval   When compared with ECG of 31-Oct-2022 8:53:04,   Significant rate decrease   Significant repolarization change   Electronically Signed By: Andrea Melvin ([facility]) 06-Nov-2022 14:45:24   Date and Time of Study: 2022-11-03 08:17:18      ECG 12 Lead QT Measurement   Final Result   HEART RATE= 102  bpm   RR Interval= 600  ms   CO Interval= 157  ms   P Horizontal Axis= -38  deg   P Front Axis= 23  deg   QRSD Interval= 89  ms   QT Interval= 382  ms   QRS Axis= 28  deg   T Wave Axis= 209  deg   - ABNORMAL ECG -   Sinus tachycardia   Atrial premature complexes   Nonspecific T abnormalities, diffuse leads   When compared with ECG of 29-Oct-2022 16:41:12,   No significant change   Electronically Signed By: Andrea Melvin ([facility]) 06-Nov-2022 14:45:34   Date and Time of Study: 2022-10-31 08:53:04      ECG 12 Lead Other; on psych medications that prolong qtc   Final Result   HEART RATE= 92  bpm   RR Interval= 656  ms   CO Interval= 154  ms   P Horizontal Axis= -11  deg   P Front Axis= 45  deg   QRSD Interval= 90  ms   QT Interval= 360  ms   QRS Axis= 20  deg   T Wave Axis= 193  deg   - ABNORMAL ECG -   Sinus rhythm   Atrial  premature complex   Abnrm T, consider ischemia, anterolateral lds   When compared with ECG of 29-Oct-2022 5:08:11,   Nonspecific significant change   Electronically Signed By: Reyes Cooper ([facility]) 03-Nov-2022 11:14:28   Date and Time of Study: 2022-10-29 16:41:12      ECG 12 Lead QT Measurement   Final Result   HEART RATE= 78  bpm   RR Interval= 772  ms   SD Interval= 139  ms   P Horizontal Axis= 4  deg   P Front Axis= 42  deg   QRSD Interval= 87  ms   QT Interval= 422  ms   QRS Axis= 32  deg   T Wave Axis= 212  deg   - ABNORMAL ECG -   Sinus rhythm   Abnormal T, consider ischemia, diffuse leads   When compared with ECG of 28-Oct-2022 5:26:46,   New or worsened ischemia or infarction   Significant repolarization change   Electronically Signed By: Reyes Cooper ([facility]) 03-Nov-2022 10:47:40   Date and Time of Study: 2022-10-29 05:08:11      SCANNED - TELEMETRY     Final Result      ECG 12 Lead   Final Result   HEART RATE= 78  bpm   RR Interval= 772  ms   SD Interval= 129  ms   P Horizontal Axis= -38  deg   P Front Axis= 2  deg   QRSD Interval= 90  ms   QT Interval= 469  ms   QRS Axis= 37  deg   T Wave Axis= 232  deg   - ABNORMAL ECG -   Sinus rhythm   Abnormal T, suspect prox. LAD occlu. or CVA   Prolonged QT interval   When compared with ECG of 27-Oct-2022 10:26:26,   Significant repolarization change   Electronically Signed By: Andrea Melvin ([facility]) 06-Nov-2022 14:45:45   Date and Time of Study: 2022-10-28 05:26:46      ECG 12 Lead QT Measurement   Final Result   HEART RATE= 97  bpm   RR Interval= 620  ms   SD Interval= 154  ms   P Horizontal Axis= 1  deg   P Front Axis= 43  deg   QRSD Interval= 92  ms   QT Interval= 474  ms   QRS Axis= 32  deg   T Wave Axis= 217  deg   - ABNORMAL ECG -   Sinus rhythm   Abnormal T, probable ischemia, widespread   Prolonged QT interval   When compared with ECG of 23-Oct-2022 19:11:40,   Significant change in rhythm   Electronically Signed By: Skyler García ([facility])  28-Oct-2022 10:11:01   Date and Time of Study: 2022-10-27 10:26:26      ECG 12 Lead   Final Result   HEART RATE= 121  bpm   RR Interval= 496  ms   CT Interval= 96  ms   P Horizontal Axis=   deg   P Front Axis= 206  deg   QRSD Interval= 77  ms   QT Interval= 382  ms   QRS Axis= 37  deg   T Wave Axis= 209  deg   - ABNORMAL ECG -   Sinus or ectopic atrial tachycardia   Abnormal T, consider ischemia, diffuse leads   Prolonged QT interval   When compared with ECG of 11-Oct-2022 14:13:27,   Significant change in rhythm: previously sinus   New or worsened ischemia or infarction   Electronically Signed By: Marino Montes) 24-Oct-2022 14:20:30   Date and Time of Study: 2022-10-23 19:11:40      SCANNED - TELEMETRY     Final Result      SCANNED - TELEMETRY     Final Result      SCANNED - TELEMETRY     Final Result      SCANNED - TELEMETRY     Final Result      SCANNED - TELEMETRY     Final Result      SCANNED - TELEMETRY     Final Result      SCANNED - TELEMETRY     Final Result      SCANNED - TELEMETRY     Final Result      SCANNED - TELEMETRY     Final Result      SCANNED - TELEMETRY     Final Result      SCANNED - TELEMETRY     Final Result      SCANNED - TELEMETRY     Final Result      SCANNED - TELEMETRY     Final Result      SCANNED - TELEMETRY     Final Result      SCANNED - TELEMETRY     Final Result      SCANNED - TELEMETRY     Final Result      SCANNED - TELEMETRY     Final Result      SCANNED - TELEMETRY     Final Result      SCANNED - TELEMETRY     Final Result      SCANNED - TELEMETRY     Final Result      SCANNED - TELEMETRY     Final Result      SCANNED - TELEMETRY     Final Result      SCANNED - TELEMETRY     Final Result      SCANNED - TELEMETRY     Final Result      SCANNED - TELEMETRY     Final Result      SCANNED - TELEMETRY     Final Result      SCANNED - TELEMETRY     Final Result      SCANNED - TELEMETRY     Final Result      SCANNED - TELEMETRY     Final Result      SCANNED - TELEMETRY     Final  Result      SCANNED - TELEMETRY     Final Result      SCANNED - TELEMETRY     Final Result      SCANNED - TELEMETRY     Final Result      SCANNED - TELEMETRY     Final Result      SCANNED - TELEMETRY     Final Result      SCANNED - TELEMETRY     Final Result      SCANNED - TELEMETRY     Final Result      SCANNED - TELEMETRY     Final Result      SCANNED - TELEMETRY     Final Result      SCANNED - TELEMETRY     Final Result      SCANNED - TELEMETRY     Final Result      SCANNED - TELEMETRY     Final Result      SCANNED - TELEMETRY     Final Result      SCANNED - TELEMETRY     Final Result      SCANNED - TELEMETRY     Final Result      SCANNED - TELEMETRY     Final Result      SCANNED - TELEMETRY     Final Result      SCANNED - TELEMETRY     Final Result      SCANNED - TELEMETRY     Final Result      SCANNED - TELEMETRY     Final Result      SCANNED - TELEMETRY     Final Result      SCANNED - TELEMETRY     Final Result      SCANNED - TELEMETRY     Final Result      SCANNED - TELEMETRY     Final Result      SCANNED - TELEMETRY     Final Result      SCANNED - TELEMETRY     Final Result      SCANNED - TELEMETRY     Final Result      SCANNED - TELEMETRY     Final Result      SCANNED - TELEMETRY     Final Result      SCANNED - TELEMETRY     Final Result      SCANNED - TELEMETRY     Final Result      SCANNED - TELEMETRY     Final Result      SCANNED - TELEMETRY     Final Result      SCANNED - TELEMETRY     Final Result      SCANNED - TELEMETRY     Final Result      SCANNED - TELEMETRY     Final Result      SCANNED - TELEMETRY     Final Result      SCANNED - TELEMETRY     Final Result      SCANNED - TELEMETRY     Final Result      SCANNED - TELEMETRY     Final Result      SCANNED - TELEMETRY     Final Result      SCANNED - TELEMETRY     Final Result      SCANNED - TELEMETRY     Final Result      SCANNED - TELEMETRY     Final Result      SCANNED - TELEMETRY     Final Result      SCANNED - TELEMETRY     Final Result       SCANNED - TELEMETRY     Final Result      SCANNED - TELEMETRY     Final Result      SCANNED - TELEMETRY     Final Result      SCANNED - TELEMETRY     Final Result      SCANNED - TELEMETRY     Final Result      SCANNED - TELEMETRY     Final Result      SCANNED - TELEMETRY     Final Result      SCANNED - TELEMETRY     Final Result      SCANNED - TELEMETRY     Final Result      SCANNED - TELEMETRY     Final Result      SCANNED - TELEMETRY     Final Result      SCANNED - TELEMETRY     Final Result      SCANNED - TELEMETRY     Final Result      SCANNED - TELEMETRY     Final Result      SCANNED - TELEMETRY     Final Result      SCANNED - TELEMETRY     Final Result      SCANNED - TELEMETRY     Final Result      ECG 12 Lead QT Measurement    (Results Pending)     I have personally reviewed EKG    Echocardiogram: Results for orders placed during the hospital encounter of 10/23/22    Adult Transthoracic Echo Limited W/ Cont if Necessary Per Protocol    Interpretation Summary  Normal LV size.  Mild apical hypokinesis seen.  Estimated LV ejection fraction of 45 to 50%  Normal RV size  Normal atrial size  Aortic valve, mitral valve, tricuspid valve appears structurally normal, no significant regurgitation seen.  Small pericardial effusion seen.  No signs of increased intrapericardial pressure  Proximal aorta appears normal in size.      Lab Review   I have reviewed the labs      Results from last 7 days   Lab Units 11/07/22  0439   MAGNESIUM mg/dL 2.5*     Results from last 7 days   Lab Units 11/11/22  0416   SODIUM mmol/L 142   POTASSIUM mmol/L 4.1   BUN mg/dL 36*   CREATININE mg/dL 1.09*   CALCIUM mg/dL 8.3*         Results from last 7 days   Lab Units 11/11/22  0615 11/10/22  1629 11/10/22  0348 11/06/22  0745   WBC 10*3/mm3 19.30*  --  7.40 17.60*   HEMOGLOBIN g/dL 7.4* 7.4* 7.4* 8.6*   HEMATOCRIT % 23.8* 23.6* 23.2* 29.0*   PLATELETS 10*3/mm3 472*  --  429 505*           RADIOLOGY:  Imaging Results (Last 24 Hours)      "Procedure Component Value Units Date/Time    XR Chest 1 View [[id number]] Collected: 11/11/22 0742     Updated: 11/11/22 0745    Narrative:      DATE OF EXAM:  11/11/2022 3:01 AM     PROCEDURE:  XR CHEST 1 VW-     INDICATIONS:  sob; I21.4-Non-ST elevation (NSTEMI) myocardial infarction;  R41.82-Altered mental status, unspecified; R50.9-Fever, unspecified;  J18.9-Pneumonia, unspecified organism; R77.8-Other specified  abnormalities of plasma proteins; E11.9-Type 2 diabetes mellitus without  complications; Z79.4-Long term (current) use of insulin;  E78.5-Hyperlipidemia, unspecified     COMPARISON:  11/9/2022     TECHNIQUE:   Single radiographic AP view of the chest was obtained.     FINDINGS:  Heart size and pulmonary vasculature stable. Right lung remains grossly  clear other than some atelectasis in the base. No significant change in  airspace disease in the lower left lung with small left pleural  effusion. No new abnormality        Impression:      Stable chest demonstrate cardiomegaly with pulmonary vascular  congestion, and left basilar airspace disease with small left pleural  effusion     Electronically Signed By-Db Argueta On:11/11/2022 7:43 AM  This report was finalized on [id number] by  Db Argueta, .                )11/11/2022  MD YARA Dunham/Transcription:   \"Dictated utilizing Dragon dictation\".   "

## 2022-11-11 NOTE — PLAN OF CARE
"Assessment: Laura Perkins presents with functional mobility impairments which indicate the need for skilled intervention. Pt requiring frequent cuing to promote arousal and to open eyes during mobility tasks. Upon coming to sitting at EOB pt demo's L lateral lean and requiring VC and mod a to maintain midline posture in sitting. Pt performed STS x2 at EOB requiring mod a x2 with FWW. Pt demo's poor static balance initially upon standing.Tolerates standing < 30sec before impulsively returning to seated EOB.  Pt requiring mod/max a x2 for bed mobility supine to EOB. Mod a x2 with FWW to perform EOB to recliner by taking 3-4 steps. Tolerating session today without incident. Will continue to follow and progress as tolerated.     Plan/Recommendations:   Moderate Intensity Therapy recommended post-acute care. This is recommended as therapy feels the patient would require 3-4 days per week and wouldn't tolerate \"3 hour daily\" rehab intensity. SNF would be the preferred choice. If the patient does not agree to SNF, arrange HH or OP depending on home bound status. If patient is medically complex, consider LTACH.. Pt requires no DME at discharge.     Pt desires Skilled Rehab placement at discharge. Pt cooperative; agreeable to therapeutic recommendations and plan of care.   "

## 2022-11-11 NOTE — THERAPY TREATMENT NOTE
"Subjective: Pt agreeable to therapeutic plan of care.  Cognition: oriented to Person and Place    Objective:     Bed Mobility: Mod-A and Max-A   Functional Transfers: Mod-A, Assist x 2 and with rolling walker  Functional Ambulation: Mod-A, Assist x 2 and with rolling walker    Lower Body Dressing: Max-A, Dependent and with rolling walker  ADL Position: edge of bed sitting and supported standing  ADL Comments: donning socks and pull up    Grooming: Max-A  ADL Position: supported sitting  ADL Comments: combing hair    Vitals: WNL    Pain: 2 VAS  Location: R arm  Interventions for pain: Repositioned  Education: Provided education on the importance of mobility in the acute care setting and Transfer Training    Assessment: Laura Perkins presents with ADL impairments below baseline abilities which indicate the need for continued skilled intervention while inpatient. Pt t/f from supine to sitting on EOB with MOD-MAX A.  Pt required MOD A to maintain EOBsitting due to R lateral lean noted.  Pt completed LBD task of donning pull up with MAX A, pt required DEP to don socks.  Pt completed sit to stand transfers using RW with MOD A x 2.  Pt completed short distance functional mobility to chair using Rw with MOD A x 2.  Pt completed combing hair with MAX A.Tolerating session today without incident. Will continue to follow and progress as tolerated.     Plan/Recommendations:   Moderate Intensity Therapy recommended post-acute care. This is recommended as therapy feels the patient would require 3-4 days per week and wouldn't tolerate \"3 hour daily\" rehab intensity. SNF would be the preferred choice. If the patient does not agree to SNF, arrange HH or OP depending on home bound status. If patient is medically complex, consider LTACH.. Pt requires no DME at discharge.     Pt desires Skilled Rehab placement at discharge. Pt cooperative; agreeable to therapeutic recommendations and plan of care.     Modified Dajuan: N/A = No pre-op " stroke/TIA    Post-Tx Position: Up in Chair, Alarms activated and Call light and personal items within reach  PPE: gloves and surgical mask

## 2022-11-11 NOTE — PROGRESS NOTES
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 female presented to [facility] ER on 10/23/2022 for mental status changes.  She was residing at [facility] and for 2 days she was noticed to be more lethargic and she was not speaking as much.  Her O2 saturations were mildly low and on admission to the ER she had a fever of 102.  She was diagnosed with pneumonia.  White count 13.3, hemoglobin 11.6, MCV 77.6 and platelets 380.  CMP was unremarkable.  Head CT showed no acute abnormality, there were some chronic small vessel ischemia changes.  CT A/P showed no nephrolithiasis.  There was no GI or  obstruction.  Large stool burden was present.  There is no evidence of acute abnormality.  Bibasilar consolidations were seen and subacute moderate compression fractures of L3 and L5 were present without malalignment.  Chest CT was limited due to respiratory motion artifact.  There were moderate to large layering bilateral pleural effusions with compressive atelectasis on both lungs.  Airspace disease was seen in bilateral lower lobes suggesting multifocal pneumonia.  Cardiomegaly with trace pericardial effusion was present as well as coronary artery atherosclerotic vascular disease.  Her troponin was elevated and on 10/27/2022 she underwent a cardiac catheterization by Dr. Miguel.  A drug-eluting stent was placed to the LAD.  TSH 10.81 (0.27-4.2), with history of hypothyroidism.  Follow-up chest CT on 11/3/2022, showed evolving extensive airspace consolidations and new right apex opacity and moderate bilateral pleural effusions.  Anasarca had improved.  On 2022, she underwent a left thoracentesis, pathology showed atypical cells (mesothelial cells versus malignant) and fluid was consistent with  transudate.  Her last CBC on 11/6/2022, showed a white count of 17.6, hemoglobin 8.6, MCV 77.8 and platelets 505.  Chest x-ray showed cardiomegaly left lower lobe opacities and mild pulmonary congestion.  Echocardiogram showed an EF of 45-50% and a small pericardial effusion.  Palliative care has been consulted and the patient is DNR and possibly interested in hospice care.  Additional disciplines consulted include endocrine, pulmonary, psychiatry, cardiology and gastroenterology.  Discharged from [facility] on 10/21/2022 following a 3-day admission for a laparoscopic partial transverse colectomy performed by Dr. PERI Michelle.  She had undergone an EGD/colonoscopy by Dr. Pope as an outpatient on 9/21/22 and was found to have a 5 cm mass in the transverse colon. CT a/p showed an abnormal colon wall thickening in the mid transverse colon with a prominent adjacent mesenteric lymph node.  There was a new L5 fx and L3 fx with some height loss.  The duodenal polyp path showed polypoid peptic duodenitis with prominent Brunner's glands.  A gastric polyp was hyperplastic, foveolar type.  Multiple colon polyps were removed that were tubular adenomas, a single serrated polyp with focal lamina propria spindle cell proliferation, hyperplastic polyp, a low grade dysplastic tubular adenoma and the transverse mass was invasive carcinoma.  Molecular testing revealed the mass to have loss of nuclear expression on IHC in MLH1 and PMS2.  Her 2 Jason was neg (1+).  BRAF mutation was detected codon 600 /D.  She was referred to [facility] but was too sick to be seen at the time of the appt.    Her colectomy was uneventful.  MSI on the tumor was the same. 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  Tumor size was 11 cm with invasion thru the muscularis propria and pericolonic tissue.  Margins were neg for malignancy.  There was no lymphovascular or perineural invasion.  0/20  lymph nodes were involved. Stage IIA (pT3, pN0).         11/09/22  Hematology/Oncology was consulted for her diagnosis of invasive moderately differentiated mucinous adenocarcinoma.       Past Medical History: DM in the past few years.  Bipolar disorder, hypothyroidism, CVA.  Surgical History:Hysterectomy in 1983 for uterine prolapse.    Social History:She lives in [place] with her .  She has been a housewife.  She did not smoke or drink alcohol.   Family History: Her brother had throat cancer.  Allergies: NKA     PCP: Beka Weir MD    INTERVAL HISTORY:  • 11/10/2022- iron 38 (), iron saturation 16 (20-50), TIBC 238 (298-536), ferritin 197 ().  Initiated on Ferrlecit 250 mg IV daily x2.  • 11/10/2022- white blood cell count 7.4, hemoglobin 7.4, platelets 429.  D-dimer 4.15 (< 0.6).  Retic 3.71.  Creatinine 1.43.  Normal bilateral lower extremity venous Dopplers.  Lung perfusion scan low probability PE.  • 11/11/2022- WBC 19.3, hemoglobin 7.4, platelet count 472,000.  Vitamin B12 632 (211-946), haptoglobin 399 (), folate 3.86 (4.70-24.2).    History of present illness reviewed since last visit and changes noted on 11/11/22.    Subjective   Complains of some cough and nausea.    ROS:  Review of Systems   Constitutional: Negative for activity change, chills, fatigue, fever and unexpected weight change.   HENT: Negative for congestion, dental problem, hearing loss, mouth sores, nosebleeds, sore throat and trouble swallowing.    Eyes: Negative for photophobia and visual disturbance.   Respiratory: Positive for cough. Negative for chest tightness and shortness of breath.    Cardiovascular: Negative for chest pain, palpitations and leg swelling.   Gastrointestinal: Positive for nausea. Negative for abdominal distention, abdominal pain, blood in stool, constipation, diarrhea and vomiting.   Endocrine: Negative for cold intolerance and heat intolerance.   Genitourinary: Negative for  decreased urine volume, difficulty urinating, dysuria, frequency, hematuria and urgency.   Musculoskeletal: Negative for arthralgias and gait problem.   Skin: Negative for rash and wound.   Neurological: Negative for dizziness, tremors, weakness, light-headedness, numbness and headaches.   Hematological: Negative for adenopathy. Does not bruise/bleed easily.   Psychiatric/Behavioral: Negative for confusion and hallucinations. The patient is not nervous/anxious.    All other systems reviewed and are negative.       MEDICATIONS:    Scheduled Meds:  aspirin, 81 mg, Oral, Daily  budesonide, 0.5 mg, Nebulization, BID - RT  clopidogrel, 75 mg, Oral, Daily  divalproex, 500 mg, Oral, BID  docusate sodium, 100 mg, Oral, BID  empagliflozin, 10 mg, Oral, Daily  ferrous sulfate, 324 mg, Oral, Daily With Breakfast  FLUoxetine, 20 mg, Oral, Daily  furosemide, 20 mg, Intravenous, Q12H  guaiFENesin, 600 mg, Oral, Q12H  insulin glargine, 18 Units, Subcutaneous, QAM  insulin lispro, 3-14 Units, Subcutaneous, TID With Meals  insulin lispro, 6 Units, Subcutaneous, TID With Meals  ipratropium-albuterol, 3 mL, Nebulization, 4x Daily - RT  levothyroxine, 50 mcg, Oral, Daily  losartan, 25 mg, Oral, Q24H  magnesium oxide, 400 mg, Oral, Daily  metoprolol succinate XL, 25 mg, Oral, Q24H  pantoprazole, 40 mg, Oral, QAM  polyethylene glycol, 17 g, Oral, Daily  risperiDONE, 0.5 mg, Oral, Daily  rosuvastatin, 20 mg, Oral, Daily  Scopolamine, 1 patch, Transdermal, Q72H  spironolactone, 25 mg, Oral, Daily       Continuous Infusions:      PRN Meds:  •  acetaminophen  •  acetaminophen  •  acetaminophen  •  benzonatate  •  Calcium Gluconate-NaCl **AND** calcium gluconate **AND** Calcium, Ionized  •  dextrose  •  dextrose  •  glucagon (human recombinant)  •  HYDROcodone-acetaminophen  •  LORazepam  •  magnesium sulfate **OR** magnesium sulfate **OR** magnesium sulfate  •  phenol  •  potassium & sodium phosphates **OR** potassium & sodium phosphates  •   "potassium chloride  •  potassium chloride  •  [COMPLETED] Insert peripheral IV **AND** sodium chloride     ALLERGIES:  No Known Allergies    Objective    VITALS:   /52 (BP Location: Left arm, Patient Position: Lying)   Pulse 75   Temp 98.7 °F (37.1 °C) (Oral)   Resp 18   Ht 162.6 cm (64\")   Wt 54.8 kg (120 lb 13 oz)   SpO2 100%   BMI 20.74 kg/m²     PHYSICAL EXAM:  Physical Exam  Vitals and nursing note reviewed.   Constitutional:       General: She is not in acute distress.     Appearance: Normal appearance. She is well-developed and normal weight. She is not diaphoretic.   HENT:      Head: Normocephalic and atraumatic.      Comments: Alopecia.     Right Ear: External ear normal.      Left Ear: External ear normal.      Nose: Nose normal.      Comments: Nasal cannula O2.     Mouth/Throat:      Mouth: Mucous membranes are moist.      Pharynx: Oropharynx is clear. No oropharyngeal exudate or posterior oropharyngeal erythema.      Comments: Dental fillings.  Eyes:      General: No scleral icterus.     Extraocular Movements: Extraocular movements intact.      Conjunctiva/sclera: Conjunctivae normal.      Pupils: Pupils are equal, round, and reactive to light.   Cardiovascular:      Rate and Rhythm: Normal rate and regular rhythm.      Heart sounds: Normal heart sounds. No murmur heard.     Comments: Cardiac monitor leads.  Pulmonary:      Effort: Pulmonary effort is normal. No respiratory distress.      Breath sounds: Normal breath sounds. No wheezing or rales.   Abdominal:      General: Bowel sounds are normal. There is no distension.      Palpations: Abdomen is soft. There is no mass.      Tenderness: There is no abdominal tenderness. There is no guarding.      Comments: Midline vertical scar above umbilicus.   Genitourinary:     Comments: Deferred   Musculoskeletal:         General: No swelling, tenderness or deformity. Normal range of motion.      Cervical back: Normal range of motion and neck supple. " No rigidity.      Right lower leg: No edema.      Left lower leg: No edema.      Comments: Left upper extremity O2 monitor.     Lymphadenopathy:      Cervical: No cervical adenopathy.      Upper Body:      Right upper body: No supraclavicular adenopathy.      Left upper body: No supraclavicular adenopathy.   Skin:     General: Skin is warm and dry.      Coloration: Skin is not pale.      Findings: No bruising, erythema or rash.      Comments: Right upper extremity IV.   Neurological:      General: No focal deficit present.      Mental Status: She is alert.      Coordination: Coordination normal.   Psychiatric:         Mood and Affect: Mood normal.         Behavior: Behavior normal.         Thought Content: Thought content normal.         Judgment: Judgment normal.           RECENT LABS:  Lab Results (last 24 hours)     Procedure Component Value Units Date/Time    POC Glucose Once [[id number]]  (Abnormal) Collected: 11/11/22 0729    Specimen: Blood Updated: 11/11/22 0730     Glucose 109 mg/dL      Comment: Serial Number: [id number]:  [id number]       CBC & Differential [[id number]]  (Abnormal) Collected: 11/11/22 0416    Specimen: Blood Updated: 11/11/22 0637    Narrative:      The following orders were created for panel order CBC & Differential.  Procedure                               Abnormality         Status                     ---------                               -----------         ------                     CBC Auto Differential[[id number]]        Abnormal            Final result               Scan Slide[[id number]]                                                                    Please view results for these tests on the individual orders.    CBC Auto Differential [[id number]]  (Abnormal) Collected: 11/11/22 0615    Specimen: Blood Updated: 11/11/22 0637     WBC 19.30 10*3/mm3      RBC 3.10 10*6/mm3      Hemoglobin 7.4 g/dL      Hematocrit 23.8 %      MCV 76.8 fL      MCH 23.8 pg      MCHC 31.0 g/dL       RDW 18.4 %      RDW-SD 52.5 fl      MPV 7.0 fL      Platelets 472 10*3/mm3      Neutrophil % 86.7 %      Lymphocyte % 7.9 %      Monocyte % 3.6 %      Eosinophil % 1.3 %      Basophil % 0.5 %      Neutrophils, Absolute 16.80 10*3/mm3      Lymphocytes, Absolute 1.50 10*3/mm3      Monocytes, Absolute 0.70 10*3/mm3      Eosinophils, Absolute 0.20 10*3/mm3      Basophils, Absolute 0.10 10*3/mm3      nRBC 0.0 /100 WBC     Basic Metabolic Panel [[id number]]  (Abnormal) Collected: 11/11/22 0416    Specimen: Blood Updated: 11/11/22 0509     Glucose 91 mg/dL      BUN 36 mg/dL      Creatinine 1.09 mg/dL      Sodium 142 mmol/L      Potassium 4.1 mmol/L      Chloride 102 mmol/L      CO2 30.0 mmol/L      Calcium 8.3 mg/dL      BUN/Creatinine Ratio 33.0     Anion Gap 10.0 mmol/L      eGFR 50.5 mL/min/1.73      Comment: National Kidney Foundation and American Society of Nephrology (ASN) Task Force recommended calculation based on the Chronic Kidney Disease Epidemiology Collaboration (CKD-EPI) equation refit without adjustment for race.       Narrative:      GFR Normal >60  Chronic Kidney Disease <60  Kidney Failure <15    The GFR formula is only valid for adults with stable renal function between ages 18 and 70.    Hemoglobin & Hematocrit, Blood [[id number]]  (Abnormal) Collected: 11/10/22 1629    Specimen: Blood Updated: 11/10/22 1636     Hemoglobin 7.4 g/dL      Hematocrit 23.6 %     POC Glucose Once [[id number]]  (Abnormal) Collected: 11/10/22 1625    Specimen: Blood Updated: 11/10/22 1626     Glucose 254 mg/dL      Comment: Serial Number: [id number]:  [id number]       POC Glucose Once [[id number]]  (Abnormal) Collected: 11/10/22 1110    Specimen: Blood Updated: 11/10/22 1111     Glucose 339 mg/dL      Comment: Serial Number: [id number]:  [id number]       Folate [[id number]]  (Abnormal) Collected: 11/10/22 0348    Specimen: Blood Updated: 11/10/22 1100     Folate 3.86 ng/mL     Narrative:      Results may be falsely  increased if patient taking Biotin.      Vitamin B12 [[id number]]  (Normal) Collected: 11/10/22 0348    Specimen: Blood Updated: 11/10/22 1100     Vitamin B-12 632 pg/mL     Narrative:      Results may be falsely increased if patient taking Biotin.      Haptoglobin [[id number]]  (Abnormal) Collected: 11/10/22 0348    Specimen: Blood Updated: 11/10/22 1045     Haptoglobin 399 mg/dL     Body Fluid Culture - Body Fluid, Pleural Cavity [[id number]] Collected: 11/07/22 1033    Specimen: Body Fluid from Pleural Cavity Updated: 11/10/22 0827     Body Fluid Culture No growth at 3 days     Gram Stain Moderate (3+) WBCs per low power field      No organisms seen          PENDING RESULTS: CEA, SPEP, copper.    IMAGING REVIEWED:  NM Lung Scan Perfusion Particulate    Result Date: 11/10/2022   1. Patchy decreased perfusion to the left lung has a similar distribution to opacities on chest x-ray. Findings likely reflect changes of multifocal pneumonia. Pulmonary embolism would be less favored.  Electronically Signed By-Jonathan Olivier MD On:11/10/2022 1:03 PM This report was finalized on [id number] by  Jonathan Olivier MD.    FL Video Swallow With Speech Single Contrast    Result Date: 11/9/2022  Modified barium swallow study with fluoroscopy. Please refer to the speech pathologist report for findings and dietary recommendations.  Electronically Signed By-Millicent Santos MD On:11/9/2022 10:16 AM This report was finalized on [id number] by  Millicent Santos MD.    XR Chest 1 View    Result Date: 11/11/2022  Stable chest demonstrate cardiomegaly with pulmonary vascular congestion, and left basilar airspace disease with small left pleural effusion  Electronically Signed By-Db Argueta On:11/11/2022 7:43 AM This report was finalized on [id number] by  Db Argueta, .      I have reviewed the patient's labs, imaging, reports, and other clinician documentation.    Assessment & Plan   ASSESSMENT:  1. 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  Given early stage, her age and performance status, observation only is recommended.  Could do genetic evaluation for peoples syndrome as an OP.   2. Leukocytosis-due to sepsis and PNA.    Afebrile.  3. Chronic microcytic anemia-anemia work-up in progress.  S/p 1 dose of Ferrlecit 125mg on 10/24, Ferrlecit 250 mg IV daily x2 starting 11/9 and on oral Fe.  On Protonix.  Iron studies confirm BRANDY.  Retic appropriately elevated.  B12 normal, haptoglobin high and folate level low-replacement started.  4. Acute thrombocytosis-due to BRANDY and inflammatory response.  5. H/o CVA an elevated D-dimer-CT head w/o acute stroke.  On ASA and Plavix.    D-dimer elevated.  Lower extremity venous Dopplers and nuclear medicine perfusion scans negative.     6. Respiratory failure and PNA-s/p abx.  Per pulmonary.   7. CHF, HTN, HLD, pericardial effusion, S/p PCI x 1, NSTEMI-s/p heart cath.  On dual platelet therapy for 6 months. Per Cardiology.    8. Compression fractures, DM, hypothyroidism, acute renal insufficiency and bipolar disorder-per Endocrine, PMD and psychiatry.      PLAN    1. Anemia w/u in progress.   2. Start folate replacement 1 mg p.o. daily.  3. We will follow CBC and recommend to transfuse for hemoglobin < 7.   4. Genetic evaluation for peoples syndrome as an OP.   5. Observation with CT's and colonoscopy in 1 year unless patient opts for hospice.   5.   Continue Ferrlecit 250 mg IV x 2, D 2/2..                  I discussed the patient's findings and my recommendations with patient.    Part of this note may be an electronic transcription/translation of spoken language to printed text using the Dragon Dictation System.    Electronically signed by Abdiel Contreras MD, 11/11/22, 8:25 AM EST.

## 2022-11-11 NOTE — CASE MANAGEMENT/SOCIAL WORK
Continued Stay Note  THANH Wharton     Patient Name: Laura Perkins  MRN: [id number]  Today's Date: 11/11/2022    Admit Date: 10/23/2022    Plan: Return to [facility], [facility]. no precert needed.   Discharge Plan     Row Name 11/11/22 1550       Plan    Plan Return to [facility], [facility]. no precert needed.    Patient/Family in Agreement with Plan yes    Plan Comments Barriers to discharge: Plan Duplex R arm today, WBC 19.3 (7.4 yesterday) Blood cultures, UA, CXR done 11/11.              Expected Discharge Date and Time     Expected Discharge Date Expected Discharge Time    Nov 14, 2022       Met with patient in room wearing PPE: mask.    Maintained distance greater than six feet and spent less than 15 minutes in the room.    Cherie Galvan RN     Office Phone [phone]  Office Cell [phone]

## 2022-11-11 NOTE — PROGRESS NOTES
Came to see patient, patient again off floor for test.  Blood sugar log reviewed.  Spoke with .  Patient is eating well per .  Currently on Lantus with Humalog as well as Jardiance.  We will continue current regimen and follow blood sugars.

## 2022-11-11 NOTE — PROGRESS NOTES
[facility] Medicine Services Daily Progress Note    Patient Name: Laura Perkins  : 1940  MRN: [id number]  Primary Care Physician:  Beka Weir MD  Date of admission: 10/23/2022      Subjective          Brief interim history:       84-year-old female with known history of cerebrovascular accident, T2DM, hypertension, HLD, bipolar disorder, anemia, S/p bladder surgery,  and history of colon cancer,S/p resection (10/22) . S/p recent colonoscopy which revealed 5 cm mass.  Patient underwent transverse colon resection at [facility] and pathology revealed invasive mucinous adenocarcinoma.  She was subsequently discharged to a local rehab facility on 10/19/2022.  Patient presented to [facility] ED on 10/23/2022 due to 2-day history of generalized body weakness, acute mental status changes/metabolic encephalopathy.  On presentation, she was noted with fever of 102 and tachycardic.  Blood cultures (10/23) which result pending and started on broad-spectrum antibiotic with Zosyn.     10/25/2022: Seen and examined and follow up.  Resting comfortably in bed in no distress or discomfort.      10/26/2022: Seen and examined in follow-up.  Resting comfortably.    10/27/2022: Seen and examined in follow-up.  Event noted for reported patient slipping out of bed last evening with no visible injury on examination.    10/28/2022:  Pt had a cardiac cath with DANIA to LAD.  Psych was consulted to help adjust meds.  Pt is off psych meds secondary to QTc prolongation.  Abilify increase, as well as Depakote for mood stabilization.  Cardiology following with QTc changes as well as EKG.      10/29/2022:  Pt is seen by psych and her meds are titrated.  Pt does not communicate with me and is repeating the words I a saying to her.  Pt is on 2 lit NC that can be titrated down.  Pt will be discharged when her psych meds are adjusted and she is back to her baseline.    10/30/2022:  Pt is very wheezy today and   states, pt is coughing very thick sputum.  Pt is very confused and not reliable historian.  She denies complains.  Pt is on 2 lit NC, and she oxygenating well, but her wheeze is louder today.  Pt think that her new psych meds, are improving her condition.      10/31/2022.  Patient was seen and examined.  Patient's family complained restless legs last night.      11/1/2022.  Patient was seen and examined.  Patient reported no new symptoms.      11/2/2022.  Patient was seen and examined.  Patient's family reported moderate improvement in her symptoms.      11/3/2022.  Patient was seen and examined.  Patient's family reported that patient was coughing all night.  Pulmonary is following.       11/04/2022.  Patient was seen and examined.  Patient's family reported significant improvement in patient's symptoms.      11/05/2022.  Patient was seen and examined.  Patient's family reported no overnight events.  Patient was noted to be coughing.  Tessalon Perles was ordered.      11/6/2022.  Patient was seen and examined.  Patient's family reported moderate improvement in her symptoms.      11/7/2022.  Patient was seen and examined.  Patient is status post thoracentesis.  Family reported musculoskeletal pain.  Pain control was reviewed.      11/8/2022.  Patient was seen and examined.  Patient's family requested a surgical consult to discuss her psych meds.      11/9/2022.  Patient was seen and examined.  Patient's family reported multiple episodes of her limb movement last night.  Psych is following.        11/10/2022.  Patient was seen and examined.  No overnight events noted.          Objective      Vitals:   Temp:  [98.2 °F (36.8 °C)-98.9 °F (37.2 °C)] 98.3 °F (36.8 °C)  Heart Rate:  [71-93] 89  Resp:  [14-20] 14  BP: (130-165)/(34-61) 137/48  Flow (L/min):  [2] 2    Physical Exam  Vitals reviewed.   Constitutional:       General: She is not in acute distress.     Appearance: She is ill-appearing.   HENT:      Head:  Normocephalic and atraumatic.      Nose: Nose normal. No congestion or rhinorrhea.      Mouth/Throat:      Mouth: Mucous membranes are moist.      Pharynx: Oropharynx is clear. No oropharyngeal exudate or posterior oropharyngeal erythema.   Eyes:      Pupils: Pupils are equal, round, and reactive to light.   Cardiovascular:      Pulses: Normal pulses.      Heart sounds: Normal heart sounds. No murmur heard.    No friction rub. No gallop.      Comments: S1 and S2 present.  No tachycardia.  Pulmonary:      Effort: No respiratory distress.      Breath sounds: No wheezing, rhonchi or rales.      Comments: Improved air entry bilaterally.  Chest:      Chest wall: No tenderness.   Abdominal:      General: Abdomen is flat. Bowel sounds are normal. There is no distension.      Palpations: Abdomen is soft. There is no mass.      Tenderness: There is no abdominal tenderness. There is no right CVA tenderness, left CVA tenderness, guarding or rebound.      Hernia: No hernia is present.   Musculoskeletal:         General: No swelling, tenderness, deformity or signs of injury.      Cervical back: Neck supple. No tenderness.      Right lower leg: No edema.      Left lower leg: No edema.   Skin:     Capillary Refill: Capillary refill takes less than 2 seconds.      Coloration: Skin is not jaundiced.      Findings: No bruising, lesion or rash.   Neurological:      Mental Status: She is alert.      Comments: No facial asymmetry noted.  Gait and station not tested.   Psychiatric:      Comments: Patient is slightly agitated.              Result Review    Result Review:  I have personally reviewed the results from the time of this admission to 11/10/2022 19:18 EST and agree with these findings:  [x]  Laboratory  []  Microbiology  [x]  Radiology  []  EKG/Telemetry   [x]  Cardiology/Vascular   []  Pathology  [x]  Old records  []  Other:      Status: Completed Collected: 11/02/22 1224    Updated: 11/02/22 1228   Narrative:     DATE OF EXAM:    11/2/2022 11:00 AM       PROCEDURE:   XR ABDOMEN KUB-       INDICATIONS:   Vomiting; I21.4-Non-ST elevation (NSTEMI) myocardial infarction;   R41.82-Altered mental status, unspecified; R50.9-Fever, unspecified;   J18.9-Pneumonia, unspecified organism; R77.8-Other specified   abnormalities of plasma proteins; E11.9-Type 2 diabetes mellitus without   complications; Z79.4-Long term (current) use of insulin;   E78.5-Hyperlipidemia, unspecified       COMPARISON:   CT chest 10/30/2022, CT abdomen and pelvis without contrast 10/24/2022       TECHNIQUE:   Radiographic view(s) of the abdomen obtained.       FINDINGS:   Included lower lungs are abnormal, better, better evaluated on recent   chest CT. No abnormal small bowel distention is seen in a pattern to   suggest small bowel obstruction, although there is a nonspecific paucity   of gas-filled small bowel loops. Stool is seen in the ascending and   transverse colon.       Impression:     Nonspecific, nonobstructive bowel gas pattern.       Electronically Signed By-Lora Monk MD On:11/2/2022 12:26 PM   This report was finalized on [id number] by  Lora Monk MD.             Wounds (last 24 hours)     LDA Wound     Row Name 11/10/22 1645 11/10/22 1245 11/10/22 0845       Wound 10/24/22 1344  medial sacral spine Pressure Injury    Wound - Properties Group Placement Date: 10/24/22  -MG Placement Time: 1344  -MG Present on Hospital Admission: Y  -MG Side: --  -MG, middle  Orientation: medial  -MG Location: sacral spine  -MG Primary Wound Type: Pressure inj  -MG    Pressure Injury Stage -- -- DTPI  -DA    Dressing Appearance -- -- dry;intact  -DA    Closure Approximated  -DA Approximated  -DA Approximated  -DA    Base pink  -DA pink  -DA pink  -DA    Periwound redness  -DA redness  -DA redness  -DA    Periwound Temperature warm  -DA warm  -DA warm  -DA    Drainage Amount none  -DA none  -DA none  -DA    Dressing Care -- -- open to air  -DA    Periwound Care -- --  dry periwound area maintained  -DA    Retired Wound - Properties Group Placement Date: 10/24/22  -MG Placement Time: 1344  -MG Present on Hospital Admission: Y  -MG Side: --  -MG, middle  Orientation: medial  -MG Location: sacral spine  -MG Primary Wound Type: Pressure inj  -MG    Retired Wound - Properties Group Date first assessed: 10/24/22  -MG Time first assessed: 1344  -MG Present on Hospital Admission: Y  -MG Side: --  -MG, middle  Location: sacral spine  -MG Primary Wound Type: Pressure inj  -MG    Row Name 11/10/22 0400 11/10/22 0000 11/09/22 2000       Wound 10/24/22 1344  medial sacral spine Pressure Injury    Wound - Properties Group Placement Date: 10/24/22  -MG Placement Time: 1344  -MG Present on Hospital Admission: Y  -MG Side: --  -MG, middle  Orientation: medial  -MG Location: sacral spine  -MG Primary Wound Type: Pressure inj  -MG    Pressure Injury Stage -- DTPI  -BE DTPI  -BE    Dressing Appearance dry;intact  -BE dry;intact  -BE dry;intact  -BE    Closure Approximated  -BE Approximated  -BE Approximated  -BE    Base pink  -BE pink  -BE pink  -BE    Periwound redness  -BE redness  -BE redness  -BE    Care, Wound -- -- soap and water  -BE    Dressing Care open to air  -BE -- open to air  -BE    Periwound Care -- -- dry periwound area maintained  -BE    Retired Wound - Properties Group Placement Date: 10/24/22  -MG Placement Time: 1344  -MG Present on Hospital Admission: Y  -MG Side: --  -MG, middle  Orientation: medial  -MG Location: sacral spine  -MG Primary Wound Type: Pressure inj  -MG    Retired Wound - Properties Group Date first assessed: 10/24/22  -MG Time first assessed: 1344  -MG Present on Hospital Admission: Y  -MG Side: --  -MG, middle  Location: sacral spine  -MG Primary Wound Type: Pressure inj  -MG          User Key  (r) = Recorded By, (t) = Taken By, (c) = Cosigned By    Initials Name Provider Type    Bia Mcmillan RN Registered Nurse    Ilda Torres RN  Registered Nurse    Tino Ledesma LPN Licensed Nurse                  Assessment & Plan    From previous notes and with minor updates.    Brief patient summary:    Patient is a 82 y.o. female with past medical history of bipolar 1 disorder, diabetes mellitus type 2, GERD, hypothyroidism, hypertension, colon cancer, hyperlipidemia, anemia and a stroke unknown who presented to [facility] on 10/23/2022 upon transfer from [facility] rehab where she had been staying since 10/19/2022 after a transverse colon resection for 5 cm mass found on colonoscopy,  at [facility] on 10/18/2022.  Pathology report in progress.  Family in [facility] reported progressive weakness lethargy and alteration in mental status over the past 2 days.She appears frail, will awaken to voice and answer but falls back to sleep. She is oriented to self and follows simple commands.  and family at bedside assisting with history .  There is no slurred speech or facial droop.  No focal deficits.  She denies headache.  She does report some chest pain.family reports shortness of air and cough productive of brown sputum.  Temperature was 102 on admission, BP was stable she was tachycardic and tachypneic.  She does not normally use home oxygen and is now stable on 4 L via nasal cannula.  She triggered sepsis.  WBCs 13.3, lactic acid normal at 0.8 procalcitonin elevated at 0.32.  Urinalysis was negative for infection.  Chest x-ray per radiology indicated a possible small left midlung infiltrate and bronchitis.  She was started on IV vancomycin and IV cefepime in ED for hospital-acquired pneumonia.  Family reports no past medical history of heart failure or coronary artery disease.  Troponin mildly elevated at 0.87.  Patient reported chest pain but  could give no other details.  May be secondary to tachycardia and chest pain pleuritic, however serial troponins and continuous cardiac monitoring have been  ordered. EKG shows no acute ST elevation. and family report she is a DNR with no CPR however they agree to intubation if needed and full support otherwise.  Creatinine mildly elevated at 1.13 BUN 33.    Family reports no history of chronic renal failure.PMH includes hypothyroidism, Bipolar disorder, Diabetes Mellitus type 2 with glucose today 439 and overactive bladder post re-suspension with mesh.  She was given an IV fluid bolus in ED and will be admitted for antibiotics for possible pneumonia with blood cultures pending.  Family concerned about possible aspiration.  He reports she had her esophagus stretched a few months ago and underwent a swallow study prior to stretching which showed esophageal dysmotility. They noticed recently after her being intubated for surgery she spits up food.  Speech has been consulted.  Aspiration precautions in place.          aspirin, 81 mg, Oral, Daily  budesonide, 0.5 mg, Nebulization, BID - RT  clopidogrel, 75 mg, Oral, Daily  divalproex, 500 mg, Oral, BID  docusate sodium, 100 mg, Oral, BID  empagliflozin, 10 mg, Oral, Daily  [START ON 11/11/2022] ferrous sulfate, 324 mg, Oral, Daily With Breakfast  FLUoxetine, 20 mg, Oral, Daily  furosemide, 20 mg, Intravenous, Q12H  guaiFENesin, 600 mg, Oral, Q12H  insulin glargine, 18 Units, Subcutaneous, QAM  insulin lispro, 3-14 Units, Subcutaneous, TID With Meals  insulin lispro, 6 Units, Subcutaneous, TID With Meals  ipratropium-albuterol, 3 mL, Nebulization, 4x Daily - RT  levothyroxine, 50 mcg, Oral, Daily  losartan, 25 mg, Oral, Q24H  magnesium oxide, 400 mg, Oral, Daily  metoprolol succinate XL, 25 mg, Oral, Q24H  pantoprazole, 40 mg, Oral, QAM  polyethylene glycol, 17 g, Oral, Daily  risperiDONE, 0.5 mg, Oral, Daily  rosuvastatin, 20 mg, Oral, Daily  Scopolamine, 1 patch, Transdermal, Q72H  spironolactone, 25 mg, Oral, Daily             Active Hospital Problems:  Active Hospital Problems    Diagnosis    • **Altered mental  status, unspecified altered mental status type    • Acute respiratory failure with hypoxia (HCC)    • Pleural effusion    • HAP (hospital-acquired pneumonia)    • Sepsis (HCC)    • Elevated troponin    • Acute respiratory distress    • Type 2 diabetes mellitus (HCC)    • Anxiety associated with depression    • Hypothyroidism (acquired)    • OAB (overactive bladder)    • GERD without esophagitis    • Acute non-ST elevation myocardial infarction (NSTEMI) (HCC)    • Dyslipidemia    • Cancer of transverse colon (HCC)           Assessment/plan:      -Continue appropriate patient's home medications for other chronic medical conditions.  -Continue the present level of care.  -Patient and family agreed with the plan of care.    Pleural effusion.  -Patient is status postthoracentesis.  -Follow pulmonary recommendations.      Cough.  -Much improved.  -Treat with Tessalon Perles.  -Patient is also on Mucinex.     Acute toxic metabolic encephalopathy/AMS      -resolving, and likely multifactorial.  Psych started pt on Abilify and adjusted Depakote for mood stabilization.       -Pt is still confused, and can't give clear ROS.  Meds to be adjusted by Psych.     Probable sepsis      -off Zosyn, now on Augmentin      -Duo nebs and Mucomyst nebs added for thick secretions and wheezing     Pneumonia involving the left lung      -Continue Augmentin, and supportive care      NSTEMI/Elevated troponin       -scheduled for C (10/27), DANIA to LAD.       -Cp free and in the setting of suspected sepsis     History of colorectal cancer (invasive mucinous adenocarcinoma)      -S/p resection (10/22)     Dysphagia      -followed by speech pathologist      CVA      -Aspirin/Crestor     T2DM      -SSI/Lantus     Hypertension     HLD     -Crestor     Hypothyroidism      -Levothyroxine     Depression/anxiety      -Risperidone on hold 2/2 increase QT prolongation and consult Psychiatrist        -Depakote dose adjustment and initiation of Abilify,  per Psych.      -Pt is still not at baseline, and is not communicating well, and can't give ROS.       DVT prophylaxis:  Mechanical DVT prophylaxis orders are present.    CODE STATUS:    Medical Intervention Limits: NO intubation (DNI)  Level Of Support Discussed With: Next of Kin (If No Surrogate); Health Care Surrogate  Code Status (Patient has no pulse and is not breathing): No CPR (Do Not Attempt to Resuscitate)  Medical Interventions (Patient has pulse or is breathing): Limited Support  Release to patient: Routine Release       Disposition: This wonderful patient can discharge tomorrow to rehab facility versus SNF.      Electronically signed by Alejandro Estrada MD, FACP, 11/10/22, 19:18 EST.        [facility] Hospitalist Team

## 2022-11-11 NOTE — PROGRESS NOTES
"PULMONARY CRITICAL CARE PROGRESS  NOTE      PATIENT IDENTIFICATION:  Name: Laura Perkins  MRN: [id number]  :  1940     Age: 82 y.o.  Sex: female    DATE OF Note:  2022   Referring Physician: Alejandro Estrada MD                  Subjective:   Laying in bed, very weak, no SOB, no chest or abdominal pain, no bowel or bladder issues reported    Objective:  tMax 24 hrs: Temp (24hrs), Av.8 °F (37.1 °C), Min:98.2 °F (36.8 °C), Max:99.8 °F (37.7 °C)      Vitals Ranges:   Temp:  [98.2 °F (36.8 °C)-99.8 °F (37.7 °C)] 99.8 °F (37.7 °C)  Heart Rate:  [] 78  Resp:  [14-24] 18  BP: (112-158)/(34-65) 112/37    Intake and Output Last 3 Shifts:   I/O last 3 completed shifts:  In: 1320 [P.O.:1320]  Out: 300 [Urine:300]    Exam:  BP (!) 112/37 (BP Location: Left arm, Patient Position: Lying)   Pulse 78   Temp 99.8 °F (37.7 °C) (Axillary)   Resp 18   Ht 162.6 cm (64\")   Wt 54.8 kg (120 lb 13 oz)   SpO2 94%   BMI 20.74 kg/m²     General Appearance: Alert  HEENT:  Normocephalic, without obvious abnormality. Conjunctivae/corneas clear.  Normal external ear canals. Nares normal, no drainage     Neck:  Supple, symmetrical, trachea midline. No JVD.  Lungs /Chest wall:   Bilateral basal rhonchi, respirations unlabored, symmetrical wall movement.     Heart:  Regular rate and rhythm, systolic murmur PMI left sternal border  Abdomen: Soft, nontender, no masses, no organomegaly.    Extremities: Trace edema, no clubbing or cyanosis        Medications:  aspirin, 81 mg, Oral, Daily  budesonide, 0.5 mg, Nebulization, BID - RT  clopidogrel, 75 mg, Oral, Daily  divalproex, 500 mg, Oral, BID  docusate sodium, 100 mg, Oral, BID  empagliflozin, 10 mg, Oral, Daily  ferrous sulfate, 324 mg, Oral, Daily With Breakfast  FLUoxetine, 20 mg, Oral, Daily  folic acid, 1 mg, Oral, Daily  furosemide, 20 mg, Intravenous, Q12H  guaiFENesin, 600 mg, Oral, Q12H  insulin glargine, 18 Units, Subcutaneous, QAM  insulin lispro, 3-14 Units, " Subcutaneous, TID With Meals  insulin lispro, 6 Units, Subcutaneous, TID With Meals  ipratropium-albuterol, 3 mL, Nebulization, 4x Daily - RT  levothyroxine, 50 mcg, Oral, Daily  losartan, 25 mg, Oral, Q24H  magnesium oxide, 400 mg, Oral, Daily  metoprolol succinate XL, 25 mg, Oral, Q24H  pantoprazole, 40 mg, Oral, QAM  polyethylene glycol, 17 g, Oral, Daily  risperiDONE, 0.5 mg, Oral, Daily  rosuvastatin, 20 mg, Oral, Daily  Scopolamine, 1 patch, Transdermal, Q72H  spironolactone, 25 mg, Oral, Daily        Infusion:        PRN:  •  acetaminophen  •  acetaminophen  •  acetaminophen  •  benzonatate  •  Calcium Gluconate-NaCl **AND** calcium gluconate **AND** Calcium, Ionized  •  dextrose  •  dextrose  •  glucagon (human recombinant)  •  HYDROcodone-acetaminophen  •  LORazepam  •  magnesium sulfate **OR** magnesium sulfate **OR** magnesium sulfate  •  phenol  •  potassium & sodium phosphates **OR** potassium & sodium phosphates  •  potassium chloride  •  potassium chloride  •  [COMPLETED] Insert peripheral IV **AND** sodium chloride  Data Review:  All labs (24hrs):   Recent Results (from the past 24 hour(s))   POC Glucose Once    Collection Time: 11/10/22 11:10 AM    Specimen: Blood   Result Value Ref Range    Glucose 339 (H) 70 - 105 mg/dL   POC Glucose Once    Collection Time: 11/10/22  4:25 PM    Specimen: Blood   Result Value Ref Range    Glucose 254 (H) 70 - 105 mg/dL   Hemoglobin & Hematocrit, Blood    Collection Time: 11/10/22  4:29 PM    Specimen: Blood   Result Value Ref Range    Hemoglobin 7.4 (L) 12.0 - 15.9 g/dL    Hematocrit 23.6 (L) 34.0 - 46.6 %   Basic Metabolic Panel    Collection Time: 11/11/22  4:16 AM    Specimen: Blood   Result Value Ref Range    Glucose 91 65 - 99 mg/dL    BUN 36 (H) 8 - 23 mg/dL    Creatinine 1.09 (H) 0.57 - 1.00 mg/dL    Sodium 142 136 - 145 mmol/L    Potassium 4.1 3.5 - 5.2 mmol/L    Chloride 102 98 - 107 mmol/L    CO2 30.0 (H) 22.0 - 29.0 mmol/L    Calcium 8.3 (L) 8.6 - 10.5  mg/dL    BUN/Creatinine Ratio 33.0 (H) 7.0 - 25.0    Anion Gap 10.0 5.0 - 15.0 mmol/L    eGFR 50.5 (L) >60.0 mL/min/1.73   CBC Auto Differential    Collection Time: 11/11/22  6:15 AM    Specimen: Blood   Result Value Ref Range    WBC 19.30 (H) 3.40 - 10.80 10*3/mm3    RBC 3.10 (L) 3.77 - 5.28 10*6/mm3    Hemoglobin 7.4 (L) 12.0 - 15.9 g/dL    Hematocrit 23.8 (L) 34.0 - 46.6 %    MCV 76.8 (L) 79.0 - 97.0 fL    MCH 23.8 (L) 26.6 - 33.0 pg    MCHC 31.0 (L) 31.5 - 35.7 g/dL    RDW 18.4 (H) 12.3 - 15.4 %    RDW-SD 52.5 37.0 - 54.0 fl    MPV 7.0 6.0 - 12.0 fL    Platelets 472 (H) 140 - 450 10*3/mm3    Neutrophil % 86.7 (H) 42.7 - 76.0 %    Lymphocyte % 7.9 (L) 19.6 - 45.3 %    Monocyte % 3.6 (L) 5.0 - 12.0 %    Eosinophil % 1.3 0.3 - 6.2 %    Basophil % 0.5 0.0 - 1.5 %    Neutrophils, Absolute 16.80 (H) 1.70 - 7.00 10*3/mm3    Lymphocytes, Absolute 1.50 0.70 - 3.10 10*3/mm3    Monocytes, Absolute 0.70 0.10 - 0.90 10*3/mm3    Eosinophils, Absolute 0.20 0.00 - 0.40 10*3/mm3    Basophils, Absolute 0.10 0.00 - 0.20 10*3/mm3    nRBC 0.0 0.0 - 0.2 /100 WBC   POC Glucose Once    Collection Time: 11/11/22  7:29 AM    Specimen: Blood   Result Value Ref Range    Glucose 109 (H) 70 - 105 mg/dL        Imaging:  XR Chest 1 View  Narrative: DATE OF EXAM:  11/11/2022 3:01 AM     PROCEDURE:  XR CHEST 1 VW-     INDICATIONS:  sob; I21.4-Non-ST elevation (NSTEMI) myocardial infarction;  R41.82-Altered mental status, unspecified; R50.9-Fever, unspecified;  J18.9-Pneumonia, unspecified organism; R77.8-Other specified  abnormalities of plasma proteins; E11.9-Type 2 diabetes mellitus without  complications; Z79.4-Long term (current) use of insulin;  E78.5-Hyperlipidemia, unspecified     COMPARISON:  11/9/2022     TECHNIQUE:   Single radiographic AP view of the chest was obtained.     FINDINGS:  Heart size and pulmonary vasculature stable. Right lung remains grossly  clear other than some atelectasis in the base. No significant change  in  airspace disease in the lower left lung with small left pleural  effusion. No new abnormality      Impression: Stable chest demonstrate cardiomegaly with pulmonary vascular  congestion, and left basilar airspace disease with small left pleural  effusion     Electronically Signed By-Db Argueta On:11/11/2022 7:43 AM  This report was finalized on [id number] by  Db Argueta, .       ASSESSMENT:  AMS  Mixed respiratory and metabolic alkalosis  Bilateral pleural effusion left greater than right  Bilateral pneumonia and possible compressive atelectasis  CVA  Hypertension  Bipolar disorder  Diabetes mellitus type 2  Invasive adenocarcinoma of the colon s/p resection 10/22  CAD s/p stents 10/27       PLAN:  Add PT/OT  Get venous doppler of right upper extremity to rule out DVT  Bronchodilators  Inhaled corticosteroids  Oncology following  DNR/DNI  Diuresis and monitor MARIAN's  Electrolytes/ glycemic control  DVT and GI prophylaxis    Discussed with Dr Chris Jacobson, APRN   11/11/2022  10:51 EST    I personally have examined  and interviewed the patient. I have reviewed the history, data, problems, assessment and plan with our NP.  Total Critical care time in direct medical management (   ) minutes, This time specifically excludes time spent performing procedures.    Reed Perez MD   11/11/2022  19:08 EST

## 2022-11-11 NOTE — PROGRESS NOTES
Nutrition Services    Patient Name: Laura Perkins  YOB: 1940  MRN: [id number]  Admission date: 10/23/2022     --Order NS Renal ONS TID.    PPE Documentation        PPE Worn By Provider Did not enter room for this encounter   PPE Worn By Patient  N/A     PROGRESS NOTE      Encounter Information: Visited pt in room to check on PO intakes and check wt. Pt's bed scale weighed at 130.8#, much improved from past readings. Pt seemed more oriented today than previous visit. Pt's  reported pt eating really well, but expressed continued concern for length of time pureed, NTL diet is recommended. Writer sent secure message to SLP to request timeline on assigned diet. Awaiting reply. Pt's  stated pt likely d/c today to [facility] in [place] and wasn't sure if they offered pureed meals. Writer contacted [facility] and assured pt's  that they do, in fact, offer puree diets for her. Writer gave pt's  suggestions for pureed food options such as baby food and ONS such as Novasource Renal, in addition to the option to purchase liquid thickener from [employer] or pharmacies to add to liquids such as water and juice. Pt was hungry during visit and was offered Novasource ONS; pt drank supplement immediately and reported liking. Will order TID today.        PO Diet: Diet Texture, Diabetic/Consistent Carbs; Diabetic - Consistent Carb; Pureed Diet; Thickened Liquids (Nectar)   PO Supplements: None.   PO Intake:  Averaging >75% of recent meals.        Current nutrition support:    Nutrition support review:        Labs (reviewed below):         GI Function:  Stool Output  Stool Unmeasured Occurrence: 1 (11/10/22 1652)  Bowel Incontinence: Yes (11/10/22 1652)  Stool Amount: moderate (11/10/22 1652)        Nutrition Intervention Updates: Continue current diet.   Order NS Renal ONS TID to support wt maintenance and optimal nutrition.       Results from last 7 days   Lab Units 11/11/22  4051  11/10/22  0348 11/06/22  0745 11/05/22  0819   SODIUM mmol/L 142 141 137 138   POTASSIUM mmol/L 4.1 3.3* 4.5 3.1*   CHLORIDE mmol/L 102 98 98 94*   CO2 mmol/L 30.0* 32.0* 30.0* 33.0*   BUN mg/dL 36* 38* 12 14   CREATININE mg/dL 1.09* 1.43* 0.64 0.73   CALCIUM mg/dL 8.3* 8.3* 8.5* 8.5*   BILIRUBIN mg/dL  --   --  0.2 0.2   ALK PHOS U/L  --   --  121* 138*   ALT (SGPT) U/L  --   --  14 18   AST (SGOT) U/L  --   --  13 17   GLUCOSE mg/dL 91 172* 231* 189*     Results from last 7 days   Lab Units 11/11/22  0615 11/10/22  0348 11/07/22  0439 11/06/22  0745 11/05/22  1716   MAGNESIUM mg/dL  --   --  2.5*  --  1.7   HEMOGLOBIN g/dL 7.4*   < >  --    < >  --    HEMATOCRIT % 23.8*   < >  --    < >  --     < > = values in this interval not displayed.     COVID19   Date Value Ref Range Status   10/30/2022 Not Detected Not Detected - Ref. Range Final     Lab Results   Component Value Date    HGBA1C 7.4 (H) 10/24/2022         RD to follow up per protocol.    Electronically signed by:  Amaris Trimble  11/11/22 13:16 EST

## 2022-11-11 NOTE — THERAPY TREATMENT NOTE
"Subjective: Pt agreeable to therapeutic plan of care.    Objective:     Bed mobility - Mod-A and Max-A  Transfers - Mod-A, Assist x 2 and with rolling walker  Ambulation - 0 feet N/A or Not attempted.    Vitals: WNL    Pain: 3 VAS   Location: RUE  Intervention for pain: Repositioned and Therapeutic Presence, ice     Education: Provided education on the importance of mobility in the acute care setting, Verbal/Tactile Cues and Transfer Training    Assessment: Laura Perkins presents with functional mobility impairments which indicate the need for skilled intervention. Pt requiring frequent cuing to promote arousal and to open eyes during mobility tasks. Upon coming to sitting at EOB pt demo's L lateral lean and requiring VC and mod a to maintain midline posture in sitting. Pt performed STS x2 at EOB requiring mod a x2 with FWW. Pt demo's poor static balance initially upon standing.Tolerates standing < 30sec before impulsively returning to seated EOB.  Pt requiring mod/max a x2 for bed mobility supine to EOB. Mod a x2 with FWW to perform EOB to recliner by taking 3-4 steps. Tolerating session today without incident. Will continue to follow and progress as tolerated.     Plan/Recommendations:   Moderate Intensity Therapy recommended post-acute care. This is recommended as therapy feels the patient would require 3-4 days per week and wouldn't tolerate \"3 hour daily\" rehab intensity. SNF would be the preferred choice. If the patient does not agree to SNF, arrange HH or OP depending on home bound status. If patient is medically complex, consider LTACH.. Pt requires no DME at discharge.     Pt desires Skilled Rehab placement at discharge. Pt cooperative; agreeable to therapeutic recommendations and plan of care.         Basic Mobility 6-click:  Rollin = Total, A lot = 2, A little = 3; 4 = None  Supine>Sit:   1 = Total, A lot = 2, A little = 3; 4 = None   Sit>Stand with arms:  1 = Total, A lot = 2, A little = 3; " 4 = None  Bed>Chair:   1 = Total, A lot = 2, A little = 3; 4 = None  Ambulate in room:  1 = Total, A lot = 2, A little = 3; 4 = None  3-5 Steps with railin = Total, A lot = 2, A little = 3; 4 = None  Score: 11    Modified Dajuan: N/A = No pre-op stroke/TIA    Post-Tx Position: Up in Chair, Alarms activated and Call light and personal items within reach, spouse present   PPE: gloves and surgical mask

## 2022-11-11 NOTE — PLAN OF CARE
Goal Outcome Evaluation:  Plan of Care Reviewed With: patient        Progress: improving  Outcome Evaluation: Pt has had a few complaints of pain to her R upper arm distal to her IV. The IV flushes without pain and doesn't appear infiltrated. The area is red, tender, warm, and swollen. Cold compress and prn pain medication provides her with some relief.

## 2022-11-11 NOTE — NURSING NOTE
Dr. Estrada updated in rounds on pending doppler of RUE due to swelling. Also informed of increased WBC. Orders received for blood cultures and UA.

## 2022-11-12 LAB
ANION GAP SERPL CALCULATED.3IONS-SCNC: 12 MMOL/L (ref 5–15)
BASOPHILS # BLD AUTO: 0 10*3/MM3 (ref 0–0.2)
BASOPHILS NFR BLD AUTO: 0.3 % (ref 0–1.5)
BUN SERPL-MCNC: 35 MG/DL (ref 8–23)
BUN/CREAT SERPL: 34.3 (ref 7–25)
CALCIUM SPEC-SCNC: 8.9 MG/DL (ref 8.6–10.5)
CHLORIDE SERPL-SCNC: 100 MMOL/L (ref 98–107)
CO2 SERPL-SCNC: 29 MMOL/L (ref 22–29)
CREAT SERPL-MCNC: 1.02 MG/DL (ref 0.57–1)
DEPRECATED RDW RBC AUTO: 51.6 FL (ref 37–54)
EGFRCR SERPLBLD CKD-EPI 2021: 54.7 ML/MIN/1.73
EOSINOPHIL # BLD AUTO: 0.2 10*3/MM3 (ref 0–0.4)
EOSINOPHIL NFR BLD AUTO: 1.5 % (ref 0.3–6.2)
ERYTHROCYTE [DISTWIDTH] IN BLOOD BY AUTOMATED COUNT: 18.4 % (ref 12.3–15.4)
GLUCOSE BLDC GLUCOMTR-MCNC: 180 MG/DL (ref 70–105)
GLUCOSE BLDC GLUCOMTR-MCNC: 200 MG/DL (ref 70–105)
GLUCOSE BLDC GLUCOMTR-MCNC: 212 MG/DL (ref 70–105)
GLUCOSE SERPL-MCNC: 163 MG/DL (ref 65–99)
HCT VFR BLD AUTO: 25.2 % (ref 34–46.6)
HGB BLD-MCNC: 7.5 G/DL (ref 12–15.9)
LYMPHOCYTES # BLD AUTO: 1.3 10*3/MM3 (ref 0.7–3.1)
LYMPHOCYTES NFR BLD AUTO: 8.1 % (ref 19.6–45.3)
MCH RBC QN AUTO: 23.5 PG (ref 26.6–33)
MCHC RBC AUTO-ENTMCNC: 29.8 G/DL (ref 31.5–35.7)
MCV RBC AUTO: 79 FL (ref 79–97)
MONOCYTES # BLD AUTO: 1 10*3/MM3 (ref 0.1–0.9)
MONOCYTES NFR BLD AUTO: 6.2 % (ref 5–12)
NEUTROPHILS NFR BLD AUTO: 13.4 10*3/MM3 (ref 1.7–7)
NEUTROPHILS NFR BLD AUTO: 83.9 % (ref 42.7–76)
NRBC BLD AUTO-RTO: 0 /100 WBC (ref 0–0.2)
PLATELET # BLD AUTO: 428 10*3/MM3 (ref 140–450)
PMV BLD AUTO: 7.2 FL (ref 6–12)
POTASSIUM SERPL-SCNC: 4.1 MMOL/L (ref 3.5–5.2)
RBC # BLD AUTO: 3.2 10*6/MM3 (ref 3.77–5.28)
SODIUM SERPL-SCNC: 141 MMOL/L (ref 136–145)
WBC NRBC COR # BLD: 16 10*3/MM3 (ref 3.4–10.8)

## 2022-11-12 PROCEDURE — 80048 BASIC METABOLIC PNL TOTAL CA: CPT | Performed by: NURSE PRACTITIONER

## 2022-11-12 PROCEDURE — 25010000002 FUROSEMIDE PER 20 MG: Performed by: INTERNAL MEDICINE

## 2022-11-12 PROCEDURE — 63710000001 INSULIN LISPRO (HUMAN) PER 5 UNITS: Performed by: INTERNAL MEDICINE

## 2022-11-12 PROCEDURE — 63710000001 INSULIN GLARGINE PER 5 UNITS: Performed by: INTERNAL MEDICINE

## 2022-11-12 PROCEDURE — 82962 GLUCOSE BLOOD TEST: CPT

## 2022-11-12 PROCEDURE — 99232 SBSQ HOSP IP/OBS MODERATE 35: CPT | Performed by: INTERNAL MEDICINE

## 2022-11-12 PROCEDURE — 85025 COMPLETE CBC W/AUTO DIFF WBC: CPT | Performed by: NURSE PRACTITIONER

## 2022-11-12 PROCEDURE — 94799 UNLISTED PULMONARY SVC/PX: CPT

## 2022-11-12 RX ORDER — POLYETHYLENE GLYCOL 3350 17 G/17G
17 POWDER, FOR SOLUTION ORAL DAILY PRN
Status: DISCONTINUED | OUTPATIENT
Start: 2022-11-12 | End: 2022-11-15 | Stop reason: HOSPADM

## 2022-11-12 RX ORDER — MORPHINE SULFATE 2 MG/ML
2 INJECTION, SOLUTION INTRAMUSCULAR; INTRAVENOUS EVERY 4 HOURS PRN
Status: DISCONTINUED | OUTPATIENT
Start: 2022-11-12 | End: 2022-11-15 | Stop reason: HOSPADM

## 2022-11-12 RX ORDER — DOCUSATE SODIUM 100 MG/1
100 CAPSULE, LIQUID FILLED ORAL DAILY PRN
Status: DISCONTINUED | OUTPATIENT
Start: 2022-11-12 | End: 2022-11-15 | Stop reason: HOSPADM

## 2022-11-12 RX ADMIN — HYDROCODONE BITARTRATE AND ACETAMINOPHEN 1 TABLET: 7.5; 325 TABLET ORAL at 10:46

## 2022-11-12 RX ADMIN — MAGNESIUM OXIDE TAB 400 MG (241.3 MG ELEMENTAL MG) 400 MG: 400 (241.3 MG) TAB at 09:26

## 2022-11-12 RX ADMIN — DIVALPROEX SODIUM 500 MG: 500 TABLET, DELAYED RELEASE ORAL at 20:58

## 2022-11-12 RX ADMIN — ROSUVASTATIN 20 MG: 10 TABLET, FILM COATED ORAL at 20:58

## 2022-11-12 RX ADMIN — INSULIN LISPRO 6 UNITS: 100 INJECTION, SOLUTION INTRAVENOUS; SUBCUTANEOUS at 17:32

## 2022-11-12 RX ADMIN — LOSARTAN POTASSIUM 25 MG: 25 TABLET, FILM COATED ORAL at 09:26

## 2022-11-12 RX ADMIN — FOLIC ACID 1 MG: 1 TABLET ORAL at 09:25

## 2022-11-12 RX ADMIN — INSULIN LISPRO 5 UNITS: 100 INJECTION, SOLUTION INTRAVENOUS; SUBCUTANEOUS at 09:04

## 2022-11-12 RX ADMIN — CLOPIDOGREL BISULFATE 75 MG: 75 TABLET ORAL at 09:25

## 2022-11-12 RX ADMIN — PANTOPRAZOLE SODIUM 40 MG: 40 TABLET, DELAYED RELEASE ORAL at 06:30

## 2022-11-12 RX ADMIN — SPIRONOLACTONE 25 MG: 25 TABLET ORAL at 09:25

## 2022-11-12 RX ADMIN — FLUOXETINE 20 MG: 20 CAPSULE ORAL at 09:25

## 2022-11-12 RX ADMIN — INSULIN LISPRO 6 UNITS: 100 INJECTION, SOLUTION INTRAVENOUS; SUBCUTANEOUS at 09:04

## 2022-11-12 RX ADMIN — IPRATROPIUM BROMIDE AND ALBUTEROL SULFATE 3 ML: .5; 3 SOLUTION RESPIRATORY (INHALATION) at 11:02

## 2022-11-12 RX ADMIN — FERROUS SULFATE TAB EC 324 MG (65 MG FE EQUIVALENT) 324 MG: 324 (65 FE) TABLET DELAYED RESPONSE at 09:25

## 2022-11-12 RX ADMIN — INSULIN LISPRO 3 UNITS: 100 INJECTION, SOLUTION INTRAVENOUS; SUBCUTANEOUS at 13:47

## 2022-11-12 RX ADMIN — ACETAMINOPHEN 650 MG: 325 TABLET, FILM COATED ORAL at 02:40

## 2022-11-12 RX ADMIN — IPRATROPIUM BROMIDE AND ALBUTEROL SULFATE 3 ML: .5; 3 SOLUTION RESPIRATORY (INHALATION) at 14:48

## 2022-11-12 RX ADMIN — IPRATROPIUM BROMIDE AND ALBUTEROL SULFATE 3 ML: .5; 3 SOLUTION RESPIRATORY (INHALATION) at 06:55

## 2022-11-12 RX ADMIN — Medication 10 ML: at 20:58

## 2022-11-12 RX ADMIN — GUAIFENESIN 600 MG: 600 TABLET, EXTENDED RELEASE ORAL at 09:25

## 2022-11-12 RX ADMIN — Medication 10 ML: at 09:26

## 2022-11-12 RX ADMIN — FUROSEMIDE 20 MG: 10 INJECTION, SOLUTION INTRAMUSCULAR; INTRAVENOUS at 04:35

## 2022-11-12 RX ADMIN — EMPAGLIFLOZIN 10 MG: 10 TABLET, FILM COATED ORAL at 09:25

## 2022-11-12 RX ADMIN — RISPERIDONE 0.5 MG: 0.25 TABLET ORAL at 09:26

## 2022-11-12 RX ADMIN — ASPIRIN 81 MG: 81 TABLET, COATED ORAL at 09:25

## 2022-11-12 RX ADMIN — INSULIN LISPRO 6 UNITS: 100 INJECTION, SOLUTION INTRAVENOUS; SUBCUTANEOUS at 13:48

## 2022-11-12 RX ADMIN — HYDROCODONE BITARTRATE AND ACETAMINOPHEN 1 TABLET: 7.5; 325 TABLET ORAL at 06:30

## 2022-11-12 RX ADMIN — IPRATROPIUM BROMIDE AND ALBUTEROL SULFATE 3 ML: .5; 3 SOLUTION RESPIRATORY (INHALATION) at 20:30

## 2022-11-12 RX ADMIN — BUDESONIDE 0.5 MG: 0.5 INHALANT RESPIRATORY (INHALATION) at 06:59

## 2022-11-12 RX ADMIN — INSULIN LISPRO 5 UNITS: 100 INJECTION, SOLUTION INTRAVENOUS; SUBCUTANEOUS at 17:32

## 2022-11-12 RX ADMIN — HYDROCODONE BITARTRATE AND ACETAMINOPHEN 1 TABLET: 7.5; 325 TABLET ORAL at 20:58

## 2022-11-12 RX ADMIN — BUDESONIDE 0.5 MG: 0.5 INHALANT RESPIRATORY (INHALATION) at 20:36

## 2022-11-12 RX ADMIN — DIVALPROEX SODIUM 500 MG: 500 TABLET, DELAYED RELEASE ORAL at 09:30

## 2022-11-12 RX ADMIN — FUROSEMIDE 20 MG: 10 INJECTION, SOLUTION INTRAMUSCULAR; INTRAVENOUS at 14:09

## 2022-11-12 RX ADMIN — LEVOTHYROXINE SODIUM 50 MCG: 50 TABLET ORAL at 06:30

## 2022-11-12 RX ADMIN — GUAIFENESIN 600 MG: 600 TABLET, EXTENDED RELEASE ORAL at 20:58

## 2022-11-12 RX ADMIN — METOPROLOL SUCCINATE 25 MG: 25 TABLET, FILM COATED, EXTENDED RELEASE ORAL at 09:26

## 2022-11-12 RX ADMIN — INSULIN GLARGINE 18 UNITS: 100 INJECTION, SOLUTION SUBCUTANEOUS at 06:27

## 2022-11-12 NOTE — PLAN OF CARE
Goal Outcome Evaluation:  Plan of Care Reviewed With: patient, spouse        Progress: improving  Outcome Evaluation: Pt had a few complaints to the pressure area to her buttocks. Repositioning and medication helped with that pain. The swelling and reddness to her RUE is continuing to improve

## 2022-11-12 NOTE — PROGRESS NOTES
[facility] Medicine Services Daily Progress Note    Patient Name: Laura Perkins  : 1940  MRN: [id number]  Primary Care Physician:  Beka Weir MD  Date of admission: 10/23/2022      Subjective          Brief interim history:       84-year-old female with known history of cerebrovascular accident, T2DM, hypertension, HLD, bipolar disorder, anemia, S/p bladder surgery,  and history of colon cancer,S/p resection (10/22) . S/p recent colonoscopy which revealed 5 cm mass.  Patient underwent transverse colon resection at [facility] and pathology revealed invasive mucinous adenocarcinoma.  She was subsequently discharged to a local rehab facility on 10/19/2022.  Patient presented to [facility] ED on 10/23/2022 due to 2-day history of generalized body weakness, acute mental status changes/metabolic encephalopathy.  On presentation, she was noted with fever of 102 and tachycardic.  Blood cultures (10/23) which result pending and started on broad-spectrum antibiotic with Zosyn.     10/25/2022: Seen and examined and follow up.  Resting comfortably in bed in no distress or discomfort.      10/26/2022: Seen and examined in follow-up.  Resting comfortably.    10/27/2022: Seen and examined in follow-up.  Event noted for reported patient slipping out of bed last evening with no visible injury on examination.    10/28/2022:  Pt had a cardiac cath with DANIA to LAD.  Psych was consulted to help adjust meds.  Pt is off psych meds secondary to QTc prolongation.  Abilify increase, as well as Depakote for mood stabilization.  Cardiology following with QTc changes as well as EKG.      10/29/2022:  Pt is seen by psych and her meds are titrated.  Pt does not communicate with me and is repeating the words I a saying to her.  Pt is on 2 lit NC that can be titrated down.  Pt will be discharged when her psych meds are adjusted and she is back to her baseline.    10/30/2022:  Pt is very wheezy today and   states, pt is coughing very thick sputum.  Pt is very confused and not reliable historian.  She denies complains.  Pt is on 2 lit NC, and she oxygenating well, but her wheeze is louder today.  Pt think that her new psych meds, are improving her condition.      10/31/2022.  Patient was seen and examined.  Patient's family complained restless legs last night.      11/1/2022.  Patient was seen and examined.  Patient reported no new symptoms.      11/2/2022.  Patient was seen and examined.  Patient's family reported moderate improvement in her symptoms.      11/3/2022.  Patient was seen and examined.  Patient's family reported that patient was coughing all night.  Pulmonary is following.       11/04/2022.  Patient was seen and examined.  Patient's family reported significant improvement in patient's symptoms.      11/05/2022.  Patient was seen and examined.  Patient's family reported no overnight events.  Patient was noted to be coughing.  Tessalon Perles was ordered.      11/6/2022.  Patient was seen and examined.  Patient's family reported moderate improvement in her symptoms.      11/7/2022.  Patient was seen and examined.  Patient is status post thoracentesis.  Family reported musculoskeletal pain.  Pain control was reviewed.      11/8/2022.  Patient was seen and examined.  Patient's family requested a surgical consult to discuss her psych meds.      11/9/2022.  Patient was seen and examined.  Patient's family reported multiple episodes of her limb movement last night.  Psych is following.        11/10/2022.  Patient was seen and examined.  No overnight events noted.      11/11/2022.  Patient was seen and examined.  Patient reported no new symptoms.  Right upper extremity Doppler was completed  Doppler was negative for DVT.      11/12/2022.  Patient was seen and examined.  No overnight events noted.        Objective      Vitals:   Temp:  [97.5 °F (36.4 °C)-99.9 °F (37.7 °C)] 99 °F (37.2 °C)  Heart Rate:  [65-92]  82  Resp:  [12-20] 18  BP: (103-139)/(33-56) 114/42  Flow (L/min):  [2] 2    Physical Exam  Vitals reviewed.   Constitutional:       General: She is not in acute distress.     Appearance: She is ill-appearing.      Comments: Family is at the bedside.   HENT:      Head: Normocephalic and atraumatic.      Nose: Nose normal. No congestion or rhinorrhea.      Mouth/Throat:      Mouth: Mucous membranes are moist.      Pharynx: Oropharynx is clear. No oropharyngeal exudate or posterior oropharyngeal erythema.   Eyes:      Pupils: Pupils are equal, round, and reactive to light.   Cardiovascular:      Pulses: Normal pulses.      Heart sounds: Normal heart sounds. No murmur heard.    No friction rub. No gallop.      Comments: S1 and S2 present.  No tachycardia.  Pulmonary:      Breath sounds: No wheezing, rhonchi or rales.   Chest:      Chest wall: No tenderness.   Abdominal:      General: Abdomen is flat. Bowel sounds are normal. There is no distension.      Palpations: Abdomen is soft. There is no mass.      Tenderness: There is no abdominal tenderness. There is no right CVA tenderness, left CVA tenderness, guarding or rebound.      Hernia: No hernia is present.   Musculoskeletal:         General: No swelling, tenderness, deformity or signs of injury.      Cervical back: Neck supple. No tenderness.      Right lower leg: No edema.      Left lower leg: No edema.   Skin:     Capillary Refill: Capillary refill takes less than 2 seconds.      Coloration: Skin is not jaundiced.      Findings: No bruising, lesion or rash.   Neurological:      Mental Status: She is alert.      Comments: No facial asymmetry noted.  Gait and station not tested.   Psychiatric:      Comments: Patient is slightly agitated.              Result Review    Result Review:  I have personally reviewed the results from the time of this admission to 11/12/2022 18:40 EST and agree with these findings:  [x]  Laboratory  []  Microbiology  [x]  Radiology  []   EKG/Telemetry   [x]  Cardiology/Vascular   []  Pathology  [x]  Old records  []  Other:      Status: Completed Collected: 11/02/22 1224    Updated: 11/02/22 1228   Narrative:     DATE OF EXAM:   11/2/2022 11:00 AM       PROCEDURE:   XR ABDOMEN KUB-       INDICATIONS:   Vomiting; I21.4-Non-ST elevation (NSTEMI) myocardial infarction;   R41.82-Altered mental status, unspecified; R50.9-Fever, unspecified;   J18.9-Pneumonia, unspecified organism; R77.8-Other specified   abnormalities of plasma proteins; E11.9-Type 2 diabetes mellitus without   complications; Z79.4-Long term (current) use of insulin;   E78.5-Hyperlipidemia, unspecified       COMPARISON:   CT chest 10/30/2022, CT abdomen and pelvis without contrast 10/24/2022       TECHNIQUE:   Radiographic view(s) of the abdomen obtained.       FINDINGS:   Included lower lungs are abnormal, better, better evaluated on recent   chest CT. No abnormal small bowel distention is seen in a pattern to   suggest small bowel obstruction, although there is a nonspecific paucity   of gas-filled small bowel loops. Stool is seen in the ascending and   transverse colon.       Impression:     Nonspecific, nonobstructive bowel gas pattern.       Electronically Signed By-Lora Monk MD On:11/2/2022 12:26 PM   This report was finalized on [id number] by  Lora Monk MD.             Wounds (last 24 hours)     LDA Wound     Row Name 11/12/22 1620 11/12/22 1200 11/12/22 0800       Wound 10/24/22 1344  medial sacral spine Pressure Injury    Wound - Properties Group Placement Date: 10/24/22  -MG Placement Time: 1344  -MG Present on Hospital Admission: Y  -MG Side: --  -MG, middle  Orientation: medial  -MG Location: sacral spine  -MG Primary Wound Type: Pressure inj  -MG    Dressing Appearance -- -- intact  -MS    Closure Approximated  -MS Approximated  -MS Approximated  -MS    Base pink  -MS pink  -MS pink  -MS    Periwound redness  -MS redness  -MS redness  -MS    Periwound  Temperature warm  -MS warm  -MS warm  -MS    Drainage Amount none  -MS none  -MS none  -MS    Care, Wound -- -- cleansed with;soap and water  -MS    Dressing Care -- -- skin barrier agent applied  -MS    Periwound Care -- -- dry periwound area maintained  -MS    Retired Wound - Properties Group Placement Date: 10/24/22  -MG Placement Time: 1344  -MG Present on Hospital Admission: Y  -MG Side: --  -MG, middle  Orientation: medial  -MG Location: sacral spine  -MG Primary Wound Type: Pressure inj  -MG    Retired Wound - Properties Group Date first assessed: 10/24/22  -MG Time first assessed: 1344  -MG Present on Hospital Admission: Y  -MG Side: --  -MG, middle  Location: sacral spine  -MG Primary Wound Type: Pressure inj  -MG    Row Name 11/12/22 0400 11/12/22 0000 11/11/22 2000       Wound 10/24/22 1344  medial sacral spine Pressure Injury    Wound - Properties Group Placement Date: 10/24/22  -MG Placement Time: 1344  -MG Present on Hospital Admission: Y  -MG Side: --  -MG, middle  Orientation: medial  -MG Location: sacral spine  -MG Primary Wound Type: Pressure inj  -MG    Closure Approximated  -BE Approximated  -BE Approximated  -BE    Base pink  -BE pink  -BE pink  -BE    Periwound redness  -BE redness  -BE redness  -BE    Periwound Temperature warm  -BE warm  -BE warm  -BE    Care, Wound -- -- cleansed with;soap and water;barrier applied  -BE    Dressing Care skin barrier agent applied  -BE -- skin barrier agent applied  -BE    Retired Wound - Properties Group Placement Date: 10/24/22  -MG Placement Time: 1344  -MG Present on Hospital Admission: Y  -MG Side: --  -MG, middle  Orientation: medial  -MG Location: sacral spine  -MG Primary Wound Type: Pressure inj  -MG    Retired Wound - Properties Group Date first assessed: 10/24/22  -MG Time first assessed: 1344  -MG Present on Hospital Admission: Y  -MG Side: --  -MG, middle  Location: sacral spine  -MG Primary Wound Type: Pressure inj  -MG          User Key  (r) =  Recorded By, (t) = Taken By, (c) = Cosigned By    Initials Name Provider Type    Raven Turcios JEANETTE, RN Registered Nurse    Ilda Torres RN Registered Nurse    Tino Ledesma LPN Licensed Nurse                  Assessment & Plan    From previous notes and with minor updates.    Brief patient summary:    Patient is a 82 y.o. female with past medical history of GERD, hypothyroidism, bipolar 1 disorder, diabetes mellitus type 2, hypertension, colon cancer, hyperlipidemia, anemia and a stroke unknown who presented to [facility] on 10/23/2022 [facility] she had been staying since 10/19/2022 after a transverse colon resection for 5 cm mass found on colonoscopy,  at [facility] on 10/18/2022.  Pathology report in progress.  Family in [facility] reported progressive weakness lethargy and alteration in mental status over the past 2 days.She appears frail, will awaken to voice and answer but falls back to sleep. She is oriented to self and follows simple commands.  and family at bedside assisting with history .  There is no slurred speech or facial droop.  No focal deficits.  She denies headache.  She does report some chest pain.family reports shortness of air and cough productive of brown sputum.  Temperature was 102 on admission, BP was stable she was tachycardic and tachypneic.  She does not normally use home oxygen and is now stable on 4 L via nasal cannula.  She triggered sepsis.  WBCs 13.3, lactic acid normal at 0.8 procalcitonin elevated at 0.32.  Urinalysis was negative for infection.  Chest x-ray per radiology indicated a possible small left midlung infiltrate and bronchitis.  She was started on IV vancomycin and IV cefepime in ED for hospital-acquired pneumonia.  Family reports no past medical history of heart failure or coronary artery disease.  Troponin mildly elevated at 0.87.  Patient reported chest pain but  could give no  other details.  May be secondary to tachycardia and chest pain pleuritic, however serial troponins and continuous cardiac monitoring have been ordered. EKG shows no acute ST elevation. and family report she is a DNR with no CPR however they agree to intubation if needed and full support otherwise.  Creatinine mildly elevated at 1.13 BUN 33.    Family reports no history of chronic renal failure.PMH includes hypothyroidism, Bipolar disorder, Diabetes Mellitus type 2 with glucose today 439 and overactive bladder post re-suspension with mesh.  She was given an IV fluid bolus in ED and will be admitted for antibiotics for possible pneumonia with blood cultures pending.  Family concerned about possible aspiration.  He reports she had her esophagus stretched a few months ago and underwent a swallow study prior to stretching which showed esophageal dysmotility. They noticed recently after her being intubated for surgery she spits up food.  Speech has been consulted.  Aspiration precautions in place.          aspirin, 81 mg, Oral, Daily  budesonide, 0.5 mg, Nebulization, BID - RT  clopidogrel, 75 mg, Oral, Daily  divalproex, 500 mg, Oral, BID  empagliflozin, 10 mg, Oral, Daily  ferrous sulfate, 324 mg, Oral, Daily With Breakfast  FLUoxetine, 20 mg, Oral, Daily  folic acid, 1 mg, Oral, Daily  furosemide, 20 mg, Intravenous, Q12H  guaiFENesin, 600 mg, Oral, Q12H  insulin glargine, 18 Units, Subcutaneous, QAM  insulin lispro, 3-14 Units, Subcutaneous, TID With Meals  insulin lispro, 6 Units, Subcutaneous, TID With Meals  ipratropium-albuterol, 3 mL, Nebulization, 4x Daily - RT  levothyroxine, 50 mcg, Oral, Daily  losartan, 25 mg, Oral, Q24H  magnesium oxide, 400 mg, Oral, Daily  metoprolol succinate XL, 25 mg, Oral, Q24H  pantoprazole, 40 mg, Oral, QAM  risperiDONE, 0.5 mg, Oral, Daily  rosuvastatin, 20 mg, Oral, Daily  Scopolamine, 1 patch, Transdermal, Q72H  spironolactone, 25 mg, Oral, Daily             Active  Hospital Problems:  Active Hospital Problems    Diagnosis    • **Altered mental status, unspecified altered mental status type    • Acute respiratory failure with hypoxia (HCC)    • Pleural effusion    • HAP (hospital-acquired pneumonia)    • Sepsis (HCC)    • Elevated troponin    • Acute respiratory distress    • Type 2 diabetes mellitus (HCC)    • Anxiety associated with depression    • Hypothyroidism (acquired)    • OAB (overactive bladder)    • GERD without esophagitis    • Acute non-ST elevation myocardial infarction (NSTEMI) (HCC)    • Dyslipidemia    • Cancer of transverse colon (HCC)           Assessment/plan:      -Continue appropriate patient's home medications for other chronic medical conditions.  -Continue the present level of care.  -Patient and family agreed with the plan of care.    Pleural effusion.  -Patient is status post thoracentesis.  -Follow pulmonary recommendations.      Cough.  -Much improved.  -Treat with Tessalon Perles.  -Patient is also on Mucinex.     Acute toxic metabolic encephalopathy/AMS      -resolving, and likely multifactorial.  Psych started pt on Abilify and adjusted Depakote for mood stabilization.       -Pt is still confused, and can't give clear ROS.  Meds to be adjusted by Psych.     Probable sepsis      -off Zosyn, now on Augmentin      -Duo nebs and Mucomyst nebs added for thick secretions and wheezing     Pneumonia involving the left lung      -Continue Augmentin, and supportive care      NSTEMI/Elevated troponin       -scheduled for C (10/27), DANIA to LAD.       -Cp free and in the setting of suspected sepsis     History of colorectal cancer (invasive mucinous adenocarcinoma)      -S/p resection (10/22)     Dysphagia      -followed by speech pathologist      CVA      -Aspirin/Crestor     T2DM      -SSI/Lantus     Hypertension     HLD     -Crestor     Hypothyroidism      -Levothyroxine     Depression/anxiety      -Risperidone on hold 2/2 increase QT prolongation and  consult Psychiatrist        -Depakote dose adjustment and initiation of Abilify, per Psych.      -Pt is still not at baseline, and is not communicating well, and can't give ROS.       DVT prophylaxis:  Mechanical DVT prophylaxis orders are present.    CODE STATUS:    Medical Intervention Limits: NO intubation (DNI)  Level Of Support Discussed With: Next of Kin (If No Surrogate); Health Care Surrogate  Code Status (Patient has no pulse and is not breathing): No CPR (Do Not Attempt to Resuscitate)  Medical Interventions (Patient has pulse or is breathing): Limited Support  Release to patient: Routine Release       Disposition: This wonderful patient can discharge tomorrow to rehab facility versus SNF.      Electronically signed by Alejandro Estrada MD, FACP, 11/12/22, 18:40 CHERELLE.        [facility] Hospitalist Team

## 2022-11-12 NOTE — PROGRESS NOTES
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 female presented to [facility] ER on 10/23/2022 for mental status changes.  She was residing at [facility] and for 2 days she was noticed to be more lethargic and she was not speaking as much.  Her O2 saturations were mildly low and on admission to the ER she had a fever of 102.  She was diagnosed with pneumonia.  White count 13.3, hemoglobin 11.6, MCV 77.6 and platelets 380.  CMP was unremarkable.  Head CT showed no acute abnormality, there were some chronic small vessel ischemia changes.  CT A/P showed no nephrolithiasis.  There was no GI or  obstruction.  Large stool burden was present.  There is no evidence of acute abnormality.  Bibasilar consolidations were seen and subacute moderate compression fractures of L3 and L5 were present without malalignment.  Chest CT was limited due to respiratory motion artifact.  There were moderate to large layering bilateral pleural effusions with compressive atelectasis on both lungs.  Airspace disease was seen in bilateral lower lobes suggesting multifocal pneumonia.  Cardiomegaly with trace pericardial effusion was present as well as coronary artery atherosclerotic vascular disease.  Her troponin was elevated and on 10/27/2022 she underwent a cardiac catheterization by Dr. Miguel.  A drug-eluting stent was placed to the LAD.  TSH 10.81 (0.27-4.2), with history of hypothyroidism.  Follow-up chest CT on 11/3/2022, showed evolving extensive airspace consolidations and new right apex opacity and moderate bilateral pleural effusions.  Anasarca had improved.  On 2022, she underwent a left thoracentesis, pathology showed atypical cells (mesothelial cells versus malignant) and fluid was consistent with  transudate.  Her last CBC on 11/6/2022, showed a white count of 17.6, hemoglobin 8.6, MCV 77.8 and platelets 505.  Chest x-ray showed cardiomegaly left lower lobe opacities and mild pulmonary congestion.  Echocardiogram showed an EF of 45-50% and a small pericardial effusion.  Palliative care has been consulted and the patient is DNR and possibly interested in hospice care.  Additional disciplines consulted include endocrine, pulmonary, psychiatry, cardiology and gastroenterology.  Discharged from [facility] on 10/21/2022 following a 3-day admission for a laparoscopic partial transverse colectomy performed by Dr. PERI Michelle.  She had undergone an EGD/colonoscopy by Dr. Pope as an outpatient on 9/21/22 and was found to have a 5 cm mass in the transverse colon. CT a/p showed an abnormal colon wall thickening in the mid transverse colon with a prominent adjacent mesenteric lymph node.  There was a new L5 fx and L3 fx with some height loss.  The duodenal polyp path showed polypoid peptic duodenitis with prominent Brunner's glands.  A gastric polyp was hyperplastic, foveolar type.  Multiple colon polyps were removed that were tubular adenomas, a single serrated polyp with focal lamina propria spindle cell proliferation, hyperplastic polyp, a low grade dysplastic tubular adenoma and the transverse mass was invasive carcinoma.  Molecular testing revealed the mass to have loss of nuclear expression on IHC in MLH1 and PMS2.  Her 2 Jason was neg (1+).  BRAF mutation was detected codon 600 /D.  She was referred to [facility] but was too sick to be seen at the time of the appt.    Her colectomy was uneventful.  MSI on the tumor was the same. 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  Tumor size was 11 cm with invasion thru the muscularis propria and pericolonic tissue.  Margins were neg for malignancy.  There was no lymphovascular or perineural invasion.  0/20  lymph nodes were involved. Stage IIA (pT3, pN0).         11/09/22  Hematology/Oncology was consulted for her diagnosis of invasive moderately differentiated mucinous adenocarcinoma.       Past Medical History: DM in the past few years.  Bipolar disorder, hypothyroidism, CVA.  Surgical History:Hysterectomy in 1983 for uterine prolapse.    Social History:She lives in [place] with her .  She has been a housewife.  She did not smoke or drink alcohol.   Family History: Her brother had throat cancer.  Allergies: NKA     PCP: Beka Weir MD    INTERVAL HISTORY:  • 11/10/2022- iron 38 (), iron saturation 16 (20-50), TIBC 238 (298-536), ferritin 197 ().  Initiated on Ferrlecit 250 mg IV daily x2.  • 11/10/2022- white blood cell count 7.4, hemoglobin 7.4, platelets 429.  D-dimer 4.15 (< 0.6).  Retic 3.71.  Creatinine 1.43.  Normal bilateral lower extremity venous Dopplers.  Lung perfusion scan low probability PE.  • 11/11/2022- WBC 19.3, hemoglobin 7.4, platelet count 472,000.  Vitamin B12 632 (211-946), haptoglobin 399 (), folate 3.86 (4.70-24.2).  • 11/12/2022- WBC 16, hemoglobin 7.5.  SPEP with M spike not observed.    History of present illness reviewed since last visit and changes noted on 11/12/22.    Subjective   Restless night with pain in the bottom and stomach.    ROS:  Review of Systems   Constitutional: Negative for activity change, chills, fatigue, fever and unexpected weight change.   HENT: Negative for congestion, dental problem, hearing loss, mouth sores, nosebleeds, sore throat and trouble swallowing.    Eyes: Negative for photophobia and visual disturbance.   Respiratory: Negative for cough, chest tightness and shortness of breath.    Cardiovascular: Negative for chest pain, palpitations and leg swelling.   Gastrointestinal: Positive for abdominal pain. Negative for abdominal distention, blood in stool, constipation, diarrhea, nausea and vomiting.   Endocrine: Negative for  cold intolerance and heat intolerance.   Genitourinary: Negative for decreased urine volume, difficulty urinating, dysuria, frequency, hematuria and urgency.   Musculoskeletal: Positive for back pain. Negative for arthralgias and gait problem.   Skin: Negative for rash and wound.   Neurological: Negative for dizziness, tremors, weakness, light-headedness, numbness and headaches.   Hematological: Negative for adenopathy. Does not bruise/bleed easily.   Psychiatric/Behavioral: Negative for confusion and hallucinations. The patient is not nervous/anxious.    All other systems reviewed and are negative.       MEDICATIONS:    Scheduled Meds:  aspirin, 81 mg, Oral, Daily  budesonide, 0.5 mg, Nebulization, BID - RT  clopidogrel, 75 mg, Oral, Daily  divalproex, 500 mg, Oral, BID  docusate sodium, 100 mg, Oral, BID  empagliflozin, 10 mg, Oral, Daily  ferrous sulfate, 324 mg, Oral, Daily With Breakfast  FLUoxetine, 20 mg, Oral, Daily  folic acid, 1 mg, Oral, Daily  furosemide, 20 mg, Intravenous, Q12H  guaiFENesin, 600 mg, Oral, Q12H  insulin glargine, 18 Units, Subcutaneous, QAM  insulin lispro, 3-14 Units, Subcutaneous, TID With Meals  insulin lispro, 6 Units, Subcutaneous, TID With Meals  ipratropium-albuterol, 3 mL, Nebulization, 4x Daily - RT  levothyroxine, 50 mcg, Oral, Daily  losartan, 25 mg, Oral, Q24H  magnesium oxide, 400 mg, Oral, Daily  metoprolol succinate XL, 25 mg, Oral, Q24H  pantoprazole, 40 mg, Oral, QAM  polyethylene glycol, 17 g, Oral, Daily  risperiDONE, 0.5 mg, Oral, Daily  rosuvastatin, 20 mg, Oral, Daily  Scopolamine, 1 patch, Transdermal, Q72H  spironolactone, 25 mg, Oral, Daily       Continuous Infusions:      PRN Meds:  •  acetaminophen  •  acetaminophen  •  acetaminophen  •  benzonatate  •  Calcium Gluconate-NaCl **AND** calcium gluconate **AND** Calcium, Ionized  •  dextrose  •  dextrose  •  glucagon (human recombinant)  •  HYDROcodone-acetaminophen  •  LORazepam  •  magnesium sulfate **OR**  "magnesium sulfate **OR** magnesium sulfate  •  phenol  •  potassium & sodium phosphates **OR** potassium & sodium phosphates  •  potassium chloride  •  potassium chloride  •  [COMPLETED] Insert peripheral IV **AND** sodium chloride     ALLERGIES:  No Known Allergies    Objective    VITALS:   /53   Pulse 92   Temp 97.5 °F (36.4 °C) (Oral)   Resp 18   Ht 162.6 cm (64\")   Wt 54.8 kg (120 lb 13 oz)   SpO2 100%   BMI 20.74 kg/m²     PHYSICAL EXAM:  Physical Exam  Vitals and nursing note reviewed.   Constitutional:       General: She is not in acute distress.     Appearance: Normal appearance. She is well-developed and normal weight. She is not toxic-appearing or diaphoretic.   HENT:      Head: Normocephalic and atraumatic.      Comments: Alopecia.     Right Ear: External ear normal.      Left Ear: External ear normal.      Nose: Nose normal.      Comments: Nasal cannula O2.     Mouth/Throat:      Mouth: Mucous membranes are moist.      Pharynx: Oropharynx is clear. No oropharyngeal exudate or posterior oropharyngeal erythema.      Comments: Dental fillings.  Eyes:      General: No scleral icterus.     Extraocular Movements: Extraocular movements intact.      Conjunctiva/sclera: Conjunctivae normal.      Pupils: Pupils are equal, round, and reactive to light.   Cardiovascular:      Rate and Rhythm: Normal rate and regular rhythm.      Heart sounds: Normal heart sounds. No murmur heard.     Comments: Cardiac monitor leads.  Pulmonary:      Effort: Pulmonary effort is normal. No respiratory distress.      Breath sounds: Normal breath sounds. No wheezing or rales.   Abdominal:      General: Bowel sounds are normal. There is no distension.      Palpations: Abdomen is soft. There is no mass.      Tenderness: There is no abdominal tenderness. There is no guarding.      Comments: Midline vertical scar above umbilicus.   Genitourinary:     Comments: Deferred   Musculoskeletal:         General: No swelling, tenderness " or deformity. Normal range of motion.      Cervical back: Normal range of motion and neck supple. No rigidity.      Right lower leg: No edema.      Left lower leg: No edema.      Comments: Left upper extremity O2 monitor.     Lymphadenopathy:      Cervical: No cervical adenopathy.      Upper Body:      Right upper body: No supraclavicular adenopathy.      Left upper body: No supraclavicular adenopathy.   Skin:     General: Skin is warm and dry.      Coloration: Skin is not pale.      Findings: No bruising, erythema or rash.      Comments: Right upper extremity IV.   Neurological:      General: No focal deficit present.      Mental Status: She is alert.      Coordination: Coordination normal.   Psychiatric:         Mood and Affect: Mood normal.         Behavior: Behavior normal.         Thought Content: Thought content normal.         Judgment: Judgment normal.           RECENT LABS:  Lab Results (last 24 hours)     Procedure Component Value Units Date/Time    POC Glucose Once [[id number]]  (Abnormal) Collected: 11/12/22 0754    Specimen: Blood Updated: 11/12/22 0756     Glucose 212 mg/dL      Comment: Serial Number: [id number]:  [id number]       Basic Metabolic Panel [[id number]]  (Abnormal) Collected: 11/12/22 0511    Specimen: Blood Updated: 11/12/22 0704     Glucose 163 mg/dL      BUN 35 mg/dL      Creatinine 1.02 mg/dL      Sodium 141 mmol/L      Potassium 4.1 mmol/L      Chloride 100 mmol/L      CO2 29.0 mmol/L      Calcium 8.9 mg/dL      BUN/Creatinine Ratio 34.3     Anion Gap 12.0 mmol/L      eGFR 54.7 mL/min/1.73      Comment: National Kidney Foundation and American Society of Nephrology (ASN) Task Force recommended calculation based on the Chronic Kidney Disease Epidemiology Collaboration (CKD-EPI) equation refit without adjustment for race.       Narrative:      GFR Normal >60  Chronic Kidney Disease <60  Kidney Failure <15    The GFR formula is only valid for adults with stable renal function between  ages 18 and 70.    CBC & Differential [[id number]]  (Abnormal) Collected: 11/12/22 0512    Specimen: Blood Updated: 11/12/22 0639    Narrative:      The following orders were created for panel order CBC & Differential.  Procedure                               Abnormality         Status                     ---------                               -----------         ------                     CBC Auto Differential[[id number]]        Abnormal            Final result                 Please view results for these tests on the individual orders.    CBC Auto Differential [[id number]]  (Abnormal) Collected: 11/12/22 0512    Specimen: Blood Updated: 11/12/22 0639     WBC 16.00 10*3/mm3      RBC 3.20 10*6/mm3      Hemoglobin 7.5 g/dL      Hematocrit 25.2 %      MCV 79.0 fL      MCH 23.5 pg      MCHC 29.8 g/dL      RDW 18.4 %      RDW-SD 51.6 fl      MPV 7.2 fL      Platelets 428 10*3/mm3      Neutrophil % 83.9 %      Lymphocyte % 8.1 %      Monocyte % 6.2 %      Eosinophil % 1.5 %      Basophil % 0.3 %      Neutrophils, Absolute 13.40 10*3/mm3      Lymphocytes, Absolute 1.30 10*3/mm3      Monocytes, Absolute 1.00 10*3/mm3      Eosinophils, Absolute 0.20 10*3/mm3      Basophils, Absolute 0.00 10*3/mm3      nRBC 0.0 /100 WBC     POC Glucose Once [[id number]]  (Abnormal) Collected: 11/11/22 1734    Specimen: Blood Updated: 11/11/22 1735     Glucose 382 mg/dL      Comment: Serial Number: [id number]:  [id number]       Urinalysis, Microscopic Only - Urine, Clean Catch [[id number]]  (Abnormal) Collected: 11/11/22 1332    Specimen: Urine, Clean Catch Updated: 11/11/22 1412     RBC, UA 0-2 /HPF      WBC, UA 0-2 /HPF      Bacteria, UA 1+ /HPF      Squamous Epithelial Cells, UA 3-6 /HPF      Yeast, UA Moderate/2+ Budding Yeast /HPF      Hyaline Casts, UA None Seen /LPF      Methodology Manual Light Microscopy    Urinalysis With Microscopic If Indicated (No Culture) - Urine, Clean Catch [[id number]]  (Abnormal) Collected: 11/11/22 1332     Specimen: Urine, Clean Catch Updated: 11/11/22 1412     Color, UA Yellow     Appearance, UA Cloudy     pH, UA 7.0     Specific Gravity, UA 1.023     Glucose, UA >=1000 mg/dL (3+)     Ketones, UA Negative     Bilirubin, UA Negative     Blood, UA Negative     Protein, UA Negative     Leuk Esterase, UA Trace     Nitrite, UA Negative     Urobilinogen, UA 0.2 E.U./dL    Protein Elec + Interp, Serum [[id number]]  (Abnormal) Collected: 11/10/22 0348    Specimen: Blood Updated: 11/11/22 1310     Total Protein 5.3 g/dL      Albumin 1.7 g/dL      Alpha-1-Globulin 0.4 g/dL      Alpha-2-Globulin 0.9 g/dL      Beta Globulin 1.1 g/dL      Gamma Globulin 1.1 g/dL      M-Irwin Not Observed g/dL      Globulin 3.6 g/dL      A/G Ratio 0.5     Please note Comment     Comment: Protein electrophoresis scan will follow via computer, mail, or   delivery.        SPE Interpretation Comment     Comment: The SPE pattern reflects hypoalbuminemia. Evidence of monoclonal  protein is not apparent.       Narrative:      Performed at:  [address] [place] [address]  [id number]  : Eleazar Thurman PhD, Phone:  [phone]    POC Glucose Once [[id number]]  (Abnormal) Collected: 11/11/22 1137    Specimen: Blood Updated: 11/11/22 1138     Glucose 252 mg/dL      Comment: Serial Number: [id number]:  [id number]       Blood Culture - Blood, Hand, Left [[id number]] Collected: 11/11/22 1107    Specimen: Blood from Hand, Left Updated: 11/11/22 1110    Blood Culture - Blood, Arm, Left [[id number]] Collected: 11/11/22 1040    Specimen: Blood from Arm, Left Updated: 11/11/22 1044          PENDING RESULTS: Copper.    IMAGING REVIEWED:  NM Lung Scan Perfusion Particulate    Result Date: 11/10/2022   1. Patchy decreased perfusion to the left lung has a similar distribution to opacities on chest x-ray. Findings likely reflect changes of multifocal pneumonia. Pulmonary embolism would be less favored.  Electronically Signed  By-Jonathan Olivier MD On:11/10/2022 1:03 PM This report was finalized on [id number] by  Jonathan Olivier MD.    XR Chest 1 View    Result Date: 11/11/2022  Stable chest demonstrate cardiomegaly with pulmonary vascular congestion, and left basilar airspace disease with small left pleural effusion  Electronically Signed By-Db Argueta On:11/11/2022 7:43 AM This report was finalized on [id number] by  Db Argueta, .      I have reviewed the patient's labs, imaging, reports, and other clinician documentation.    Assessment & Plan   ASSESSMENT:  1. 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  Given early stage, her age and performance status, observation only is recommended.  Could do genetic evaluation for peoples syndrome as an OP.   2. Leukocytosis-due to sepsis and PNA.    Afebrile.  3. Chronic microcytic anemia-anemia work-up in progress.  S/p 1 dose of Ferrlecit 125mg on 10/24, Ferrlecit 250 mg IV daily x2 starting 11/9 and on oral Fe.  On Protonix.  Iron studies confirm BRANDY.  Retic appropriately elevated.  B12 normal, haptoglobin high and folate level low-replacement started.  SPEP with no M spike.  4. Acute thrombocytosis-due to BRANDY and inflammatory response.  5. H/o CVA an elevated D-dimer-CT head w/o acute stroke.  On ASA and Plavix.    D-dimer elevated.  Lower extremity venous Dopplers and nuclear medicine perfusion scans negative.     6. Respiratory failure and PNA-s/p abx.  Per pulmonary.   7. CHF, HTN, HLD, pericardial effusion, S/p PCI x 1, NSTEMI-s/p heart cath.  On dual platelet therapy for 6 months. Per Cardiology.    8. Compression fractures, DM, hypothyroidism, acute renal insufficiency and bipolar disorder-per Endocrine, PMD and psychiatry.      PLAN    1. Copper level pending.   2. Continue folate replacement 1 mg p.o. daily.  3. We will follow  CBC and recommend to transfuse for hemoglobin < 7.   4. Genetic evaluation for peoples syndrome as an OP.   5. Observation with CT's and colonoscopy in 1 year unless patient opts for hospice.                    I discussed the patient's findings and my recommendations with patient and spouse.    Part of this note may be an electronic transcription/translation of spoken language to printed text using the Dragon Dictation System.    Electronically signed by Abdiel Contreras MD, 11/12/22, 10:06 AM EST.

## 2022-11-12 NOTE — PROGRESS NOTES
[facility] Medicine Services Daily Progress Note    Patient Name: Laura Perkins  : 1940  MRN: [id number]  Primary Care Physician:  Beka Weir MD  Date of admission: 10/23/2022      Subjective          Brief interim history:       84-year-old female with known history of cerebrovascular accident, T2DM, hypertension, HLD, bipolar disorder, anemia, S/p bladder surgery,  and history of colon cancer,S/p resection (10/22) . S/p recent colonoscopy which revealed 5 cm mass.  Patient underwent transverse colon resection at [facility] and pathology revealed invasive mucinous adenocarcinoma.  She was subsequently discharged to a local rehab facility on 10/19/2022.  Patient presented to [facility] ED on 10/23/2022 due to 2-day history of generalized body weakness, acute mental status changes/metabolic encephalopathy.  On presentation, she was noted with fever of 102 and tachycardic.  Blood cultures (10/23) which result pending and started on broad-spectrum antibiotic with Zosyn.     10/25/2022: Seen and examined and follow up.  Resting comfortably in bed in no distress or discomfort.      10/26/2022: Seen and examined in follow-up.  Resting comfortably.    10/27/2022: Seen and examined in follow-up.  Event noted for reported patient slipping out of bed last evening with no visible injury on examination.    10/28/2022:  Pt had a cardiac cath with DANIA to LAD.  Psych was consulted to help adjust meds.  Pt is off psych meds secondary to QTc prolongation.  Abilify increase, as well as Depakote for mood stabilization.  Cardiology following with QTc changes as well as EKG.      10/29/2022:  Pt is seen by psych and her meds are titrated.  Pt does not communicate with me and is repeating the words I a saying to her.  Pt is on 2 lit NC that can be titrated down.  Pt will be discharged when her psych meds are adjusted and she is back to her baseline.    10/30/2022:  Pt is very wheezy today and   states, pt is coughing very thick sputum.  Pt is very confused and not reliable historian.  She denies complains.  Pt is on 2 lit NC, and she oxygenating well, but her wheeze is louder today.  Pt think that her new psych meds, are improving her condition.      10/31/2022.  Patient was seen and examined.  Patient's family complained restless legs last night.      11/1/2022.  Patient was seen and examined.  Patient reported no new symptoms.      11/2/2022.  Patient was seen and examined.  Patient's family reported moderate improvement in her symptoms.      11/3/2022.  Patient was seen and examined.  Patient's family reported that patient was coughing all night.  Pulmonary is following.       11/04/2022.  Patient was seen and examined.  Patient's family reported significant improvement in patient's symptoms.      11/05/2022.  Patient was seen and examined.  Patient's family reported no overnight events.  Patient was noted to be coughing.  Tessalon Perles was ordered.      11/6/2022.  Patient was seen and examined.  Patient's family reported moderate improvement in her symptoms.      11/7/2022.  Patient was seen and examined.  Patient is status post thoracentesis.  Family reported musculoskeletal pain.  Pain control was reviewed.      11/8/2022.  Patient was seen and examined.  Patient's family requested a surgical consult to discuss her psych meds.      11/9/2022.  Patient was seen and examined.  Patient's family reported multiple episodes of her limb movement last night.  Psych is following.        11/10/2022.  Patient was seen and examined.  No overnight events noted.      11/11/2022.  Patient was seen and examined.  Patient reported no new symptoms.  Right upper extremity Doppler was completed  Doppler was negative for DVT.        Objective      Vitals:   Temp:  [98 °F (36.7 °C)-100.4 °F (38 °C)] 98 °F (36.7 °C)  Heart Rate:  [] 88  Resp:  [18-24] 18  BP: (112-158)/(37-65) 132/37  Flow (L/min):  [2]  2    Physical Exam  Vitals reviewed.   Constitutional:       Appearance: She is ill-appearing.      Comments: Patient is lying comfortably in bed and in no acute distress.   HENT:      Head: Normocephalic and atraumatic.      Nose: Nose normal. No congestion or rhinorrhea.      Mouth/Throat:      Mouth: Mucous membranes are moist.      Pharynx: Oropharynx is clear. No oropharyngeal exudate or posterior oropharyngeal erythema.   Eyes:      Pupils: Pupils are equal, round, and reactive to light.   Cardiovascular:      Pulses: Normal pulses.      Heart sounds: Normal heart sounds. No murmur heard.    No friction rub. No gallop.      Comments: S1 and S2 present.  No tachycardia.  Pulmonary:      Effort: No respiratory distress.      Breath sounds: No wheezing, rhonchi or rales.      Comments: Improved air entry bilaterally.  Chest:      Chest wall: No tenderness.   Abdominal:      General: Abdomen is flat. Bowel sounds are normal. There is no distension.      Palpations: Abdomen is soft. There is no mass.      Tenderness: There is no abdominal tenderness. There is no right CVA tenderness, left CVA tenderness, guarding or rebound.      Hernia: No hernia is present.   Musculoskeletal:         General: No swelling, tenderness, deformity or signs of injury.      Cervical back: Neck supple. No tenderness.      Right lower leg: No edema.      Left lower leg: No edema.   Skin:     Capillary Refill: Capillary refill takes less than 2 seconds.      Coloration: Skin is not jaundiced.      Findings: No bruising, lesion or rash.   Neurological:      Mental Status: She is alert.      Comments: No facial asymmetry noted.  Gait and station not tested.   Psychiatric:      Comments: Patient is slightly agitated.              Result Review    Result Review:  I have personally reviewed the results from the time of this admission to 11/11/2022 19:36 EST and agree with these findings:  [x]  Laboratory  []  Microbiology  [x]  Radiology  []   EKG/Telemetry   [x]  Cardiology/Vascular   []  Pathology  [x]  Old records  []  Other:      Status: Completed Collected: 11/02/22 1224    Updated: 11/02/22 1228   Narrative:     DATE OF EXAM:   11/2/2022 11:00 AM       PROCEDURE:   XR ABDOMEN KUB-       INDICATIONS:   Vomiting; I21.4-Non-ST elevation (NSTEMI) myocardial infarction;   R41.82-Altered mental status, unspecified; R50.9-Fever, unspecified;   J18.9-Pneumonia, unspecified organism; R77.8-Other specified   abnormalities of plasma proteins; E11.9-Type 2 diabetes mellitus without   complications; Z79.4-Long term (current) use of insulin;   E78.5-Hyperlipidemia, unspecified       COMPARISON:   CT chest 10/30/2022, CT abdomen and pelvis without contrast 10/24/2022       TECHNIQUE:   Radiographic view(s) of the abdomen obtained.       FINDINGS:   Included lower lungs are abnormal, better, better evaluated on recent   chest CT. No abnormal small bowel distention is seen in a pattern to   suggest small bowel obstruction, although there is a nonspecific paucity   of gas-filled small bowel loops. Stool is seen in the ascending and   transverse colon.       Impression:     Nonspecific, nonobstructive bowel gas pattern.       Electronically Signed By-Lora Monk MD On:11/2/2022 12:26 PM   This report was finalized on [id number] by  Lora Monk MD.             Wounds (last 24 hours)     LDA Wound     Row Name 11/11/22 1600 11/11/22 1200 11/11/22 0800       Wound 10/24/22 1344  medial sacral spine Pressure Injury    Wound - Properties Group Placement Date: 10/24/22  -MG Placement Time: 1344  -MG Present on Hospital Admission: Y  -MG Side: --  -MG, middle  Orientation: medial  -MG Location: sacral spine  -MG Primary Wound Type: Pressure inj  -MG    Pressure Injury Stage DTPI  -JH -- --    Dressing Appearance intact  -JH -- --    Closure Approximated  -JH Approximated  -JH Approximated  -RB    Base pink  -JH pink  -JH pink  -RB    Periwound redness  -JH redness   -JH redness  -RB    Retired Wound - Properties Group Placement Date: 10/24/22  -MG Placement Time: 1344  -MG Present on Hospital Admission: Y  -MG Side: --  -MG, middle  Orientation: medial  -MG Location: sacral spine  -MG Primary Wound Type: Pressure inj  -MG    Retired Wound - Properties Group Date first assessed: 10/24/22  -MG Time first assessed: 1344  -MG Present on Hospital Admission: Y  -MG Side: --  -MG, middle  Location: sacral spine  -MG Primary Wound Type: Pressure inj  -MG    Row Name 11/11/22 0400 11/11/22 0000 11/10/22 2000       Wound 10/24/22 1344  medial sacral spine Pressure Injury    Wound - Properties Group Placement Date: 10/24/22  -MG Placement Time: 1344  -MG Present on Hospital Admission: Y  -MG Side: --  -MG, middle  Orientation: medial  -MG Location: sacral spine  -MG Primary Wound Type: Pressure inj  -MG    Dressing Appearance intact  -BE dry;intact;open to air  -BE --    Closure Approximated  -BE Approximated  -BE Approximated  -BE    Base pink  -BE pink  -BE pink  -BE    Periwound redness  -BE redness  -BE redness  -BE    Care, Wound barrier applied  -BE barrier applied  -BE barrier applied;soap and water  -BE    Dressing Care skin barrier agent applied  -BE open to air;skin barrier agent applied  -BE open to air  -BE    Retired Wound - Properties Group Placement Date: 10/24/22  -MG Placement Time: 1344  -MG Present on Hospital Admission: Y  -MG Side: --  -MG, middle  Orientation: medial  -MG Location: sacral spine  -MG Primary Wound Type: Pressure inj  -MG    Retired Wound - Properties Group Date first assessed: 10/24/22  -MG Time first assessed: 1344  -MG Present on Hospital Admission: Y  -MG Side: --  -MG, middle  Location: sacral spine  -MG Primary Wound Type: Pressure inj  -MG          User Key  (r) = Recorded By, (t) = Taken By, (c) = Cosigned By    Initials Name Provider Type    Toño Erwin, RN Registered Nurse    Ilda Torres RN Registered Nurse    CHRISTIANA Valero,  HAYDEE Porter Registered Nurse    BE Tino Campa LPN Licensed Nurse                  Assessment & Plan    From previous notes and with minor updates.    Brief patient summary:    Patient is a 82 y.o. female with past medical history of hypothyroidism, bipolar 1 disorder, diabetes mellitus type 2, GERD, hypertension, colon cancer, hyperlipidemia, anemia and a stroke unknown who presented to [facility] on 10/23/2022 upon transfer from [facility] rehab where she had been staying since 10/19/2022 after a transverse colon resection for 5 cm mass found on colonoscopy,  at [facility] on 10/18/2022.  Pathology report in progress.  Family in [facility] reported progressive weakness lethargy and alteration in mental status over the past 2 days.She appears frail, will awaken to voice and answer but falls back to sleep. She is oriented to self and follows simple commands.  and family at bedside assisting with history .  There is no slurred speech or facial droop.  No focal deficits.  She denies headache.  She does report some chest pain.family reports shortness of air and cough productive of brown sputum.  Temperature was 102 on admission, BP was stable she was tachycardic and tachypneic.  She does not normally use home oxygen and is now stable on 4 L via nasal cannula.  She triggered sepsis.  WBCs 13.3, lactic acid normal at 0.8 procalcitonin elevated at 0.32.  Urinalysis was negative for infection.  Chest x-ray per radiology indicated a possible small left midlung infiltrate and bronchitis.  She was started on IV vancomycin and IV cefepime in ED for hospital-acquired pneumonia.  Family reports no past medical history of heart failure or coronary artery disease.  Troponin mildly elevated at 0.87.  Patient reported chest pain but  could give no other details.  May be secondary to tachycardia and chest pain pleuritic, however serial troponins and continuous cardiac monitoring have  been ordered. EKG shows no acute ST elevation. and family report she is a DNR with no CPR however they agree to intubation if needed and full support otherwise.  Creatinine mildly elevated at 1.13 BUN 33.    Family reports no history of chronic renal failure.PMH includes hypothyroidism, Bipolar disorder, Diabetes Mellitus type 2 with glucose today 439 and overactive bladder post re-suspension with mesh.  She was given an IV fluid bolus in ED and will be admitted for antibiotics for possible pneumonia with blood cultures pending.  Family concerned about possible aspiration.  He reports she had her esophagus stretched a few months ago and underwent a swallow study prior to stretching which showed esophageal dysmotility. They noticed recently after her being intubated for surgery she spits up food.  Speech has been consulted.  Aspiration precautions in place.          aspirin, 81 mg, Oral, Daily  budesonide, 0.5 mg, Nebulization, BID - RT  clopidogrel, 75 mg, Oral, Daily  divalproex, 500 mg, Oral, BID  docusate sodium, 100 mg, Oral, BID  empagliflozin, 10 mg, Oral, Daily  ferrous sulfate, 324 mg, Oral, Daily With Breakfast  FLUoxetine, 20 mg, Oral, Daily  folic acid, 1 mg, Oral, Daily  furosemide, 20 mg, Intravenous, Q12H  guaiFENesin, 600 mg, Oral, Q12H  insulin glargine, 18 Units, Subcutaneous, QAM  insulin lispro, 3-14 Units, Subcutaneous, TID With Meals  insulin lispro, 6 Units, Subcutaneous, TID With Meals  ipratropium-albuterol, 3 mL, Nebulization, 4x Daily - RT  levothyroxine, 50 mcg, Oral, Daily  losartan, 25 mg, Oral, Q24H  magnesium oxide, 400 mg, Oral, Daily  metoprolol succinate XL, 25 mg, Oral, Q24H  pantoprazole, 40 mg, Oral, QAM  polyethylene glycol, 17 g, Oral, Daily  risperiDONE, 0.5 mg, Oral, Daily  rosuvastatin, 20 mg, Oral, Daily  Scopolamine, 1 patch, Transdermal, Q72H  spironolactone, 25 mg, Oral, Daily             Active Hospital Problems:  Active Hospital Problems    Diagnosis    •  **Altered mental status, unspecified altered mental status type    • Acute respiratory failure with hypoxia (HCC)    • Pleural effusion    • HAP (hospital-acquired pneumonia)    • Sepsis (HCC)    • Elevated troponin    • Acute respiratory distress    • Type 2 diabetes mellitus (HCC)    • Anxiety associated with depression    • Hypothyroidism (acquired)    • OAB (overactive bladder)    • GERD without esophagitis    • Acute non-ST elevation myocardial infarction (NSTEMI) (HCC)    • Dyslipidemia    • Cancer of transverse colon (HCC)           Assessment/plan:      -Continue appropriate patient's home medications for other chronic medical conditions.  -Continue the present level of care.  -Patient and family agreed with the plan of care.    Pleural effusion.  -Patient is status postthoracentesis.  -Follow pulmonary recommendations.      Cough.  -Much improved.  -Treat with Tessalon Perles.  -Patient is also on Mucinex.     Acute toxic metabolic encephalopathy/AMS      -resolving, and likely multifactorial.  Psych started pt on Abilify and adjusted Depakote for mood stabilization.       -Pt is still confused, and can't give clear ROS.  Meds to be adjusted by Psych.     Probable sepsis      -off Zosyn, now on Augmentin      -Duo nebs and Mucomyst nebs added for thick secretions and wheezing     Pneumonia involving the left lung      -Continue Augmentin, and supportive care      NSTEMI/Elevated troponin       -scheduled for C (10/27), DANIA to LAD.       -Cp free and in the setting of suspected sepsis     History of colorectal cancer (invasive mucinous adenocarcinoma)      -S/p resection (10/22)     Dysphagia      -followed by speech pathologist      CVA      -Aspirin/Crestor     T2DM      -SSI/Lantus     Hypertension     HLD     -Crestor     Hypothyroidism      -Levothyroxine     Depression/anxiety      -Risperidone on hold 2/2 increase QT prolongation and consult Psychiatrist        -Depakote dose adjustment and  initiation of Abilify, per Psych.      -Pt is still not at baseline, and is not communicating well, and can't give ROS.       DVT prophylaxis:  Mechanical DVT prophylaxis orders are present.    CODE STATUS:    Medical Intervention Limits: NO intubation (DNI)  Level Of Support Discussed With: Next of Kin (If No Surrogate); Health Care Surrogate  Code Status (Patient has no pulse and is not breathing): No CPR (Do Not Attempt to Resuscitate)  Medical Interventions (Patient has pulse or is breathing): Limited Support  Release to patient: Routine Release       Disposition: This wonderful patient can discharge tomorrow to rehab facility versus SNF.      Electronically signed by Alejandro Estrada MD, FACP, 11/11/22, 19:36 EST.        [facility] Hospitalist Team

## 2022-11-12 NOTE — PROGRESS NOTES
Daily Progress Note    Patient Care Team:  Beka Weir MD as PCP - General (Family Medicine)  Toño Michelle MD as Surgeon (General Surgery)  Abdiel Contreras MD as Consulting Physician (Hematology and Oncology)    Chief Complaint: Follow-up type 2 diabetes    HPI: Patient seen and examined today.  She is eating well.  Blood sugar log reviewed.  No low blood sugars noted.    ROS:   Constitutional:  Denies fatigue, tiredness.    Respiratory: denies cough, shortness of breath.   Cardiovascular:  denies chest pain, edema   GI:  Denies abdominal pain, nausea, vomiting.          Vitals:    11/12/22 1451   BP:    Pulse: 82   Resp: 18   Temp:    SpO2: 97%     Body mass index is 20.74 kg/m².    Physical Exam:  GEN: NAD, conversant  CV: RRR  LUNG: CTA  PSYCH: Awake and coherent.      Results Review:     I reviewed the patient's new clinical results.    Glucose   Date Value Ref Range Status   11/12/2022 163 (H) 65 - 99 mg/dL Final     Sodium   Date Value Ref Range Status   11/12/2022 141 136 - 145 mmol/L Final     Potassium   Date Value Ref Range Status   11/12/2022 4.1 3.5 - 5.2 mmol/L Final     CO2   Date Value Ref Range Status   11/12/2022 29.0 22.0 - 29.0 mmol/L Final     Chloride   Date Value Ref Range Status   11/12/2022 100 98 - 107 mmol/L Final     Anion Gap   Date Value Ref Range Status   11/12/2022 12.0 5.0 - 15.0 mmol/L Final     Creatinine   Date Value Ref Range Status   11/12/2022 1.02 (H) 0.57 - 1.00 mg/dL Final     BUN   Date Value Ref Range Status   11/12/2022 35 (H) 8 - 23 mg/dL Final     BUN/Creatinine Ratio   Date Value Ref Range Status   11/12/2022 34.3 (H) 7.0 - 25.0 Final     Calcium   Date Value Ref Range Status   11/12/2022 8.9 8.6 - 10.5 mg/dL Final     Albumin   Date Value Ref Range Status   11/10/2022 1.7 (L) 2.9 - 4.4 g/dL Final     A/G Ratio   Date Value Ref Range Status   11/10/2022 0.5 (L) 0.7 - 1.7 Final     Lab Results   Component Value Date    HGBA1C 7.4 (H) 10/24/2022     HGBA1C 10.3 (H) 10/27/2020    HGBA1C 8.5 (H) 06/04/2019     No results found for: GLUF, MICROALBUR  Results from last 7 days   Lab Units 11/12/22  1617 11/12/22  1124 11/12/22  0754 11/11/22  1734 11/11/22  1137 11/11/22  0729   GLUCOSE mg/dL 200* 180* 212* 382* 252* 109*             Medication Review: Reviewed.     aspirin, 81 mg, Oral, Daily  budesonide, 0.5 mg, Nebulization, BID - RT  clopidogrel, 75 mg, Oral, Daily  divalproex, 500 mg, Oral, BID  empagliflozin, 10 mg, Oral, Daily  ferrous sulfate, 324 mg, Oral, Daily With Breakfast  FLUoxetine, 20 mg, Oral, Daily  folic acid, 1 mg, Oral, Daily  furosemide, 20 mg, Intravenous, Q12H  guaiFENesin, 600 mg, Oral, Q12H  insulin glargine, 18 Units, Subcutaneous, QAM  insulin lispro, 3-14 Units, Subcutaneous, TID With Meals  insulin lispro, 6 Units, Subcutaneous, TID With Meals  ipratropium-albuterol, 3 mL, Nebulization, 4x Daily - RT  levothyroxine, 50 mcg, Oral, Daily  losartan, 25 mg, Oral, Q24H  magnesium oxide, 400 mg, Oral, Daily  metoprolol succinate XL, 25 mg, Oral, Q24H  pantoprazole, 40 mg, Oral, QAM  risperiDONE, 0.5 mg, Oral, Daily  rosuvastatin, 20 mg, Oral, Daily  Scopolamine, 1 patch, Transdermal, Q72H  spironolactone, 25 mg, Oral, Daily              Assessment and plan:  Diabetes mellitus type 2 with hyperglycemia: Uncontrolled, blood sugars improving we will continue glargine 18 units subcu nightly and Humalog 6 units with each meal along with Jardiance.  We will follow blood sugars and make further adjustments as needed.    Hyperlipidemia: Currently on rosuvastatin.    Hypertension: Well-controlled    Hypothyroidism: Currently on levothyroxine supplementation.    Ami Singleton MD. FACE

## 2022-11-12 NOTE — PROGRESS NOTES
"PULMONARY CRITICAL CARE PROGRESS  NOTE      PATIENT IDENTIFICATION:  Name: Laura Perkins  MRN: [id number]  :  1940     Age: 82 y.o.  Sex: female    DATE OF Note:  2022   Referring Physician: Alejandro Estrada MD                  Subjective:   Laying in bed, on room air, appetite fair,  no SOB, no chest or abd pain, no bowel or bladder issues       Objective:  tMax 24 hrs: Temp (24hrs), Av.6 °F (37 °C), Min:97.5 °F (36.4 °C), Max:100.4 °F (38 °C)      Vitals Ranges:   Temp:  [97.5 °F (36.4 °C)-100.4 °F (38 °C)] 97.5 °F (36.4 °C)  Heart Rate:  [65-91] 66  Resp:  [18-20] 18  BP: (112-139)/(37-56) 139/56    Intake and Output Last 3 Shifts:   I/O last 3 completed shifts:  In: 1080 [P.O.:1080]  Out: 1400 [Urine:1400]    Exam:  /56 (BP Location: Left arm, Patient Position: Lying)   Pulse 66   Temp 97.5 °F (36.4 °C) (Oral)   Resp 18   Ht 162.6 cm (64\")   Wt 54.8 kg (120 lb 13 oz)   SpO2 100%   BMI 20.74 kg/m²     General Appearance:   Alert  HEENT:  Normocephalic, without obvious abnormality. Conjunctivae/corneas clear.  Normal external ear canals. Nares normal, no drainage     Neck:  Supple, symmetrical, trachea midline. No JVD.  Lungs /Chest wall:   Bilateral basal rhonchi, respirations unlabored, symmetrical wall movement.     Heart:  Regular rate and rhythm, systolic murmur PMI left sternal border  Abdomen: Soft, nontender, no masses, no organomegaly.    Extremities: Trace edema, no clubbing or cyanosis        Medications:  aspirin, 81 mg, Oral, Daily  budesonide, 0.5 mg, Nebulization, BID - RT  clopidogrel, 75 mg, Oral, Daily  divalproex, 500 mg, Oral, BID  docusate sodium, 100 mg, Oral, BID  empagliflozin, 10 mg, Oral, Daily  ferrous sulfate, 324 mg, Oral, Daily With Breakfast  FLUoxetine, 20 mg, Oral, Daily  folic acid, 1 mg, Oral, Daily  furosemide, 20 mg, Intravenous, Q12H  guaiFENesin, 600 mg, Oral, Q12H  insulin glargine, 18 Units, Subcutaneous, QAM  insulin lispro, 3-14 Units, " Subcutaneous, TID With Meals  insulin lispro, 6 Units, Subcutaneous, TID With Meals  ipratropium-albuterol, 3 mL, Nebulization, 4x Daily - RT  levothyroxine, 50 mcg, Oral, Daily  losartan, 25 mg, Oral, Q24H  magnesium oxide, 400 mg, Oral, Daily  metoprolol succinate XL, 25 mg, Oral, Q24H  pantoprazole, 40 mg, Oral, QAM  polyethylene glycol, 17 g, Oral, Daily  risperiDONE, 0.5 mg, Oral, Daily  rosuvastatin, 20 mg, Oral, Daily  Scopolamine, 1 patch, Transdermal, Q72H  spironolactone, 25 mg, Oral, Daily        Infusion:        PRN:    acetaminophen    acetaminophen    acetaminophen    benzonatate    Calcium Gluconate-NaCl **AND** calcium gluconate **AND** Calcium, Ionized    dextrose    dextrose    glucagon (human recombinant)    HYDROcodone-acetaminophen    LORazepam    magnesium sulfate **OR** magnesium sulfate **OR** magnesium sulfate    phenol    potassium & sodium phosphates **OR** potassium & sodium phosphates    potassium chloride    potassium chloride    [COMPLETED] Insert peripheral IV **AND** sodium chloride  Data Review:  All labs (24hrs):   Recent Results (from the past 24 hour(s))   POC Glucose Once    Collection Time: 11/11/22 11:37 AM    Specimen: Blood   Result Value Ref Range    Glucose 252 (H) 70 - 105 mg/dL   Duplex Venous Upper Extremity - Right CAR    Collection Time: 11/11/22 12:55 PM   Result Value Ref Range    Target HR (85%) 116 bpm    Max. Pred. HR (100%) 137 bpm    Right Internal Jugular Spont Y     Right Internal Jugular Phasic Y     Right Internal Jugular Compress C     Right Internal Jugular Augment Y     Right Subclavian Spont Y     Right Subclavian Phasic Y     Right Subclavian Compress C     Right Subclavian Augment Y     Right Axillary Spont Y     Right Axillary Phasic Y     Right Axillary Compress C     Right Axillary Augment Y     Right Brachial Compress C     Right Radial Compress C     Right Ulnar Compress C     Right Basilic Upper Compress C     Right Basilic Forearm Compress C      Right Cephalic Upper Compress C     Right Cephalic Forearm Compress C     Left Internal Jugular Spont Y     Left Internal Jugular Phasic Y     Left Internal Jugular Compress C     Left Internal Jugular Augment Y     Left Subclavian Spont Y     Left Subclavian Phasic Y     Left Subclavian Compress C     Left Subclavian Augment Y    Urinalysis With Microscopic If Indicated (No Culture) - Urine, Clean Catch    Collection Time: 11/11/22  1:32 PM    Specimen: Urine, Clean Catch   Result Value Ref Range    Color, UA Yellow Yellow, Straw    Appearance, UA Cloudy (A) Clear    pH, UA 7.0 5.0 - 8.0    Specific Gravity, UA 1.023 1.005 - 1.030    Glucose, UA >=1000 mg/dL (3+) (A) Negative    Ketones, UA Negative Negative    Bilirubin, UA Negative Negative    Blood, UA Negative Negative    Protein, UA Negative Negative    Leuk Esterase, UA Trace (A) Negative    Nitrite, UA Negative Negative    Urobilinogen, UA 0.2 E.U./dL 0.2 - 1.0 E.U./dL   Urinalysis, Microscopic Only - Urine, Clean Catch    Collection Time: 11/11/22  1:32 PM    Specimen: Urine, Clean Catch   Result Value Ref Range    RBC, UA 0-2 (A) None Seen /HPF    WBC, UA 0-2 (A) None Seen /HPF    Bacteria, UA 1+ (A) None Seen /HPF    Squamous Epithelial Cells, UA 3-6 (A) None Seen, 0-2 /HPF    Yeast, UA Moderate/2+ Budding Yeast None Seen /HPF    Hyaline Casts, UA None Seen None Seen /LPF    Methodology Manual Light Microscopy    POC Glucose Once    Collection Time: 11/11/22  5:34 PM    Specimen: Blood   Result Value Ref Range    Glucose 382 (H) 70 - 105 mg/dL   Basic Metabolic Panel    Collection Time: 11/12/22  5:11 AM    Specimen: Blood   Result Value Ref Range    Glucose 163 (H) 65 - 99 mg/dL    BUN 35 (H) 8 - 23 mg/dL    Creatinine 1.02 (H) 0.57 - 1.00 mg/dL    Sodium 141 136 - 145 mmol/L    Potassium 4.1 3.5 - 5.2 mmol/L    Chloride 100 98 - 107 mmol/L    CO2 29.0 22.0 - 29.0 mmol/L    Calcium 8.9 8.6 - 10.5 mg/dL    BUN/Creatinine Ratio 34.3 (H) 7.0 - 25.0     Anion Gap 12.0 5.0 - 15.0 mmol/L    eGFR 54.7 (L) >60.0 mL/min/1.73   CBC Auto Differential    Collection Time: 11/12/22  5:12 AM    Specimen: Blood   Result Value Ref Range    WBC 16.00 (H) 3.40 - 10.80 10*3/mm3    RBC 3.20 (L) 3.77 - 5.28 10*6/mm3    Hemoglobin 7.5 (L) 12.0 - 15.9 g/dL    Hematocrit 25.2 (L) 34.0 - 46.6 %    MCV 79.0 79.0 - 97.0 fL    MCH 23.5 (L) 26.6 - 33.0 pg    MCHC 29.8 (L) 31.5 - 35.7 g/dL    RDW 18.4 (H) 12.3 - 15.4 %    RDW-SD 51.6 37.0 - 54.0 fl    MPV 7.2 6.0 - 12.0 fL    Platelets 428 140 - 450 10*3/mm3    Neutrophil % 83.9 (H) 42.7 - 76.0 %    Lymphocyte % 8.1 (L) 19.6 - 45.3 %    Monocyte % 6.2 5.0 - 12.0 %    Eosinophil % 1.5 0.3 - 6.2 %    Basophil % 0.3 0.0 - 1.5 %    Neutrophils, Absolute 13.40 (H) 1.70 - 7.00 10*3/mm3    Lymphocytes, Absolute 1.30 0.70 - 3.10 10*3/mm3    Monocytes, Absolute 1.00 (H) 0.10 - 0.90 10*3/mm3    Eosinophils, Absolute 0.20 0.00 - 0.40 10*3/mm3    Basophils, Absolute 0.00 0.00 - 0.20 10*3/mm3    nRBC 0.0 0.0 - 0.2 /100 WBC   POC Glucose Once    Collection Time: 11/12/22  7:54 AM    Specimen: Blood   Result Value Ref Range    Glucose 212 (H) 70 - 105 mg/dL        Imaging:  Duplex Venous Upper Extremity - Right CAR    Normal right upper extremity venous duplex scan.  XR Chest 1 View  Narrative: DATE OF EXAM:  11/11/2022 3:01 AM     PROCEDURE:  XR CHEST 1 VW-     INDICATIONS:  sob; I21.4-Non-ST elevation (NSTEMI) myocardial infarction;  R41.82-Altered mental status, unspecified; R50.9-Fever, unspecified;  J18.9-Pneumonia, unspecified organism; R77.8-Other specified  abnormalities of plasma proteins; E11.9-Type 2 diabetes mellitus without  complications; Z79.4-Long term (current) use of insulin;  E78.5-Hyperlipidemia, unspecified     COMPARISON:  11/9/2022     TECHNIQUE:   Single radiographic AP view of the chest was obtained.     FINDINGS:  Heart size and pulmonary vasculature stable. Right lung remains grossly  clear other than some atelectasis in the  base. No significant change in  airspace disease in the lower left lung with small left pleural  effusion. No new abnormality      Impression: Stable chest demonstrate cardiomegaly with pulmonary vascular  congestion, and left basilar airspace disease with small left pleural  effusion     Electronically Signed By-Db Argueta On:11/11/2022 7:43 AM  This report was finalized on [id number] by  Db Argueta, .       ASSESSMENT:  AMS  Mixed respiratory and metabolic alkalosis  Bilateral pleural effusion left greater than right  Bilateral pneumonia and possible compressive atelectasis  CVA  Hypertension  Bipolar disorder  Diabetes mellitus type 2  Invasive adenocarcinoma of the colon s/p resection 10/22  CAD s/p stents 10/27       PLAN:  Try to get patient OOB daily   PT/OT   Bronchodilators  Inhaled corticosteroids  Oncology following  DNR/DNI  Diuresis and monitor MARIAN's  Electrolytes/ glycemic control  No DVT and GI prophylaxis.    Discussed with Dr Chris Jacobson, APRN    11/12/2022  08:48 EST    I personally have examined  and interviewed the patient. I have reviewed the history, data, problems, assessment and plan with our NP.  Total Critical care time in direct medical management (   ) minutes, This time specifically excludes time spent performing procedures.    Reed Perez MD   11/12/2022  18:21 EST

## 2022-11-13 LAB
ANION GAP SERPL CALCULATED.3IONS-SCNC: 14 MMOL/L (ref 5–15)
BUN SERPL-MCNC: 40 MG/DL (ref 8–23)
BUN/CREAT SERPL: 36.4 (ref 7–25)
CALCIUM SPEC-SCNC: 9 MG/DL (ref 8.6–10.5)
CHLORIDE SERPL-SCNC: 99 MMOL/L (ref 98–107)
CO2 SERPL-SCNC: 27 MMOL/L (ref 22–29)
COPPER SERPL-MCNC: 133 UG/DL (ref 80–158)
CREAT SERPL-MCNC: 1.1 MG/DL (ref 0.57–1)
EGFRCR SERPLBLD CKD-EPI 2021: 50 ML/MIN/1.73
GLUCOSE BLDC GLUCOMTR-MCNC: 135 MG/DL (ref 70–105)
GLUCOSE BLDC GLUCOMTR-MCNC: 234 MG/DL (ref 70–105)
GLUCOSE BLDC GLUCOMTR-MCNC: 241 MG/DL (ref 70–105)
GLUCOSE BLDC GLUCOMTR-MCNC: 305 MG/DL (ref 70–105)
GLUCOSE SERPL-MCNC: 138 MG/DL (ref 65–99)
POTASSIUM SERPL-SCNC: 3.8 MMOL/L (ref 3.5–5.2)
SODIUM SERPL-SCNC: 140 MMOL/L (ref 136–145)
WHOLE BLOOD HOLD SPECIMEN: NORMAL

## 2022-11-13 PROCEDURE — 82962 GLUCOSE BLOOD TEST: CPT

## 2022-11-13 PROCEDURE — 25010000002 FUROSEMIDE PER 20 MG: Performed by: INTERNAL MEDICINE

## 2022-11-13 PROCEDURE — 25010000002 ONDANSETRON PER 1 MG: Performed by: INTERNAL MEDICINE

## 2022-11-13 PROCEDURE — 63710000001 INSULIN GLARGINE PER 5 UNITS: Performed by: INTERNAL MEDICINE

## 2022-11-13 PROCEDURE — 63710000001 INSULIN LISPRO (HUMAN) PER 5 UNITS: Performed by: INTERNAL MEDICINE

## 2022-11-13 PROCEDURE — 80048 BASIC METABOLIC PNL TOTAL CA: CPT | Performed by: NURSE PRACTITIONER

## 2022-11-13 PROCEDURE — 94799 UNLISTED PULMONARY SVC/PX: CPT

## 2022-11-13 PROCEDURE — 99232 SBSQ HOSP IP/OBS MODERATE 35: CPT | Performed by: INTERNAL MEDICINE

## 2022-11-13 RX ORDER — INSULIN LISPRO 100 [IU]/ML
7 INJECTION, SOLUTION INTRAVENOUS; SUBCUTANEOUS
Status: DISCONTINUED | OUTPATIENT
Start: 2022-11-13 | End: 2022-11-15 | Stop reason: HOSPADM

## 2022-11-13 RX ORDER — ONDANSETRON 2 MG/ML
4 INJECTION INTRAMUSCULAR; INTRAVENOUS EVERY 6 HOURS PRN
Status: DISCONTINUED | OUTPATIENT
Start: 2022-11-13 | End: 2022-11-15 | Stop reason: HOSPADM

## 2022-11-13 RX ADMIN — GUAIFENESIN 600 MG: 600 TABLET, EXTENDED RELEASE ORAL at 09:15

## 2022-11-13 RX ADMIN — HYDROCODONE BITARTRATE AND ACETAMINOPHEN 1 TABLET: 7.5; 325 TABLET ORAL at 21:11

## 2022-11-13 RX ADMIN — FLUOXETINE 20 MG: 20 CAPSULE ORAL at 09:15

## 2022-11-13 RX ADMIN — BUDESONIDE 0.5 MG: 0.5 INHALANT RESPIRATORY (INHALATION) at 06:53

## 2022-11-13 RX ADMIN — INSULIN GLARGINE 18 UNITS: 100 INJECTION, SOLUTION SUBCUTANEOUS at 06:22

## 2022-11-13 RX ADMIN — CLOPIDOGREL BISULFATE 75 MG: 75 TABLET ORAL at 09:15

## 2022-11-13 RX ADMIN — ASPIRIN 81 MG: 81 TABLET, COATED ORAL at 09:15

## 2022-11-13 RX ADMIN — IPRATROPIUM BROMIDE AND ALBUTEROL SULFATE 3 ML: .5; 3 SOLUTION RESPIRATORY (INHALATION) at 06:49

## 2022-11-13 RX ADMIN — METOPROLOL SUCCINATE 25 MG: 25 TABLET, FILM COATED, EXTENDED RELEASE ORAL at 09:15

## 2022-11-13 RX ADMIN — INSULIN LISPRO 7 UNITS: 100 INJECTION, SOLUTION INTRAVENOUS; SUBCUTANEOUS at 18:18

## 2022-11-13 RX ADMIN — SPIRONOLACTONE 25 MG: 25 TABLET ORAL at 09:15

## 2022-11-13 RX ADMIN — ROSUVASTATIN 20 MG: 10 TABLET, FILM COATED ORAL at 21:11

## 2022-11-13 RX ADMIN — RISPERIDONE 0.5 MG: 0.25 TABLET ORAL at 09:14

## 2022-11-13 RX ADMIN — LEVOTHYROXINE SODIUM 50 MCG: 50 TABLET ORAL at 06:22

## 2022-11-13 RX ADMIN — Medication 10 ML: at 21:11

## 2022-11-13 RX ADMIN — IPRATROPIUM BROMIDE AND ALBUTEROL SULFATE 3 ML: .5; 3 SOLUTION RESPIRATORY (INHALATION) at 11:20

## 2022-11-13 RX ADMIN — LOSARTAN POTASSIUM 25 MG: 25 TABLET, FILM COATED ORAL at 09:15

## 2022-11-13 RX ADMIN — DIVALPROEX SODIUM 500 MG: 500 TABLET, DELAYED RELEASE ORAL at 21:11

## 2022-11-13 RX ADMIN — Medication 10 ML: at 09:16

## 2022-11-13 RX ADMIN — PANTOPRAZOLE SODIUM 40 MG: 40 TABLET, DELAYED RELEASE ORAL at 06:22

## 2022-11-13 RX ADMIN — FERROUS SULFATE TAB EC 324 MG (65 MG FE EQUIVALENT) 324 MG: 324 (65 FE) TABLET DELAYED RESPONSE at 09:14

## 2022-11-13 RX ADMIN — FOLIC ACID 1 MG: 1 TABLET ORAL at 09:15

## 2022-11-13 RX ADMIN — INSULIN LISPRO 10 UNITS: 100 INJECTION, SOLUTION INTRAVENOUS; SUBCUTANEOUS at 18:18

## 2022-11-13 RX ADMIN — INSULIN LISPRO 6 UNITS: 100 INJECTION, SOLUTION INTRAVENOUS; SUBCUTANEOUS at 09:14

## 2022-11-13 RX ADMIN — LORAZEPAM 0.5 MG: 0.5 TABLET ORAL at 02:09

## 2022-11-13 RX ADMIN — GUAIFENESIN 600 MG: 600 TABLET, EXTENDED RELEASE ORAL at 21:11

## 2022-11-13 RX ADMIN — INSULIN LISPRO 6 UNITS: 100 INJECTION, SOLUTION INTRAVENOUS; SUBCUTANEOUS at 13:56

## 2022-11-13 RX ADMIN — EMPAGLIFLOZIN 10 MG: 10 TABLET, FILM COATED ORAL at 09:15

## 2022-11-13 RX ADMIN — DIVALPROEX SODIUM 500 MG: 500 TABLET, DELAYED RELEASE ORAL at 09:15

## 2022-11-13 RX ADMIN — FUROSEMIDE 20 MG: 10 INJECTION, SOLUTION INTRAMUSCULAR; INTRAVENOUS at 15:34

## 2022-11-13 RX ADMIN — BUDESONIDE 0.5 MG: 0.5 INHALANT RESPIRATORY (INHALATION) at 16:28

## 2022-11-13 RX ADMIN — IPRATROPIUM BROMIDE AND ALBUTEROL SULFATE 3 ML: .5; 3 SOLUTION RESPIRATORY (INHALATION) at 16:32

## 2022-11-13 RX ADMIN — INSULIN LISPRO 5 UNITS: 100 INJECTION, SOLUTION INTRAVENOUS; SUBCUTANEOUS at 13:56

## 2022-11-13 RX ADMIN — MAGNESIUM OXIDE TAB 400 MG (241.3 MG ELEMENTAL MG) 400 MG: 400 (241.3 MG) TAB at 09:15

## 2022-11-13 RX ADMIN — ONDANSETRON 4 MG: 2 INJECTION INTRAMUSCULAR; INTRAVENOUS at 16:16

## 2022-11-13 NOTE — PLAN OF CARE
Goal Outcome Evaluation:  Plan of Care Reviewed With: patient, spouse        Progress: declining  Outcome Evaluation: Patient has been very anxious and crying out all shift, prn medication was not affective.   no

## 2022-11-13 NOTE — PROGRESS NOTES
Daily Progress Note    Patient Care Team:  Beka Weir MD as PCP - General (Family Medicine)  Toño Michelle MD as Surgeon (General Surgery)  Abdiel Contreras MD as Consulting Physician (Hematology and Oncology)    Chief Complaint: Follow-up type 2 diabetes    HPI: Patient seen and examined today.  She is eating well.  Blood sugars are high.  No low blood sugars noted.   present at the bedside.    ROS:   Constitutional:  Denies fatigue, tiredness.    Respiratory: denies cough, shortness of breath.   Cardiovascular:  denies chest pain, edema   GI:  Denies abdominal pain, nausea, vomiting.         Vitals:    11/13/22 1123   BP:    Pulse: 90   Resp: 18   Temp:    SpO2: 97%     Body mass index is 20.81 kg/m².    Physical Exam:  GEN: NAD, conversant  PSYCH: AOX3, appropriate mood, affect normal      Results Review:     I reviewed the patient's new clinical results.    Glucose   Date Value Ref Range Status   11/13/2022 138 (H) 65 - 99 mg/dL Final     Sodium   Date Value Ref Range Status   11/13/2022 140 136 - 145 mmol/L Final     Potassium   Date Value Ref Range Status   11/13/2022 3.8 3.5 - 5.2 mmol/L Final     CO2   Date Value Ref Range Status   11/13/2022 27.0 22.0 - 29.0 mmol/L Final     Chloride   Date Value Ref Range Status   11/13/2022 99 98 - 107 mmol/L Final     Anion Gap   Date Value Ref Range Status   11/13/2022 14.0 5.0 - 15.0 mmol/L Final     Creatinine   Date Value Ref Range Status   11/13/2022 1.10 (H) 0.57 - 1.00 mg/dL Final     BUN   Date Value Ref Range Status   11/13/2022 40 (H) 8 - 23 mg/dL Final     BUN/Creatinine Ratio   Date Value Ref Range Status   11/13/2022 36.4 (H) 7.0 - 25.0 Final     Calcium   Date Value Ref Range Status   11/13/2022 9.0 8.6 - 10.5 mg/dL Final     Lab Results   Component Value Date    HGBA1C 7.4 (H) 10/24/2022    HGBA1C 10.3 (H) 10/27/2020    HGBA1C 8.5 (H) 06/04/2019     No results found for: GLUF, MICROALBUR  Results from last 7 days   Lab Units  11/13/22  1226 11/13/22  0710 11/12/22  1617 11/12/22  1124 11/12/22  0754 11/11/22  1734   GLUCOSE mg/dL 241* 135* 200* 180* 212* 382*             Medication Review: Reviewed.     aspirin, 81 mg, Oral, Daily  budesonide, 0.5 mg, Nebulization, BID - RT  clopidogrel, 75 mg, Oral, Daily  divalproex, 500 mg, Oral, BID  empagliflozin, 10 mg, Oral, Daily  ferrous sulfate, 324 mg, Oral, Daily With Breakfast  FLUoxetine, 20 mg, Oral, Daily  folic acid, 1 mg, Oral, Daily  furosemide, 20 mg, Intravenous, Q12H  guaiFENesin, 600 mg, Oral, Q12H  insulin glargine, 18 Units, Subcutaneous, QAM  insulin lispro, 3-14 Units, Subcutaneous, TID With Meals  insulin lispro, 6 Units, Subcutaneous, TID With Meals  ipratropium-albuterol, 3 mL, Nebulization, 4x Daily - RT  levothyroxine, 50 mcg, Oral, Daily  losartan, 25 mg, Oral, Q24H  magnesium oxide, 400 mg, Oral, Daily  metoprolol succinate XL, 25 mg, Oral, Q24H  pantoprazole, 40 mg, Oral, QAM  risperiDONE, 0.5 mg, Oral, Daily  rosuvastatin, 20 mg, Oral, Daily  Scopolamine, 1 patch, Transdermal, Q72H  spironolactone, 25 mg, Oral, Daily              Assessment and plan:  Diabetes mellitus type 2 with hyperglycemia: Uncontrolled, blood sugars high, will increase glargine to 20 units subcu nightly and Humalog to 7 units with each meal along with Jardiance.  We will follow blood sugars and make further adjustments as needed.     Hyperlipidemia: Currently on rosuvastatin.     Hypertension: Well-controlled     Hypothyroidism: Currently on levothyroxine supplementation.    Ami Singleton MD. FACE

## 2022-11-13 NOTE — PROGRESS NOTES
"PULMONARY CRITICAL CARE PROGRESS  NOTE      PATIENT IDENTIFICATION:  Name: Laura Perkins  MRN: [id number]  :  1940     Age: 82 y.o.  Sex: female    DATE OF Note:  2022   Referring Physician: Alejandro Estrada MD                  Subjective:   Laying in bed resting, on room air,  no SOB, no chest or abd pain, no bowel or bladder issues    Objective:  tMax 24 hrs: Temp (24hrs), Av.5 °F (36.9 °C), Min:97.4 °F (36.3 °C), Max:99.9 °F (37.7 °C)      Vitals Ranges:   Temp:  [97.4 °F (36.3 °C)-99.9 °F (37.7 °C)] 97.4 °F (36.3 °C)  Heart Rate:  [75-93] 78  Resp:  [12-18] 18  BP: (103-156)/(33-62) 149/49    Intake and Output Last 3 Shifts:   I/O last 3 completed shifts:  In: 600 [P.O.:600]  Out: 1000 [Urine:1000]    Exam:  /49   Pulse 78   Temp 97.4 °F (36.3 °C)   Resp 18   Ht 162.6 cm (64\")   Wt 55 kg (121 lb 4.1 oz)   SpO2 97%   BMI 20.81 kg/m²     General Appearance: Alert  HEENT:  Normocephalic, without obvious abnormality. Conjunctivae/corneas clear.  Normal external ear canals. Nares normal, no drainage     Neck:  Supple, symmetrical, trachea midline. No JVD.  Lungs /Chest wall:   Bilateral basal rhonchi, respirations unlabored, symmetrical wall movement.     Heart:  Regular rate and rhythm, systolic murmur PMI left sternal border  Abdomen: Soft, nontender, no masses, no organomegaly.    Extremities: Trace edema, no clubbing or cyanosis  Skin: excoriation myrtle-area and stage II sacrum         Medications:  aspirin, 81 mg, Oral, Daily  budesonide, 0.5 mg, Nebulization, BID - RT  clopidogrel, 75 mg, Oral, Daily  divalproex, 500 mg, Oral, BID  empagliflozin, 10 mg, Oral, Daily  ferrous sulfate, 324 mg, Oral, Daily With Breakfast  FLUoxetine, 20 mg, Oral, Daily  folic acid, 1 mg, Oral, Daily  furosemide, 20 mg, Intravenous, Q12H  guaiFENesin, 600 mg, Oral, Q12H  insulin glargine, 18 Units, Subcutaneous, QAM  insulin lispro, 3-14 Units, Subcutaneous, TID With Meals  insulin lispro, 6 Units, " Subcutaneous, TID With Meals  ipratropium-albuterol, 3 mL, Nebulization, 4x Daily - RT  levothyroxine, 50 mcg, Oral, Daily  losartan, 25 mg, Oral, Q24H  magnesium oxide, 400 mg, Oral, Daily  metoprolol succinate XL, 25 mg, Oral, Q24H  pantoprazole, 40 mg, Oral, QAM  risperiDONE, 0.5 mg, Oral, Daily  rosuvastatin, 20 mg, Oral, Daily  Scopolamine, 1 patch, Transdermal, Q72H  spironolactone, 25 mg, Oral, Daily        Infusion:        PRN:    acetaminophen    acetaminophen    acetaminophen    benzonatate    Calcium Gluconate-NaCl **AND** calcium gluconate **AND** Calcium, Ionized    dextrose    dextrose    docusate sodium    glucagon (human recombinant)    HYDROcodone-acetaminophen    LORazepam    magnesium sulfate **OR** magnesium sulfate **OR** magnesium sulfate    Morphine    phenol    polyethylene glycol    potassium & sodium phosphates **OR** potassium & sodium phosphates    potassium chloride    potassium chloride    [COMPLETED] Insert peripheral IV **AND** sodium chloride  Data Review:  All labs (24hrs):   Recent Results (from the past 24 hour(s))   POC Glucose Once    Collection Time: 11/12/22 11:24 AM    Specimen: Blood   Result Value Ref Range    Glucose 180 (H) 70 - 105 mg/dL   POC Glucose Once    Collection Time: 11/12/22  4:17 PM    Specimen: Blood   Result Value Ref Range    Glucose 200 (H) 70 - 105 mg/dL   Basic Metabolic Panel    Collection Time: 11/13/22  5:46 AM    Specimen: Blood   Result Value Ref Range    Glucose 138 (H) 65 - 99 mg/dL    BUN 40 (H) 8 - 23 mg/dL    Creatinine 1.10 (H) 0.57 - 1.00 mg/dL    Sodium 140 136 - 145 mmol/L    Potassium 3.8 3.5 - 5.2 mmol/L    Chloride 99 98 - 107 mmol/L    CO2 27.0 22.0 - 29.0 mmol/L    Calcium 9.0 8.6 - 10.5 mg/dL    BUN/Creatinine Ratio 36.4 (H) 7.0 - 25.0    Anion Gap 14.0 5.0 - 15.0 mmol/L    eGFR 50.0 (L) >60.0 mL/min/1.73   Lavender Top    Collection Time: 11/13/22  5:46 AM   Result Value Ref Range    Extra Tube hold for add-on    POC Glucose Once     Collection Time: 11/13/22  7:10 AM    Specimen: Blood   Result Value Ref Range    Glucose 135 (H) 70 - 105 mg/dL        Imaging:  Duplex Venous Upper Extremity - Right CAR    Normal right upper extremity venous duplex scan.  XR Chest 1 View  Narrative: DATE OF EXAM:  11/11/2022 3:01 AM     PROCEDURE:  XR CHEST 1 VW-     INDICATIONS:  sob; I21.4-Non-ST elevation (NSTEMI) myocardial infarction;  R41.82-Altered mental status, unspecified; R50.9-Fever, unspecified;  J18.9-Pneumonia, unspecified organism; R77.8-Other specified  abnormalities of plasma proteins; E11.9-Type 2 diabetes mellitus without  complications; Z79.4-Long term (current) use of insulin;  E78.5-Hyperlipidemia, unspecified     COMPARISON:  11/9/2022     TECHNIQUE:   Single radiographic AP view of the chest was obtained.     FINDINGS:  Heart size and pulmonary vasculature stable. Right lung remains grossly  clear other than some atelectasis in the base. No significant change in  airspace disease in the lower left lung with small left pleural  effusion. No new abnormality      Impression: Stable chest demonstrate cardiomegaly with pulmonary vascular  congestion, and left basilar airspace disease with small left pleural  effusion     Electronically Signed By-Db Argueta On:11/11/2022 7:43 AM  This report was finalized on [id number] by  Db Argueta, .       ASSESSMENT:  AMS  Mixed respiratory and metabolic alkalosis  Bilateral pleural effusion left greater than right  Bilateral pneumonia and possible compressive atelectasis  CVA  Hypertension  Bipolar disorder  Diabetes mellitus type 2  Invasive adenocarcinoma of the colon s/p resection 10/22  CAD s/p stents 10/27       PLAN:  OOB daily for longer times   Monitor intake   Bronchodilator  Inhaled corticosteroids  Electrolytes/ glycemic control  Oncology following  Diuretics and monitor MARIAN's  DNR/DNI  PT/OT  No DVT and GI prophylaxis    Discussed with Dr Chris Jacobson, APRN    11/13/2022  09:05 EST    I personally have examined  and interviewed the patient. I have reviewed the history, data, problems, assessment and plan with our NP.  Total Critical care time in direct medical management (   ) minutes, This time specifically excludes time spent performing procedures.    Reed Perez MD   11/13/2022  17:22 EST

## 2022-11-13 NOTE — PROGRESS NOTES
[facility] Daily Progress Note    Patient Name: Laura Perkins  : 1940  MRN: [id number]  Primary Care Physician:  Beka Weir MD  Date of admission: 10/23/2022      Subjective        Chief complaints- change in mental status    Patient reports     10/25/2022: Seen and examined and follow up.  Resting comfortably in bed in no distress or discomfort.      10/26/2022: Seen and examined in follow-up.  Resting comfortably.    10/27/2022: Seen and examined in follow-up.  Event noted for reported patient slipping out of bed last evening with no visible injury on examination.    10/28/2022:  Pt had a cardiac cath with DANIA to LAD.  Psych was consulted to help adjust meds.  Pt is off psych meds secondary to QTc prolongation.  Abilify increase, as well as Depakote for mood stabilization.  Cardiology following with QTc changes as well as EKG.      10/29/2022:  Pt is seen by psych and her meds are titrated.  Pt does not communicate with me and is repeating the words I a saying to her.  Pt is on 2 lit NC that can be titrated down.  Pt will be discharged when her psych meds are adjusted and she is back to her baseline.    10/30/2022:  Pt is very wheezy today and  states, pt is coughing very thick sputum.  Pt is very confused and not reliable historian.  She denies complains.  Pt is on 2 lit NC, and she oxygenating well, but her wheeze is louder today.  Pt think that her new psych meds, are improving her condition.      10/31/2022.  Patient was seen and examined.  Patient's family complained restless legs last night.      2022.  Patient was seen and examined.  Patient reported no new symptoms.      2022.  Patient was seen and examined.  Patient's family reported moderate improvement in her symptoms.      11/3/2022.  Patient was seen and examined.  Patient's family reported that patient was coughing all night.  Pulmonary is following.       2022.  Patient  was seen and examined.  Patient's family reported significant improvement in patient's symptoms.      11/05/2022.  Patient was seen and examined.  Patient's family reported no overnight events.  Patient was noted to be coughing.  Tessalon Perles was ordered.      11/6/2022.  Patient was seen and examined.  Patient's family reported moderate improvement in her symptoms.      11/7/2022.  Patient was seen and examined.  Patient is status post thoracentesis.  Family reported musculoskeletal pain.  Pain control was reviewed.      11/8/2022.  Patient was seen and examined.  Patient's family requested a surgical consult to discuss her psych meds.      11/9/2022.  Patient was seen and examined.  Patient's family reported multiple episodes of her limb movement last night.  Psych is following.        11/10/2022.  Patient was seen and examined.  No overnight events noted.      11/11/2022.  Patient was seen and examined.  Patient reported no new symptoms.  Right upper extremity Doppler was completed  Doppler was negative for DVT.      11/12/2022.  Patient was seen and examined.  No overnight events noted.    11/13/2022  Patient seen and examined  She is alert and oriented x3   by bedside        Objective      Vitals:   Temp:  [97.4 °F (36.3 °C)-99.5 °F (37.5 °C)] 97.9 °F (36.6 °C)  Heart Rate:  [75-93] 90  Resp:  [12-18] 18  BP: (114-156)/(41-62) 130/41    Physical Exam  Vitals reviewed.   Constitutional:       General: She is not in acute distress.     Comments: Chronically ill looking   HENT:      Head: Normocephalic.      Nose: Nose normal.      Mouth/Throat:      Mouth: Mucous membranes are moist.   Eyes:      Extraocular Movements: Extraocular movements intact.      Conjunctiva/sclera: Conjunctivae normal.      Pupils: Pupils are equal, round, and reactive to light.   Cardiovascular:      Rate and Rhythm: Normal rate and regular rhythm.      Comments: S1 and S2 present.  No tachycardia.  Pulmonary:      Effort: No  respiratory distress.      Breath sounds: Normal breath sounds.   Abdominal:      General: Bowel sounds are normal. There is no distension.      Palpations: Abdomen is soft.      Tenderness: There is no abdominal tenderness.   Musculoskeletal:      Cervical back: Neck supple.      Comments: Degenerative changes   Skin:     General: Skin is warm and dry.   Neurological:      Mental Status: She is alert. Mental status is at baseline.   Psychiatric:         Mood and Affect: Mood normal.              Result Review    Result Review:  I have personally reviewed the results from the time of this admission to 11/13/2022 13:50 EST and agree with these findings:  [x]  Laboratory  [x]  Microbiology  [x]  Radiology  []  EKG/Telemetry   [x]  Cardiology/Vascular   []  Pathology  [x]  Old records  []  Other:      Status: Completed Collected: 11/02/22 1224    Updated: 11/02/22 1228   Narrative:     DATE OF EXAM:   11/2/2022 11:00 AM       PROCEDURE:   XR ABDOMEN KUB-       INDICATIONS:   Vomiting; I21.4-Non-ST elevation (NSTEMI) myocardial infarction;   R41.82-Altered mental status, unspecified; R50.9-Fever, unspecified;   J18.9-Pneumonia, unspecified organism; R77.8-Other specified   abnormalities of plasma proteins; E11.9-Type 2 diabetes mellitus without   complications; Z79.4-Long term (current) use of insulin;   E78.5-Hyperlipidemia, unspecified       COMPARISON:   CT chest 10/30/2022, CT abdomen and pelvis without contrast 10/24/2022       TECHNIQUE:   Radiographic view(s) of the abdomen obtained.       FINDINGS:   Included lower lungs are abnormal, better, better evaluated on recent   chest CT. No abnormal small bowel distention is seen in a pattern to   suggest small bowel obstruction, although there is a nonspecific paucity   of gas-filled small bowel loops. Stool is seen in the ascending and   transverse colon.       Impression:     Nonspecific, nonobstructive bowel gas pattern.       Electronically Signed By-Lora  MD Lacho On:11/2/2022 12:26 PM   This report was finalized on [id number] by  Lora Monk MD.             Wounds (last 24 hours)     LDA Wound     Row Name 11/13/22 0815 11/13/22 0411 11/13/22 0025       Wound 10/24/22 1344  medial sacral spine Pressure Injury    Wound - Properties Group Placement Date: 10/24/22  -MG Placement Time: 1344  -MG Present on Hospital Admission: Y  -MG Side: --  -MG, middle  Orientation: medial  -MG Location: sacral spine  -MG Primary Wound Type: Pressure inj  -MG    Closure Approximated  -MS Approximated  -TJ Approximated  -TJ    Base pink  -MS pink  -TJ pink  -TJ    Periwound redness  -MS redness  -TJ redness  -TJ    Periwound Temperature warm  -MS warm  -TJ warm  -TJ    Drainage Amount none  -MS none  -TJ none  -TJ    Care, Wound cleansed with;soap and water  -MS -- --    Dressing Care skin barrier agent applied  -MS -- --    Periwound Care dry periwound area maintained  -MS -- --    Retired Wound - Properties Group Placement Date: 10/24/22  -MG Placement Time: 1344  -MG Present on Hospital Admission: Y  -MG Side: --  -MG, middle  Orientation: medial  -MG Location: sacral spine  -MG Primary Wound Type: Pressure inj  -MG    Retired Wound - Properties Group Date first assessed: 10/24/22  -MG Time first assessed: 1344  -MG Present on Hospital Admission: Y  -MG Side: --  -MG, middle  Location: sacral spine  -MG Primary Wound Type: Pressure inj  -MG    Row Name 11/12/22 2015 11/12/22 1620          Wound 10/24/22 1344  medial sacral spine Pressure Injury    Wound - Properties Group Placement Date: 10/24/22  -MG Placement Time: 1344  -MG Present on Hospital Admission: Y  -MG Side: --  -MG, middle  Orientation: medial  -MG Location: sacral spine  -MG Primary Wound Type: Pressure inj  -MG    Pressure Injury Stage DTPI  -TJ --     Dressing Appearance intact  -TJ --     Closure Approximated  -TJ Approximated  -MS     Base pink  -TJ pink  -MS     Periwound redness  -TJ redness  -MS      Periwound Temperature warm  -TJ warm  -MS     Drainage Amount none  -TJ none  -MS     Care, Wound cleansed with;soap and water  -TJ --     Dressing Care skin barrier agent applied  -TJ --     Retired Wound - Properties Group Placement Date: 10/24/22  -MG Placement Time: 1344  -MG Present on Hospital Admission: Y  -MG Side: --  -MG, middle  Orientation: medial  -MG Location: sacral spine  -MG Primary Wound Type: Pressure inj  -MG    Retired Wound - Properties Group Date first assessed: 10/24/22  -MG Time first assessed: 1344  -MG Present on Hospital Admission: Y  -MG Side: --  -MG, middle  Location: sacral spine  -MG Primary Wound Type: Pressure inj  -MG          User Key  (r) = Recorded By, (t) = Taken By, (c) = Cosigned By    Initials Name Provider Type    Apoorva Merida, RN Registered Nurse    Raven Turcios RN Registered Nurse    Ilda Torres RN Registered Nurse                  Assessment & Plan        Brief patient summary:    Patient is an 84-year-old female.  Recently had a colonoscopy and 5 cm mass was found.  She underwent transverse colon resection at [facility]- pathology report revealed invasive mucinous adenocarcinoma..  She was subsequently discharged to a local rehab facility on 10/19/2022.     She presented to [facility] on 10/23/2022 due to 2 days history of generalized body weakness and altered mental status.     On admission, temperature was noted to be 102 and tachycardic.  Oxygen saturation was in the low 90s on 4 L of oxygen.  Baseline patient does not use oxygen.     Patient triggered sepsis and was started on sepsis protocol.  She was admitted for further care                 aspirin, 81 mg, Oral, Daily  budesonide, 0.5 mg, Nebulization, BID - RT  clopidogrel, 75 mg, Oral, Daily  divalproex, 500 mg, Oral, BID  empagliflozin, 10 mg, Oral, Daily  ferrous sulfate, 324 mg, Oral, Daily With Breakfast  FLUoxetine, 20 mg, Oral, Daily  folic acid, 1 mg, Oral,  Daily  furosemide, 20 mg, Intravenous, Q12H  guaiFENesin, 600 mg, Oral, Q12H  insulin glargine, 18 Units, Subcutaneous, QAM  insulin lispro, 3-14 Units, Subcutaneous, TID With Meals  insulin lispro, 6 Units, Subcutaneous, TID With Meals  ipratropium-albuterol, 3 mL, Nebulization, 4x Daily - RT  levothyroxine, 50 mcg, Oral, Daily  losartan, 25 mg, Oral, Q24H  magnesium oxide, 400 mg, Oral, Daily  metoprolol succinate XL, 25 mg, Oral, Q24H  pantoprazole, 40 mg, Oral, QAM  risperiDONE, 0.5 mg, Oral, Daily  rosuvastatin, 20 mg, Oral, Daily  Scopolamine, 1 patch, Transdermal, Q72H  spironolactone, 25 mg, Oral, Daily             Active Hospital Problems:  Active Hospital Problems    Diagnosis    • **Altered mental status, unspecified altered mental status type    • Acute respiratory failure with hypoxia (HCC)    • Pleural effusion    • HAP (hospital-acquired pneumonia)    • Acute respiratory distress    • Elevated troponin    • Sepsis (HCC)    • Type 2 diabetes mellitus (HCC)    • Anxiety associated with depression    • Hypothyroidism (acquired)    • OAB (overactive bladder)    • GERD without esophagitis    • Acute non-ST elevation myocardial infarction (NSTEMI) ([facility])    • Dyslipidemia    • Cancer of transverse colon ([facility])           Assessment/plan:    Acute toxic metabolic encephalopathy  Multifactorial- polypharmacy/infection  Mental status back to baseline      Chronic microcytic anemia  Iron deficiency anemia  Retic  count appropriately elevated  Serum B12-normal  Folic acid level-low-on supplementary folic acid  Status post iron infusion x3  Monitor hemoglobin and transfuse as needed to keep hemoglobin greater than 7    Acute thrombocytosis  Most likely due to iron deficiency anemia and inflammatory response.    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  Status post colectomy 10/18/2022  Considering patient's  age, early  cancer stage and overall performance- observation only recommended  Oncologist following and plans for genetic evaluation for Donnelly syndrome as outpatient        Diabetes mellitus type 2 with hyperglycemia  On Lantus, Premeal insulin and sliding scale insulin  Also on Jardiance  Monitor blood sugar and adjust insulin as needed  Endocrinologist following    Hypothyroidism-on Synthroid    Hyperlipidemia-on statin    GERD-PPI      Elevated troponin  Acute non-ST elevation MI  Presumed CAD  2D echo moderate LV dysfunction  Left heart catheterization 10/27/2022 with drug-eluting stent to LAD  On aspirin, Plavix, statin and metoprolol    CVA-on Plavix, aspirin and statin    Overactive bladder status post pinning and mesh     Anxiety/depression  Was seen by psych  On fluoxetine and risperidone        DVT prophylaxis:  Mechanical DVT prophylaxis orders are present.    CODE STATUS:    Medical Intervention Limits: NO intubation (DNI)  Level Of Support Discussed With: Next of Kin (If No Surrogate); Health Care Surrogate  Code Status (Patient has no pulse and is not breathing): No CPR (Do Not Attempt to Resuscitate)  Medical Interventions (Patient has pulse or is breathing): Limited Support  Release to patient: Routine Release       Disposition: Pending clinical progress    Electronically signed by Brannon Sorenson MD, FACP, 11/13/22, 13:50 EST.        [facility]ist Team

## 2022-11-13 NOTE — PLAN OF CARE
Goal Outcome Evaluation:      Pt has been on room air today, spouse at bedside.Pt has nausea and vomiting today, medication ordered and given. Will continue to monitor

## 2022-11-14 LAB
ANION GAP SERPL CALCULATED.3IONS-SCNC: 14 MMOL/L (ref 5–15)
ANISOCYTOSIS BLD QL: ABNORMAL
BUN SERPL-MCNC: 37 MG/DL (ref 8–23)
BUN/CREAT SERPL: 31.6 (ref 7–25)
CALCIUM SPEC-SCNC: 8.8 MG/DL (ref 8.6–10.5)
CHLORIDE SERPL-SCNC: 98 MMOL/L (ref 98–107)
CO2 SERPL-SCNC: 29 MMOL/L (ref 22–29)
CREAT SERPL-MCNC: 1.17 MG/DL (ref 0.57–1)
DEPRECATED RDW RBC AUTO: 52.1 FL (ref 37–54)
EGFRCR SERPLBLD CKD-EPI 2021: 46.4 ML/MIN/1.73
EOSINOPHIL # BLD MANUAL: 0.82 10*3/MM3 (ref 0–0.4)
EOSINOPHIL NFR BLD MANUAL: 8 % (ref 0.3–6.2)
ERYTHROCYTE [DISTWIDTH] IN BLOOD BY AUTOMATED COUNT: 18.7 % (ref 12.3–15.4)
GLUCOSE BLDC GLUCOMTR-MCNC: 151 MG/DL (ref 70–105)
GLUCOSE BLDC GLUCOMTR-MCNC: 157 MG/DL (ref 70–105)
GLUCOSE BLDC GLUCOMTR-MCNC: 229 MG/DL (ref 70–105)
GLUCOSE SERPL-MCNC: 180 MG/DL (ref 65–99)
HCT VFR BLD AUTO: 27.4 % (ref 34–46.6)
HGB BLD-MCNC: 8.3 G/DL (ref 12–15.9)
LYMPHOCYTES # BLD MANUAL: 1.12 10*3/MM3 (ref 0.7–3.1)
LYMPHOCYTES NFR BLD MANUAL: 1 % (ref 5–12)
MCH RBC QN AUTO: 23.8 PG (ref 26.6–33)
MCHC RBC AUTO-ENTMCNC: 30.3 G/DL (ref 31.5–35.7)
MCV RBC AUTO: 78.7 FL (ref 79–97)
MONOCYTES # BLD: 0.1 10*3/MM3 (ref 0.1–0.9)
NEUTROPHILS # BLD AUTO: 8.16 10*3/MM3 (ref 1.7–7)
NEUTROPHILS NFR BLD MANUAL: 72 % (ref 42.7–76)
NEUTS BAND NFR BLD MANUAL: 8 % (ref 0–5)
PLATELET # BLD AUTO: 463 10*3/MM3 (ref 140–450)
PMV BLD AUTO: 7.3 FL (ref 6–12)
POIKILOCYTOSIS BLD QL SMEAR: ABNORMAL
POTASSIUM SERPL-SCNC: 4 MMOL/L (ref 3.5–5.2)
RBC # BLD AUTO: 3.48 10*6/MM3 (ref 3.77–5.28)
SCAN SLIDE: NORMAL
SMALL PLATELETS BLD QL SMEAR: ABNORMAL
SODIUM SERPL-SCNC: 141 MMOL/L (ref 136–145)
VARIANT LYMPHS NFR BLD MANUAL: 11 % (ref 19.6–45.3)
WBC MORPH BLD: NORMAL
WBC NRBC COR # BLD: 10.2 10*3/MM3 (ref 3.4–10.8)

## 2022-11-14 PROCEDURE — 63710000001 INSULIN LISPRO (HUMAN) PER 5 UNITS: Performed by: INTERNAL MEDICINE

## 2022-11-14 PROCEDURE — 94761 N-INVAS EAR/PLS OXIMETRY MLT: CPT

## 2022-11-14 PROCEDURE — 97116 GAIT TRAINING THERAPY: CPT

## 2022-11-14 PROCEDURE — 63710000001 INSULIN GLARGINE PER 5 UNITS: Performed by: INTERNAL MEDICINE

## 2022-11-14 PROCEDURE — 97530 THERAPEUTIC ACTIVITIES: CPT

## 2022-11-14 PROCEDURE — 99232 SBSQ HOSP IP/OBS MODERATE 35: CPT | Performed by: INTERNAL MEDICINE

## 2022-11-14 PROCEDURE — 94799 UNLISTED PULMONARY SVC/PX: CPT

## 2022-11-14 PROCEDURE — 94664 DEMO&/EVAL PT USE INHALER: CPT

## 2022-11-14 PROCEDURE — 85025 COMPLETE CBC W/AUTO DIFF WBC: CPT | Performed by: INTERNAL MEDICINE

## 2022-11-14 PROCEDURE — 85007 BL SMEAR W/DIFF WBC COUNT: CPT | Performed by: INTERNAL MEDICINE

## 2022-11-14 PROCEDURE — 82962 GLUCOSE BLOOD TEST: CPT

## 2022-11-14 PROCEDURE — 80048 BASIC METABOLIC PNL TOTAL CA: CPT | Performed by: NURSE PRACTITIONER

## 2022-11-14 PROCEDURE — 25010000002 FUROSEMIDE PER 20 MG: Performed by: INTERNAL MEDICINE

## 2022-11-14 PROCEDURE — 97535 SELF CARE MNGMENT TRAINING: CPT | Performed by: OCCUPATIONAL THERAPIST

## 2022-11-14 PROCEDURE — 97110 THERAPEUTIC EXERCISES: CPT | Performed by: OCCUPATIONAL THERAPIST

## 2022-11-14 RX ORDER — FUROSEMIDE 10 MG/ML
20 INJECTION INTRAMUSCULAR; INTRAVENOUS DAILY
Status: DISCONTINUED | OUTPATIENT
Start: 2022-11-15 | End: 2022-11-15 | Stop reason: HOSPADM

## 2022-11-14 RX ORDER — HYDROCODONE BITARTRATE AND ACETAMINOPHEN 7.5; 325 MG/1; MG/1
1 TABLET ORAL EVERY 4 HOURS PRN
Status: DISCONTINUED | OUTPATIENT
Start: 2022-11-14 | End: 2022-11-15 | Stop reason: HOSPADM

## 2022-11-14 RX ADMIN — GUAIFENESIN 600 MG: 600 TABLET, EXTENDED RELEASE ORAL at 09:13

## 2022-11-14 RX ADMIN — LEVOTHYROXINE SODIUM 50 MCG: 50 TABLET ORAL at 06:15

## 2022-11-14 RX ADMIN — INSULIN LISPRO 7 UNITS: 100 INJECTION, SOLUTION INTRAVENOUS; SUBCUTANEOUS at 13:09

## 2022-11-14 RX ADMIN — ROSUVASTATIN 20 MG: 10 TABLET, FILM COATED ORAL at 20:44

## 2022-11-14 RX ADMIN — BUDESONIDE 0.5 MG: 0.5 INHALANT RESPIRATORY (INHALATION) at 06:54

## 2022-11-14 RX ADMIN — SPIRONOLACTONE 25 MG: 25 TABLET ORAL at 09:13

## 2022-11-14 RX ADMIN — DIVALPROEX SODIUM 500 MG: 500 TABLET, DELAYED RELEASE ORAL at 20:44

## 2022-11-14 RX ADMIN — INSULIN LISPRO 7 UNITS: 100 INJECTION, SOLUTION INTRAVENOUS; SUBCUTANEOUS at 09:12

## 2022-11-14 RX ADMIN — FUROSEMIDE 20 MG: 10 INJECTION, SOLUTION INTRAMUSCULAR; INTRAVENOUS at 02:08

## 2022-11-14 RX ADMIN — RISPERIDONE 0.5 MG: 0.25 TABLET ORAL at 09:13

## 2022-11-14 RX ADMIN — ASPIRIN 81 MG: 81 TABLET, COATED ORAL at 09:13

## 2022-11-14 RX ADMIN — GUAIFENESIN 600 MG: 600 TABLET, EXTENDED RELEASE ORAL at 20:44

## 2022-11-14 RX ADMIN — INSULIN LISPRO 3 UNITS: 100 INJECTION, SOLUTION INTRAVENOUS; SUBCUTANEOUS at 13:09

## 2022-11-14 RX ADMIN — DIVALPROEX SODIUM 500 MG: 500 TABLET, DELAYED RELEASE ORAL at 09:13

## 2022-11-14 RX ADMIN — INSULIN LISPRO 7 UNITS: 100 INJECTION, SOLUTION INTRAVENOUS; SUBCUTANEOUS at 17:33

## 2022-11-14 RX ADMIN — MAGNESIUM OXIDE TAB 400 MG (241.3 MG ELEMENTAL MG) 400 MG: 400 (241.3 MG) TAB at 09:13

## 2022-11-14 RX ADMIN — FOLIC ACID 1 MG: 1 TABLET ORAL at 09:13

## 2022-11-14 RX ADMIN — LOSARTAN POTASSIUM 25 MG: 25 TABLET, FILM COATED ORAL at 09:13

## 2022-11-14 RX ADMIN — IPRATROPIUM BROMIDE AND ALBUTEROL SULFATE 3 ML: .5; 3 SOLUTION RESPIRATORY (INHALATION) at 20:25

## 2022-11-14 RX ADMIN — FLUOXETINE 20 MG: 20 CAPSULE ORAL at 09:13

## 2022-11-14 RX ADMIN — INSULIN LISPRO 3 UNITS: 100 INJECTION, SOLUTION INTRAVENOUS; SUBCUTANEOUS at 09:12

## 2022-11-14 RX ADMIN — EMPAGLIFLOZIN 10 MG: 10 TABLET, FILM COATED ORAL at 09:13

## 2022-11-14 RX ADMIN — CLOPIDOGREL BISULFATE 75 MG: 75 TABLET ORAL at 09:13

## 2022-11-14 RX ADMIN — INSULIN GLARGINE 20 UNITS: 100 INJECTION, SOLUTION SUBCUTANEOUS at 06:15

## 2022-11-14 RX ADMIN — IPRATROPIUM BROMIDE AND ALBUTEROL SULFATE 3 ML: .5; 3 SOLUTION RESPIRATORY (INHALATION) at 06:54

## 2022-11-14 RX ADMIN — BUDESONIDE 0.5 MG: 0.5 INHALANT RESPIRATORY (INHALATION) at 20:25

## 2022-11-14 RX ADMIN — IPRATROPIUM BROMIDE AND ALBUTEROL SULFATE 3 ML: .5; 3 SOLUTION RESPIRATORY (INHALATION) at 15:27

## 2022-11-14 RX ADMIN — FERROUS SULFATE TAB EC 324 MG (65 MG FE EQUIVALENT) 324 MG: 324 (65 FE) TABLET DELAYED RESPONSE at 09:13

## 2022-11-14 RX ADMIN — IPRATROPIUM BROMIDE AND ALBUTEROL SULFATE 3 ML: .5; 3 SOLUTION RESPIRATORY (INHALATION) at 12:40

## 2022-11-14 RX ADMIN — METOPROLOL SUCCINATE 25 MG: 25 TABLET, FILM COATED, EXTENDED RELEASE ORAL at 09:13

## 2022-11-14 RX ADMIN — Medication 10 ML: at 09:14

## 2022-11-14 RX ADMIN — LORAZEPAM 0.5 MG: 0.5 TABLET ORAL at 02:08

## 2022-11-14 RX ADMIN — PANTOPRAZOLE SODIUM 40 MG: 40 TABLET, DELAYED RELEASE ORAL at 06:15

## 2022-11-14 RX ADMIN — SCOPALAMINE 1 PATCH: 1 PATCH, EXTENDED RELEASE TRANSDERMAL at 10:50

## 2022-11-14 RX ADMIN — INSULIN LISPRO 5 UNITS: 100 INJECTION, SOLUTION INTRAVENOUS; SUBCUTANEOUS at 17:34

## 2022-11-14 NOTE — PROGRESS NOTES
[facility] Medicine Services Daily Progress Note    Patient Name: Laura Perkins  : 1940  MRN: [id number]  Primary Care Physician:  Beka Weir MD  Date of admission: 10/23/2022      Subjective        Chief complaints- change in mental status    Patient reports     10/25/2022: Seen and examined and follow up.  Resting comfortably in bed in no distress or discomfort.      10/26/2022: Seen and examined in follow-up.  Resting comfortably.    10/27/2022: Seen and examined in follow-up.  Event noted for reported patient slipping out of bed last evening with no visible injury on examination.    10/28/2022:  Pt had a cardiac cath with DANIA to LAD.  Psych was consulted to help adjust meds.  Pt is off psych meds secondary to QTc prolongation.  Abilify increase, as well as Depakote for mood stabilization.  Cardiology following with QTc changes as well as EKG.      10/29/2022:  Pt is seen by psych and her meds are titrated.  Pt does not communicate with me and is repeating the words I a saying to her.  Pt is on 2 lit NC that can be titrated down.  Pt will be discharged when her psych meds are adjusted and she is back to her baseline.    10/30/2022:  Pt is very wheezy today and  states, pt is coughing very thick sputum.  Pt is very confused and not reliable historian.  She denies complains.  Pt is on 2 lit NC, and she oxygenating well, but her wheeze is louder today.  Pt think that her new psych meds, are improving her condition.      10/31/2022.  Patient was seen and examined.  Patient's family complained restless legs last night.      2022.  Patient was seen and examined.  Patient reported no new symptoms.      2022.  Patient was seen and examined.  Patient's family reported moderate improvement in her symptoms.      11/3/2022.  Patient was seen and examined.  Patient's family reported that patient was coughing all night.  Pulmonary is following.       2022.  Patient  was seen and examined.  Patient's family reported significant improvement in patient's symptoms.      11/05/2022.  Patient was seen and examined.  Patient's family reported no overnight events.  Patient was noted to be coughing.  Tessalon Perles was ordered.      11/6/2022.  Patient was seen and examined.  Patient's family reported moderate improvement in her symptoms.      11/7/2022.  Patient was seen and examined.  Patient is status post thoracentesis.  Family reported musculoskeletal pain.  Pain control was reviewed.      11/8/2022.  Patient was seen and examined.  Patient's family requested a surgical consult to discuss her psych meds.      11/9/2022.  Patient was seen and examined.  Patient's family reported multiple episodes of her limb movement last night.  Psych is following.        11/10/2022.  Patient was seen and examined.  No overnight events noted.      11/11/2022.  Patient was seen and examined.  Patient reported no new symptoms.  Right upper extremity Doppler was completed  Doppler was negative for DVT.      11/12/2022.  Patient was seen and examined.  No overnight events noted.    11/13/2022  Patient seen and examined  She is alert and oriented x3   by bedside      11/14/2022.  Patient was seen and examined.  Patient's family reported significant improvement in patient's symptoms.  Patient can discharge in the next 24 to 48 hours to rehab facility.        Objective      Vitals:   Temp:  [97.6 °F (36.4 °C)-98.5 °F (36.9 °C)] 98 °F (36.7 °C)  Heart Rate:  [74-98] 96  Resp:  [14-24] 16  BP: (102-161)/(38-74) 140/44    Physical Exam  Vitals reviewed.   Constitutional:       General: She is not in acute distress.     Appearance: She is ill-appearing.      Comments: Family is at the bedside.   HENT:      Head: Normocephalic.      Nose: Nose normal.      Mouth/Throat:      Mouth: Mucous membranes are moist.   Eyes:      Extraocular Movements: Extraocular movements intact.      Conjunctiva/sclera:  Conjunctivae normal.      Pupils: Pupils are equal, round, and reactive to light.   Cardiovascular:      Comments: S1 and S2 present.  No tachycardia.  Pulmonary:      Effort: No respiratory distress.      Breath sounds: Normal breath sounds.   Abdominal:      General: Bowel sounds are normal. There is no distension.      Palpations: Abdomen is soft. There is no mass.      Tenderness: There is no abdominal tenderness. There is no right CVA tenderness, left CVA tenderness, guarding or rebound.      Hernia: No hernia is present.   Musculoskeletal:      Cervical back: Neck supple.      Comments: Degenerative changes   Skin:     General: Skin is warm and dry.   Neurological:      Comments: No facial asymmetry noted.  Gait and station not tested.   Psychiatric:      Comments: No agitation.              Result Review    Result Review:  I have personally reviewed the results from the time of this admission to 11/14/2022 18:05 EST and agree with these findings:  [x]  Laboratory  [x]  Microbiology  [x]  Radiology  []  EKG/Telemetry   [x]  Cardiology/Vascular   []  Pathology  [x]  Old records  []  Other:      Status: Completed Collected: 11/02/22 1224    Updated: 11/02/22 1228   Narrative:     DATE OF EXAM:   11/2/2022 11:00 AM       PROCEDURE:   XR ABDOMEN KUB-       INDICATIONS:   Vomiting; I21.4-Non-ST elevation (NSTEMI) myocardial infarction;   R41.82-Altered mental status, unspecified; R50.9-Fever, unspecified;   J18.9-Pneumonia, unspecified organism; R77.8-Other specified   abnormalities of plasma proteins; E11.9-Type 2 diabetes mellitus without   complications; Z79.4-Long term (current) use of insulin;   E78.5-Hyperlipidemia, unspecified       COMPARISON:   CT chest 10/30/2022, CT abdomen and pelvis without contrast 10/24/2022       TECHNIQUE:   Radiographic view(s) of the abdomen obtained.       FINDINGS:   Included lower lungs are abnormal, better, better evaluated on recent   chest CT. No abnormal small bowel  distention is seen in a pattern to   suggest small bowel obstruction, although there is a nonspecific paucity   of gas-filled small bowel loops. Stool is seen in the ascending and   transverse colon.       Impression:     Nonspecific, nonobstructive bowel gas pattern.       Electronically Signed By-Lora Monk MD On:11/2/2022 12:26 PM   This report was finalized on [id number] by  Lora Monk MD.             Wounds (last 24 hours)     LDA Wound     Row Name 11/14/22 1600 11/14/22 1200 11/14/22 0800       Wound 10/24/22 1344  medial sacral spine Pressure Injury    Wound - Properties Group Placement Date: 10/24/22  -MG Placement Time: 1344  -MG Present on Hospital Admission: Y  -MG Side: --  -MG, middle  Orientation: medial  -MG Location: sacral spine  -MG Primary Wound Type: Pressure inj  -MG    Dressing Appearance -- -- intact  -MS    Closure Approximated  -JH Approximated  -MS Approximated  -MS    Base pink  -JH pink  -MS pink  -MS    Periwound redness  -JH redness  -MS redness  -MS    Periwound Temperature warm  -JH warm  -MS warm  -MS    Drainage Amount none  -JH none  -MS none  -MS    Care, Wound -- -- soap and water  -MS    Dressing Care -- -- skin barrier agent applied  -MS    Periwound Care -- -- dry periwound area maintained  -MS    Retired Wound - Properties Group Placement Date: 10/24/22  -MG Placement Time: 1344  -MG Present on Hospital Admission: Y  -MG Side: --  -MG, middle  Orientation: medial  -MG Location: sacral spine  -MG Primary Wound Type: Pressure inj  -MG    Retired Wound - Properties Group Date first assessed: 10/24/22  -MG Time first assessed: 1344  -MG Present on Hospital Admission: Y  -MG Side: --  -MG, middle  Location: sacral spine  -MG Primary Wound Type: Pressure inj  -MG    Row Name 11/14/22 0426 11/14/22 0015 11/13/22 2025       Wound 10/24/22 1344  medial sacral spine Pressure Injury    Wound - Properties Group Placement Date: 10/24/22  -MG Placement Time: 1344  -MG Present  on Hospital Admission: Y  -MG Side: --  -MG, middle  Orientation: medial  -MG Location: sacral spine  -MG Primary Wound Type: Pressure inj  -MG    Closure Approximated  -TJ Approximated  -TJ Approximated  -TJ    Base pink  -TJ pink  -TJ pink  -TJ    Periwound redness  -TJ redness  -TJ redness  -TJ    Periwound Temperature warm  -TJ warm  -TJ warm  -TJ    Drainage Amount none  -TJ none  -TJ none  -TJ    Retired Wound - Properties Group Placement Date: 10/24/22  -MG Placement Time: 1344  -MG Present on Hospital Admission: Y  -MG Side: --  -MG, middle  Orientation: medial  -MG Location: sacral spine  -MG Primary Wound Type: Pressure inj  -MG    Retired Wound - Properties Group Date first assessed: 10/24/22  -MG Time first assessed: 1344  -MG Present on Hospital Admission: Y  -MG Side: --  -MG, middle  Location: sacral spine  -MG Primary Wound Type: Pressure inj  -MG          User Key  (r) = Recorded By, (t) = Taken By, (c) = Cosigned By    Initials Name Provider Type    Apoorva Merida, RN Registered Nurse    Raven Turcios RN Registered Nurse    Ilda Torres RN Registered Nurse     Charlotte Valero RN Registered Nurse                  Assessment & Plan      From previous notes and with minor updates.  CC diabetes with  Brief patient summary:    Patient is an 84-year-old female.  Recently had a colonoscopy and 5 cm mass was found.  She underwent transverse colon resection at [facility]- pathology report revealed invasive mucinous adenocarcinoma..  She was subsequently discharged to a local rehab facility on 10/19/2022.     She presented to [facility] on 10/23/2022 due to 2 days history of generalized body weakness and altered mental status.     On admission, temperature was noted to be 102 and tachycardic.  Oxygen saturation was in the low 90s on 4 L of oxygen.  Baseline patient does not use oxygen.     Patient triggered sepsis and was started on sepsis protocol.  She was admitted for  further care                 aspirin, 81 mg, Oral, Daily  budesonide, 0.5 mg, Nebulization, BID - RT  clopidogrel, 75 mg, Oral, Daily  divalproex, 500 mg, Oral, BID  empagliflozin, 10 mg, Oral, Daily  ferrous sulfate, 324 mg, Oral, Daily With Breakfast  FLUoxetine, 20 mg, Oral, Daily  folic acid, 1 mg, Oral, Daily  [START ON 11/15/2022] furosemide, 20 mg, Intravenous, Daily  guaiFENesin, 600 mg, Oral, Q12H  insulin glargine, 20 Units, Subcutaneous, QAM  insulin lispro, 3-14 Units, Subcutaneous, TID With Meals  insulin lispro, 7 Units, Subcutaneous, TID With Meals  ipratropium-albuterol, 3 mL, Nebulization, 4x Daily - RT  levothyroxine, 50 mcg, Oral, Daily  losartan, 25 mg, Oral, Q24H  magnesium oxide, 400 mg, Oral, Daily  metoprolol succinate XL, 25 mg, Oral, Q24H  pantoprazole, 40 mg, Oral, QAM  risperiDONE, 0.5 mg, Oral, Daily  rosuvastatin, 20 mg, Oral, Daily  Scopolamine, 1 patch, Transdermal, Q72H  spironolactone, 25 mg, Oral, Daily             Active Hospital Problems:  Active Hospital Problems    Diagnosis    • **Altered mental status, unspecified altered mental status type    • Acute respiratory failure with hypoxia ([facility])    • Pleural effusion    • HAP (hospital-acquired pneumonia)    • Sepsis ([facility])    • Elevated troponin    • Acute respiratory distress    • Type 2 diabetes mellitus ([facility])    • Anxiety associated with depression    • Hypothyroidism (acquired)    • OAB (overactive bladder)    • GERD without esophagitis    • Acute non-ST elevation myocardial infarction (NSTEMI) ([facility])    • Dyslipidemia    • Cancer of transverse colon ([facility])           Assessment/plan:      -Continue appropriate patient's home medications for other chronic medical conditions.  -Continue the present level of care.  -Patient and family agreed with the plan of care.      Acute toxic metabolic encephalopathy  Multifactorial- polypharmacy/infection  Mental status back to baseline      Chronic microcytic anemia  Iron deficiency  anemia  Retic  count appropriately elevated  Serum B12-normal  Folic acid level-low-on supplementary folic acid  Status post iron infusion x3  Monitor hemoglobin and transfuse as needed to keep hemoglobin greater than 7    Acute thrombocytosis  Most likely due to iron deficiency anemia and inflammatory response.    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  Status post colectomy 10/18/2022  Considering patient's  age, early  cancer stage and overall performance- observation only recommended  Oncologist following and plans for genetic evaluation for Donnelly syndrome as outpatient        Diabetes mellitus type 2 with hyperglycemia  On Lantus, Premeal insulin and sliding scale insulin  Also on Jardiance  Monitor blood sugar and adjust insulin as needed  Endocrinologist following    Hypothyroidism-on Synthroid    Hyperlipidemia-on statin    GERD-PPI      Elevated troponin  Acute non-ST elevation MI  Presumed CAD  2D echo moderate LV dysfunction  Left heart catheterization 10/27/2022 with drug-eluting stent to LAD  On aspirin, Plavix, statin and metoprolol    CVA-on Plavix, aspirin and statin    Overactive bladder status post pinning and mesh     Anxiety/depression  Was seen by psych  On fluoxetine and risperidone        DVT prophylaxis:  Mechanical DVT prophylaxis orders are present.    CODE STATUS:    Medical Intervention Limits: NO intubation (DNI)  Level Of Support Discussed With: Next of Kin (If No Surrogate); Health Care Surrogate  Code Status (Patient has no pulse and is not breathing): No CPR (Do Not Attempt to Resuscitate)  Medical Interventions (Patient has pulse or is breathing): Limited Support  Release to patient: Routine Release       Disposition: This wonderful patient  can discharge in the next 24 to 48 hours.    Electronically signed by Alejandro Estrada MD, FACP, 11/14/22, 18:05 EST.        [facility] Hospitalist Team

## 2022-11-14 NOTE — THERAPY TREATMENT NOTE
"Subjective: Pt agreeable to therapeutic plan of care. Spouse in room during tx.   Cognition: oriented to Person, Place, Time and Situation    Objective:     Bed Mobility: N/A or Not attempted.   Functional Transfers: Mod-A for sit-stand transfer for toileting hygiene. Stand balance and tolerance=fair-.   Functional Ambulation: N/A or Not attempted.    Toileting: Max-A  ADL Position: supported standing  ADL Comments: Pt has loose stools & a wound on sacrum/buttocks.     Grooming: Min-A & min v.c. for initiation.   ADL Position: supported sitting      Vitals: WNL    Pain: 10 VAS  Location: wound on sacrum/buttocks  Interventions for pain: Repositioned and Therapeutic Presence.   Education: Provided education on the importance of mobility in the acute care setting and Energy conservation strategies, UE AROM exercices.     Assessment: Laura Perkins presents with ADL impairments below baseline abilities which indicate the need for continued skilled intervention while inpatient. She is participating with toileting ADLs by standing for hygiene. Standing tolerance fair- for task. Toilet transfer=mod A. Grooming tasks=min assist with v.c. for initiation. Pt. Tolerating UE ex.to increase general strength & endurance. Encouraged pt to do UE AROM ex at least 3 times per day.  Tolerating session today without incident. Will continue to follow and progress as tolerated.     Plan/Recommendations:   Moderate Intensity Therapy recommended post-acute care. This is recommended as therapy feels the patient would require 3-4 days per week and wouldn't tolerate \"3 hour daily\" rehab intensity. SNF would be the preferred choice. If the patient does not agree to SNF, arrange HH or OP depending on home bound status. If patient is medically complex, consider LTACH.. Pt requires no DME at discharge.     Pt desires Skilled Rehab placement at discharge. Pt cooperative; agreeable to therapeutic recommendations and plan of care.     Modified " [place]: N/A = No pre-op stroke/TIA    Post-Tx Position: Up in Chair, Alarms activated and Call light and personal items within reach  PPE: gloves and surgical mask

## 2022-11-14 NOTE — THERAPY TREATMENT NOTE
"Subjective: Pt agreeable to therapeutic plan of care.    Objective:     Bed mobility - Min-A supine to sit.  Took pt time to get upright at edge of the bed and gain her balance  Transfers - Min-A, Mod-A and with rolling walkersit to stand from edge of the bed then pt transferred to bedside commode.  Sit to stand from bedside commode 3 times.  Pt stood once more from chair for wound care skin check to pt's bottom.   Ambulation - 10 feet Mod-A and with rolling walker  Pt needed cues for posture, pt stood still for posture correction and to keep eyes open.  Pt needed a lot of cues to step back to sitting surface    Vitals: WNL    Pain: 5 VAS   Location: Buttocks  Intervention for pain: Repositioned, RN provided medication and Therapeutic Presence    Education: Provided education on the importance of mobility in the acute care setting, Verbal/Tactile Cues, Transfer Training and Gait Training    Assessment: Laura Perkins presents with functional mobility impairments which indicate the need for skilled intervention. Tolerating session today without incident. Will continue to follow and progress as tolerated.     Plan/Recommendations:   Moderate Intensity Therapy recommended post-acute care. This is recommended as therapy feels the patient would require 3-4 days per week and wouldn't tolerate \"3 hour daily\" rehab intensity. SNF would be the preferred choice. If the patient does not agree to SNF, arrange HH or OP depending on home bound status. If patient is medically complex, consider LTACH.. Pt requires no DME at discharge.     Pt desires Skilled Rehab placement at discharge. Pt cooperative; agreeable to therapeutic recommendations and plan of care.     Basic Mobility 6-click:  Rollin = Total, A lot = 2, A little = 3; 4 = None  Supine>Sit:   1 = Total, A lot = 2, A little = 3; 4 = None   Sit>Stand with arms:  1 = Total, A lot = 2, A little = 3; 4 = None  Bed>Chair:   1 = Total, A lot = 2, A little = 3; 4 = " None  Ambulate in room:  1 = Total, A lot = 2, A little = 3; 4 = None  3-5 Steps with railin = Total, A lot = 2, A little = 3; 4 = None  Score: 12    Modified [place]: N/A = No pre-op stroke/TIA    Post-Tx Position: Up in Chair, Alarms activated and Call light and personal items within reach pt's  present  PPE: gloves and surgical mask

## 2022-11-14 NOTE — CASE MANAGEMENT/SOCIAL WORK
Continued Stay Note  [facility]     Patient Name: Laura Perkins  MRN: [id number]  Today's Date: 11/14/2022    Admit Date: 10/23/2022    Plan: Return to [facility]. no precert needed.   Discharge Plan     Row Name 11/14/22 4100       Plan    Plan Return to [facility]. no precert needed.    Plan Comments BArriers to discharge: pain and moaning overnight 11/13. Facility would like to wait to take to make sure pain is controlled overnight tonight.              Expected Discharge Date and Time     Expected Discharge Date Expected Discharge Time    Nov 15, 2022           Met with patient in room wearing PPE: mask.    Maintained distance greater than six feet and spent less than 15 minutes in the room.    Cherie Galvan RN      Office Phone [phone]  Office Cell [phone]

## 2022-11-14 NOTE — NURSING NOTE
WOCN note:    82 yr old female admitted 10/23/22 with altered mental status. WOCN follow up for sacral pressure injury.     Patient sitting up in chair but able to stand with assist for assessment and skin care. Previous assessment on 10/24 noted a sacral deep tissue pressure injury. This has now resolved and the skin is pink and blanchable. There is erythema to the buttocks but this is also blanchable. Patient is incontinent of urine and stool.     Patient is on an Agiliti bed surface with a waffle cushion in the chair. Spouse is at the bedside and was instructed on skin care recommendations with frequent cleansing and protection with zinc based skin barriers. We will continue to follow.

## 2022-11-14 NOTE — CONSULTS
"Diabetes Education  Assessment/Teaching    Patient Name:  Laura Perkins  YOB: 1940  MRN: [id number]  Admit Date:  10/23/2022      Assessment Date:  11/14/2022  Flowsheet Row Most Recent Value   General Information     Referral From: A1c  [Pt seen due to diabetes on problem list. A1c on 10/24/2022 7.4%.]   Height 162.6 cm (64\")   Weight 52 kg (114 lb 10.2 oz)   Weight Method Bed scale   Pregnancy Assessment    Diabetes History    What type of diabetes do you have? Type 2   Length of Diabetes Diagnosis --  [pt with long hx of diabetes]   Do you test your blood sugar at home? yes   Frequency of checks daily   Meter type new meter and supplies,  unsure of name   Who performs the test?    Have you had low blood sugar? (<70mg/dl) no   Education Preferences    Nutrition Information    Assessment Topics    DM Goals           Flowsheet Row Most Recent Value   DM Education Needs    Meter Has own   Frequency of Testing Daily  [Discussed checking bs daily if pt returns home on oral agents. Discussed checking 3 times/day if pt discharged from rehab on insulin therapy.]   Blood Glucose Target Range Reviewed A1c result from 10/24/2022 of 7.4%. Reviewed healthy bs range and healthy A1c target. Discussed importance of bs control.   Medication Oral  [At home pt takes Invokana 150 mg daily, Glipizide 20 mg bid, Metformin  mg bid and Januvia 100 mg pm]   Problem Solving Hypoglycemia, Signs, Symptoms, Treatment   Reducing Risks A1C testing  [Gave A1c info sheet]   Healthy Eating --  [Pt eating 3 meals/day]   Motivation Engaged   Teaching Method Explanation, Discussion, Handouts   Patient Response Verbalized understanding            Other Comments:  Met with pt and . Assessment and education completed with . Pt came from rehab and will be returning to rehab per CM note.  states pt has received insulin in hospital in the past but when she returns home, she has been instructed to go " back to oral agents.  has not given pt insulin injections. Discussed if pt would be discharged from rehab on insulin therapy,  would need to be instructed on how to do.  verbalized understanding of info. He has no additional questions.       Electronically signed by:  Lainey Altamirano RN  11/14/22 15:44 EST

## 2022-11-14 NOTE — PLAN OF CARE
"Goal Outcome Evaluation:     Bed mobility - Min-A supine to sit.  Took pt time to get upright at edge of the bed and gain her balance  Transfers - Min-A, Mod-A and with rolling walkersit to stand from edge of the bed then pt transferred to bedside commode.  Sit to stand from bedside commode 3 times.  Pt stood once more from chair for wound care skin check to pt's bottom.   Ambulation - 10 feet Mod-A and with rolling walker  Pt needed cues for posture, pt stood still for posture correction and to keep eyes open.  Pt needed a lot of cues to step back to sitting surface    Moderate Intensity Therapy recommended post-acute care. This is recommended as therapy feels the patient would require 3-4 days per week and wouldn't tolerate \"3 hour daily\" rehab intensity. SNF would be the preferred choice. If the patient does not agree to SNF, arrange HH or OP depending on home bound status. If patient is medically complex, consider LTACH.. Pt requires no DME at discharge.     Pt desires Skilled Rehab placement at discharge. Pt cooperative; agreeable to therapeutic recommendations and plan of care.             "

## 2022-11-14 NOTE — PROGRESS NOTES
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 female presented to [facility] ER on 10/23/2022 for mental status changes.  She was residing at [facility] and for 2 days she was noticed to be more lethargic and she was not speaking as much.  Her O2 saturations were mildly low and on admission to the ER she had a fever of 102.  She was diagnosed with pneumonia.  White count 13.3, hemoglobin 11.6, MCV 77.6 and platelets 380.  CMP was unremarkable.  Head CT showed no acute abnormality, there were some chronic small vessel ischemia changes.  CT A/P showed no nephrolithiasis.  There was no GI or  obstruction.  Large stool burden was present.  There is no evidence of acute abnormality.  Bibasilar consolidations were seen and subacute moderate compression fractures of L3 and L5 were present without malalignment.  Chest CT was limited due to respiratory motion artifact.  There were moderate to large layering bilateral pleural effusions with compressive atelectasis on both lungs.  Airspace disease was seen in bilateral lower lobes suggesting multifocal pneumonia.  Cardiomegaly with trace pericardial effusion was present as well as coronary artery atherosclerotic vascular disease.  Her troponin was elevated and on 10/27/2022 she underwent a cardiac catheterization by Dr. Miguel.  A drug-eluting stent was placed to the LAD.  TSH 10.81 (0.27-4.2), with history of hypothyroidism.  Follow-up chest CT on 11/3/2022, showed evolving extensive airspace consolidations and new right apex opacity and moderate bilateral pleural effusions.  Anasarca had improved.  On 2022, she underwent a left thoracentesis, pathology showed atypical cells (mesothelial cells versus malignant) and fluid was consistent with  transudate.  Her last CBC on 11/6/2022, showed a white count of 17.6, hemoglobin 8.6, MCV 77.8 and platelets 505.  Chest x-ray showed cardiomegaly left lower lobe opacities and mild pulmonary congestion.  Echocardiogram showed an EF of 45-50% and a small pericardial effusion.  Palliative care has been consulted and the patient is DNR and possibly interested in hospice care.  Additional disciplines consulted include endocrine, pulmonary, psychiatry, cardiology and gastroenterology.  Discharged from [facility] on 10/21/2022 following a 3-day admission for a laparoscopic partial transverse colectomy performed by Dr. PERI Michelle.  She had undergone an EGD/colonoscopy by Dr. Pope as an outpatient on 9/21/22 and was found to have a 5 cm mass in the transverse colon. CT a/p showed an abnormal colon wall thickening in the mid transverse colon with a prominent adjacent mesenteric lymph node.  There was a new L5 fx and L3 fx with some height loss.  The duodenal polyp path showed polypoid peptic duodenitis with prominent Brunner's glands.  A gastric polyp was hyperplastic, foveolar type.  Multiple colon polyps were removed that were tubular adenomas, a single serrated polyp with focal lamina propria spindle cell proliferation, hyperplastic polyp, a low grade dysplastic tubular adenoma and the transverse mass was invasive carcinoma.  Molecular testing revealed the mass to have loss of nuclear expression on IHC in MLH1 and PMS2.  Her 2 Jason was neg (1+).  BRAF mutation was detected codon 600 /D.  She was referred to [facility] but was too sick to be seen at the time of the appt.    Her colectomy was uneventful.  MSI on the tumor was the same. 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  Tumor size was 11 cm with invasion thru the muscularis propria and pericolonic tissue.  Margins were neg for malignancy.  There was no lymphovascular or perineural invasion.  0/20  lymph nodes were involved. Stage IIA (pT3, pN0).         11/09/22  Hematology/Oncology was consulted for her diagnosis of invasive moderately differentiated mucinous adenocarcinoma.       Past Medical History: DM in the past few years.  Bipolar disorder, hypothyroidism, CVA.  Surgical History:Hysterectomy in 1983 for uterine prolapse.    Social History:She lives in [place] with her .  She has been a housewife.  She did not smoke or drink alcohol.   Family History: Her brother had throat cancer.  Allergies: NKA     PCP: Beka Weir MD    INTERVAL HISTORY:  • 11/10/2022- iron 38 (), iron saturation 16 (20-50), TIBC 238 (298-536), ferritin 197 ().  Initiated on Ferrlecit 250 mg IV daily x2.  • 11/10/2022- white blood cell count 7.4, hemoglobin 7.4, platelets 429.  D-dimer 4.15 (< 0.6).  Retic 3.71.  Creatinine 1.43.  Normal bilateral lower extremity venous Dopplers.  Lung perfusion scan low probability PE.  • 11/11/2022- WBC 19.3, hemoglobin 7.4, platelet count 472,000.  Vitamin B12 632 (211-946), haptoglobin 399 (), folate 3.86 (4.70-24.2).  • 11/12/2022- WBC 16, hemoglobin 7.5.  SPEP with M spike not observed.  • 11/13/2022- agitated and anxious during the night.  • 11/14/2022- hemoglobin 8.3, copper 133 ().    History of present illness reviewed since last visit and changes noted on 11/14/22.    Subjective   Complains of pain in the bottom and shortness of air.    ROS:  Review of Systems   Constitutional: Negative for activity change, chills, fatigue, fever and unexpected weight change.   HENT: Negative for congestion, dental problem, hearing loss, mouth sores, nosebleeds, sore throat and trouble swallowing.    Eyes: Negative for photophobia and visual disturbance.   Respiratory: Positive for shortness of breath. Negative for cough and chest tightness.    Cardiovascular: Negative for chest pain, palpitations and leg swelling.   Gastrointestinal: Negative for abdominal  distention, abdominal pain, blood in stool, constipation, diarrhea, nausea and vomiting.   Endocrine: Negative for cold intolerance and heat intolerance.   Genitourinary: Negative for decreased urine volume, difficulty urinating, dysuria, frequency, hematuria and urgency.   Musculoskeletal: Positive for back pain. Negative for arthralgias and gait problem.   Skin: Negative for rash and wound.   Neurological: Negative for dizziness, tremors, weakness, light-headedness, numbness and headaches.   Hematological: Negative for adenopathy. Does not bruise/bleed easily.   Psychiatric/Behavioral: Negative for confusion and hallucinations. The patient is not nervous/anxious.    All other systems reviewed and are negative.       MEDICATIONS:    Scheduled Meds:  aspirin, 81 mg, Oral, Daily  budesonide, 0.5 mg, Nebulization, BID - RT  clopidogrel, 75 mg, Oral, Daily  divalproex, 500 mg, Oral, BID  empagliflozin, 10 mg, Oral, Daily  ferrous sulfate, 324 mg, Oral, Daily With Breakfast  FLUoxetine, 20 mg, Oral, Daily  folic acid, 1 mg, Oral, Daily  furosemide, 20 mg, Intravenous, Q12H  guaiFENesin, 600 mg, Oral, Q12H  insulin glargine, 20 Units, Subcutaneous, QAM  insulin lispro, 3-14 Units, Subcutaneous, TID With Meals  insulin lispro, 7 Units, Subcutaneous, TID With Meals  ipratropium-albuterol, 3 mL, Nebulization, 4x Daily - RT  levothyroxine, 50 mcg, Oral, Daily  losartan, 25 mg, Oral, Q24H  magnesium oxide, 400 mg, Oral, Daily  metoprolol succinate XL, 25 mg, Oral, Q24H  pantoprazole, 40 mg, Oral, QAM  risperiDONE, 0.5 mg, Oral, Daily  rosuvastatin, 20 mg, Oral, Daily  Scopolamine, 1 patch, Transdermal, Q72H  spironolactone, 25 mg, Oral, Daily       Continuous Infusions:      PRN Meds:  •  acetaminophen  •  acetaminophen  •  acetaminophen  •  benzonatate  •  Calcium Gluconate-NaCl **AND** calcium gluconate **AND** Calcium, Ionized  •  dextrose  •  dextrose  •  docusate sodium  •  glucagon (human recombinant)  •   "HYDROcodone-acetaminophen  •  LORazepam  •  magnesium sulfate **OR** magnesium sulfate **OR** magnesium sulfate  •  Morphine  •  ondansetron  •  phenol  •  polyethylene glycol  •  potassium & sodium phosphates **OR** potassium & sodium phosphates  •  potassium chloride  •  potassium chloride  •  [COMPLETED] Insert peripheral IV **AND** sodium chloride     ALLERGIES:  No Known Allergies    Objective    VITALS:   /44 (BP Location: Left arm, Patient Position: Lying)   Pulse 83   Temp 97.9 °F (36.6 °C) (Oral)   Resp 18   Ht 162.6 cm (64\")   Wt 52 kg (114 lb 10.2 oz)   SpO2 100%   BMI 19.68 kg/m²     PHYSICAL EXAM:  Physical Exam  Vitals and nursing note reviewed.   Constitutional:       General: She is not in acute distress.     Appearance: Normal appearance. She is well-developed and normal weight. She is not toxic-appearing or diaphoretic.   HENT:      Head: Normocephalic and atraumatic.      Comments: Alopecia.     Right Ear: External ear normal.      Left Ear: External ear normal.      Nose: Nose normal.      Mouth/Throat:      Mouth: Mucous membranes are moist.      Pharynx: Oropharynx is clear. No oropharyngeal exudate or posterior oropharyngeal erythema.      Comments: Dental fillings.  Eyes:      General: No scleral icterus.     Extraocular Movements: Extraocular movements intact.      Conjunctiva/sclera: Conjunctivae normal.      Pupils: Pupils are equal, round, and reactive to light.   Cardiovascular:      Rate and Rhythm: Normal rate and regular rhythm.      Heart sounds: Normal heart sounds. No murmur heard.     Comments: Cardiac monitor leads.  Pulmonary:      Effort: Pulmonary effort is normal. No respiratory distress.      Breath sounds: Normal breath sounds. No wheezing or rales.   Abdominal:      General: Bowel sounds are normal. There is no distension.      Palpations: Abdomen is soft. There is no mass.      Tenderness: There is no abdominal tenderness. There is no guarding.      Comments: " Midline vertical scar above umbilicus.   Genitourinary:     Comments: Deferred   Musculoskeletal:         General: No swelling, tenderness or deformity. Normal range of motion.      Cervical back: Normal range of motion and neck supple. No rigidity.      Right lower leg: No edema.      Left lower leg: No edema.      Comments: Left upper extremity O2 monitor.     Lymphadenopathy:      Cervical: No cervical adenopathy.      Upper Body:      Right upper body: No supraclavicular adenopathy.      Left upper body: No supraclavicular adenopathy.   Skin:     General: Skin is warm and dry.      Coloration: Skin is not pale.      Findings: No bruising, erythema or rash.   Neurological:      General: No focal deficit present.      Mental Status: She is alert and oriented to person, place, and time.      Coordination: Coordination normal.   Psychiatric:      Comments: Agitated and anxious during the night.           RECENT LABS:  Lab Results (last 24 hours)     Procedure Component Value Units Date/Time    POC Glucose Once [[id number]]  (Abnormal) Collected: 11/14/22 0804    Specimen: Blood Updated: 11/14/22 0804     Glucose 151 mg/dL      Comment: Serial Number: [id number]:  [id number]       Manual Differential [[id number]]  (Abnormal) Collected: 11/14/22 0452    Specimen: Blood Updated: 11/14/22 0555     Neutrophil % 72.0 %      Lymphocyte % 11.0 %      Monocyte % 1.0 %      Eosinophil % 8.0 %      Bands %  8.0 %      Neutrophils Absolute 8.16 10*3/mm3      Lymphocytes Absolute 1.12 10*3/mm3      Monocytes Absolute 0.10 10*3/mm3      Eosinophils Absolute 0.82 10*3/mm3      Anisocytosis Slight/1+     Poikilocytes Slight/1+     WBC Morphology Normal     Platelet Estimate Increased    CBC & Differential [[id number]]  (Abnormal) Collected: 11/14/22 0452    Specimen: Blood Updated: 11/14/22 0555    Narrative:      The following orders were created for panel order CBC & Differential.  Procedure                                Abnormality         Status                     ---------                               -----------         ------                     CBC Auto Differential[[id number]]        Abnormal            Final result               Scan Slide[[id number]]                                       Final result                 Please view results for these tests on the individual orders.    Scan Slide [[id number]] Collected: 11/14/22 0452    Specimen: Blood Updated: 11/14/22 0555     Scan Slide --     Comment: See Manual Differential Results       CBC Auto Differential [[id number]]  (Abnormal) Collected: 11/14/22 0452    Specimen: Blood Updated: 11/14/22 0555     WBC 10.20 10*3/mm3      RBC 3.48 10*6/mm3      Hemoglobin 8.3 g/dL      Hematocrit 27.4 %      MCV 78.7 fL      MCH 23.8 pg      MCHC 30.3 g/dL      RDW 18.7 %      RDW-SD 52.1 fl      MPV 7.3 fL      Platelets 463 10*3/mm3     Narrative:      Modified report. Previous result was Hemogram on 11/14/2022 at 0528 EST.  The previously reported component NRBC is no longer being reported. Previous result was 0.1 /100 WBC (Reference Range: 0.0-0.2 /100 WBC) on 11/14/2022 at 0528 EST.    Basic Metabolic Panel [[id number]]  (Abnormal) Collected: 11/14/22 0452    Specimen: Blood Updated: 11/14/22 0543     Glucose 180 mg/dL      BUN 37 mg/dL      Creatinine 1.17 mg/dL      Sodium 141 mmol/L      Potassium 4.0 mmol/L      Chloride 98 mmol/L      CO2 29.0 mmol/L      Calcium 8.8 mg/dL      BUN/Creatinine Ratio 31.6     Anion Gap 14.0 mmol/L      eGFR 46.4 mL/min/1.73      Comment: National Kidney Foundation and American Society of Nephrology (ASN) Task Force recommended calculation based on the Chronic Kidney Disease Epidemiology Collaboration (CKD-EPI) equation refit without adjustment for race.       Narrative:      GFR Normal >60  Chronic Kidney Disease <60  Kidney Failure <15    The GFR formula is only valid for adults with stable renal function between ages 18 and 70.    POC Glucose  Once [[id number]]  (Abnormal) Collected: 11/13/22 2013    Specimen: Blood Updated: 11/13/22 2015     Glucose 234 mg/dL      Comment: Serial Number: [id number]:  [id number]       Copper, Serum [[id number]] Collected: 11/10/22 0348    Specimen: Blood Updated: 11/13/22 2002     Copper 133 ug/dL      Comment:                                 Detection Limit = 5       Narrative:      Test(s) 001586-Copper, Serum or Plasma  was developed and its performance characteristics determined  by [facility]. It has not been cleared or approved by the Food  and Drug Administration.  Performed at:  [address] [facility]  [id number]  : Aubrie Cifuentes MD, Phone:  [phone]    POC Glucose Once [[id number]]  (Abnormal) Collected: 11/13/22 1702    Specimen: Blood Updated: 11/13/22 1705     Glucose 305 mg/dL      Comment: Serial Number: [id number]:  [id number]       POC Glucose Once [[id number]]  (Abnormal) Collected: 11/13/22 1226    Specimen: Blood Updated: 11/13/22 1229     Glucose 241 mg/dL      Comment: Serial Number: [id number]:  [id number]       Blood Culture - Blood, Hand, Left [[id number]]  (Normal) Collected: 11/11/22 1107    Specimen: Blood from Hand, Left Updated: 11/13/22 1117     Blood Culture No growth at 2 days    Blood Culture - Blood, Arm, Left [[id number]]  (Normal) Collected: 11/11/22 1040    Specimen: Blood from Arm, Left Updated: 11/13/22 1047     Blood Culture No growth at 2 days          PENDING RESULTS: N/A    IMAGING REVIEWED:  No radiology results for the last day    I have reviewed the patient's labs, imaging, reports, and other clinician documentation.    Assessment & Plan   ASSESSMENT:  1. 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  Given early stage, her age and performance status, observation only is  recommended.  Could do genetic evaluation for peoples syndrome as an OP.   2. Leukocytosis-due to sepsis and PNA.    Afebrile.  Resolved.  3. Chronic microcytic anemia-anemia work-up in progress.  S/p 1 dose of Ferrlecit 125mg on 10/24, Ferrlecit 250 mg IV daily x2 starting 11/9 and on oral Fe.  On Protonix.  Iron studies confirm BRANDY.  Retic appropriately elevated.  B12 normal, haptoglobin high and folate level low-replacement started.  SPEP with no M spike.  4. Acute thrombocytosis-due to BRANDY and inflammatory response.  5. H/o CVA an elevated D-dimer-CT head w/o acute stroke.  On ASA and Plavix.    D-dimer elevated.  Lower extremity venous Dopplers and nuclear medicine perfusion scans negative.     6. Respiratory failure and PNA-s/p abx.  Per pulmonary.   7. CHF, HTN, HLD, pericardial effusion, S/p PCI x 1, NSTEMI-s/p heart cath.  On dual platelet therapy for 6 months. Per Cardiology.    8. Compression fractures, DM, hypothyroidism, acute renal insufficiency, and bipolar disorder-per Endocrine, PMD and psychiatry.   9. Sacral pressure injury: We will have skin care nurse see.     PLAN    1. Wound care evaluation.   2. Continue folate replacement 1 mg p.o. daily.  3. We will follow CBC and recommend to transfuse for hemoglobin < 7.   4. Genetic evaluation for peoples syndrome as an OP.   5. Observation with CT's and colonoscopy in 1 year unless patient opts for hospice.                    I discussed the patient's findings and my recommendations with patient and spouse.    Part of this note may be an electronic transcription/translation of spoken language to printed text using the Dragon Dictation System.    Electronically signed by Abdiel Contreras MD, 11/14/22, 8:12 AM EST.

## 2022-11-14 NOTE — PLAN OF CARE
Goal Outcome Evaluation:      Pt has been up to chair today for awhile. Possible discharge tomorrow, spouse at bedside. Will continue to monitor

## 2022-11-14 NOTE — PLAN OF CARE
Goal Outcome Evaluation:  Plan of Care Reviewed With: patient, spouse        Progress: no change  Outcome Evaluation: Patient has been very anxious and crying out all shift, prn medication was not affective.

## 2022-11-14 NOTE — PLAN OF CARE
Goal Outcome Evaluation:      Laura Perkins presents with ADL impairments below baseline abilities which indicate the need for continued skilled intervention while inpatient. She is participating with toileting ADLs by standing for hygiene. Standing tolerance fair- for task. Toilet transfer=mod A. Grooming tasks=min assist with v.c. for initiation. Pt. Tolerating UE ex.to increase general strength & endurance. Encouraged pt to do UE AROM ex at least 3 times per day.  Tolerating session today without incident. Will continue to follow and progress as tolerated.

## 2022-11-14 NOTE — PROGRESS NOTES
"  Cardiology Progress Note    Patient Identification:  Name: Laura Pekrins  Age: 82 y.o.  Sex: female  :  1940  MRN: [id number]                 Follow Up / Chief Complaint: Elevated troponin, non stemi   Chief Complaint   Patient presents with   • Altered Mental Status       Interval History:  Patient presented from outlying facility with pneumonia.  Noted to have elevated troponin   Patient underwent cardiac cath 10/27/2022 with stent to proximal LAD.     NP NOTE: patient seen, no distress noted.  Reports no chest pain, intermittently confused. She does state \"I get choked up\".  Monitor- sinus rhythm  Encouraged patient to be up to chair.     Electronically signed by ROMAN Lynn, 22, 10:09 AM EST.    Cardiology attending addendum :    I have personally performed a face-to-face diagnostic evaluation, physical exam and reviewed data on this patient.  I have reviewed documentation done by me and nurse practitioner  and corrected as needed.  And agree with the different components of documentation.Greater than 50% of the time spent in the care of this patient was provided by attending consultant/me.            Subjective: Patient seen and examined.  Chart reviewed.  Labs reviewed.  Discussed with RN taking care of patient.        Objective: Serial troponin 0.872, 0.653, 0.793, 0.546, 0.373  10/26/2022: troponin 0.300, 0.256  Glucose 136, sodium 148 WBC 13.4, hgb 9.0  10/27/2022: troponin 0.175, WBC 12.9 hgb 8.8 EKG with diffuse T wave abnormalities and QTC prolongation   10/28/2022: troponin 0.136, glucose 215, WBC 12.2   hgb 8.6  10/30/2022: troponin 0.067, pro bnp [id number]  10/31/2022: troponin 0.05, glucose 190, creatinine 0.51  WBC 12.2, hgb 8.4  2022:  Troponin 0.057, glucose 185  2022: no labs to review   2022: Glucose elevated, CBC reveals a white count of 17.6 and hemoglobin of 8.8  2022: Chest x-ray reveals persistent bibasilar airspace disease left greater than " right  11/8/2022: Glucose elevated, WBC 7.6, hemoglobin 8.6  probnp [id number]  11/9/2022: glucose elevated   11/10/2022: glucose elevated, potassium 3.3, bun 38 creatinine 1.43, D-Dimer 4.15, hgb 7.4   11/11/2022: potassium 4.1, bun 36 creatinine 1.09 WBC 19.3 hgb 7.4 plts 472  11/14/2022: glucose 180, bun 37, creatinine 1.17, wbc 10.2  hgb 8.3 plts 463      History of Present Illness:        Mrs. Laura Perkins has a Past medical history of      - hypertension  - dyslipidemia   - diabetes   - anemia, GERD  - stroke 2012  - bipolar disorder  - Colon cancer- s/p resection 10/18/2022  - bladder surgery, eye surgery, hysterectomy, hemorrhoidectomy   -  Non smoker  - no allergies  - family history of heart disease in mother         Presented from rehab center to the ED on 10/24/2022 for confusion/alerted mental status.  Patient was recently admitted to [facility] for transverse colon resection on 10/18/2022.  Patient was discharged to rehab. In the ED patient was noted to have fever 102. CXR showed left sided pneumonia and patient was started on IVFs and antibiotics.  Cardiology has been consulted for elevated troponin.   Patient alert and oriented and gives some history but is poor historian and most of information above was obtained from chart review.  She denies any chest pain but does state she is short of breath and coughing.  RN reported patient aspirated and had swallow study now on thickened liquid.      Serial troponins have been elevated but non-trending 0.872, 0.653, 0.793, 0.546, 0.373  EKG showed sinus tachycardia with diffuse T wave inversion that is changed from previous EKG on 10/11/2022 that showed normal sinus rhythm without any ST abnormalities.       Assessment:  :     Acute non-ST elevation MI  Presumed CAD  Hypertension, dyslipidemia, diabetes  Recent colon cancer resection 10/18/2022  Fever, leukocytosis  Altered mental status  Prolonged QTC on psychiatric medications   CAD, NSTEMI,  PCI  Acute HFrEF due to systolic dysfunction from ischemic cardiomyopathy        Recommendations / Plan:         Telemetry is revealing sinus rhythm  Monitor renal function and hemoglobin and urine output  proBNP is improved from 23,855-14,071--14,684--13,684  Continue diuresis as tolerated  Echo is revealing moderate LV dysfunction  Continue to monitor EKG and QTC.  EKG done 10/31/2022 reviewed/interpreted by me reveals sinus tachycardia with rate of 102 bpm with QT of 382 ms and QTC of 493 ms.  Repeat EKG 11/8/2022 reviewed/interpreted by me reveals sinus rhythm with rate of 81 bpm with QT interval of 427 ms and QTC of 480  Increase activity as tolerated  Continue aspirin, clopidogrel, losartan, metoprolol succinate, rosuvastatin as tolerated  Patient has significant improvement in symptoms we will continue diuresis and medical management  Patient had pleural tap and removal of fluid 11/7/2022, will repeat x-ray in a.m. to monitor fluid  Patient's D-dimer is elevated, VQ scan 11/10/2020 shows patchy decrease perfusion in left lung similar to distribution of opacity on chest x-ray consistent with multifocal pneumonia.  Bilateral venous Dopplers 11/10/2022 negative for DVT    Patient continues to improve gradually.  Discussed with patient's  by bedside.  Will follow-up as needed please call me back if I can be of any further assistance       Copied text in this portion of the note has been reviewed and is accurate as of 11/14/2022    Past Medical History:  Past Medical History:   Diagnosis Date   • Acid reflux    • Anemia    • Anemia    • Bipolar 1 disorder (HCC)    • Colon cancer (HCC)    • Diabetes mellitus (HCC)     Type 2   • Disease of thyroid gland    • Hyperlipidemia    • Hypertension     TAKEN OFF HTN MEDS OVER 5 YEARS AGO   • Incontinence of urine    • Stroke (HCC)     2012     Past Surgical History:  Past Surgical History:   Procedure Laterality Date   • BLADDER SURGERY     • CARDIAC CATHETERIZATION  N/A 10/27/2022    Procedure: Left Heart Cath, possible pci;  Surgeon: Andrea Melvin MD;  Location:  [facility];  Service: Cardiovascular;  Laterality: N/A;   • COLON RESECTION N/A 10/18/2022    Procedure: COLON RESECTIOn TRANSVERSE LAPAROSCOPIC;  Surgeon: Toño Michelle MD;  Location:  [facility];  Service: General;  Laterality: N/A;   • COLONOSCOPY  09/21/2022    [facility]   • ENDOSCOPY  09/21/2022    [facility]   • EYE SURGERY     • HEMORRHOIDECTOMY     • HYSTERECTOMY          Social History:   Social History     Tobacco Use   • Smoking status: Never   • Smokeless tobacco: Never   Substance Use Topics   • Alcohol use: Never      Family History:  Family History   Problem Relation Age of Onset   • Heart disease Mother    • Diabetes Mother    • No Known Problems Father    • Cancer Brother           Allergies:  No Known Allergies  Scheduled Meds:  aspirin, 81 mg, Daily  budesonide, 0.5 mg, BID - RT  clopidogrel, 75 mg, Daily  divalproex, 500 mg, BID  empagliflozin, 10 mg, Daily  ferrous sulfate, 324 mg, Daily With Breakfast  FLUoxetine, 20 mg, Daily  folic acid, 1 mg, Daily  [START ON 11/15/2022] furosemide, 20 mg, Daily  guaiFENesin, 600 mg, Q12H  insulin glargine, 20 Units, QAM  insulin lispro, 3-14 Units, TID With Meals  insulin lispro, 7 Units, TID With Meals  ipratropium-albuterol, 3 mL, 4x Daily - RT  levothyroxine, 50 mcg, Daily  losartan, 25 mg, Q24H  magnesium oxide, 400 mg, Daily  metoprolol succinate XL, 25 mg, Q24H  pantoprazole, 40 mg, QAM  risperiDONE, 0.5 mg, Daily  rosuvastatin, 20 mg, Daily  Scopolamine, 1 patch, Q72H  spironolactone, 25 mg, Daily          Review of Systems:   Review of Systems   Constitutional: Negative for chills and fever.   Cardiovascular: Negative for chest pain and palpitations.   Respiratory: Negative for cough and hemoptysis.    Gastrointestinal: Negative for nausea and vomiting.   Neurological: Positive for  "weakness.            Constitutional:  Temp:  [97.6 °F (36.4 °C)-98.5 °F (36.9 °C)] 98 °F (36.7 °C)  Heart Rate:  [74-98] 96  Resp:  [14-24] 16  BP: (102-161)/(38-74) 140/44    Physical Exam   /44 (BP Location: Right arm, Patient Position: Lying)   Pulse 96   Temp 98 °F (36.7 °C) (Oral)   Resp 16   Ht 162.6 cm (64\")   Wt 52 kg (114 lb 10.2 oz)   SpO2 98%   BMI 19.68 kg/m²   General:  Appears in no acute distress- frail and chronically ill   Eyes: Sclera is anicteric,  conjunctiva is clear   HEENT:  No JVD. Thyroid not visibly enlarged. No mucosal pallor or cyanosis  Respiratory: Respirations regular and unlabored at rest.  Scattered rhonchi left base  Cardiovascular: S1,S2 Regular rate and rhythm. No murmur, rub or gallop auscultated.  . No pretibial pitting edema  Gastrointestinal: Abdomen nondistended.  Musculoskeletal:  No abnormal movements  Extremities: No digital clubbing or cyanosis  Skin: Color pink. Skin warm and dry to touch. No rashes  No xanthoma  Neuro: Alert and awake, no lateralizing deficits appreciated    INTAKE AND OUTPUT:    Intake/Output Summary (Last 24 hours) at 11/14/2022 1842  Last data filed at 11/14/2022 1240  Gross per 24 hour   Intake 480 ml   Output --   Net 480 ml       Cardiographics  Telemetry: sinus rhythm     ECG:   ECG 12 Lead QT Measurement   Final Result   HEART RATE= 81  bpm   RR Interval= 772  ms   UT Interval= 142  ms   P Horizontal Axis= -11  deg   P Front Axis= 28  deg   QRSD Interval= 96  ms   QT Interval= 427  ms   QRS Axis= 29  deg   T Wave Axis= 195  deg   - ABNORMAL ECG -   Sinus rhythm   Atrial premature complex   Abnormal T, suspect prox. LAD occlu. or CVA   When compared with ECG of 05-Nov-2022 9:56:04,   Significant repolarization change   Electronically Signed By: Skyler García ([facility]) 08-Nov-2022 18:06:10   Date and Time of Study: 2022-11-08 10:37:22      ECG 12 Lead QT Measurement   Final Result   HEART RATE= 74  bpm   RR Interval= 812  ms   UT " Interval= 115  ms   P Horizontal Axis= -74  deg   P Front Axis= -14  deg   QRSD Interval= 92  ms   QT Interval= 516  ms   QRS Axis= 25  deg   T Wave Axis= 196  deg   - ABNORMAL ECG -   Sinus rhythm   Abnrm T, probable ischemia, anterolateral lds   Prolonged QT interval   When compared with ECG of 03-Nov-2022 8:17:18,   Nonspecific significant change   Electronically Signed By: Jackie Mercado ([facility]) 08-Nov-2022 13:45:20   Date and Time of Study: 2022-11-05 09:56:04      ECG 12 Lead QT Measurement   Final Result   HEART RATE= 76  bpm   RR Interval= 808  ms   HI Interval= 155  ms   P Horizontal Axis= -14  deg   P Front Axis= 53  deg   QRSD Interval= 96  ms   QT Interval= 477  ms   QRS Axis= 19  deg   T Wave Axis= 183  deg   - ABNORMAL ECG -   Sinus rhythm   Atrial premature complex   Abnrm T, consider ischemia, anterolateral lds   Prolonged QT interval   When compared with ECG of 31-Oct-2022 8:53:04,   Significant rate decrease   Significant repolarization change   Electronically Signed By: Andrea Melvin ([facility]) 06-Nov-2022 14:45:24   Date and Time of Study: 2022-11-03 08:17:18      ECG 12 Lead QT Measurement   Final Result   HEART RATE= 102  bpm   RR Interval= 600  ms   HI Interval= 157  ms   P Horizontal Axis= -38  deg   P Front Axis= 23  deg   QRSD Interval= 89  ms   QT Interval= 382  ms   QRS Axis= 28  deg   T Wave Axis= 209  deg   - ABNORMAL ECG -   Sinus tachycardia   Atrial premature complexes   Nonspecific T abnormalities, diffuse leads   When compared with ECG of 29-Oct-2022 16:41:12,   No significant change   Electronically Signed By: Andrea Melvin ([facility]) 06-Nov-2022 14:45:34   Date and Time of Study: 2022-10-31 08:53:04      ECG 12 Lead Other; on psych medications that prolong qtc   Final Result   HEART RATE= 92  bpm   RR Interval= 656  ms   HI Interval= 154  ms   P Horizontal Axis= -11  deg   P Front Axis= 45  deg   QRSD Interval= 90  ms   QT Interval= 360  ms   QRS Axis= 20  deg   T Wave Axis= 193   deg   - ABNORMAL ECG -   Sinus rhythm   Atrial premature complex   Abnrm T, consider ischemia, anterolateral lds   When compared with ECG of 29-Oct-2022 5:08:11,   Nonspecific significant change   Electronically Signed By: Reyes Cooper ([facility]) 03-Nov-2022 11:14:28   Date and Time of Study: 2022-10-29 16:41:12      ECG 12 Lead QT Measurement   Final Result   HEART RATE= 78  bpm   RR Interval= 772  ms   IL Interval= 139  ms   P Horizontal Axis= 4  deg   P Front Axis= 42  deg   QRSD Interval= 87  ms   QT Interval= 422  ms   QRS Axis= 32  deg   T Wave Axis= 212  deg   - ABNORMAL ECG -   Sinus rhythm   Abnormal T, consider ischemia, diffuse leads   When compared with ECG of 28-Oct-2022 5:26:46,   New or worsened ischemia or infarction   Significant repolarization change   Electronically Signed By: Reyes Cooper ([facility]) 03-Nov-2022 10:47:40   Date and Time of Study: 2022-10-29 05:08:11      SCANNED - TELEMETRY     Final Result      ECG 12 Lead   Final Result   HEART RATE= 78  bpm   RR Interval= 772  ms   IL Interval= 129  ms   P Horizontal Axis= -38  deg   P Front Axis= 2  deg   QRSD Interval= 90  ms   QT Interval= 469  ms   QRS Axis= 37  deg   T Wave Axis= 232  deg   - ABNORMAL ECG -   Sinus rhythm   Abnormal T, suspect prox. LAD occlu. or CVA   Prolonged QT interval   When compared with ECG of 27-Oct-2022 10:26:26,   Significant repolarization change   Electronically Signed By: Andrea Melvin ([facility]) 06-Nov-2022 14:45:45   Date and Time of Study: 2022-10-28 05:26:46      ECG 12 Lead QT Measurement   Final Result   HEART RATE= 97  bpm   RR Interval= 620  ms   IL Interval= 154  ms   P Horizontal Axis= 1  deg   P Front Axis= 43  deg   QRSD Interval= 92  ms   QT Interval= 474  ms   QRS Axis= 32  deg   T Wave Axis= 217  deg   - ABNORMAL ECG -   Sinus rhythm   Abnormal T, probable ischemia, widespread   Prolonged QT interval   When compared with ECG of 23-Oct-2022 19:11:40,   Significant change in rhythm    Electronically Signed By: Skyler García ([facility]) 28-Oct-2022 10:11:01   Date and Time of Study: 2022-10-27 10:26:26      ECG 12 Lead   Final Result   HEART RATE= 121  bpm   RR Interval= 496  ms   VA Interval= 96  ms   P Horizontal Axis=   deg   P Front Axis= 206  deg   QRSD Interval= 77  ms   QT Interval= 382  ms   QRS Axis= 37  deg   T Wave Axis= 209  deg   - ABNORMAL ECG -   Sinus or ectopic atrial tachycardia   Abnormal T, consider ischemia, diffuse leads   Prolonged QT interval   When compared with ECG of 11-Oct-2022 14:13:27,   Significant change in rhythm: previously sinus   New or worsened ischemia or infarction   Electronically Signed By: Marino Montes ([facility]) 24-Oct-2022 14:20:30   Date and Time of Study: 2022-10-23 19:11:40      SCANNED - TELEMETRY     Final Result      SCANNED - TELEMETRY     Final Result      SCANNED - TELEMETRY     Final Result      SCANNED - TELEMETRY     Final Result      SCANNED - TELEMETRY     Final Result      SCANNED - TELEMETRY     Final Result      SCANNED - TELEMETRY     Final Result      SCANNED - TELEMETRY     Final Result      SCANNED - TELEMETRY     Final Result      SCANNED - TELEMETRY     Final Result      SCANNED - TELEMETRY     Final Result      SCANNED - TELEMETRY     Final Result      SCANNED - TELEMETRY     Final Result      SCANNED - TELEMETRY     Final Result      SCANNED - TELEMETRY     Final Result      SCANNED - TELEMETRY     Final Result      SCANNED - TELEMETRY     Final Result      SCANNED - TELEMETRY     Final Result      SCANNED - TELEMETRY     Final Result      SCANNED - TELEMETRY     Final Result      SCANNED - TELEMETRY     Final Result      SCANNED - TELEMETRY     Final Result      SCANNED - TELEMETRY     Final Result      SCANNED - TELEMETRY     Final Result      SCANNED - TELEMETRY     Final Result      SCANNED - TELEMETRY     Final Result      SCANNED - TELEMETRY     Final Result      SCANNED - TELEMETRY     Final Result      SCANNED - TELEMETRY      Final Result      SCANNED - TELEMETRY     Final Result      SCANNED - TELEMETRY     Final Result      SCANNED - TELEMETRY     Final Result      SCANNED - TELEMETRY     Final Result      SCANNED - TELEMETRY     Final Result      SCANNED - TELEMETRY     Final Result      SCANNED - TELEMETRY     Final Result      SCANNED - TELEMETRY     Final Result      SCANNED - TELEMETRY     Final Result      SCANNED - TELEMETRY     Final Result      SCANNED - TELEMETRY     Final Result      SCANNED - TELEMETRY     Final Result      SCANNED - TELEMETRY     Final Result      SCANNED - TELEMETRY     Final Result      SCANNED - TELEMETRY     Final Result      SCANNED - TELEMETRY     Final Result      SCANNED - TELEMETRY     Final Result      SCANNED - TELEMETRY     Final Result      SCANNED - TELEMETRY     Final Result      SCANNED - TELEMETRY     Final Result      SCANNED - TELEMETRY     Final Result      SCANNED - TELEMETRY     Final Result      SCANNED - TELEMETRY     Final Result      SCANNED - TELEMETRY     Final Result      SCANNED - TELEMETRY     Final Result      SCANNED - TELEMETRY     Final Result      SCANNED - TELEMETRY     Final Result      SCANNED - TELEMETRY     Final Result      SCANNED - TELEMETRY     Final Result      SCANNED - TELEMETRY     Final Result      SCANNED - TELEMETRY     Final Result      SCANNED - TELEMETRY     Final Result      SCANNED - TELEMETRY     Final Result      SCANNED - TELEMETRY     Final Result      SCANNED - TELEMETRY     Final Result      SCANNED - TELEMETRY     Final Result      SCANNED - TELEMETRY     Final Result      SCANNED - TELEMETRY     Final Result      SCANNED - TELEMETRY     Final Result      SCANNED - TELEMETRY     Final Result      SCANNED - TELEMETRY     Final Result      SCANNED - TELEMETRY     Final Result      SCANNED - TELEMETRY     Final Result      SCANNED - TELEMETRY     Final Result      SCANNED - TELEMETRY     Final Result      SCANNED - TELEMETRY     Final Result       SCANNED - TELEMETRY     Final Result      SCANNED - TELEMETRY     Final Result      SCANNED - TELEMETRY     Final Result      SCANNED - TELEMETRY     Final Result      SCANNED - TELEMETRY     Final Result      SCANNED - TELEMETRY     Final Result      SCANNED - TELEMETRY     Final Result      SCANNED - TELEMETRY     Final Result      SCANNED - TELEMETRY     Final Result      SCANNED - TELEMETRY     Final Result      SCANNED - TELEMETRY     Final Result      SCANNED - TELEMETRY     Final Result      SCANNED - TELEMETRY     Final Result      SCANNED - TELEMETRY     Final Result      SCANNED - TELEMETRY     Final Result      SCANNED - TELEMETRY     Final Result      SCANNED - TELEMETRY     Final Result      SCANNED - TELEMETRY     Final Result      SCANNED - TELEMETRY     Final Result      SCANNED - TELEMETRY     Final Result      SCANNED - TELEMETRY     Final Result      SCANNED - TELEMETRY     Final Result      SCANNED - TELEMETRY     Final Result      SCANNED - TELEMETRY     Final Result      SCANNED - TELEMETRY     Final Result      ECG 12 Lead QT Measurement    (Results Pending)     I have personally reviewed EKG    Echocardiogram: Results for orders placed during the hospital encounter of 10/23/22    Adult Transthoracic Echo Limited W/ Cont if Necessary Per Protocol    Interpretation Summary  Normal LV size.  Mild apical hypokinesis seen.  Estimated LV ejection fraction of 45 to 50%  Normal RV size  Normal atrial size  Aortic valve, mitral valve, tricuspid valve appears structurally normal, no significant regurgitation seen.  Small pericardial effusion seen.  No signs of increased intrapericardial pressure  Proximal aorta appears normal in size.      Lab Review   I have reviewed the labs          Results from last 7 days   Lab Units 11/14/22  0452   SODIUM mmol/L 141   POTASSIUM mmol/L 4.0   BUN mg/dL 37*   CREATININE mg/dL 1.17*   CALCIUM mg/dL 8.8         Results from last 7 days   Lab Units 11/14/22  7716  "11/12/22  0512 11/11/22  0615   WBC 10*3/mm3 10.20 16.00* 19.30*   HEMOGLOBIN g/dL 8.3* 7.5* 7.4*   HEMATOCRIT % 27.4* 25.2* 23.8*   PLATELETS 10*3/mm3 463* 428 472*           RADIOLOGY:  Imaging Results (Last 24 Hours)     ** No results found for the last 24 hours. **                )11/14/2022  MD YARA Dunham/Transcription:   \"Dictated utilizing Dragon dictation\".   "

## 2022-11-15 VITALS
WEIGHT: 114.64 LBS | HEIGHT: 64 IN | SYSTOLIC BLOOD PRESSURE: 118 MMHG | RESPIRATION RATE: 23 BRPM | TEMPERATURE: 99.5 F | OXYGEN SATURATION: 91 % | BODY MASS INDEX: 19.57 KG/M2 | HEART RATE: 82 BPM | DIASTOLIC BLOOD PRESSURE: 43 MMHG

## 2022-11-15 LAB
ANION GAP SERPL CALCULATED.3IONS-SCNC: 12 MMOL/L (ref 5–15)
BUN SERPL-MCNC: 41 MG/DL (ref 8–23)
BUN/CREAT SERPL: 40.2 (ref 7–25)
CALCIUM SPEC-SCNC: 8.6 MG/DL (ref 8.6–10.5)
CHLORIDE SERPL-SCNC: 99 MMOL/L (ref 98–107)
CO2 SERPL-SCNC: 28 MMOL/L (ref 22–29)
CREAT SERPL-MCNC: 1.02 MG/DL (ref 0.57–1)
EGFRCR SERPLBLD CKD-EPI 2021: 54.7 ML/MIN/1.73
GLUCOSE BLDC GLUCOMTR-MCNC: 117 MG/DL (ref 70–105)
GLUCOSE BLDC GLUCOMTR-MCNC: 312 MG/DL (ref 70–105)
GLUCOSE SERPL-MCNC: 150 MG/DL (ref 65–99)
POTASSIUM SERPL-SCNC: 4.2 MMOL/L (ref 3.5–5.2)
SODIUM SERPL-SCNC: 139 MMOL/L (ref 136–145)

## 2022-11-15 PROCEDURE — 97535 SELF CARE MNGMENT TRAINING: CPT | Performed by: OCCUPATIONAL THERAPIST

## 2022-11-15 PROCEDURE — 94799 UNLISTED PULMONARY SVC/PX: CPT

## 2022-11-15 PROCEDURE — 25010000002 FUROSEMIDE PER 20 MG: Performed by: INTERNAL MEDICINE

## 2022-11-15 PROCEDURE — 63710000001 INSULIN GLARGINE PER 5 UNITS: Performed by: INTERNAL MEDICINE

## 2022-11-15 PROCEDURE — 63710000001 INSULIN LISPRO (HUMAN) PER 5 UNITS: Performed by: INTERNAL MEDICINE

## 2022-11-15 PROCEDURE — 94664 DEMO&/EVAL PT USE INHALER: CPT

## 2022-11-15 PROCEDURE — 94761 N-INVAS EAR/PLS OXIMETRY MLT: CPT

## 2022-11-15 PROCEDURE — 82962 GLUCOSE BLOOD TEST: CPT

## 2022-11-15 PROCEDURE — 80048 BASIC METABOLIC PNL TOTAL CA: CPT | Performed by: NURSE PRACTITIONER

## 2022-11-15 RX ORDER — INSULIN LISPRO 100 [IU]/ML
3-14 INJECTION, SOLUTION INTRAVENOUS; SUBCUTANEOUS
Qty: 3 ML | Refills: 12 | Status: SHIPPED | OUTPATIENT
Start: 2022-11-15 | End: 2023-02-13

## 2022-11-15 RX ORDER — SCOLOPAMINE TRANSDERMAL SYSTEM 1 MG/1
1 PATCH, EXTENDED RELEASE TRANSDERMAL
Qty: 10 PATCH | Refills: 0 | Status: SHIPPED | OUTPATIENT
Start: 2022-11-17 | End: 2022-12-17

## 2022-11-15 RX ORDER — HYDROCODONE BITARTRATE AND ACETAMINOPHEN 7.5; 325 MG/1; MG/1
1 TABLET ORAL EVERY 8 HOURS PRN
Qty: 12 TABLET | Refills: 0 | Status: SHIPPED | OUTPATIENT
Start: 2022-11-15 | End: 2022-11-19

## 2022-11-15 RX ORDER — LOSARTAN POTASSIUM 25 MG/1
25 TABLET ORAL
Qty: 30 TABLET | Refills: 1 | Status: SHIPPED | OUTPATIENT
Start: 2022-11-16 | End: 2023-01-15

## 2022-11-15 RX ORDER — POTASSIUM CHLORIDE 20 MEQ/1
40 TABLET, EXTENDED RELEASE ORAL AS NEEDED
Qty: 30 TABLET | Refills: 2 | Status: SHIPPED | OUTPATIENT
Start: 2022-11-15 | End: 2023-02-13

## 2022-11-15 RX ORDER — SPIRONOLACTONE 25 MG/1
25 TABLET ORAL DAILY
Qty: 30 TABLET | Refills: 0 | Status: SHIPPED | OUTPATIENT
Start: 2022-11-16 | End: 2022-12-16

## 2022-11-15 RX ORDER — FOLIC ACID 1 MG/1
1 TABLET ORAL DAILY
Qty: 30 TABLET | Refills: 2 | Status: SHIPPED | OUTPATIENT
Start: 2022-11-16 | End: 2023-02-14

## 2022-11-15 RX ORDER — CLOPIDOGREL BISULFATE 75 MG/1
75 TABLET ORAL DAILY
Qty: 30 TABLET | Refills: 2 | Status: SHIPPED | OUTPATIENT
Start: 2022-11-16 | End: 2023-02-14

## 2022-11-15 RX ORDER — ASPIRIN 81 MG/1
81 TABLET ORAL DAILY
Qty: 30 TABLET | Refills: 2 | Status: SHIPPED | OUTPATIENT
Start: 2022-11-16 | End: 2023-02-14

## 2022-11-15 RX ORDER — DIVALPROEX SODIUM 500 MG/1
500 TABLET, DELAYED RELEASE ORAL 2 TIMES DAILY
Qty: 60 TABLET | Refills: 2 | Status: SHIPPED | OUTPATIENT
Start: 2022-11-15 | End: 2023-02-13

## 2022-11-15 RX ORDER — INSULIN LISPRO 100 [IU]/ML
7 INJECTION, SOLUTION INTRAVENOUS; SUBCUTANEOUS
Qty: 6.3 ML | Refills: 2 | Status: SHIPPED | OUTPATIENT
Start: 2022-11-15 | End: 2023-02-13

## 2022-11-15 RX ORDER — ALBUTEROL SULFATE 90 UG/1
2 AEROSOL, METERED RESPIRATORY (INHALATION) EVERY 4 HOURS PRN
Qty: 18 G | Refills: 2 | Status: SHIPPED | OUTPATIENT
Start: 2022-11-15 | End: 2023-02-13

## 2022-11-15 RX ORDER — BENZONATATE 200 MG/1
200 CAPSULE ORAL 3 TIMES DAILY PRN
Qty: 30 CAPSULE | Refills: 0 | Status: SHIPPED | OUTPATIENT
Start: 2022-11-15 | End: 2022-11-25

## 2022-11-15 RX ORDER — GUAIFENESIN 600 MG/1
600 TABLET, EXTENDED RELEASE ORAL EVERY 12 HOURS SCHEDULED
Qty: 20 TABLET | Refills: 0 | Status: SHIPPED | OUTPATIENT
Start: 2022-11-15 | End: 2022-11-25

## 2022-11-15 RX ORDER — RISPERIDONE 0.5 MG/1
0.5 TABLET ORAL DAILY
Qty: 30 TABLET | Refills: 0 | Status: SHIPPED | OUTPATIENT
Start: 2022-11-16 | End: 2022-12-16

## 2022-11-15 RX ORDER — METOPROLOL SUCCINATE 25 MG/1
25 TABLET, EXTENDED RELEASE ORAL
Qty: 30 TABLET | Refills: 1 | Status: SHIPPED | OUTPATIENT
Start: 2022-11-16 | End: 2023-01-15

## 2022-11-15 RX ORDER — FUROSEMIDE 20 MG/1
20 TABLET ORAL 2 TIMES DAILY
Qty: 60 TABLET | Refills: 1 | Status: SHIPPED | OUTPATIENT
Start: 2022-11-15 | End: 2023-01-14

## 2022-11-15 RX ADMIN — IPRATROPIUM BROMIDE AND ALBUTEROL SULFATE 3 ML: .5; 3 SOLUTION RESPIRATORY (INHALATION) at 07:00

## 2022-11-15 RX ADMIN — BUDESONIDE 0.5 MG: 0.5 INHALANT RESPIRATORY (INHALATION) at 07:04

## 2022-11-15 RX ADMIN — FUROSEMIDE 20 MG: 10 INJECTION, SOLUTION INTRAMUSCULAR; INTRAVENOUS at 09:33

## 2022-11-15 RX ADMIN — INSULIN LISPRO 10 UNITS: 100 INJECTION, SOLUTION INTRAVENOUS; SUBCUTANEOUS at 13:21

## 2022-11-15 RX ADMIN — FERROUS SULFATE TAB EC 324 MG (65 MG FE EQUIVALENT) 324 MG: 324 (65 FE) TABLET DELAYED RESPONSE at 09:33

## 2022-11-15 RX ADMIN — GUAIFENESIN 600 MG: 600 TABLET, EXTENDED RELEASE ORAL at 09:33

## 2022-11-15 RX ADMIN — INSULIN LISPRO 7 UNITS: 100 INJECTION, SOLUTION INTRAVENOUS; SUBCUTANEOUS at 09:31

## 2022-11-15 RX ADMIN — FOLIC ACID 1 MG: 1 TABLET ORAL at 09:33

## 2022-11-15 RX ADMIN — LEVOTHYROXINE SODIUM 50 MCG: 50 TABLET ORAL at 06:51

## 2022-11-15 RX ADMIN — ASPIRIN 81 MG: 81 TABLET, COATED ORAL at 09:33

## 2022-11-15 RX ADMIN — SPIRONOLACTONE 25 MG: 25 TABLET ORAL at 09:32

## 2022-11-15 RX ADMIN — RISPERIDONE 0.5 MG: 0.25 TABLET ORAL at 09:32

## 2022-11-15 RX ADMIN — Medication 10 ML: at 09:33

## 2022-11-15 RX ADMIN — DIVALPROEX SODIUM 500 MG: 500 TABLET, DELAYED RELEASE ORAL at 09:33

## 2022-11-15 RX ADMIN — PANTOPRAZOLE SODIUM 40 MG: 40 TABLET, DELAYED RELEASE ORAL at 09:32

## 2022-11-15 RX ADMIN — EMPAGLIFLOZIN 10 MG: 10 TABLET, FILM COATED ORAL at 09:33

## 2022-11-15 RX ADMIN — METOPROLOL SUCCINATE 25 MG: 25 TABLET, FILM COATED, EXTENDED RELEASE ORAL at 09:32

## 2022-11-15 RX ADMIN — CLOPIDOGREL BISULFATE 75 MG: 75 TABLET ORAL at 09:32

## 2022-11-15 RX ADMIN — INSULIN LISPRO 7 UNITS: 100 INJECTION, SOLUTION INTRAVENOUS; SUBCUTANEOUS at 13:22

## 2022-11-15 RX ADMIN — MAGNESIUM OXIDE TAB 400 MG (241.3 MG ELEMENTAL MG) 400 MG: 400 (241.3 MG) TAB at 09:33

## 2022-11-15 RX ADMIN — INSULIN GLARGINE 20 UNITS: 100 INJECTION, SOLUTION SUBCUTANEOUS at 09:30

## 2022-11-15 RX ADMIN — FLUOXETINE 20 MG: 20 CAPSULE ORAL at 09:33

## 2022-11-15 RX ADMIN — LOSARTAN POTASSIUM 25 MG: 25 TABLET, FILM COATED ORAL at 09:32

## 2022-11-15 NOTE — PROGRESS NOTES
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  CAD s/p stents 10/27        PLAN:  Oxygenating well on room air  Patient stable from pulm standpoint to return to skilled nursing facility  Monitor intake   Bronchodilator  Inhaled corticosteroids  Electrolytes/ glycemic control  Oncology following  Aspirin and Plavix  Glucose control  Mucinex  Blood pressure controlled  Crestor  Thyroid hormone replacement  Diuretics and monitor MARIAN's  DNR/DNI  PT/OT  No DVT and GI prophylaxis       LOS: 23 days     Subjective   Patient is awake and denies shortness of breath      Objective     Vital signs for last 24 hours:  Vitals:    11/15/22 0703 11/15/22 0704 11/15/22 0706 11/15/22 1014   BP:    138/49   BP Location:    Left arm   Patient Position:    Lying   Pulse: 73 79 74 82   Resp: 20 20 20 21   Temp:    98.4 °F (36.9 °C)   TempSrc:    Axillary   SpO2: 94% 97% 94% 91%   Weight:       Height:           Intake/Output last 3 shifts:  I/O last 3 completed shifts:  In: 480 [P.O.:480]  Out: -   Intake/Output this shift:  I/O this shift:  In: 240 [P.O.:240]  Out: -       Radiology  Imaging Results (Last 24 Hours)     ** No results found for the last 24 hours. **          Labs:  Results from last 7 days   Lab Units 11/14/22  0452   WBC 10*3/mm3 10.20   HEMOGLOBIN g/dL 8.3*   HEMATOCRIT % 27.4*   PLATELETS 10*3/mm3 463*     Results from last 7 days   Lab Units 11/15/22  0302   SODIUM mmol/L 139   POTASSIUM mmol/L 4.2   CHLORIDE mmol/L 99   CO2 mmol/L 28.0   BUN mg/dL 41*   CREATININE mg/dL 1.02*   CALCIUM mg/dL 8.6   GLUCOSE mg/dL 150*         Results from last 7 days   Lab Units 11/10/22  0348   ALBUMIN g/dL 1.7*                                Meds:   SCHEDULE  aspirin, 81 mg, Oral, Daily  budesonide, 0.5 mg, Nebulization, BID - RT  clopidogrel, 75 mg, Oral, Daily  divalproex, 500 mg, Oral, BID  empagliflozin, 10 mg, Oral, Daily  ferrous sulfate, 324 mg, Oral, Daily With Breakfast  FLUoxetine, 20 mg, Oral, Daily  folic acid, 1 mg, Oral, Daily  furosemide, 20 mg, Intravenous, Daily  guaiFENesin, 600 mg, Oral, Q12H  insulin glargine, 20 Units, Subcutaneous, QAM  insulin lispro, 3-14 Units, Subcutaneous, TID With Meals  insulin lispro, 7 Units, Subcutaneous, TID With Meals  ipratropium-albuterol, 3 mL, Nebulization, 4x Daily - RT  levothyroxine, 50 mcg, Oral, Daily  losartan, 25 mg, Oral, Q24H  magnesium oxide, 400 mg, Oral, Daily  metoprolol succinate XL, 25 mg, Oral, Q24H  pantoprazole, 40 mg, Oral, QAM  risperiDONE, 0.5 mg, Oral, Daily  rosuvastatin, 20 mg, Oral, Daily  Scopolamine, 1 patch, Transdermal, Q72H  spironolactone, 25 mg, Oral, Daily      Infusions     PRNs  •  acetaminophen  •  acetaminophen  •  acetaminophen  •  benzonatate  •  Calcium Gluconate-NaCl **AND** calcium gluconate **AND** Calcium, Ionized  •  dextrose  •  dextrose  •  docusate sodium  •  glucagon (human recombinant)  •  HYDROcodone-acetaminophen  •  LORazepam  •  magnesium sulfate **OR** magnesium sulfate **OR** magnesium sulfate  •  Morphine  •  ondansetron  •  phenol  •  polyethylene glycol  •  potassium & sodium phosphates **OR** potassium & sodium phosphates  •  potassium chloride  •  potassium chloride  •  [COMPLETED] Insert peripheral IV **AND** sodium chloride    Physical Exam:  General Appearance:  Alert   HEENT:  Normocephalic, without obvious abnormality, Conjunctiva/corneas clear,.   Nares normal, no drainage     Neck:  Supple, symmetrical, trachea midline.   Lungs /Chest wall: Minimal bilateral basal rhonchi, respirations unlabored, symmetrical wall movement.     Heart:  Regular rate and rhythm, S1 S2 normal  Abdomen: Soft, non-tender, no masses, no  organomegaly.    Extremities: No edema, no clubbing or cyanosis constitutional: Negative for     ROS  Constitutional: Negative for chills, fever and malaise/fatigue.   HENT: Negative.    Eyes: Negative.    Cardiovascular: Negative.    Respiratory: Positive for improvement in the cough and shortness of breath.    Skin: Negative.    Musculoskeletal: Negative.    Gastrointestinal: Negative.    Genitourinary: Negative.    Neurological: Negative.    Psychiatric/Behavioral: Negative.      I reviewed the recent clinical results    Much of this encounter note is an electronic transcription/translation of spoken language to printed text using Dragon Software which might include inadvertent errors in transcription.

## 2022-11-15 NOTE — PLAN OF CARE
Goal Outcome Evaluation:      Laura Perkins presents with ADL impairments below baseline abilities which indicate the need for continued skilled intervention while inpatient. Pt more lethargic today requiring max vc to initiate & follow through with tasks. Requiring max hand over hand assist for feeding tasks. Tolerating session today without incident. Will continue to follow and progress as tolerated. Anticipate d/c to SNF soon.

## 2022-11-15 NOTE — PLAN OF CARE
Problem: Pain Acute  Goal: Acceptable Pain Control and Functional Ability  Intervention: Prevent or Manage Pain  Recent Flowsheet Documentation  Taken 11/14/2022 2301 by Heather Velez RN  Medication Review/Management: medications reviewed   Goal Outcome Evaluation:

## 2022-11-15 NOTE — PROGRESS NOTES
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 female presented to [facility] ER on 10/23/2022 for mental status changes.  She was residing at [facility] and for 2 days she was noticed to be more lethargic and she was not speaking as much.  Her O2 saturations were mildly low and on admission to the ER she had a fever of 102.  She was diagnosed with pneumonia.  White count 13.3, hemoglobin 11.6, MCV 77.6 and platelets 380.  CMP was unremarkable.  Head CT showed no acute abnormality, there were some chronic small vessel ischemia changes.  CT A/P showed no nephrolithiasis.  There was no GI or  obstruction.  Large stool burden was present.  There is no evidence of acute abnormality.  Bibasilar consolidations were seen and subacute moderate compression fractures of L3 and L5 were present without malalignment.  Chest CT was limited due to respiratory motion artifact.  There were moderate to large layering bilateral pleural effusions with compressive atelectasis on both lungs.  Airspace disease was seen in bilateral lower lobes suggesting multifocal pneumonia.  Cardiomegaly with trace pericardial effusion was present as well as coronary artery atherosclerotic vascular disease.  Her troponin was elevated and on 10/27/2022 she underwent a cardiac catheterization by Dr. Miguel.  A drug-eluting stent was placed to the LAD.  TSH 10.81 (0.27-4.2), with history of hypothyroidism.  Follow-up chest CT on 11/3/2022, showed evolving extensive airspace consolidations and new right apex opacity and moderate bilateral pleural effusions.  Anasarca had improved.  On 2022, she underwent a left thoracentesis, pathology showed atypical cells (mesothelial cells versus malignant) and fluid was consistent with  transudate.  Her last CBC on 11/6/2022, showed a white count of 17.6, hemoglobin 8.6, MCV 77.8 and platelets 505.  Chest x-ray showed cardiomegaly left lower lobe opacities and mild pulmonary congestion.  Echocardiogram showed an EF of 45-50% and a small pericardial effusion.  Palliative care has been consulted and the patient is DNR and possibly interested in hospice care.  Additional disciplines consulted include endocrine, pulmonary, psychiatry, cardiology and gastroenterology.  Discharged from [facility] on 10/21/2022 following a 3-day admission for a laparoscopic partial transverse colectomy performed by Dr. PERI Michelle.  She had undergone an EGD/colonoscopy by Dr. oPpe as an outpatient on 9/21/22 and was found to have a 5 cm mass in the transverse colon. CT a/p showed an abnormal colon wall thickening in the mid transverse colon with a prominent adjacent mesenteric lymph node.  There was a new L5 fx and L3 fx with some height loss.  The duodenal polyp path showed polypoid peptic duodenitis with prominent Brunner's glands.  A gastric polyp was hyperplastic, foveolar type.  Multiple colon polyps were removed that were tubular adenomas, a single serrated polyp with focal lamina propria spindle cell proliferation, hyperplastic polyp, a low grade dysplastic tubular adenoma and the transverse mass was invasive carcinoma.  Molecular testing revealed the mass to have loss of nuclear expression on IHC in MLH1 and PMS2.  Her 2 Jason was neg (1+).  BRAF mutation was detected codon 600 /D.  She was referred to [facility] but was too sick to be seen at the time of the appt.    Her colectomy was uneventful.  MSI on the tumor was the same. 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  Tumor size was 11 cm with invasion thru the muscularis propria and pericolonic tissue.  Margins were neg for malignancy.  There was no lymphovascular or perineural invasion.  0/20  lymph nodes were involved. Stage IIA (pT3, pN0).         11/09/22  Hematology/Oncology was consulted for her diagnosis of invasive moderately differentiated mucinous adenocarcinoma.       Past Medical History: DM in the past few years.  Bipolar disorder, hypothyroidism, CVA.  Surgical History:Hysterectomy in 1983 for uterine prolapse.    Social History:She lives in [place] with her .  She has been a housewife.  She did not smoke or drink alcohol.   Family History: Her brother had throat cancer.  Allergies: NKA     PCP: Beka Weir MD    INTERVAL HISTORY:  • 11/10/2022- iron 38 (), iron saturation 16 (20-50), TIBC 238 (298-536), ferritin 197 ().  Initiated on Ferrlecit 250 mg IV daily x2.  • 11/10/2022- white blood cell count 7.4, hemoglobin 7.4, platelets 429.  D-dimer 4.15 (< 0.6).  Retic 3.71.  Creatinine 1.43.  Normal bilateral lower extremity venous Dopplers.  Lung perfusion scan low probability PE.  • 11/11/2022- WBC 19.3, hemoglobin 7.4, platelet count 472,000.  Vitamin B12 632 (211-946), haptoglobin 399 (), folate 3.86 (4.70-24.2).  • 11/12/2022- WBC 16, hemoglobin 7.5.  SPEP with M spike not observed.  • 11/13/2022- agitated and anxious during the night.  • 11/14/2022- hemoglobin 8.3, copper 133 ().  • 11/15/2022- creatinine 1.02 (0.57-1.0).    History of present illness reviewed since last visit and changes noted on 11/15/22.    Subjective  Some cough and pain in the bottom..    ROS:  Review of Systems   Constitutional: Negative for activity change, chills, fatigue, fever and unexpected weight change.   HENT: Negative for congestion, dental problem, hearing loss, mouth sores, nosebleeds, sore throat and trouble swallowing.    Eyes: Negative for photophobia and visual disturbance.   Respiratory: Positive for cough. Negative for chest tightness and shortness of breath.    Cardiovascular: Negative for chest pain, palpitations and leg swelling.   Gastrointestinal:  Negative for abdominal distention, abdominal pain, blood in stool, constipation, diarrhea, nausea and vomiting.   Endocrine: Negative for cold intolerance and heat intolerance.   Genitourinary: Negative for decreased urine volume, difficulty urinating, dysuria, frequency, hematuria and urgency.   Musculoskeletal: Positive for back pain. Negative for arthralgias and gait problem.   Skin: Negative for rash and wound.   Neurological: Negative for dizziness, tremors, weakness, light-headedness, numbness and headaches.   Hematological: Negative for adenopathy. Does not bruise/bleed easily.   Psychiatric/Behavioral: Negative for confusion and hallucinations. The patient is not nervous/anxious.    All other systems reviewed and are negative.       MEDICATIONS:    Scheduled Meds:  aspirin, 81 mg, Oral, Daily  budesonide, 0.5 mg, Nebulization, BID - RT  clopidogrel, 75 mg, Oral, Daily  divalproex, 500 mg, Oral, BID  empagliflozin, 10 mg, Oral, Daily  ferrous sulfate, 324 mg, Oral, Daily With Breakfast  FLUoxetine, 20 mg, Oral, Daily  folic acid, 1 mg, Oral, Daily  furosemide, 20 mg, Intravenous, Daily  guaiFENesin, 600 mg, Oral, Q12H  insulin glargine, 20 Units, Subcutaneous, QAM  insulin lispro, 3-14 Units, Subcutaneous, TID With Meals  insulin lispro, 7 Units, Subcutaneous, TID With Meals  ipratropium-albuterol, 3 mL, Nebulization, 4x Daily - RT  levothyroxine, 50 mcg, Oral, Daily  losartan, 25 mg, Oral, Q24H  magnesium oxide, 400 mg, Oral, Daily  metoprolol succinate XL, 25 mg, Oral, Q24H  pantoprazole, 40 mg, Oral, QAM  risperiDONE, 0.5 mg, Oral, Daily  rosuvastatin, 20 mg, Oral, Daily  Scopolamine, 1 patch, Transdermal, Q72H  spironolactone, 25 mg, Oral, Daily       Continuous Infusions:      PRN Meds:  •  acetaminophen  •  acetaminophen  •  acetaminophen  •  benzonatate  •  Calcium Gluconate-NaCl **AND** calcium gluconate **AND** Calcium, Ionized  •  dextrose  •  dextrose  •  docusate sodium  •  glucagon (human  "recombinant)  •  HYDROcodone-acetaminophen  •  LORazepam  •  magnesium sulfate **OR** magnesium sulfate **OR** magnesium sulfate  •  Morphine  •  ondansetron  •  phenol  •  polyethylene glycol  •  potassium & sodium phosphates **OR** potassium & sodium phosphates  •  potassium chloride  •  potassium chloride  •  [COMPLETED] Insert peripheral IV **AND** sodium chloride     ALLERGIES:  No Known Allergies    Objective    VITALS:   /51 (BP Location: Right arm, Patient Position: Lying)   Pulse 74   Temp 98.6 °F (37 °C) (Axillary)   Resp 20   Ht 162.6 cm (64\")   Wt 52 kg (114 lb 10.2 oz)   SpO2 94%   BMI 19.68 kg/m²     PHYSICAL EXAM:  Physical Exam  Vitals and nursing note reviewed.   Constitutional:       General: She is not in acute distress.     Appearance: Normal appearance. She is well-developed and normal weight. She is not toxic-appearing or diaphoretic.   HENT:      Head: Normocephalic and atraumatic.      Comments: Alopecia.     Right Ear: External ear normal.      Left Ear: External ear normal.      Nose: Nose normal.      Mouth/Throat:      Mouth: Mucous membranes are moist.      Pharynx: Oropharynx is clear. No oropharyngeal exudate or posterior oropharyngeal erythema.      Comments: Dental fillings.  Eyes:      General: No scleral icterus.     Extraocular Movements: Extraocular movements intact.      Conjunctiva/sclera: Conjunctivae normal.      Pupils: Pupils are equal, round, and reactive to light.   Cardiovascular:      Rate and Rhythm: Normal rate and regular rhythm.      Heart sounds: Normal heart sounds. No murmur heard.     Comments: Cardiac monitor leads.  Pulmonary:      Effort: Pulmonary effort is normal. No respiratory distress.      Breath sounds: Normal breath sounds. No wheezing or rales.   Abdominal:      General: Bowel sounds are normal. There is no distension.      Palpations: Abdomen is soft. There is no mass.      Tenderness: There is no abdominal tenderness. There is no " guarding or rebound.      Comments: Midline vertical scar above umbilicus.   Genitourinary:     Comments: Deferred   Musculoskeletal:         General: No swelling, tenderness or deformity. Normal range of motion.      Cervical back: Normal range of motion and neck supple. No rigidity.      Right lower leg: No edema.      Left lower leg: No edema.      Comments: Left upper extremity O2 monitor.     Lymphadenopathy:      Cervical: No cervical adenopathy.      Upper Body:      Right upper body: No supraclavicular adenopathy.      Left upper body: No supraclavicular adenopathy.   Skin:     General: Skin is warm and dry.      Coloration: Skin is not pale.      Findings: No bruising, erythema or rash.   Neurological:      General: No focal deficit present.      Mental Status: She is alert and oriented to person, place, and time.      Coordination: Coordination normal.   Psychiatric:      Comments: Agitated and anxious during the night.           RECENT LABS:  Lab Results (last 24 hours)     Procedure Component Value Units Date/Time    POC Glucose Once [[id number]]  (Abnormal) Collected: 11/15/22 0724    Specimen: Blood Updated: 11/15/22 0725     Glucose 117 mg/dL      Comment: Serial Number: [id number]:  [id number]       Basic Metabolic Panel [[id number]]  (Abnormal) Collected: 11/15/22 0302    Specimen: Blood Updated: 11/15/22 0405     Glucose 150 mg/dL      BUN 41 mg/dL      Creatinine 1.02 mg/dL      Sodium 139 mmol/L      Potassium 4.2 mmol/L      Chloride 99 mmol/L      CO2 28.0 mmol/L      Calcium 8.6 mg/dL      BUN/Creatinine Ratio 40.2     Anion Gap 12.0 mmol/L      eGFR 54.7 mL/min/1.73      Comment: National Kidney Foundation and American Society of Nephrology (ASN) Task Force recommended calculation based on the Chronic Kidney Disease Epidemiology Collaboration (CKD-EPI) equation refit without adjustment for race.       Narrative:      GFR Normal >60  Chronic Kidney Disease <60  Kidney Failure <15    The  GFR formula is only valid for adults with stable renal function between ages 18 and 70.    POC Glucose Once [[id number]]  (Abnormal) Collected: 11/14/22 1711    Specimen: Blood Updated: 11/14/22 1712     Glucose 229 mg/dL      Comment: Serial Number: [id number]:  [id number]       POC Glucose Once [[id number]]  (Abnormal) Collected: 11/14/22 1255    Specimen: Blood Updated: 11/14/22 1256     Glucose 157 mg/dL      Comment: Serial Number: [id number]:  [id number]       Blood Culture - Blood, Hand, Left [[id number]]  (Normal) Collected: 11/11/22 1107    Specimen: Blood from Hand, Left Updated: 11/14/22 1117     Blood Culture No growth at 3 days    Blood Culture - Blood, Arm, Left [[id number]]  (Normal) Collected: 11/11/22 1040    Specimen: Blood from Arm, Left Updated: 11/14/22 1045     Blood Culture No growth at 3 days    POC Glucose Once [[id number]]  (Abnormal) Collected: 11/14/22 0804    Specimen: Blood Updated: 11/14/22 0804     Glucose 151 mg/dL      Comment: Serial Number: [id number]:  [id number]             PENDING RESULTS: N/A    IMAGING REVIEWED:  No radiology results for the last day    I have reviewed the patient's labs, imaging, reports, and other clinician documentation.    Assessment & Plan   ASSESSMENT:  1. 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  Given early stage, her age and performance status, observation only is recommended.  Could do genetic evaluation for peoples syndrome as an OP.   2. Leukocytosis-due to sepsis and PNA.    Afebrile.  Resolved.  3. Chronic microcytic anemia-anemia work-up in progress.  S/p 1 dose of Ferrlecit 125mg on 10/24, Ferrlecit 250 mg IV daily x2 starting 11/9 and on oral Fe.  On Protonix.  Iron studies confirm BRANDY.  Retic appropriately elevated.  B12 normal, haptoglobin high and folate level low-replacement  started.  SPEP with no M spike.  4. Acute thrombocytosis-due to BRANDY and inflammatory response.  5. H/o CVA an elevated D-dimer-CT head w/o acute stroke.  On ASA and Plavix.    D-dimer elevated.  Lower extremity venous Dopplers and nuclear medicine perfusion scans negative.     6. Respiratory failure and PNA-s/p abx.  Per pulmonary.   7. CHF, HTN, HLD, pericardial effusion, S/p PCI x 1, NSTEMI-s/p heart cath.  On dual platelet therapy for 6 months. Per Cardiology.    8. Compression fractures, DM, hypothyroidism, acute renal insufficiency, and bipolar disorder-per Endocrine, PMD and psychiatry.   9. Sacral pressure injury: Skin care nurse following.     PLAN     1. Continue folate replacement 1 mg p.o. daily.  2. We will follow CBC and recommend to transfuse for hemoglobin < 7.   3. Genetic evaluation for peoples syndrome as an OP.   4. Observation with CT's and colonoscopy in 1 year unless patient opts for hospice.                    I discussed the patient's findings and my recommendations with patient and spouse.    Part of this note may be an electronic transcription/translation of spoken language to printed text using the Dragon Dictation System.    Electronically signed by Abdiel Contreras MD, 11/15/22, 8:03 AM EST.

## 2022-11-15 NOTE — NURSING NOTE
Patient discharging to [facility]. Report called to [facility]. IV site discontinued. Stent card given to spouse.. Prescriptions included in discharge packet. W/C transport to [facility]. , [place], transporting patient to facility. Number furnished for assistance for unloading upon arrival.

## 2022-11-15 NOTE — PLAN OF CARE
Goal Outcome Evaluation:            Goals of care met, patient discharging to [facility].

## 2022-11-15 NOTE — DISCHARGE INSTRUCTIONS
Patient was advised to follow-up with her primary care physician who will review her current medications.    Patient was advised to follow-up with her pulmonologist to reassess her pulmonary function.    Patient was advised to follow-up with wound care we will continue to treat the sacral wound.    Patient was advised to return to the emergency department if she experiences any recurrence of her symptoms.

## 2022-11-15 NOTE — DISCHARGE SUMMARY
[facility] Medicine Services  DISCHARGE SUMMARY    Patient Name: Laura Perkins  : 1940  MRN: [id number]    Date of Admission: 10/23/2022  Discharge Diagnosis: Acute respiratory failure with hypoxia/pneumonia  Date of Discharge: 11/15/2022.  Primary Care Physician: Beka Weir MD      Presenting Problem:   Elevated troponin [R77.8]  Fever, unspecified fever cause [R50.9]  Altered mental status, unspecified altered mental status type [R41.82]  Pneumonia of left lung due to infectious organism, unspecified part of lung [J18.9]    Active and Resolved Hospital Problems:  Active Hospital Problems    Diagnosis POA   • **Altered mental status, unspecified altered mental status type [R41.82] Yes     Priority: High   • Acute respiratory failure with hypoxia (HCC) [J96.01] Yes     Priority: High   • Pleural effusion [J90] Yes     Priority: High   • HAP (hospital-acquired pneumonia) [J18.9, Y95] Yes     Priority: High   • Sepsis (HCC) [A41.9] Yes     Priority: High   • Elevated troponin [R77.8] Yes     Priority: Medium   • Acute respiratory distress [R06.03] Yes   • Type 2 diabetes mellitus (HCC) [E11.9] Yes   • Anxiety associated with depression [F41.8] Yes   • Hypothyroidism (acquired) [E03.9] Yes   • OAB (overactive bladder) [N32.81] Yes   • GERD without esophagitis [K21.9] Yes   • Acute non-ST elevation myocardial infarction (NSTEMI) (HCC) [I21.4] Yes   • Dyslipidemia [E78.5] Yes   • 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         Hospital Course     From previous notes and with minor updates.      Hospital Course:    Laura Perkins is a 82 y.o. female who presented to [facility] on 10/23/2022 upon transfer from [facility] where she had been staying since 10/19/2022 after a transverse colon resection for 5 cm mass found on colonoscopy,  at [facility], [place], operated by [facility] on 10/18/2022.   Pathology report in progress.  Family in [facility] reported progressive weakness lethargy and alteration in mental status over the past 2 days.She appears frail, will awaken to voice and answer but falls back to sleep. She is oriented to self and follows simple commands.  and family at bedside assisting with history .  There is no slurred speech or facial droop.  No focal deficits.  She denies headache.  She does report some chest pain.family reports shortness of air and cough productive of brown sputum.  Temperature was 102 on admission, BP was stable she was tachycardic and tachypneic.  She does not normally use home oxygen and is now stable on 4 L via nasal cannula.  She triggered sepsis.  WBCs 13.3, lactic acid normal at 0.8 procalcitonin elevated at 0.32.  Urinalysis was negative for infection.  Chest x-ray per radiology indicated a possible small left midlung infiltrate and bronchitis.  She was started on IV vancomycin and IV cefepime in ED for hospital-acquired pneumonia.  Family reports no past medical history of heart failure or coronary artery disease.  Troponin mildly elevated at 0.87.  Patient reported chest pain but  could give no other details.  May be secondary to tachycardia and chest pain pleuritic, however serial troponins and continuous cardiac monitoring have been ordered. EKG shows no acute ST elevation. and family report she is a DNR with no CPR however they agree to intubation if needed and full support otherwise.  Creatinine mildly elevated at 1.13 BUN 33.  Family reports no history of chronic renal failure.PMH includes hypothyroidism, Bipolar disorder, Diabetes Mellitus type 2 with glucose today 439 and overactive bladder post re-suspension with mesh.  She was given an IV fluid bolus in ED and will be admitted for antibiotics for possible pneumonia with blood cultures pending.  Family concerned about possible aspiration.  He reports she had her esophagus stretched a few  months ago and underwent a swallow study prior to stretching which showed esophageal dysmotility. They noticed recently after her being intubated for surgery she spits up food.  Speech has been consulted.  Aspiration precautions in place.  This wonderful patient has had a complicated hospital course.  Cardiology consult was completed and patient underwent a cardiac catheterization with possible PCI.  Coronary artery disease was treated with Plavix.  Acute respiratory failure with hypoxia was treated with oxygen therapy.  Pulmonary consult was completed.  Sepsis and pneumonia was treated with antibiotics.  Endocrinology consult was completed because of diabetes and hyperglycemia.  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  Diabetes mellitus type 2 was treated with insulin therapy  Endocrinology consult was completed.  Altered mental status was treated with supportive care.  Psych consult was completed.  Appropriate patient's home medications were resumed in the hospital for other chronic medical conditions.  Patient and family reported significant improvement in her symptoms after over 23 days in the hospital and requested to be discharged to rehab facility.  Patient was advised to take her medications as prescribed.  The discharge medications are as per medication reconciliation list.  Patient was advised to follow-up with her primary care physician within 3 to 5 days of discharge.  Patient was advised to follow-up with her pulmonologist within 14 days of discharge.  Patient was advised to follow-up with her cardiologist within 14 days of discharge.  Patient was advised to return to the emergency department if she experiences any recurrence of her symptoms.  Patient and family agreed with the plan and patient was discharged in a stable condition.          DISCHARGE Follow Up Recommendations for labs and diagnostics:     Patient was  advised to follow-up with her primary care physician who will review her current medications.    Patient was advised to follow-up with her pulmonologist to reassess her pulmonary function.    Patient was advised to follow-up with her cardiologist will reassess her cardiac function.            Reasons For Change In Medications and Indications for New Medications:      Day of Discharge     Vital Signs:  Temp:  [97.6 °F (36.4 °C)-98.6 °F (37 °C)] 98.4 °F (36.9 °C)  Heart Rate:  [73-96] 82  Resp:  [14-22] 21  BP: (102-141)/(38-84) 138/49    Physical Exam:  Physical Exam  Vitals reviewed.   Constitutional:       General: She is not in acute distress.  HENT:      Head: Normocephalic and atraumatic.      Nose: Nose normal. No congestion or rhinorrhea.      Mouth/Throat:      Mouth: Mucous membranes are moist.      Pharynx: Oropharynx is clear. No oropharyngeal exudate or posterior oropharyngeal erythema.   Eyes:      Pupils: Pupils are equal, round, and reactive to light.   Cardiovascular:      Pulses: Normal pulses.      Heart sounds: Normal heart sounds. No murmur heard.    No friction rub. No gallop.      Comments: Good air entry bilaterally.  Pulmonary:      Effort: No respiratory distress.      Breath sounds: No wheezing, rhonchi or rales.   Chest:      Chest wall: No tenderness.   Abdominal:      General: Abdomen is flat. Bowel sounds are normal. There is no distension.      Palpations: Abdomen is soft. There is no mass.      Tenderness: There is no abdominal tenderness. There is no right CVA tenderness, left CVA tenderness, guarding or rebound.      Hernia: No hernia is present.   Musculoskeletal:         General: No swelling, tenderness, deformity or signs of injury.      Cervical back: Neck supple. No tenderness.      Right lower leg: No edema.      Left lower leg: No edema.   Skin:     Capillary Refill: Capillary refill takes less than 2 seconds.      Coloration: Skin is not jaundiced.      Findings: No bruising,  lesion or rash.   Neurological:      Mental Status: She is alert.      Comments: No facial asymmetry noted.  Gait and station not tested.   Psychiatric:      Comments: No agitation.                Pertinent  and/or Most Recent Results     LAB RESULTS:      Lab 11/14/22  0452 11/12/22  0512 11/11/22  0615 11/10/22  1629 11/10/22  0348   WBC 10.20 16.00* 19.30*  --  7.40   HEMOGLOBIN 8.3* 7.5* 7.4* 7.4* 7.4*   HEMATOCRIT 27.4* 25.2* 23.8* 23.6* 23.2*   PLATELETS 463* 428 472*  --  429   NEUTROS ABS 8.16* 13.40* 16.80*  --  5.55   LYMPHS ABS  --  1.30 1.50  --   --    MONOS ABS  --  1.00* 0.70  --   --    EOS ABS 0.82* 0.20 0.20  --  0.52*   MCV 78.7* 79.0 76.8*  --  76.6*         Lab 11/15/22  0302 11/14/22  0452 11/13/22  0546 11/12/22  0511 11/11/22  0416   SODIUM 139 141 140 141 142   POTASSIUM 4.2 4.0 3.8 4.1 4.1   CHLORIDE 99 98 99 100 102   CO2 28.0 29.0 27.0 29.0 30.0*   ANION GAP 12.0 14.0 14.0 12.0 10.0   BUN 41* 37* 40* 35* 36*   CREATININE 1.02* 1.17* 1.10* 1.02* 1.09*   EGFR 54.7* 46.4* 50.0* 54.7* 50.5*   GLUCOSE 150* 180* 138* 163* 91   CALCIUM 8.6 8.8 9.0 8.9 8.3*         Lab 11/10/22  0348   ALBUMIN 1.7*                 Lab 11/10/22  0348   IRON 38   IRON SATURATION 16*   TIBC 238*   TRANSFERRIN 160*   FERRITIN 197.00*   FOLATE 3.86*   VITAMIN B 12 632         Brief Urine Lab Results  (Last result in the past 365 days)      Color   Clarity   Blood   Leuk Est   Nitrite   Protein   CREAT   Urine HCG        11/11/22 1332 Yellow   Cloudy   Negative   Trace   Negative   Negative               Microbiology Results (last 10 days)     Procedure Component Value - Date/Time    Blood Culture - Blood, Hand, Left [[id number]]  (Normal) Collected: 11/11/22 1107    Lab Status: Preliminary result Specimen: Blood from Hand, Left Updated: 11/15/22 1115     Blood Culture No growth at 4 days    Blood Culture - Blood, Arm, Left [[id number]]  (Normal) Collected: 11/11/22 1040    Lab Status: Preliminary result Specimen: Blood  from Arm, Left Updated: 11/15/22 1047     Blood Culture No growth at 4 days    Body Fluid Culture - Body Fluid, Pleural Cavity [[id number]] Collected: 11/07/22 1033    Lab Status: Final result Specimen: Body Fluid from Pleural Cavity Updated: 11/10/22 0827     Body Fluid Culture No growth at 3 days     Gram Stain Moderate (3+) WBCs per low power field      No organisms seen          CT Abdomen Pelvis Without Contrast    Result Date: 10/24/2022  Impression: 1.     No evidence for significant nephrolithiasis. There is no evidence for obstructive uropathy. 2.     Postoperative changes are noted. There is no bowel dilatation or obstruction. A large stool burden is observed. 3.     No evidence for acute abnormality throughout the abdomen or pelvis on this noncontrast exam. 4.     Bibasilar consolidations are noted suggesting bibasilar pneumonia. 5.     Evidence for subacute moderate compression fractures at the L3 and L5 vertebral levels. There is no significant retropulsion of fracture fragments. There is no malalignment.  Electronically Signed By-Glen Otto MD On:10/24/2022 9:07 PM This report was finalized on [id number] by  Glen Otto MD.    CT Head Without Contrast    Result Date: 10/24/2022  Impression: 1.     No evidence for acute intracranial abnormality. 2.     Diffuse nonspecific white matter changes are noted with associated diffuse volume loss. These findings are likely related to chronic small vessel ischemic changes and/or age-related changes.  Electronically Signed By-Glen Otto MD On:10/24/2022 9:03 PM This report was finalized on [id number] by  Glen Otto MD.    CT Chest Without Contrast Diagnostic    Result Date: 11/3/2022  Impression:  1. Evolving extensive airspace consolidations with endobronchial debris concerning for evolving bilateral pneumonia. There is developing groundglass opacity within the right apex which is new since 10/30/2022. Moderate layering bilateral pleural  effusions persist. 2. Anasarca previously noted has improved.  Electronically Signed By-Shahram Kenny DO On:11/3/2022 4:34 PM This report was finalized on [id number] by  Shahram Kenny DO.    CT Chest Without Contrast Diagnostic    Result Date: 10/30/2022  Impression:  1. Limited study due to respiratory motion artifact. 2. There are moderate to large layering bilateral pleural effusions. There is compressive atelectasis on both lungs. 3. There is airspace disease in both lower lobes and the lingula raising suspicion of multifocal pneumonia. 4. Cardiomegaly, trace pericardial effusion, and coronary artery atherosclerotic vascular disease. 5. Suggestion of anasarca.  Electronically Signed By-London Aguilar MD On:10/30/2022 8:02 PM This report was finalized on [id number] by  London Aguilar MD.    NM Lung Scan Perfusion Particulate    Result Date: 11/10/2022  Impression:  1. Patchy decreased perfusion to the left lung has a similar distribution to opacities on chest x-ray. Findings likely reflect changes of multifocal pneumonia. Pulmonary embolism would be less favored.  Electronically Signed By-Jonathan Olivier MD On:11/10/2022 1:03 PM This report was finalized on [id number] by  Jonathan Olivier MD.    FL Video Swallow With Speech Single Contrast    Result Date: 11/9/2022  Impression: Modified barium swallow study with fluoroscopy. Please refer to the speech pathologist report for findings and dietary recommendations.  Electronically Signed By-Millicent Santos MD On:11/9/2022 10:16 AM This report was finalized on [id number] by  Millicent Santos MD.    FL Video Swallow With Speech Single Contrast    Result Date: 10/24/2022  Impression: For results, please refer to the speech pathology note.  Electronically Signed By-Adan Weiss MD On:10/24/2022 10:43 AM This report was finalized on [id number] by  Adan Weiss MD.    XR Chest 1 View    Result Date: 11/11/2022  Impression: Stable chest demonstrate cardiomegaly  with pulmonary vascular congestion, and left basilar airspace disease with small left pleural effusion  Electronically Signed By-Db Argueta On:11/11/2022 7:43 AM This report was finalized on [id number] by  Db Argueta, .    XR Chest 1 View    Result Date: 11/9/2022  Impression: Cardiomegaly and mild pulmonary vascular congestion, similar to slightly increased in the prior study Patchy opacities at the left lung base may represent atelectasis, asymmetric pulmonary edema or pneumonia. Electronically Signed: Des Duckworth MD 11/9/2022 10:32 EST Workstation ID: [id number]    XR Chest 1 View    Result Date: 11/8/2022  Impression: Persistent bibasilar airspace disease left greater than right which may relate to pneumonia, mildly worsened from the prior study.  Stable small bilateral pleural effusions.  Electronically Signed By-James Win MD On:11/8/2022 8:01 AM This report was finalized on [id number] by  James Win MD.    XR Chest 1 View    Result Date: 11/7/2022  Impression: 1. Decrease in size of left pleural effusion with small residual effusion. No pneumothorax. 2. Persistent bibasilar airspace disease left greater than right. 3. No chest tube visualized.  Electronically Signed By-James Win MD On:11/7/2022 11:29 AM This report was finalized on [id number] by  James Win MD.    XR Chest 1 View    Result Date: 11/6/2022  Impression:   1.  Stable cardiomegaly. 2.  No change in prominent interstitial markings favored to be due to interstitial edema. 3.  No change in left basilar airspace disease and moderate left pleural effusion.   Electronically Signed By-Mk Waldrop MD On:11/6/2022 6:22 PM This report was finalized on [id number] by  Mk Waldrop MD.    XR Chest 1 View    Result Date: 11/3/2022  Impression: IMPRESSION : Interval enlargement of left-sided pleural effusion and associated dependent consolidation[  Slight decrease in size of right-sided pleural effusion given differences  in technique  Diffuse prominence of interstitial markings suggest underlying interstitial edema and/or pneumonia. Findings are slightly increased from the comparison study  Electronically Signed By-Des Duckworth On:11/3/2022 6:40 AM This report was finalized on [id number] by  Des Duckworth, .    XR Chest 1 View    Result Date: 10/30/2022  Impression:  1. Interval development of small-to-moderate pleural effusions with compressive atelectasis. 2. There is bilateral mixed interstitial/airspace disease, left greater than right side likely due to multifocal pneumonia. This is new on the right side and has worsened on the left.  Electronically Signed By-London Aguilar MD On:10/30/2022 12:00 PM This report was finalized on [id number] by  London Aguilar MD.    XR Chest 1 View    Result Date: 10/23/2022  Impression:  1. Questionable small focal infiltrate in the periphery left midlung zone. 2. Bilateral diffuse increased lung markings consistent with chronic lung disease and bronchitis  Electronically Signed By-Tony Bonds MD On:10/23/2022 6:39 PM This report was finalized on [id number] by  Tony Bonds MD.    XR Abdomen KUB    Result Date: 11/2/2022  Impression: Nonspecific, nonobstructive bowel gas pattern.  Electronically Signed By-Lora Monk MD On:11/2/2022 12:26 PM This report was finalized on [id number] by  Lora Monk MD.      Results for orders placed during the hospital encounter of 10/23/22    Duplex Venous Upper Extremity - Right CAR    Interpretation Summary  •  Normal right upper extremity venous duplex scan.      Results for orders placed during the hospital encounter of 10/23/22    Duplex Venous Upper Extremity - Right CAR    Interpretation Summary  •  Normal right upper extremity venous duplex scan.      Results for orders placed during the hospital encounter of 10/23/22    Adult Transthoracic Echo Limited W/ Cont if Necessary Per Protocol    Interpretation  Summary  Normal LV size.  Mild apical hypokinesis seen.  Estimated LV ejection fraction of 45 to 50%  Normal RV size  Normal atrial size  Aortic valve, mitral valve, tricuspid valve appears structurally normal, no significant regurgitation seen.  Small pericardial effusion seen.  No signs of increased intrapericardial pressure  Proximal aorta appears normal in size.      Labs Pending at Discharge:  Pending Labs     Order Current Status    Blood Culture - Blood, Arm, Left Preliminary result    Blood Culture - Blood, Hand, Left Preliminary result          Procedures Performed  Procedure(s):  Left Heart Cath, possible pci         Consults:   Consults     Date and Time Order Name Status Description    11/9/2022 10:58 AM Hematology & Oncology Inpatient Consult Completed     10/31/2022  5:57 PM Inpatient Endocrinology Consult Completed     10/30/2022  2:24 PM Inpatient Pulmonology Consult Completed     10/27/2022  2:17 PM Inpatient Psychiatrist Consult Completed     10/24/2022  5:09 PM Inpatient Cardiology Consult Completed     10/24/2022 11:50 AM Inpatient Gastroenterology Consult Completed     10/23/2022  7:42 PM Hospitalist (on-call MD unless specified)              Discharge Details        Discharge Medications      New Medications      Instructions Start Date   albuterol sulfate  (90 Base) MCG/ACT inhaler  Commonly known as: PROVENTIL HFA;VENTOLIN HFA;PROAIR HFA   2 puffs, Inhalation, Every 4 Hours PRN      aspirin 81 MG EC tablet   81 mg, Oral, Daily   Start Date: November 16, 2022     benzonatate 200 MG capsule  Commonly known as: TESSALON   200 mg, Oral, 3 Times Daily PRN      clopidogrel 75 MG tablet  Commonly known as: PLAVIX   75 mg, Oral, Daily   Start Date: November 16, 2022     empagliflozin 10 MG tablet tablet  Commonly known as: JARDIANCE   10 mg, Oral, Daily   Start Date: November 16, 2022     folic acid 1 MG tablet  Commonly known as: FOLVITE   1 mg, Oral, Daily   Start Date: November 16, 2022      furosemide 20 MG tablet  Commonly known as: Lasix   20 mg, Oral, 2 Times Daily      guaiFENesin 600 MG 12 hr tablet  Commonly known as: MUCINEX   600 mg, Oral, Every 12 Hours Scheduled      HYDROcodone-acetaminophen 7.5-325 MG per tablet  Commonly known as: NORCO   1 tablet, Oral, Every 8 Hours PRN      insulin glargine 100 UNIT/ML injection  Commonly known as: LANTUS, SEMGLEE   20 Units, Subcutaneous, Every Morning   Start Date: November 16, 2022     insulin lispro 100 UNIT/ML injection  Commonly known as: ADMELOG   3-14 Units, Subcutaneous, 3 Times Daily With Meals      insulin lispro 100 UNIT/ML injection  Commonly known as: ADMELOG   7 Units, Subcutaneous, 3 Times Daily With Meals      losartan 25 MG tablet  Commonly known as: COZAAR   25 mg, Oral, Every 24 Hours Scheduled   Start Date: November 16, 2022     metoprolol succinate XL 25 MG 24 hr tablet  Commonly known as: TOPROL-XL   25 mg, Oral, Every 24 Hours Scheduled   Start Date: November 16, 2022     potassium chloride 20 MEQ CR tablet  Commonly known as: K-DUR,KLOR-CON   40 mEq, Oral, As Needed      Scopolamine 1 MG/3DAYS patch   1 patch, Transdermal, Every 72 Hours   Start Date: November 17, 2022     spironolactone 25 MG tablet  Commonly known as: ALDACTONE   25 mg, Oral, Daily   Start Date: November 16, 2022        Changes to Medications      Instructions Start Date   divalproex 500 MG DR tablet  Commonly known as: DEPAKOTE  What changed:   · medication strength  · how much to take  · when to take this   500 mg, Oral, 2 Times Daily      risperiDONE 0.5 MG tablet  Commonly known as: risperDAL  What changed:   · how much to take  · when to take this  · additional instructions   0.5 mg, Oral, Daily   Start Date: November 16, 2022        Continue These Medications      Instructions Start Date   FeroSul 325 (65 FE) MG tablet  Generic drug: ferrous sulfate   1 tablet, Oral, 2 Times Daily      FLUoxetine 20 MG capsule  Commonly known as: PROzac   1 capsule,  Oral, Daily      levothyroxine 50 MCG tablet  Commonly known as: SYNTHROID, LEVOTHROID   50 mcg, Oral, Daily      LORazepam 0.5 MG tablet  Commonly known as: ATIVAN   0.5 mg, Oral, 3 Times Daily      oxybutynin 5 MG tablet  Commonly known as: DITROPAN   5 mg, Oral, Every Morning      pantoprazole 40 MG EC tablet  Commonly known as: PROTONIX   40 mg, Oral, Every Morning      potassium chloride 10 MEQ CR capsule  Commonly known as: MICRO-K   20 mEq, Oral, 2 Times Daily      rosuvastatin 20 MG tablet  Commonly known as: CRESTOR   20 mg, Oral, Every Night at Bedtime         Stop These Medications    glipizide 10 MG tablet  Commonly known as: GLUCOTROL     Invokana 300 MG tablet tablet  Generic drug: Canagliflozin     metFORMIN  MG 24 hr tablet  Commonly known as: GLUCOPHAGE-XR     SITagliptin 100 MG tablet  Commonly known as: JANUVIA            No Known Allergies      Discharge Disposition: Stable.  Rehab Facility or Unit (DC - External)    Diet:  Hospital:  Diet Order   Procedures   • Diet Pureed (NDD 1); Pudding Thick         Discharge Activity: As tolerated.        CODE STATUS:  Code Status and Medical Interventions:   Ordered at: 11/07/22 1327     Medical Intervention Limits:    NO intubation (DNI)     Level Of Support Discussed With:    Next of Kin (If No Surrogate)    Health Care Surrogate     Code Status (Patient has no pulse and is not breathing):    No CPR (Do Not Attempt to Resuscitate)     Medical Interventions (Patient has pulse or is breathing):    Limited Support     Release to patient:    Routine Release         No future appointments.    Additional Instructions for the Follow-ups that You Need to Schedule     Ambulatory Referral to Cardiac Rehab   As directed            Time spent on Discharge including face to face service: 55 minutes    This patient has been examined wearing appropriate Personal Protective Equipment and discussed with hospital infection control department, state health  department, infectious disease specialist and pulmonologist. 11/15/22      Signature: Electronically signed by Alejandro Estrada MD, FACP, 11/15/22, 12:57 PM EST.

## 2022-11-15 NOTE — CASE MANAGEMENT/SOCIAL WORK
Case Management Discharge Note      Final Note: [facility]    Selected Continued Care - Prior Encounters Includes continued care and service providers with selected services from prior encounters from 7/25/2022 to 11/15/2022    Discharged on 10/21/2022 Admission date: 10/18/2022 - Discharge disposition: Rehab Facility or Unit (DC - External)    Destination     Service Provider Selected Services Address Phone Fax Patient Preferred    [facility] [address] [place] [address] [phone] --                    Transportation Services  Private: Car    Final Discharge Disposition Code: 03 - skilled nursing facility (SNF)

## 2022-11-15 NOTE — THERAPY TREATMENT NOTE
"Subjective: Pt agreeable to therapeutic plan of care.   Cognition: oriented to Person and Place. Pt more lethargic today. Requiring max v.c. to stay awake & following commands.     Objective:       Feeding: Max-A  ADL Position: sitting up in bed  ADL Comments: Requiring hand over hand assist for feeding. Max vc to initiate and follow-through with task.       Vitals: WNL    Pain: 0 VAS    Education: ADL training    Assessment: Laura Perkins presents with ADL impairments below baseline abilities which indicate the need for continued skilled intervention while inpatient. Pt more lethargic today requiring max vc to initiate & follow through with tasks. Requiring max hand over hand assist for feeding tasks. Tolerating session today without incident. Will continue to follow and progress as tolerated. Anticipate d/c to SNF soon.     Plan/Recommendations:   Moderate Intensity Therapy recommended post-acute care. This is recommended as therapy feels the patient would require 3-4 days per week and wouldn't tolerate \"3 hour daily\" rehab intensity. SNF would be the preferred choice. If the patient does not agree to SNF, arrange HH or OP depending on home bound status. If patient is medically complex, consider LTACH.. Pt requires no DME at discharge.     Pt desires Skilled Rehab placement at discharge. Pt cooperative; agreeable to therapeutic recommendations and plan of care.     Modified [place]: N/A = No pre-op stroke/TIA    Post-Tx Position: Supine with HOB Elevated, Alarms activated and Call light and personal items within reach. Spouse at b/s.   PPE: gloves and surgical mask  "

## 2022-11-16 LAB
BACTERIA SPEC AEROBE CULT: NORMAL
BACTERIA SPEC AEROBE CULT: NORMAL

## 2022-11-16 NOTE — PROGRESS NOTES
"Enter Query Response Below      Query Response:     Deep tissue injury to sacrum present on admission.         If applicable, please update the problem list.      Patient: Laura Stephens        : 1940  Account: [id number]           Admit Date:         How to Respond to this query:       a. Click New Note     b. Answer query within the yellow box.                c. Update the Problem List, if applicable.      If you have any questions about this query contact me at: [phone]    Dr. Estrada:     82 year old female admitted on 10/23/22 with sepsis, and pneumonia.   The wound care nursing note from 10/24/22 includes documentation of \"There is skin discoloration from chronic pressure as well as ecchymosis consistent with deep tissue pressure injury to the sacrum\".  Wound care recommendations of pressure injury prevention means with frequent turning from side to side and Calzime to gluteal fold for skin protection.      Can the patient's condition be further clarified as:     Deep tissue pressure injury to sacrum present on admission  Deep tissue pressure injury developing after admission  No pressure injury  Other___________________________________  Unable to determine     By submitting this query, we are merely seeking further clarification of documentation to accurately reflect all conditions that you are monitoring, evaluating, treating or that extend the hospitalization or utilize additional resources of care. Please utilize your independent clinical judgment when addressing the question(s) above.     This query and your response, once completed, will be entered into the legal medical record.    Sincerely,  Ina Howell RN, BSN  [email or web address]  Clinical Documentation Integrity Program   "

## 2022-11-17 NOTE — PROGRESS NOTES
"Enter Query Response Below      Query Response:     Metabolic encephalopathy related to sepsis.         If applicable, please update the problem list.      Patient: Laura Stephens        : 1940  Account: [id number]           Admit Date: 10/23/2022        How to Respond to this query:       a. Click New Note     b. Answer query within the yellow box.                c. Update the Problem List, if applicable.      If you have any questions about this query contact me at: [phone]    Dr. Estrada:     82 year old female admitted on 10/23/22 with sepsis, and pneumonia.   The Hospitalist progress note on 22 states, \"Acute toxic metabolic encephalopathy Multifactorial- polypharmacy/infection Mental status back to baseline\".  Medications administered during her hospital stay include IV cefepime, IV Zosyn, IV vancomcyin, and oral Augmentin.       After study, can the patient's condition be further clarified as;     Metabolic encephalopathy related to sepsis  Metabolic encephalopathy not related to sepsis  Other__________________________________  Unable to determine     By submitting this query, we are merely seeking further clarification of documentation to accurately reflect all conditions that you are monitoring, evaluating, treating or that extend the hospitalization or utilize additional resources of care. Please utilize your independent clinical judgment when addressing the question(s) above.     This query and your response, once completed, will be entered into the legal medical record.    Sincerely,  Ina Howell RN,BSN  [email or web address]  Clinical Documentation Integrity Program   "

## 2024-03-25 NOTE — PLAN OF CARE
Goal Outcome Evaluation:  Plan of Care Reviewed With: patient           Outcome Evaluation: Pt seen for physical therapy re-evaluation due to length of stay. Pt continuing to function below baseline with deficits in strength, balance, cognition and activity tolerance. She is requiring max A for bed mobility and mod Ax2 for transfers. Pt inconsistenly following commands and inconsistently answers questions appropriately however, is oriented x4. Pt will continue to require skilled PT while in current setting and SNF placement at d/c.   Per chart notes from virtual visit with MTM 3/21 patient is going to discuss alternatives to Humira with Rheumatologist at next appt 3/26/24.    Thank You,     Karyna Rosen CP  Specialty Pharmacy Clinic Liaison [facility] Specialty  [email or web address]  [email or web address]  Phone: [phone]  [email or web address]: [phone]

## 2025-02-24 NOTE — PROGRESS NOTES
FOLLOW UP NOTE      PATIENT:  Isabelle Lopez    MEDICAL RECORD NUMBER:  [id number]  YOB: 1958  AGE: 66 year old     DATE: 2/27/2025    TYPE OF APPOINTMENT:  Pharmacomanagement note.    CHIEF COMPLAINT:  \"About the same.\"    HISTORY OF PRESENTING ILLNESS:    Appointment visit: # 5   Tx plan (every 6 visits or 90 days which ever is longer) and Tx consent (yearly) signed on: 8/8/2024  Judy Lopez  is a 66 year old White  female who presents to the clinic today for medication management follow up. She was last seen 11/26/2024 for depression, anxiety and insomnia. She was coping with her symptoms at that time and we continued her same medications. Since then she lets me know that she has been hanging out at home. She is thinking about getting her closet organized coming up. She had been noticing since I saw her last she was having some mouth quivering noted with the abilify and then was having some lip concerns with the abilify. She has since stopped the abilify and symptoms did go away. She has been working on some dental stuff that she has going on right now. She has also had an eye appointment since I saw her last and got new glasses.    Judy Lopez  lets me know recently, mood has been \"about the same.\" She is trying to find motivation to help get things done around the house. Patient rates depression on a scale 1-10 (1 being good control and 10 being poor control) 5/10. Anxiety has been present with different things going on. She is going on vacation coming up and worried about her dog while she is on vacation. On scale of 1-10 (1 being good control and 10 being not well controlled) the patient rates anxiety 5/10. Coping skills include distraction with doing something else. Appetite has been good. Energy has been low as usual. She has some motivation to do things. Sleep has been ok and getting about 5 hours of sleep. Denies anhedonia. 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 female presented to [facility] on 10/23/2022 for mental status changes.  She was residing at [facility] and for 2 days she was noticed to be more lethargic and she was not speaking as much.  Her O2 saturations were mildly low and on admission to the ER she had a fever of 102.  She was diagnosed with pneumonia.  White count 13.3, hemoglobin 11.6, MCV 77.6 and platelets 380.  CMP was unremarkable.  Head CT showed no acute abnormality, there were some chronic small vessel ischemia changes.  CT A/P showed no nephrolithiasis.  There was no GI or  obstruction.  Large stool burden was present.  There is no evidence of acute abnormality.  Bibasilar consolidations were seen and subacute moderate compression fractures of L3 and L5 were present without malalignment.  Chest CT was limited due to respiratory motion artifact.  There were moderate to large layering bilateral pleural effusions with compressive atelectasis on both lungs.  Airspace disease was seen in bilateral lower lobes suggesting multifocal pneumonia.  Cardiomegaly with trace pericardial effusion was present as well as coronary artery atherosclerotic vascular disease.  Her troponin was elevated and on 10/27/2022 she underwent a cardiac catheterization by Dr. Miguel.  A drug-eluting stent was placed to the LAD.  TSH 10.81 (0.27-4.2), with history of hypothyroidism.  Follow-up chest CT on 11/3/2022, showed evolving extensive airspace consolidations and new right apex opacity and moderate bilateral pleural effusions.  Anasarca had improved.  On 2022, she underwent a left thoracentesis, pathology showed atypical cells (mesothelial cells versus malignant) and fluid was consistent with  plan. Denies homicidal ideation, intent or plan. Denies hallucinations.     PHQ 9 and REBECCA 7 reviewed in the appointment today.     Last four PHQ 2/9 Test Results  0: Not at all  1: Several days  2: More than half the days  3: Nearly every day          2/27/2025     9:32 AM 11/26/2024     9:25 AM 10/17/2024     9:19 AM 9/10/2024     9:30 AM   PHQ 2 Score   Adult PHQ 2 Score 3 2 2 4   Adult PHQ 2 Interpretation Further screening needed No further screening needed No further screening needed Further screening needed   Little interest or pleasure in activity? 2 2 2 2         2/27/2025     9:32 AM 11/26/2024     9:25 AM 10/17/2024     9:19 AM 9/10/2024     9:30 AM   PHQ 9 Score   Adult PHQ 9 Score 10 11 9 10   Adult PHQ 9 Interpretation Moderate Depression Moderate Depression Mild Depression Moderate Depression           Last four GAD7 Assessments           2/27/2025     9:30 AM 12/26/2024    12:30 PM 11/26/2024     9:30 AM 10/17/2024     9:30 AM   GAD7 Screening   GAD7 Score 6  3 2   Feeling nervous, anxious or on edge Several days  Not at all Not at all   Not being able to stop or control worrying Several days  Not at all Not at all   Worrying too much about different things More than half the days  Several days Not at all   Trouble relaxing Several days  Not at all Not at all   Being so restless that it's hard to sit still Not at all  Not at all Not at all   Becoming easily annoyed or irritable Not at all  More than half the days More than half the days   Feeling afraid as if something awful might happen Several days  Not at all Not at all   Date/time completed by patient via [employer]  12/19/2024  4:56 PM         Patient is currently on:  Current Outpatient Medications   Medication Sig    DULoxetine (CYMBALTA) 60 MG capsule TAKE 2 CAPSULES BY MOUTH DAILY    atorvastatin (LIPITOR) 40 MG tablet Take 1 tablet by mouth nightly.    losartan (COZAAR) 50 MG tablet Take 1 tablet by mouth daily.    metFORMIN (GLUCOPHAGE-XR)  transudate.  Her last CBC on 11/6/2022, showed a white count of 17.6, hemoglobin 8.6, MCV 77.8 and platelets 505.  Chest x-ray showed cardiomegaly left lower lobe opacities and mild pulmonary congestion.  Echocardiogram showed an EF of 45-50% and a small pericardial effusion.  Palliative care has been consulted and the patient is DNR and possibly interested in hospice care.  Additional disciplines consulted include endocrine, pulmonary, psychiatry, cardiology and gastroenterology.  Discharged from [facility] on 10/21/2022 following a 3-day admission for a laparoscopic partial transverse colectomy performed by Dr. PERI Michelle.  She had undergone an EGD/colonoscopy by Dr. Pope as an outpatient on 9/21/22 and was found to have a 5 cm mass in the transverse colon. CT a/p showed an abnormal colon wall thickening in the mid transverse colon with a prominent adjacent mesenteric lymph node.  There was a new L5 fx and L3 fx with some height loss.  The duodenal polyp path showed polypoid peptic duodenitis with prominent Brunner's glands.  A gastric polyp was hyperplastic, foveolar type.  Multiple colon polyps were removed that were tubular adenomas, a single serrated polyp with focal lamina propria spindle cell proliferation, hyperplastic polyp, a low grade dysplastic tubular adenoma and the transverse mass was invasive carcinoma.  Molecular testing revealed the mass to have loss of nuclear expression on IHC in MLH1 and PMS2.  Her 2 Jason was neg (1+).  BRAF mutation was detected codon 600 /D.  She was referred to [facility] but was too sick to be seen at the time of the appt.    Her colectomy was uneventful.  MSI on the tumor was the same. 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  Tumor size was 11 cm with invasion thru the muscularis propria and pericolonic tissue.  Margins were neg for malignancy.  There was no lymphovascular or perineural invasion.  0/20  500 MG 24 hr tablet Take 1 tablet by mouth daily (with breakfast).    busPIRone (BUSPAR) 5 MG tablet Take one tablet by mouth daily    Cholecalciferol (VITAMIN D) 2000 units capsule Take by mouth daily.      No current facility-administered medications for this visit.          ALLERGIES:   Allergen Reactions    Lisinopril Cough         Visit Vitals  LMP 06/01/1997 (Approximate)          PAST PSYCHIATRIC HISTORY   Social History     Social History Narrative    PSYCHIATRIC HISTORY:     Previous Diagnosis: Depression, anxiety    Previous Therapist: Patient denies    Previous Psychiatrist: Patient denies    Previous Psychiatric Hospitalizations: Patient denies    Previous Suicide Attempts: Patients denies    Self-Injurious Behavior: Patient denies    Past Psychiatric Medication Trials: Effexor (did not work), Cymbalta (partial positive response), Wellbutrin (450 mg made her shake), BuSpar, Prozac (minimal response at 60 mg daily), Lexapro (did not help), Abilify (tardive dyskinesia in mouth and lip)          SUICIDE RISK ASSESSMENT    YES NO If yes, describe    x Suicide attempt in last 24 hour?    x Suicidal thoughts?    x Plan or considering various methods? Describe:     x Access to means?  No Specify weapon location     x Indication of substance abuse/dependence?    x Attempts in past?      x Any family members, loved ones, friends who committed suicide?    x Recent deaths, losses, anniversary dates?    x Has made preparations for death?    x Lack of support system?       N/A Patient has safety plan     Patient has no current intent,or plan, but agrees to contact provider if Suicidal Ideation arises.     Patient aware of emergency 24 hour access information.        MENTAL STATUS EXAM   Appearance: [x] well-groomed   [x]  good eye contact   [x] appears stated age [] poor hygiene  [] disheveled  [] poor eye contact [] appears older than stated age  [] appears younger than stated age [] other:     Behavior:  [x] calm  lymph nodes were involved. Stage IIA (pT3, pN0).         11/09/22  Hematology/Oncology was consulted for her diagnosis of invasive moderately differentiated mucinous adenocarcinoma.       Past Medical History: DM in the past few years.  Bipolar disorder, hypothyroidism, CVA.  Surgical History:Hysterectomy in 1983 for uterine prolapse.    Social History:She lives in [place] with her .  She has been a housewife.  She did not smoke or drink alcohol.   Family History: Her brother had throat cancer.  Allergies: NKA     PCP: Beka Weir MD    INTERVAL HISTORY:  • 11/10/2022- iron 38 (), iron saturation 16 (20-50), TIBC 238 (298-536), ferritin 197 ().  Initiated on Ferrlecit 250 mg IV daily x2.  • 11/10/2022- white blood cell count 7.4, hemoglobin 7.4, platelets 429.  D-dimer 4.15 (< 0.6).  Retic 3.71.  Creatinine 1.43.  Normal bilateral lower extremity venous Dopplers.  Lung perfusion scan low probability PE.  • 11/11/2022- WBC 19.3, hemoglobin 7.4, platelet count 472,000.  Vitamin B12 632 (211-946), haptoglobin 399 (), folate 3.86 (4.70-24.2).  • 11/12/2022- WBC 16, hemoglobin 7.5.  SPEP with M spike not observed.  • 11/13/2022- agitated and anxious during the night.    History of present illness reviewed since last visit and changes noted on 11/13/22.    Subjective   Some cough and pain in in the bottom.    ROS:  Review of Systems   Constitutional: Negative for activity change, chills, fatigue, fever and unexpected weight change.   HENT: Negative for congestion, dental problem, hearing loss, mouth sores, nosebleeds, sore throat and trouble swallowing.    Eyes: Negative for photophobia and visual disturbance.   Respiratory: Positive for cough. Negative for chest tightness and shortness of breath.    Cardiovascular: Negative for chest pain, palpitations and leg swelling.   Gastrointestinal: Negative for abdominal distention, abdominal pain, blood in stool, constipation, diarrhea, nausea  [x] pleasant   [x] interactive  [] anxious [] hostile [] apathetic [] irritable [] psychomotor retardation [] other:     Muscle: [x] normal [] abnormal muscle tone/strength  [] normal movements noted  [] abnormal movements noted      Gait: [x] normal  [] slow  [] hyperactive [] bryant [] walker [] wheelchair [] other:    Cooperativity: [x]  cooperative  [x] forthcoming [x] appears reliable  [] guarded [] reliability questionable  [] suspicious [] other:    Speech:  [x] clear/articulate  [] soft  [] loud  []  pressured  []  animated  []  rambling  []  slurred []  poor articulation [] other:     Language: [x] no abnormality noted  [] repetition impaired/naming impaired [] other:     Mood/Feelings:  [x] normal  [x] euthymic  [] depressed  [] irritable [] anxious  []  fearful [] euphoric [] labile [] grief [] paranoia [] panic [] guilt/shame    [] apathy/Indifference  [] jealousy [] helpless [] hopeless [] other:    Affect: [x] mood-congruent [x] stable [x] normal range [] constricted [] flat [] other:    Thought Processes: [x] linear [x] logical [x]  goal-directed []  tangential   [] circumstantial []  disorganized [] blocking []  flight of ideas []  loose associations [] other:    Thought content: [x]  no overt delusions or abnormality noted [] delusions []  poverty of thought []  Preoccupations [] other:     Perception: [x]  no auditory visual hallucinations []  no auditory hallucinations []  no visual hallucinations []  auditory hallucinations []  visual hallucinations []  responding to internal stimuli  [] other:    Consciousness: [x] awake [x] alert [] drowsy [] other:    Recent memory: [x] good [] fair [] poor [] other:     Remote memory: [x] good [] fair [] poor [] other:     Fund of knowledge: [x] consistent with education and experiences as evidenced by vocabulary [] impaired [] other:     Attention: [x] good [] fair [] poor [] other:     Concentration: [x] good [] fair [] poor [] other:     Insight: [x] good   and vomiting.   Endocrine: Negative for cold intolerance and heat intolerance.   Genitourinary: Negative for decreased urine volume, difficulty urinating, dysuria, frequency, hematuria and urgency.   Musculoskeletal: Positive for back pain. Negative for arthralgias and gait problem.   Skin: Negative for rash and wound.   Neurological: Negative for dizziness, tremors, weakness, light-headedness, numbness and headaches.   Hematological: Negative for adenopathy. Does not bruise/bleed easily.   Psychiatric/Behavioral: Negative for confusion and hallucinations. The patient is not nervous/anxious.    All other systems reviewed and are negative.       MEDICATIONS:    Scheduled Meds:  aspirin, 81 mg, Oral, Daily  budesonide, 0.5 mg, Nebulization, BID - RT  clopidogrel, 75 mg, Oral, Daily  divalproex, 500 mg, Oral, BID  empagliflozin, 10 mg, Oral, Daily  ferrous sulfate, 324 mg, Oral, Daily With Breakfast  FLUoxetine, 20 mg, Oral, Daily  folic acid, 1 mg, Oral, Daily  furosemide, 20 mg, Intravenous, Q12H  guaiFENesin, 600 mg, Oral, Q12H  insulin glargine, 18 Units, Subcutaneous, QAM  insulin lispro, 3-14 Units, Subcutaneous, TID With Meals  insulin lispro, 6 Units, Subcutaneous, TID With Meals  ipratropium-albuterol, 3 mL, Nebulization, 4x Daily - RT  levothyroxine, 50 mcg, Oral, Daily  losartan, 25 mg, Oral, Q24H  magnesium oxide, 400 mg, Oral, Daily  metoprolol succinate XL, 25 mg, Oral, Q24H  pantoprazole, 40 mg, Oral, QAM  risperiDONE, 0.5 mg, Oral, Daily  rosuvastatin, 20 mg, Oral, Daily  Scopolamine, 1 patch, Transdermal, Q72H  spironolactone, 25 mg, Oral, Daily       Continuous Infusions:      PRN Meds:  •  acetaminophen  •  acetaminophen  •  acetaminophen  •  benzonatate  •  Calcium Gluconate-NaCl **AND** calcium gluconate **AND** Calcium, Ionized  •  dextrose  •  dextrose  •  docusate sodium  •  glucagon (human recombinant)  •  HYDROcodone-acetaminophen  •  LORazepam  •  magnesium sulfate **OR** magnesium sulfate  "**OR** magnesium sulfate  •  Morphine  •  phenol  •  polyethylene glycol  •  potassium & sodium phosphates **OR** potassium & sodium phosphates  •  potassium chloride  •  potassium chloride  •  [COMPLETED] Insert peripheral IV **AND** sodium chloride     ALLERGIES:  No Known Allergies    Objective    VITALS:   /42   Pulse 88   Temp 97.4 °F (36.3 °C)   Resp 18   Ht 162.6 cm (64\")   Wt 55 kg (121 lb 4.1 oz)   SpO2 97%   BMI 20.81 kg/m²     PHYSICAL EXAM:  Physical Exam  Vitals and nursing note reviewed.   Constitutional:       General: She is not in acute distress.     Appearance: Normal appearance. She is well-developed and normal weight. She is not toxic-appearing or diaphoretic.   HENT:      Head: Normocephalic and atraumatic.      Comments: Alopecia.     Right Ear: External ear normal.      Left Ear: External ear normal.      Nose: Nose normal.      Comments: Nasal cannula O2.     Mouth/Throat:      Mouth: Mucous membranes are moist.      Pharynx: Oropharynx is clear. No oropharyngeal exudate or posterior oropharyngeal erythema.      Comments: Dental fillings.  Eyes:      General: No scleral icterus.     Extraocular Movements: Extraocular movements intact.      Conjunctiva/sclera: Conjunctivae normal.      Pupils: Pupils are equal, round, and reactive to light.   Cardiovascular:      Rate and Rhythm: Normal rate and regular rhythm.      Heart sounds: Normal heart sounds. No murmur heard.     Comments: Cardiac monitor leads.  Pulmonary:      Effort: Pulmonary effort is normal. No respiratory distress.      Breath sounds: Normal breath sounds. No wheezing or rales.   Abdominal:      General: Bowel sounds are normal. There is no distension.      Palpations: Abdomen is soft. There is no mass.      Tenderness: There is no abdominal tenderness. There is no guarding.      Comments: Midline vertical scar above umbilicus.   Genitourinary:     Comments: Deferred   Musculoskeletal:         General: No swelling, " [] fair [] poor [] other:     Judgement: [x] good [] fair [] poor [] other:     Motivation to pursue treatment: [x] good [] fair [] poor [] other:     Level of engagement: [x] open [] guarded [] resistant [] other:        ASSESSMENT   1. Recurrent major depressive disorder, in full remission (CMD)    2. REBECCA (generalized anxiety disorder)    3. Other insomnia         Judy Lopez comes to the clinic for medication management. She feels like her depression is ok for the most part and denies any major depression. She has some anxiety with different things going on and we spent some time today talking about her anxiety and different ways to cope with her anxiety. Sleep has been ok with having at least 5 hours of sleep a night. Overall she is doing ok for the most part and will continue her same medications.     Greater than 40 minutes spent in chart review, pre-charting, encounter and charting.       Medication consent signed for new medication: Not needed   There have not been any new medication(s) prescribed today.  Controlled substance contract signed: Not needed   Last urine drug screen: Not needed   Next urine drug screen due: Not needed   Medication labs (lipid panel, TSH, A1c/glucose): Not needed  AIMS: Not needed      PLAN:  1. Continue same medications   2. Continue with anxiety and mood coping skills   3. Utilize proper sleep hygiene and sleep routine   4. Utilized daily routine to help with anxiety and mood management   5. Continue to follow up with other healthcare providers for other chronic health concerns   6. If medication refills are required between visits the medications can be refilled until next appointment  7. Return in about 3 months (around 5/27/2025).      Discussed risks, benefits and alternatives of medications especially:   []  Increased risk of suicide  [] metabolic risks such as increased weight gain, diabetes and hyperlipidemia; extrapyramidal side effects; and tardive dyskinesia  []  increased risk of seizures  [] increased risk of drug dependence []  legal risks  [] increased risk of death when mixed with alcohol or other depressants  [] Razo-Vasyl syndrome  [] Cardiac risks  [] Other:        Psychiatric Medications:  BuSpar 5 mg tablet Take one tablet by mouth daily  Cymbalta 60 mg capsule Take two capsules by mouth daily        Eloise Taylor DNP, YESENIA, FNP-BC, PMHNP-BC         tenderness or deformity. Normal range of motion.      Cervical back: Normal range of motion and neck supple. No rigidity.      Right lower leg: No edema.      Left lower leg: No edema.      Comments: Left upper extremity O2 monitor.     Lymphadenopathy:      Cervical: No cervical adenopathy.      Upper Body:      Right upper body: No supraclavicular adenopathy.      Left upper body: No supraclavicular adenopathy.   Skin:     General: Skin is warm and dry.      Coloration: Skin is not pale.      Findings: No bruising, erythema or rash.      Comments: Right upper extremity IV.   Neurological:      General: No focal deficit present.      Mental Status: She is alert and oriented to person, place, and time.      Coordination: Coordination normal.   Psychiatric:      Comments: Agitated and anxious during the night.           RECENT LABS:  Lab Results (last 24 hours)     Procedure Component Value Units Date/Time    POC Glucose Once [[id number]]  (Abnormal) Collected: 11/13/22 0710    Specimen: Blood Updated: 11/13/22 0711     Glucose 135 mg/dL      Comment: Serial Number: [id number]:  [id number]       Extra Tubes [[id number]] Collected: 11/13/22 0546    Specimen: Blood, Venous Line Updated: 11/13/22 0701    Narrative:      The following orders were created for panel order Extra Tubes.  Procedure                               Abnormality         Status                     ---------                               -----------         ------                     Lavender Top[[id number]]                                     Final result                 Please view results for these tests on the individual orders.    Lavender Top [[id number]] Collected: 11/13/22 0546    Specimen: Blood Updated: 11/13/22 0701     Extra Tube hold for add-on     Comment: Auto resulted       Basic Metabolic Panel [[id number]]  (Abnormal) Collected: 11/13/22 0546    Specimen: Blood Updated: 11/13/22 0612     Glucose 138 mg/dL      BUN 40 mg/dL       Creatinine 1.10 mg/dL      Sodium 140 mmol/L      Potassium 3.8 mmol/L      Chloride 99 mmol/L      CO2 27.0 mmol/L      Calcium 9.0 mg/dL      BUN/Creatinine Ratio 36.4     Anion Gap 14.0 mmol/L      eGFR 50.0 mL/min/1.73      Comment: National Kidney Foundation and American Society of Nephrology (ASN) Task Force recommended calculation based on the Chronic Kidney Disease Epidemiology Collaboration (CKD-EPI) equation refit without adjustment for race.       Narrative:      GFR Normal >60  Chronic Kidney Disease <60  Kidney Failure <15    The GFR formula is only valid for adults with stable renal function between ages 18 and 70.    POC Glucose Once [[id number]]  (Abnormal) Collected: 11/12/22 1617    Specimen: Blood Updated: 11/12/22 1619     Glucose 200 mg/dL      Comment: Serial Number: [id number]:  [id number]       POC Glucose Once [[id number]]  (Abnormal) Collected: 11/12/22 1124    Specimen: Blood Updated: 11/12/22 1125     Glucose 180 mg/dL      Comment: Serial Number: [id number]:  [id number]       Blood Culture - Blood, Hand, Left [[id number]]  (Normal) Collected: 11/11/22 1107    Specimen: Blood from Hand, Left Updated: 11/12/22 1117     Blood Culture No growth at 24 hours    Blood Culture - Blood, Arm, Left [[id number]]  (Normal) Collected: 11/11/22 1040    Specimen: Blood from Arm, Left Updated: 11/12/22 1047     Blood Culture No growth at 24 hours          PENDING RESULTS: Copper.    IMAGING REVIEWED:  No radiology results for the last day    I have reviewed the patient's labs, imaging, reports, and other clinician documentation.    Assessment & Plan   ASSESSMENT:  1. 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  Given early stage, her age and performance status, observation only is recommended.  Could do genetic evaluation for peoples syndrome as an  OP.   2. Leukocytosis-due to sepsis and PNA.    Afebrile.  3. Chronic microcytic anemia-anemia work-up in progress.  S/p 1 dose of Ferrlecit 125mg on 10/24, Ferrlecit 250 mg IV daily x2 starting 11/9 and on oral Fe.  On Protonix.  Iron studies confirm BRANDY.  Retic appropriately elevated.  B12 normal, haptoglobin high and folate level low-replacement started.  SPEP with no M spike.  4. Acute thrombocytosis-due to BRANDY and inflammatory response.  5. H/o CVA an elevated D-dimer-CT head w/o acute stroke.  On ASA and Plavix.    D-dimer elevated.  Lower extremity venous Dopplers and nuclear medicine perfusion scans negative.     6. Respiratory failure and PNA-s/p abx.  Per pulmonary.   7. CHF, HTN, HLD, pericardial effusion, S/p PCI x 1, NSTEMI-s/p heart cath.  On dual platelet therapy for 6 months. Per Cardiology.    8. Compression fractures, DM, hypothyroidism, acute renal insufficiency, pressure injury and bipolar disorder-per Endocrine, PMD and psychiatry.      PLAN    1. Copper level pending.   2. Continue folate replacement 1 mg p.o. daily.  3. We will follow CBC and recommend to transfuse for hemoglobin < 7.   4. Genetic evaluation for peoples syndrome as an OP.   5. Observation with CT's and colonoscopy in 1 year unless patient opts for hospice.                    I discussed the patient's findings and my recommendations with patient and spouse.    Part of this note may be an electronic transcription/translation of spoken language to printed text using the Dragon Dictation System.    Electronically signed by Abdiel Contreras MD, 11/13/22, 9:32 AM EST.

## (undated) DEVICE — GLV SURG PREMIERPRO ORTHO LTX PF SZ7.5 BRN

## (undated) DEVICE — ELECTRD BLD EZ CLN MOD XLNG 2.75IN

## (undated) DEVICE — WOUND RETRACTOR AND PROTECTOR: Brand: ALEXIS WOUND PROTECTOR-RETRACTOR

## (undated) DEVICE — ENDOPATH XCEL BLADELESS TROCARS WITH STABILITY SLEEVES: Brand: ENDOPATH XCEL

## (undated) DEVICE — GW PTFE EMERALD HEPCOAT FC J TIP STD .035 3MM 150CM

## (undated) DEVICE — HORIZON TI ML 6 CLIPS/CART
Type: IMPLANTABLE DEVICE | Site: ABDOMEN | Status: NON-FUNCTIONAL
Brand: WECK

## (undated) DEVICE — GLV SURG BIOGEL LTX PF 6

## (undated) DEVICE — GW RUNTHROUGH NS HYPERCOAT .014 3X180CM

## (undated) DEVICE — HARMONIC ACE +7 LAPAROSCOPIC SHEARS ADVANCED HEMOSTASIS 5MM DIAMETER 36CM SHAFT LENGTH  FOR USE WITH GRAY HAND PIECE ONLY: Brand: HARMONIC ACE

## (undated) DEVICE — ENDOCUT SCISSOR TIP, DISPOSABLE: Brand: RENEW

## (undated) DEVICE — PATIENT RETURN ELECTRODE, SINGLE-USE, CONTACT QUALITY MONITORING, ADULT, WITH 9FT CORD, FOR PATIENTS WEIGING OVER 33LBS. (15KG): Brand: MEGADYNE

## (undated) DEVICE — STPCK 3WY HP ROT

## (undated) DEVICE — CATH DIAG IMPULSE PIG .056 6F 110CM

## (undated) DEVICE — DISPOSABLE GRASPER CARTRIDGE: Brand: DIRECT DRIVE REPOSABLE GRASPERS

## (undated) DEVICE — SUT SILK 3/0 TIES 18IN A184H

## (undated) DEVICE — SUT SILK 0 TIES 18IN SA66G

## (undated) DEVICE — SUT SILK 2/0 SH CR8 18IN CR8 C012D

## (undated) DEVICE — ENDOPATH PNEUMONEEDLE INSUFFLATION NEEDLES WITH LUER LOCK CONNECTORS 120MM: Brand: ENDOPATH

## (undated) DEVICE — SUT PDS 0 CT1 36IN Z346H

## (undated) DEVICE — GOWN ,SIRUS,NONREINFORCED SMALL: Brand: MEDLINE

## (undated) DEVICE — SYR LUERLOK 30CC

## (undated) DEVICE — TRAP FLD MINIVAC MEGADYNE 100ML

## (undated) DEVICE — PK TRY HEART CATH 50

## (undated) DEVICE — SUT VIC 5/0 PS2 18IN J495H

## (undated) DEVICE — LOU LAP SIGMOID COLON: Brand: MEDLINE INDUSTRIES, INC.

## (undated) DEVICE — DEV INFL COMPAK W/ACCESSPLUS IN4530

## (undated) DEVICE — SUT GUT CHRM 3/0 SH 36IN G172H

## (undated) DEVICE — DEV COND GAS LAP INSUFLOW W/LUER CONN

## (undated) DEVICE — SKIN PREP TRAY W/CHG: Brand: MEDLINE INDUSTRIES, INC.

## (undated) DEVICE — 6F .070 XB 3 100CM: Brand: VISTA BRITE TIP

## (undated) DEVICE — TBG PENCL TELESCP MEGADYNE SMOKE EVAC 10FT

## (undated) DEVICE — PREP SOL POVIDONE/IODINE BT 4OZ

## (undated) DEVICE — WR CLIP ROTOBLATOR

## (undated) DEVICE — PINNACLE INTRODUCER SHEATH: Brand: PINNACLE

## (undated) DEVICE — ENDOPATH XCEL UNIVERSAL TROCAR STABLILITY SLEEVES: Brand: ENDOPATH XCEL

## (undated) DEVICE — SUT SILK 2/0 TIES 18IN A185H

## (undated) DEVICE — SUT VIC 0 TN 27IN DYED JTN0G

## (undated) DEVICE — BALN EUPHORA 3X15MM

## (undated) DEVICE — CATH DIAG IMPULSE FL4 6F 100CM

## (undated) DEVICE — CONTRST ISOVUE300 61PCT 50ML

## (undated) DEVICE — CATH DIAG IMPULSE FR4 6F 100CM

## (undated) DEVICE — TBG NAMIC PRESS MONTR A/ F/M 12IN

## (undated) DEVICE — LAPAROVUE VISIBILITY SYSTEM LAPAROSCOPIC SOLUTIONS: Brand: LAPAROVUE

## (undated) DEVICE — LAPAROSCOPIC SMOKE ELIMINATION DEVICE: Brand: PNEUVIEW XE

## (undated) DEVICE — SOL NACL 0.9PCT 1000ML

## (undated) DEVICE — TOTAL TRAY, 16FR 10ML SIL FOLEY, URN: Brand: MEDLINE